# Patient Record
Sex: MALE | Race: WHITE | NOT HISPANIC OR LATINO | Employment: OTHER | URBAN - NONMETROPOLITAN AREA
[De-identification: names, ages, dates, MRNs, and addresses within clinical notes are randomized per-mention and may not be internally consistent; named-entity substitution may affect disease eponyms.]

---

## 2017-01-17 ENCOUNTER — TRANSCRIBE ORDERS (OUTPATIENT)
Dept: LAB | Facility: MEDICAL CENTER | Age: 82
End: 2017-01-17

## 2017-01-17 ENCOUNTER — APPOINTMENT (OUTPATIENT)
Dept: LAB | Facility: MEDICAL CENTER | Age: 82
End: 2017-01-17
Payer: COMMERCIAL

## 2017-01-17 DIAGNOSIS — I48.20 CHRONIC ATRIAL FIBRILLATION (HCC): Primary | ICD-10-CM

## 2017-01-17 DIAGNOSIS — I48.20 CHRONIC ATRIAL FIBRILLATION (HCC): ICD-10-CM

## 2017-01-17 LAB
INR PPP: 2.65 (ref 0.86–1.16)
PROTHROMBIN TIME: 27.8 SECONDS (ref 12–14.3)

## 2017-01-17 PROCEDURE — 85610 PROTHROMBIN TIME: CPT

## 2017-01-17 PROCEDURE — 36415 COLL VENOUS BLD VENIPUNCTURE: CPT

## 2017-03-07 ENCOUNTER — TRANSCRIBE ORDERS (OUTPATIENT)
Dept: LAB | Facility: MEDICAL CENTER | Age: 82
End: 2017-03-07

## 2017-03-07 ENCOUNTER — APPOINTMENT (OUTPATIENT)
Dept: LAB | Facility: MEDICAL CENTER | Age: 82
End: 2017-03-07
Payer: COMMERCIAL

## 2017-03-07 DIAGNOSIS — I48.20 CHRONIC ATRIAL FIBRILLATION (HCC): Primary | ICD-10-CM

## 2017-03-07 DIAGNOSIS — I48.20 CHRONIC ATRIAL FIBRILLATION (HCC): ICD-10-CM

## 2017-03-07 LAB
INR PPP: 1.77 (ref 0.86–1.16)
PROTHROMBIN TIME: 20.5 SECONDS (ref 12–14.3)

## 2017-03-07 PROCEDURE — 36415 COLL VENOUS BLD VENIPUNCTURE: CPT

## 2017-03-07 PROCEDURE — 85610 PROTHROMBIN TIME: CPT

## 2017-03-30 ENCOUNTER — TRANSCRIBE ORDERS (OUTPATIENT)
Dept: LAB | Facility: MEDICAL CENTER | Age: 82
End: 2017-03-30

## 2017-03-30 ENCOUNTER — APPOINTMENT (OUTPATIENT)
Dept: LAB | Facility: MEDICAL CENTER | Age: 82
End: 2017-03-30
Payer: COMMERCIAL

## 2017-03-30 DIAGNOSIS — I48.20 CHRONIC ATRIAL FIBRILLATION (HCC): Primary | ICD-10-CM

## 2017-03-30 DIAGNOSIS — I48.20 CHRONIC ATRIAL FIBRILLATION (HCC): ICD-10-CM

## 2017-03-30 LAB
INR PPP: 2.37 (ref 0.86–1.16)
PROTHROMBIN TIME: 25.6 SECONDS (ref 12–14.3)

## 2017-03-30 PROCEDURE — 36415 COLL VENOUS BLD VENIPUNCTURE: CPT

## 2017-03-30 PROCEDURE — 85610 PROTHROMBIN TIME: CPT

## 2017-04-27 ENCOUNTER — TRANSCRIBE ORDERS (OUTPATIENT)
Dept: LAB | Facility: MEDICAL CENTER | Age: 82
End: 2017-04-27

## 2017-04-27 ENCOUNTER — LAB (OUTPATIENT)
Dept: LAB | Facility: MEDICAL CENTER | Age: 82
End: 2017-04-27
Payer: COMMERCIAL

## 2017-04-27 DIAGNOSIS — E78.5 HYPERLIPIDEMIA, UNSPECIFIED HYPERLIPIDEMIA TYPE: ICD-10-CM

## 2017-04-27 DIAGNOSIS — E03.9 HYPOTHYROIDISM, UNSPECIFIED TYPE: ICD-10-CM

## 2017-04-27 DIAGNOSIS — I48.20 CHRONIC ATRIAL FIBRILLATION (HCC): Primary | ICD-10-CM

## 2017-04-27 DIAGNOSIS — I11.9 HYPERTENSIVE ARTERIOSCLEROTIC CARDIOVASCULAR DISEASE: ICD-10-CM

## 2017-04-27 DIAGNOSIS — E55.9 VITAMIN D DEFICIENCY: ICD-10-CM

## 2017-04-27 DIAGNOSIS — E11.8 TYPE 2 DIABETES MELLITUS WITH COMPLICATION, UNSPECIFIED LONG TERM INSULIN USE STATUS: ICD-10-CM

## 2017-04-27 DIAGNOSIS — I48.20 CHRONIC ATRIAL FIBRILLATION (HCC): ICD-10-CM

## 2017-04-27 LAB
25(OH)D3 SERPL-MCNC: 22.8 NG/ML (ref 30–100)
ALBUMIN SERPL BCP-MCNC: 3.5 G/DL (ref 3.5–5)
ALP SERPL-CCNC: 86 U/L (ref 46–116)
ALT SERPL W P-5'-P-CCNC: 32 U/L (ref 12–78)
ANION GAP SERPL CALCULATED.3IONS-SCNC: 8 MMOL/L (ref 4–13)
AST SERPL W P-5'-P-CCNC: 14 U/L (ref 5–45)
BASOPHILS # BLD AUTO: 0.02 THOUSANDS/ΜL (ref 0–0.1)
BASOPHILS NFR BLD AUTO: 0 % (ref 0–1)
BILIRUB SERPL-MCNC: 0.87 MG/DL (ref 0.2–1)
BUN SERPL-MCNC: 22 MG/DL (ref 5–25)
CALCIUM SERPL-MCNC: 8.5 MG/DL (ref 8.3–10.1)
CHLORIDE SERPL-SCNC: 109 MMOL/L (ref 100–108)
CHOLEST SERPL-MCNC: 172 MG/DL (ref 50–200)
CO2 SERPL-SCNC: 24 MMOL/L (ref 21–32)
CREAT SERPL-MCNC: 1.12 MG/DL (ref 0.6–1.3)
EOSINOPHIL # BLD AUTO: 0.15 THOUSAND/ΜL (ref 0–0.61)
EOSINOPHIL NFR BLD AUTO: 2 % (ref 0–6)
ERYTHROCYTE [DISTWIDTH] IN BLOOD BY AUTOMATED COUNT: 13.9 % (ref 11.6–15.1)
EST. AVERAGE GLUCOSE BLD GHB EST-MCNC: 163 MG/DL
GFR SERPL CREATININE-BSD FRML MDRD: >60 ML/MIN/1.73SQ M
GLUCOSE P FAST SERPL-MCNC: 156 MG/DL (ref 65–99)
HBA1C MFR BLD: 7.3 % (ref 4.2–6.3)
HCT VFR BLD AUTO: 43.1 % (ref 36.5–49.3)
HGB BLD-MCNC: 14.4 G/DL (ref 12–17)
INR PPP: 3.06 (ref 0.86–1.16)
LDLC SERPL DIRECT ASSAY-MCNC: 111 MG/DL (ref 0–100)
LYMPHOCYTES # BLD AUTO: 1.89 THOUSANDS/ΜL (ref 0.6–4.47)
LYMPHOCYTES NFR BLD AUTO: 25 % (ref 14–44)
MCH RBC QN AUTO: 30.3 PG (ref 26.8–34.3)
MCHC RBC AUTO-ENTMCNC: 33.4 G/DL (ref 31.4–37.4)
MCV RBC AUTO: 91 FL (ref 82–98)
MONOCYTES # BLD AUTO: 0.72 THOUSAND/ΜL (ref 0.17–1.22)
MONOCYTES NFR BLD AUTO: 9 % (ref 4–12)
NEUTROPHILS # BLD AUTO: 4.81 THOUSANDS/ΜL (ref 1.85–7.62)
NEUTS SEG NFR BLD AUTO: 64 % (ref 43–75)
NRBC BLD AUTO-RTO: 0 /100 WBCS
PLATELET # BLD AUTO: 175 THOUSANDS/UL (ref 149–390)
PMV BLD AUTO: 11 FL (ref 8.9–12.7)
POTASSIUM SERPL-SCNC: 4.2 MMOL/L (ref 3.5–5.3)
PROT SERPL-MCNC: 6.7 G/DL (ref 6.4–8.2)
PROTHROMBIN TIME: 32.1 SECONDS (ref 12.1–14.4)
RBC # BLD AUTO: 4.76 MILLION/UL (ref 3.88–5.62)
SODIUM SERPL-SCNC: 141 MMOL/L (ref 136–145)
TSH SERPL DL<=0.05 MIU/L-ACNC: 2.85 UIU/ML (ref 0.36–3.74)
WBC # BLD AUTO: 7.62 THOUSAND/UL (ref 4.31–10.16)

## 2017-04-27 PROCEDURE — 36415 COLL VENOUS BLD VENIPUNCTURE: CPT

## 2017-04-27 PROCEDURE — 82306 VITAMIN D 25 HYDROXY: CPT

## 2017-04-27 PROCEDURE — 85610 PROTHROMBIN TIME: CPT

## 2017-04-27 PROCEDURE — 84443 ASSAY THYROID STIM HORMONE: CPT

## 2017-04-27 PROCEDURE — 82465 ASSAY BLD/SERUM CHOLESTEROL: CPT

## 2017-04-27 PROCEDURE — 80053 COMPREHEN METABOLIC PANEL: CPT

## 2017-04-27 PROCEDURE — 83721 ASSAY OF BLOOD LIPOPROTEIN: CPT

## 2017-04-27 PROCEDURE — 85025 COMPLETE CBC W/AUTO DIFF WBC: CPT

## 2017-04-27 PROCEDURE — 83036 HEMOGLOBIN GLYCOSYLATED A1C: CPT

## 2017-05-04 ENCOUNTER — TRANSCRIBE ORDERS (OUTPATIENT)
Dept: ADMINISTRATIVE | Facility: HOSPITAL | Age: 82
End: 2017-05-04

## 2017-05-04 DIAGNOSIS — M54.9 BACK PAIN, UNSPECIFIED BACK LOCATION, UNSPECIFIED BACK PAIN LATERALITY, UNSPECIFIED CHRONICITY: Primary | ICD-10-CM

## 2017-05-08 ENCOUNTER — HOSPITAL ENCOUNTER (OUTPATIENT)
Dept: RADIOLOGY | Facility: HOSPITAL | Age: 82
Discharge: HOME | End: 2017-05-08
Payer: COMMERCIAL

## 2017-05-08 ENCOUNTER — TRANSCRIBE ORDERS (OUTPATIENT)
Dept: ADMINISTRATIVE | Facility: HOSPITAL | Age: 82
End: 2017-05-08

## 2017-05-08 DIAGNOSIS — M54.9 BACK PAIN, UNSPECIFIED BACK LOCATION, UNSPECIFIED BACK PAIN LATERALITY, UNSPECIFIED CHRONICITY: Primary | ICD-10-CM

## 2017-05-08 DIAGNOSIS — M54.9 BACK PAIN, UNSPECIFIED BACK LOCATION, UNSPECIFIED BACK PAIN LATERALITY, UNSPECIFIED CHRONICITY: ICD-10-CM

## 2017-05-08 PROCEDURE — 72110 X-RAY EXAM L-2 SPINE 4/>VWS: CPT

## 2017-05-18 ENCOUNTER — TRANSCRIBE ORDERS (OUTPATIENT)
Dept: LAB | Facility: MEDICAL CENTER | Age: 82
End: 2017-05-18

## 2017-05-18 ENCOUNTER — APPOINTMENT (OUTPATIENT)
Dept: LAB | Facility: MEDICAL CENTER | Age: 82
End: 2017-05-18
Payer: COMMERCIAL

## 2017-05-18 DIAGNOSIS — I48.91 ATRIAL FIBRILLATION, UNSPECIFIED TYPE (HCC): Primary | ICD-10-CM

## 2017-05-18 DIAGNOSIS — I48.91 ATRIAL FIBRILLATION, UNSPECIFIED TYPE (HCC): ICD-10-CM

## 2017-05-18 LAB
INR PPP: 2.35 (ref 0.86–1.16)
PROTHROMBIN TIME: 26 SECONDS (ref 12.1–14.4)

## 2017-05-18 PROCEDURE — 85610 PROTHROMBIN TIME: CPT

## 2017-05-18 PROCEDURE — 36415 COLL VENOUS BLD VENIPUNCTURE: CPT

## 2017-06-21 ENCOUNTER — TRANSCRIBE ORDERS (OUTPATIENT)
Dept: LAB | Facility: MEDICAL CENTER | Age: 82
End: 2017-06-21

## 2017-06-21 ENCOUNTER — APPOINTMENT (OUTPATIENT)
Dept: LAB | Facility: MEDICAL CENTER | Age: 82
End: 2017-06-21
Payer: COMMERCIAL

## 2017-06-21 DIAGNOSIS — I48.20 CHRONIC ATRIAL FIBRILLATION (HCC): ICD-10-CM

## 2017-06-21 DIAGNOSIS — I48.20 CHRONIC ATRIAL FIBRILLATION (HCC): Primary | ICD-10-CM

## 2017-06-21 LAB
INR PPP: 2.68 (ref 0.86–1.16)
PROTHROMBIN TIME: 28.9 SECONDS (ref 12.1–14.4)

## 2017-06-21 PROCEDURE — 85610 PROTHROMBIN TIME: CPT

## 2017-06-21 PROCEDURE — 36415 COLL VENOUS BLD VENIPUNCTURE: CPT

## 2017-08-01 ENCOUNTER — APPOINTMENT (OUTPATIENT)
Dept: LAB | Facility: MEDICAL CENTER | Age: 82
End: 2017-08-01
Payer: COMMERCIAL

## 2017-08-01 ENCOUNTER — TRANSCRIBE ORDERS (OUTPATIENT)
Dept: LAB | Facility: MEDICAL CENTER | Age: 82
End: 2017-08-01

## 2017-08-01 DIAGNOSIS — I48.20 CHRONIC ATRIAL FIBRILLATION (HCC): Primary | ICD-10-CM

## 2017-08-01 DIAGNOSIS — I48.20 CHRONIC ATRIAL FIBRILLATION (HCC): ICD-10-CM

## 2017-08-01 LAB
INR PPP: 2 (ref 0.86–1.16)
PROTHROMBIN TIME: 22.9 SECONDS (ref 12.1–14.4)

## 2017-08-01 PROCEDURE — 85610 PROTHROMBIN TIME: CPT

## 2017-08-01 PROCEDURE — 36415 COLL VENOUS BLD VENIPUNCTURE: CPT

## 2017-08-25 ENCOUNTER — TRANSCRIBE ORDERS (OUTPATIENT)
Dept: LAB | Facility: MEDICAL CENTER | Age: 82
End: 2017-08-25

## 2017-08-25 ENCOUNTER — APPOINTMENT (OUTPATIENT)
Dept: LAB | Facility: MEDICAL CENTER | Age: 82
End: 2017-08-25
Payer: COMMERCIAL

## 2017-08-25 DIAGNOSIS — C61 CANCER OF PROSTATE (HCC): ICD-10-CM

## 2017-08-25 DIAGNOSIS — I48.20 CHRONIC ATRIAL FIBRILLATION (HCC): Primary | ICD-10-CM

## 2017-08-25 DIAGNOSIS — Z79.01 LONG TERM (CURRENT) USE OF ANTICOAGULANTS: ICD-10-CM

## 2017-08-25 DIAGNOSIS — I48.20 CHRONIC ATRIAL FIBRILLATION (HCC): ICD-10-CM

## 2017-08-25 LAB
INR PPP: 3.38 (ref 0.86–1.16)
PROTHROMBIN TIME: 34.7 SECONDS (ref 12.1–14.4)
PSA SERPL-MCNC: 0.2 NG/ML (ref 0–4)

## 2017-08-25 PROCEDURE — 36415 COLL VENOUS BLD VENIPUNCTURE: CPT

## 2017-08-25 PROCEDURE — 85610 PROTHROMBIN TIME: CPT

## 2017-08-25 PROCEDURE — G0103 PSA SCREENING: HCPCS

## 2017-09-06 ENCOUNTER — ALLSCRIPTS OFFICE VISIT (OUTPATIENT)
Dept: OTHER | Facility: OTHER | Age: 82
End: 2017-09-06

## 2017-09-06 LAB
BILIRUB UR QL STRIP: NORMAL
CLARITY UR: NORMAL
COLOR UR: YELLOW
GLUCOSE (HISTORICAL): NORMAL
HGB UR QL STRIP.AUTO: NORMAL
KETONES UR STRIP-MCNC: NORMAL MG/DL
LEUKOCYTE ESTERASE UR QL STRIP: NORMAL
NITRITE UR QL STRIP: NORMAL
PH UR STRIP.AUTO: 5.5 [PH]
PROT UR STRIP-MCNC: NORMAL MG/DL
SP GR UR STRIP.AUTO: 1.02
UROBILINOGEN UR QL STRIP.AUTO: 0.2

## 2017-09-12 ENCOUNTER — TRANSCRIBE ORDERS (OUTPATIENT)
Dept: LAB | Facility: MEDICAL CENTER | Age: 82
End: 2017-09-12

## 2017-09-12 ENCOUNTER — APPOINTMENT (OUTPATIENT)
Dept: LAB | Facility: MEDICAL CENTER | Age: 82
End: 2017-09-12
Payer: COMMERCIAL

## 2017-09-12 DIAGNOSIS — Z79.01 LONG TERM (CURRENT) USE OF ANTICOAGULANTS: ICD-10-CM

## 2017-09-12 DIAGNOSIS — Z86.010 PERSONAL HISTORY OF COLONIC POLYPS: ICD-10-CM

## 2017-09-12 DIAGNOSIS — I48.20 CHRONIC ATRIAL FIBRILLATION (HCC): ICD-10-CM

## 2017-09-12 DIAGNOSIS — I48.20 CHRONIC ATRIAL FIBRILLATION (HCC): Primary | ICD-10-CM

## 2017-09-12 DIAGNOSIS — Z01.818 PREOP EXAMINATION: ICD-10-CM

## 2017-09-12 LAB
ERYTHROCYTE [DISTWIDTH] IN BLOOD BY AUTOMATED COUNT: 14 % (ref 11.6–15.1)
HCT VFR BLD AUTO: 43 % (ref 36.5–49.3)
HGB BLD-MCNC: 14.5 G/DL (ref 12–17)
INR PPP: 2.38 (ref 0.86–1.16)
MCH RBC QN AUTO: 30.5 PG (ref 26.8–34.3)
MCHC RBC AUTO-ENTMCNC: 33.7 G/DL (ref 31.4–37.4)
MCV RBC AUTO: 90 FL (ref 82–98)
PLATELET # BLD AUTO: 182 THOUSANDS/UL (ref 149–390)
PMV BLD AUTO: 10.9 FL (ref 8.9–12.7)
PROTHROMBIN TIME: 26.3 SECONDS (ref 12.1–14.4)
RBC # BLD AUTO: 4.76 MILLION/UL (ref 3.88–5.62)
WBC # BLD AUTO: 7.35 THOUSAND/UL (ref 4.31–10.16)

## 2017-09-12 PROCEDURE — 36415 COLL VENOUS BLD VENIPUNCTURE: CPT

## 2017-09-12 PROCEDURE — 85027 COMPLETE CBC AUTOMATED: CPT

## 2017-09-12 PROCEDURE — 85610 PROTHROMBIN TIME: CPT

## 2017-10-18 ENCOUNTER — APPOINTMENT (OUTPATIENT)
Dept: LAB | Facility: MEDICAL CENTER | Age: 82
End: 2017-10-18
Payer: COMMERCIAL

## 2017-10-18 ENCOUNTER — TRANSCRIBE ORDERS (OUTPATIENT)
Dept: RADIOLOGY | Facility: MEDICAL CENTER | Age: 82
End: 2017-10-18

## 2017-10-18 DIAGNOSIS — I48.20 CHRONIC ATRIAL FIBRILLATION (HCC): Primary | ICD-10-CM

## 2017-10-18 DIAGNOSIS — Z79.01 LONG-TERM (CURRENT) USE OF ANTICOAGULANTS: ICD-10-CM

## 2017-10-18 DIAGNOSIS — I48.20 CHRONIC ATRIAL FIBRILLATION (HCC): ICD-10-CM

## 2017-10-18 LAB
INR PPP: 2.27 (ref 0.86–1.16)
PROTHROMBIN TIME: 25.3 SECONDS (ref 12.1–14.4)

## 2017-10-18 PROCEDURE — 85610 PROTHROMBIN TIME: CPT

## 2017-10-18 PROCEDURE — 36415 COLL VENOUS BLD VENIPUNCTURE: CPT

## 2017-11-15 ENCOUNTER — TRANSCRIBE ORDERS (OUTPATIENT)
Dept: LAB | Facility: MEDICAL CENTER | Age: 82
End: 2017-11-15

## 2017-11-15 ENCOUNTER — APPOINTMENT (OUTPATIENT)
Dept: LAB | Facility: MEDICAL CENTER | Age: 82
End: 2017-11-15
Payer: COMMERCIAL

## 2017-11-15 DIAGNOSIS — I48.20 CHRONIC ATRIAL FIBRILLATION (HCC): ICD-10-CM

## 2017-11-15 DIAGNOSIS — I11.0 HYPERTENSIVE CHF (HCC): ICD-10-CM

## 2017-11-15 DIAGNOSIS — E11.8 TYPE 2 DIABETES MELLITUS WITH COMPLICATION, UNSPECIFIED LONG TERM INSULIN USE STATUS: Primary | ICD-10-CM

## 2017-11-15 DIAGNOSIS — E55.9 VITAMIN D DEFICIENCY: ICD-10-CM

## 2017-11-15 DIAGNOSIS — Z79.01 LONG-TERM (CURRENT) USE OF ANTICOAGULANTS: ICD-10-CM

## 2017-11-15 DIAGNOSIS — E03.9 HYPOTHYROIDISM, UNSPECIFIED TYPE: ICD-10-CM

## 2017-11-15 DIAGNOSIS — E11.8 TYPE 2 DIABETES MELLITUS WITH COMPLICATION, UNSPECIFIED LONG TERM INSULIN USE STATUS: ICD-10-CM

## 2017-11-15 LAB
25(OH)D3 SERPL-MCNC: 31.1 NG/ML (ref 30–100)
ALBUMIN SERPL BCP-MCNC: 3.5 G/DL (ref 3.5–5)
ALP SERPL-CCNC: 88 U/L (ref 46–116)
ALT SERPL W P-5'-P-CCNC: 29 U/L (ref 12–78)
ANION GAP SERPL CALCULATED.3IONS-SCNC: 8 MMOL/L (ref 4–13)
AST SERPL W P-5'-P-CCNC: 15 U/L (ref 5–45)
BASOPHILS # BLD AUTO: 0.01 THOUSANDS/ΜL (ref 0–0.1)
BASOPHILS NFR BLD AUTO: 0 % (ref 0–1)
BILIRUB SERPL-MCNC: 0.87 MG/DL (ref 0.2–1)
BUN SERPL-MCNC: 24 MG/DL (ref 5–25)
CALCIUM SERPL-MCNC: 8.5 MG/DL (ref 8.3–10.1)
CHLORIDE SERPL-SCNC: 105 MMOL/L (ref 100–108)
CHOLEST SERPL-MCNC: 155 MG/DL (ref 50–200)
CO2 SERPL-SCNC: 25 MMOL/L (ref 21–32)
CREAT SERPL-MCNC: 1.21 MG/DL (ref 0.6–1.3)
EOSINOPHIL # BLD AUTO: 0.13 THOUSAND/ΜL (ref 0–0.61)
EOSINOPHIL NFR BLD AUTO: 2 % (ref 0–6)
ERYTHROCYTE [DISTWIDTH] IN BLOOD BY AUTOMATED COUNT: 14.1 % (ref 11.6–15.1)
EST. AVERAGE GLUCOSE BLD GHB EST-MCNC: 166 MG/DL
GFR SERPL CREATININE-BSD FRML MDRD: 55 ML/MIN/1.73SQ M
GLUCOSE P FAST SERPL-MCNC: 145 MG/DL (ref 65–99)
HBA1C MFR BLD: 7.4 % (ref 4.2–6.3)
HCT VFR BLD AUTO: 42.1 % (ref 36.5–49.3)
HDLC SERPL-MCNC: 36 MG/DL (ref 40–60)
HGB BLD-MCNC: 14.3 G/DL (ref 12–17)
INR PPP: 2.61 (ref 0.86–1.16)
LDLC SERPL CALC-MCNC: 93 MG/DL (ref 0–100)
LYMPHOCYTES # BLD AUTO: 1.66 THOUSANDS/ΜL (ref 0.6–4.47)
LYMPHOCYTES NFR BLD AUTO: 23 % (ref 14–44)
MCH RBC QN AUTO: 30.7 PG (ref 26.8–34.3)
MCHC RBC AUTO-ENTMCNC: 34 G/DL (ref 31.4–37.4)
MCV RBC AUTO: 90 FL (ref 82–98)
MONOCYTES # BLD AUTO: 0.74 THOUSAND/ΜL (ref 0.17–1.22)
MONOCYTES NFR BLD AUTO: 10 % (ref 4–12)
NEUTROPHILS # BLD AUTO: 4.67 THOUSANDS/ΜL (ref 1.85–7.62)
NEUTS SEG NFR BLD AUTO: 65 % (ref 43–75)
NRBC BLD AUTO-RTO: 0 /100 WBCS
PLATELET # BLD AUTO: 185 THOUSANDS/UL (ref 149–390)
PMV BLD AUTO: 10.9 FL (ref 8.9–12.7)
POTASSIUM SERPL-SCNC: 4 MMOL/L (ref 3.5–5.3)
PROT SERPL-MCNC: 7.1 G/DL (ref 6.4–8.2)
PROTHROMBIN TIME: 28.3 SECONDS (ref 12.1–14.4)
RBC # BLD AUTO: 4.66 MILLION/UL (ref 3.88–5.62)
SODIUM SERPL-SCNC: 138 MMOL/L (ref 136–145)
TRIGL SERPL-MCNC: 131 MG/DL
TSH SERPL DL<=0.05 MIU/L-ACNC: 2.63 UIU/ML (ref 0.36–3.74)
WBC # BLD AUTO: 7.23 THOUSAND/UL (ref 4.31–10.16)

## 2017-11-15 PROCEDURE — 84443 ASSAY THYROID STIM HORMONE: CPT

## 2017-11-15 PROCEDURE — 83036 HEMOGLOBIN GLYCOSYLATED A1C: CPT

## 2017-11-15 PROCEDURE — 36415 COLL VENOUS BLD VENIPUNCTURE: CPT

## 2017-11-15 PROCEDURE — 80053 COMPREHEN METABOLIC PANEL: CPT

## 2017-11-15 PROCEDURE — 85025 COMPLETE CBC W/AUTO DIFF WBC: CPT

## 2017-11-15 PROCEDURE — 80061 LIPID PANEL: CPT

## 2017-11-15 PROCEDURE — 82306 VITAMIN D 25 HYDROXY: CPT

## 2017-11-15 PROCEDURE — 85610 PROTHROMBIN TIME: CPT

## 2017-12-18 ENCOUNTER — TRANSCRIBE ORDERS (OUTPATIENT)
Dept: RADIOLOGY | Facility: MEDICAL CENTER | Age: 82
End: 2017-12-18

## 2017-12-18 ENCOUNTER — APPOINTMENT (OUTPATIENT)
Dept: LAB | Facility: MEDICAL CENTER | Age: 82
End: 2017-12-18
Payer: COMMERCIAL

## 2017-12-18 DIAGNOSIS — I48.20 CHRONIC ATRIAL FIBRILLATION (HCC): ICD-10-CM

## 2017-12-18 DIAGNOSIS — I48.20 CHRONIC ATRIAL FIBRILLATION (HCC): Primary | ICD-10-CM

## 2017-12-18 LAB
INR PPP: 2.36 (ref 0.86–1.16)
PROTHROMBIN TIME: 26.1 SECONDS (ref 12.1–14.4)

## 2017-12-18 PROCEDURE — 85610 PROTHROMBIN TIME: CPT

## 2017-12-18 PROCEDURE — 36415 COLL VENOUS BLD VENIPUNCTURE: CPT

## 2018-01-13 VITALS
DIASTOLIC BLOOD PRESSURE: 78 MMHG | SYSTOLIC BLOOD PRESSURE: 128 MMHG | WEIGHT: 250 LBS | HEIGHT: 73 IN | BODY MASS INDEX: 33.13 KG/M2

## 2018-01-29 ENCOUNTER — APPOINTMENT (OUTPATIENT)
Dept: LAB | Facility: MEDICAL CENTER | Age: 83
End: 2018-01-29
Payer: COMMERCIAL

## 2018-01-29 ENCOUNTER — TRANSCRIBE ORDERS (OUTPATIENT)
Dept: LAB | Facility: MEDICAL CENTER | Age: 83
End: 2018-01-29

## 2018-01-29 DIAGNOSIS — I48.20 CHRONIC ATRIAL FIBRILLATION (HCC): ICD-10-CM

## 2018-01-29 DIAGNOSIS — I48.20 CHRONIC ATRIAL FIBRILLATION (HCC): Primary | ICD-10-CM

## 2018-01-29 LAB
INR PPP: 3.61 (ref 0.86–1.16)
PROTHROMBIN TIME: 36.6 SECONDS (ref 12.1–14.4)

## 2018-01-29 PROCEDURE — 36415 COLL VENOUS BLD VENIPUNCTURE: CPT

## 2018-01-29 PROCEDURE — 85610 PROTHROMBIN TIME: CPT

## 2018-02-15 ENCOUNTER — APPOINTMENT (OUTPATIENT)
Dept: LAB | Facility: MEDICAL CENTER | Age: 83
End: 2018-02-15
Payer: COMMERCIAL

## 2018-02-15 ENCOUNTER — TRANSCRIBE ORDERS (OUTPATIENT)
Dept: RADIOLOGY | Facility: MEDICAL CENTER | Age: 83
End: 2018-02-15

## 2018-02-15 DIAGNOSIS — I48.20 CHRONIC ATRIAL FIBRILLATION (HCC): Primary | ICD-10-CM

## 2018-02-15 DIAGNOSIS — Z79.01 LONG-TERM (CURRENT) USE OF ANTICOAGULANTS: ICD-10-CM

## 2018-02-15 DIAGNOSIS — I48.20 CHRONIC ATRIAL FIBRILLATION (HCC): ICD-10-CM

## 2018-02-15 LAB
INR PPP: 2.52 (ref 0.86–1.16)
PROTHROMBIN TIME: 27.5 SECONDS (ref 12.1–14.4)

## 2018-02-15 PROCEDURE — 36415 COLL VENOUS BLD VENIPUNCTURE: CPT

## 2018-02-15 PROCEDURE — 85610 PROTHROMBIN TIME: CPT

## 2018-03-13 ENCOUNTER — TRANSCRIBE ORDERS (OUTPATIENT)
Dept: RADIOLOGY | Facility: MEDICAL CENTER | Age: 83
End: 2018-03-13

## 2018-03-13 ENCOUNTER — APPOINTMENT (OUTPATIENT)
Dept: LAB | Facility: MEDICAL CENTER | Age: 83
End: 2018-03-13
Payer: COMMERCIAL

## 2018-03-13 DIAGNOSIS — I48.20 CHRONIC ATRIAL FIBRILLATION (HCC): Primary | ICD-10-CM

## 2018-03-13 DIAGNOSIS — Z79.01 LONG-TERM (CURRENT) USE OF ANTICOAGULANTS: ICD-10-CM

## 2018-03-13 DIAGNOSIS — I48.20 CHRONIC ATRIAL FIBRILLATION (HCC): ICD-10-CM

## 2018-03-13 LAB
INR PPP: 2.06 (ref 0.86–1.16)
PROTHROMBIN TIME: 23.4 SECONDS (ref 12.1–14.4)

## 2018-03-13 PROCEDURE — 85610 PROTHROMBIN TIME: CPT

## 2018-03-13 PROCEDURE — 36415 COLL VENOUS BLD VENIPUNCTURE: CPT

## 2018-04-16 ENCOUNTER — APPOINTMENT (OUTPATIENT)
Dept: LAB | Facility: MEDICAL CENTER | Age: 83
End: 2018-04-16
Payer: COMMERCIAL

## 2018-04-16 ENCOUNTER — TRANSCRIBE ORDERS (OUTPATIENT)
Dept: LAB | Facility: MEDICAL CENTER | Age: 83
End: 2018-04-16

## 2018-04-16 DIAGNOSIS — I48.91 ATRIAL FIBRILLATION, UNSPECIFIED TYPE (HCC): ICD-10-CM

## 2018-04-16 DIAGNOSIS — I48.91 ATRIAL FIBRILLATION, UNSPECIFIED TYPE (HCC): Primary | ICD-10-CM

## 2018-04-16 DIAGNOSIS — Z79.01 LONG TERM (CURRENT) USE OF ANTICOAGULANTS: ICD-10-CM

## 2018-04-16 LAB
INR PPP: 2.85 (ref 0.86–1.16)
PROTHROMBIN TIME: 30.3 SECONDS (ref 12.1–14.4)

## 2018-04-16 PROCEDURE — 36415 COLL VENOUS BLD VENIPUNCTURE: CPT

## 2018-04-16 PROCEDURE — 85610 PROTHROMBIN TIME: CPT

## 2018-04-24 ENCOUNTER — APPOINTMENT (OUTPATIENT)
Dept: LAB | Facility: MEDICAL CENTER | Age: 83
End: 2018-04-24
Payer: COMMERCIAL

## 2018-04-24 ENCOUNTER — TRANSCRIBE ORDERS (OUTPATIENT)
Dept: LAB | Facility: MEDICAL CENTER | Age: 83
End: 2018-04-24

## 2018-04-24 DIAGNOSIS — I48.91 ATRIAL FIBRILLATION, UNSPECIFIED TYPE (HCC): Primary | ICD-10-CM

## 2018-04-24 DIAGNOSIS — I48.91 ATRIAL FIBRILLATION, UNSPECIFIED TYPE (HCC): ICD-10-CM

## 2018-04-24 DIAGNOSIS — Z79.01 LONG TERM (CURRENT) USE OF ANTICOAGULANTS: ICD-10-CM

## 2018-04-24 LAB
INR PPP: 1.8 (ref 0.86–1.16)
PROTHROMBIN TIME: 21 SECONDS (ref 12.1–14.4)

## 2018-04-24 PROCEDURE — 36415 COLL VENOUS BLD VENIPUNCTURE: CPT

## 2018-04-24 PROCEDURE — 85610 PROTHROMBIN TIME: CPT

## 2018-05-07 ENCOUNTER — TRANSCRIBE ORDERS (OUTPATIENT)
Dept: LAB | Facility: MEDICAL CENTER | Age: 83
End: 2018-05-07

## 2018-05-07 ENCOUNTER — APPOINTMENT (OUTPATIENT)
Dept: LAB | Facility: MEDICAL CENTER | Age: 83
End: 2018-05-07
Payer: COMMERCIAL

## 2018-05-07 DIAGNOSIS — I48.20 CHRONIC ATRIAL FIBRILLATION (HCC): ICD-10-CM

## 2018-05-07 DIAGNOSIS — Z79.01 LONG TERM (CURRENT) USE OF ANTICOAGULANTS: ICD-10-CM

## 2018-05-07 DIAGNOSIS — R60.9 EDEMA, UNSPECIFIED TYPE: Primary | ICD-10-CM

## 2018-05-07 DIAGNOSIS — R60.9 EDEMA, UNSPECIFIED TYPE: ICD-10-CM

## 2018-05-07 LAB
ANION GAP SERPL CALCULATED.3IONS-SCNC: 4 MMOL/L (ref 4–13)
BUN SERPL-MCNC: 18 MG/DL (ref 5–25)
CALCIUM SERPL-MCNC: 8.7 MG/DL (ref 8.3–10.1)
CHLORIDE SERPL-SCNC: 107 MMOL/L (ref 100–108)
CO2 SERPL-SCNC: 28 MMOL/L (ref 21–32)
CREAT SERPL-MCNC: 1.15 MG/DL (ref 0.6–1.3)
GFR SERPL CREATININE-BSD FRML MDRD: 59 ML/MIN/1.73SQ M
GLUCOSE P FAST SERPL-MCNC: 172 MG/DL (ref 65–99)
INR PPP: 2.33 (ref 0.86–1.16)
INR PPP: 2.33 (ref 0.86–1.17)
NT-PROBNP SERPL-MCNC: 1370 PG/ML
POTASSIUM SERPL-SCNC: 4.3 MMOL/L (ref 3.5–5.3)
PROTHROMBIN TIME: 25.8 SECONDS (ref 12.1–14.4)
SODIUM SERPL-SCNC: 139 MMOL/L (ref 136–145)

## 2018-05-07 PROCEDURE — 83880 ASSAY OF NATRIURETIC PEPTIDE: CPT

## 2018-05-07 PROCEDURE — 36415 COLL VENOUS BLD VENIPUNCTURE: CPT

## 2018-05-07 PROCEDURE — 85610 PROTHROMBIN TIME: CPT

## 2018-05-07 PROCEDURE — 80048 BASIC METABOLIC PNL TOTAL CA: CPT

## 2018-06-13 ENCOUNTER — APPOINTMENT (OUTPATIENT)
Dept: LAB | Facility: MEDICAL CENTER | Age: 83
End: 2018-06-13
Payer: COMMERCIAL

## 2018-06-13 ENCOUNTER — TRANSCRIBE ORDERS (OUTPATIENT)
Dept: ADMINISTRATIVE | Facility: HOSPITAL | Age: 83
End: 2018-06-13

## 2018-06-13 DIAGNOSIS — Z79.01 LONG TERM (CURRENT) USE OF ANTICOAGULANTS: ICD-10-CM

## 2018-06-13 DIAGNOSIS — I48.20 CHRONIC ATRIAL FIBRILLATION (HCC): Primary | ICD-10-CM

## 2018-06-13 DIAGNOSIS — I48.20 CHRONIC ATRIAL FIBRILLATION (HCC): ICD-10-CM

## 2018-06-13 LAB
INR PPP: 2.37 (ref 0.86–1.17)
INR PPP: 2.37 (ref 0.86–1.17)
PROTHROMBIN TIME: 26 SECONDS (ref 11.8–14.2)

## 2018-06-13 PROCEDURE — 85610 PROTHROMBIN TIME: CPT

## 2018-06-13 PROCEDURE — 36415 COLL VENOUS BLD VENIPUNCTURE: CPT

## 2018-07-09 ENCOUNTER — ANTICOAG VISIT (OUTPATIENT)
Dept: CARDIOLOGY CLINIC | Facility: CLINIC | Age: 83
End: 2018-07-09

## 2018-07-09 DIAGNOSIS — Z79.01 LONG TERM CURRENT USE OF ANTICOAGULANT: ICD-10-CM

## 2018-07-09 DIAGNOSIS — I48.91 ATRIAL FIBRILLATION, UNSPECIFIED TYPE (HCC): Primary | ICD-10-CM

## 2018-07-17 ENCOUNTER — TRANSCRIBE ORDERS (OUTPATIENT)
Dept: LAB | Facility: MEDICAL CENTER | Age: 83
End: 2018-07-17

## 2018-07-17 ENCOUNTER — APPOINTMENT (OUTPATIENT)
Dept: LAB | Facility: MEDICAL CENTER | Age: 83
End: 2018-07-17
Payer: COMMERCIAL

## 2018-07-17 DIAGNOSIS — I48.20 CHRONIC ATRIAL FIBRILLATION (HCC): ICD-10-CM

## 2018-07-17 DIAGNOSIS — Z79.01 LONG TERM (CURRENT) USE OF ANTICOAGULANTS: ICD-10-CM

## 2018-07-17 DIAGNOSIS — I48.20 CHRONIC ATRIAL FIBRILLATION (HCC): Primary | ICD-10-CM

## 2018-07-17 LAB
INR PPP: 2.47 (ref 0.86–1.17)
PROTHROMBIN TIME: 26.8 SECONDS (ref 11.8–14.2)

## 2018-07-17 PROCEDURE — 36415 COLL VENOUS BLD VENIPUNCTURE: CPT

## 2018-07-17 PROCEDURE — 85610 PROTHROMBIN TIME: CPT

## 2018-07-18 ENCOUNTER — ANTICOAG VISIT (OUTPATIENT)
Dept: CARDIOLOGY CLINIC | Facility: CLINIC | Age: 83
End: 2018-07-18

## 2018-07-19 NOTE — PROGRESS NOTES
If no changes in medication health or diet  No missed or extra doses  No s/s of bleeding TIA or CVA  No new ABX, NSAIDs OCT meds, or suppliments  Continue the the same dose and recheck in one month per Mike Nick  Left message for Uri Thornton and told him to call back if any issues

## 2018-08-15 ENCOUNTER — EVALUATION (OUTPATIENT)
Dept: PHYSICAL THERAPY | Facility: CLINIC | Age: 83
End: 2018-08-15
Payer: COMMERCIAL

## 2018-08-15 DIAGNOSIS — M54.50 ACUTE LEFT-SIDED LOW BACK PAIN WITHOUT SCIATICA: Primary | ICD-10-CM

## 2018-08-15 PROCEDURE — 97161 PT EVAL LOW COMPLEX 20 MIN: CPT | Performed by: PHYSICAL THERAPIST

## 2018-08-15 PROCEDURE — 97140 MANUAL THERAPY 1/> REGIONS: CPT | Performed by: PHYSICAL THERAPIST

## 2018-08-15 PROCEDURE — G8991 OTHER PT/OT GOAL STATUS: HCPCS | Performed by: PHYSICAL THERAPIST

## 2018-08-15 PROCEDURE — G8990 OTHER PT/OT CURRENT STATUS: HCPCS | Performed by: PHYSICAL THERAPIST

## 2018-08-15 PROCEDURE — 97110 THERAPEUTIC EXERCISES: CPT | Performed by: PHYSICAL THERAPIST

## 2018-08-15 NOTE — PROGRESS NOTES
PT Evaluation     Today's date: 8/15/2018  Patient name: Ritchie Elias  : 1935  MRN: [id number]  Referring provider: Kory Hager DO  Dx:   Encounter Diagnosis     ICD-10-CM    1  Acute left-sided low back pain without sciatica M54 5                   Assessment  Impairments: activity intolerance, impaired balance, impaired physical strength and pain with function    Assessment details: Ritchie Elias is a 80y o  year old male presenting to PT with pain, decreased range of motion, decreased strength, and decreased tolerance to activity  Signs and symptoms are consistent with referring diagnosis of low back pain, and more specifically consistent with sacro-iliac joint dysfucntion  He is found with functional leg length discrepancy on eval, which is corrected using muscle energy techniques  The existing impairments result in difficulty performing all standing and walking tasks due to pain  Yen Brower would benefit from skilled PT services to address these issues and to maximize function  Home exercise provided and all questions answered  Thank you for the referral     Understanding of Dx/Px/POC: good   Prognosis: good    Goals      SHORT TERM GOALS (2-4 WEEKS)    Increase lumbar spine AROM 25% in    Increase lower extremity strength 10lbs in all weak areas  Improve sitting posture and body mechanics  Standing/ADL tolerance >60 minutes          LONG TERM GOALS (DISCHARGE)    Demonstrate lumbar rotation >75%  Decrease pain to <2/10 with activity  Independent with HEP  Resolve leg length discrepancy    Plan  Planned modality interventions: cryotherapy  Planned therapy interventions: joint mobilization, manual therapy, therapeutic activities and therapeutic exercise        Subjective Evaluation    History of Present Illness  Date of onset: 2018  Mechanism of injury: Chaparrita Dasilva reports acute onset of back pain last week, which began while bending/twisting forward to reach his foot while standing    He immediately felt pain, and attempted to rest for 4 days to reduce his pain  Upon MD evaluation, his pain was controlled with medication, however he continues to have some pain in his L side low back/hip  Pain originated straight across his back in an SIJ pattern  Patient also c/o BLE neuropathy in stocking pattern, decreased BLE strength and impaired balance  Quality of life: good    Pain  Current pain ratin  At best pain ratin  At worst pain ratin  Location: L side low back/hip  Quality: dull ache, sharp and pressure  Relieving factors: rest and change in position  Aggravating factors: lifting and walking  Progression: improved          Objective     Observations     Additional Observation Details  Leg length discrepancy   LLE>RLE by 1 5 inches, consistent with L anterior rotation of innominate  Strength/Myotome Testing     Additional Strength Details  Hip flexion  R: 32#  L: 35#  Knee extension  R: 44#  L:37#  Dorsiflexion  R: 52#  L: 59#      Flowsheet Rows      Most Recent Value   PT/OT G-Codes   Current Score  52   Projected Score  74   Assessment Type  Evaluation   G code set  Other PT/OT Primary   Other PT Primary Current Status ()  CK   Other PT Primary Goal Status ()  CJ        Precautions: n/a    Daily Treatment Diary         Manual  8-15            P/A Mob             Flex/Rot Mob             STM             Muscle energy 8'                         Exercise Diary              Bike L5 10'            Bridge 2x10            Press Ups             LTR             DKTC             HR/TR nv            Anti Rotation             squats nv            Dead Bug             Abd bracing nv            Step ups nv            Lat Pulldown:  S             Rotary Torso             Back Extension nv                                                                                                       Modalities              CP

## 2018-08-20 ENCOUNTER — APPOINTMENT (OUTPATIENT)
Dept: LAB | Facility: MEDICAL CENTER | Age: 83
End: 2018-08-20
Payer: COMMERCIAL

## 2018-08-20 ENCOUNTER — TRANSCRIBE ORDERS (OUTPATIENT)
Dept: LAB | Facility: MEDICAL CENTER | Age: 83
End: 2018-08-20

## 2018-08-20 DIAGNOSIS — I48.91 ATRIAL FIBRILLATION, UNSPECIFIED TYPE (HCC): Primary | ICD-10-CM

## 2018-08-20 DIAGNOSIS — Z79.01 LONG TERM (CURRENT) USE OF ANTICOAGULANTS: ICD-10-CM

## 2018-08-20 DIAGNOSIS — I48.91 ATRIAL FIBRILLATION, UNSPECIFIED TYPE (HCC): ICD-10-CM

## 2018-08-20 LAB
INR PPP: 3.39 (ref 0.86–1.17)
PROTHROMBIN TIME: 34.3 SECONDS (ref 11.8–14.2)

## 2018-08-20 PROCEDURE — 36415 COLL VENOUS BLD VENIPUNCTURE: CPT

## 2018-08-20 PROCEDURE — 85610 PROTHROMBIN TIME: CPT

## 2018-08-21 ENCOUNTER — ANTICOAG VISIT (OUTPATIENT)
Dept: CARDIOLOGY CLINIC | Facility: CLINIC | Age: 83
End: 2018-08-21

## 2018-08-21 DIAGNOSIS — Z79.01 LONG TERM (CURRENT) USE OF ANTICOAGULANTS: ICD-10-CM

## 2018-08-21 DIAGNOSIS — I48.19 PERSISTENT ATRIAL FIBRILLATION (HCC): Primary | ICD-10-CM

## 2018-08-21 NOTE — PROGRESS NOTES
If no changes in medication health or diet  No missed or extra doses  No s/s of bleeding TIA or CVA  No new ABX, NSAIDs OCT meds, or suppliments  Reduce dose as directed and check in two weeks     Semaj Arguello PA-C

## 2018-08-22 ENCOUNTER — OFFICE VISIT (OUTPATIENT)
Dept: PHYSICAL THERAPY | Facility: CLINIC | Age: 83
End: 2018-08-22
Payer: COMMERCIAL

## 2018-08-22 DIAGNOSIS — M54.50 ACUTE LEFT-SIDED LOW BACK PAIN WITHOUT SCIATICA: Primary | ICD-10-CM

## 2018-08-22 PROCEDURE — 97110 THERAPEUTIC EXERCISES: CPT

## 2018-08-22 PROCEDURE — 97112 NEUROMUSCULAR REEDUCATION: CPT

## 2018-08-22 PROCEDURE — 97010 HOT OR COLD PACKS THERAPY: CPT

## 2018-08-22 NOTE — PROGRESS NOTES
Daily Note     Today's date: 2018  Patient name: Finn Chinchilla  : 1935  MRN: [id number]  Referring provider: Joslyn Garza DO  Dx:   Encounter Diagnosis     ICD-10-CM    1  Acute left-sided low back pain without sciatica M54 5                   Subjective: Patient reports LBP has decreased since initial evaluation  Objective: See treatment diary below  Incorporated Full Te program today  Assessment: Tolerated treatment well  Patient had no change in LB t/o session today    Patient unable to lay on back due to rib pain  Plan: Continue per plan of care  Precautions: n/a     Daily Treatment Diary            Manual  8-15  8-22                   P/A Mob                       Flex/Rot Mob                       STM                       Muscle energy 8'                                             Exercise Diary                        Bike L5 10' 10'                   Bridge 2x10                     Press Ups                       LTR                       DKTC                       HR/TR nv  2x10                   Anti Rotation    L 3 2x10                   squats nv  2x10                   Dead Bug                       Abd bracing                      Step ups nv  6" 2x10                   Lat Pulldown:  S                       Rotary Torso                       Back Extension nv  80# 2x10                   Standing Hip x 3     3# 2x10                                                                                                                                                                   Modalities                        CP    10'

## 2018-08-24 NOTE — PROGRESS NOTES
Patient called because nobody called him in regards to his PT/INR  I gave him instructions, and he did state that he is was on Prednisone       Ok to continue lower dose especially if on Prednisone  thank you  EVELIN

## 2018-08-29 ENCOUNTER — OFFICE VISIT (OUTPATIENT)
Dept: PHYSICAL THERAPY | Facility: CLINIC | Age: 83
End: 2018-08-29
Payer: COMMERCIAL

## 2018-08-29 DIAGNOSIS — M54.50 ACUTE LEFT-SIDED LOW BACK PAIN WITHOUT SCIATICA: Primary | ICD-10-CM

## 2018-08-29 PROCEDURE — 97110 THERAPEUTIC EXERCISES: CPT

## 2018-08-29 PROCEDURE — 97010 HOT OR COLD PACKS THERAPY: CPT

## 2018-08-29 PROCEDURE — 97112 NEUROMUSCULAR REEDUCATION: CPT

## 2018-08-29 NOTE — PROGRESS NOTES
Daily Note     Today's date: 2018  Patient name: Dionisio Goltz  : 1935  MRN: [id number]  Referring provider: Sujatha Chatman DO  Dx:   Encounter Diagnosis     ICD-10-CM    1  Acute left-sided low back pain without sciatica M54 5                   Subjective: Patient reports that his LBP has decreased since initial visit  Ribs are still sore  Objective: See treatment diary below  Increased resistance on back extension today  Assessment: Tolerated treatment well  Patient had no change in LBP t/o program        Plan: Continue per plan of care  Potential discharge next session  Precautions: n/a     Daily Treatment Diary            Manual  8-15  8-22  8-29                 P/A Mob                       Flex/Rot Mob                       STM                       Muscle energy 8'                                             Exercise Diary                        Bike L5 10' 10'  10'                 Bridge 2x10                     Press Ups                       LTR                       DKTC                       HR/TR nv  2x10  2x10                 Anti Rotation    L 3 2x10  L 3 2x10                 squats nv  2x10  2x10                 Dead Bug                       Abd bracing                       Step ups nv  6" 2x10  6" 2x10                 Lat Pulldown:  S                       Rotary Torso                       Back Extension nv  80# 2x10  100# 2x10                 Standing Hip x 3     3# 2x10  3# 2x10                                                                                                                                                                 Modalities                        CP    10'  10'

## 2018-09-05 ENCOUNTER — OFFICE VISIT (OUTPATIENT)
Dept: PHYSICAL THERAPY | Facility: CLINIC | Age: 83
End: 2018-09-05
Payer: COMMERCIAL

## 2018-09-05 DIAGNOSIS — M54.50 ACUTE LEFT-SIDED LOW BACK PAIN WITHOUT SCIATICA: Primary | ICD-10-CM

## 2018-09-05 PROCEDURE — 97110 THERAPEUTIC EXERCISES: CPT | Performed by: PHYSICAL THERAPIST

## 2018-09-05 PROCEDURE — 97112 NEUROMUSCULAR REEDUCATION: CPT | Performed by: PHYSICAL THERAPIST

## 2018-09-05 PROCEDURE — G8992 OTHER PT/OT  D/C STATUS: HCPCS | Performed by: PHYSICAL THERAPIST

## 2018-09-05 PROCEDURE — G8991 OTHER PT/OT GOAL STATUS: HCPCS | Performed by: PHYSICAL THERAPIST

## 2018-09-05 NOTE — PROGRESS NOTES
PT Discharge    Today's date: 2018  Patient name: Noy Gasca  : 1935  MRN: [id number]  Referring provider: Francetta Merlin, DO  Dx:   Encounter Diagnosis     ICD-10-CM    1  Acute left-sided low back pain without sciatica M54 5                   Assessment  Impairments: activity intolerance, impaired balance, impaired physical strength and pain with function    Assessment details: Noy Gasca is a 80y o  year old male presenting to PT with pain with resolution of all previous symptoms  No further skilled PT indicated  HEP updated to include CARRIE to promote lumbar extension and to centralize symptoms if they return  Understanding of Dx/Px/POC: good   Prognosis: good    Goals      SHORT TERM GOALS (2-4 WEEKS)    Increase lumbar spine AROM 25% in    Increase lower extremity strength 10lbs in all weak areas  Improve sitting posture and body mechanics  Standing/ADL tolerance >60 minutes          LONG TERM GOALS (DISCHARGE)    Demonstrate lumbar rotation >75%  Decrease pain to <2/10 with activity  Independent with HEP  Resolve leg length discrepancy        Subjective Evaluation    History of Present Illness  Date of onset: 2018  Mechanism of injury: Albino Zuñiga reports no issues with his back since MD appointment and PT follow ups  He has occasional L side LBP if he leans forward for a prolonged period  Quality of life: good    Pain  Current pain ratin  At best pain ratin  At worst pain ratin  Location: L side low back/hip  Quality: dull ache, sharp and pressure  Relieving factors: rest and change in position  Aggravating factors: lifting and walking  Progression: improved    Patient Goals  Patient goals for therapy: decreased pain and return to sport/leisure activities          Objective     Observations     Additional Observation Details  Leg length discrepancy   LLE>RLE by 1 5 inches, consistent with L anterior rotation of innominate      Strength/Myotome Testing     Additional Strength Details  Hip flexion  R: 32#  L: 35#  Knee extension  R: 44#  L:37#  Dorsiflexion  R: 52#  L: 59#        Precautions: n/a     Daily Treatment Diary            Manual  8-15  8-22  8-29  9-5               P/A Mob                       Flex/Rot Mob                       STM                       Muscle energy 8'                                             Exercise Diary                        Bike L5 10' 10'  10'  L5 10'               Bridge 2x10                     Press Ups                       LTR                       DKTC                       HR/TR nv  2x10  2x10                 Anti Rotation    L 3 2x10  L 3 2x10  L4 2x10               squats nv  2x10  2x10  2x10               Dead Bug                       Abd bracing                       Step ups nv  6" 2x10  6" 2x10                 Lat Pulldown:  S                       Rotary Torso                       Back Extension nv  80# 2x10  100# 2x10  100#               Standing Hip x 3     3# 2x10  3# 2x10  5#                                                                                                                                                               Modalities                        CP    10'  10'

## 2018-09-13 ENCOUNTER — APPOINTMENT (OUTPATIENT)
Dept: LAB | Facility: MEDICAL CENTER | Age: 83
End: 2018-09-13
Payer: COMMERCIAL

## 2018-09-13 ENCOUNTER — TRANSCRIBE ORDERS (OUTPATIENT)
Dept: LAB | Facility: MEDICAL CENTER | Age: 83
End: 2018-09-13

## 2018-09-13 DIAGNOSIS — I48.91 ATRIAL FIBRILLATION, UNSPECIFIED TYPE (HCC): ICD-10-CM

## 2018-09-13 DIAGNOSIS — I48.91 ATRIAL FIBRILLATION, UNSPECIFIED TYPE (HCC): Primary | ICD-10-CM

## 2018-09-13 DIAGNOSIS — Z79.01 LONG TERM (CURRENT) USE OF ANTICOAGULANTS: ICD-10-CM

## 2018-09-13 LAB
INR PPP: 2.94 (ref 0.86–1.17)
INR PPP: 2.94 (ref 0.86–1.17)
PROTHROMBIN TIME: 30.7 SECONDS (ref 11.8–14.2)

## 2018-09-13 PROCEDURE — 85610 PROTHROMBIN TIME: CPT

## 2018-09-13 PROCEDURE — 36415 COLL VENOUS BLD VENIPUNCTURE: CPT

## 2018-09-14 ENCOUNTER — ANTICOAG VISIT (OUTPATIENT)
Dept: CARDIOLOGY CLINIC | Facility: CLINIC | Age: 83
End: 2018-09-14

## 2018-09-14 DIAGNOSIS — I48.19 PERSISTENT ATRIAL FIBRILLATION (HCC): Primary | ICD-10-CM

## 2018-09-14 DIAGNOSIS — Z79.01 LONG TERM (CURRENT) USE OF ANTICOAGULANTS: ICD-10-CM

## 2018-09-14 LAB — INR PPP: 2.94 (ref 0.86–1.17)

## 2018-09-14 NOTE — PROGRESS NOTES
Please advise  If no changes in medication health or diet  No missed or extra doses  No s/s of bleeding TIA or CVA  No new ABX, NSAIDs OCT meds, or suppliments  Continue the same dose and recheck in one month     Michelle Storey PA-C

## 2018-09-20 DIAGNOSIS — R60.9 EDEMA, UNSPECIFIED TYPE: Primary | ICD-10-CM

## 2018-09-20 RX ORDER — FUROSEMIDE 20 MG/1
TABLET ORAL
COMMUNITY
Start: 2018-08-09 | End: 2018-09-20 | Stop reason: SDUPTHER

## 2018-09-20 RX ORDER — FUROSEMIDE 20 MG/1
TABLET ORAL
Qty: 30 TABLET | Refills: 5 | Status: SHIPPED | OUTPATIENT
Start: 2018-09-20 | End: 2019-06-06 | Stop reason: SDUPTHER

## 2018-10-22 ENCOUNTER — APPOINTMENT (OUTPATIENT)
Dept: LAB | Facility: MEDICAL CENTER | Age: 83
End: 2018-10-22
Payer: COMMERCIAL

## 2018-10-22 ENCOUNTER — TRANSCRIBE ORDERS (OUTPATIENT)
Dept: LAB | Facility: MEDICAL CENTER | Age: 83
End: 2018-10-22

## 2018-10-22 DIAGNOSIS — Z79.01 LONG TERM (CURRENT) USE OF ANTICOAGULANTS: ICD-10-CM

## 2018-10-22 DIAGNOSIS — I48.20 CHRONIC ATRIAL FIBRILLATION (HCC): Primary | ICD-10-CM

## 2018-10-22 DIAGNOSIS — I48.20 CHRONIC ATRIAL FIBRILLATION (HCC): ICD-10-CM

## 2018-10-22 LAB
INR PPP: 2.58 (ref 0.86–1.17)
PROTHROMBIN TIME: 27.7 SECONDS (ref 11.8–14.2)

## 2018-10-22 PROCEDURE — 36415 COLL VENOUS BLD VENIPUNCTURE: CPT

## 2018-10-22 PROCEDURE — 85610 PROTHROMBIN TIME: CPT

## 2018-10-23 ENCOUNTER — ANTICOAG VISIT (OUTPATIENT)
Dept: CARDIOLOGY CLINIC | Facility: CLINIC | Age: 83
End: 2018-10-23

## 2018-10-23 DIAGNOSIS — I48.19 PERSISTENT ATRIAL FIBRILLATION (HCC): Primary | ICD-10-CM

## 2018-10-23 DIAGNOSIS — Z79.01 LONG TERM (CURRENT) USE OF ANTICOAGULANTS: ICD-10-CM

## 2018-10-23 NOTE — PROGRESS NOTES
Please advise    If no changes in medication health or diet  No missed or extra doses  No s/s of bleeding TIA or CVA  No new ABX, NSAIDs OCT meds, or supplements  Continue the same dose and recheck in one month  Jennifer Rice PA-C      Spoke to [facility] wife

## 2018-11-29 ENCOUNTER — APPOINTMENT (OUTPATIENT)
Dept: LAB | Facility: MEDICAL CENTER | Age: 83
End: 2018-11-29
Payer: COMMERCIAL

## 2018-11-29 ENCOUNTER — TRANSCRIBE ORDERS (OUTPATIENT)
Dept: RADIOLOGY | Facility: MEDICAL CENTER | Age: 83
End: 2018-11-29

## 2018-11-29 DIAGNOSIS — I48.91 ATRIAL FIBRILLATION, UNSPECIFIED TYPE (HCC): ICD-10-CM

## 2018-11-29 DIAGNOSIS — Z79.01 LONG TERM (CURRENT) USE OF ANTICOAGULANTS: ICD-10-CM

## 2018-11-29 DIAGNOSIS — I48.91 ATRIAL FIBRILLATION, UNSPECIFIED TYPE (HCC): Primary | ICD-10-CM

## 2018-11-29 LAB
INR PPP: 3.45 (ref 0.86–1.17)
PROTHROMBIN TIME: 34.7 SECONDS (ref 11.8–14.2)

## 2018-11-29 PROCEDURE — 36415 COLL VENOUS BLD VENIPUNCTURE: CPT

## 2018-11-29 PROCEDURE — 85610 PROTHROMBIN TIME: CPT

## 2018-11-30 ENCOUNTER — ANTICOAG VISIT (OUTPATIENT)
Dept: CARDIOLOGY CLINIC | Facility: CLINIC | Age: 83
End: 2018-11-30

## 2018-11-30 DIAGNOSIS — Z79.01 LONG TERM (CURRENT) USE OF ANTICOAGULANTS: ICD-10-CM

## 2018-11-30 DIAGNOSIS — I48.19 PERSISTENT ATRIAL FIBRILLATION (HCC): Primary | ICD-10-CM

## 2018-11-30 LAB
INR PPP: 3.45 (ref 0.86–1.17)
INR PPP: 3.45 (ref 0.86–1.17)

## 2018-11-30 NOTE — PATIENT INSTRUCTIONS
If no changes in medication health or diet  No missed or extra doses  No s/s of bleeding TIA or CVA  No new ABX, NSAIDs OCT meds, or supplements  Dose as instructed and recheck in 10-14 days  Carly Ortiz PA-C    [facility] to take 8 mg on Monday and Friday only, 6 mg the other 5 days, recheck in 2 weeks    PV

## 2018-12-19 ENCOUNTER — APPOINTMENT (OUTPATIENT)
Dept: LAB | Facility: MEDICAL CENTER | Age: 83
End: 2018-12-19
Payer: COMMERCIAL

## 2018-12-19 DIAGNOSIS — Z79.01 LONG TERM (CURRENT) USE OF ANTICOAGULANTS: ICD-10-CM

## 2018-12-19 DIAGNOSIS — I48.91 ATRIAL FIBRILLATION, UNSPECIFIED TYPE (HCC): ICD-10-CM

## 2018-12-19 LAB
INR PPP: 2.19 (ref 0.86–1.17)
PROTHROMBIN TIME: 24.4 SECONDS (ref 11.8–14.2)

## 2018-12-19 PROCEDURE — 85610 PROTHROMBIN TIME: CPT

## 2018-12-19 PROCEDURE — 36415 COLL VENOUS BLD VENIPUNCTURE: CPT

## 2018-12-20 ENCOUNTER — ANTICOAG VISIT (OUTPATIENT)
Dept: CARDIOLOGY CLINIC | Facility: CLINIC | Age: 83
End: 2018-12-20
Payer: COMMERCIAL

## 2018-12-20 DIAGNOSIS — Z79.01 LONG TERM (CURRENT) USE OF ANTICOAGULANTS: ICD-10-CM

## 2018-12-20 DIAGNOSIS — I48.19 PERSISTENT ATRIAL FIBRILLATION (HCC): Primary | ICD-10-CM

## 2018-12-20 PROCEDURE — 93793 ANTICOAG MGMT PT WARFARIN: CPT | Performed by: PHYSICIAN ASSISTANT

## 2018-12-20 PROCEDURE — 85610 PROTHROMBIN TIME: CPT | Performed by: PHYSICIAN ASSISTANT

## 2018-12-20 NOTE — PATIENT INSTRUCTIONS
If no changes in medication health or diet  No missed or extra doses  No s/s of bleeding TIA or CVA  No new ABX, NSAIDs OCT meds, or supplements  Dose as instructed and recheck in one month  Danyelle Osborn PA-C    [facility] to continue the same dose and recheck in one month    PV
No

## 2019-01-10 DIAGNOSIS — I10 ESSENTIAL HYPERTENSION: Primary | ICD-10-CM

## 2019-01-10 RX ORDER — METOPROLOL SUCCINATE 50 MG/1
TABLET, EXTENDED RELEASE ORAL
Qty: 180 TABLET | Refills: 0 | Status: SHIPPED | OUTPATIENT
Start: 2019-01-10 | End: 2019-04-04 | Stop reason: SDUPTHER

## 2019-01-22 ENCOUNTER — APPOINTMENT (OUTPATIENT)
Dept: LAB | Facility: MEDICAL CENTER | Age: 84
End: 2019-01-22
Payer: COMMERCIAL

## 2019-01-22 ENCOUNTER — TRANSCRIBE ORDERS (OUTPATIENT)
Dept: RADIOLOGY | Facility: MEDICAL CENTER | Age: 84
End: 2019-01-22

## 2019-01-22 DIAGNOSIS — Z79.01 LONG TERM (CURRENT) USE OF ANTICOAGULANTS: ICD-10-CM

## 2019-01-22 DIAGNOSIS — I48.20 CHRONIC ATRIAL FIBRILLATION (HCC): ICD-10-CM

## 2019-01-22 DIAGNOSIS — I48.20 CHRONIC ATRIAL FIBRILLATION (HCC): Primary | ICD-10-CM

## 2019-01-22 LAB
INR PPP: 2.67 (ref 0.86–1.17)
PROTHROMBIN TIME: 28.5 SECONDS (ref 11.8–14.2)

## 2019-01-22 PROCEDURE — 85610 PROTHROMBIN TIME: CPT

## 2019-01-22 PROCEDURE — 36415 COLL VENOUS BLD VENIPUNCTURE: CPT

## 2019-01-28 ENCOUNTER — ANTICOAG VISIT (OUTPATIENT)
Dept: CARDIOLOGY CLINIC | Facility: CLINIC | Age: 84
End: 2019-01-28
Payer: COMMERCIAL

## 2019-01-28 DIAGNOSIS — I48.19 PERSISTENT ATRIAL FIBRILLATION (HCC): Primary | ICD-10-CM

## 2019-01-28 DIAGNOSIS — Z79.01 LONG TERM (CURRENT) USE OF ANTICOAGULANTS: ICD-10-CM

## 2019-01-28 PROCEDURE — 93793 ANTICOAG MGMT PT WARFARIN: CPT | Performed by: PHYSICIAN ASSISTANT

## 2019-01-28 NOTE — PATIENT INSTRUCTIONS
If no changes in medication (no new abx, NSAIDs, steroids, OTC meds, or supplements), health, or diet, no missed or extra doses, and no s/sx of bleeding, TIA, or CVA, dose as instructed and recheck in 1 month   Spoke to patient   Georgiann Burkitt, PA-C

## 2019-03-04 ENCOUNTER — APPOINTMENT (OUTPATIENT)
Dept: LAB | Facility: MEDICAL CENTER | Age: 84
End: 2019-03-04
Payer: COMMERCIAL

## 2019-03-04 ENCOUNTER — TRANSCRIBE ORDERS (OUTPATIENT)
Dept: RADIOLOGY | Facility: MEDICAL CENTER | Age: 84
End: 2019-03-04

## 2019-03-04 DIAGNOSIS — Z51.81 MONITORING FOR LONG-TERM ANTICOAGULANT USE: ICD-10-CM

## 2019-03-04 DIAGNOSIS — I48.20 CHRONIC ATRIAL FIBRILLATION (HCC): Primary | ICD-10-CM

## 2019-03-04 DIAGNOSIS — Z79.01 MONITORING FOR LONG-TERM ANTICOAGULANT USE: ICD-10-CM

## 2019-03-04 DIAGNOSIS — I48.20 CHRONIC ATRIAL FIBRILLATION (HCC): ICD-10-CM

## 2019-03-04 LAB
INR PPP: 2.3 (ref 0.86–1.17)
PROTHROMBIN TIME: 25.4 SECONDS (ref 11.8–14.2)

## 2019-03-04 PROCEDURE — 36415 COLL VENOUS BLD VENIPUNCTURE: CPT

## 2019-03-04 PROCEDURE — 85610 PROTHROMBIN TIME: CPT

## 2019-03-05 ENCOUNTER — ANTICOAG VISIT (OUTPATIENT)
Dept: CARDIOLOGY CLINIC | Facility: CLINIC | Age: 84
End: 2019-03-05
Payer: COMMERCIAL

## 2019-03-05 DIAGNOSIS — I48.19 PERSISTENT ATRIAL FIBRILLATION (HCC): ICD-10-CM

## 2019-03-05 DIAGNOSIS — Z79.01 LONG TERM (CURRENT) USE OF ANTICOAGULANTS: Primary | ICD-10-CM

## 2019-03-05 PROCEDURE — 93793 ANTICOAG MGMT PT WARFARIN: CPT | Performed by: PHYSICIAN ASSISTANT

## 2019-03-05 NOTE — PATIENT INSTRUCTIONS
If no changes in medication (no new abx, NSAIDs, steroids, OTC meds, or supplements), health, or diet, no missed or extra doses, and no s/sx of bleeding, TIA, or CVA, dose as instructed and recheck in 1 month   Spoke to patient   Dong Gill PA-C

## 2019-04-04 DIAGNOSIS — I10 ESSENTIAL HYPERTENSION: ICD-10-CM

## 2019-04-04 RX ORDER — METOPROLOL SUCCINATE 50 MG/1
TABLET, EXTENDED RELEASE ORAL
Qty: 180 TABLET | Refills: 0 | Status: SHIPPED | OUTPATIENT
Start: 2019-04-04 | End: 2019-10-17 | Stop reason: SDUPTHER

## 2019-04-08 ENCOUNTER — TRANSCRIBE ORDERS (OUTPATIENT)
Dept: ADMINISTRATIVE | Facility: HOSPITAL | Age: 84
End: 2019-04-08

## 2019-04-08 ENCOUNTER — APPOINTMENT (OUTPATIENT)
Dept: LAB | Facility: MEDICAL CENTER | Age: 84
End: 2019-04-08
Payer: COMMERCIAL

## 2019-04-08 DIAGNOSIS — I48.20 CHRONIC ATRIAL FIBRILLATION (HCC): Primary | ICD-10-CM

## 2019-04-08 DIAGNOSIS — I48.20 CHRONIC ATRIAL FIBRILLATION (HCC): ICD-10-CM

## 2019-04-08 DIAGNOSIS — Z79.01 LONG TERM (CURRENT) USE OF ANTICOAGULANTS: ICD-10-CM

## 2019-04-08 LAB
INR PPP: 2.19 (ref 0.86–1.17)
PROTHROMBIN TIME: 24.4 SECONDS (ref 11.8–14.2)

## 2019-04-08 PROCEDURE — 85610 PROTHROMBIN TIME: CPT

## 2019-04-08 PROCEDURE — 36415 COLL VENOUS BLD VENIPUNCTURE: CPT

## 2019-04-09 ENCOUNTER — ANTICOAG VISIT (OUTPATIENT)
Dept: CARDIOLOGY CLINIC | Facility: CLINIC | Age: 84
End: 2019-04-09
Payer: COMMERCIAL

## 2019-04-09 DIAGNOSIS — I48.19 PERSISTENT ATRIAL FIBRILLATION (HCC): ICD-10-CM

## 2019-04-09 DIAGNOSIS — Z79.01 LONG TERM (CURRENT) USE OF ANTICOAGULANTS: ICD-10-CM

## 2019-04-09 PROCEDURE — 93793 ANTICOAG MGMT PT WARFARIN: CPT | Performed by: PHYSICIAN ASSISTANT

## 2019-04-22 ENCOUNTER — CONSULT (OUTPATIENT)
Dept: VASCULAR SURGERY | Facility: HOSPITAL | Age: 84
End: 2019-04-22
Payer: COMMERCIAL

## 2019-04-22 VITALS
WEIGHT: 243 LBS | HEIGHT: 73 IN | TEMPERATURE: 97.7 F | HEART RATE: 73 BPM | SYSTOLIC BLOOD PRESSURE: 152 MMHG | BODY MASS INDEX: 32.2 KG/M2 | DIASTOLIC BLOOD PRESSURE: 68 MMHG

## 2019-04-22 DIAGNOSIS — I87.2 CHRONIC VENOUS INSUFFICIENCY: Primary | ICD-10-CM

## 2019-04-22 PROCEDURE — 99202 OFFICE O/P NEW SF 15 MIN: CPT | Performed by: SURGERY

## 2019-04-22 RX ORDER — WARFARIN SODIUM 4 MG/1
TABLET ORAL
COMMUNITY
End: 2019-05-18 | Stop reason: SDUPTHER

## 2019-04-22 RX ORDER — ASPIRIN 81 MG/1
81 TABLET ORAL DAILY
COMMUNITY
End: 2021-07-14

## 2019-04-22 RX ORDER — ATORVASTATIN CALCIUM 40 MG/1
40 TABLET, FILM COATED ORAL DAILY
COMMUNITY
End: 2019-05-18 | Stop reason: SDUPTHER

## 2019-05-18 DIAGNOSIS — I48.19 PERSISTENT ATRIAL FIBRILLATION (HCC): Primary | ICD-10-CM

## 2019-05-20 ENCOUNTER — APPOINTMENT (OUTPATIENT)
Dept: LAB | Facility: MEDICAL CENTER | Age: 84
End: 2019-05-20
Payer: COMMERCIAL

## 2019-05-20 ENCOUNTER — TRANSCRIBE ORDERS (OUTPATIENT)
Dept: ADMINISTRATIVE | Facility: HOSPITAL | Age: 84
End: 2019-05-20

## 2019-05-20 DIAGNOSIS — I48.19 PERSISTENT ATRIAL FIBRILLATION (HCC): ICD-10-CM

## 2019-05-20 DIAGNOSIS — I48.20 CHRONIC ATRIAL FIBRILLATION (HCC): Primary | ICD-10-CM

## 2019-05-20 DIAGNOSIS — Z79.01 LONG TERM (CURRENT) USE OF ANTICOAGULANTS: ICD-10-CM

## 2019-05-20 DIAGNOSIS — I48.20 CHRONIC ATRIAL FIBRILLATION (HCC): ICD-10-CM

## 2019-05-20 LAB
INR PPP: 2.39 (ref 0.86–1.17)
PROTHROMBIN TIME: 26.1 SECONDS (ref 11.8–14.2)

## 2019-05-20 PROCEDURE — 85610 PROTHROMBIN TIME: CPT

## 2019-05-20 PROCEDURE — 36415 COLL VENOUS BLD VENIPUNCTURE: CPT

## 2019-05-20 RX ORDER — ATORVASTATIN CALCIUM 40 MG/1
40 TABLET, FILM COATED ORAL DAILY
Qty: 90 TABLET | Refills: 3 | Status: SHIPPED | OUTPATIENT
Start: 2019-05-20 | End: 2020-08-28 | Stop reason: SDUPTHER

## 2019-05-20 RX ORDER — WARFARIN SODIUM 4 MG/1
TABLET ORAL
Qty: 180 TABLET | Refills: 0 | Status: SHIPPED | OUTPATIENT
Start: 2019-05-20 | End: 2020-08-12 | Stop reason: SDUPTHER

## 2019-05-20 RX ORDER — ATORVASTATIN CALCIUM 40 MG/1
TABLET, FILM COATED ORAL
Qty: 90 TABLET | Refills: 0 | Status: SHIPPED | OUTPATIENT
Start: 2019-05-20 | End: 2019-05-20 | Stop reason: SDUPTHER

## 2019-05-21 ENCOUNTER — ANTICOAG VISIT (OUTPATIENT)
Dept: CARDIOLOGY CLINIC | Facility: CLINIC | Age: 84
End: 2019-05-21
Payer: COMMERCIAL

## 2019-05-21 DIAGNOSIS — I48.19 PERSISTENT ATRIAL FIBRILLATION (HCC): ICD-10-CM

## 2019-05-21 DIAGNOSIS — Z79.01 LONG TERM (CURRENT) USE OF ANTICOAGULANTS: ICD-10-CM

## 2019-05-21 PROCEDURE — 93793 ANTICOAG MGMT PT WARFARIN: CPT | Performed by: PHYSICIAN ASSISTANT

## 2019-06-06 ENCOUNTER — OFFICE VISIT (OUTPATIENT)
Dept: CARDIOLOGY CLINIC | Facility: CLINIC | Age: 84
End: 2019-06-06
Payer: COMMERCIAL

## 2019-06-06 VITALS
HEIGHT: 73 IN | WEIGHT: 245 LBS | HEART RATE: 75 BPM | BODY MASS INDEX: 32.47 KG/M2 | SYSTOLIC BLOOD PRESSURE: 110 MMHG | DIASTOLIC BLOOD PRESSURE: 60 MMHG

## 2019-06-06 DIAGNOSIS — R60.9 EDEMA, UNSPECIFIED TYPE: ICD-10-CM

## 2019-06-06 DIAGNOSIS — I87.2 CHRONIC VENOUS INSUFFICIENCY: ICD-10-CM

## 2019-06-06 DIAGNOSIS — I48.19 PERSISTENT ATRIAL FIBRILLATION (HCC): Primary | ICD-10-CM

## 2019-06-06 DIAGNOSIS — Z79.01 LONG TERM (CURRENT) USE OF ANTICOAGULANTS: ICD-10-CM

## 2019-06-06 DIAGNOSIS — I50.32 CHRONIC DIASTOLIC CONGESTIVE HEART FAILURE (HCC): ICD-10-CM

## 2019-06-06 PROCEDURE — 99214 OFFICE O/P EST MOD 30 MIN: CPT | Performed by: INTERNAL MEDICINE

## 2019-06-06 PROCEDURE — 93000 ELECTROCARDIOGRAM COMPLETE: CPT | Performed by: INTERNAL MEDICINE

## 2019-06-06 RX ORDER — FUROSEMIDE 20 MG/1
TABLET ORAL
Qty: 30 TABLET | Refills: 5 | Status: SHIPPED | OUTPATIENT
Start: 2019-06-06 | End: 2019-12-12 | Stop reason: SDUPTHER

## 2019-06-18 ENCOUNTER — APPOINTMENT (OUTPATIENT)
Dept: LAB | Facility: MEDICAL CENTER | Age: 84
End: 2019-06-18
Payer: COMMERCIAL

## 2019-06-18 ENCOUNTER — ANTICOAG VISIT (OUTPATIENT)
Dept: CARDIOLOGY CLINIC | Facility: CLINIC | Age: 84
End: 2019-06-18
Payer: COMMERCIAL

## 2019-06-18 DIAGNOSIS — R60.9 EDEMA, UNSPECIFIED TYPE: ICD-10-CM

## 2019-06-18 DIAGNOSIS — I48.19 PERSISTENT ATRIAL FIBRILLATION (HCC): ICD-10-CM

## 2019-06-18 DIAGNOSIS — Z79.01 LONG TERM (CURRENT) USE OF ANTICOAGULANTS: ICD-10-CM

## 2019-06-18 DIAGNOSIS — I48.19 PERSISTENT ATRIAL FIBRILLATION (HCC): Primary | ICD-10-CM

## 2019-06-18 LAB
ALBUMIN SERPL BCP-MCNC: 3.6 G/DL (ref 3.5–5)
ALP SERPL-CCNC: 90 U/L (ref 46–116)
ALT SERPL W P-5'-P-CCNC: 26 U/L (ref 12–78)
ANION GAP SERPL CALCULATED.3IONS-SCNC: 5 MMOL/L (ref 4–13)
AST SERPL W P-5'-P-CCNC: 13 U/L (ref 5–45)
BASOPHILS # BLD AUTO: 0.05 THOUSANDS/ΜL (ref 0–0.1)
BASOPHILS NFR BLD AUTO: 1 % (ref 0–1)
BILIRUB SERPL-MCNC: 0.84 MG/DL (ref 0.2–1)
BUN SERPL-MCNC: 20 MG/DL (ref 5–25)
CALCIUM SERPL-MCNC: 8.7 MG/DL (ref 8.3–10.1)
CHLORIDE SERPL-SCNC: 105 MMOL/L (ref 100–108)
CHOLEST SERPL-MCNC: 177 MG/DL (ref 50–200)
CO2 SERPL-SCNC: 27 MMOL/L (ref 21–32)
CREAT SERPL-MCNC: 1.3 MG/DL (ref 0.6–1.3)
EOSINOPHIL # BLD AUTO: 0.15 THOUSAND/ΜL (ref 0–0.61)
EOSINOPHIL NFR BLD AUTO: 2 % (ref 0–6)
ERYTHROCYTE [DISTWIDTH] IN BLOOD BY AUTOMATED COUNT: 13.2 % (ref 11.6–15.1)
GFR SERPL CREATININE-BSD FRML MDRD: 50 ML/MIN/1.73SQ M
GLUCOSE P FAST SERPL-MCNC: 208 MG/DL (ref 65–99)
HCT VFR BLD AUTO: 45.7 % (ref 36.5–49.3)
HDLC SERPL-MCNC: 40 MG/DL (ref 40–60)
HGB BLD-MCNC: 14.9 G/DL (ref 12–17)
IMM GRANULOCYTES # BLD AUTO: 0.04 THOUSAND/UL (ref 0–0.2)
IMM GRANULOCYTES NFR BLD AUTO: 0 % (ref 0–2)
INR PPP: 2.53 (ref 0.86–1.17)
LDLC SERPL CALC-MCNC: 105 MG/DL (ref 0–100)
LYMPHOCYTES # BLD AUTO: 2.26 THOUSANDS/ΜL (ref 0.6–4.47)
LYMPHOCYTES NFR BLD AUTO: 25 % (ref 14–44)
MCH RBC QN AUTO: 29.7 PG (ref 26.8–34.3)
MCHC RBC AUTO-ENTMCNC: 32.6 G/DL (ref 31.4–37.4)
MCV RBC AUTO: 91 FL (ref 82–98)
MONOCYTES # BLD AUTO: 0.81 THOUSAND/ΜL (ref 0.17–1.22)
MONOCYTES NFR BLD AUTO: 9 % (ref 4–12)
NEUTROPHILS # BLD AUTO: 5.69 THOUSANDS/ΜL (ref 1.85–7.62)
NEUTS SEG NFR BLD AUTO: 63 % (ref 43–75)
NONHDLC SERPL-MCNC: 137 MG/DL
NRBC BLD AUTO-RTO: 0 /100 WBCS
NT-PROBNP SERPL-MCNC: 1176 PG/ML
PLATELET # BLD AUTO: 164 THOUSANDS/UL (ref 149–390)
PMV BLD AUTO: 11 FL (ref 8.9–12.7)
POTASSIUM SERPL-SCNC: 4.3 MMOL/L (ref 3.5–5.3)
PROT SERPL-MCNC: 6.9 G/DL (ref 6.4–8.2)
PROTHROMBIN TIME: 27.3 SECONDS (ref 11.8–14.2)
RBC # BLD AUTO: 5.01 MILLION/UL (ref 3.88–5.62)
SODIUM SERPL-SCNC: 137 MMOL/L (ref 136–145)
TRIGL SERPL-MCNC: 158 MG/DL
WBC # BLD AUTO: 9 THOUSAND/UL (ref 4.31–10.16)

## 2019-06-18 PROCEDURE — 80053 COMPREHEN METABOLIC PANEL: CPT | Performed by: INTERNAL MEDICINE

## 2019-06-18 PROCEDURE — 80061 LIPID PANEL: CPT | Performed by: INTERNAL MEDICINE

## 2019-06-18 PROCEDURE — 85610 PROTHROMBIN TIME: CPT

## 2019-06-18 PROCEDURE — 36415 COLL VENOUS BLD VENIPUNCTURE: CPT | Performed by: INTERNAL MEDICINE

## 2019-06-18 PROCEDURE — 85025 COMPLETE CBC W/AUTO DIFF WBC: CPT | Performed by: INTERNAL MEDICINE

## 2019-06-18 PROCEDURE — 93793 ANTICOAG MGMT PT WARFARIN: CPT | Performed by: PHYSICIAN ASSISTANT

## 2019-06-18 PROCEDURE — 83880 ASSAY OF NATRIURETIC PEPTIDE: CPT

## 2019-07-11 ENCOUNTER — APPOINTMENT (OUTPATIENT)
Dept: LAB | Facility: MEDICAL CENTER | Age: 84
End: 2019-07-11
Payer: COMMERCIAL

## 2019-07-11 ENCOUNTER — TRANSCRIBE ORDERS (OUTPATIENT)
Dept: ADMINISTRATIVE | Facility: HOSPITAL | Age: 84
End: 2019-07-11

## 2019-07-11 DIAGNOSIS — Z13.9 SCREENING FOR UNSPECIFIED CONDITION: ICD-10-CM

## 2019-07-11 DIAGNOSIS — I10 HYPERTENSION, UNSPECIFIED TYPE: ICD-10-CM

## 2019-07-11 DIAGNOSIS — E55.9 VITAMIN D DEFICIENCY DISEASE: ICD-10-CM

## 2019-07-11 DIAGNOSIS — E11.9 DIABETES MELLITUS WITHOUT COMPLICATION (HCC): ICD-10-CM

## 2019-07-11 DIAGNOSIS — E11.9 DIABETES MELLITUS WITHOUT COMPLICATION (HCC): Primary | ICD-10-CM

## 2019-07-11 LAB
25(OH)D3 SERPL-MCNC: 16.6 NG/ML (ref 30–100)
ALBUMIN SERPL BCP-MCNC: 3.6 G/DL (ref 3.5–5)
ALP SERPL-CCNC: 91 U/L (ref 46–116)
ALT SERPL W P-5'-P-CCNC: 29 U/L (ref 12–78)
ANION GAP SERPL CALCULATED.3IONS-SCNC: 5 MMOL/L (ref 4–13)
AST SERPL W P-5'-P-CCNC: 13 U/L (ref 5–45)
BASOPHILS # BLD AUTO: 0.06 THOUSANDS/ΜL (ref 0–0.1)
BASOPHILS NFR BLD AUTO: 1 % (ref 0–1)
BILIRUB SERPL-MCNC: 0.86 MG/DL (ref 0.2–1)
BUN SERPL-MCNC: 26 MG/DL (ref 5–25)
CALCIUM SERPL-MCNC: 8.6 MG/DL (ref 8.3–10.1)
CHLORIDE SERPL-SCNC: 107 MMOL/L (ref 100–108)
CO2 SERPL-SCNC: 26 MMOL/L (ref 21–32)
CREAT SERPL-MCNC: 1.26 MG/DL (ref 0.6–1.3)
EOSINOPHIL # BLD AUTO: 0.14 THOUSAND/ΜL (ref 0–0.61)
EOSINOPHIL NFR BLD AUTO: 2 % (ref 0–6)
ERYTHROCYTE [DISTWIDTH] IN BLOOD BY AUTOMATED COUNT: 13.4 % (ref 11.6–15.1)
EST. AVERAGE GLUCOSE BLD GHB EST-MCNC: 209 MG/DL
GFR SERPL CREATININE-BSD FRML MDRD: 52 ML/MIN/1.73SQ M
GLUCOSE P FAST SERPL-MCNC: 212 MG/DL (ref 65–99)
HBA1C MFR BLD: 8.9 % (ref 4.2–6.3)
HCT VFR BLD AUTO: 44.5 % (ref 36.5–49.3)
HGB BLD-MCNC: 14.4 G/DL (ref 12–17)
IMM GRANULOCYTES # BLD AUTO: 0.02 THOUSAND/UL (ref 0–0.2)
IMM GRANULOCYTES NFR BLD AUTO: 0 % (ref 0–2)
INR PPP: 2.55 (ref 0.84–1.19)
LYMPHOCYTES # BLD AUTO: 1.53 THOUSANDS/ΜL (ref 0.6–4.47)
LYMPHOCYTES NFR BLD AUTO: 21 % (ref 14–44)
MCH RBC QN AUTO: 30 PG (ref 26.8–34.3)
MCHC RBC AUTO-ENTMCNC: 32.4 G/DL (ref 31.4–37.4)
MCV RBC AUTO: 93 FL (ref 82–98)
MONOCYTES # BLD AUTO: 0.71 THOUSAND/ΜL (ref 0.17–1.22)
MONOCYTES NFR BLD AUTO: 10 % (ref 4–12)
NEUTROPHILS # BLD AUTO: 4.73 THOUSANDS/ΜL (ref 1.85–7.62)
NEUTS SEG NFR BLD AUTO: 66 % (ref 43–75)
NRBC BLD AUTO-RTO: 0 /100 WBCS
PLATELET # BLD AUTO: 159 THOUSANDS/UL (ref 149–390)
PMV BLD AUTO: 11.4 FL (ref 8.9–12.7)
POTASSIUM SERPL-SCNC: 4.2 MMOL/L (ref 3.5–5.3)
PROT SERPL-MCNC: 7 G/DL (ref 6.4–8.2)
PROTHROMBIN TIME: 26.9 SECONDS (ref 11.6–14.5)
RBC # BLD AUTO: 4.8 MILLION/UL (ref 3.88–5.62)
SODIUM SERPL-SCNC: 138 MMOL/L (ref 136–145)
TSH SERPL DL<=0.05 MIU/L-ACNC: 3.16 UIU/ML (ref 0.36–3.74)
WBC # BLD AUTO: 7.19 THOUSAND/UL (ref 4.31–10.16)

## 2019-07-11 PROCEDURE — 80053 COMPREHEN METABOLIC PANEL: CPT

## 2019-07-11 PROCEDURE — 85610 PROTHROMBIN TIME: CPT

## 2019-07-11 PROCEDURE — 85025 COMPLETE CBC W/AUTO DIFF WBC: CPT

## 2019-07-11 PROCEDURE — 36415 COLL VENOUS BLD VENIPUNCTURE: CPT

## 2019-07-11 PROCEDURE — 84443 ASSAY THYROID STIM HORMONE: CPT

## 2019-07-11 PROCEDURE — 83036 HEMOGLOBIN GLYCOSYLATED A1C: CPT

## 2019-07-11 PROCEDURE — 82306 VITAMIN D 25 HYDROXY: CPT

## 2019-07-12 ENCOUNTER — ANTICOAG VISIT (OUTPATIENT)
Dept: CARDIOLOGY CLINIC | Facility: CLINIC | Age: 84
End: 2019-07-12
Payer: COMMERCIAL

## 2019-07-12 DIAGNOSIS — Z79.01 LONG TERM (CURRENT) USE OF ANTICOAGULANTS: ICD-10-CM

## 2019-07-12 DIAGNOSIS — I48.19 PERSISTENT ATRIAL FIBRILLATION (HCC): ICD-10-CM

## 2019-07-12 PROCEDURE — 93793 ANTICOAG MGMT PT WARFARIN: CPT | Performed by: PHYSICIAN ASSISTANT

## 2019-07-12 NOTE — PATIENT INSTRUCTIONS
If no changes in medication health or diet  No missed or extra doses  No s/s of bleeding TIA or CVA  No new ABX, NSAIDs OCT meds, or supplements  Dose as instructed and recheck in one month  [facility] #     Jovan Nino PA-C

## 2019-07-31 ENCOUNTER — CLINICAL SUPPORT (OUTPATIENT)
Dept: NUTRITION | Facility: HOSPITAL | Age: 84
End: 2019-07-31
Payer: COMMERCIAL

## 2019-07-31 VITALS — HEIGHT: 73 IN | WEIGHT: 235 LBS | BODY MASS INDEX: 31.14 KG/M2

## 2019-07-31 DIAGNOSIS — E11.9 TYPE 2 DIABETES MELLITUS WITHOUT COMPLICATION, WITHOUT LONG-TERM CURRENT USE OF INSULIN (HCC): ICD-10-CM

## 2019-07-31 PROCEDURE — 97802 MEDICAL NUTRITION INDIV IN: CPT

## 2019-07-31 NOTE — PROGRESS NOTES
Initial Nutrition Assessment Form    Patient Name: Nimo Camacho    YOB: 1935    Sex: Male     Assessment Date: 7/31/2019  Start Time: 1200 Stop Time: 1300 Total Minutes: 60     Data:  Present at session: Self/wife   Parent Concerns:    Medical Dx/Reason for Referral: diabetes   Past Medical History:   Diagnosis Date    [facility]     CAD (coronary artery disease)     Congest[facility] failure ([facility]F) ([facility] )     History of echocardiogram 04/05/2018    EF 0 55, Mild LVH  Mild moderate mitral regurg  Trace aortic regurg   HTN (hypertension)     Hyperlipidemia     Long term (current) use of anticoagulants 8/21/2018       Current Outpatient Medications   Medication Sig Dispense Refill    aspirin (ECOTRIN LOW STRENGTH) 81 mg EC tablet Take 81 mg by mouth daily      atorvastatin (LIPITOR) 40 mg tablet Take 1 tablet (40 mg total) by mouth daily 90 tablet 3    furosemide (LASIX) 20 mg tablet 1 daily 30 tablet 5    metoprolol succinate (TOPROL-XL) 50 mg 24 hr tablet TAKE ONE TABLET BY MOUTH 2 TIMES A  tablet 0    warfarin (COUMADIN) 4 mg tablet TAKE 2 TABLETS DAILY OR AS DIRECTED PENDING PT/INR RESULTS & [facility] DIRECTIONS AS NEEDED 180 tablet 0     No current facility-administered medications for this visit  Additional Meds/Supplements: Metformin prescribed 500 mg BID but did not tolerate, decreased to 500 mg once daily; tolerating   Special Learning Needs: none   Height: HC Readings from Last 3 Encounters:   No data found for [facility]       Weight: Wt Readings from Last 3 Encounters:   06/06/19 111 kg (245 lb)   04/22/19 110 kg (243 lb)   09/06/17 113 kg (250 lb)     There is no height or weight on file to calculate BMI     Recent Weight Change: []Yes     [x]No  Amount:       Energy Needs: 2000 calories/250 g carbs daily   No Known Allergies    Social History     Substance and Sexual Activity   Alcohol Use Never    Frequency: Never       Social History     Tobacco Use Smoking Status Never Smoker   Smokeless Tobacco Never Used       Who shops? spouse   Who cooks? spouse   Exercise: no   Prior Counseling? [x]Yes     []No  When: 2012   Why: diabetes        Diet Hx:  Breakfast: 8 a m  Oatmeal or eggs with toast, pancakes, waffles, platt, plum juice, coffee with half-n-half, 1/2 banana   Lunch: 12-1 p m  Leftovers, 1/2 sandwich, raw vegetables, diet green tea or water       Dinner: 5-6  p m  Cooked meal with meat, starch and vegetables, water       Snacks: AM - coffee and cookie    Of note patient was eating lots of candy including chocolates, licorice, goodies before blood work; has since cut all of it out of his diet        Nutrition Diagnosis:   Excess carbohydrate intake  related to Physiological causes requiring modified carbohydrate intake (i e  diabetes mellitus) as  evidenced by Hemoglobin A1C >6%       Medical Nutrition Therapy Intervention:  [x]Individualized Meal Plan [x]Understanding Lab Values   []Basic Pathophysiology of Disease []Food/Medication Interactions   []Food Diary [x]Exercise   [x]Lifestyle/Behavior Modification Techniques []Medication, Mechanism of Action   [x]Label Reading [x]Self Blood Glucose Monitoring   [x]Weight/BMI Goals []Other -    Other Notes:        Comprehension: []Excellent  []Very Good  [x]Good  []Fair   []Poor    Receptivity: []Excellent  []Very Good  [x]Good  []Fair   []Poor    Expected Compliance: []Excellent  []Very Good  [x]Good  []Fair   []Poor        Goals:  1  Carb counting with goal intake <250 g daily   2  Test blood sugar once daily but vary times   3    Eat starch, protein at each meal within carb range       Follow up September 4, 2019  Labs:  [facility]  Lab Results   Component Value Date     09/23/2015    K 4 2 07/11/2019     07/11/2019    CO2 26 07/11/2019    ANIONGAP 9 09/23/2015    BUN 26 (H) 07/11/2019    CREATININE 1 26 07/11/2019    GLUCOSE 133 09/23/2015    GLUF 212 (H) 07/11/2019    CALCIUM 8 6 07/11/2019 AST 13 07/11/2019    ALT 29 07/11/2019    ALKPHOS 91 07/11/2019    PROT 7 1 09/23/2015    BILITOT 0 85 09/23/2015    EGFR 52 07/11/2019       BMP  Lab Results   Component Value Date    GLUCOSE 133 09/23/2015    CALCIUM 8 6 07/11/2019     09/23/2015    K 4 2 07/11/2019    CO2 26 07/11/2019     07/11/2019    BUN 26 (H) 07/11/2019    CREATININE 1 26 07/11/2019       Lipids  Lab Results   Component Value Date    CHOL 164 09/23/2015     Lab Results   Component Value Date    HDL 40 06/18/2019    HDL 36 (L) 11/15/2017    HDL 35 (L) 10/28/2016     Lab Results   Component Value Date    LDLCALC 105 (H) 06/18/2019    LDLCALC 93 11/15/2017    LDLCALC 101 (H) 10/28/2016     Lab Results   Component Value Date    TRIG 158 (H) 06/18/2019    TRIG 131 11/15/2017    TRIG 130 10/28/2016     No results found for: CHOLHDL    Hemoglobin A1C  Lab Results   Component Value Date    HGBA1C 8 9 (H) 07/11/2019       Fasting Glucose  Lab Results   Component Value Date    GLUF 212 (H) 07/11/2019       Insulin     Thyroid  Lab Results   Component Value Date    S3NEZVH 0 8 06/18/2015       Hepatic Function Panel  Lab Results   Component Value Date    ALT 29 07/11/2019    AST 13 07/11/2019    ALKPHOS 91 07/11/2019    BILITOT 0 85 09/23/2015       Celiac Disease Antibody Panel  No results found for: ENDOMYSIAL IGA, GLIADIN IGA, GLIADIN IGG, IGA, TISSUE TRANSGLUT AB, TTG IGA   Iron  No results found for: IRON, TIBC, FERRITIN    Vitamins  No results found for: VITAMIN B2   No results found for: NICOTINAMIDE, NICOTINIC ACID   No results found for: VITAMINB6  Lab Results   Component Value Date    QQYYDGAL76 401 01/18/2016     No results found for: VITB5  No results found for: O2XGDZWY  No results found for: THYROGLB  No results found for: VITAMIN K   No results found for: 25-HYDROXY VIT D   No components found for: [address] [facility] MA,RD,LDN,CDE  [address] [facility] SERVICES  [place] [id number]

## 2019-08-21 ENCOUNTER — APPOINTMENT (OUTPATIENT)
Dept: LAB | Facility: MEDICAL CENTER | Age: 84
End: 2019-08-21
Payer: COMMERCIAL

## 2019-08-22 ENCOUNTER — ANTICOAG VISIT (OUTPATIENT)
Dept: CARDIOLOGY CLINIC | Facility: CLINIC | Age: 84
End: 2019-08-22
Payer: COMMERCIAL

## 2019-08-22 DIAGNOSIS — I48.19 PERSISTENT ATRIAL FIBRILLATION (HCC): ICD-10-CM

## 2019-08-22 DIAGNOSIS — Z79.01 LONG TERM (CURRENT) USE OF ANTICOAGULANTS: ICD-10-CM

## 2019-08-22 PROCEDURE — 93793 ANTICOAG MGMT PT WARFARIN: CPT | Performed by: PHYSICIAN ASSISTANT

## 2019-08-22 NOTE — PATIENT INSTRUCTIONS
If no changes in medication (no new abx, NSAIDs, steroids, OTC meds, or supplements), health, or diet, no missed or extra doses, and no s/sx of bleeding, TIA, or CVA, dose as instructed and recheck in 1 month   Spoke to pt's wife   Sarah Carmen, [place]

## 2019-09-04 ENCOUNTER — CLINICAL SUPPORT (OUTPATIENT)
Dept: NUTRITION | Facility: HOSPITAL | Age: 84
End: 2019-09-04
Payer: COMMERCIAL

## 2019-09-04 DIAGNOSIS — E11.9 TYPE 2 DIABETES MELLITUS WITHOUT COMPLICATION, WITHOUT LONG-TERM CURRENT USE OF INSULIN (HCC): ICD-10-CM

## 2019-09-04 PROCEDURE — 97803 MED NUTRITION INDIV SUBSEQ: CPT

## 2019-09-15 NOTE — PROGRESS NOTES
Follow-Up Nutrition Assessment Form    Patient Name: Nimo Camacho    YOB: 1935    Sex: Male      Follow Up Date: 9/4/19  Start Time: 1200 Stop Time: 200 Total Minutes: 30     Data:  Present at session: Self/wife   Parent Concerns:    Medical Dx/Reason for Referral: diabetes   Past Medical History:   Diagnosis Date    [facility]     CAD (coronary artery disease)     Congestive heart failure (CHF) ([facility] )     History of echocardiogram 04/05/2018    EF 0 55, Mild LVH  Mild moderate mitral regurg  Trace aortic regurg   HTN (hypertension)     Hyperlipidemia     Long term (current) use of anticoagulants 8/21/2018       Current Outpatient Medications   Medication Sig Dispense Refill    aspirin (ECOTRIN LOW STRENGTH) 81 mg EC tablet Take 81 mg by mouth daily      atorvastatin (LIPITOR) 40 mg tablet Take 1 tablet (40 mg total) by mouth daily 90 tablet 3    furosemide (LASIX) 20 mg tablet 1 daily 30 tablet 5    metoprolol succinate (TOPROL-XL) 50 mg 24 hr tablet TAKE ONE TABLET BY MOUTH 2 TIMES A  tablet 0    warfarin (COUMADIN) 4 mg tablet TAKE 2 TABLETS DAILY OR AS DIRECTED PENDING PT/INR RESULTS & CADC DIRECTIONS AS NEEDED 180 tablet 0     No current facility-administered medications for this visit  Additional Meds/Supplements: Increased metformin and is not tolerating   Barriers to Learning: None   Labs: Reviewed SMBG level; below 200 for most numbers   Height: Ht Readings from Last 3 Encounters:   07/31/19 6' 1" (1 854 m)   06/06/19 6' 1" (1 854 m)   04/22/19 6' 1" (1 854 m)      Weight: Wt Readings from Last 3 Encounters:   07/31/19 107 kg (235 lb)   06/06/19 111 kg (245 lb)   04/22/19 110 kg (243 lb)     There is no height or weight on file to calculate BMI  Wt  Change Since Last Visit: []Yes     [x]No  Amount:       Energy Needs: No change   Pain Screen: Are you having pain now? no      Goals Achieved:  1  Carb counting and staying within goal range  2  Testing blood sugars at various times  3  Consuming protein and starch at each meal     New Goals:   1  Protein and carb at HS snack   2  Fasting blood sugars still elevated   3  Initial PES: Excess carbohydrate intake  related to Physiological causes requiring modified carbohydrate intake (i e  diabetes mellitus) as  evidenced by Hemoglobin A1C >6%      New PES: No Change      New Problem List:  1  Consuming only carb at HS   2  Blood sugars improved but still above goal ranges   3  Assessment:  Patient seen for follow up visit regarding diabetes  He is counting his carbs and his blood sugars did improved  However, his fasting blood sugars remain elevated  He mentioned his PCP is fine if his blood sugars remain below 200 but patient is not happy with those numbers  He is now taking metformin as prescribed and tolerating  Wife is concerned he is not taking proper snack at HS and may explain elevated fasting sugars         Medical Nutrition Therapy Intervention:  [x]Individualized Meal Plan []Understanding Lab Values   []Basic Pathophysiology of Disease []Food/Medication Interactions   []Food Diary []Exercise   [x]Lifestyle/Behavior Modification Techniques []Medication, Mechanism of Action   []Label Reading [x]Self Blood Glucose Monitoring   []Weight/BMI Goals []Other -    Other Notes:        Comprehension: []Excellent  []Very Good  [x]Good  []Fair   []Poor    Receptivity: []Excellent  []Very Good  [x]Good  []Fair   []Poor    Expected Compliance: []Excellent  []Very Good  [x]Good  []Fair   []Poor      Labs:  CMP  Lab Results   Component Value Date     09/23/2015    K 4 2 07/11/2019     07/11/2019    CO2 26 07/11/2019    ANIONGAP 9 09/23/2015    BUN 26 (H) 07/11/2019    CREATININE 1 26 07/11/2019    GLUCOSE 133 09/23/2015    GLUF 212 (H) 07/11/2019    CALCIUM 8 6 07/11/2019    AST 13 07/11/2019    ALT 29 07/11/2019    ALKPHOS 91 07/11/2019    PROT 7 1 09/23/2015    BILITOT 0 85 09/23/2015    EGFR 52 07/11/2019       BMP  Lab Results   Component Value Date    GLUCOSE 133 09/23/2015    CALCIUM 8 6 07/11/2019     09/23/2015    K 4 2 07/11/2019    CO2 26 07/11/2019     07/11/2019    BUN 26 (H) 07/11/2019    CREATININE 1 26 07/11/2019       Lipids  Lab Results   Component Value Date    CHOL 164 09/23/2015     Lab Results   Component Value Date    HDL 40 06/18/2019    HDL 36 (L) 11/15/2017    HDL 35 (L) 10/28/2016     Lab Results   Component Value Date    LDLCALC 105 (H) 06/18/2019    LDLCALC 93 11/15/2017    LDLCALC 101 (H) 10/28/2016     Lab Results   Component Value Date    TRIG 158 (H) 06/18/2019    TRIG 131 11/15/2017    TRIG 130 10/28/2016     No results found for: CHOLHDL    Hemoglobin A1C  Lab Results   Component Value Date    HGBA1C 8 9 (H) 07/11/2019       Fasting Glucose  Lab Results   Component Value Date    GLUF 212 (H) 07/11/2019       Insulin     Thyroid  Lab Results   Component Value Date    E7WIXVC 0 8 06/18/2015       Hepatic Function Panel  Lab Results   Component Value Date    ALT 29 07/11/2019    AST 13 07/11/2019    ALKPHOS 91 07/11/2019    BILITOT 0 85 09/23/2015       Celiac Disease Antibody Panel  No results found for: ENDOMYSIAL IGA, GLIADIN IGA, GLIADIN IGG, IGA, TISSUE TRANSGLUT AB, TTG IGA   Iron  No results found for: IRON, TIBC, FERRITIN    Vitamins  No results found for: VITAMIN B2   No results found for: NICOTINAMIDE, NICOTINIC ACID   No results found for: VITAMINB6  Lab Results   Component Value Date    KMOCCKYG90 401 01/18/2016     No results found for: VITB5  No results found for: C0GIVHGP  No results found for: THYROGLB  No results found for: VITAMIN K   No results found for: 25-HYDROXY VIT D   No components found for: VITAMINE     Follow up October 9, 2019    Leopoldo Melendrez MA,RD,LDN,CDE  [facility]  [address] [facility] [id number]

## 2019-10-03 ENCOUNTER — APPOINTMENT (OUTPATIENT)
Dept: LAB | Facility: MEDICAL CENTER | Age: 84
End: 2019-10-03
Payer: COMMERCIAL

## 2019-10-04 ENCOUNTER — ANTICOAG VISIT (OUTPATIENT)
Dept: CARDIOLOGY CLINIC | Facility: CLINIC | Age: 84
End: 2019-10-04
Payer: COMMERCIAL

## 2019-10-04 DIAGNOSIS — I48.19 PERSISTENT ATRIAL FIBRILLATION (HCC): ICD-10-CM

## 2019-10-04 DIAGNOSIS — Z79.01 LONG TERM (CURRENT) USE OF ANTICOAGULANTS: ICD-10-CM

## 2019-10-04 PROCEDURE — 93793 ANTICOAG MGMT PT WARFARIN: CPT | Performed by: PHYSICIAN ASSISTANT

## 2019-10-04 NOTE — PATIENT INSTRUCTIONS
If no changes in medication (no new abx, NSAIDs, steroids, OTC meds, or supplements), health, or diet, no missed or extra doses, and no s/sx of bleeding, TIA, or CVA, dose as instructed and recheck in 1 month   Spoke to patient   Lisa Whitfield PA-C

## 2019-10-17 DIAGNOSIS — I10 ESSENTIAL HYPERTENSION: ICD-10-CM

## 2019-10-21 RX ORDER — METOPROLOL SUCCINATE 50 MG/1
TABLET, EXTENDED RELEASE ORAL
Qty: 180 TABLET | Refills: 0 | Status: SHIPPED | OUTPATIENT
Start: 2019-10-21 | End: 2019-12-12 | Stop reason: SDUPTHER

## 2019-11-02 ENCOUNTER — TRANSCRIBE ORDERS (OUTPATIENT)
Dept: LAB | Facility: MEDICAL CENTER | Age: 84
End: 2019-11-02

## 2019-11-02 ENCOUNTER — APPOINTMENT (OUTPATIENT)
Dept: LAB | Facility: MEDICAL CENTER | Age: 84
End: 2019-11-02
Payer: COMMERCIAL

## 2019-11-02 DIAGNOSIS — E11.40 TYPE 2 DIABETES MELLITUS WITH DIABETIC NEUROPATHY, WITH LONG-TERM CURRENT USE OF INSULIN (HCC): ICD-10-CM

## 2019-11-02 DIAGNOSIS — Z79.4 TYPE 2 DIABETES MELLITUS WITH DIABETIC NEUROPATHY, WITH LONG-TERM CURRENT USE OF INSULIN (HCC): Primary | ICD-10-CM

## 2019-11-02 DIAGNOSIS — Z79.4 TYPE 2 DIABETES MELLITUS WITH DIABETIC NEUROPATHY, WITH LONG-TERM CURRENT USE OF INSULIN (HCC): ICD-10-CM

## 2019-11-02 DIAGNOSIS — E11.40 TYPE 2 DIABETES MELLITUS WITH DIABETIC NEUROPATHY, WITH LONG-TERM CURRENT USE OF INSULIN (HCC): Primary | ICD-10-CM

## 2019-11-02 LAB
EST. AVERAGE GLUCOSE BLD GHB EST-MCNC: 140 MG/DL
HBA1C MFR BLD: 6.5 % (ref 4.2–6.3)

## 2019-11-02 PROCEDURE — 36415 COLL VENOUS BLD VENIPUNCTURE: CPT

## 2019-11-02 PROCEDURE — 83036 HEMOGLOBIN GLYCOSYLATED A1C: CPT

## 2019-12-12 ENCOUNTER — OFFICE VISIT (OUTPATIENT)
Dept: CARDIOLOGY CLINIC | Facility: CLINIC | Age: 84
End: 2019-12-12
Payer: COMMERCIAL

## 2019-12-12 VITALS
BODY MASS INDEX: 30.35 KG/M2 | HEIGHT: 73 IN | WEIGHT: 229 LBS | DIASTOLIC BLOOD PRESSURE: 70 MMHG | HEART RATE: 75 BPM | SYSTOLIC BLOOD PRESSURE: 120 MMHG

## 2019-12-12 DIAGNOSIS — I10 ESSENTIAL HYPERTENSION: ICD-10-CM

## 2019-12-12 DIAGNOSIS — I48.19 PERSISTENT ATRIAL FIBRILLATION (HCC): ICD-10-CM

## 2019-12-12 DIAGNOSIS — R60.9 EDEMA, UNSPECIFIED TYPE: ICD-10-CM

## 2019-12-12 DIAGNOSIS — I50.32 CHRONIC DIASTOLIC CONGESTIVE HEART FAILURE (HCC): Primary | ICD-10-CM

## 2019-12-12 PROCEDURE — 3074F SYST BP LT 130 MM HG: CPT | Performed by: INTERNAL MEDICINE

## 2019-12-12 PROCEDURE — 99214 OFFICE O/P EST MOD 30 MIN: CPT | Performed by: INTERNAL MEDICINE

## 2019-12-12 PROCEDURE — 3078F DIAST BP <80 MM HG: CPT | Performed by: INTERNAL MEDICINE

## 2019-12-12 PROCEDURE — 93000 ELECTROCARDIOGRAM COMPLETE: CPT | Performed by: INTERNAL MEDICINE

## 2019-12-12 RX ORDER — METOPROLOL SUCCINATE 50 MG/1
50 TABLET, EXTENDED RELEASE ORAL 2 TIMES DAILY
Qty: 180 TABLET | Refills: 3 | Status: SHIPPED | OUTPATIENT
Start: 2019-12-12 | End: 2021-01-07 | Stop reason: SDUPTHER

## 2019-12-12 RX ORDER — FUROSEMIDE 20 MG/1
TABLET ORAL
Qty: 45 TABLET | Refills: 3 | Status: SHIPPED | OUTPATIENT
Start: 2019-12-12 | End: 2021-01-07 | Stop reason: SDUPTHER

## 2019-12-12 NOTE — PROGRESS NOTES
Subjective:        Patient ID: Misa Castillo is a 80 y o  male  Chief Complaint:  Brannon Newman is here for routine follow-up  He has atrial fibrillation, hypertension newly diagnosed diabetes, he lost a significant amount of weight and went on low-dose metformin therapy your direction in markedly improved his A1c  He denies any chest pains palpitations concerning shortness of breath edema orthopnea  He denies any PND nor orthopnea  He ran out of Lasix couple weeks ago wants know if he needs to continue taking this  His last proBNP was over 1000  The following portions of the patient's history were reviewed and updated as appropriate: allergies, current medications, past family history, past medical history, past social history, past surgical history and problem list   Review of Systems   Constitution: Negative for chills, diaphoresis, malaise/fatigue and weight gain  HENT: Negative for nosebleeds and stridor  Eyes: Negative for double vision, vision loss in left eye, vision loss in right eye and visual disturbance  Cardiovascular: Positive for leg swelling (Mild, controlled with support hose)  Negative for chest pain, claudication, cyanosis, dyspnea on exertion, irregular heartbeat, near-syncope, orthopnea, palpitations, paroxysmal nocturnal dyspnea and syncope  Respiratory: Negative for cough, shortness of breath, snoring and wheezing  Endocrine: Negative for polydipsia, polyphagia and polyuria  Hematologic/Lymphatic: Negative for bleeding problem  Does not bruise/bleed easily  Skin: Negative for flushing and rash  Musculoskeletal: Negative for falls and myalgias  Gastrointestinal: Negative for abdominal pain, heartburn, hematemesis, hematochezia, melena and nausea  Genitourinary: Negative for hematuria  Neurological: Negative for brief paralysis, dizziness, focal weakness, headaches, light-headedness, loss of balance and vertigo     Psychiatric/Behavioral: Negative for altered mental status and substance abuse  Allergic/Immunologic: Negative for hives  Objective:      /70   Pulse 75   Ht 6' 1" (1 854 m)   Wt 104 kg (229 lb)   BMI 30 21 kg/m²   Physical Exam   Constitutional: He is oriented to person, place, and time  He appears well-developed and well-nourished  No distress  HENT:   Head: Normocephalic and atraumatic  Eyes: Pupils are equal, round, and reactive to light  EOM are normal  No scleral icterus  Neck: Normal range of motion  Neck supple  No JVD present  No thyromegaly present  Cardiovascular: Normal rate and normal heart sounds  Exam reveals no gallop and no friction rub  No murmur heard  Pulmonary/Chest: Effort normal and breath sounds normal  No stridor  No respiratory distress  He has no wheezes  He has no rales  Abdominal: Soft  Bowel sounds are normal  He exhibits no distension and no mass  There is no tenderness  Musculoskeletal: Normal range of motion  He exhibits no edema or deformity  Neurological: He is alert and oriented to person, place, and time  Coordination normal    Skin: Skin is warm and dry  No erythema  No pallor  Psychiatric: He has a normal mood and affect   His behavior is normal        Lab Review:   Appointment on 11/02/2019   Component Date Value    Hemoglobin A1C 11/02/2019 6 5*    EAG 11/02/2019 140    Ancillary Orders on 09/06/2019   Component Date Value    Protime 10/03/2019 23 3*    INR 10/03/2019 2 13*   Ancillary Orders on 07/12/2019   Component Date Value    Protime 08/21/2019 23 1*    INR 08/21/2019 2 10*   Appointment on 07/11/2019   Component Date Value    Sodium 07/11/2019 138     Potassium 07/11/2019 4 2     Chloride 07/11/2019 107     CO2 07/11/2019 26     ANION GAP 07/11/2019 5     BUN 07/11/2019 26*    Creatinine 07/11/2019 1 26     Glucose, Fasting 07/11/2019 212*    Calcium 07/11/2019 8 6     AST 07/11/2019 13     ALT 07/11/2019 29     Alkaline Phosphatase 07/11/2019 91     Total Protein 07/11/2019 7 0     Albumin 07/11/2019 3 6     Total Bilirubin 07/11/2019 0 86     eGFR 07/11/2019 52     TSH 3RD GENERATON 07/11/2019 3 160     Vit D, 25-Hydroxy 07/11/2019 16 6*    Hemoglobin A1C 07/11/2019 8 9*    EAG 07/11/2019 209     WBC 07/11/2019 7 19     RBC 07/11/2019 4 80     Hemoglobin 07/11/2019 14 4     Hematocrit 07/11/2019 44 5     MCV 07/11/2019 93     MCH 07/11/2019 30 0     MCHC 07/11/2019 32 4     RDW 07/11/2019 13 4     MPV 07/11/2019 11 4     Platelets 11/10/3886 159     nRBC 07/11/2019 0     Neutrophils Relative 07/11/2019 66     Immat GRANS % 07/11/2019 0     Lymphocytes Relative 07/11/2019 21     Monocytes Relative 07/11/2019 10     Eosinophils Relative 07/11/2019 2     Basophils Relative 07/11/2019 1     Neutrophils Absolute 07/11/2019 4 73     Immature Grans Absolute 07/11/2019 0 02     Lymphocytes Absolute 07/11/2019 1 53     Monocytes Absolute 07/11/2019 0 71     Eosinophils Absolute 07/11/2019 0 14     Basophils Absolute 07/11/2019 0 06    Orders Only on 06/18/2019   Component Date Value    Protime 07/11/2019 26 9*    INR 07/11/2019 2 55*   Appointment on 06/18/2019   Component Date Value    NT-proBNP 06/18/2019 1,176*     No results found  Assessment:       1  Chronic diastolic congestive heart failure (HCC)     2  Persistent atrial fibrillation  POCT ECG   3  Edema, unspecified type  furosemide (LASIX) 20 mg tablet   4  Essential hypertension  metoprolol succinate (TOPROL-XL) 50 mg 24 hr tablet        Plan:       Lorraine Guan is without any signs or symptoms reminiscent of unstable angina, decompensated heart failure/volume excess nor electrical instability/malignant dysrhythmia  His AFib is rate controlled and anticoagulated  Goal INR 2 0-3 0  He remains in our Coumadin Clinic  Knows he should have an INR every month      His edema, much of which is venous insufficiency, is controlled Laurent support hose, due to mild chronic diastolic CHF I have also recommended he go back on Lasix about 3 days a week  I am happy with his blood pressure control  Recent lipids also quite good on modest atorvastatin therapy with no myalgias  I recommended no changes today, ordered no surveillance testing, we will see him back in 6 months when anticipate consideration of follow-up echocardiography for hypertension and AFib surveillance purposes  Should any elective surgery be scheduled prior to them please let me know as I would then likely advocate preoperative risk stratifying stress test for anything more than low risk

## 2019-12-12 NOTE — PATIENT INSTRUCTIONS

## 2019-12-14 ENCOUNTER — APPOINTMENT (OUTPATIENT)
Dept: LAB | Facility: MEDICAL CENTER | Age: 84
End: 2019-12-14
Payer: COMMERCIAL

## 2019-12-16 ENCOUNTER — ANTICOAG VISIT (OUTPATIENT)
Dept: CARDIOLOGY CLINIC | Facility: CLINIC | Age: 84
End: 2019-12-16
Payer: COMMERCIAL

## 2019-12-16 DIAGNOSIS — Z79.01 LONG TERM (CURRENT) USE OF ANTICOAGULANTS: ICD-10-CM

## 2019-12-16 DIAGNOSIS — I48.19 PERSISTENT ATRIAL FIBRILLATION (HCC): ICD-10-CM

## 2019-12-16 PROCEDURE — 93793 ANTICOAG MGMT PT WARFARIN: CPT | Performed by: PHYSICIAN ASSISTANT

## 2019-12-16 NOTE — PATIENT INSTRUCTIONS
Spoke to his wife -he was hunting last week and missed a couple of doses   Recheck in 1 week   Gareth Smith

## 2019-12-21 ENCOUNTER — APPOINTMENT (OUTPATIENT)
Dept: LAB | Facility: MEDICAL CENTER | Age: 84
End: 2019-12-21
Payer: COMMERCIAL

## 2019-12-21 ENCOUNTER — TRANSCRIBE ORDERS (OUTPATIENT)
Dept: LAB | Facility: MEDICAL CENTER | Age: 84
End: 2019-12-21

## 2019-12-21 DIAGNOSIS — I48.19 PERSISTENT ATRIAL FIBRILLATION (HCC): ICD-10-CM

## 2019-12-21 LAB
INR PPP: 1.96 (ref 0.84–1.19)
PROTHROMBIN TIME: 21.8 SECONDS (ref 11.6–14.5)

## 2019-12-21 PROCEDURE — 36415 COLL VENOUS BLD VENIPUNCTURE: CPT

## 2019-12-21 PROCEDURE — 85610 PROTHROMBIN TIME: CPT

## 2019-12-23 ENCOUNTER — ANTICOAG VISIT (OUTPATIENT)
Dept: CARDIOLOGY CLINIC | Facility: CLINIC | Age: 84
End: 2019-12-23
Payer: COMMERCIAL

## 2019-12-23 DIAGNOSIS — I48.19 PERSISTENT ATRIAL FIBRILLATION (HCC): ICD-10-CM

## 2019-12-23 DIAGNOSIS — Z79.01 LONG TERM (CURRENT) USE OF ANTICOAGULANTS: ICD-10-CM

## 2019-12-23 PROCEDURE — 93793 ANTICOAG MGMT PT WARFARIN: CPT | Performed by: PHYSICIAN ASSISTANT

## 2019-12-23 NOTE — PATIENT INSTRUCTIONS
If no changes in medication health or diet  No missed or extra doses  No s/s of bleeding TIA or CVA  No new ABX, NSAIDs OCT meds, or supplements  Dose as instructed and recheck in two weeks  red Watts PA-C    Called Patient Back   He had been told to take an extra dose last week  This had not been reflected in the chart  He is to continue what he had been doing  Before which is 8 mg M, W, F and 6 mg the other days

## 2020-01-09 ENCOUNTER — APPOINTMENT (OUTPATIENT)
Dept: LAB | Facility: MEDICAL CENTER | Age: 85
End: 2020-01-09
Payer: COMMERCIAL

## 2020-01-09 ENCOUNTER — TRANSCRIBE ORDERS (OUTPATIENT)
Dept: ADMINISTRATIVE | Facility: HOSPITAL | Age: 85
End: 2020-01-09

## 2020-01-09 DIAGNOSIS — I10 HYPERTENSION, UNSPECIFIED TYPE: ICD-10-CM

## 2020-01-09 DIAGNOSIS — E11.9 TYPE 2 DIABETES MELLITUS WITHOUT COMPLICATION, UNSPECIFIED WHETHER LONG TERM INSULIN USE (HCC): Primary | ICD-10-CM

## 2020-01-09 DIAGNOSIS — Z13.9 SCREENING FOR UNSPECIFIED CONDITION: ICD-10-CM

## 2020-01-09 DIAGNOSIS — E11.9 TYPE 2 DIABETES MELLITUS WITHOUT COMPLICATION, UNSPECIFIED WHETHER LONG TERM INSULIN USE (HCC): ICD-10-CM

## 2020-01-09 DIAGNOSIS — I48.19 PERSISTENT ATRIAL FIBRILLATION (HCC): ICD-10-CM

## 2020-01-09 DIAGNOSIS — E55.9 VITAMIN D DEFICIENCY: ICD-10-CM

## 2020-01-09 LAB
25(OH)D3 SERPL-MCNC: 44.7 NG/ML (ref 30–100)
ALBUMIN SERPL BCP-MCNC: 3.5 G/DL (ref 3.5–5)
ALP SERPL-CCNC: 72 U/L (ref 46–116)
ALT SERPL W P-5'-P-CCNC: 24 U/L (ref 12–78)
ANION GAP SERPL CALCULATED.3IONS-SCNC: 5 MMOL/L (ref 4–13)
AST SERPL W P-5'-P-CCNC: 10 U/L (ref 5–45)
BASOPHILS # BLD AUTO: 0.05 THOUSANDS/ΜL (ref 0–0.1)
BASOPHILS NFR BLD AUTO: 1 % (ref 0–1)
BILIRUB SERPL-MCNC: 0.91 MG/DL (ref 0.2–1)
BUN SERPL-MCNC: 23 MG/DL (ref 5–25)
CALCIUM SERPL-MCNC: 8.9 MG/DL (ref 8.3–10.1)
CHLORIDE SERPL-SCNC: 111 MMOL/L (ref 100–108)
CO2 SERPL-SCNC: 27 MMOL/L (ref 21–32)
CREAT SERPL-MCNC: 1.15 MG/DL (ref 0.6–1.3)
EOSINOPHIL # BLD AUTO: 0.21 THOUSAND/ΜL (ref 0–0.61)
EOSINOPHIL NFR BLD AUTO: 3 % (ref 0–6)
ERYTHROCYTE [DISTWIDTH] IN BLOOD BY AUTOMATED COUNT: 13.7 % (ref 11.6–15.1)
EST. AVERAGE GLUCOSE BLD GHB EST-MCNC: 140 MG/DL
GFR SERPL CREATININE-BSD FRML MDRD: 58 ML/MIN/1.73SQ M
GLUCOSE P FAST SERPL-MCNC: 128 MG/DL (ref 65–99)
HBA1C MFR BLD: 6.5 % (ref 4.2–6.3)
HCT VFR BLD AUTO: 42.1 % (ref 36.5–49.3)
HGB BLD-MCNC: 13.5 G/DL (ref 12–17)
IMM GRANULOCYTES # BLD AUTO: 0.02 THOUSAND/UL (ref 0–0.2)
IMM GRANULOCYTES NFR BLD AUTO: 0 % (ref 0–2)
INR PPP: 1.84 (ref 0.84–1.19)
LYMPHOCYTES # BLD AUTO: 1.71 THOUSANDS/ΜL (ref 0.6–4.47)
LYMPHOCYTES NFR BLD AUTO: 23 % (ref 14–44)
MCH RBC QN AUTO: 29.7 PG (ref 26.8–34.3)
MCHC RBC AUTO-ENTMCNC: 32.1 G/DL (ref 31.4–37.4)
MCV RBC AUTO: 93 FL (ref 82–98)
MONOCYTES # BLD AUTO: 0.7 THOUSAND/ΜL (ref 0.17–1.22)
MONOCYTES NFR BLD AUTO: 9 % (ref 4–12)
NEUTROPHILS # BLD AUTO: 4.8 THOUSANDS/ΜL (ref 1.85–7.62)
NEUTS SEG NFR BLD AUTO: 64 % (ref 43–75)
NRBC BLD AUTO-RTO: 0 /100 WBCS
PLATELET # BLD AUTO: 164 THOUSANDS/UL (ref 149–390)
PMV BLD AUTO: 10.8 FL (ref 8.9–12.7)
POTASSIUM SERPL-SCNC: 4.1 MMOL/L (ref 3.5–5.3)
PROT SERPL-MCNC: 6.8 G/DL (ref 6.4–8.2)
PROTHROMBIN TIME: 20.8 SECONDS (ref 11.6–14.5)
RBC # BLD AUTO: 4.55 MILLION/UL (ref 3.88–5.62)
SODIUM SERPL-SCNC: 143 MMOL/L (ref 136–145)
TSH SERPL DL<=0.05 MIU/L-ACNC: 2.5 UIU/ML (ref 0.36–3.74)
WBC # BLD AUTO: 7.49 THOUSAND/UL (ref 4.31–10.16)

## 2020-01-09 PROCEDURE — 85025 COMPLETE CBC W/AUTO DIFF WBC: CPT

## 2020-01-09 PROCEDURE — 83036 HEMOGLOBIN GLYCOSYLATED A1C: CPT

## 2020-01-09 PROCEDURE — 36415 COLL VENOUS BLD VENIPUNCTURE: CPT

## 2020-01-09 PROCEDURE — 80053 COMPREHEN METABOLIC PANEL: CPT

## 2020-01-09 PROCEDURE — 85610 PROTHROMBIN TIME: CPT

## 2020-01-09 PROCEDURE — 84443 ASSAY THYROID STIM HORMONE: CPT

## 2020-01-09 PROCEDURE — 82306 VITAMIN D 25 HYDROXY: CPT

## 2020-01-10 ENCOUNTER — ANTICOAG VISIT (OUTPATIENT)
Dept: CARDIOLOGY CLINIC | Facility: CLINIC | Age: 85
End: 2020-01-10
Payer: COMMERCIAL

## 2020-01-10 DIAGNOSIS — Z79.01 LONG TERM (CURRENT) USE OF ANTICOAGULANTS: ICD-10-CM

## 2020-01-10 DIAGNOSIS — I48.19 PERSISTENT ATRIAL FIBRILLATION (HCC): ICD-10-CM

## 2020-01-10 PROCEDURE — 93793 ANTICOAG MGMT PT WARFARIN: CPT | Performed by: PHYSICIAN ASSISTANT

## 2020-01-10 NOTE — PATIENT INSTRUCTIONS
Left voicemail w/ instructions and asked him to call back w/ any changes or missed doses  Recheck in 2 weeks   Pablo Pina PA-C      Spoke to Baudilio - no missed doses or other changes   He will increase dose and recheck in 2 weeks  Pablo Pina PA-C

## 2020-01-27 ENCOUNTER — APPOINTMENT (OUTPATIENT)
Dept: LAB | Facility: MEDICAL CENTER | Age: 85
End: 2020-01-27
Payer: COMMERCIAL

## 2020-01-27 DIAGNOSIS — I48.19 PERSISTENT ATRIAL FIBRILLATION (HCC): ICD-10-CM

## 2020-01-27 LAB
INR PPP: 2.15 (ref 0.84–1.19)
PROTHROMBIN TIME: 23.5 SECONDS (ref 11.6–14.5)

## 2020-01-27 PROCEDURE — 85610 PROTHROMBIN TIME: CPT

## 2020-01-27 PROCEDURE — 36415 COLL VENOUS BLD VENIPUNCTURE: CPT

## 2020-01-28 ENCOUNTER — ANTICOAG VISIT (OUTPATIENT)
Dept: CARDIOLOGY CLINIC | Facility: CLINIC | Age: 85
End: 2020-01-28
Payer: COMMERCIAL

## 2020-01-28 DIAGNOSIS — Z79.01 LONG TERM (CURRENT) USE OF ANTICOAGULANTS: ICD-10-CM

## 2020-01-28 DIAGNOSIS — I48.19 PERSISTENT ATRIAL FIBRILLATION (HCC): ICD-10-CM

## 2020-01-28 PROCEDURE — 93793 ANTICOAG MGMT PT WARFARIN: CPT | Performed by: PHYSICIAN ASSISTANT

## 2020-02-27 ENCOUNTER — APPOINTMENT (OUTPATIENT)
Dept: LAB | Facility: MEDICAL CENTER | Age: 85
End: 2020-02-27
Payer: COMMERCIAL

## 2020-02-27 ENCOUNTER — TRANSCRIBE ORDERS (OUTPATIENT)
Dept: ADMINISTRATIVE | Facility: HOSPITAL | Age: 85
End: 2020-02-27

## 2020-02-27 DIAGNOSIS — E11.69 TYPE 2 DIABETES MELLITUS WITH OTHER SPECIFIED COMPLICATION, UNSPECIFIED WHETHER LONG TERM INSULIN USE (HCC): ICD-10-CM

## 2020-02-27 DIAGNOSIS — I10 HYPERTENSION, UNSPECIFIED TYPE: ICD-10-CM

## 2020-02-27 DIAGNOSIS — E78.5 HYPERLIPIDEMIA, UNSPECIFIED HYPERLIPIDEMIA TYPE: ICD-10-CM

## 2020-02-27 DIAGNOSIS — I48.19 PERSISTENT ATRIAL FIBRILLATION (HCC): ICD-10-CM

## 2020-02-27 DIAGNOSIS — E55.9 VITAMIN D DEFICIENCY: Primary | ICD-10-CM

## 2020-02-27 DIAGNOSIS — E55.9 VITAMIN D DEFICIENCY: ICD-10-CM

## 2020-02-27 LAB
25(OH)D3 SERPL-MCNC: 43.4 NG/ML (ref 30–100)
ALBUMIN SERPL BCP-MCNC: 3.7 G/DL (ref 3.5–5)
ALP SERPL-CCNC: 87 U/L (ref 46–116)
ALT SERPL W P-5'-P-CCNC: 19 U/L (ref 12–78)
ANION GAP SERPL CALCULATED.3IONS-SCNC: 4 MMOL/L (ref 4–13)
AST SERPL W P-5'-P-CCNC: 11 U/L (ref 5–45)
BASOPHILS # BLD AUTO: 0.06 THOUSANDS/ΜL (ref 0–0.1)
BASOPHILS NFR BLD AUTO: 1 % (ref 0–1)
BILIRUB SERPL-MCNC: 0.85 MG/DL (ref 0.2–1)
BUN SERPL-MCNC: 20 MG/DL (ref 5–25)
CALCIUM SERPL-MCNC: 8.8 MG/DL (ref 8.3–10.1)
CHLORIDE SERPL-SCNC: 109 MMOL/L (ref 100–108)
CO2 SERPL-SCNC: 26 MMOL/L (ref 21–32)
CREAT SERPL-MCNC: 1.19 MG/DL (ref 0.6–1.3)
EOSINOPHIL # BLD AUTO: 0.17 THOUSAND/ΜL (ref 0–0.61)
EOSINOPHIL NFR BLD AUTO: 2 % (ref 0–6)
ERYTHROCYTE [DISTWIDTH] IN BLOOD BY AUTOMATED COUNT: 14 % (ref 11.6–15.1)
EST. AVERAGE GLUCOSE BLD GHB EST-MCNC: 143 MG/DL
GFR SERPL CREATININE-BSD FRML MDRD: 56 ML/MIN/1.73SQ M
GLUCOSE P FAST SERPL-MCNC: 134 MG/DL (ref 65–99)
HBA1C MFR BLD: 6.6 %
HCT VFR BLD AUTO: 42.6 % (ref 36.5–49.3)
HGB BLD-MCNC: 13.7 G/DL (ref 12–17)
IMM GRANULOCYTES # BLD AUTO: 0.02 THOUSAND/UL (ref 0–0.2)
IMM GRANULOCYTES NFR BLD AUTO: 0 % (ref 0–2)
INR PPP: 2.1 (ref 0.84–1.19)
LYMPHOCYTES # BLD AUTO: 1.56 THOUSANDS/ΜL (ref 0.6–4.47)
LYMPHOCYTES NFR BLD AUTO: 21 % (ref 14–44)
MCH RBC QN AUTO: 29.7 PG (ref 26.8–34.3)
MCHC RBC AUTO-ENTMCNC: 32.2 G/DL (ref 31.4–37.4)
MCV RBC AUTO: 92 FL (ref 82–98)
MONOCYTES # BLD AUTO: 0.66 THOUSAND/ΜL (ref 0.17–1.22)
MONOCYTES NFR BLD AUTO: 9 % (ref 4–12)
NEUTROPHILS # BLD AUTO: 5 THOUSANDS/ΜL (ref 1.85–7.62)
NEUTS SEG NFR BLD AUTO: 67 % (ref 43–75)
NRBC BLD AUTO-RTO: 0 /100 WBCS
PLATELET # BLD AUTO: 172 THOUSANDS/UL (ref 149–390)
PMV BLD AUTO: 11 FL (ref 8.9–12.7)
POTASSIUM SERPL-SCNC: 4.1 MMOL/L (ref 3.5–5.3)
PROT SERPL-MCNC: 6.8 G/DL (ref 6.4–8.2)
PROTHROMBIN TIME: 23.1 SECONDS (ref 11.6–14.5)
RBC # BLD AUTO: 4.62 MILLION/UL (ref 3.88–5.62)
SODIUM SERPL-SCNC: 139 MMOL/L (ref 136–145)
TSH SERPL DL<=0.05 MIU/L-ACNC: 2.71 UIU/ML (ref 0.36–3.74)
WBC # BLD AUTO: 7.47 THOUSAND/UL (ref 4.31–10.16)

## 2020-02-27 PROCEDURE — 80053 COMPREHEN METABOLIC PANEL: CPT

## 2020-02-27 PROCEDURE — 83036 HEMOGLOBIN GLYCOSYLATED A1C: CPT

## 2020-02-27 PROCEDURE — 82306 VITAMIN D 25 HYDROXY: CPT

## 2020-02-27 PROCEDURE — 85025 COMPLETE CBC W/AUTO DIFF WBC: CPT

## 2020-02-27 PROCEDURE — 85610 PROTHROMBIN TIME: CPT

## 2020-02-27 PROCEDURE — 84443 ASSAY THYROID STIM HORMONE: CPT

## 2020-02-27 PROCEDURE — 36415 COLL VENOUS BLD VENIPUNCTURE: CPT

## 2020-02-28 ENCOUNTER — ANTICOAG VISIT (OUTPATIENT)
Dept: CARDIOLOGY CLINIC | Facility: CLINIC | Age: 85
End: 2020-02-28
Payer: COMMERCIAL

## 2020-02-28 DIAGNOSIS — Z79.01 LONG TERM (CURRENT) USE OF ANTICOAGULANTS: ICD-10-CM

## 2020-02-28 DIAGNOSIS — I48.19 PERSISTENT ATRIAL FIBRILLATION (HCC): ICD-10-CM

## 2020-02-28 PROCEDURE — 93793 ANTICOAG MGMT PT WARFARIN: CPT | Performed by: PHYSICIAN ASSISTANT

## 2020-02-28 NOTE — PATIENT INSTRUCTIONS
If no changes in medication health or diet  No missed or extra doses  No s/s of bleeding TIA or CVA  No new ABX, NSAIDs OCT meds, or supplements  Dose as instructed and recheck in one month     Aliyah Mora PA-C

## 2020-04-03 ENCOUNTER — APPOINTMENT (OUTPATIENT)
Dept: LAB | Facility: MEDICAL CENTER | Age: 85
End: 2020-04-03
Payer: COMMERCIAL

## 2020-04-03 DIAGNOSIS — I48.19 PERSISTENT ATRIAL FIBRILLATION (HCC): ICD-10-CM

## 2020-04-03 LAB
INR PPP: 1.94 (ref 0.84–1.19)
PROTHROMBIN TIME: 21.7 SECONDS (ref 11.6–14.5)

## 2020-04-03 PROCEDURE — 85610 PROTHROMBIN TIME: CPT

## 2020-04-03 PROCEDURE — 36415 COLL VENOUS BLD VENIPUNCTURE: CPT

## 2020-04-06 ENCOUNTER — ANTICOAG VISIT (OUTPATIENT)
Dept: CARDIOLOGY CLINIC | Facility: CLINIC | Age: 85
End: 2020-04-06
Payer: COMMERCIAL

## 2020-04-06 DIAGNOSIS — Z79.01 LONG TERM (CURRENT) USE OF ANTICOAGULANTS: ICD-10-CM

## 2020-04-06 DIAGNOSIS — I48.19 PERSISTENT ATRIAL FIBRILLATION (HCC): ICD-10-CM

## 2020-04-06 PROCEDURE — 93793 ANTICOAG MGMT PT WARFARIN: CPT | Performed by: PHYSICIAN ASSISTANT

## 2020-04-21 ENCOUNTER — APPOINTMENT (OUTPATIENT)
Dept: LAB | Facility: MEDICAL CENTER | Age: 85
End: 2020-04-21
Payer: COMMERCIAL

## 2020-04-21 DIAGNOSIS — Z79.01 LONG TERM (CURRENT) USE OF ANTICOAGULANTS: ICD-10-CM

## 2020-04-21 DIAGNOSIS — I48.19 PERSISTENT ATRIAL FIBRILLATION (HCC): Primary | ICD-10-CM

## 2020-04-21 LAB
INR PPP: 2.16 (ref 0.84–1.19)
PROTHROMBIN TIME: 23.6 SECONDS (ref 11.6–14.5)

## 2020-04-21 PROCEDURE — 85610 PROTHROMBIN TIME: CPT

## 2020-04-21 PROCEDURE — 36415 COLL VENOUS BLD VENIPUNCTURE: CPT

## 2020-04-22 ENCOUNTER — ANTICOAG VISIT (OUTPATIENT)
Dept: CARDIOLOGY CLINIC | Facility: CLINIC | Age: 85
End: 2020-04-22
Payer: COMMERCIAL

## 2020-04-22 DIAGNOSIS — Z79.01 LONG TERM (CURRENT) USE OF ANTICOAGULANTS: ICD-10-CM

## 2020-04-22 DIAGNOSIS — I48.19 PERSISTENT ATRIAL FIBRILLATION (HCC): ICD-10-CM

## 2020-04-22 PROCEDURE — 93793 ANTICOAG MGMT PT WARFARIN: CPT | Performed by: PHYSICIAN ASSISTANT

## 2020-05-27 ENCOUNTER — APPOINTMENT (OUTPATIENT)
Dept: LAB | Facility: MEDICAL CENTER | Age: 85
End: 2020-05-27
Payer: COMMERCIAL

## 2020-05-28 ENCOUNTER — ANTICOAG VISIT (OUTPATIENT)
Dept: CARDIOLOGY CLINIC | Facility: CLINIC | Age: 85
End: 2020-05-28
Payer: COMMERCIAL

## 2020-05-28 DIAGNOSIS — I48.19 PERSISTENT ATRIAL FIBRILLATION (HCC): ICD-10-CM

## 2020-05-28 DIAGNOSIS — Z79.01 LONG TERM (CURRENT) USE OF ANTICOAGULANTS: ICD-10-CM

## 2020-05-28 PROCEDURE — 93793 ANTICOAG MGMT PT WARFARIN: CPT | Performed by: PHYSICIAN ASSISTANT

## 2020-07-14 ENCOUNTER — APPOINTMENT (OUTPATIENT)
Dept: LAB | Facility: MEDICAL CENTER | Age: 85
End: 2020-07-14
Payer: COMMERCIAL

## 2020-07-14 ENCOUNTER — TRANSCRIBE ORDERS (OUTPATIENT)
Dept: ADMINISTRATIVE | Facility: HOSPITAL | Age: 85
End: 2020-07-14

## 2020-07-14 DIAGNOSIS — Z13.9 SCREENING FOR UNSPECIFIED CONDITION: ICD-10-CM

## 2020-07-14 DIAGNOSIS — I10 HYPERTENSION, UNSPECIFIED TYPE: ICD-10-CM

## 2020-07-14 DIAGNOSIS — E11.9 TYPE 2 DIABETES MELLITUS WITHOUT COMPLICATION, UNSPECIFIED WHETHER LONG TERM INSULIN USE (HCC): Primary | ICD-10-CM

## 2020-07-14 DIAGNOSIS — E55.9 VITAMIN D DEFICIENCY: ICD-10-CM

## 2020-07-14 DIAGNOSIS — E11.9 TYPE 2 DIABETES MELLITUS WITHOUT COMPLICATION, UNSPECIFIED WHETHER LONG TERM INSULIN USE (HCC): ICD-10-CM

## 2020-07-14 DIAGNOSIS — E55.9 VITAMIN D DEFICIENCY: Primary | ICD-10-CM

## 2020-07-14 LAB
25(OH)D3 SERPL-MCNC: 21.3 NG/ML (ref 30–100)
ALBUMIN SERPL BCP-MCNC: 3.7 G/DL (ref 3.5–5)
ALP SERPL-CCNC: 74 U/L (ref 46–116)
ALT SERPL W P-5'-P-CCNC: 22 U/L (ref 12–78)
ANION GAP SERPL CALCULATED.3IONS-SCNC: 4 MMOL/L (ref 4–13)
AST SERPL W P-5'-P-CCNC: 12 U/L (ref 5–45)
BASOPHILS # BLD AUTO: 0.04 THOUSANDS/ΜL (ref 0–0.1)
BASOPHILS NFR BLD AUTO: 1 % (ref 0–1)
BILIRUB SERPL-MCNC: 0.83 MG/DL (ref 0.2–1)
BUN SERPL-MCNC: 22 MG/DL (ref 5–25)
CALCIUM SERPL-MCNC: 8.8 MG/DL (ref 8.3–10.1)
CHLORIDE SERPL-SCNC: 107 MMOL/L (ref 100–108)
CO2 SERPL-SCNC: 28 MMOL/L (ref 21–32)
CREAT SERPL-MCNC: 1.18 MG/DL (ref 0.6–1.3)
EOSINOPHIL # BLD AUTO: 0.17 THOUSAND/ΜL (ref 0–0.61)
EOSINOPHIL NFR BLD AUTO: 2 % (ref 0–6)
ERYTHROCYTE [DISTWIDTH] IN BLOOD BY AUTOMATED COUNT: 13.6 % (ref 11.6–15.1)
EST. AVERAGE GLUCOSE BLD GHB EST-MCNC: 140 MG/DL
GFR SERPL CREATININE-BSD FRML MDRD: 56 ML/MIN/1.73SQ M
GLUCOSE P FAST SERPL-MCNC: 135 MG/DL (ref 65–99)
HBA1C MFR BLD: 6.5 %
HCT VFR BLD AUTO: 43.4 % (ref 36.5–49.3)
HGB BLD-MCNC: 14.2 G/DL (ref 12–17)
IMM GRANULOCYTES # BLD AUTO: 0.02 THOUSAND/UL (ref 0–0.2)
IMM GRANULOCYTES NFR BLD AUTO: 0 % (ref 0–2)
LYMPHOCYTES # BLD AUTO: 1.6 THOUSANDS/ΜL (ref 0.6–4.47)
LYMPHOCYTES NFR BLD AUTO: 21 % (ref 14–44)
MCH RBC QN AUTO: 30.1 PG (ref 26.8–34.3)
MCHC RBC AUTO-ENTMCNC: 32.7 G/DL (ref 31.4–37.4)
MCV RBC AUTO: 92 FL (ref 82–98)
MONOCYTES # BLD AUTO: 0.67 THOUSAND/ΜL (ref 0.17–1.22)
MONOCYTES NFR BLD AUTO: 9 % (ref 4–12)
NEUTROPHILS # BLD AUTO: 4.97 THOUSANDS/ΜL (ref 1.85–7.62)
NEUTS SEG NFR BLD AUTO: 67 % (ref 43–75)
NRBC BLD AUTO-RTO: 0 /100 WBCS
PLATELET # BLD AUTO: 162 THOUSANDS/UL (ref 149–390)
PMV BLD AUTO: 11.1 FL (ref 8.9–12.7)
POTASSIUM SERPL-SCNC: 4.6 MMOL/L (ref 3.5–5.3)
PROT SERPL-MCNC: 7 G/DL (ref 6.4–8.2)
RBC # BLD AUTO: 4.71 MILLION/UL (ref 3.88–5.62)
SODIUM SERPL-SCNC: 139 MMOL/L (ref 136–145)
TSH SERPL DL<=0.05 MIU/L-ACNC: 2.91 UIU/ML (ref 0.36–3.74)
WBC # BLD AUTO: 7.47 THOUSAND/UL (ref 4.31–10.16)

## 2020-07-14 PROCEDURE — 85025 COMPLETE CBC W/AUTO DIFF WBC: CPT

## 2020-07-14 PROCEDURE — 83036 HEMOGLOBIN GLYCOSYLATED A1C: CPT

## 2020-07-14 PROCEDURE — 82306 VITAMIN D 25 HYDROXY: CPT

## 2020-07-14 PROCEDURE — 84443 ASSAY THYROID STIM HORMONE: CPT

## 2020-07-14 PROCEDURE — 80053 COMPREHEN METABOLIC PANEL: CPT

## 2020-07-14 RX ORDER — ERGOCALCIFEROL 1.25 MG/1
50000 CAPSULE ORAL WEEKLY
Qty: 12 CAPSULE | Refills: 0 | Status: SHIPPED | OUTPATIENT
Start: 2020-07-14 | End: 2020-10-19 | Stop reason: SDUPTHER

## 2020-07-15 ENCOUNTER — ANTICOAG VISIT (OUTPATIENT)
Dept: CARDIOLOGY CLINIC | Facility: CLINIC | Age: 85
End: 2020-07-15
Payer: COMMERCIAL

## 2020-07-15 DIAGNOSIS — Z79.01 LONG TERM (CURRENT) USE OF ANTICOAGULANTS: ICD-10-CM

## 2020-07-15 DIAGNOSIS — I48.19 PERSISTENT ATRIAL FIBRILLATION (HCC): ICD-10-CM

## 2020-07-15 PROCEDURE — 93793 ANTICOAG MGMT PT WARFARIN: CPT | Performed by: PHYSICIAN ASSISTANT

## 2020-07-15 NOTE — PATIENT INSTRUCTIONS
If no changes in medication health or diet  No missed or extra doses  No s/s of bleeding TIA or CVA  No new ABX, NSAIDs OCT meds, or supplements  Dose as instructed and recheck in one month     Dany Goodman PA-C

## 2020-08-03 ENCOUNTER — OFFICE VISIT (OUTPATIENT)
Dept: CARDIOLOGY CLINIC | Facility: CLINIC | Age: 85
End: 2020-08-03
Payer: COMMERCIAL

## 2020-08-03 VITALS
DIASTOLIC BLOOD PRESSURE: 80 MMHG | HEIGHT: 73 IN | HEART RATE: 61 BPM | BODY MASS INDEX: 30.62 KG/M2 | WEIGHT: 231 LBS | SYSTOLIC BLOOD PRESSURE: 124 MMHG

## 2020-08-03 DIAGNOSIS — E78.5 DYSLIPIDEMIA: ICD-10-CM

## 2020-08-03 DIAGNOSIS — I48.19 PERSISTENT ATRIAL FIBRILLATION (HCC): Primary | ICD-10-CM

## 2020-08-03 PROCEDURE — 3074F SYST BP LT 130 MM HG: CPT | Performed by: INTERNAL MEDICINE

## 2020-08-03 PROCEDURE — 99214 OFFICE O/P EST MOD 30 MIN: CPT | Performed by: INTERNAL MEDICINE

## 2020-08-03 PROCEDURE — 4040F PNEUMOC VAC/ADMIN/RCVD: CPT | Performed by: INTERNAL MEDICINE

## 2020-08-03 PROCEDURE — 3008F BODY MASS INDEX DOCD: CPT | Performed by: INTERNAL MEDICINE

## 2020-08-03 PROCEDURE — 1036F TOBACCO NON-USER: CPT | Performed by: INTERNAL MEDICINE

## 2020-08-03 PROCEDURE — 3079F DIAST BP 80-89 MM HG: CPT | Performed by: INTERNAL MEDICINE

## 2020-08-03 PROCEDURE — 93000 ELECTROCARDIOGRAM COMPLETE: CPT | Performed by: INTERNAL MEDICINE

## 2020-08-03 PROCEDURE — 3044F HG A1C LEVEL LT 7.0%: CPT | Performed by: INTERNAL MEDICINE

## 2020-08-03 PROCEDURE — 1160F RVW MEDS BY RX/DR IN RCRD: CPT | Performed by: INTERNAL MEDICINE

## 2020-08-03 NOTE — PATIENT INSTRUCTIONS

## 2020-08-03 NOTE — PROGRESS NOTES
Subjective:        Patient ID: Christel Das is a 80 y o  male  Chief Complaint:  Roya Jaramillo is here for routine AFib and dyslipidemia follow-up with chronic lower extremity edema/venous insufficiency  He is wearing new over-the-counter support hose with much success  Denies any palpitations, chest pains, concerning shortness of breath, lightheadednes dizziness presyncope orthopnea PND  The following portions of the patient's history were reviewed and updated as appropriate: allergies, current medications, past family history, past medical history, past social history, past surgical history and problem list   Review of Systems   Constitution: Negative for chills, diaphoresis, malaise/fatigue and weight gain  HENT: Negative for nosebleeds and stridor  Eyes: Negative for double vision, vision loss in left eye, vision loss in right eye and visual disturbance  Cardiovascular: Negative for chest pain, claudication, cyanosis, dyspnea on exertion, irregular heartbeat, leg swelling, near-syncope, orthopnea, palpitations, paroxysmal nocturnal dyspnea and syncope  Respiratory: Negative for cough, shortness of breath, snoring and wheezing  Endocrine: Negative for polydipsia, polyphagia and polyuria  Hematologic/Lymphatic: Negative for bleeding problem  Does not bruise/bleed easily  Skin: Negative for flushing and rash  Musculoskeletal: Negative for falls and myalgias  Gastrointestinal: Negative for abdominal pain, heartburn, hematemesis, hematochezia, melena and nausea  Genitourinary: Negative for hematuria  Neurological: Negative for brief paralysis, dizziness, focal weakness, headaches, light-headedness, loss of balance and vertigo  Psychiatric/Behavioral: Negative for altered mental status and substance abuse  Allergic/Immunologic: Negative for hives            Objective:      /80   Pulse 61   Ht 6' 1"   Wt 105 kg (231 lb)   BMI 30 48 kg/m²   Physical Exam   Constitutional: He is oriented to person, place, and time  He appears well-developed and well-nourished  No distress  HENT:   Head: Normocephalic and atraumatic  Eyes: Pupils are equal, round, and reactive to light  EOM are normal  No scleral icterus  Neck: Normal range of motion  Neck supple  No JVD present  No thyromegaly present  Cardiovascular: Normal rate and normal heart sounds  Exam reveals no gallop and no friction rub  No murmur heard  Pulmonary/Chest: Effort normal and breath sounds normal  No stridor  No respiratory distress  He has no wheezes  He has no rales  Abdominal: Soft  Bowel sounds are normal  He exhibits no distension and no mass  There is no abdominal tenderness  Musculoskeletal: Normal range of motion  General: No deformity or edema (Trace bilateral with support hose on bilaterally to knees)  Neurological: He is alert and oriented to person, place, and time  Coordination normal    Skin: Skin is warm and dry  No erythema  No pallor  Psychiatric: He has a normal mood and affect   His behavior is normal        Lab Review:   Appointment on 07/14/2020   Component Date Value    Sodium 07/14/2020 139     Potassium 07/14/2020 4 6     Chloride 07/14/2020 107     CO2 07/14/2020 28     ANION GAP 07/14/2020 4     BUN 07/14/2020 22     Creatinine 07/14/2020 1 18     Glucose, Fasting 07/14/2020 135*    Calcium 07/14/2020 8 8     AST 07/14/2020 12     ALT 07/14/2020 22     Alkaline Phosphatase 07/14/2020 74     Total Protein 07/14/2020 7 0     Albumin 07/14/2020 3 7     Total Bilirubin 07/14/2020 0 83     eGFR 07/14/2020 56     TSH 3RD GENERATON 07/14/2020 2 910     Vit D, 25-Hydroxy 07/14/2020 21 3*    Hemoglobin A1C 07/14/2020 6 5*    EAG 07/14/2020 140     WBC 07/14/2020 7 47     RBC 07/14/2020 4 71     Hemoglobin 07/14/2020 14 2     Hematocrit 07/14/2020 43 4     MCV 07/14/2020 92     MCH 07/14/2020 30 1     MCHC 07/14/2020 32 7     RDW 07/14/2020 13 6     MPV 07/14/2020 11 1     Platelets 93/23/7732 162     nRBC 07/14/2020 0     Neutrophils Relative 07/14/2020 67     Immat GRANS % 07/14/2020 0     Lymphocytes Relative 07/14/2020 21     Monocytes Relative 07/14/2020 9     Eosinophils Relative 07/14/2020 2     Basophils Relative 07/14/2020 1     Neutrophils Absolute 07/14/2020 4 97     Immature Grans Absolute 07/14/2020 0 02     Lymphocytes Absolute 07/14/2020 1 60     Monocytes Absolute 07/14/2020 0 67     Eosinophils Absolute 07/14/2020 0 17     Basophils Absolute 07/14/2020 0 04    Ancillary Orders on 06/12/2020   Component Date Value    Protime 07/14/2020 24 9*    INR 07/14/2020 2 26*   Ancillary Orders on 05/15/2020   Component Date Value    Protime 05/27/2020 22 5*    INR 05/27/2020 2 04*   Orders Only on 04/21/2020   Component Date Value    Protime 04/21/2020 23 6*    INR 04/21/2020 2 16*   Appointment on 04/03/2020   Component Date Value    Protime 04/03/2020 21 7*    INR 04/03/2020 1 94*   Appointment on 02/27/2020   Component Date Value    Protime 02/27/2020 23 1*    INR 02/27/2020 2 10*    Sodium 02/27/2020 139     Potassium 02/27/2020 4 1     Chloride 02/27/2020 109*    CO2 02/27/2020 26     ANION GAP 02/27/2020 4     BUN 02/27/2020 20     Creatinine 02/27/2020 1 19     Glucose, Fasting 02/27/2020 134*    Calcium 02/27/2020 8 8     AST 02/27/2020 11     ALT 02/27/2020 19     Alkaline Phosphatase 02/27/2020 87     Total Protein 02/27/2020 6 8     Albumin 02/27/2020 3 7     Total Bilirubin 02/27/2020 0 85     eGFR 02/27/2020 56     Hemoglobin A1C 02/27/2020 6 6*    EAG 02/27/2020 143     TSH 3RD GENERATON 02/27/2020 2 710     Vit D, 25-Hydroxy 02/27/2020 43 4     WBC 02/27/2020 7 47     RBC 02/27/2020 4 62     Hemoglobin 02/27/2020 13 7     Hematocrit 02/27/2020 42 6     MCV 02/27/2020 92     MCH 02/27/2020 29 7     MCHC 02/27/2020 32 2     RDW 02/27/2020 14 0     MPV 02/27/2020 11 0     Platelets 20/53/5375 172     nRBC 02/27/2020 0     Neutrophils Relative 02/27/2020 67     Immat GRANS % 02/27/2020 0     Lymphocytes Relative 02/27/2020 21     Monocytes Relative 02/27/2020 9     Eosinophils Relative 02/27/2020 2     Basophils Relative 02/27/2020 1     Neutrophils Absolute 02/27/2020 5 00     Immature Grans Absolute 02/27/2020 0 02     Lymphocytes Absolute 02/27/2020 1 56     Monocytes Absolute 02/27/2020 0 66     Eosinophils Absolute 02/27/2020 0 17     Basophils Absolute 02/27/2020 0 06      No results found  Assessment:       1  Persistent atrial fibrillation (HCC)  POCT ECG   2  Dyslipidemia          Plan:       Aparna Arshad has no signs or symptoms reminiscent of angina heart failure nor electrical instability  No suggested RVR  Lipids reasonably well controlled, 1 year ago  recent transaminases normal, no myalgias  He wants to look into novel oral anticoagulant therapy, I told him if any are affordable, gave him a list today and call us if there affordable, we can stop his Coumadin aspirin therapy when he goes on such  He will give our office a call  I recommended no medication changes today nor acute cardiac testing, we will see him back in 6 months he will call sooner with any concerning potential cardiac symptoms in the meantime

## 2020-08-12 DIAGNOSIS — I48.19 PERSISTENT ATRIAL FIBRILLATION (HCC): ICD-10-CM

## 2020-08-12 RX ORDER — WARFARIN SODIUM 4 MG/1
TABLET ORAL
Qty: 180 TABLET | Refills: 3 | Status: SHIPPED | OUTPATIENT
Start: 2020-08-12 | End: 2021-01-07 | Stop reason: SDUPTHER

## 2020-08-25 ENCOUNTER — APPOINTMENT (OUTPATIENT)
Dept: LAB | Facility: MEDICAL CENTER | Age: 85
End: 2020-08-25
Payer: COMMERCIAL

## 2020-08-25 ENCOUNTER — ANTICOAG VISIT (OUTPATIENT)
Dept: CARDIOLOGY CLINIC | Facility: CLINIC | Age: 85
End: 2020-08-25
Payer: COMMERCIAL

## 2020-08-25 DIAGNOSIS — I48.19 PERSISTENT ATRIAL FIBRILLATION (HCC): ICD-10-CM

## 2020-08-25 DIAGNOSIS — Z79.01 LONG TERM (CURRENT) USE OF ANTICOAGULANTS: ICD-10-CM

## 2020-08-25 PROCEDURE — 93793 ANTICOAG MGMT PT WARFARIN: CPT | Performed by: PHYSICIAN ASSISTANT

## 2020-08-28 ENCOUNTER — OFFICE VISIT (OUTPATIENT)
Dept: FAMILY MEDICINE CLINIC | Facility: CLINIC | Age: 85
End: 2020-08-28
Payer: COMMERCIAL

## 2020-08-28 VITALS
RESPIRATION RATE: 20 BRPM | HEIGHT: 72 IN | BODY MASS INDEX: 29.8 KG/M2 | DIASTOLIC BLOOD PRESSURE: 74 MMHG | WEIGHT: 220 LBS | HEART RATE: 74 BPM | SYSTOLIC BLOOD PRESSURE: 126 MMHG

## 2020-08-28 DIAGNOSIS — I50.32 CHRONIC DIASTOLIC CONGESTIVE HEART FAILURE (HCC): ICD-10-CM

## 2020-08-28 DIAGNOSIS — I87.2 CHRONIC VENOUS INSUFFICIENCY: ICD-10-CM

## 2020-08-28 DIAGNOSIS — Z23 NEED FOR VACCINATION: Primary | ICD-10-CM

## 2020-08-28 DIAGNOSIS — Z79.01 LONG TERM (CURRENT) USE OF ANTICOAGULANTS: ICD-10-CM

## 2020-08-28 DIAGNOSIS — I48.19 PERSISTENT ATRIAL FIBRILLATION (HCC): ICD-10-CM

## 2020-08-28 PROCEDURE — 3725F SCREEN DEPRESSION PERFORMED: CPT | Performed by: FAMILY MEDICINE

## 2020-08-28 PROCEDURE — 3074F SYST BP LT 130 MM HG: CPT | Performed by: FAMILY MEDICINE

## 2020-08-28 PROCEDURE — 1125F AMNT PAIN NOTED PAIN PRSNT: CPT | Performed by: FAMILY MEDICINE

## 2020-08-28 PROCEDURE — G0009 ADMIN PNEUMOCOCCAL VACCINE: HCPCS

## 2020-08-28 PROCEDURE — 3008F BODY MASS INDEX DOCD: CPT | Performed by: FAMILY MEDICINE

## 2020-08-28 PROCEDURE — 3078F DIAST BP <80 MM HG: CPT | Performed by: FAMILY MEDICINE

## 2020-08-28 PROCEDURE — 1170F FXNL STATUS ASSESSED: CPT | Performed by: FAMILY MEDICINE

## 2020-08-28 PROCEDURE — 4040F PNEUMOC VAC/ADMIN/RCVD: CPT | Performed by: FAMILY MEDICINE

## 2020-08-28 PROCEDURE — 1160F RVW MEDS BY RX/DR IN RCRD: CPT | Performed by: FAMILY MEDICINE

## 2020-08-28 PROCEDURE — G0439 PPPS, SUBSEQ VISIT: HCPCS | Performed by: FAMILY MEDICINE

## 2020-08-28 PROCEDURE — 3044F HG A1C LEVEL LT 7.0%: CPT | Performed by: FAMILY MEDICINE

## 2020-08-28 PROCEDURE — 1036F TOBACCO NON-USER: CPT | Performed by: FAMILY MEDICINE

## 2020-08-28 PROCEDURE — 90732 PPSV23 VACC 2 YRS+ SUBQ/IM: CPT

## 2020-08-28 RX ORDER — ATORVASTATIN CALCIUM 40 MG/1
40 TABLET, FILM COATED ORAL DAILY
Qty: 90 TABLET | Refills: 1 | Status: SHIPPED | OUTPATIENT
Start: 2020-08-28 | End: 2021-01-07 | Stop reason: SDUPTHER

## 2020-08-28 NOTE — PATIENT INSTRUCTIONS
Pneumococcal Polyvalent Vaccine (By injection)   Pneumococcal Vaccine Polyvalent ([id number] VAX-een [id number]-[id number])  Prevents severe infections, such as pneumonia and meningitis  Brand Name(s): Pneumovax 23   There may be other brand names for this medicine  When This Medicine Should Not Be Used: This vaccine is not right for everyone  You should not receive this vaccine if you had an allergic reaction to pneumococcal vaccine  How to Use This Medicine:   Injectable  · A nurse or other health provider will give you this medicine  This vaccine is given as a shot into a muscle or under the skin, usually in the thigh or upper arm  Drugs and Foods to Avoid:   Ask your doctor or pharmacist before using any other medicine, including over-the-counter medicines, vitamins, and herbal products  · Some medicines can affect how this vaccine works  Tell your doctor if you are receiving a treatment or medicine that causes a weak immune system  This includes radiation treatment, steroid medicine (such as hydrocortisone, methylprednisolone, prednisolone, prednisone), or cancer medicine  · You should not receive varicella virus vaccine (Zostavax®) at the same time that you are given pneumococcal vaccine  The vaccines should be given 4 weeks apart  Warnings While Using This Medicine:   · Tell the doctor if you are currently sick, or if you have heart disease, blood vessel disease, or lung disease  · Adults and adolescents: Tell your doctor if you are pregnant or breastfeeding  · Tell your doctor if you have a weak immune system  You may not be fully protected by this vaccine  · Tell your doctor if you have any problems with spinal fluid, which could be caused by a birth defect or previous surgery  This vaccine may not prevent meningitis in people who have spinal fluid problems    Possible Side Effects While Using This Medicine:   Call your doctor right away if you notice any of these side effects:  · Allergic reaction: Itching or hives, swelling in your face or hands, swelling or tingling in your mouth or throat, chest tightness, trouble breathing  · High fever  If you notice these less serious side effects, talk with your doctor:   · Headache  · Muscle or joint pain  · Pain, redness, swelling, or tenderness where the shot was given  · Tiredness or weakness  If you notice other side effects that you think are caused by this medicine, tell your doctor  Call your doctor for medical advice about side effects  You may report side effects to FDA at 4-688-FDA-7744  © 2017 [facility] Information is for End User's use only and may not be sold, redistributed or otherwise used for commercial purposes  The above information is an  only  It is not intended as medical advice for individual conditions or treatments  Talk to your doctor, nurse or pharmacist before following any medical regimen to see if it is safe and effective for you

## 2020-08-28 NOTE — PROGRESS NOTES
Assessment and Plan:     Problem List Items Addressed This Visit        Cardiovascular and Mediastinum    Persistent [facility] fibrillation ([facility] )      Other Visit Diagnoses     Need for vaccination    -  Primary    Relevant Orders    PNEUMOCOCCAL POLYSACCHARIDE VACCINE 23-VALENT =>1YO SQ IM           Preventive health issues were discussed with patient, and age appropriate screening tests were ordered as noted in patient's After Visit Summary  Personalized health advice and appropriate referrals for health education or preventive services given if needed, as noted in patient's After Visit Summary  History of Present Illness:     Patient presents for [place] to Medicare visit  Patient Care Team:  Gordon Pena DO as PCP - Beauford Brittle, MD     Review of Systems:     Review of Systems   Problem List:     Patient Active Problem List   Diagnosis    Long term (current) use of anticoagulants    Persistent [facility] fibrillation ([facility] )    Chronic venous insufficiency      Past Medical and Surgical History:     Past Medical History:   Diagnosis Date    Atrial fibrillation ([facility] )     CAD (coronary artery disease)     Congestive heart failure (CHF) ([facility] )     History of echocardiogram 04/05/2018    EF 0 55, Mild LVH  Mild moderate mitral regurg  Trace aortic regurg   HTN (hypertension)     Hyperlipidemia     Long term (current) use of anticoagulants 8/21/2018     Past Surgical History:   Procedure Laterality Date    CARDIAC CATHETERIZATION  01/15/1988    Left main: unobstructed  Posterolateral branch 90% narrowed  Family History:     No family history on file     Social History:     E-Cigarette/Vaping    E-Cigarette Use Never User      E-Cigarette/Vaping Substances    Nicotine No     THC No     CBD No     Flavoring No     Other No     Unknown No      Social History     Socioeconomic History    Marital status: /Civil Union     Spouse name: None    Number of children: None    Years of education: None    Highest education level: None   Occupational History    None   Social Needs    Financial resource strain: None    Food insecurity     Worry: None     Inability: None    Transportation needs     Medical: None     Non-medical: None   Tobacco Use    Smoking status: Never Smoker    Smokeless tobacco: Never Used   Substance and Sexual Activity    Alcohol use: Never     Frequency: Never    Drug use: Never    Sexual activity: None   Lifestyle    Physical activity     Days per week: None     Minutes per session: None    Stress: None   Relationships    Social connections     Talks on phone: None     Gets together: None     Attends Sikh service: None     Active member of club or organization: None     Attends meetings of clubs or organizations: None     Relationship status: None    Intimate partner violence     Fear of current or ex partner: None     Emotionally abused: None     Physically abused: None     Forced sexual activity: None   Other Topics Concern    None   Social History Narrative    None      Medications and Allergies:     Current Outpatient Medications   Medication Sig Dispense Refill    aspirin (ECOTRIN LOW STRENGTH) 81 mg EC tablet Take 81 mg by mouth daily      atorvastatin (LIPITOR) 40 mg tablet Take 1 tablet (40 mg total) by mouth daily 90 tablet 3    ergocalciferol (VITAMIN D2) 50,000 units Take 1 capsule (50,000 Units total) by mouth once a week 12 capsule 0    furosemide (LASIX) 20 mg tablet 1 tablet 3 days a week 45 tablet 3    metFORMIN (GLUCOPHAGE) 500 mg tablet Take 500 mg by mouth daily with breakfast       metoprolol succinate (TOPROL-XL) 50 mg 24 hr tablet Take 1 tablet (50 mg total) by mouth 2 (two) times a day 180 tablet 3    warfarin (COUMADIN) 4 mg tablet Take 2 tablets daily or as directed by cardiology 180 tablet 3     No current facility-administered medications for this visit  No Known Allergies   Immunizations:      There is no immunization history for the selected administration types on file for this patient  Health Maintenance: There are no preventive care reminders to display for this patient  Topic Date Due    Pneumococcal Vaccine: 65+ Years (1 of 1 - PPSV23) 08/17/2000    Influenza Vaccine  07/01/2020      Medicare Screening Tests and Risk Assessments:     Mel Parker is here for his Subsequent Wellness visit  Last Medicare Wellness visit information reviewed, patient interviewed and updates made to the record today  Health Risk Assessment:   Patient rates overall health as very good  Patient feels that their physical health rating is same  Eyesight was rated as same  Hearing was rated as slightly worse  Patient feels that their emotional and mental health rating is much better  Pain experienced in the last 7 days has been none  Patient states that he has experienced no weight loss or gain in last 6 months  Fall Risk Screening: In the past year, patient has experienced: no history of falling in past year      Home Safety:  Patient does not have trouble with stairs inside or outside of their home  Patient has working smoke alarms and has working carbon monoxide detector  Home safety hazards include: none  Nutrition:   Current diet is Regular  Medications:   Patient is currently taking over-the-counter supplements  OTC medications include: see medication list  Patient is able to manage medications  Activities of Daily Living (ADLs)/Instrumental Activities of Daily Living (IADLs):   Walk and transfer into and out of bed and chair?: Yes  Dress and groom yourself?: Yes    Bathe or shower yourself?: Yes    Feed yourself?  Yes  Do your laundry/housekeeping?: Yes  Manage your money, pay your bills and track your expenses?: Yes  Make your own meals?: Yes    Do your own shopping?: Yes    Previous Hospitalizations:   Any hospitalizations or ED visits within the last 12 months?: No      Advance Care Planning: Living will: No      Comments: Take Me to [place] discussed and given to Patient  PREVENTIVE SCREENINGS      Cardiovascular Screening:    General: Screening Not Indicated and History Lipid Disorder      Diabetes Screening:     General: Screening Not Indicated and History Diabetes      Colorectal Cancer Screening:     General: Screening Not Indicated      Prostate Cancer Screening:    General: Screening Not Indicated      Lung Cancer Screening:     General: Screening Not Indicated    No exam data present     Physical Exam:     /74   Pulse 74   Resp 20   Ht 5' 11 5" (1 816 m)   Wt 99 8 kg (220 lb)   BMI 30 26 kg/m²     Physical Exam  Constitutional:       Appearance: Normal appearance  He is obese  HENT:      Head: Normocephalic  Right Ear: Tympanic membrane, ear canal and external ear normal       Left Ear: Tympanic membrane, ear canal and external ear normal       Nose: Nose normal       Mouth/Throat:      Mouth: Mucous membranes are moist       Pharynx: Oropharynx is clear  Eyes:      Extraocular Movements: Extraocular movements intact  Conjunctiva/sclera: Conjunctivae normal       Pupils: Pupils are equal, round, and reactive to light  Neck:      Musculoskeletal: Normal range of motion  Cardiovascular:      Rate and Rhythm: Normal rate  Rhythm irregular  Pulses: Normal pulses  Heart sounds: Normal heart sounds  Pulmonary:      Effort: Pulmonary effort is normal       Breath sounds: Normal breath sounds  Abdominal:      General: Abdomen is flat  Bowel sounds are normal       Palpations: Abdomen is soft  Musculoskeletal: Normal range of motion  Skin:     General: Skin is warm and dry  Neurological:      General: No focal deficit present  Mental Status: He is alert and oriented to person, place, and time     Psychiatric:         Mood and Affect: Mood normal           Elton All, DO

## 2020-10-13 ENCOUNTER — LAB (OUTPATIENT)
Dept: LAB | Facility: MEDICAL CENTER | Age: 85
End: 2020-10-13
Payer: COMMERCIAL

## 2020-10-14 ENCOUNTER — ANTICOAG VISIT (OUTPATIENT)
Dept: CARDIOLOGY CLINIC | Facility: CLINIC | Age: 85
End: 2020-10-14
Payer: COMMERCIAL

## 2020-10-14 DIAGNOSIS — I48.19 PERSISTENT ATRIAL FIBRILLATION (HCC): ICD-10-CM

## 2020-10-14 DIAGNOSIS — Z79.01 LONG TERM (CURRENT) USE OF ANTICOAGULANTS: ICD-10-CM

## 2020-10-14 PROCEDURE — 93793 ANTICOAG MGMT PT WARFARIN: CPT | Performed by: PHYSICIAN ASSISTANT

## 2020-10-19 DIAGNOSIS — E11.9 DIABETES MELLITUS TYPE 2 IN NONOBESE (HCC): Primary | ICD-10-CM

## 2020-10-19 DIAGNOSIS — E55.9 VITAMIN D DEFICIENCY: ICD-10-CM

## 2020-10-19 RX ORDER — ERGOCALCIFEROL 1.25 MG/1
50000 CAPSULE ORAL WEEKLY
Qty: 12 CAPSULE | Refills: 0 | Status: SHIPPED | OUTPATIENT
Start: 2020-10-19 | End: 2021-02-26

## 2020-10-29 ENCOUNTER — LAB (OUTPATIENT)
Dept: LAB | Facility: MEDICAL CENTER | Age: 85
End: 2020-10-29
Payer: COMMERCIAL

## 2020-10-29 DIAGNOSIS — Z79.01 LONG TERM (CURRENT) USE OF ANTICOAGULANTS: ICD-10-CM

## 2020-10-29 DIAGNOSIS — I48.19 PERSISTENT ATRIAL FIBRILLATION (HCC): ICD-10-CM

## 2020-10-30 ENCOUNTER — ANTICOAG VISIT (OUTPATIENT)
Dept: CARDIOLOGY CLINIC | Facility: CLINIC | Age: 85
End: 2020-10-30
Payer: COMMERCIAL

## 2020-10-30 DIAGNOSIS — Z79.01 LONG TERM (CURRENT) USE OF ANTICOAGULANTS: ICD-10-CM

## 2020-10-30 DIAGNOSIS — I48.19 PERSISTENT ATRIAL FIBRILLATION (HCC): ICD-10-CM

## 2020-10-30 PROCEDURE — 93793 ANTICOAG MGMT PT WARFARIN: CPT | Performed by: PHYSICIAN ASSISTANT

## 2020-11-16 ENCOUNTER — LAB (OUTPATIENT)
Dept: LAB | Facility: MEDICAL CENTER | Age: 85
End: 2020-11-16
Payer: COMMERCIAL

## 2020-11-16 ENCOUNTER — ANTICOAG VISIT (OUTPATIENT)
Dept: CARDIOLOGY CLINIC | Facility: CLINIC | Age: 85
End: 2020-11-16
Payer: COMMERCIAL

## 2020-11-16 DIAGNOSIS — I48.19 PERSISTENT ATRIAL FIBRILLATION (HCC): ICD-10-CM

## 2020-11-16 DIAGNOSIS — Z79.01 LONG TERM (CURRENT) USE OF ANTICOAGULANTS: ICD-10-CM

## 2020-11-16 LAB
INR PPP: 2.12 (ref 0.84–1.19)
PROTHROMBIN TIME: 23.6 SECONDS (ref 11.6–14.5)

## 2020-11-16 PROCEDURE — 85610 PROTHROMBIN TIME: CPT

## 2020-11-16 PROCEDURE — 93793 ANTICOAG MGMT PT WARFARIN: CPT | Performed by: PHYSICIAN ASSISTANT

## 2020-11-16 PROCEDURE — 36415 COLL VENOUS BLD VENIPUNCTURE: CPT

## 2020-11-17 DIAGNOSIS — Z79.01 LONG TERM (CURRENT) USE OF ANTICOAGULANTS: ICD-10-CM

## 2020-11-17 DIAGNOSIS — I48.19 PERSISTENT ATRIAL FIBRILLATION (HCC): Primary | ICD-10-CM

## 2020-12-22 ENCOUNTER — LAB (OUTPATIENT)
Dept: LAB | Facility: MEDICAL CENTER | Age: 85
End: 2020-12-22
Payer: COMMERCIAL

## 2020-12-22 ENCOUNTER — ANTICOAG VISIT (OUTPATIENT)
Dept: CARDIOLOGY CLINIC | Facility: CLINIC | Age: 85
End: 2020-12-22

## 2020-12-22 DIAGNOSIS — Z79.01 LONG TERM (CURRENT) USE OF ANTICOAGULANTS: ICD-10-CM

## 2020-12-22 DIAGNOSIS — I48.19 PERSISTENT ATRIAL FIBRILLATION (HCC): ICD-10-CM

## 2020-12-22 LAB
INR PPP: 2 (ref 0.84–1.19)
PROTHROMBIN TIME: 22.6 SECONDS (ref 11.6–14.5)

## 2020-12-22 PROCEDURE — 85610 PROTHROMBIN TIME: CPT | Performed by: PHYSICIAN ASSISTANT

## 2020-12-22 PROCEDURE — 36415 COLL VENOUS BLD VENIPUNCTURE: CPT | Performed by: PHYSICIAN ASSISTANT

## 2021-01-07 ENCOUNTER — OFFICE VISIT (OUTPATIENT)
Dept: CARDIOLOGY CLINIC | Facility: CLINIC | Age: 86
End: 2021-01-07
Payer: COMMERCIAL

## 2021-01-07 VITALS
HEIGHT: 72 IN | SYSTOLIC BLOOD PRESSURE: 110 MMHG | WEIGHT: 228 LBS | DIASTOLIC BLOOD PRESSURE: 60 MMHG | HEART RATE: 55 BPM | BODY MASS INDEX: 30.88 KG/M2

## 2021-01-07 DIAGNOSIS — I48.19 PERSISTENT ATRIAL FIBRILLATION (HCC): ICD-10-CM

## 2021-01-07 DIAGNOSIS — R60.9 EDEMA, UNSPECIFIED TYPE: ICD-10-CM

## 2021-01-07 DIAGNOSIS — I10 ESSENTIAL HYPERTENSION: ICD-10-CM

## 2021-01-07 DIAGNOSIS — I50.32 CHRONIC DIASTOLIC CONGESTIVE HEART FAILURE (HCC): Primary | ICD-10-CM

## 2021-01-07 DIAGNOSIS — Z79.01 LONG TERM (CURRENT) USE OF ANTICOAGULANTS: ICD-10-CM

## 2021-01-07 PROCEDURE — 1036F TOBACCO NON-USER: CPT | Performed by: INTERNAL MEDICINE

## 2021-01-07 PROCEDURE — 3074F SYST BP LT 130 MM HG: CPT | Performed by: INTERNAL MEDICINE

## 2021-01-07 PROCEDURE — 1160F RVW MEDS BY RX/DR IN RCRD: CPT | Performed by: INTERNAL MEDICINE

## 2021-01-07 PROCEDURE — 99214 OFFICE O/P EST MOD 30 MIN: CPT | Performed by: INTERNAL MEDICINE

## 2021-01-07 PROCEDURE — 3078F DIAST BP <80 MM HG: CPT | Performed by: INTERNAL MEDICINE

## 2021-01-07 RX ORDER — METOPROLOL SUCCINATE 50 MG/1
50 TABLET, EXTENDED RELEASE ORAL 2 TIMES DAILY
Qty: 180 TABLET | Refills: 3 | Status: SHIPPED | OUTPATIENT
Start: 2021-01-07 | End: 2022-01-28

## 2021-01-07 RX ORDER — ATORVASTATIN CALCIUM 40 MG/1
40 TABLET, FILM COATED ORAL DAILY
Qty: 90 TABLET | Refills: 3 | Status: SHIPPED | OUTPATIENT
Start: 2021-01-07 | End: 2022-01-28

## 2021-01-07 RX ORDER — FUROSEMIDE 20 MG/1
TABLET ORAL
Qty: 45 TABLET | Refills: 3 | Status: SHIPPED | OUTPATIENT
Start: 2021-01-07 | End: 2022-07-14

## 2021-01-07 RX ORDER — WARFARIN SODIUM 4 MG/1
TABLET ORAL
Qty: 180 TABLET | Refills: 3 | Status: SHIPPED | OUTPATIENT
Start: 2021-01-07 | End: 2022-01-14

## 2021-01-07 NOTE — PATIENT INSTRUCTIONS
A-fib (Atrial Fibrillation)   AMBULATORY CARE:   Atrial fibrillation (a-fib)  is an irregular heartbeat  It reduces your heart's ability to pump blood through your body  A-fib may come and go, or it may be a long-term condition  A-fib can cause life-threatening blood clots, stroke, or heart failure  It is important to treat and manage a-fib to help prevent these problems  Common signs and symptoms include the following:   · A heartbeat that races, pounds, or flutters    · Weakness, severe tiredness, or confusion    · Feeling lightheaded, sweaty, dizzy, or faint    · Shortness of breath or anxiety    · Chest pain or pressure    Call your local emergency number (911 in the [address] [facility]) if:   · You have any of the following signs of a heart attack:      ? Squeezing, pressure, or pain in your chest    ? You may  also have any of the following:     ? Discomfort or pain in your back, neck, jaw, stomach, or arm    ? Shortness of breath    ? Nausea or vomiting    ? Lightheadedness or a sudden cold sweat    · You have any of the following signs of a stroke:      ? Numbness or drooping on one side of your face     ? Weakness in an arm or leg    ? Confusion or difficulty speaking    ? Dizziness, a severe headache, or vision loss    Call your cardiologist if:   · Your arm or leg feels warm, tender, and painful  It may look swollen and red  · Your heart rate is more than 110 beats per minute  · You have new or worsening swelling in your legs, feet, ankles, or abdomen  · You are short of breath, even at rest      · You have questions or concerns about your condition or care  Treatment for A-fib:  Conditions that cause a-fib, such as thyroid disease, will be treated  You may also need any of the following:  · Heart medicines  help control your heart rate or rhythm  You may need more than one medicine to treat your symptoms  · Antiplatelet or blood thinner medicines  help prevent blood clots and stroke  · Cardioversion  is a procedure to return your heart rate and rhythm to normal  It can be done using medicines or electric shock  · A-fib ablation  is a procedure that uses energy to burn a small area of heart tissue  This creates scar tissue and prevents electrical signals that cause a-fib  You may need this procedure more than once  Ask for more information on a-fib ablation  · A pacemaker  may be inserted into your heart  A pacemaker is a device that controls your heartbeat  A pacemaker may be inserted during an ablation procedure or surgery  Ask your healthcare provider for more information on pacemakers  · Surgery  may be needed if other procedures do not work  During surgery your healthcare provider will make cuts in the upper part of your heart  The provider will stitch the cuts together to create scar tissue  The scar tissue will prevent electrical signals that cause a-fib  Manage A-fib:   · Know your target heart rate  Learn how to check your pulse and monitor your heart rate  · Know the risks if you choose to drink alcohol  Alcohol can make a-fib hard to manage  Ask your healthcare provider if it is safe for you to drink alcohol  A drink of alcohol is 12 ounces of beer, 5 ounces of wine, or 1½ ounces of liquor  · Do not smoke  Nicotine can cause heart damage and make it more difficult to manage your a-fib  Do not use e-cigarettes or smokeless tobacco in place of cigarettes or to help you quit  They still contain nicotine  Ask your healthcare provider for information if you currently smoke and need help quitting  · Eat heart-healthy foods  Heart healthy foods will help keep your cholesterol low  These include fruits, vegetables, whole-grain breads, low-fat dairy products, beans, lean meats, and fish  Replace butter and margarine with heart-healthy oils such as olive oil and canola oil  · Maintain a healthy weight  Ask your healthcare provider how much you should weigh  Ask him or her to help you create a safe weight loss plan if you are overweight  Even a small goal of a 10% weight loss can improve your heart health  · Get regular physical activity  Physical activity helps improve your heart health  Get at least 150 minutes of moderate aerobic physical activity each week  Your healthcare provider can help you create an activity plan  · Manage other health conditions  This includes high blood pressure or cholesterol, sleep apnea, diabetes, and other heart conditions  Take medicine as directed and follow your treatment plan  Follow up with your cardiologist as directed: You will need regular blood tests and monitoring  Write down your questions so you remember to ask them during your visits  © Copyright [address] Information is for End User's use only and may not be sold, redistributed or otherwise used for commercial purposes  All illustrations and images included in CareNotes® are the copyrighted property of [employer]  or [facility] [employer]   The above information is an  only  It is not intended as medical advice for individual conditions or treatments  Talk to your doctor, nurse or pharmacist before following any medical regimen to see if it is safe and effective for you

## 2021-01-07 NOTE — PROGRESS NOTES
Subjective:        Patient ID: Andrez Bryant is a 80 y o  male  Chief Complaint:  Wagner [place] is here for routine follow-up  Feels well , had a good hunting season  Denies any chest pain palpitations alarming shortness of breath, unusual edema, wears his support hose, presyncope syncope or palpitations  No loss of conscious  Still unsteady on his feet due to neuropathy  Gabapentin did not help him he said  The following portions of the patient's history were reviewed and updated as appropriate: allergies, current medications, past family history, past medical history, past social history, past surgical history and problem list   Review of Systems   Constitution: Negative for chills, diaphoresis, malaise/fatigue and weight gain  HENT: Negative for nosebleeds and stridor  Eyes: Negative for double vision, vision loss in left eye, vision loss in right eye and visual disturbance  Cardiovascular: Negative for chest pain, claudication, cyanosis, dyspnea on exertion, irregular heartbeat, leg swelling, near-syncope, orthopnea, palpitations, paroxysmal nocturnal dyspnea and syncope  Respiratory: Negative for cough, shortness of breath, snoring and wheezing  Endocrine: Negative for polydipsia, polyphagia and polyuria  Hematologic/Lymphatic: Negative for bleeding problem  Does not bruise/bleed easily  Skin: Negative for flushing and rash  Musculoskeletal: Negative for falls and myalgias  Gastrointestinal: Negative for abdominal pain, heartburn, hematemesis, hematochezia, melena and nausea  Genitourinary: Negative for hematuria  Neurological: Positive for loss of balance, numbness and paresthesias  Negative for brief paralysis, dizziness, focal weakness, headaches, light-headedness and vertigo  Psychiatric/Behavioral: Negative for altered mental status and substance abuse  Allergic/Immunologic: Negative for hives            Objective:      /60   Pulse 55   Ht 5' 11 5" (1 816 m)   Wt 103 kg (228 lb)   BMI 31 36 kg/m²   Physical Exam   Constitutional: He is oriented to person, place, and time  He appears well-developed and well-nourished  No distress  HENT:   Head: Normocephalic and atraumatic  Eyes: Pupils are equal, round, and reactive to light  EOM are normal  No scleral icterus  Neck: Normal range of motion  Neck supple  No JVD present  No thyromegaly present  Cardiovascular: Normal rate and normal heart sounds  Exam reveals no gallop and no friction rub  No murmur heard  Pulmonary/Chest: Effort normal and breath sounds normal  No stridor  No respiratory distress  He has no wheezes  He has no rales  Abdominal: Soft  Bowel sounds are normal  He exhibits no distension and no mass  There is no abdominal tenderness  Musculoskeletal: Normal range of motion  General: No deformity or edema  Neurological: He is alert and oriented to person, place, and time  Coordination normal    Skin: Skin is warm and dry  No erythema  No pallor  Psychiatric: He has a normal mood and affect  His behavior is normal        Lab Review:   Orders Only on 11/17/2020   Component Date Value    Protime 12/22/2020 22 6*    INR 12/22/2020 2 00*   Lab on 11/16/2020   Component Date Value    Protime 11/16/2020 23 6*    INR 11/16/2020 2 12*     No results found  Assessment:       1  Chronic diastolic congestive heart failure (HCC)     2  Persistent atrial fibrillation (HCC)  atorvastatin (LIPITOR) 40 mg tablet    Echo complete with contrast if indicated    warfarin (COUMADIN) 4 mg tablet   3  Edema, unspecified type  furosemide (LASIX) 20 mg tablet   4  Essential hypertension  metoprolol succinate (TOPROL-XL) 50 mg 24 hr tablet    Echo complete with contrast if indicated   5  Long term (current) use of anticoagulants          Plan:       [place] has rate controlled atrial fibrillation, anticoagulated, INR has been therapeutic, no bleeding issues      His heart failure is well compensated, he is euvolemic, edema at [facility] on present dose diuretics  Blood pressure is well controlled  He has no symptoms reminiscent of angina  I recommended no changes today, refilled his cardiac medications, ordered no acute testing, did order an echocardiogram in 6 months time prior to his follow-up visit  He will call sooner with any concerning potential cardiac symptoms in the meantime

## 2021-01-21 ENCOUNTER — IMMUNIZATIONS (OUTPATIENT)
Dept: FAMILY MEDICINE CLINIC | Facility: HOSPITAL | Age: 86
End: 2021-01-21

## 2021-01-21 DIAGNOSIS — Z23 ENCOUNTER FOR IMMUNIZATION: Primary | ICD-10-CM

## 2021-01-21 PROCEDURE — 0011A SARS-COV-2 / COVID-19 MRNA VACCINE (MODERNA) 100 MCG: CPT

## 2021-01-21 PROCEDURE — 91301 SARS-COV-2 / COVID-19 MRNA VACCINE (MODERNA) 100 MCG: CPT

## 2021-01-25 ENCOUNTER — TELEPHONE (OUTPATIENT)
Dept: FAMILY MEDICINE CLINIC | Facility: CLINIC | Age: 86
End: 2021-01-25

## 2021-01-25 DIAGNOSIS — Z85.46 HISTORY OF PROSTATE CANCER: Primary | ICD-10-CM

## 2021-01-26 ENCOUNTER — TRANSCRIBE ORDERS (OUTPATIENT)
Dept: LAB | Facility: MEDICAL CENTER | Age: 86
End: 2021-01-26

## 2021-01-26 ENCOUNTER — LAB (OUTPATIENT)
Dept: LAB | Facility: MEDICAL CENTER | Age: 86
End: 2021-01-26
Payer: COMMERCIAL

## 2021-01-26 DIAGNOSIS — Z00.00 ROUTINE GENERAL MEDICAL EXAMINATION AT A HEALTH CARE FACILITY: ICD-10-CM

## 2021-01-26 DIAGNOSIS — E55.9 VITAMIN D DEFICIENCY: ICD-10-CM

## 2021-01-26 DIAGNOSIS — Z85.46 HISTORY OF PROSTATE CANCER: ICD-10-CM

## 2021-01-26 DIAGNOSIS — Z00.00 ROUTINE GENERAL MEDICAL EXAMINATION AT A HEALTH CARE FACILITY: Primary | ICD-10-CM

## 2021-01-26 LAB
25(OH)D3 SERPL-MCNC: 71 NG/ML (ref 30–100)
ALBUMIN SERPL BCP-MCNC: 3.4 G/DL (ref 3.5–5)
ALP SERPL-CCNC: 67 U/L (ref 46–116)
ALT SERPL W P-5'-P-CCNC: 22 U/L (ref 12–78)
ANION GAP SERPL CALCULATED.3IONS-SCNC: 2 MMOL/L (ref 4–13)
AST SERPL W P-5'-P-CCNC: 14 U/L (ref 5–45)
BILIRUB SERPL-MCNC: 0.72 MG/DL (ref 0.2–1)
BUN SERPL-MCNC: 24 MG/DL (ref 5–25)
CALCIUM ALBUM COR SERPL-MCNC: 9 MG/DL (ref 8.3–10.1)
CALCIUM SERPL-MCNC: 8.5 MG/DL (ref 8.3–10.1)
CHLORIDE SERPL-SCNC: 112 MMOL/L (ref 100–108)
CO2 SERPL-SCNC: 29 MMOL/L (ref 21–32)
CREAT SERPL-MCNC: 1.17 MG/DL (ref 0.6–1.3)
EST. AVERAGE GLUCOSE BLD GHB EST-MCNC: 146 MG/DL
GFR SERPL CREATININE-BSD FRML MDRD: 57 ML/MIN/1.73SQ M
GLUCOSE P FAST SERPL-MCNC: 133 MG/DL (ref 65–99)
HBA1C MFR BLD: 6.7 %
POTASSIUM SERPL-SCNC: 4.2 MMOL/L (ref 3.5–5.3)
PROT SERPL-MCNC: 6.5 G/DL (ref 6.4–8.2)
PSA SERPL-MCNC: 0.1 NG/ML (ref 0–4)
SODIUM SERPL-SCNC: 143 MMOL/L (ref 136–145)

## 2021-01-26 PROCEDURE — 83036 HEMOGLOBIN GLYCOSYLATED A1C: CPT

## 2021-01-26 PROCEDURE — 82306 VITAMIN D 25 HYDROXY: CPT

## 2021-01-26 PROCEDURE — G0103 PSA SCREENING: HCPCS

## 2021-01-26 PROCEDURE — 80053 COMPREHEN METABOLIC PANEL: CPT

## 2021-01-27 ENCOUNTER — ANTICOAG VISIT (OUTPATIENT)
Dept: CARDIOLOGY CLINIC | Facility: CLINIC | Age: 86
End: 2021-01-27
Payer: COMMERCIAL

## 2021-01-27 DIAGNOSIS — Z79.01 LONG TERM (CURRENT) USE OF ANTICOAGULANTS: ICD-10-CM

## 2021-01-27 DIAGNOSIS — I48.19 PERSISTENT ATRIAL FIBRILLATION (HCC): ICD-10-CM

## 2021-01-27 PROCEDURE — 93793 ANTICOAG MGMT PT WARFARIN: CPT | Performed by: NURSE PRACTITIONER

## 2021-01-27 NOTE — PATIENT INSTRUCTIONS
INR received from lab and reviewed  No changes needed; continue current dosing schedule    Recheck INR in 2 weeks  DOE Escobedo

## 2021-02-03 ENCOUNTER — LAB (OUTPATIENT)
Dept: LAB | Facility: MEDICAL CENTER | Age: 86
End: 2021-02-03
Payer: COMMERCIAL

## 2021-02-03 ENCOUNTER — ANTICOAG VISIT (OUTPATIENT)
Dept: CARDIOLOGY CLINIC | Facility: CLINIC | Age: 86
End: 2021-02-03
Payer: COMMERCIAL

## 2021-02-03 DIAGNOSIS — Z01.818 PRE-OP EXAM: Primary | ICD-10-CM

## 2021-02-03 DIAGNOSIS — I48.19 PERSISTENT ATRIAL FIBRILLATION (HCC): ICD-10-CM

## 2021-02-03 DIAGNOSIS — Z79.01 LONG TERM (CURRENT) USE OF ANTICOAGULANTS: ICD-10-CM

## 2021-02-03 LAB
INR PPP: 1.9 (ref 0.84–1.19)
PROTHROMBIN TIME: 21.7 SECONDS (ref 11.6–14.5)

## 2021-02-03 PROCEDURE — 93793 ANTICOAG MGMT PT WARFARIN: CPT | Performed by: PHYSICIAN ASSISTANT

## 2021-02-03 PROCEDURE — 36415 COLL VENOUS BLD VENIPUNCTURE: CPT

## 2021-02-03 PROCEDURE — 85610 PROTHROMBIN TIME: CPT

## 2021-02-03 NOTE — PATIENT INSTRUCTIONS
No changes in medication health or diet  No missed or extra doses  No s/s of bleeding TIA or CVA  No new ABX, NSAIDs OCT meds, or supplements  Dose as instructed and recheck in two weeks     Tahira Sexton PA-C

## 2021-02-19 ENCOUNTER — IMMUNIZATIONS (OUTPATIENT)
Dept: FAMILY MEDICINE CLINIC | Facility: HOSPITAL | Age: 86
End: 2021-02-19

## 2021-02-19 DIAGNOSIS — Z23 ENCOUNTER FOR IMMUNIZATION: Primary | ICD-10-CM

## 2021-02-19 PROCEDURE — 91301 SARS-COV-2 / COVID-19 MRNA VACCINE (MODERNA) 100 MCG: CPT

## 2021-02-19 PROCEDURE — 0012A SARS-COV-2 / COVID-19 MRNA VACCINE (MODERNA) 100 MCG: CPT

## 2021-02-26 ENCOUNTER — LAB (OUTPATIENT)
Dept: LAB | Facility: MEDICAL CENTER | Age: 86
End: 2021-02-26
Payer: COMMERCIAL

## 2021-02-26 ENCOUNTER — OFFICE VISIT (OUTPATIENT)
Dept: FAMILY MEDICINE CLINIC | Facility: CLINIC | Age: 86
End: 2021-02-26
Payer: COMMERCIAL

## 2021-02-26 VITALS
HEART RATE: 76 BPM | BODY MASS INDEX: 31.15 KG/M2 | TEMPERATURE: 97.1 F | DIASTOLIC BLOOD PRESSURE: 86 MMHG | HEIGHT: 72 IN | WEIGHT: 230 LBS | SYSTOLIC BLOOD PRESSURE: 126 MMHG | RESPIRATION RATE: 16 BRPM

## 2021-02-26 DIAGNOSIS — Z79.01 LONG TERM (CURRENT) USE OF ANTICOAGULANTS: ICD-10-CM

## 2021-02-26 DIAGNOSIS — I87.2 CHRONIC VENOUS INSUFFICIENCY: Primary | ICD-10-CM

## 2021-02-26 DIAGNOSIS — I50.32 CHRONIC DIASTOLIC CONGESTIVE HEART FAILURE (HCC): ICD-10-CM

## 2021-02-26 DIAGNOSIS — C61 MALIGNANT NEOPLASM OF PROSTATE (HCC): ICD-10-CM

## 2021-02-26 DIAGNOSIS — R60.9 EDEMA, UNSPECIFIED TYPE: ICD-10-CM

## 2021-02-26 DIAGNOSIS — I48.19 PERSISTENT ATRIAL FIBRILLATION (HCC): ICD-10-CM

## 2021-02-26 DIAGNOSIS — I10 ESSENTIAL HYPERTENSION: ICD-10-CM

## 2021-02-26 DIAGNOSIS — E55.9 VITAMIN D DEFICIENCY: ICD-10-CM

## 2021-02-26 DIAGNOSIS — E11.9 DIABETES MELLITUS TYPE 2 IN NONOBESE (HCC): ICD-10-CM

## 2021-02-26 LAB
INR PPP: 2.38 (ref 0.84–1.19)
PROTHROMBIN TIME: 25.9 SECONDS (ref 11.6–14.5)

## 2021-02-26 PROCEDURE — 1036F TOBACCO NON-USER: CPT | Performed by: FAMILY MEDICINE

## 2021-02-26 PROCEDURE — 3079F DIAST BP 80-89 MM HG: CPT | Performed by: FAMILY MEDICINE

## 2021-02-26 PROCEDURE — 99214 OFFICE O/P EST MOD 30 MIN: CPT | Performed by: FAMILY MEDICINE

## 2021-02-26 PROCEDURE — 36415 COLL VENOUS BLD VENIPUNCTURE: CPT

## 2021-02-26 PROCEDURE — 3074F SYST BP LT 130 MM HG: CPT | Performed by: FAMILY MEDICINE

## 2021-02-26 PROCEDURE — 1160F RVW MEDS BY RX/DR IN RCRD: CPT | Performed by: FAMILY MEDICINE

## 2021-02-26 PROCEDURE — 85610 PROTHROMBIN TIME: CPT

## 2021-02-26 RX ORDER — METOPROLOL SUCCINATE 50 MG/1
50 TABLET, EXTENDED RELEASE ORAL 2 TIMES DAILY
Qty: 180 TABLET | Refills: 1 | Status: CANCELLED | OUTPATIENT
Start: 2021-02-26

## 2021-02-26 RX ORDER — ATORVASTATIN CALCIUM 40 MG/1
40 TABLET, FILM COATED ORAL DAILY
Qty: 90 TABLET | Refills: 1 | Status: CANCELLED | OUTPATIENT
Start: 2021-02-26

## 2021-02-26 RX ORDER — FUROSEMIDE 20 MG/1
TABLET ORAL
Qty: 45 TABLET | Refills: 1 | Status: CANCELLED | OUTPATIENT
Start: 2021-02-26

## 2021-02-26 RX ORDER — ERGOCALCIFEROL 1.25 MG/1
50000 CAPSULE ORAL WEEKLY
Qty: 12 CAPSULE | Refills: 0 | Status: CANCELLED | OUTPATIENT
Start: 2021-02-26

## 2021-02-26 NOTE — PROGRESS NOTES
Assessment/Plan:Chronic congestive heart failure stable on current regimen  Persistent atrial fibrillation anticoagulated    Varicose veins with venous hypertension compression stockings and diuretics of affective    Essential hypertension with blood pressure controlled on current regimen  Diabetes mellitus type 2 under good control with a hemoglobin A1c of 6 7  Vitamin-D deficiency the patient had a vitamin-D corrected to 75 and patient was told to stop supplementation and just take 1 a day multi Diana    Problem List Items Addressed This Visit        Cardiovascular and Mediastinum    Persistent atrial fibrillation ([facility] )    Chronic venous insufficiency - Primary    Chronic diastolic congestive heart failure ([facility] )       Other    Long term (current) use of anticoagulants      Other Visit Diagnoses     Vitamin D deficiency        Edema, unspecified type        Diabetes mellitus type 2 in nonobese [facility])        Essential hypertension               Diagnoses and all orders for this visit:    Chronic venous insufficiency    Persistent atrial fibrillation (HCC)    Vitamin D deficiency  -     ergocalciferol (VITAMIN D2) 50,000 units; Take 1 capsule (50,000 Units total) by mouth once a week    Edema, unspecified type    Diabetes mellitus type 2 in nonobese ([facility])  -     metFORMIN (GLUCOPHAGE) 500 mg tablet; Take 1 tablet (500 mg total) by mouth daily with breakfast    Essential hypertension    Chronic diastolic congest[facility] failure ([facility] )    Long term (current) use of anticoagulants    Other orders  -     Cancel: atorvastatin (LIPITOR) 40 mg tablet; Take 1 tablet (40 mg total) by mouth daily  -     Cancel: furosemide (LASIX) 20 mg tablet; 1 tablet 3 days a week  -     Cancel: metoprolol succinate (TOPROL-XL) 50 mg 24 hr tablet; Take 1 tablet (50 mg total) by mouth 2 (two) times a day        No problem-specific Assessment & Plan notes found for this encounter        PHQ-9 Depression Screening    PHQ-9:   Frequency of the following problems over the past two weeks: Body mass index is 31 63 kg/m²  BMI Counseling: Body mass index is 31 63 kg/m²  The BMI   Subjective:      Patient ID: Christo Amor is a 80 y o  male  HPI    The following portions of the patient's history were reviewed and updated as appropriate:   He has a past medical history of Atrial fibrillation ([facility] ), CAD (coronary artery disease), [facility]est[facility] ([facility]) [facility] History of echocardiogram (04/05/2018), HTN (hypertension), Hyperlipidemia, and Long term (current) use of anticoagulants (8/21/2018)  ,  does not have any pertinent problems on file  ,   has a past surgical history that includes Cardiac catheterization (01/15/1988)  ,  family history is not on file  ,   reports that he has never smoked  He has never used smokeless tobacco  He reports that he does not drink alcohol or use drugs  ,  has No Known Allergies     Current Outpatient Medications   Medication Sig Dispense Refill    aspirin (ECOTRIN LOW STRENGTH) 81 mg EC tablet Take 81 mg by mouth daily      atorvastatin (LIPITOR) 40 mg tablet Take 1 tablet (40 mg total) by mouth daily 90 tablet 3    ergocalciferol (VITAMIN D2) 50,000 units Take 1 capsule (50,000 Units total) by mouth once a week 12 capsule 0    furosemide (LASIX) 20 mg tablet 1 tablet 3 days a week 45 tablet 3    metFORMIN (GLUCOPHAGE) 500 mg tablet Take 1 tablet (500 mg total) by mouth daily with breakfast 90 tablet 1    metoprolol succinate (TOPROL-XL) 50 mg 24 hr tablet Take 1 tablet (50 mg total) by mouth 2 (two) times a day 180 tablet 3    warfarin (COUMADIN) 4 mg tablet Take 2 tablets daily or as directed by cardiology 180 tablet 3     No current facility-administered medications for this visit  Review of Systems      Objective:    /86   Pulse 76   Temp (!) 97 1 °F (36 2 °C)   Resp 16   Ht 5' 11 5" (1 816 m)   Wt 104 kg (230 lb)   BMI 31 63 kg/m²   Body mass index is 31 63 kg/m²  Physical Exam

## 2021-03-01 ENCOUNTER — ANTICOAG VISIT (OUTPATIENT)
Dept: CARDIOLOGY CLINIC | Facility: CLINIC | Age: 86
End: 2021-03-01
Payer: COMMERCIAL

## 2021-03-01 DIAGNOSIS — Z79.01 LONG TERM (CURRENT) USE OF ANTICOAGULANTS: ICD-10-CM

## 2021-03-01 DIAGNOSIS — I48.19 PERSISTENT ATRIAL FIBRILLATION (HCC): ICD-10-CM

## 2021-03-01 PROCEDURE — 93793 ANTICOAG MGMT PT WARFARIN: CPT | Performed by: PHYSICIAN ASSISTANT

## 2021-03-01 NOTE — PATIENT INSTRUCTIONS
No changes in medication health or diet  No missed or extra doses  No s/s of bleeding TIA or CVA  No new ABX, NSAIDs OCT meds, or supplements  Dose as instructed and recheck in one month     Jimena Concepcion PA-C

## 2021-03-31 ENCOUNTER — APPOINTMENT (OUTPATIENT)
Dept: LAB | Facility: MEDICAL CENTER | Age: 86
End: 2021-03-31
Payer: COMMERCIAL

## 2021-03-31 DIAGNOSIS — I48.19 PERSISTENT ATRIAL FIBRILLATION (HCC): ICD-10-CM

## 2021-03-31 DIAGNOSIS — Z79.01 LONG TERM (CURRENT) USE OF ANTICOAGULANTS: ICD-10-CM

## 2021-04-01 ENCOUNTER — ANTICOAG VISIT (OUTPATIENT)
Dept: CARDIOLOGY CLINIC | Facility: CLINIC | Age: 86
End: 2021-04-01
Payer: COMMERCIAL

## 2021-04-01 DIAGNOSIS — I48.19 PERSISTENT ATRIAL FIBRILLATION (HCC): ICD-10-CM

## 2021-04-01 DIAGNOSIS — Z79.01 LONG TERM (CURRENT) USE OF ANTICOAGULANTS: ICD-10-CM

## 2021-04-01 PROCEDURE — 93793 ANTICOAG MGMT PT WARFARIN: CPT | Performed by: INTERNAL MEDICINE

## 2021-04-01 NOTE — PATIENT INSTRUCTIONS
No changes in health or diet  No missed or extra doses  No s/s of bleeding TIA or CVA  No new ABX, NSAIDs OCT meds, or supplements  Dose as instructed and recheck in two weeks  Will stop taking Centrum Silver     Daniela Zepeda PA-C

## 2021-04-15 ENCOUNTER — APPOINTMENT (OUTPATIENT)
Dept: LAB | Facility: MEDICAL CENTER | Age: 86
End: 2021-04-15
Payer: COMMERCIAL

## 2021-04-15 DIAGNOSIS — Z79.01 LONG TERM (CURRENT) USE OF ANTICOAGULANTS: ICD-10-CM

## 2021-04-15 DIAGNOSIS — I48.19 PERSISTENT ATRIAL FIBRILLATION (HCC): ICD-10-CM

## 2021-04-15 LAB
INR PPP: 1.57 (ref 0.84–1.19)
PROTHROMBIN TIME: 18.7 SECONDS (ref 11.6–14.5)

## 2021-04-15 PROCEDURE — 36415 COLL VENOUS BLD VENIPUNCTURE: CPT

## 2021-04-15 PROCEDURE — 85610 PROTHROMBIN TIME: CPT

## 2021-04-16 ENCOUNTER — ANTICOAG VISIT (OUTPATIENT)
Dept: CARDIOLOGY CLINIC | Facility: CLINIC | Age: 86
End: 2021-04-16
Payer: COMMERCIAL

## 2021-04-16 DIAGNOSIS — Z79.01 LONG TERM (CURRENT) USE OF ANTICOAGULANTS: ICD-10-CM

## 2021-04-16 DIAGNOSIS — I48.19 PERSISTENT ATRIAL FIBRILLATION (HCC): ICD-10-CM

## 2021-04-16 PROCEDURE — 93793 ANTICOAG MGMT PT WARFARIN: CPT | Performed by: PHYSICIAN ASSISTANT

## 2021-04-16 NOTE — PATIENT INSTRUCTIONS
No changes in medication health or diet  No missed or extra doses  No s/s of bleeding TIA or CVA  No new ABX, NSAIDs OCT meds, or supplements  Dose as instructed and recheck in two weeks     Colton Fernandez PA-C

## 2021-05-04 ENCOUNTER — ANTICOAG VISIT (OUTPATIENT)
Dept: CARDIOLOGY CLINIC | Facility: CLINIC | Age: 86
End: 2021-05-04
Payer: COMMERCIAL

## 2021-05-04 ENCOUNTER — APPOINTMENT (OUTPATIENT)
Dept: LAB | Facility: MEDICAL CENTER | Age: 86
End: 2021-05-04
Payer: COMMERCIAL

## 2021-05-04 DIAGNOSIS — I48.19 PERSISTENT ATRIAL FIBRILLATION (HCC): ICD-10-CM

## 2021-05-04 DIAGNOSIS — Z79.01 LONG TERM (CURRENT) USE OF ANTICOAGULANTS: ICD-10-CM

## 2021-05-04 PROCEDURE — 93793 ANTICOAG MGMT PT WARFARIN: CPT | Performed by: NURSE PRACTITIONER

## 2021-05-04 NOTE — PATIENT INSTRUCTIONS
INR received from lab and reviewed  No changes needed; continue current dosing schedule  Recheck INR in one month    DOE Ibrahim

## 2021-06-01 ENCOUNTER — ANTICOAG VISIT (OUTPATIENT)
Dept: CARDIOLOGY CLINIC | Facility: CLINIC | Age: 86
End: 2021-06-01
Payer: COMMERCIAL

## 2021-06-01 ENCOUNTER — APPOINTMENT (OUTPATIENT)
Dept: LAB | Facility: MEDICAL CENTER | Age: 86
End: 2021-06-01
Payer: COMMERCIAL

## 2021-06-01 DIAGNOSIS — Z79.01 LONG TERM (CURRENT) USE OF ANTICOAGULANTS: ICD-10-CM

## 2021-06-01 DIAGNOSIS — I48.19 PERSISTENT ATRIAL FIBRILLATION (HCC): ICD-10-CM

## 2021-06-01 PROCEDURE — 93793 ANTICOAG MGMT PT WARFARIN: CPT | Performed by: PHYSICIAN ASSISTANT

## 2021-06-01 NOTE — PATIENT INSTRUCTIONS
LMAM to keep the same dose and recheck in 1 month  Patient to call if questions, concerns, or changes in medication health or diet     Earlyne Ormond, PA-C

## 2021-07-01 ENCOUNTER — HOSPITAL ENCOUNTER (OUTPATIENT)
Dept: NON INVASIVE DIAGNOSTICS | Facility: CLINIC | Age: 86
Discharge: HOME | End: 2021-07-01
Payer: COMMERCIAL

## 2021-07-01 DIAGNOSIS — I10 ESSENTIAL HYPERTENSION: ICD-10-CM

## 2021-07-01 DIAGNOSIS — I48.19 PERSISTENT ATRIAL FIBRILLATION (HCC): ICD-10-CM

## 2021-07-01 PROCEDURE — 93306 TTE W/DOPPLER COMPLETE: CPT | Performed by: INTERNAL MEDICINE

## 2021-07-01 PROCEDURE — 93306 TTE W/DOPPLER COMPLETE: CPT

## 2021-07-06 ENCOUNTER — APPOINTMENT (OUTPATIENT)
Dept: LAB | Facility: MEDICAL CENTER | Age: 86
End: 2021-07-06
Payer: COMMERCIAL

## 2021-07-06 ENCOUNTER — ANTICOAG VISIT (OUTPATIENT)
Dept: CARDIOLOGY CLINIC | Facility: CLINIC | Age: 86
End: 2021-07-06
Payer: COMMERCIAL

## 2021-07-06 DIAGNOSIS — I48.19 PERSISTENT ATRIAL FIBRILLATION (HCC): Primary | ICD-10-CM

## 2021-07-06 DIAGNOSIS — Z79.01 LONG TERM (CURRENT) USE OF ANTICOAGULANTS: ICD-10-CM

## 2021-07-06 PROCEDURE — 93793 ANTICOAG MGMT PT WARFARIN: CPT | Performed by: NURSE PRACTITIONER

## 2021-07-14 ENCOUNTER — OFFICE VISIT (OUTPATIENT)
Dept: CARDIOLOGY CLINIC | Facility: CLINIC | Age: 86
End: 2021-07-14
Payer: COMMERCIAL

## 2021-07-14 VITALS
HEART RATE: 55 BPM | DIASTOLIC BLOOD PRESSURE: 72 MMHG | BODY MASS INDEX: 31.36 KG/M2 | SYSTOLIC BLOOD PRESSURE: 116 MMHG | WEIGHT: 228 LBS

## 2021-07-14 DIAGNOSIS — E78.5 DYSLIPIDEMIA: ICD-10-CM

## 2021-07-14 DIAGNOSIS — I48.19 PERSISTENT ATRIAL FIBRILLATION (HCC): Primary | ICD-10-CM

## 2021-07-14 DIAGNOSIS — Z79.01 LONG TERM (CURRENT) USE OF ANTICOAGULANTS: ICD-10-CM

## 2021-07-14 DIAGNOSIS — I50.32 CHRONIC DIASTOLIC CONGESTIVE HEART FAILURE (HCC): ICD-10-CM

## 2021-07-14 DIAGNOSIS — I87.2 CHRONIC VENOUS INSUFFICIENCY: ICD-10-CM

## 2021-07-14 PROCEDURE — 3074F SYST BP LT 130 MM HG: CPT | Performed by: INTERNAL MEDICINE

## 2021-07-14 PROCEDURE — 99214 OFFICE O/P EST MOD 30 MIN: CPT | Performed by: INTERNAL MEDICINE

## 2021-07-14 PROCEDURE — 1036F TOBACCO NON-USER: CPT | Performed by: INTERNAL MEDICINE

## 2021-07-14 PROCEDURE — 3078F DIAST BP <80 MM HG: CPT | Performed by: INTERNAL MEDICINE

## 2021-07-14 PROCEDURE — 1160F RVW MEDS BY RX/DR IN RCRD: CPT | Performed by: INTERNAL MEDICINE

## 2021-07-14 NOTE — PATIENT INSTRUCTIONS
A-fib (Atrial Fibrillation)   AMBULATORY CARE:   Atrial fibrillation (A-fib)  is an irregular heartbeat  It reduces your heart's ability to pump blood through your body  A-fib may come and go, or it may be a long-term condition  A-fib can cause life-threatening blood clots, stroke, or heart failure  It is important to treat and manage A-fib to help prevent these problems  Common signs and symptoms include the following:   · A heartbeat that races, pounds, or flutters    · Weakness, severe tiredness, or confusion    · Feeling lightheaded, sweaty, dizzy, or faint    · Shortness of breath or anxiety    · Chest pain or pressure    Call your local emergency number (911 in the [facility] [facility]) or have someone call if:   · You have any of the following signs of a heart attack:      ? Squeezing, pressure, or pain in your chest    ? You may  also have any of the following:     § Discomfort or pain in your back, neck, jaw, stomach, or arm    § Shortness of breath    § Nausea or vomiting    § Lightheadedness or a sudden cold sweat    · You have any of the following signs of a stroke:      ? Numbness or drooping on one side of your face     ? Weakness in an arm or leg    ? Confusion or difficulty speaking    ? Dizziness, a severe headache, or vision loss    Call your doctor or cardiologist if:   · Your arm or leg feels warm, tender, and painful  It may look swollen and red  · Your heart rate is more than 110 beats per minute  · You have new or worsening swelling in your legs, feet, ankles, or abdomen  · You are short of breath, even at rest     · You have questions or concerns about your condition or care  Treatment for A-fib:  Conditions that cause A-fib, such as thyroid disease, will be treated  You may also need any of the following:  · Heart medicines  help control your heart rate or rhythm  You may need more than one medicine to treat your symptoms      · Antiplatelet or blood thinner medicines  help prevent blood clots and stroke  · Cardioversion  is a procedure to return your heart rate and rhythm to normal  It can be done using medicines or electric shock  · A-fib ablation  is a procedure that uses energy to burn a small area of heart tissue  This creates scar tissue and prevents electrical signals that cause A-fib  You may need this procedure more than once  Ask for more information on A-fib ablation  · A pacemaker  may be inserted into your heart  A pacemaker is a device that controls your heartbeat  A pacemaker may be inserted during an ablation procedure or surgery  Ask your healthcare provider for more information on pacemakers  · Surgery  may be needed if other procedures do not work  During surgery your healthcare provider will make cuts in the upper part of your heart  The provider will stitch the cuts together to create scar tissue  The scar tissue will prevent electrical signals that cause A-fib  Manage A-fib:   · Know your target heart rate  Learn how to check your pulse and monitor your heart rate  · Know the risks if you choose to drink alcohol  Alcohol can increase your risk for A-fib or make A-fib harder to manage  Ask your healthcare provider if it is okay for you to drink any alcohol  He or she can help you set limits for the number of drinks you have in 24 hours and in a week  A drink of alcohol is 12 ounces of beer, 5 ounces of wine, or 1½ ounces of liquor  · Do not smoke  Nicotine can cause heart damage and make it more difficult to manage your A-fib  Do not use e-cigarettes or smokeless tobacco in place of cigarettes or to help you quit  They still contain nicotine  Ask your healthcare provider for information if you currently smoke and need help quitting  · Eat heart-healthy foods  Heart healthy foods will help keep your cholesterol low  These include fruits, vegetables, whole-grain breads, low-fat dairy products, beans, lean meats, and fish   Replace butter and margarine with heart-healthy oils such as olive oil and canola oil  · Maintain a healthy weight  Ask your healthcare provider what a healthy weight is for you  Ask him or her to help you create a safe weight loss plan if you are overweight  Even a small goal of a 10% weight loss can improve your heart health  · Get regular physical activity  Physical activity helps improve your heart health  Get at least 150 minutes of moderate aerobic physical activity each week  Your healthcare provider can help you create an activity plan  · Manage other health conditions  This includes high blood pressure or cholesterol, sleep apnea, diabetes, and other heart conditions  Take medicine as directed and follow your treatment plan  Your healthcare provider may need to change a medicine you are taking if it is causing your A-fib  Do not  stop taking any medicine unless directed by your provider  Follow up with your doctor or cardiologist as directed: You will need regular blood tests and monitoring  Write down your questions so you remember to ask them during your visits  © Copyright [employer] 2021 Information is for End User's use only and may not be sold, redistributed or otherwise used for commercial purposes  All illustrations and images included in CareNotes® are the copyrighted property of A D A M [employer]  or [address] [place], [place] [address]   The above information is an  only  It is not intended as medical advice for individual conditions or treatments  Talk to your doctor, nurse or pharmacist before following any medical regimen to see if it is safe and effective for you

## 2021-07-14 NOTE — PROGRESS NOTES
Subjective:        Patient ID: Homer Fish is a 80 y o  male  Chief Complaint:  Tahir Hodge is here for AFib, hypertensive, dyslipidemia, diastolic heart failure follow-up  He is compliant with his compression stockings, these have helped dramatically with his lower extremity edema  Takes his water pill 3 days a week  Denies any chest pain palpitations shortness of breath concerns presyncope syncope TIA or claudication like symptoms  He does have some excess bruising particularly on his upper extremities  The following portions of the patient's history were reviewed and updated as appropriate: allergies, current medications, past family history, past medical history, past social history, past surgical history and problem list   Review of Systems   Constitutional: Negative for chills, diaphoresis, malaise/fatigue and weight gain  HENT: Negative for nosebleeds and stridor  Eyes: Negative for double vision, vision loss in left eye, vision loss in right eye and visual disturbance  Cardiovascular: Negative for chest pain, claudication, cyanosis, dyspnea on exertion, irregular heartbeat, leg swelling, near-syncope, orthopnea, palpitations, paroxysmal nocturnal dyspnea and syncope  Respiratory: Negative for cough, shortness of breath, snoring and wheezing  Endocrine: Negative for polydipsia, polyphagia and polyuria  Hematologic/Lymphatic: Negative for bleeding problem  Does not bruise/bleed easily  Skin: Negative for flushing and rash  Musculoskeletal: Negative for falls and myalgias  Gastrointestinal: Negative for abdominal pain, heartburn, hematemesis, hematochezia, melena and nausea  Genitourinary: Negative for hematuria  Neurological: Negative for brief paralysis, dizziness, focal weakness, headaches, light-headedness, loss of balance and vertigo  Psychiatric/Behavioral: Negative for altered mental status and substance abuse  Allergic/Immunologic: Negative for hives  Objective:      /72   Pulse 55   Wt 103 kg (228 lb)   BMI 31 36 kg/m²   Physical Exam  Constitutional:       General: He is not in acute distress  Appearance: He is well-developed  HENT:      Head: Normocephalic and atraumatic  Eyes:      General: No scleral icterus  Pupils: Pupils are equal, round, and reactive to light  Neck:      Thyroid: No thyromegaly  Vascular: No JVD  Cardiovascular:      Rate and Rhythm: Normal rate  Rhythm irregular  Heart sounds: Normal heart sounds  No murmur heard  No friction rub  No gallop  Pulmonary:      Effort: Pulmonary effort is normal  No respiratory distress  Breath sounds: Normal breath sounds  No stridor  No wheezing or rales  Abdominal:      General: Bowel sounds are normal  There is no distension  Palpations: Abdomen is soft  There is no mass  Tenderness: There is no abdominal tenderness  Musculoskeletal:         General: No deformity  Normal range of motion  Cervical back: Normal range of motion and neck supple  Right lower leg: No edema  Left lower leg: No edema  Skin:     General: Skin is warm and dry  Coloration: Skin is not pale  Findings: No erythema  Neurological:      Mental Status: He is alert and oriented to person, place, and time        Coordination: Coordination normal    Psychiatric:         Behavior: Behavior normal          Lab Review:   Ancillary Orders on 2021   Component Date Value    Protime 2021 31 1*    INR 2021 3 02*     Echo complete with contrast if indicated    Result Date: 2021  Narrative: [address] [place] [address] Transthoracic Echocardiogram 2D, M-mode, Doppler, and Color Doppler Study date:  2021 Patient: Jamila Higuera MR number: [id number] Account number: [de-identified] : 1935 Age: 80 years Gender: Male Status: Outpatient Location: [address] [place] [address] Height: 72 in Weight: 229 5 lb BP: 126/ 86 mmHg Indications: Atrial fibrillation  Diagnoses: I10  - Essential (primary) hypertension, I48 0 - Atrial fibrillation Sonographer:  Pierce Lu RDCS Primary Physician:  Jahaira Molina DO Referring Physician: TARUN Shannon  Group:  [facility] Interpreting Physician: TARUN Shannon  SUMMARY LEFT VENTRICLE: Systolic function was normal  Ejection fraction was estimated to be 65 %  There were no regional wall motion abnormalities  Wall thickness was mildly increased  There was mild concentric hypertrophy  RIGHT VENTRICLE: The size was at the upper limits of normal  Systolic function was normal  LEFT ATRIUM: The atrium was moderately dilated  RIGHT ATRIUM: The atrium was mildly dilated  MITRAL VALVE: There was mild annular calcification  There was mild regurgitation  TRICUSPID VALVE: There was trace regurgitation  HISTORY: PRIOR HISTORY: CAD, prostate cancer, DVT Congestive heart failure  Atrial fibrillation  Risk factors: hypertension, diabetes, and hypercholesterolemia  Chronic lung disease  PRIOR PROCEDURES: Diagnostic cath  PROCEDURE: The study was performed in the [facility] and Vascular Center  This was a routine study  The transthoracic approach was used  The study included complete 2D imaging, M-mode, complete spectral Doppler, and color Doppler  The heart rate was 63 bpm, at the start of the study  Images were obtained from the parasternal, apical, subcostal, and suprasternal notch acoustic windows  Echocardiographic views were limited due to lung interference  This was a technically difficult study  LEFT VENTRICLE: Size was normal  Systolic function was normal  Ejection fraction was estimated to be 65 %  There were no regional wall motion abnormalities  Wall thickness was mildly increased  There was mild concentric hypertrophy  No evidence of apical thrombus   DOPPLER: The study was not technically sufficient to allow evaluation of LV diastolic function  RIGHT VENTRICLE: The size was at the upper limits of normal  Systolic function was normal  Wall thickness was normal  LEFT ATRIUM: The atrium was moderately dilated  RIGHT ATRIUM: The atrium was mildly dilated  MITRAL VALVE: There was mild annular calcification  Valve structure was normal  There was normal leaflet separation  DOPPLER: The transmitral velocity was within the normal range  There was no evidence for stenosis  There was mild regurgitation  AORTIC VALVE: The valve was trileaflet  Leaflets exhibited normal thickness and normal cuspal separation  DOPPLER: Transaortic velocity was within the normal range  There was no evidence for stenosis  There was no significant regurgitation  TRICUSPID VALVE: The valve structure was normal  There was normal leaflet separation  DOPPLER: The transtricuspid velocity was within the normal range  There was no evidence for stenosis  There was trace regurgitation  PULMONIC VALVE: Leaflets exhibited normal thickness, no calcification, and normal cuspal separation  DOPPLER: The transpulmonic velocity was within the normal range  There was no significant regurgitation  PERICARDIUM: There was no pericardial effusion  The pericardium was normal in appearance  AORTA: The root exhibited normal size  SYSTEMIC VEINS: IVC: The inferior vena cava was normal in size  SYSTEM MEASUREMENT TABLES 2D %FS: 28 04 % Ao Diam: 3 28 cm Ao asc: 3 77 cm EDV(Teich): 81 23 ml EF(Teich): 54 6 % ESV(Teich): 36 88 ml IVSd: 1 31 cm LA Area: 27 54 cm2 LA Diam: 5 16 cm LVEDV MOD A4C: 108 04 ml LVEF MOD A4C: 64 4 % LVESV MOD A4C: 38 47 ml LVIDd: 4 26 cm LVIDs: 3 06 cm LVLd A4C: 6 42 cm LVLs A4C: 4 92 cm LVOT Diam: 2 02 cm LVPWd: 1 2 cm RA Area: 17 43 cm2 RVIDd: 3 32 cm SV MOD A4C: 69 57 ml SV(Teich): 44 35 ml CW AR Dec Glynn: 1 76 m/s2 AR Dec Time: 2258 44 ms AR PHT: 654 95 ms AR Vmax: 3 98 m/s AR maxP 37 mmHg AV Env  Ti: 344 43 ms AV VTI: 18 22 cm AV Vmax: 0 76 m/s AV Vmean: 0 53 m/s AV maxP 29 mmHg AV meanP 28 mmHg MR VTI: 131 67 cm MR Vmax: 3 44 m/s MR Vmean: 2 91 m/s MR maxP 22 mmHg MR meanP 8 mmHg TR Vmax: 3 m/s TR maxP 07 mmHg MM TAPSE: 1 91 cm PW AROLDO (VTI): 2 45 cm2 AROLDO Vmax: 2 46 cm2 AVAI (VTI): 0 cm2/m2 AVAI Vmax: 0 cm2/m2 LVOT Env  Ti: 346 34 ms LVOT VTI: 13 95 cm LVOT Vmax: 0 58 m/s LVOT Vmean: 0 4 m/s LVOT maxP 35 mmHg LVOT meanP 75 mmHg LVSI Dopp: 19 72 ml/m2 LVSV Dopp: 44 57 ml [facility] Accredited Echocardiography Laboratory Prepared and electronically signed by TARUN Boyce  Signed 2021 12:00:12         Assessment:       1  Persistent atrial fibrillation ([address] )     2  Chronic venous insufficiency     3  Chronic diastolic congestive heart failure ([address] )     4  Long term (current) use of anticoagulants     5  Dyslipidemia          Plan: With excess bruising, and recent literature    suggesting high risk versus benefit ratio of multiple anticoagulant therapy, advised him to stop the baby aspirin therapy now and just continue with Coumadin goal INR 2 0-3 0  Recent transaminases normal, no diet or weight change since his last lipid profile which was well controlled  Diuretic dose appears appropriate, I told him he can take an extra dose once a week p r n  Significant edema, he will watch his sodium intake as well  Heart rate well controlled on metoprolol and blood pressure is well controlled, no changes here advised  He is not having any angina, we reviewed his recent echocardiogram together, no alarming findings  I will see him back in 6 months, he will call sooner with any concerning potential cardiac symptoms in the meantime

## 2021-07-23 ENCOUNTER — ANTICOAG VISIT (OUTPATIENT)
Dept: CARDIOLOGY CLINIC | Facility: CLINIC | Age: 86
End: 2021-07-23
Payer: COMMERCIAL

## 2021-07-23 ENCOUNTER — APPOINTMENT (OUTPATIENT)
Dept: LAB | Facility: MEDICAL CENTER | Age: 86
End: 2021-07-23
Payer: COMMERCIAL

## 2021-07-23 DIAGNOSIS — I48.19 PERSISTENT ATRIAL FIBRILLATION (HCC): ICD-10-CM

## 2021-07-23 DIAGNOSIS — I48.19 PERSISTENT ATRIAL FIBRILLATION (HCC): Primary | ICD-10-CM

## 2021-07-23 DIAGNOSIS — Z79.01 LONG TERM (CURRENT) USE OF ANTICOAGULANTS: ICD-10-CM

## 2021-07-23 LAB
INR PPP: 2.28 (ref 0.84–1.19)
PROTHROMBIN TIME: 25 SECONDS (ref 11.6–14.5)

## 2021-07-23 PROCEDURE — 36415 COLL VENOUS BLD VENIPUNCTURE: CPT

## 2021-07-23 PROCEDURE — 85610 PROTHROMBIN TIME: CPT

## 2021-07-23 PROCEDURE — 93793 ANTICOAG MGMT PT WARFARIN: CPT | Performed by: NURSE PRACTITIONER

## 2021-07-23 NOTE — PATIENT INSTRUCTIONS
INR received from lab and reviewed  No changes needed; continue current dosing schedule  Recheck INR in one month    LMVM with instructions   DOE Solitario

## 2021-08-17 ENCOUNTER — APPOINTMENT (OUTPATIENT)
Dept: LAB | Facility: MEDICAL CENTER | Age: 86
End: 2021-08-17
Payer: COMMERCIAL

## 2021-08-17 DIAGNOSIS — Z86.010 PERSONAL HISTORY OF COLONIC POLYPS: ICD-10-CM

## 2021-08-17 DIAGNOSIS — Z83.71 FAMILY HISTORY OF COLONIC POLYPS: ICD-10-CM

## 2021-08-17 LAB — HEMOCCULT STL QL IA: NEGATIVE

## 2021-08-17 PROCEDURE — G0328 FECAL BLOOD SCRN IMMUNOASSAY: HCPCS

## 2021-08-25 ENCOUNTER — APPOINTMENT (OUTPATIENT)
Dept: LAB | Facility: MEDICAL CENTER | Age: 86
End: 2021-08-25
Payer: COMMERCIAL

## 2021-08-25 ENCOUNTER — RA CDI HCC (OUTPATIENT)
Dept: OTHER | Facility: HOSPITAL | Age: 86
End: 2021-08-25

## 2021-08-25 DIAGNOSIS — I48.19 PERSISTENT ATRIAL FIBRILLATION (HCC): ICD-10-CM

## 2021-08-25 DIAGNOSIS — Z79.01 LONG TERM (CURRENT) USE OF ANTICOAGULANTS: ICD-10-CM

## 2021-08-25 NOTE — PROGRESS NOTES
[facility]  coding opportunities             Chart Reviewed * (Number of) Inbasket suggestions sent to Provider: 1     Problem listed updated  Provider Accepted, (number of) suggestions accepted: 1            Number of suggestions NOT actually used: 1     Patients insurance company: [address]  (Medicare [employer] and [employer])     Visit status: Patient arrived for their scheduled appointment        [place] [address]  coding opportunities             Chart Reviewed * (Number of) Inbasket suggestions sent to Provider: 1     Problem listed updated   Provider Accepted, (number of) suggestions accepted: 1               Patients insurance company: [address]  (Medicare [employer]vantage and [employer])           [place] [address]  coding opportunities             Chart Reviewed * (Number of) Inbasket suggestions sent to Provider: 1     I11 0 Hypertensive heart disease with heart failure ([place] [address] )  * code also type of heart failure    If this is correct, please document and assess at your next visit 8/31/21                 Patients insurance company: [address]  (Medicare [employer]age and [employer])

## 2021-08-26 ENCOUNTER — ANTICOAG VISIT (OUTPATIENT)
Dept: CARDIOLOGY CLINIC | Facility: CLINIC | Age: 86
End: 2021-08-26
Payer: COMMERCIAL

## 2021-08-26 DIAGNOSIS — Z79.01 LONG TERM (CURRENT) USE OF ANTICOAGULANTS: ICD-10-CM

## 2021-08-26 DIAGNOSIS — I48.19 PERSISTENT ATRIAL FIBRILLATION (HCC): Primary | ICD-10-CM

## 2021-08-26 PROCEDURE — 93793 ANTICOAG MGMT PT WARFARIN: CPT | Performed by: NURSE PRACTITIONER

## 2021-08-26 NOTE — PATIENT INSTRUCTIONS
INR received from lab and reviewed  No changes needed; continue current dosing schedule  Recheck INR in one month    DOE Solitario

## 2021-08-27 ENCOUNTER — TELEPHONE (OUTPATIENT)
Dept: FAMILY MEDICINE CLINIC | Facility: CLINIC | Age: 86
End: 2021-08-27

## 2021-08-27 DIAGNOSIS — E11.9 DIABETES MELLITUS TYPE 2 IN NONOBESE (HCC): Primary | ICD-10-CM

## 2021-08-27 DIAGNOSIS — E55.9 VITAMIN D DEFICIENCY: ICD-10-CM

## 2021-08-28 ENCOUNTER — APPOINTMENT (OUTPATIENT)
Dept: LAB | Facility: MEDICAL CENTER | Age: 86
End: 2021-08-28
Payer: COMMERCIAL

## 2021-08-28 DIAGNOSIS — E11.9 DIABETES MELLITUS TYPE 2 IN NONOBESE (HCC): ICD-10-CM

## 2021-08-28 DIAGNOSIS — E55.9 VITAMIN D DEFICIENCY: ICD-10-CM

## 2021-08-28 LAB
ALBUMIN SERPL BCP-MCNC: 3.4 G/DL (ref 3.5–5)
ALP SERPL-CCNC: 86 U/L (ref 46–116)
ALT SERPL W P-5'-P-CCNC: 21 U/L (ref 12–78)
ANION GAP SERPL CALCULATED.3IONS-SCNC: 1 MMOL/L (ref 4–13)
AST SERPL W P-5'-P-CCNC: 12 U/L (ref 5–45)
BASOPHILS # BLD AUTO: 0.06 THOUSANDS/ΜL (ref 0–0.1)
BASOPHILS NFR BLD AUTO: 1 % (ref 0–1)
BILIRUB SERPL-MCNC: 0.94 MG/DL (ref 0.2–1)
BUN SERPL-MCNC: 23 MG/DL (ref 5–25)
CALCIUM ALBUM COR SERPL-MCNC: 8.9 MG/DL (ref 8.3–10.1)
CALCIUM SERPL-MCNC: 8.4 MG/DL (ref 8.3–10.1)
CHLORIDE SERPL-SCNC: 110 MMOL/L (ref 100–108)
CHOLEST SERPL-MCNC: 146 MG/DL (ref 50–200)
CO2 SERPL-SCNC: 27 MMOL/L (ref 21–32)
CREAT SERPL-MCNC: 1.18 MG/DL (ref 0.6–1.3)
EOSINOPHIL # BLD AUTO: 0.17 THOUSAND/ΜL (ref 0–0.61)
EOSINOPHIL NFR BLD AUTO: 2 % (ref 0–6)
ERYTHROCYTE [DISTWIDTH] IN BLOOD BY AUTOMATED COUNT: 13.8 % (ref 11.6–15.1)
EST. AVERAGE GLUCOSE BLD GHB EST-MCNC: 140 MG/DL
GFR SERPL CREATININE-BSD FRML MDRD: 56 ML/MIN/1.73SQ M
GLUCOSE P FAST SERPL-MCNC: 134 MG/DL (ref 65–99)
HBA1C MFR BLD: 6.5 %
HCT VFR BLD AUTO: 43.5 % (ref 36.5–49.3)
HDLC SERPL-MCNC: 39 MG/DL
HGB BLD-MCNC: 13.9 G/DL (ref 12–17)
IMM GRANULOCYTES # BLD AUTO: 0.03 THOUSAND/UL (ref 0–0.2)
IMM GRANULOCYTES NFR BLD AUTO: 0 % (ref 0–2)
LDLC SERPL CALC-MCNC: 86 MG/DL (ref 0–100)
LYMPHOCYTES # BLD AUTO: 1.44 THOUSANDS/ΜL (ref 0.6–4.47)
LYMPHOCYTES NFR BLD AUTO: 19 % (ref 14–44)
MCH RBC QN AUTO: 29.3 PG (ref 26.8–34.3)
MCHC RBC AUTO-ENTMCNC: 32 G/DL (ref 31.4–37.4)
MCV RBC AUTO: 92 FL (ref 82–98)
MONOCYTES # BLD AUTO: 0.76 THOUSAND/ΜL (ref 0.17–1.22)
MONOCYTES NFR BLD AUTO: 10 % (ref 4–12)
NEUTROPHILS # BLD AUTO: 5.3 THOUSANDS/ΜL (ref 1.85–7.62)
NEUTS SEG NFR BLD AUTO: 68 % (ref 43–75)
NRBC BLD AUTO-RTO: 0 /100 WBCS
PLATELET # BLD AUTO: 166 THOUSANDS/UL (ref 149–390)
PMV BLD AUTO: 10.9 FL (ref 8.9–12.7)
POTASSIUM SERPL-SCNC: 4.3 MMOL/L (ref 3.5–5.3)
PROT SERPL-MCNC: 7 G/DL (ref 6.4–8.2)
RBC # BLD AUTO: 4.74 MILLION/UL (ref 3.88–5.62)
SODIUM SERPL-SCNC: 138 MMOL/L (ref 136–145)
TRIGL SERPL-MCNC: 104 MG/DL
WBC # BLD AUTO: 7.76 THOUSAND/UL (ref 4.31–10.16)

## 2021-08-28 PROCEDURE — 36415 COLL VENOUS BLD VENIPUNCTURE: CPT

## 2021-08-28 PROCEDURE — 83036 HEMOGLOBIN GLYCOSYLATED A1C: CPT

## 2021-08-28 PROCEDURE — 82306 VITAMIN D 25 HYDROXY: CPT

## 2021-08-28 PROCEDURE — 80053 COMPREHEN METABOLIC PANEL: CPT

## 2021-08-28 PROCEDURE — 85025 COMPLETE CBC W/AUTO DIFF WBC: CPT

## 2021-08-28 PROCEDURE — 80061 LIPID PANEL: CPT

## 2021-08-29 LAB — 25(OH)D3 SERPL-MCNC: 40.3 NG/ML (ref 30–100)

## 2021-08-30 PROBLEM — I11.0 HYPERTENSIVE HEART DISEASE WITH HEART FAILURE (HCC): Status: ACTIVE | Noted: 2021-08-30

## 2021-08-31 ENCOUNTER — OFFICE VISIT (OUTPATIENT)
Dept: FAMILY MEDICINE CLINIC | Facility: CLINIC | Age: 86
End: 2021-08-31
Payer: COMMERCIAL

## 2021-08-31 VITALS
HEART RATE: 88 BPM | SYSTOLIC BLOOD PRESSURE: 126 MMHG | BODY MASS INDEX: 31.56 KG/M2 | RESPIRATION RATE: 20 BRPM | TEMPERATURE: 96.7 F | HEIGHT: 72 IN | DIASTOLIC BLOOD PRESSURE: 88 MMHG | WEIGHT: 233 LBS

## 2021-08-31 DIAGNOSIS — I48.19 PERSISTENT ATRIAL FIBRILLATION (HCC): ICD-10-CM

## 2021-08-31 DIAGNOSIS — E55.9 VITAMIN D DEFICIENCY: ICD-10-CM

## 2021-08-31 DIAGNOSIS — I87.2 CHRONIC VENOUS INSUFFICIENCY: ICD-10-CM

## 2021-08-31 DIAGNOSIS — Z11.59 NEED FOR HEPATITIS B SCREENING TEST: ICD-10-CM

## 2021-08-31 DIAGNOSIS — Z11.59 NEED FOR HEPATITIS C SCREENING TEST: ICD-10-CM

## 2021-08-31 DIAGNOSIS — Z11.4 SCREENING FOR HIV (HUMAN IMMUNODEFICIENCY VIRUS): ICD-10-CM

## 2021-08-31 DIAGNOSIS — Z13.6 SCREENING FOR CARDIOVASCULAR CONDITION: ICD-10-CM

## 2021-08-31 DIAGNOSIS — C61 MALIGNANT NEOPLASM OF PROSTATE (HCC): ICD-10-CM

## 2021-08-31 DIAGNOSIS — Z79.01 LONG TERM (CURRENT) USE OF ANTICOAGULANTS: Primary | ICD-10-CM

## 2021-08-31 DIAGNOSIS — I10 ESSENTIAL HYPERTENSION: ICD-10-CM

## 2021-08-31 DIAGNOSIS — E11.9 DIABETES MELLITUS TYPE 2 IN NONOBESE (HCC): ICD-10-CM

## 2021-08-31 DIAGNOSIS — Z23 NEED FOR VACCINATION: ICD-10-CM

## 2021-08-31 DIAGNOSIS — I50.32 CHRONIC DIASTOLIC CONGESTIVE HEART FAILURE (HCC): ICD-10-CM

## 2021-08-31 PROCEDURE — G0439 PPPS, SUBSEQ VISIT: HCPCS | Performed by: FAMILY MEDICINE

## 2021-08-31 PROCEDURE — G0008 ADMIN INFLUENZA VIRUS VAC: HCPCS

## 2021-08-31 PROCEDURE — 3079F DIAST BP 80-89 MM HG: CPT | Performed by: FAMILY MEDICINE

## 2021-08-31 PROCEDURE — 3725F SCREEN DEPRESSION PERFORMED: CPT | Performed by: FAMILY MEDICINE

## 2021-08-31 PROCEDURE — 1160F RVW MEDS BY RX/DR IN RCRD: CPT | Performed by: FAMILY MEDICINE

## 2021-08-31 PROCEDURE — 3288F FALL RISK ASSESSMENT DOCD: CPT | Performed by: FAMILY MEDICINE

## 2021-08-31 PROCEDURE — 99213 OFFICE O/P EST LOW 20 MIN: CPT | Performed by: FAMILY MEDICINE

## 2021-08-31 PROCEDURE — 3074F SYST BP LT 130 MM HG: CPT | Performed by: FAMILY MEDICINE

## 2021-08-31 PROCEDURE — 1170F FXNL STATUS ASSESSED: CPT | Performed by: FAMILY MEDICINE

## 2021-08-31 PROCEDURE — 1036F TOBACCO NON-USER: CPT | Performed by: FAMILY MEDICINE

## 2021-08-31 PROCEDURE — 90662 IIV NO PRSV INCREASED AG IM: CPT

## 2021-08-31 PROCEDURE — 1125F AMNT PAIN NOTED PAIN PRSNT: CPT | Performed by: FAMILY MEDICINE

## 2021-08-31 NOTE — PROGRESS NOTES
Assessment and Plan:     Problem List Items Addressed This Visit     None      Visit Diagnoses     Diabetes mellitus type 2 in nonobese [facility])               Preventive health issues were discussed with patient, and age appropriate screening tests were ordered as noted in patient's After Visit Summary  Personalized health advice and appropriate referrals for health education or preventive services given if needed, as noted in patient's After Visit Summary  History of Present Illness:     Patient presents for Welcome to Medicare visit  Patient Care Team:  Amanda Cheng DO as PCP - Elina Maya MD     Review of Systems:     Review of Systems   Problem List:     Patient Active Problem List   Diagnosis    Long term (current) use of anticoagulants    Persistent atrial fibrillation [facility]    Chronic venous insufficiency    Chronic diastolic congestive heart failure ([facility] )    Malignant neoplasm of prostate ([facility] )    Hypertensive heart disease with heart failure [facility])      Past Medical and Surgical History:     Past Medical History:   Diagnosis Date    Anxiety     Atrial fibrillation [facility]     Atrial flutter (HCC)     Congestive heart failure (CHF) ([facility]     [facility]     Coronary artery disease     DVT [facility]deep venous thrombosis) [facility]     ED (erectile dysfunction)     Hearing loss     History of echocardiogram 04/05/2018    EF 0 55, Mild LVH  Mild moderate mitral regurg  Trace aortic regurg   Hx of radiation therapy     XRT    Hypercholesterolemia     Hyperlipidemia     Hypertension     Long term (current) use of anticoagulants 8/21/2018    Neuropathy of both feet     Peripheral neuropathy     Prostate cancer (HCC)     Type 2 diabetes mellitus ([facility] )      Past Surgical History:   Procedure Laterality Date    CARDIAC CATHETERIZATION  01/15/1988    Left main: unobstructed  Posterolateral branch 90% narrowed      COLONOSCOPY  10/05/2017 Dr Wojciech Ward        Family History:     Family History   Problem Relation Age of Onset    Cancer Brother         bladder    Colon cancer Brother     Heart disease Other     Hypertension Other     Cancer Other         bladder    Colon cancer Other       Social History:     Social History     Socioeconomic History    Marital status: /Civil Union     Spouse name: None    Number of children: None    Years of education: None    Highest education level: None   Occupational History    Occupation: Retired-[employer]   Tobacco Use    Smoking status: Never Smoker    Smokeless tobacco: Never Used   Vaping Use    Vaping Use: Never used   Substance and Sexual Activity    Alcohol use: Not Currently    Drug use: Never    Sexual activity: None   Other Topics Concern    None   Social History Narrative    None     Social Determinants of Health     Financial Resource Strain:     Difficulty of Paying Living Expenses:    Food Insecurity:     Worried About Running Out of Food in the Last Year:     Ran Out of Food in the Last Year:    Transportation Needs:     Lack of Transportation (Medical):      Lack of Transportation (Non-Medical):    Physical Activity:     Days of Exercise per Week:     Minutes of Exercise per Session:    Stress:     Feeling of Stress :    Social Connections:     Frequency of Communication with Friends and Family:     Frequency of Social Gatherings with Friends and Family:     Attends Hoahaoism Services:     Active Member of Clubs or Organizations:     Attends Club or Organization Meetings:     Marital Status:    Intimate Partner Violence:     Fear of Current or Ex-Partner:     Emotionally Abused:     Physically Abused:     Sexually Abused:       Medications and Allergies:     Current Outpatient Medications   Medication Sig Dispense Refill    atorvastatin (LIPITOR) 40 mg tablet Take 1 tablet (40 mg total) by mouth daily 90 tablet 3    furosemide (LASIX) 20 mg tablet 1 tablet 3 days a week 45 tablet 3    metFORMIN (GLUCOPHAGE) 500 mg tablet Take 1 tablet (500 mg total) by mouth daily with breakfast 90 tablet 1    metoprolol succinate (TOPROL-XL) 50 mg 24 hr tablet Take 1 tablet (50 mg total) by mouth 2 (two) times a day 180 tablet 3    warfarin (COUMADIN) 4 mg tablet Take 2 tablets daily or as directed by cardiology 180 tablet 3     No current facility-administered medications for this visit  No Known Allergies   Immunizations:     Immunization History   Administered Date(s) Administered    Pneumococcal Polysaccharide PPV23 08/28/2020    SARS-CoV-2 / COVID-19 mRNA IM ([facility]) 01/21/2021, 02/19/2021      Health Maintenance: There are no preventive care reminders to display for this patient  Topic Date Due    DTaP,Tdap,and Td Vaccines (1 - Tdap) Never done    Influenza Vaccine (1) 09/01/2021      Medicare Screening Tests and Risk Assessments:     Conor Tena is here for his Subsequent Wellness visit  Last Medicare Wellness visit information reviewed, patient interviewed and updates made to the record today  Health Risk Assessment:   Patient rates overall health as good  Patient feels that their physical health rating is same  Patient is satisfied with their life  Eyesight was rated as same  Hearing was rated as slightly worse  Patient feels that their emotional and mental health rating is same  Patients states they are never, rarely angry  Patient states they are sometimes unusually tired/fatigued  Pain experienced in the last 7 days has been none  Patient states that he has experienced no weight loss or gain in last 6 months  Depression Screening:   PHQ-2 Score: 0      Fall Risk Screening: In the past year, patient has experienced: no history of falling in past year      Home Safety:  Patient does not have trouble with stairs inside or outside of their home  Patient has working smoke alarms and has working carbon monoxide detector  Home safety hazards include: none  Nutrition:   Current diet is Regular  Medications:   Patient is currently taking over-the-counter supplements  OTC medications include: see medication list  Patient is able to manage medications  Activities of Daily Living (ADLs)/Instrumental Activities of Daily Living (IADLs):   Walk and transfer into and out of bed and chair?: Yes  Dress and groom yourself?: Yes    Bathe or shower yourself?: Yes    Feed yourself? Yes  Do your laundry/housekeeping?: Yes  Manage your money, pay your bills and track your expenses?: Yes  Make your own meals?: Yes    Do your own shopping?: Yes    Previous Hospitalizations:   Any hospitalizations or ED visits within the last 12 months?: No      Advance Care Planning:     Advanced directive: Yes      PREVENTIVE SCREENINGS      Cardiovascular Screening:    General: Screening Current      Diabetes Screening:     General: Screening Not Indicated and History Diabetes      Colorectal Cancer Screening:     General: Screening Not Indicated      Prostate Cancer Screening:    General: History Prostate Cancer and Screening Not Indicated      Lung Cancer Screening:     General: Screening Not Indicated    Screening, Brief Intervention, and Referral to Treatment (SBIRT)    Screening  Typical number of drinks in a day: 0  Typical number of drinks in a week: 0  Interpretation: Low risk drinking behavior      Single Item Drug Screening:  How often have you used an illegal drug (including marijuana) or a prescription medication for non-medical reasons in the past year? never    Single Item Drug Screen Score: 0  Interpretation: Negative screen for possible drug use disorder    No exam data present     Physical Exam:     /88   Pulse 88   Temp (!) 96 7 °F (35 9 °C)   Resp 20   Ht 5' 11 5" (1 816 m)   Wt 106 kg (233 lb)   BMI 32 04 kg/m²     Physical Exam     Elijah Borja,

## 2021-08-31 NOTE — PROGRESS NOTES
Assessment and Plan: diabetes mellitus type 2 with a hemoglobin A1c of 6 5 under excellent control    Persistent atrial fibrillation with chronic anticoagulation    Chronic venous insufficiency patient uses compression stockings    History of prostate cancer in remission    Chronic diastolic congestive heart failure controlled on current regimen    Dyslipidemia with a lipid panel desirable on Lipitor 40     Problem List Items Addressed This Visit     None      Visit Diagnoses     Diabetes mellitus type 2 in nonobese (HCC)            BMI Counseling: Body mass index is 32 04 kg/m²  The BMI is above normal  Nutrition recommendations include decreasing portion sizes, encouraging healthy choices of fruits and vegetables, decreasing fast food intake, consuming healthier snacks, limiting drinks that contain sugar, moderation in carbohydrate intake, increasing intake of lean protein, reducing intake of saturated and trans fat and reducing intake of cholesterol  Exercise recommendations include moderate physical activity 150 minutes/week and exercising 3-5 times per week  Preventive health issues were discussed with patient, and age appropriate screening tests were ordered as noted in patient's After Visit Summary  Personalized health advice and appropriate referrals for health education or preventive services given if needed, as noted in patient's After Visit Summary  History of Present Illness:     Patient presents for Welcome to Medicare visit  Patient Care Team:  Amanda Cheng DO as PCP - Elina Maya MD     Review of Systems:     Review of Systems   Constitutional: Negative for chills and fever  HENT: Negative for ear pain and sore throat  Eyes: Negative for pain and visual disturbance  Respiratory: Negative for cough and shortness of breath  Cardiovascular: Negative for chest pain and palpitations  Gastrointestinal: Negative for abdominal pain and vomiting     Genitourinary: Negative for dysuria, frequency and hematuria  Musculoskeletal: Negative for arthralgias and back pain  Skin: Negative for color change and rash  Neurological: Negative for seizures and syncope  All other systems reviewed and are negative  Problem List:     Patient Active Problem List   Diagnosis    Long term (current) use of anticoagulants    Persistent atrial fibrillation (HCC)    Chronic venous insufficiency    Chronic diastolic congestive heart failure ([place] [address] )    Malignant neoplasm of prostate ([place] [address] )    Hypertensive heart disease with heart failure [facility])      Past Medical and Surgical History:     Past Medical History:   Diagnosis Date    Anxiety     Atrial fibrillation ([place] [address] )     Atrial flutter (HCC)     Congestive heart failure (CHF) ([place] [address] )     COPD (chronic obstructive pulmonary disease) ([place] [address] )     Coronary artery disease     DVT (deep venous thrombosis) ([place] [address] )     ED (erectile dysfunction)     Hearing loss     History of echocardiogram 04/05/2018    EF 0 55, Mild LVH  Mild moderate mitral regurg  Trace aortic regurg   Hx of radiation therapy     XRT    Hypercholesterolemia     Hyperlipidemia     Hypertension     Long term (current) use of anticoagulants 8/21/2018    Neuropathy of both feet     Peripheral neuropathy     Prostate cancer (HCC)     Type 2 diabetes mellitus ([place] [address] )      Past Surgical History:   Procedure Laterality Date    CARDIAC CATHETERIZATION  01/15/1988    Left main: unobstructed  Posterolateral branch 90% narrowed      COLONOSCOPY  10/05/2017    Dr Tony Hernandez        Family History:     Family History   Problem Relation Age of Onset    Cancer Brother         bladder    Colon cancer Brother     Heart disease Other     Hypertension Other     Cancer Other         bladder    Colon cancer Other       Social History:     Social History     Socioeconomic History    Marital status: /Civil Union     Spouse name: None    Number of children: None    Years of education: None    Highest education level: None   Occupational History    Occupation: Retired-[employer]   Tobacco Use    Smoking status: Never Smoker    Smokeless tobacco: Never Used   Vaping Use    Vaping Use: Never used   Substance and Sexual Activity    Alcohol use: Not Currently    Drug use: Never    Sexual activity: None   Other Topics Concern    None   Social History Narrative    None     Social Determinants of Health     Financial Resource Strain:     Difficulty of Paying Living Expenses:    Food Insecurity:     Worried About Running Out of Food in the Last Year:     Ran Out of Food in the Last Year:    Transportation Needs:     Lack of Transportation (Medical):      Lack of Transportation (Non-Medical):    Physical Activity:     Days of Exercise per Week:     Minutes of Exercise per Session:    Stress:     Feeling of Stress :    Social Connections:     Frequency of Communication with Friends and Family:     Frequency of Social Gatherings with Friends and Family:     Attends Yarsanism Services:     Active Member of Clubs or Organizations:     Attends Club or Organization Meetings:     Marital Status:    Intimate Partner Violence:     Fear of Current or Ex-Partner:     Emotionally Abused:     Physically Abused:     Sexually Abused:       Medications and Allergies:     Current Outpatient Medications   Medication Sig Dispense Refill    atorvastatin (LIPITOR) 40 mg tablet Take 1 tablet (40 mg total) by mouth daily 90 tablet 3    furosemide (LASIX) 20 mg tablet 1 tablet 3 days a week 45 tablet 3    metFORMIN (GLUCOPHAGE) 500 mg tablet Take 1 tablet (500 mg total) by mouth daily with breakfast 90 tablet 1    metoprolol succinate (TOPROL-XL) 50 mg 24 hr tablet Take 1 tablet (50 mg total) by mouth 2 (two) times a day 180 tablet 3    warfarin (COUMADIN) 4 mg tablet Take 2 tablets daily or as directed by cardiology 180 tablet 3     No current facility-administered medications for this visit  No Known Allergies   Immunizations:     Immunization History   Administered Date(s) Administered    Pneumococcal Polysaccharide PPV23 08/28/2020    SARS-CoV-2 / COVID-19 mRNA IM ([facility]) 01/21/2021, 02/19/2021      Health Maintenance: There are no preventive care reminders to display for this patient  Topic Date Due    DTaP,Tdap,and Td Vaccines (1 - Tdap) Never done    Influenza Vaccine (1) 09/01/2021      Medicare Screening Tests and Risk Assessments:     Annual Wellness Visit  No exam data present     Physical Exam:     /88   Pulse 88   Temp (!) 96 7 °F (35 9 °C)   Resp 20   Ht 5' 11 5" (1 816 m)   Wt 106 kg (233 lb)   BMI 32 04 kg/m²     Physical Exam  Vitals and nursing note reviewed  Constitutional:       Appearance: He is well-developed  HENT:      Head: Normocephalic and atraumatic  Eyes:      Conjunctiva/sclera: Conjunctivae normal    Cardiovascular:      Rate and Rhythm: Normal rate and regular rhythm  Heart sounds: No murmur heard  Pulmonary:      Effort: Pulmonary effort is normal  No respiratory distress  Breath sounds: Normal breath sounds  Abdominal:      Palpations: Abdomen is soft  Tenderness: There is no abdominal tenderness  Musculoskeletal:      Cervical back: Neck supple  Skin:     General: Skin is warm and dry  Neurological:      Mental Status: He is alert            Noah Benavides DO

## 2021-08-31 NOTE — PATIENT INSTRUCTIONS

## 2021-09-24 ENCOUNTER — APPOINTMENT (OUTPATIENT)
Dept: LAB | Facility: MEDICAL CENTER | Age: 86
End: 2021-09-24
Payer: COMMERCIAL

## 2021-09-24 DIAGNOSIS — Z79.01 LONG TERM (CURRENT) USE OF ANTICOAGULANTS: ICD-10-CM

## 2021-09-24 DIAGNOSIS — E11.9 DIABETES MELLITUS TYPE 2 IN NONOBESE (HCC): ICD-10-CM

## 2021-09-24 DIAGNOSIS — Z11.59 NEED FOR HEPATITIS C SCREENING TEST: ICD-10-CM

## 2021-09-24 DIAGNOSIS — I48.19 PERSISTENT ATRIAL FIBRILLATION (HCC): ICD-10-CM

## 2021-09-24 DIAGNOSIS — Z11.59 NEED FOR HEPATITIS B SCREENING TEST: ICD-10-CM

## 2021-09-24 DIAGNOSIS — E55.9 VITAMIN D DEFICIENCY: ICD-10-CM

## 2021-09-24 DIAGNOSIS — Z13.6 SCREENING FOR CARDIOVASCULAR CONDITION: ICD-10-CM

## 2021-09-24 DIAGNOSIS — Z11.4 SCREENING FOR HIV (HUMAN IMMUNODEFICIENCY VIRUS): ICD-10-CM

## 2021-09-24 LAB
INR PPP: 2.71 (ref 0.84–1.19)
PROTHROMBIN TIME: 27.4 SECONDS (ref 11.6–14.5)

## 2021-09-24 PROCEDURE — 36415 COLL VENOUS BLD VENIPUNCTURE: CPT

## 2021-09-24 PROCEDURE — 85610 PROTHROMBIN TIME: CPT

## 2021-09-27 ENCOUNTER — ANTICOAG VISIT (OUTPATIENT)
Dept: CARDIOLOGY CLINIC | Facility: CLINIC | Age: 86
End: 2021-09-27
Payer: COMMERCIAL

## 2021-09-27 DIAGNOSIS — I48.19 PERSISTENT ATRIAL FIBRILLATION (HCC): Primary | ICD-10-CM

## 2021-09-27 DIAGNOSIS — Z79.01 LONG TERM (CURRENT) USE OF ANTICOAGULANTS: ICD-10-CM

## 2021-09-27 PROCEDURE — 93793 ANTICOAG MGMT PT WARFARIN: CPT | Performed by: NURSE PRACTITIONER

## 2021-09-27 NOTE — PATIENT INSTRUCTIONS
INR received from lab and reviewed  No changes needed; continue current dosing schedule  Recheck INR in one month    DOE Xavier

## 2021-10-26 ENCOUNTER — APPOINTMENT (OUTPATIENT)
Dept: LAB | Facility: MEDICAL CENTER | Age: 86
End: 2021-10-26
Payer: COMMERCIAL

## 2021-10-26 ENCOUNTER — ANTICOAG VISIT (OUTPATIENT)
Dept: CARDIOLOGY CLINIC | Facility: CLINIC | Age: 86
End: 2021-10-26
Payer: COMMERCIAL

## 2021-10-26 DIAGNOSIS — I48.19 PERSISTENT ATRIAL FIBRILLATION (HCC): ICD-10-CM

## 2021-10-26 DIAGNOSIS — I48.19 PERSISTENT ATRIAL FIBRILLATION (HCC): Primary | ICD-10-CM

## 2021-10-26 DIAGNOSIS — Z79.01 LONG TERM (CURRENT) USE OF ANTICOAGULANTS: ICD-10-CM

## 2021-10-26 LAB
INR PPP: 2.59 (ref 0.84–1.19)
PROTHROMBIN TIME: 26.5 SECONDS (ref 11.6–14.5)

## 2021-10-26 PROCEDURE — 93793 ANTICOAG MGMT PT WARFARIN: CPT | Performed by: NURSE PRACTITIONER

## 2021-10-26 PROCEDURE — 36415 COLL VENOUS BLD VENIPUNCTURE: CPT

## 2021-10-26 PROCEDURE — 85610 PROTHROMBIN TIME: CPT

## 2021-11-01 ENCOUNTER — IMMUNIZATIONS (OUTPATIENT)
Dept: FAMILY MEDICINE CLINIC | Facility: HOSPITAL | Age: 86
End: 2021-11-01

## 2021-11-01 DIAGNOSIS — Z23 ENCOUNTER FOR IMMUNIZATION: Primary | ICD-10-CM

## 2021-11-26 ENCOUNTER — ANTICOAG VISIT (OUTPATIENT)
Dept: CARDIOLOGY CLINIC | Facility: CLINIC | Age: 86
End: 2021-11-26
Payer: COMMERCIAL

## 2021-11-26 ENCOUNTER — APPOINTMENT (OUTPATIENT)
Dept: LAB | Facility: MEDICAL CENTER | Age: 86
End: 2021-11-26
Payer: COMMERCIAL

## 2021-11-26 DIAGNOSIS — Z79.01 LONG TERM (CURRENT) USE OF ANTICOAGULANTS: ICD-10-CM

## 2021-11-26 DIAGNOSIS — I48.19 PERSISTENT ATRIAL FIBRILLATION (HCC): Primary | ICD-10-CM

## 2021-11-26 LAB
INR PPP: 2.32 (ref 0.84–1.19)
PROTHROMBIN TIME: 24.3 SECONDS (ref 11.6–14.5)

## 2021-11-26 PROCEDURE — 36415 COLL VENOUS BLD VENIPUNCTURE: CPT

## 2021-11-26 PROCEDURE — 93793 ANTICOAG MGMT PT WARFARIN: CPT | Performed by: PHYSICIAN ASSISTANT

## 2021-11-26 PROCEDURE — 85610 PROTHROMBIN TIME: CPT

## 2022-01-04 ENCOUNTER — APPOINTMENT (OUTPATIENT)
Dept: LAB | Facility: MEDICAL CENTER | Age: 87
End: 2022-01-04
Payer: COMMERCIAL

## 2022-01-05 ENCOUNTER — ANTICOAG VISIT (OUTPATIENT)
Dept: CARDIOLOGY CLINIC | Facility: CLINIC | Age: 87
End: 2022-01-05
Payer: COMMERCIAL

## 2022-01-05 DIAGNOSIS — I48.19 PERSISTENT ATRIAL FIBRILLATION (HCC): Primary | ICD-10-CM

## 2022-01-05 DIAGNOSIS — Z79.01 LONG TERM (CURRENT) USE OF ANTICOAGULANTS: ICD-10-CM

## 2022-01-05 PROCEDURE — 93793 ANTICOAG MGMT PT WARFARIN: CPT | Performed by: NURSE PRACTITIONER

## 2022-01-05 NOTE — PATIENT INSTRUCTIONS
INR received from lab and reviewed  No changes needed; continue current dosing schedule    Recheck INR in one month-instructions given to pt by phone   Kira Cade, Josh Maldondao

## 2022-01-14 DIAGNOSIS — I48.19 PERSISTENT ATRIAL FIBRILLATION (HCC): ICD-10-CM

## 2022-01-14 RX ORDER — WARFARIN SODIUM 4 MG/1
TABLET ORAL
Qty: 180 TABLET | Refills: 5 | Status: SHIPPED | OUTPATIENT
Start: 2022-01-14

## 2022-01-28 DIAGNOSIS — I10 ESSENTIAL HYPERTENSION: ICD-10-CM

## 2022-01-28 DIAGNOSIS — I48.19 PERSISTENT ATRIAL FIBRILLATION (HCC): ICD-10-CM

## 2022-01-28 RX ORDER — ATORVASTATIN CALCIUM 40 MG/1
TABLET, FILM COATED ORAL
Qty: 90 TABLET | Refills: 5 | Status: SHIPPED | OUTPATIENT
Start: 2022-01-28

## 2022-01-28 RX ORDER — METOPROLOL SUCCINATE 50 MG/1
TABLET, EXTENDED RELEASE ORAL
Qty: 180 TABLET | Refills: 5 | Status: SHIPPED | OUTPATIENT
Start: 2022-01-28

## 2022-02-04 ENCOUNTER — APPOINTMENT (OUTPATIENT)
Dept: LAB | Facility: MEDICAL CENTER | Age: 87
End: 2022-02-04
Payer: COMMERCIAL

## 2022-02-07 ENCOUNTER — ANTICOAG VISIT (OUTPATIENT)
Dept: CARDIOLOGY CLINIC | Facility: CLINIC | Age: 87
End: 2022-02-07
Payer: COMMERCIAL

## 2022-02-07 DIAGNOSIS — Z79.01 LONG TERM (CURRENT) USE OF ANTICOAGULANTS: ICD-10-CM

## 2022-02-07 DIAGNOSIS — I48.19 PERSISTENT ATRIAL FIBRILLATION (HCC): Primary | ICD-10-CM

## 2022-02-07 PROCEDURE — 93793 ANTICOAG MGMT PT WARFARIN: CPT | Performed by: NURSE PRACTITIONER

## 2022-02-07 NOTE — PATIENT INSTRUCTIONS
INR received from lab and reviewed  No changes needed; continue current dosing schedule  Recheck INR in one month    DOE Long

## 2022-02-09 ENCOUNTER — OFFICE VISIT (OUTPATIENT)
Dept: CARDIOLOGY CLINIC | Facility: CLINIC | Age: 87
End: 2022-02-09
Payer: COMMERCIAL

## 2022-02-09 VITALS
DIASTOLIC BLOOD PRESSURE: 78 MMHG | SYSTOLIC BLOOD PRESSURE: 124 MMHG | HEART RATE: 66 BPM | BODY MASS INDEX: 31.83 KG/M2 | HEIGHT: 72 IN | WEIGHT: 235 LBS

## 2022-02-09 DIAGNOSIS — I48.19 PERSISTENT ATRIAL FIBRILLATION (HCC): Primary | ICD-10-CM

## 2022-02-09 DIAGNOSIS — I10 PRIMARY HYPERTENSION: ICD-10-CM

## 2022-02-09 DIAGNOSIS — I50.32 CHRONIC DIASTOLIC CONGESTIVE HEART FAILURE (HCC): ICD-10-CM

## 2022-02-09 DIAGNOSIS — E78.5 DYSLIPIDEMIA: ICD-10-CM

## 2022-02-09 PROCEDURE — 93000 ELECTROCARDIOGRAM COMPLETE: CPT | Performed by: INTERNAL MEDICINE

## 2022-02-09 PROCEDURE — 99214 OFFICE O/P EST MOD 30 MIN: CPT | Performed by: INTERNAL MEDICINE

## 2022-02-09 NOTE — PATIENT INSTRUCTIONS
A-fib (Atrial Fibrillation)   AMBULATORY CARE:   Atrial fibrillation (A-fib)  is an irregular heartbeat  It reduces your heart's ability to pump blood through your body  A-fib may come and go, or it may be a long-term condition  A-fib can cause life-threatening blood clots, stroke, or heart failure  It is important to treat and manage A-fib to help prevent these problems  Common signs and symptoms include the following:   · A heartbeat that races, pounds, or flutters    · Weakness, severe tiredness, or confusion    · Feeling lightheaded, sweaty, dizzy, or faint    · Shortness of breath or anxiety    · Chest pain or pressure    Call your local emergency number (911 in the [address] [facility]) or have someone call if:   · You have any of the following signs of a heart attack:      ? Squeezing, pressure, or pain in your chest    ? You may  also have any of the following:     § Discomfort or pain in your back, neck, jaw, stomach, or arm    § Shortness of breath    § Nausea or vomiting    § Lightheadedness or a sudden cold sweat    · You have any of the following signs of a stroke:      ? Numbness or drooping on one side of your face     ? Weakness in an arm or leg    ? Confusion or difficulty speaking    ? Dizziness, a severe headache, or vision loss    Call your doctor or cardiologist if:   · Your arm or leg feels warm, tender, and painful  It may look swollen and red  · Your heart rate is more than 110 beats per minute  · You have new or worsening swelling in your legs, feet, ankles, or abdomen  · You are short of breath, even at rest     · You have questions or concerns about your condition or care  Treatment for A-fib:  Conditions that cause A-fib, such as thyroid disease, will be treated  You may also need any of the following:  · Heart medicines  help control your heart rate or rhythm  You may need more than one medicine to treat your symptoms      · Antiplatelet or blood thinner medicines  help prevent blood clots and stroke  · Cardioversion  is a procedure to return your heart rate and rhythm to normal  It can be done using medicines or electric shock  · A-fib ablation  is a procedure that uses energy to burn a small area of heart tissue  This creates scar tissue and prevents electrical signals that cause A-fib  You may need this procedure more than once  Ask for more information on A-fib ablation  · A pacemaker  may be inserted into your heart  A pacemaker is a device that controls your heartbeat  A pacemaker may be inserted during an ablation procedure or surgery  Ask your healthcare provider for more information on pacemakers  · Surgery  may be needed if other procedures do not work  During surgery your healthcare provider will make cuts in the upper part of your heart  The provider will stitch the cuts together to create scar tissue  The scar tissue will prevent electrical signals that cause A-fib  Manage A-fib:   · Know your target heart rate  Learn how to check your pulse and monitor your heart rate  · Know the risks if you choose to drink alcohol  Alcohol can increase your risk for A-fib or make A-fib harder to manage  Ask your healthcare provider if it is okay for you to drink any alcohol  He or she can help you set limits for the number of drinks you have in 24 hours and in a week  A drink of alcohol is 12 ounces of beer, 5 ounces of wine, or 1½ ounces of liquor  · Do not smoke  Nicotine can cause heart damage and make it more difficult to manage your A-fib  Do not use e-cigarettes or smokeless tobacco in place of cigarettes or to help you quit  They still contain nicotine  Ask your healthcare provider for information if you currently smoke and need help quitting  · Eat heart-healthy foods  Heart healthy foods will help keep your cholesterol low  These include fruits, vegetables, whole-grain breads, low-fat dairy products, beans, lean meats, and fish   Replace butter and margarine with heart-healthy oils such as olive oil and canola oil  · Maintain a healthy weight  Ask your healthcare provider what a healthy weight is for you  Ask him or her to help you create a safe weight loss plan if you are overweight  Even a small goal of a 10% weight loss can improve your heart health  · Get regular physical activity  Physical activity helps improve your heart health  Get at least 150 minutes of moderate aerobic physical activity each week  Your healthcare provider can help you create an activity plan  · Manage other health conditions  This includes high blood pressure or cholesterol, sleep apnea, diabetes, and other heart conditions  Take medicine as directed and follow your treatment plan  Your healthcare provider may need to change a medicine you are taking if it is causing your A-fib  Do not  stop taking any medicine unless directed by your provider  Follow up with your doctor or cardiologist as directed: You will need regular blood tests and monitoring  Write down your questions so you remember to ask them during your visits  © Copyright [employer] 2021 Information is for End User's use only and may not be sold, redistributed or otherwise used for commercial purposes  All illustrations and images included in CareNotes® are the copyrighted property of [employer] M [employer]  or [address] [place], [place] [address]micky   The above information is an  only  It is not intended as medical advice for individual conditions or treatments  Talk to your doctor, nurse or pharmacist before following any medical regimen to see if it is safe and effective for you

## 2022-02-09 NOTE — PROGRESS NOTES
Subjective:        Patient ID: Suraj Nieto is a 80 y o  male  Chief Complaint: Many is here for routine follow-up for chronic atrial fibrillation, hypertension, dyslipidemia and diastolic heart failure  He offers no concerning complaints, has had no loss of exercise tolerance recently, denies any chest pain shortness of breath palpitations presyncope syncope TIA or claudication like symptoms  No edema orthopnea or PND  EKG stable, rate controlled atrial fibrillation  The following portions of the patient's history were reviewed and updated as appropriate: allergies, current medications, past family history, past medical history, past social history, past surgical history and problem list   Review of Systems   Constitutional: Negative for chills, diaphoresis, malaise/fatigue and weight gain  HENT: Negative for nosebleeds and stridor  Eyes: Negative for double vision, vision loss in left eye, vision loss in right eye and visual disturbance  Cardiovascular: Negative for chest pain, claudication, cyanosis, dyspnea on exertion, irregular heartbeat, leg swelling, near-syncope, orthopnea, palpitations, paroxysmal nocturnal dyspnea and syncope  Respiratory: Negative for cough, shortness of breath, snoring and wheezing  Endocrine: Negative for polydipsia, polyphagia and polyuria  Hematologic/Lymphatic: Negative for bleeding problem  Does not bruise/bleed easily  Skin: Negative for flushing and rash  Musculoskeletal: Negative for falls and myalgias  Gastrointestinal: Negative for abdominal pain, heartburn, hematemesis, hematochezia, melena and nausea  Genitourinary: Negative for hematuria  Neurological: Positive for loss of balance (Which he blames on his chronic neuropathy, slowly progressing)  Negative for brief paralysis, dizziness, focal weakness, headaches, light-headedness and vertigo  Psychiatric/Behavioral: Negative for altered mental status and substance abuse  Allergic/Immunologic: Negative for hives  Objective:      /78   Pulse 66   Ht 5' 11 5" (1 816 m)   Wt 107 kg (235 lb)   BMI 32 32 kg/m²   Physical Exam  Constitutional:       General: He is not in acute distress  Appearance: He is well-developed  He is not ill-appearing or diaphoretic  HENT:      Head: Normocephalic and atraumatic  Eyes:      General: No scleral icterus  Pupils: Pupils are equal, round, and reactive to light  Neck:      Thyroid: No thyromegaly  Vascular: No JVD  Cardiovascular:      Rate and Rhythm: Normal rate  Rhythm irregular  Heart sounds: Normal heart sounds  No murmur heard  No friction rub  No gallop  Pulmonary:      Effort: Pulmonary effort is normal  No respiratory distress  Breath sounds: Normal breath sounds  No stridor  No wheezing or rales  Abdominal:      General: Bowel sounds are normal  There is no distension  Palpations: Abdomen is soft  There is no mass  Tenderness: There is no abdominal tenderness  Musculoskeletal:         General: No deformity  Normal range of motion  Cervical back: Normal range of motion and neck supple  Right lower leg: No edema (Bilateral support stockings on 2 knees)  Left lower leg: No edema  Skin:     General: Skin is warm and dry  Coloration: Skin is not pale  Findings: No erythema  Neurological:      Mental Status: He is alert and oriented to person, place, and time  Coordination: Coordination normal    Psychiatric:         Mood and Affect: Mood normal          Behavior: Behavior normal          Thought Content: Thought content normal          Judgment: Judgment normal          Lab Review:   Ancillary Orders on 01/21/2022   Component Date Value    Protime 02/04/2022 26 6*    INR 02/04/2022 2 61*   Ancillary Orders on 12/24/2021   Component Date Value    Protime 01/04/2022 26 3*    INR 01/04/2022 2 57*     No results found  Assessment:       1  Persistent atrial fibrillation (HCC)  POCT ECG   2  Chronic diastolic congestive heart failure ([address] )     3  Primary hypertension     4  Dyslipidemia          Plan:       Dmitry Arreola has no signs or symptoms reminiscent of angina pectoris heart failure nor electrical instability  His AFib is rate controlled anticoagulated  He was not interested in discussing the options of other anticoagulants today  He does not want to make any changes  His blood pressure is well controlled as heart his lipids on his present regimen  Meds reviewed in detail, I have recommended no changes today  Blood work seems up-to-date, reviewed together today      I will see him back in 6 months, I ordered no acute cardiac testing, asked to call me sooner with any concerning potential

## 2022-02-21 ENCOUNTER — TELEPHONE (OUTPATIENT)
Dept: OTHER | Facility: OTHER | Age: 87
End: 2022-02-21

## 2022-02-21 DIAGNOSIS — I10 ESSENTIAL HYPERTENSION: Primary | ICD-10-CM

## 2022-02-21 DIAGNOSIS — E11.9 DIABETES MELLITUS TYPE 2 IN NONOBESE (HCC): ICD-10-CM

## 2022-02-21 DIAGNOSIS — E55.9 VITAMIN D DEFICIENCY: ICD-10-CM

## 2022-02-21 NOTE — TELEPHONE ENCOUNTER
Wife calling to inquire if patient has bloodwork ordered  I told her a PT INR but wife believes he should have more labs ordered  Please call and advise

## 2022-02-22 ENCOUNTER — APPOINTMENT (OUTPATIENT)
Dept: LAB | Facility: MEDICAL CENTER | Age: 87
End: 2022-02-22
Payer: COMMERCIAL

## 2022-02-22 DIAGNOSIS — I10 ESSENTIAL HYPERTENSION: ICD-10-CM

## 2022-02-22 DIAGNOSIS — E11.9 DIABETES MELLITUS TYPE 2 IN NONOBESE (HCC): Primary | ICD-10-CM

## 2022-02-22 DIAGNOSIS — E55.9 VITAMIN D DEFICIENCY: ICD-10-CM

## 2022-02-22 LAB
25(OH)D3 SERPL-MCNC: 24.8 NG/ML (ref 30–100)
ALBUMIN SERPL BCP-MCNC: 3.5 G/DL (ref 3.5–5)
ALP SERPL-CCNC: 76 U/L (ref 46–116)
ALT SERPL W P-5'-P-CCNC: 22 U/L (ref 12–78)
ANION GAP SERPL CALCULATED.3IONS-SCNC: 4 MMOL/L (ref 4–13)
AST SERPL W P-5'-P-CCNC: 13 U/L (ref 5–45)
BASOPHILS # BLD AUTO: 0.04 THOUSANDS/ΜL (ref 0–0.1)
BASOPHILS NFR BLD AUTO: 1 % (ref 0–1)
BILIRUB SERPL-MCNC: 0.95 MG/DL (ref 0.2–1)
BUN SERPL-MCNC: 26 MG/DL (ref 5–25)
CALCIUM SERPL-MCNC: 9 MG/DL (ref 8.3–10.1)
CHLORIDE SERPL-SCNC: 110 MMOL/L (ref 100–108)
CHOLEST SERPL-MCNC: 153 MG/DL
CO2 SERPL-SCNC: 26 MMOL/L (ref 21–32)
CREAT SERPL-MCNC: 1.27 MG/DL (ref 0.6–1.3)
EOSINOPHIL # BLD AUTO: 0.1 THOUSAND/ΜL (ref 0–0.61)
EOSINOPHIL NFR BLD AUTO: 1 % (ref 0–6)
ERYTHROCYTE [DISTWIDTH] IN BLOOD BY AUTOMATED COUNT: 14.2 % (ref 11.6–15.1)
EST. AVERAGE GLUCOSE BLD GHB EST-MCNC: 151 MG/DL
GFR SERPL CREATININE-BSD FRML MDRD: 50 ML/MIN/1.73SQ M
GLUCOSE P FAST SERPL-MCNC: 155 MG/DL (ref 65–99)
HBA1C MFR BLD: 6.9 %
HCT VFR BLD AUTO: 42.8 % (ref 36.5–49.3)
HDLC SERPL-MCNC: 38 MG/DL
HGB BLD-MCNC: 13.6 G/DL (ref 12–17)
IMM GRANULOCYTES # BLD AUTO: 0.03 THOUSAND/UL (ref 0–0.2)
IMM GRANULOCYTES NFR BLD AUTO: 0 % (ref 0–2)
LDLC SERPL CALC-MCNC: 97 MG/DL (ref 0–100)
LYMPHOCYTES # BLD AUTO: 1.63 THOUSANDS/ΜL (ref 0.6–4.47)
LYMPHOCYTES NFR BLD AUTO: 21 % (ref 14–44)
MCH RBC QN AUTO: 29.2 PG (ref 26.8–34.3)
MCHC RBC AUTO-ENTMCNC: 31.8 G/DL (ref 31.4–37.4)
MCV RBC AUTO: 92 FL (ref 82–98)
MONOCYTES # BLD AUTO: 0.74 THOUSAND/ΜL (ref 0.17–1.22)
MONOCYTES NFR BLD AUTO: 10 % (ref 4–12)
NEUTROPHILS # BLD AUTO: 5.22 THOUSANDS/ΜL (ref 1.85–7.62)
NEUTS SEG NFR BLD AUTO: 67 % (ref 43–75)
NRBC BLD AUTO-RTO: 0 /100 WBCS
PLATELET # BLD AUTO: 167 THOUSANDS/UL (ref 149–390)
PMV BLD AUTO: 11 FL (ref 8.9–12.7)
POTASSIUM SERPL-SCNC: 4.2 MMOL/L (ref 3.5–5.3)
PROT SERPL-MCNC: 7.1 G/DL (ref 6.4–8.2)
RBC # BLD AUTO: 4.65 MILLION/UL (ref 3.88–5.62)
SODIUM SERPL-SCNC: 140 MMOL/L (ref 136–145)
TRIGL SERPL-MCNC: 88 MG/DL
WBC # BLD AUTO: 7.76 THOUSAND/UL (ref 4.31–10.16)

## 2022-02-22 PROCEDURE — 80053 COMPREHEN METABOLIC PANEL: CPT

## 2022-02-22 PROCEDURE — 36415 COLL VENOUS BLD VENIPUNCTURE: CPT

## 2022-02-22 PROCEDURE — 83036 HEMOGLOBIN GLYCOSYLATED A1C: CPT

## 2022-02-22 PROCEDURE — 80061 LIPID PANEL: CPT

## 2022-02-22 PROCEDURE — 82306 VITAMIN D 25 HYDROXY: CPT

## 2022-02-22 PROCEDURE — 85025 COMPLETE CBC W/AUTO DIFF WBC: CPT

## 2022-02-24 ENCOUNTER — OFFICE VISIT (OUTPATIENT)
Dept: FAMILY MEDICINE CLINIC | Facility: CLINIC | Age: 87
End: 2022-02-24
Payer: COMMERCIAL

## 2022-02-24 VITALS
DIASTOLIC BLOOD PRESSURE: 88 MMHG | WEIGHT: 234 LBS | SYSTOLIC BLOOD PRESSURE: 134 MMHG | RESPIRATION RATE: 20 BRPM | HEIGHT: 72 IN | HEART RATE: 76 BPM | BODY MASS INDEX: 31.69 KG/M2 | TEMPERATURE: 96.5 F

## 2022-02-24 DIAGNOSIS — E55.9 VITAMIN D DEFICIENCY: ICD-10-CM

## 2022-02-24 DIAGNOSIS — I10 ESSENTIAL HYPERTENSION: ICD-10-CM

## 2022-02-24 DIAGNOSIS — C61 MALIGNANT NEOPLASM OF PROSTATE (HCC): ICD-10-CM

## 2022-02-24 DIAGNOSIS — E11.9 DIABETES MELLITUS TYPE 2 IN NONOBESE (HCC): Primary | ICD-10-CM

## 2022-02-24 DIAGNOSIS — I48.19 PERSISTENT ATRIAL FIBRILLATION (HCC): ICD-10-CM

## 2022-02-24 DIAGNOSIS — N18.31 STAGE 3A CHRONIC KIDNEY DISEASE (HCC): ICD-10-CM

## 2022-02-24 DIAGNOSIS — I87.2 CHRONIC VENOUS INSUFFICIENCY: ICD-10-CM

## 2022-02-24 DIAGNOSIS — Z79.01 LONG TERM (CURRENT) USE OF ANTICOAGULANTS: ICD-10-CM

## 2022-02-24 DIAGNOSIS — I50.32 CHRONIC DIASTOLIC CONGESTIVE HEART FAILURE (HCC): ICD-10-CM

## 2022-02-24 DIAGNOSIS — E78.5 HYPERLIPIDEMIA, UNSPECIFIED HYPERLIPIDEMIA TYPE: ICD-10-CM

## 2022-02-24 PROCEDURE — 1160F RVW MEDS BY RX/DR IN RCRD: CPT | Performed by: FAMILY MEDICINE

## 2022-02-24 PROCEDURE — 99214 OFFICE O/P EST MOD 30 MIN: CPT | Performed by: FAMILY MEDICINE

## 2022-02-24 PROCEDURE — 1036F TOBACCO NON-USER: CPT | Performed by: FAMILY MEDICINE

## 2022-02-24 NOTE — PROGRESS NOTES
Assessment/Plan:  Diabetes mellitus well controlled with hemoglobin A1c of 6 9 currently treated with metformin 500 mg b i d  Hyperlipidemia on atorvastatin 40 mg with a lipid panel in the desirable profile    Essential hypertension with blood pressure controlled on the current regimen    Persistent atrial fibrillation with the rate control with Toprol 50 mg anticoagulated with Coumadin    Chronic venous insufficiency the patient wears compression stockings    Chronic diastolic congestive heart failure treated with Lasix 20 mg daily    History of prostate cancer currently in remission    Chronic kidney disease stage IIIA stable    Problem List Items Addressed This Visit     None      Visit Diagnoses     Diabetes mellitus type 2 in nonobese ([facility] )    -  Primary    Relevant Orders    Hemoglobin A1C    Comprehensive metabolic panel    Lipid Panel with Direct LDL reflex           Diagnoses and all orders for this visit:    Diabetes mellitus type 2 in nonobese ([facility] )  -     Hemoglobin A1C; Future  -     Comprehensive metabolic panel; Future  -     Lipid Panel with Direct LDL reflex; Future        No problem-specific Assessment & Plan notes found for this encounter  PHQ-2/9 Depression Screening            Body mass index is 32 18 kg/m²  BMI Counseling: Body mass index is 32 18 kg/m²  The BMI     Subjective:      Patient ID: Cynthia Aquino is a 80 y o  male      Patient presents for six-month checkup for atrial fibrillation diabetes mellitus type 2      The following portions of the patient's history were reviewed and updated as appropriate:   He has a past medical history of Anxiety, Atrial fibrillation (HCC), Atrial flutter ([facility] ), Congestive heart failure (CHF) ([facility] ), COPD (chronic obstructive pulmonary disease) ([facility] ), Coronary artery disease, DVT (deep venous thrombosis) ([facility] ), ED (erectile dysfunction), Hearing loss, History of echocardiogram (04/05/2018), radiation therapy, Hypercholesterolemia, Hyperlipidemia, Hypertension, Long term (current) use of anticoagulants (8/21/2018), Neuropathy of both feet, Peripheral neuropathy, Prostate cancer ([facility] ), and Type 2 diabetes mellitus ([facility] )  ,  does not have any pertinent problems on file  ,   has a past surgical history that includes Cardiac catheterization (01/15/1988); Colonoscopy (10/05/2017); and Prostate surgery  ,  family history includes Cancer in his brother and other; Colon cancer in his brother and other; Heart disease in his other; Hypertension in his other  ,   reports that he has never smoked  He has never used smokeless tobacco  He reports previous alcohol use  He reports that he does not use drugs  ,  has No Known Allergies     Current Outpatient Medications   Medication Sig Dispense Refill    atorvastatin (LIPITOR) 40 mg tablet TAKE 1 TABLET BY MOUTH DAILY 90 tablet 5    furosemide (LASIX) 20 mg tablet 1 tablet 3 days a week 45 tablet 3    metFORMIN (GLUCOPHAGE) 500 mg tablet Take 1 tablet (500 mg total) by mouth daily with breakfast 90 tablet 1    metoprolol succinate (TOPROL-XL) 50 mg 24 hr tablet TAKE 1 TABLET BY MOUTH TWICE DAILY 180 tablet 5    warfarin (COUMADIN) 4 mg tablet TAKE 2 TABLETS BY MOUTH DAILY OR AS DIRECTED 180 tablet 5     No current facility-administered medications for this visit  Review of Systems   Constitutional: Negative for chills and fever  HENT: Negative for ear pain and sore throat  Eyes: Negative for pain and visual disturbance  Respiratory: Negative for cough and shortness of breath  Cardiovascular: Negative for chest pain and palpitations  Gastrointestinal: Negative for abdominal pain and vomiting  Genitourinary: Negative for dysuria and hematuria  Musculoskeletal: Negative for arthralgias and back pain  Skin: Negative for color change and rash  Neurological: Negative for seizures and syncope          Neuropathy of the legs bilaterally which makes it difficult for him to go on hikes   All other systems reviewed and are negative  Objective:    /88   Pulse 76   Temp (!) 96 5 °F (35 8 °C)   Resp 20   Ht 5' 11 5" (1 816 m)   Wt 106 kg (234 lb)   BMI 32 18 kg/m²   Body mass index is 32 18 kg/m²  Physical Exam  Constitutional:       Appearance: He is well-developed  HENT:      Head: Normocephalic  Eyes:      Pupils: Pupils are equal, round, and reactive to light  Cardiovascular:      Rate and Rhythm: Normal rate and regular rhythm  Heart sounds: Normal heart sounds  Pulmonary:      Effort: Pulmonary effort is normal       Breath sounds: Normal breath sounds  Abdominal:      General: Bowel sounds are normal       Palpations: Abdomen is soft  Tenderness: There is no abdominal tenderness  Musculoskeletal:      Cervical back: Normal range of motion  Skin:     General: Skin is warm  Neurological:      Mental Status: He is alert and oriented to person, place, and time

## 2022-03-07 ENCOUNTER — APPOINTMENT (OUTPATIENT)
Dept: LAB | Facility: MEDICAL CENTER | Age: 87
End: 2022-03-07
Payer: COMMERCIAL

## 2022-03-08 ENCOUNTER — ANTICOAG VISIT (OUTPATIENT)
Dept: CARDIOLOGY CLINIC | Facility: CLINIC | Age: 87
End: 2022-03-08
Payer: COMMERCIAL

## 2022-03-08 DIAGNOSIS — Z79.01 LONG TERM (CURRENT) USE OF ANTICOAGULANTS: ICD-10-CM

## 2022-03-08 DIAGNOSIS — I48.19 PERSISTENT ATRIAL FIBRILLATION (HCC): Primary | ICD-10-CM

## 2022-03-08 PROCEDURE — 93793 ANTICOAG MGMT PT WARFARIN: CPT | Performed by: PHYSICIAN ASSISTANT

## 2022-03-08 NOTE — PATIENT INSTRUCTIONS
Spoke with Patient: No changes in medication health or diet  No missed or extra doses  No s/s of bleeding TIA or CVA  No new ABX, NSAIDs OTC meds, or supplements  Dose as instructed and recheck in two weeks     Katie Gonzales PA-C

## 2022-03-18 ENCOUNTER — TELEPHONE (OUTPATIENT)
Dept: FAMILY MEDICINE CLINIC | Facility: CLINIC | Age: 87
End: 2022-03-18

## 2022-03-18 DIAGNOSIS — Z85.46 HISTORY OF PROSTATE CANCER: Primary | ICD-10-CM

## 2022-03-18 NOTE — TELEPHONE ENCOUNTER
Received care consideration that patient has a history of treated prostate CA - has not had a PSA in 12 months     Order placed

## 2022-03-22 ENCOUNTER — ANTICOAG VISIT (OUTPATIENT)
Dept: CARDIOLOGY CLINIC | Facility: CLINIC | Age: 87
End: 2022-03-22
Payer: COMMERCIAL

## 2022-03-22 ENCOUNTER — APPOINTMENT (OUTPATIENT)
Dept: LAB | Facility: MEDICAL CENTER | Age: 87
End: 2022-03-22
Payer: COMMERCIAL

## 2022-03-22 DIAGNOSIS — E55.9 VITAMIN D DEFICIENCY: ICD-10-CM

## 2022-03-22 DIAGNOSIS — I48.19 PERSISTENT ATRIAL FIBRILLATION (HCC): Primary | ICD-10-CM

## 2022-03-22 DIAGNOSIS — I10 ESSENTIAL HYPERTENSION: ICD-10-CM

## 2022-03-22 DIAGNOSIS — Z79.01 LONG TERM (CURRENT) USE OF ANTICOAGULANTS: ICD-10-CM

## 2022-03-22 DIAGNOSIS — Z85.46 HISTORY OF PROSTATE CANCER: ICD-10-CM

## 2022-03-22 PROCEDURE — 93793 ANTICOAG MGMT PT WARFARIN: CPT | Performed by: PHYSICIAN ASSISTANT

## 2022-03-22 NOTE — PATIENT INSTRUCTIONS
Spoke with Patient: No changes in medication health or diet  No missed or extra doses  No s/s of bleeding TIA or CVA  No new ABX, NSAIDs OTC meds, or supplements  Dose as instructed and recheck in two weeks     Meka Aden PA-C

## 2022-04-01 ENCOUNTER — OFFICE VISIT (OUTPATIENT)
Dept: FAMILY MEDICINE CLINIC | Facility: CLINIC | Age: 87
End: 2022-04-01
Payer: COMMERCIAL

## 2022-04-01 ENCOUNTER — HOSPITAL ENCOUNTER (OUTPATIENT)
Dept: RADIOLOGY | Facility: HOSPITAL | Age: 87
Discharge: HOME | End: 2022-04-01
Attending: FAMILY MEDICINE
Payer: COMMERCIAL

## 2022-04-01 VITALS — DIASTOLIC BLOOD PRESSURE: 60 MMHG | SYSTOLIC BLOOD PRESSURE: 110 MMHG

## 2022-04-01 DIAGNOSIS — M25.551 RIGHT HIP PAIN: ICD-10-CM

## 2022-04-01 DIAGNOSIS — M53.2X6 LUMBAR SPINE INSTABILITY: Primary | ICD-10-CM

## 2022-04-01 DIAGNOSIS — Z79.01 LONG TERM (CURRENT) USE OF ANTICOAGULANTS: ICD-10-CM

## 2022-04-01 DIAGNOSIS — I48.19 PERSISTENT ATRIAL FIBRILLATION (HCC): ICD-10-CM

## 2022-04-01 DIAGNOSIS — M53.2X6 LUMBAR SPINE INSTABILITY: ICD-10-CM

## 2022-04-01 PROCEDURE — 72100 X-RAY EXAM L-S SPINE 2/3 VWS: CPT

## 2022-04-01 PROCEDURE — 1036F TOBACCO NON-USER: CPT | Performed by: FAMILY MEDICINE

## 2022-04-01 PROCEDURE — 1160F RVW MEDS BY RX/DR IN RCRD: CPT | Performed by: FAMILY MEDICINE

## 2022-04-01 PROCEDURE — 73502 X-RAY EXAM HIP UNI 2-3 VIEWS: CPT

## 2022-04-01 PROCEDURE — 99213 OFFICE O/P EST LOW 20 MIN: CPT | Performed by: FAMILY MEDICINE

## 2022-04-01 RX ORDER — CYCLOBENZAPRINE HCL 10 MG
10 TABLET ORAL 3 TIMES DAILY PRN
Qty: 30 TABLET | Refills: 0 | Status: SHIPPED | OUTPATIENT
Start: 2022-04-01

## 2022-04-01 RX ORDER — METHYLPREDNISOLONE 4 MG/1
TABLET ORAL
Qty: 21 EACH | Refills: 0 | Status: SHIPPED | OUTPATIENT
Start: 2022-04-01

## 2022-04-04 ENCOUNTER — TELEPHONE (OUTPATIENT)
Dept: FAMILY MEDICINE CLINIC | Facility: CLINIC | Age: 87
End: 2022-04-04

## 2022-04-04 NOTE — PROGRESS NOTES
Assessment/Plan:  Fall with lumbar strain sprain and right hip pain the plan is to x-ray the lumbar spine and hip  The patient was advised to use Salonpas for the pain  Patient was also given a Medrol Dosepak for pain and inflammation and Flexeril for lumbar spasm  Patient has persistent atrial fibrillation anticoagulated with Coumadin    Problem List Items Addressed This Visit     None      Visit Diagnoses     Lumbar spine instability    -  Primary    Relevant Medications    methylPREDNISolone 4 MG tablet therapy pack    cyclobenzaprine (FLEXERIL) 10 mg tablet    Other Relevant Orders    XR spine lumbar 2 or 3 views injury    XR hip/pelv 2-3 vws right if performed           Diagnoses and all orders for this visit:    Lumbar spine instability  -     XR spine lumbar 2 or 3 views injury; Future  -     XR hip/pelv 2-3 vws right if performed; Future  -     methylPREDNISolone 4 MG tablet therapy pack; Use as directed on package  -     cyclobenzaprine (FLEXERIL) 10 mg tablet; Take 1 tablet (10 mg total) by mouth 3 (three) times a day as needed for muscle spasms        No problem-specific Assessment & Plan notes found for this encounter  PHQ-2/9 Depression Screening            There is no height or weight on file to calculate BMI  BMI Counseling: There is no height or weight on file to calculate BMI  The BMI     Subjective:      Patient ID: Henry Mclean is a 80 y o  male      Patient presents with a chief complaint that he was involved in a fall being pulled by a dog      The following portions of the patient's history were reviewed and updated as appropriate:   He has a past medical history of Anxiety, Atrial fibrillation ([address] ), Atrial flutter ([address] ), Congestive heart failure (CHF) ([address] ), COPD (chronic obstructive pulmonary disease) ([address] ), Coronary artery disease, DVT (deep venous thrombosis) ([address] ), ED (erectile dysfunction), Hearing loss, History of echocardiogram (04/05/2018), radiation therapy, Hypercholesterolemia, Hyperlipidemia, Hypertension, Long term (current) use of anticoagulants (8/21/2018), Neuropathy of both feet, Peripheral neuropathy, Prostate cancer ([facility] ), and Type 2 diabetes mellitus ([facility] )  ,  does not have any pertinent problems on file  ,   has a past surgical history that includes Cardiac catheterization (01/15/1988); Colonoscopy (10/05/2017); and Prostate surgery  ,  family history includes Cancer in his brother and other; Colon cancer in his brother and other; Heart disease in his other; Hypertension in his other  ,   reports that he has never smoked  He has never used smokeless tobacco  He reports previous alcohol use  He reports that he does not use drugs  ,  has No Known Allergies     Current Outpatient Medications   Medication Sig Dispense Refill    atorvastatin (LIPITOR) 40 mg tablet TAKE 1 TABLET BY MOUTH DAILY 90 tablet 5    cyclobenzaprine (FLEXERIL) 10 mg tablet Take 1 tablet (10 mg total) by mouth 3 (three) times a day as needed for muscle spasms 30 tablet 0    furosemide (LASIX) 20 mg tablet 1 tablet 3 days a week 45 tablet 3    metFORMIN (GLUCOPHAGE) 500 mg tablet Take 1 tablet (500 mg total) by mouth daily with breakfast 90 tablet 1    methylPREDNISolone 4 MG tablet therapy pack Use as directed on package 21 each 0    metoprolol succinate (TOPROL-XL) 50 mg 24 hr tablet TAKE 1 TABLET BY MOUTH TWICE DAILY 180 tablet 5    warfarin (COUMADIN) 4 mg tablet TAKE 2 TABLETS BY MOUTH DAILY OR AS DIRECTED 180 tablet 5     No current facility-administered medications for this visit  Review of Systems   Constitutional: Negative for chills and fever  HENT: Negative for ear pain and sore throat  Eyes: Negative for pain and visual disturbance  Respiratory: Negative for cough and shortness of breath  Cardiovascular: Negative for chest pain and palpitations  Gastrointestinal: Negative for abdominal pain and vomiting  Genitourinary: Negative for dysuria and hematuria  Musculoskeletal: Positive for back pain  Negative for arthralgias  Right hip pain   Skin: Negative for color change and rash  Neurological: Negative for seizures and syncope  All other systems reviewed and are negative  Objective:    /60 (BP Location: Right arm, Patient Position: Sitting, Cuff Size: Standard)   There is no height or weight on file to calculate BMI  Physical Exam  Constitutional:       Appearance: He is well-developed  HENT:      Head: Normocephalic  Eyes:      Pupils: Pupils are equal, round, and reactive to light  Cardiovascular:      Rate and Rhythm: Normal rate  Rhythm irregular  Heart sounds: Murmur heard  Pulmonary:      Effort: Pulmonary effort is normal       Breath sounds: Normal breath sounds  Abdominal:      General: Bowel sounds are normal       Palpations: Abdomen is soft  Tenderness: There is no abdominal tenderness  Musculoskeletal:      Cervical back: Normal range of motion  Comments: Pain on palpation of the right hip lumbar paraspinal spasm with SI tenderness bilaterally   Skin:     General: Skin is warm  Neurological:      Mental Status: He is alert and oriented to person, place, and time

## 2022-04-05 ENCOUNTER — ANTICOAG VISIT (OUTPATIENT)
Dept: CARDIOLOGY CLINIC | Facility: CLINIC | Age: 87
End: 2022-04-05

## 2022-04-05 ENCOUNTER — TELEPHONE (OUTPATIENT)
Dept: FAMILY MEDICINE CLINIC | Facility: CLINIC | Age: 87
End: 2022-04-05

## 2022-04-05 DIAGNOSIS — Z79.01 LONG TERM (CURRENT) USE OF ANTICOAGULANTS: ICD-10-CM

## 2022-04-05 DIAGNOSIS — I48.19 PERSISTENT ATRIAL FIBRILLATION (HCC): Primary | ICD-10-CM

## 2022-04-08 ENCOUNTER — APPOINTMENT (OUTPATIENT)
Dept: LAB | Facility: MEDICAL CENTER | Age: 87
End: 2022-04-08
Payer: COMMERCIAL

## 2022-04-08 ENCOUNTER — ANTICOAG VISIT (OUTPATIENT)
Dept: CARDIOLOGY CLINIC | Facility: CLINIC | Age: 87
End: 2022-04-08
Payer: COMMERCIAL

## 2022-04-08 DIAGNOSIS — Z79.01 LONG TERM (CURRENT) USE OF ANTICOAGULANTS: ICD-10-CM

## 2022-04-08 DIAGNOSIS — I48.19 PERSISTENT ATRIAL FIBRILLATION (HCC): ICD-10-CM

## 2022-04-08 DIAGNOSIS — I48.19 PERSISTENT ATRIAL FIBRILLATION (HCC): Primary | ICD-10-CM

## 2022-04-08 LAB
INR PPP: 3.98 (ref 0.84–1.19)
PROTHROMBIN TIME: 36.7 SECONDS (ref 11.6–14.5)

## 2022-04-08 PROCEDURE — 93793 ANTICOAG MGMT PT WARFARIN: CPT | Performed by: NURSE PRACTITIONER

## 2022-04-08 PROCEDURE — 36415 COLL VENOUS BLD VENIPUNCTURE: CPT

## 2022-04-08 PROCEDURE — 85610 PROTHROMBIN TIME: CPT

## 2022-04-08 NOTE — PATIENT INSTRUCTIONS
INR received from lab and reviewed      Hold today, reduce to 4 mg Sat and Sun  Recheck INR on Monday  [facility] with results and instructions   DOE Frost

## 2022-04-11 ENCOUNTER — APPOINTMENT (OUTPATIENT)
Dept: LAB | Facility: MEDICAL CENTER | Age: 87
End: 2022-04-11
Payer: COMMERCIAL

## 2022-04-11 ENCOUNTER — ANTICOAG VISIT (OUTPATIENT)
Dept: CARDIOLOGY CLINIC | Facility: CLINIC | Age: 87
End: 2022-04-11
Payer: COMMERCIAL

## 2022-04-11 DIAGNOSIS — I48.19 PERSISTENT ATRIAL FIBRILLATION (HCC): Primary | ICD-10-CM

## 2022-04-11 DIAGNOSIS — Z79.01 LONG TERM (CURRENT) USE OF ANTICOAGULANTS: ICD-10-CM

## 2022-04-11 DIAGNOSIS — I48.19 PERSISTENT ATRIAL FIBRILLATION (HCC): ICD-10-CM

## 2022-04-11 LAB
INR PPP: 1.96 (ref 0.84–1.19)
INR PPP: 1.96 (ref 0.84–1.19)
PROTHROMBIN TIME: 21.4 SECONDS (ref 11.6–14.5)

## 2022-04-11 PROCEDURE — 85610 PROTHROMBIN TIME: CPT

## 2022-04-11 PROCEDURE — 36415 COLL VENOUS BLD VENIPUNCTURE: CPT

## 2022-04-11 PROCEDURE — 93793 ANTICOAG MGMT PT WARFARIN: CPT | Performed by: NURSE PRACTITIONER

## 2022-04-11 NOTE — PATIENT INSTRUCTIONS
INR received from lab and reviewed  No changes needed; continue current dosing schedule    Recheck INR in 2 weeks  Instructions given to pt by phone  Josh Anthony

## 2022-04-12 ENCOUNTER — ANTICOAG VISIT (OUTPATIENT)
Dept: CARDIOLOGY CLINIC | Facility: CLINIC | Age: 87
End: 2022-04-12

## 2022-04-12 DIAGNOSIS — I48.19 PERSISTENT ATRIAL FIBRILLATION (HCC): Primary | ICD-10-CM

## 2022-04-12 DIAGNOSIS — Z79.01 LONG TERM (CURRENT) USE OF ANTICOAGULANTS: ICD-10-CM

## 2022-04-22 ENCOUNTER — APPOINTMENT (OUTPATIENT)
Dept: LAB | Facility: MEDICAL CENTER | Age: 87
End: 2022-04-22
Payer: COMMERCIAL

## 2022-04-22 ENCOUNTER — TELEPHONE (OUTPATIENT)
Dept: NUTRITION | Facility: HOSPITAL | Age: 87
End: 2022-04-22

## 2022-04-22 DIAGNOSIS — Z79.01 LONG TERM (CURRENT) USE OF ANTICOAGULANTS: ICD-10-CM

## 2022-04-22 DIAGNOSIS — I48.19 PERSISTENT ATRIAL FIBRILLATION (HCC): ICD-10-CM

## 2022-04-22 LAB
INR PPP: 2.88 (ref 0.84–1.19)
PROTHROMBIN TIME: 28.7 SECONDS (ref 11.6–14.5)

## 2022-04-22 PROCEDURE — 36415 COLL VENOUS BLD VENIPUNCTURE: CPT

## 2022-04-22 PROCEDURE — 85610 PROTHROMBIN TIME: CPT

## 2022-04-22 NOTE — TELEPHONE ENCOUNTER
Spoke with patient's wife regarding his increased blood sugars  She is concern because his blood sugars have not return to normal after his fall and subsequent steroid use  His fasting blood sugars are ranging from 149-189  She spoke with Dr Deny Hastings office and was advise not to increase Metformin  She was questioning what they can do to lower his blood sugars  Explained to wife that his fall, steroids, pain and recent holiday are all like contributing to his higher than normal blood sugars  He activity level has decrease since his fall too  Encourage her to continue to monitor blood sugars and if not within usual range by mid May call PCP office

## 2022-04-25 ENCOUNTER — ANTICOAG VISIT (OUTPATIENT)
Dept: CARDIOLOGY CLINIC | Facility: CLINIC | Age: 87
End: 2022-04-25
Payer: COMMERCIAL

## 2022-04-25 DIAGNOSIS — Z79.01 LONG TERM (CURRENT) USE OF ANTICOAGULANTS: ICD-10-CM

## 2022-04-25 DIAGNOSIS — I48.19 PERSISTENT ATRIAL FIBRILLATION (HCC): Primary | ICD-10-CM

## 2022-04-25 PROCEDURE — 93793 ANTICOAG MGMT PT WARFARIN: CPT | Performed by: PHYSICIAN ASSISTANT

## 2022-04-25 NOTE — PATIENT INSTRUCTIONS
LMAM to adjust dose and recheck in one month  Patient to call if questions, concerns or changes in medication health or diet    Andrew Henry PA-C

## 2022-05-13 ENCOUNTER — HOSPITAL ENCOUNTER (OUTPATIENT)
Dept: CT IMAGING | Facility: HOSPITAL | Age: 87
Discharge: HOME | End: 2022-05-13
Payer: COMMERCIAL

## 2022-05-13 ENCOUNTER — HOSPITAL ENCOUNTER (OUTPATIENT)
Dept: RADIOLOGY | Facility: HOSPITAL | Age: 87
Discharge: HOME | End: 2022-05-13
Attending: FAMILY MEDICINE
Payer: COMMERCIAL

## 2022-05-13 ENCOUNTER — APPOINTMENT (OUTPATIENT)
Dept: LAB | Facility: HOSPITAL | Age: 87
End: 2022-05-13
Attending: FAMILY MEDICINE
Payer: COMMERCIAL

## 2022-05-13 ENCOUNTER — OFFICE VISIT (OUTPATIENT)
Dept: FAMILY MEDICINE CLINIC | Facility: CLINIC | Age: 87
End: 2022-05-13
Payer: COMMERCIAL

## 2022-05-13 VITALS
DIASTOLIC BLOOD PRESSURE: 68 MMHG | BODY MASS INDEX: 31.78 KG/M2 | SYSTOLIC BLOOD PRESSURE: 114 MMHG | WEIGHT: 227 LBS | TEMPERATURE: 97.4 F | HEIGHT: 71 IN | HEART RATE: 68 BPM | OXYGEN SATURATION: 94 % | RESPIRATION RATE: 16 BRPM

## 2022-05-13 DIAGNOSIS — K29.00 ACUTE GASTRITIS WITHOUT HEMORRHAGE, UNSPECIFIED GASTRITIS TYPE: ICD-10-CM

## 2022-05-13 DIAGNOSIS — J40 BRONCHITIS: ICD-10-CM

## 2022-05-13 DIAGNOSIS — I50.32 CHRONIC DIASTOLIC CONGESTIVE HEART FAILURE (HCC): ICD-10-CM

## 2022-05-13 DIAGNOSIS — I11.0 HYPERTENSIVE HEART DISEASE WITH HEART FAILURE (HCC): ICD-10-CM

## 2022-05-13 DIAGNOSIS — Z79.01 LONG TERM (CURRENT) USE OF ANTICOAGULANTS: ICD-10-CM

## 2022-05-13 DIAGNOSIS — R29.6 MULTIPLE FALLS: ICD-10-CM

## 2022-05-13 DIAGNOSIS — I48.19 PERSISTENT ATRIAL FIBRILLATION (HCC): ICD-10-CM

## 2022-05-13 DIAGNOSIS — E55.9 VITAMIN D DEFICIENCY: ICD-10-CM

## 2022-05-13 DIAGNOSIS — S09.90XA TRAUMATIC INJURY OF HEAD, INITIAL ENCOUNTER: ICD-10-CM

## 2022-05-13 DIAGNOSIS — N18.31 STAGE 3A CHRONIC KIDNEY DISEASE (HCC): ICD-10-CM

## 2022-05-13 DIAGNOSIS — Z85.46 HISTORY OF PROSTATE CANCER: ICD-10-CM

## 2022-05-13 DIAGNOSIS — S09.90XA TRAUMATIC INJURY OF HEAD, INITIAL ENCOUNTER: Primary | ICD-10-CM

## 2022-05-13 DIAGNOSIS — R35.0 URINARY FREQUENCY: ICD-10-CM

## 2022-05-13 LAB
25(OH)D3 SERPL-MCNC: 39.2 NG/ML (ref 30–100)
BACTERIA UR QL AUTO: NORMAL /HPF
BASOPHILS # BLD AUTO: 0.03 THOUSANDS/ΜL (ref 0–0.1)
BASOPHILS NFR BLD AUTO: 0 % (ref 0–1)
BILIRUB UR QL STRIP: NEGATIVE
CLARITY UR: ABNORMAL
COLOR UR: YELLOW
EOSINOPHIL # BLD AUTO: 0.05 THOUSAND/ΜL (ref 0–0.61)
EOSINOPHIL NFR BLD AUTO: 0 % (ref 0–6)
ERYTHROCYTE [DISTWIDTH] IN BLOOD BY AUTOMATED COUNT: 14.2 % (ref 11.6–15.1)
GLUCOSE UR STRIP-MCNC: ABNORMAL MG/DL
HCT VFR BLD AUTO: 41.9 % (ref 36.5–49.3)
HGB BLD-MCNC: 13.5 G/DL (ref 12–17)
HGB UR QL STRIP.AUTO: ABNORMAL
IMM GRANULOCYTES # BLD AUTO: 0.09 THOUSAND/UL (ref 0–0.2)
IMM GRANULOCYTES NFR BLD AUTO: 1 % (ref 0–2)
KETONES UR STRIP-MCNC: NEGATIVE MG/DL
LEUKOCYTE ESTERASE UR QL STRIP: NEGATIVE
LYMPHOCYTES # BLD AUTO: 1.15 THOUSANDS/ΜL (ref 0.6–4.47)
LYMPHOCYTES NFR BLD AUTO: 10 % (ref 14–44)
MCH RBC QN AUTO: 29.5 PG (ref 26.8–34.3)
MCHC RBC AUTO-ENTMCNC: 32.2 G/DL (ref 31.4–37.4)
MCV RBC AUTO: 92 FL (ref 82–98)
MONOCYTES # BLD AUTO: 1.27 THOUSAND/ΜL (ref 0.17–1.22)
MONOCYTES NFR BLD AUTO: 11 % (ref 4–12)
NEUTROPHILS # BLD AUTO: 9.42 THOUSANDS/ΜL (ref 1.85–7.62)
NEUTS SEG NFR BLD AUTO: 78 % (ref 43–75)
NITRITE UR QL STRIP: NEGATIVE
NON-SQ EPI CELLS URNS QL MICRO: NORMAL /HPF
NRBC BLD AUTO-RTO: 0 /100 WBCS
PH UR STRIP.AUTO: 6 [PH]
PLATELET # BLD AUTO: 192 THOUSANDS/UL (ref 149–390)
PMV BLD AUTO: 10.8 FL (ref 8.9–12.7)
PROT UR STRIP-MCNC: ABNORMAL MG/DL
RBC # BLD AUTO: 4.57 MILLION/UL (ref 3.88–5.62)
RBC #/AREA URNS AUTO: NORMAL /HPF
SP GR UR STRIP.AUTO: 1.02 (ref 1–1.03)
UROBILINOGEN UR QL STRIP.AUTO: 2 E.U./DL
WBC # BLD AUTO: 12.01 THOUSAND/UL (ref 4.31–10.16)
WBC #/AREA URNS AUTO: NORMAL /HPF

## 2022-05-13 PROCEDURE — 87070 CULTURE OTHR SPECIMN AEROBIC: CPT

## 2022-05-13 PROCEDURE — 82306 VITAMIN D 25 HYDROXY: CPT

## 2022-05-13 PROCEDURE — 3725F SCREEN DEPRESSION PERFORMED: CPT | Performed by: FAMILY MEDICINE

## 2022-05-13 PROCEDURE — 1101F PT FALLS ASSESS-DOCD LE1/YR: CPT | Performed by: FAMILY MEDICINE

## 2022-05-13 PROCEDURE — 70450 CT HEAD/BRAIN W/O DYE: CPT

## 2022-05-13 PROCEDURE — 87205 SMEAR GRAM STAIN: CPT

## 2022-05-13 PROCEDURE — 1036F TOBACCO NON-USER: CPT | Performed by: FAMILY MEDICINE

## 2022-05-13 PROCEDURE — 36415 COLL VENOUS BLD VENIPUNCTURE: CPT

## 2022-05-13 PROCEDURE — G1004 CDSM NDSC: HCPCS

## 2022-05-13 PROCEDURE — 81001 URINALYSIS AUTO W/SCOPE: CPT | Performed by: FAMILY MEDICINE

## 2022-05-13 PROCEDURE — 99215 OFFICE O/P EST HI 40 MIN: CPT | Performed by: FAMILY MEDICINE

## 2022-05-13 PROCEDURE — 1160F RVW MEDS BY RX/DR IN RCRD: CPT | Performed by: FAMILY MEDICINE

## 2022-05-13 PROCEDURE — 85025 COMPLETE CBC W/AUTO DIFF WBC: CPT

## 2022-05-13 PROCEDURE — 86738 MYCOPLASMA ANTIBODY: CPT

## 2022-05-13 PROCEDURE — 71046 X-RAY EXAM CHEST 2 VIEWS: CPT

## 2022-05-13 PROCEDURE — 3288F FALL RISK ASSESSMENT DOCD: CPT | Performed by: FAMILY MEDICINE

## 2022-05-13 RX ORDER — FAMOTIDINE 20 MG/1
20 TABLET, FILM COATED ORAL 2 TIMES DAILY
Qty: 60 TABLET | Refills: 1 | Status: SHIPPED | OUTPATIENT
Start: 2022-05-13

## 2022-05-13 RX ORDER — AZITHROMYCIN 250 MG/1
TABLET, FILM COATED ORAL
Qty: 6 TABLET | Refills: 0 | Status: SHIPPED | OUTPATIENT
Start: 2022-05-13 | End: 2022-05-16 | Stop reason: SDUPTHER

## 2022-05-13 NOTE — PROGRESS NOTES
Assessment/Plan:  Patient had a fall yesterday in his yard while gardening he did have head trauma with the incident the patient did not lose consciousness  Had difficulty getting up     The patient is anticoagulated for chronic atrial fibrillation plan on checking a CT to rule out a subdural    Chronic atrial fibrillation anticoagulated with Coumadin    Sinus pressure congestion with coughing of thick phlegm plan is mycoplasma panel sputum CNS    And check a chest  x-ray      Diabetes mellitus type 2 with blood sugar running at approximately 170-200    Urinary frequency the plan is to perform a urinalysis with reflex to culture    Leg weakness with multiple falls the plan is for a physical therapy evaluation and treatment    Hypertensive cardiovascular disease with blood pressure controlled on the current regimen    Prostate cancer in remission    Chronic diastolic congestive heart failure treated with Lasix 20 mg    Patient was advised to present to the emergency room if there is any worsening of his symptoms    Problem List Items Addressed This Visit        Cardiovascular and Mediastinum    Persistent atrial fibrillation ([address] )    Relevant Orders    CT head w wo contrast    Chronic diastolic congestive heart failure ([address] )    Hypertensive heart disease with heart failure ([address] )       Other    Long term (current) use of anticoagulants    Relevant Orders    CT head w wo contrast      Other Visit Diagnoses     Traumatic injury of head, initial encounter    -  Primary    Relevant Orders    CT head w wo contrast    Bronchitis        Relevant Medications    azithromycin (Zithromax) 250 mg tablet    Other Relevant Orders    CBC and differential    XR chest pa & lateral    Mycoplasma Pneumoniae AB, IgG/IgM    Sputum culture and Gram stain    Acute gastritis without hemorrhage, unspecified gastritis type        Relevant Medications    famotidine (PEPCID) 20 mg tablet    History of prostate cancer        Stage 3a chronic kidney disease ([place] [address] )        Multiple falls        Relevant Orders    Ambulatory Referral to Physical Therapy    Vitamin D deficiency        Relevant Orders    Vitamin D 25 hydroxy    Urinary frequency        Relevant Orders    UA w Reflex to Microscopic w Reflex to Culture           Diagnoses and all orders for this visit:    Traumatic injury of head, initial encounter  -     CT head w wo contrast; Future    Persistent atrial fibrillation ([place] [address] )  -     CT head w wo contrast; Future    Long term (current) use of anticoagulants  -     CT head w wo contrast; Future    Bronchitis  -     CBC and differential; Future  -     XR chest pa & lateral; Future  -     Mycoplasma Pneumoniae AB, IgG/IgM; Future  -     Sputum culture and Gram stain; Future  -     azithromycin (Zithromax) 250 mg tablet; Take 2 tablets (500 mg total) by mouth daily for 1 day, THEN 1 tablet (250 mg total) daily for 4 days  Acute gastritis without hemorrhage, unspecified gastritis type  -     famotidine (PEPCID) 20 mg tablet; Take 1 tablet (20 mg total) by mouth in the morning and 1 tablet (20 mg total) in the evening  History of prostate cancer    Stage 3a chronic kidney disease ([place] [address] )    Multiple falls  -     Ambulatory Referral to Physical Therapy; Future    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)    Chronic diastolic congestive heart failure (HCC)    Vitamin D deficiency  -     Vitamin D 25 hydroxy; Future    Urinary frequency  -     UA w Reflex to Microscopic w Reflex to Culture        No problem-specific Assessment & Plan notes found for this encounter  PHQ-2/9 Depression Screening    Little interest or pleasure in doing things: 0 - not at all  Feeling down, depressed, or hopeless: 0 - not at all  PHQ-2 Score: 0  PHQ-2 Interpretation: Negative depression screen          Body mass index is 31 66 kg/m²  BMI Counseling: Body mass index is 31 66 kg/m²  The BMI     Subjective:      Patient ID: Carlee Anaya is a 80 y o  male     Patient presents with cold-like symptoms on and off for 2-3 days had a home COVID-19 test 2 days ago that was negative  Patient complains of generalized weakness and using a roller walker patient complains of an upset stomach and fatigue along with a cough  Patient also complains of urinary frequency  The patient had a fall in the yd patient states he lost his balance landed on his butt by the [facility] greatest his head patient was able to manage to get up by himself with difficulty  The following portions of the patient's history were reviewed and updated as appropriate:   He has a past medical history of Anxiety, Atrial fibrillation (HCC), Atrial flutter ([facility] [address] ), Congest[facility] COPD (chronic obstructive pulmonary disease) ([facility] ), Coronary artery disease, DVT (deep venous thrombosis) ([facility] ), ED (erectile dysfunction), Hearing loss, History of echocardiogram (04/05/2018), radiation therapy, Hypercholesterolemia, Hyperlipidemia, Hypertension, Long term (current) use of anticoagulants (8/21/2018), Neuropathy of both feet, Peripheral neuropathy, Prostate cancer ([facility] ), and Type 2 diabetes mellitus ([facility] )  ,  does not have any pertinent problems on file  ,   has a past surgical history that includes Cardiac catheterization (01/15/1988); Colonoscopy (10/05/2017); and Prostate surgery  ,  family history includes Cancer in his brother and other; Colon cancer in his brother and other; Heart disease in his other; Hypertension in his other  ,   reports that he has never smoked  He has never used smokeless tobacco  He reports previous alcohol use  He reports that he does not use drugs  ,  has No Known Allergies     Current Outpatient Medications   Medication Sig Dispense Refill    azithromycin (Zithromax) 250 mg tablet Take 2 tablets (500 mg total) by mouth daily for 1 day, THEN 1 tablet (250 mg total) daily for 4 days   6 tablet 0    famotidine (PEPCID) 20 mg tablet Take 1 tablet (20 mg total) by mouth in the morning and 1 tablet (20 mg total) in the evening  60 tablet 1    atorvastatin (LIPITOR) 40 mg tablet TAKE 1 TABLET BY MOUTH DAILY 90 tablet 5    cyclobenzaprine (FLEXERIL) 10 mg tablet Take 1 tablet (10 mg total) by mouth 3 (three) times a day as needed for muscle spasms 30 tablet 0    furosemide (LASIX) 20 mg tablet 1 tablet 3 days a week 45 tablet 3    metFORMIN (GLUCOPHAGE) 500 mg tablet TAKE 1 TABLET BY MOUTH DAILY WITH BREAKFAST 90 tablet 1    methylPREDNISolone 4 MG tablet therapy pack Use as directed on package (Patient not taking: Reported on 5/13/2022) 21 each 0    metoprolol succinate (TOPROL-XL) 50 mg 24 hr tablet TAKE 1 TABLET BY MOUTH TWICE DAILY 180 tablet 5    warfarin (COUMADIN) 4 mg tablet TAKE 2 TABLETS BY MOUTH DAILY OR AS DIRECTED 180 tablet 5     No current facility-administered medications for this visit  Review of Systems   Constitutional: Positive for fatigue  Negative for chills and fever  HENT: Positive for congestion, rhinorrhea and sinus pressure  Negative for ear pain and sore throat  Eyes: Negative for pain and visual disturbance  Respiratory: Positive for cough  Negative for shortness of breath  Cardiovascular: Negative for chest pain and palpitations  Gastrointestinal: Positive for nausea  Negative for abdominal pain and vomiting  Endocrine: Positive for polyuria  Genitourinary: Negative for dysuria and hematuria  Musculoskeletal: Negative for arthralgias and back pain  Skin: Negative for color change and rash  Neurological: Positive for weakness  Negative for seizures and syncope  All other systems reviewed and are negative  Objective:    /68   Pulse 68   Temp (!) 97 4 °F (36 3 °C)   Resp 16   Ht 5' 11" (1 803 m)   Wt 103 kg (227 lb)   SpO2 94%   BMI 31 66 kg/m²   Body mass index is 31 66 kg/m²  Physical Exam  HENT:      Nose: Rhinorrhea present  Right Sinus: Maxillary sinus tenderness present        Left Sinus: Maxillary sinus tenderness present  Cardiovascular:      Rate and Rhythm: Rhythm irregular  Heart sounds: Murmur heard  Pulmonary:      Breath sounds: Rhonchi present  Musculoskeletal:      Right lower leg: Edema present  Left lower leg: Edema present        Comments: Using walker

## 2022-05-14 LAB
M PNEUMO IGG SER IA-ACNC: 340 U/ML (ref 0–99)
M PNEUMO IGM SER IA-ACNC: <770 U/ML (ref 0–769)

## 2022-05-15 LAB
BACTERIA SPT RESP CULT: ABNORMAL
GRAM STN SPEC: ABNORMAL

## 2022-05-16 ENCOUNTER — TELEPHONE (OUTPATIENT)
Dept: FAMILY MEDICINE CLINIC | Facility: CLINIC | Age: 87
End: 2022-05-16

## 2022-05-16 DIAGNOSIS — J40 BRONCHITIS: ICD-10-CM

## 2022-05-16 RX ORDER — AZITHROMYCIN 250 MG/1
TABLET, FILM COATED ORAL
Qty: 6 TABLET | Refills: 0 | Status: SHIPPED | OUTPATIENT
Start: 2022-05-16 | End: 2022-05-21

## 2022-05-16 NOTE — TELEPHONE ENCOUNTER
Left message the mycoplasma was positive in that I have called in a 2nd Zithromax pack he is to complete the 1st back stay off 5 days and then begin the 2nd pack

## 2022-05-17 ENCOUNTER — TELEPHONE (OUTPATIENT)
Dept: FAMILY MEDICINE CLINIC | Facility: CLINIC | Age: 87
End: 2022-05-17

## 2022-05-17 DIAGNOSIS — R06.2 WHEEZING: Primary | ICD-10-CM

## 2022-05-17 RX ORDER — ALBUTEROL SULFATE 2.5 MG/3ML
2.5 SOLUTION RESPIRATORY (INHALATION) EVERY 6 HOURS PRN
Qty: 270 ML | Refills: 1 | Status: SHIPPED | OUTPATIENT
Start: 2022-05-17 | End: 2022-06-16

## 2022-05-17 NOTE — TELEPHONE ENCOUNTER
Patient did receive your message, he is on his last zithromax today and he is picking up the new one and will start it in 5 days  He is feeling a little better, temp still running appox 100 2 at night  Patients sugars are running approx 207 but the wife believes it may be from the medications  His wife has been giving him nebulizer treatments on her own and it is definitely helping him break up the mucous  She was wondering if she could either have a script to fill more albuterol or maybe a breo inhaler?

## 2022-05-23 ENCOUNTER — TELEPHONE (OUTPATIENT)
Dept: FAMILY MEDICINE CLINIC | Facility: CLINIC | Age: 87
End: 2022-05-23

## 2022-05-23 NOTE — TELEPHONE ENCOUNTER
[facility] PA dept needs a peer to peer done by 6:00pm tonight for the CT head patient had on 05/13/2022 - it was started on that day but never got the authorization to go through w/o a peer to peer    Phone # [phone]    Case # [id number]

## 2022-05-31 ENCOUNTER — APPOINTMENT (OUTPATIENT)
Dept: LAB | Facility: MEDICAL CENTER | Age: 87
End: 2022-05-31
Payer: COMMERCIAL

## 2022-05-31 ENCOUNTER — ANTICOAG VISIT (OUTPATIENT)
Dept: CARDIOLOGY CLINIC | Facility: CLINIC | Age: 87
End: 2022-05-31
Payer: COMMERCIAL

## 2022-05-31 DIAGNOSIS — E11.9 DIABETES MELLITUS TYPE 2 IN NONOBESE (HCC): ICD-10-CM

## 2022-05-31 DIAGNOSIS — Z79.01 LONG TERM (CURRENT) USE OF ANTICOAGULANTS: ICD-10-CM

## 2022-05-31 DIAGNOSIS — I48.19 PERSISTENT ATRIAL FIBRILLATION (HCC): ICD-10-CM

## 2022-05-31 DIAGNOSIS — I48.19 PERSISTENT ATRIAL FIBRILLATION (HCC): Primary | ICD-10-CM

## 2022-05-31 LAB
25(OH)D3 SERPL-MCNC: 49.4 NG/ML (ref 30–100)
ALBUMIN SERPL BCP-MCNC: 3.2 G/DL (ref 3.5–5)
ALP SERPL-CCNC: 82 U/L (ref 46–116)
ALT SERPL W P-5'-P-CCNC: 23 U/L (ref 12–78)
ANION GAP SERPL CALCULATED.3IONS-SCNC: 4 MMOL/L (ref 4–13)
AST SERPL W P-5'-P-CCNC: 17 U/L (ref 5–45)
BASOPHILS # BLD AUTO: 0.05 THOUSANDS/ΜL (ref 0–0.1)
BASOPHILS NFR BLD AUTO: 1 % (ref 0–1)
BILIRUB SERPL-MCNC: 0.67 MG/DL (ref 0.2–1)
BUN SERPL-MCNC: 25 MG/DL (ref 5–25)
CALCIUM ALBUM COR SERPL-MCNC: 9.9 MG/DL (ref 8.3–10.1)
CALCIUM SERPL-MCNC: 9.3 MG/DL (ref 8.3–10.1)
CHLORIDE SERPL-SCNC: 109 MMOL/L (ref 100–108)
CHOLEST SERPL-MCNC: 151 MG/DL
CO2 SERPL-SCNC: 27 MMOL/L (ref 21–32)
CREAT SERPL-MCNC: 1.17 MG/DL (ref 0.6–1.3)
EOSINOPHIL # BLD AUTO: 0.15 THOUSAND/ΜL (ref 0–0.61)
EOSINOPHIL NFR BLD AUTO: 2 % (ref 0–6)
ERYTHROCYTE [DISTWIDTH] IN BLOOD BY AUTOMATED COUNT: 13.8 % (ref 11.6–15.1)
EST. AVERAGE GLUCOSE BLD GHB EST-MCNC: 154 MG/DL
GFR SERPL CREATININE-BSD FRML MDRD: 56 ML/MIN/1.73SQ M
GLUCOSE P FAST SERPL-MCNC: 146 MG/DL (ref 65–99)
HBA1C MFR BLD: 7 %
HCT VFR BLD AUTO: 41.5 % (ref 36.5–49.3)
HDLC SERPL-MCNC: 41 MG/DL
HGB BLD-MCNC: 13 G/DL (ref 12–17)
IMM GRANULOCYTES # BLD AUTO: 0.03 THOUSAND/UL (ref 0–0.2)
IMM GRANULOCYTES NFR BLD AUTO: 0 % (ref 0–2)
INR PPP: 2.56 (ref 0.84–1.19)
LDLC SERPL CALC-MCNC: 93 MG/DL (ref 0–100)
LYMPHOCYTES # BLD AUTO: 1.53 THOUSANDS/ΜL (ref 0.6–4.47)
LYMPHOCYTES NFR BLD AUTO: 20 % (ref 14–44)
MCH RBC QN AUTO: 29 PG (ref 26.8–34.3)
MCHC RBC AUTO-ENTMCNC: 31.3 G/DL (ref 31.4–37.4)
MCV RBC AUTO: 92 FL (ref 82–98)
MONOCYTES # BLD AUTO: 0.65 THOUSAND/ΜL (ref 0.17–1.22)
MONOCYTES NFR BLD AUTO: 8 % (ref 4–12)
NEUTROPHILS # BLD AUTO: 5.38 THOUSANDS/ΜL (ref 1.85–7.62)
NEUTS SEG NFR BLD AUTO: 69 % (ref 43–75)
NRBC BLD AUTO-RTO: 0 /100 WBCS
PLATELET # BLD AUTO: 254 THOUSANDS/UL (ref 149–390)
PMV BLD AUTO: 10.9 FL (ref 8.9–12.7)
POTASSIUM SERPL-SCNC: 4.2 MMOL/L (ref 3.5–5.3)
PROT SERPL-MCNC: 6.8 G/DL (ref 6.4–8.2)
PROTHROMBIN TIME: 26.2 SECONDS (ref 11.6–14.5)
PSA SERPL-MCNC: 0.1 NG/ML (ref 0–4)
RBC # BLD AUTO: 4.49 MILLION/UL (ref 3.88–5.62)
SODIUM SERPL-SCNC: 140 MMOL/L (ref 136–145)
TRIGL SERPL-MCNC: 87 MG/DL
WBC # BLD AUTO: 7.79 THOUSAND/UL (ref 4.31–10.16)

## 2022-05-31 PROCEDURE — 84153 ASSAY OF PSA TOTAL: CPT

## 2022-05-31 PROCEDURE — 82306 VITAMIN D 25 HYDROXY: CPT

## 2022-05-31 PROCEDURE — 85025 COMPLETE CBC W/AUTO DIFF WBC: CPT

## 2022-05-31 PROCEDURE — 36415 COLL VENOUS BLD VENIPUNCTURE: CPT

## 2022-05-31 PROCEDURE — 85610 PROTHROMBIN TIME: CPT

## 2022-05-31 PROCEDURE — 80053 COMPREHEN METABOLIC PANEL: CPT

## 2022-05-31 PROCEDURE — 80061 LIPID PANEL: CPT

## 2022-05-31 PROCEDURE — 83036 HEMOGLOBIN GLYCOSYLATED A1C: CPT

## 2022-05-31 PROCEDURE — 93793 ANTICOAG MGMT PT WARFARIN: CPT | Performed by: NURSE PRACTITIONER

## 2022-05-31 NOTE — PATIENT INSTRUCTIONS
INR received from lab and reviewed  No changes needed; continue current dosing schedule  Recheck INR in one month    Results and instructions given to pt by phone  Josh Frost

## 2022-06-29 ENCOUNTER — ANTICOAG VISIT (OUTPATIENT)
Dept: CARDIOLOGY CLINIC | Facility: CLINIC | Age: 87
End: 2022-06-29
Payer: COMMERCIAL

## 2022-06-29 ENCOUNTER — APPOINTMENT (OUTPATIENT)
Dept: LAB | Facility: MEDICAL CENTER | Age: 87
End: 2022-06-29
Payer: COMMERCIAL

## 2022-06-29 DIAGNOSIS — Z79.01 LONG TERM (CURRENT) USE OF ANTICOAGULANTS: ICD-10-CM

## 2022-06-29 DIAGNOSIS — I48.19 PERSISTENT ATRIAL FIBRILLATION (HCC): Primary | ICD-10-CM

## 2022-06-29 DIAGNOSIS — I48.19 PERSISTENT ATRIAL FIBRILLATION (HCC): ICD-10-CM

## 2022-06-29 LAB
INR PPP: 2.84 (ref 0.84–1.19)
PROTHROMBIN TIME: 28.4 SECONDS (ref 11.6–14.5)

## 2022-06-29 PROCEDURE — 36415 COLL VENOUS BLD VENIPUNCTURE: CPT

## 2022-06-29 PROCEDURE — 93793 ANTICOAG MGMT PT WARFARIN: CPT | Performed by: PHYSICIAN ASSISTANT

## 2022-06-29 PROCEDURE — 85610 PROTHROMBIN TIME: CPT

## 2022-06-29 NOTE — PATIENT INSTRUCTIONS
LMAM to adjust dose and recheck in one month  Patient to call if questions, concerns or changes in medication health or diet    Hema Lindsey PA-C

## 2022-07-14 ENCOUNTER — TELEPHONE (OUTPATIENT)
Dept: FAMILY MEDICINE CLINIC | Facility: CLINIC | Age: 87
End: 2022-07-14

## 2022-07-14 DIAGNOSIS — R60.9 EDEMA, UNSPECIFIED TYPE: ICD-10-CM

## 2022-07-14 RX ORDER — FUROSEMIDE 20 MG/1
TABLET ORAL
Qty: 45 TABLET | Refills: 5 | Status: SHIPPED | OUTPATIENT
Start: 2022-07-14

## 2022-07-14 NOTE — TELEPHONE ENCOUNTER
Patient is wondering if it is ok to get a 2nd booster after having walking pneumonia, wife states he is well recovered but was just wondering

## 2022-07-30 ENCOUNTER — APPOINTMENT (OUTPATIENT)
Dept: LAB | Facility: MEDICAL CENTER | Age: 87
End: 2022-07-30
Payer: COMMERCIAL

## 2022-07-30 DIAGNOSIS — I48.19 PERSISTENT ATRIAL FIBRILLATION (HCC): ICD-10-CM

## 2022-07-30 DIAGNOSIS — Z79.01 LONG TERM (CURRENT) USE OF ANTICOAGULANTS: ICD-10-CM

## 2022-08-01 ENCOUNTER — ANTICOAG VISIT (OUTPATIENT)
Dept: CARDIOLOGY CLINIC | Facility: CLINIC | Age: 87
End: 2022-08-01
Payer: COMMERCIAL

## 2022-08-01 ENCOUNTER — RA CDI HCC (OUTPATIENT)
Dept: OTHER | Facility: HOSPITAL | Age: 87
End: 2022-08-01

## 2022-08-01 DIAGNOSIS — I48.19 PERSISTENT ATRIAL FIBRILLATION (HCC): Primary | ICD-10-CM

## 2022-08-01 DIAGNOSIS — Z79.01 LONG TERM (CURRENT) USE OF ANTICOAGULANTS: ICD-10-CM

## 2022-08-01 PROCEDURE — 93793 ANTICOAG MGMT PT WARFARIN: CPT | Performed by: PHYSICIAN ASSISTANT

## 2022-08-01 NOTE — PROGRESS NOTES
[facility]  coding opportunities       Chart reviewed, no opportunity found:   [address]        Patients Insurance     Medicare Insurance: [employer]age

## 2022-08-01 NOTE — PATIENT INSTRUCTIONS
Spoke with Patient: No changes in medication health or diet  No missed or extra doses  No s/s of bleeding TIA or CVA  No new ABX, NSAIDs OTC meds, or supplements  Dose as instructed and recheck in two weeks     Daniela Zepeda PA-C

## 2022-08-08 ENCOUNTER — OFFICE VISIT (OUTPATIENT)
Dept: FAMILY MEDICINE CLINIC | Facility: CLINIC | Age: 87
End: 2022-08-08
Payer: COMMERCIAL

## 2022-08-08 VITALS
HEIGHT: 71 IN | BODY MASS INDEX: 32.2 KG/M2 | HEART RATE: 72 BPM | TEMPERATURE: 96.8 F | WEIGHT: 230 LBS | SYSTOLIC BLOOD PRESSURE: 126 MMHG | DIASTOLIC BLOOD PRESSURE: 84 MMHG | RESPIRATION RATE: 16 BRPM

## 2022-08-08 DIAGNOSIS — H25.9 SENILE CATARACT OF LEFT EYE, UNSPECIFIED AGE-RELATED CATARACT TYPE: ICD-10-CM

## 2022-08-08 DIAGNOSIS — I11.0 HYPERTENSIVE HEART DISEASE WITH HEART FAILURE (HCC): ICD-10-CM

## 2022-08-08 DIAGNOSIS — Z79.01 LONG TERM (CURRENT) USE OF ANTICOAGULANTS: ICD-10-CM

## 2022-08-08 DIAGNOSIS — N18.31 STAGE 3A CHRONIC KIDNEY DISEASE (HCC): ICD-10-CM

## 2022-08-08 DIAGNOSIS — I48.19 PERSISTENT ATRIAL FIBRILLATION (HCC): ICD-10-CM

## 2022-08-08 DIAGNOSIS — Z01.818 PREOPERATIVE CLEARANCE: Primary | ICD-10-CM

## 2022-08-08 DIAGNOSIS — I50.32 CHRONIC DIASTOLIC CONGESTIVE HEART FAILURE (HCC): ICD-10-CM

## 2022-08-08 DIAGNOSIS — I87.2 CHRONIC VENOUS INSUFFICIENCY: ICD-10-CM

## 2022-08-08 DIAGNOSIS — I73.9 PERIPHERAL ARTERIAL DISEASE (HCC): ICD-10-CM

## 2022-08-08 PROCEDURE — 99214 OFFICE O/P EST MOD 30 MIN: CPT | Performed by: FAMILY MEDICINE

## 2022-08-08 NOTE — PROGRESS NOTES
Assessment/Plan:  The patient is cleared for left cataract surgery    Persistent atrial fibrillation anticoagulated with Coumadin    Hypertensive cardiovascular disease with blood pressure controlled on the current regimen    Chronic diastolic congestive heart failure treated with Lasix 20 mg February 9th cardiology notes reviewed and appreciated  June 1, 2021 echo reviewed    Chronic venous insufficiency bilaterally the patient wears compression stockings    Diabetes mellitus type 2 laboratories pending    Peripheral arterial disease on arterial screening severe right arterial narrowing the plan is to follow through with a Doppler of the right lower extremity  Problem List Items Addressed This Visit        Cardiovascular and Mediastinum    Persistent atrial fibrillation (HCC)    Chronic venous insufficiency    Chronic diastolic congestive heart failure ([address] )    Hypertensive heart disease with heart failure ([address] )       Other    Long term (current) use of anticoagulants      Other Visit Diagnoses     Preoperative clearance    -  Primary    Senile cataract of left eye, unspecified age-related cataract type        Stage 3a chronic kidney disease ([address] )        Peripheral arterial disease ([address] )        Relevant Orders    VAS lower limb arterial duplex, limited/unilateral           Diagnoses and all orders for this visit:    Preoperative clearance    Senile cataract of left eye, unspecified age-related cataract type    Persistent atrial fibrillation ([address] )    Long term (current) use of anticoagulants    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)    Chronic diastolic congestive heart failure ([address] )    Chronic venous insufficiency    Stage 3a chronic kidney disease ([address] )    Peripheral arterial disease (HCC)  -     VAS lower limb arterial duplex, limited/unilateral; Future      No problem-specific Assessment & Plan notes found for this encounter        PHQ-2/9 Depression Screening            Body mass index is 32 08 kg/m²  BMI Counseling: Body mass index is 32 08 kg/m²  The BMI     Subjective:      Patient ID: Natacha Luke is a 80 y o  male  Patient presents for preoperative clearance for left cataract surgery      The following portions of the patient's history were reviewed and updated as appropriate:   He has a past medical history of Anxiety, Atrial fibrillation (HCC), Atrial flutter ([facility] ), Congestive heart failure (CHF) ([facility] ), COPD [facility]chronic obstructive [facility] disease) ([facility] ), Coronary artery disease, DVT (deep venous thrombosis) ([facility] ), ED (erectile dysfunction), Hearing loss, History of echocardiogram (04/05/2018), radiation therapy, Hypercholesterolemia, Hyperlipidemia, Hypertension, Long term (current) use of anticoagulants (8/21/2018), Neuropathy of both feet, Peripheral neuropathy, Prostate cancer ([facility] ), and Type 2 diabetes mellitus ([facility] )  ,  does not have any pertinent problems on file  ,   has a past surgical history that includes Cardiac catheterization (01/15/1988); Colonoscopy (10/05/2017); and Prostate surgery  ,  family history includes Cancer in his brother and other; Colon cancer in his brother and other; Heart disease in his other; Hypertension in his other  ,   reports that he has never smoked  He has never used smokeless tobacco  He reports previous alcohol use  He reports that he does not use drugs  ,  has No Known Allergies     Current Outpatient Medications   Medication Sig Dispense Refill    atorvastatin (LIPITOR) 40 mg tablet TAKE 1 TABLET BY MOUTH DAILY 90 tablet 5    cyclobenzaprine (FLEXERIL) 10 mg tablet Take 1 tablet (10 mg total) by mouth 3 (three) times a day as needed for muscle spasms 30 tablet 0    famotidine (PEPCID) 20 mg tablet Take 1 tablet (20 mg total) by mouth in the morning and 1 tablet (20 mg total) in the evening   60 tablet 1    furosemide (LASIX) 20 mg tablet TAKE 1 TABLET BY MOUTH 3 DAYS WEEKLY 45 tablet 5    metFORMIN (GLUCOPHAGE) 500 mg tablet TAKE 1 TABLET BY MOUTH DAILY WITH BREAKFAST 90 tablet 1    methylPREDNISolone 4 MG tablet therapy pack Use as directed on package (Patient not taking: Reported on 5/13/2022) 21 each 0    metoprolol succinate (TOPROL-XL) 50 mg 24 hr tablet TAKE 1 TABLET BY MOUTH TWICE DAILY 180 tablet 5    warfarin (COUMADIN) 4 mg tablet TAKE 2 TABLETS BY MOUTH DAILY OR AS DIRECTED 180 tablet 5     No current facility-administered medications for this visit  Review of Systems   Constitutional: Negative for chills and fever  HENT: Negative for ear pain and sore throat  Eyes: Negative for pain and visual disturbance  Respiratory: Negative for cough and shortness of breath  Cardiovascular: Negative for chest pain and palpitations  Gastrointestinal: Negative for abdominal pain and vomiting  Genitourinary: Negative for dysuria and hematuria  Musculoskeletal: Negative for arthralgias and back pain  Skin: Negative for color change and rash  Neurological: Positive for numbness  Negative for seizures and syncope  Bilateral peripheral neuropathy   All other systems reviewed and are negative  Objective:    /84   Pulse 72   Temp (!) 96 8 °F (36 °C)   Resp 16   Ht 5' 11" (1 803 m)   Wt 104 kg (230 lb)   BMI 32 08 kg/m²   Body mass index is 32 08 kg/m²  Physical Exam  Constitutional:       Appearance: He is well-developed  HENT:      Head: Normocephalic  Eyes:      Pupils: Pupils are equal, round, and reactive to light  Cardiovascular:      Rate and Rhythm: Normal rate and regular rhythm  Heart sounds: Normal heart sounds  Pulmonary:      Effort: Pulmonary effort is normal       Breath sounds: Normal breath sounds  Abdominal:      General: Bowel sounds are normal       Palpations: Abdomen is soft  Tenderness: There is no abdominal tenderness  Musculoskeletal:      Cervical back: Normal range of motion  Skin:     General: Skin is warm     Neurological:      Mental Status: He is alert and oriented to person, place, and time

## 2022-08-11 ENCOUNTER — OFFICE VISIT (OUTPATIENT)
Dept: CARDIOLOGY CLINIC | Facility: CLINIC | Age: 87
End: 2022-08-11
Payer: COMMERCIAL

## 2022-08-11 VITALS
WEIGHT: 231 LBS | BODY MASS INDEX: 30.62 KG/M2 | SYSTOLIC BLOOD PRESSURE: 110 MMHG | HEART RATE: 56 BPM | DIASTOLIC BLOOD PRESSURE: 60 MMHG | HEIGHT: 73 IN

## 2022-08-11 DIAGNOSIS — I48.20 CHRONIC ATRIAL FIBRILLATION (HCC): Primary | ICD-10-CM

## 2022-08-11 DIAGNOSIS — E78.2 MIXED HYPERLIPIDEMIA: ICD-10-CM

## 2022-08-11 DIAGNOSIS — I50.32 CHRONIC DIASTOLIC CONGESTIVE HEART FAILURE (HCC): ICD-10-CM

## 2022-08-11 DIAGNOSIS — I11.0 HYPERTENSIVE HEART DISEASE WITH HEART FAILURE (HCC): ICD-10-CM

## 2022-08-11 PROCEDURE — 1160F RVW MEDS BY RX/DR IN RCRD: CPT | Performed by: INTERNAL MEDICINE

## 2022-08-11 PROCEDURE — 99214 OFFICE O/P EST MOD 30 MIN: CPT | Performed by: INTERNAL MEDICINE

## 2022-08-11 NOTE — PATIENT INSTRUCTIONS
A-fib (Atrial Fibrillation)   AMBULATORY CARE:   Atrial fibrillation (A-fib)  is an irregular heartbeat  It reduces your heart's ability to pump blood through your body  A-fib may come and go, or it may be a long-term condition  A-fib can cause life-threatening blood clots, stroke, or heart failure  It is important to treat and manage A-fib to help prevent these problems  Common signs and symptoms include the following:   A heartbeat that races, pounds, or flutters    Weakness, severe tiredness, or confusion    Feeling lightheaded, sweaty, dizzy, or faint    Shortness of breath or anxiety    Chest pain or pressure    Call your local emergency number (911 in the [address] [facility]) or have someone call if:   You have any of the following signs of a heart attack:      Squeezing, pressure, or pain in your chest    You may  also have any of the following:     Discomfort or pain in your back, neck, jaw, stomach, or arm    Shortness of breath    Nausea or vomiting    Lightheadedness or a sudden cold sweat    You have any of the following signs of a stroke:      Numbness or drooping on one side of your face     Weakness in an arm or leg    Confusion or difficulty speaking    Dizziness, a severe headache, or vision loss    Call your doctor or cardiologist if:   Your arm or leg feels warm, tender, and painful  It may look swollen and red  Your heart rate is more than 110 beats per minute  You have new or worsening swelling in your legs, feet, ankles, or abdomen  You are short of breath, even at rest     You have questions or concerns about your condition or care  Treatment for A-fib:  Conditions that cause A-fib, such as thyroid disease, will be treated  You may also need any of the following:  Heart medicines  help control your heart rate or rhythm  You may need more than one medicine to treat your symptoms  Antiplatelet or blood thinner medicines  help prevent blood clots and stroke      Cardioversion  is a procedure to return your heart rate and rhythm to normal  It can be done using medicines or electric shock  A-fib ablation  is a procedure that uses energy to burn a small area of heart tissue  This creates scar tissue and prevents electrical signals that cause A-fib  You may need this procedure more than once  Ask for more information on A-fib ablation  A pacemaker  may be inserted into your heart  A pacemaker is a device that controls your heartbeat  A pacemaker may be inserted during an ablation procedure or surgery  Ask your healthcare provider for more information on pacemakers  Surgery  may be needed if other procedures do not work  During surgery your healthcare provider will make cuts in the upper part of your heart  The provider will stitch the cuts together to create scar tissue  The scar tissue will prevent electrical signals that cause A-fib  Manage A-fib:   Know your target heart rate  Learn how to check your pulse and monitor your heart rate  Know the risks if you choose to drink alcohol  Alcohol can increase your risk for A-fib or make A-fib harder to manage  Ask your healthcare provider if it is okay for you to drink any alcohol  He or she can help you set limits for the number of drinks you have in 24 hours and in a week  A drink of alcohol is 12 ounces of beer, 5 ounces of wine, or 1½ ounces of liquor  Do not smoke  Nicotine can cause heart damage and make it more difficult to manage your A-fib  Do not use e-cigarettes or smokeless tobacco in place of cigarettes or to help you quit  They still contain nicotine  Ask your healthcare provider for information if you currently smoke and need help quitting  Eat heart-healthy foods  Heart healthy foods will help keep your cholesterol low  These include fruits, vegetables, whole-grain breads, low-fat dairy products, beans, lean meats, and fish  Replace butter and margarine with heart-healthy oils such as olive oil and canola oil  Maintain a healthy weight  Ask your healthcare provider what a healthy weight is for you  Ask him or her to help you create a safe weight loss plan if you are overweight  Even a small goal of a 10% weight loss can improve your heart health  Get regular physical activity  Physical activity helps improve your heart health  Get at least 150 minutes of moderate aerobic physical activity each week  Your healthcare provider can help you create an activity plan  Manage other health conditions  This includes high blood pressure or cholesterol, sleep apnea, diabetes, and other heart conditions  Take medicine as directed and follow your treatment plan  Your healthcare provider may need to change a medicine you are taking if it is causing your A-fib  Do not  stop taking any medicine unless directed by your provider  Follow up with your doctor or cardiologist as directed: You will need regular blood tests and monitoring  Write down your questions so you remember to ask them during your visits  © Copyright [employer] 2022 Information is for End User's use only and may not be sold, redistributed or otherwise used for commercial purposes  All illustrations and images included in CareNotes® are the copyrighted property of A D A M [employer] Inc  or [facility] Jose A Carreno   The above information is an  only  It is not intended as medical advice for individual conditions or treatments  Talk to your doctor, nurse or pharmacist before following any medical regimen to see if it is safe and effective for you

## 2022-08-11 NOTE — PROGRESS NOTES
Subjective:        Patient ID: Christel Das is a 80 y o  male  Chief Complaint:  Roya Jaramillo is here for routine follow-up for chronic atrial fibrillation, feels well offers no cardiac complaints  Getting some arterial lower extremity Doppler studies done but he has no claudication, has no lower extremity wound healing issues, has chronic neuropathy/paresthesias/numbness  Denies any chest pain shortness of breath palpitations presyncope syncope TIA or claudication like symptoms  No falls or bleeding issues  The following portions of the patient's history were reviewed and updated as appropriate: allergies, current medications, past family history, past medical history, past social history, past surgical history and problem list   Review of Systems   Constitutional: Negative for chills, diaphoresis, malaise/fatigue and weight gain  HENT: Negative for nosebleeds and stridor  Eyes: Negative for double vision, vision loss in left eye, vision loss in right eye and visual disturbance  Cardiovascular: Negative for chest pain, claudication, cyanosis, dyspnea on exertion, irregular heartbeat, leg swelling, near-syncope, orthopnea, palpitations, paroxysmal nocturnal dyspnea and syncope  Respiratory: Negative for cough, shortness of breath, snoring and wheezing  Endocrine: Negative for polydipsia, polyphagia and polyuria  Hematologic/Lymphatic: Negative for bleeding problem  Does not bruise/bleed easily  Skin: Negative for flushing and rash  Musculoskeletal: Negative for falls and myalgias  Gastrointestinal: Negative for abdominal pain, heartburn, hematemesis, hematochezia, melena and nausea  Genitourinary: Negative for hematuria  Neurological: Positive for numbness and paresthesias  Negative for brief paralysis, dizziness, focal weakness, headaches, light-headedness, loss of balance and vertigo  Psychiatric/Behavioral: Negative for altered mental status and substance abuse  Allergic/Immunologic: Negative for hives  Objective:      /60   Pulse 56   Ht 6' 1" (1 854 m)   Wt 105 kg (231 lb)   BMI 30 48 kg/m²   Physical Exam  Constitutional:       General: He is not in acute distress  Appearance: He is well-developed  He is not diaphoretic  HENT:      Head: Normocephalic and atraumatic  Eyes:      General: No scleral icterus  Pupils: Pupils are equal, round, and reactive to light  Neck:      Thyroid: No thyromegaly  Vascular: No carotid bruit or JVD  Cardiovascular:      Rate and Rhythm: Normal rate  Rhythm irregular  Heart sounds: Normal heart sounds  No murmur heard  No friction rub  No gallop  Pulmonary:      Effort: Pulmonary effort is normal  No respiratory distress  Breath sounds: Normal breath sounds  No stridor  No wheezing or rales  Abdominal:      General: Bowel sounds are normal  There is no distension  Palpations: Abdomen is soft  There is no mass  Tenderness: There is no abdominal tenderness  Musculoskeletal:         General: No deformity  Normal range of motion  Cervical back: Normal range of motion and neck supple  Right lower leg: Edema present  Left lower leg: Edema (Trace bilateral below knees with support stockings on) present  Skin:     General: Skin is warm and dry  Coloration: Skin is not pale  Findings: No erythema  Neurological:      Mental Status: He is alert and oriented to person, place, and time  Coordination: Coordination normal    Psychiatric:         Mood and Affect: Mood normal          Behavior: Behavior normal          Lab Review:   Transcribe Orders on 07/30/2022   Component Date Value    Protime 07/30/2022 20 4 (A)    INR 07/30/2022 1 72 (A)   Appointment on 06/29/2022   Component Date Value    Protime 06/29/2022 28 4 (A)    INR 06/29/2022 2 84 (A)     No results found  Assessment:       1  Chronic atrial fibrillation ([facility] )     2   Chronic diastolic congestive heart failure ([facility] )     3  Hypertensive heart disease with heart failure ([facility] )     4  Mixed hyperlipidemia          Plan:       Dany Low has no signs or symptoms reminiscent of angina heart failure nor electrical instability/dysrhythmia  AFib is rate controlled and anticoagulated, goal INR 2 0-3 0, he is in our Coumadin Clinic  Blood pressure is well controlled,  Continue metoprolol  Lipids reasonably well controlled, no myalgias, continue atorvastatin  He is euvolemic continue Lasix 3 days per week and support stockings  I made no changes today, ordered no cardiac testing acutely, asked him to come back and see me in 6 months, asked him to call me sooner with any concerning potential cardiac symptoms in the meantime

## 2022-08-16 ENCOUNTER — APPOINTMENT (OUTPATIENT)
Dept: LAB | Facility: MEDICAL CENTER | Age: 87
End: 2022-08-16
Payer: COMMERCIAL

## 2022-08-16 ENCOUNTER — ANTICOAG VISIT (OUTPATIENT)
Dept: CARDIOLOGY CLINIC | Facility: CLINIC | Age: 87
End: 2022-08-16
Payer: COMMERCIAL

## 2022-08-16 DIAGNOSIS — Z79.01 LONG TERM (CURRENT) USE OF ANTICOAGULANTS: ICD-10-CM

## 2022-08-16 DIAGNOSIS — I48.19 PERSISTENT ATRIAL FIBRILLATION (HCC): Primary | ICD-10-CM

## 2022-08-16 DIAGNOSIS — I48.19 PERSISTENT ATRIAL FIBRILLATION (HCC): ICD-10-CM

## 2022-08-16 LAB
INR PPP: 2.92 (ref 0.84–1.19)
PROTHROMBIN TIME: 30.8 SECONDS (ref 11.6–14.5)

## 2022-08-16 PROCEDURE — 36415 COLL VENOUS BLD VENIPUNCTURE: CPT

## 2022-08-16 PROCEDURE — 93793 ANTICOAG MGMT PT WARFARIN: CPT | Performed by: NURSE PRACTITIONER

## 2022-08-16 PROCEDURE — 85610 PROTHROMBIN TIME: CPT

## 2022-08-16 NOTE — PATIENT INSTRUCTIONS
INR received from lab and reviewed  No changes needed; continue current dosing schedule    Recheck INR in 2 weeks-instructions given to pt by phone   Josh Ferreira

## 2022-08-25 ENCOUNTER — HOSPITAL ENCOUNTER (OUTPATIENT)
Dept: NON INVASIVE DIAGNOSTICS | Facility: HOSPITAL | Age: 87
Discharge: HOME | End: 2022-08-25
Attending: FAMILY MEDICINE
Payer: COMMERCIAL

## 2022-08-25 DIAGNOSIS — I73.9 PERIPHERAL ARTERIAL DISEASE (HCC): ICD-10-CM

## 2022-08-25 PROCEDURE — 93926 LOWER EXTREMITY STUDY: CPT | Performed by: SURGERY

## 2022-08-25 PROCEDURE — 93926 LOWER EXTREMITY STUDY: CPT

## 2022-08-25 PROCEDURE — 93922 UPR/L XTREMITY ART 2 LEVELS: CPT | Performed by: SURGERY

## 2022-08-26 ENCOUNTER — TELEPHONE (OUTPATIENT)
Dept: FAMILY MEDICINE CLINIC | Facility: CLINIC | Age: 87
End: 2022-08-26

## 2022-08-30 ENCOUNTER — ANTICOAG VISIT (OUTPATIENT)
Dept: CARDIOLOGY CLINIC | Facility: CLINIC | Age: 87
End: 2022-08-30
Payer: COMMERCIAL

## 2022-08-30 ENCOUNTER — APPOINTMENT (OUTPATIENT)
Dept: LAB | Facility: MEDICAL CENTER | Age: 87
End: 2022-08-30
Payer: COMMERCIAL

## 2022-08-30 DIAGNOSIS — I48.19 PERSISTENT ATRIAL FIBRILLATION (HCC): Primary | ICD-10-CM

## 2022-08-30 DIAGNOSIS — Z79.01 LONG TERM (CURRENT) USE OF ANTICOAGULANTS: ICD-10-CM

## 2022-08-30 DIAGNOSIS — I48.19 PERSISTENT ATRIAL FIBRILLATION (HCC): ICD-10-CM

## 2022-08-30 LAB
INR PPP: 2.61 (ref 0.84–1.19)
PROTHROMBIN TIME: 28.2 SECONDS (ref 11.6–14.5)

## 2022-08-30 PROCEDURE — 93793 ANTICOAG MGMT PT WARFARIN: CPT | Performed by: NURSE PRACTITIONER

## 2022-08-30 PROCEDURE — 36415 COLL VENOUS BLD VENIPUNCTURE: CPT

## 2022-08-30 PROCEDURE — 85610 PROTHROMBIN TIME: CPT

## 2022-08-30 NOTE — PATIENT INSTRUCTIONS
INR received from lab and reviewed  No changes needed; continue current dosing schedule    Recheck INR in one month-instructions given to patient by phone  Tres Guerrero

## 2022-09-13 ENCOUNTER — TELEPHONE (OUTPATIENT)
Dept: FAMILY MEDICINE CLINIC | Facility: CLINIC | Age: 87
End: 2022-09-13

## 2022-09-13 DIAGNOSIS — N18.31 STAGE 3A CHRONIC KIDNEY DISEASE (HCC): ICD-10-CM

## 2022-09-13 DIAGNOSIS — E78.5 HYPERLIPIDEMIA, UNSPECIFIED HYPERLIPIDEMIA TYPE: ICD-10-CM

## 2022-09-13 DIAGNOSIS — I10 ESSENTIAL HYPERTENSION: ICD-10-CM

## 2022-09-13 DIAGNOSIS — I11.0 HYPERTENSIVE HEART DISEASE WITH HEART FAILURE (HCC): Primary | ICD-10-CM

## 2022-09-13 DIAGNOSIS — E11.9 DIABETES MELLITUS TYPE 2 IN NONOBESE (HCC): ICD-10-CM

## 2022-09-13 DIAGNOSIS — E55.9 VITAMIN D DEFICIENCY: ICD-10-CM

## 2022-09-14 ENCOUNTER — APPOINTMENT (OUTPATIENT)
Dept: LAB | Facility: MEDICAL CENTER | Age: 87
End: 2022-09-14
Payer: COMMERCIAL

## 2022-09-14 DIAGNOSIS — I11.0 HYPERTENSIVE HEART DISEASE WITH HEART FAILURE (HCC): ICD-10-CM

## 2022-09-14 DIAGNOSIS — N18.31 STAGE 3A CHRONIC KIDNEY DISEASE (HCC): ICD-10-CM

## 2022-09-14 DIAGNOSIS — I10 ESSENTIAL HYPERTENSION: ICD-10-CM

## 2022-09-14 DIAGNOSIS — E11.9 DIABETES MELLITUS TYPE 2 IN NONOBESE (HCC): ICD-10-CM

## 2022-09-14 DIAGNOSIS — E78.5 HYPERLIPIDEMIA, UNSPECIFIED HYPERLIPIDEMIA TYPE: ICD-10-CM

## 2022-09-14 DIAGNOSIS — E55.9 VITAMIN D DEFICIENCY: ICD-10-CM

## 2022-09-14 LAB
25(OH)D3 SERPL-MCNC: 47.6 NG/ML (ref 30–100)
ALBUMIN SERPL BCP-MCNC: 3.4 G/DL (ref 3.5–5)
ALP SERPL-CCNC: 69 U/L (ref 46–116)
ALT SERPL W P-5'-P-CCNC: 22 U/L (ref 12–78)
ANION GAP SERPL CALCULATED.3IONS-SCNC: 3 MMOL/L (ref 4–13)
AST SERPL W P-5'-P-CCNC: 9 U/L (ref 5–45)
BASOPHILS # BLD AUTO: 0.04 THOUSANDS/ΜL (ref 0–0.1)
BASOPHILS NFR BLD AUTO: 1 % (ref 0–1)
BILIRUB SERPL-MCNC: 0.8 MG/DL (ref 0.2–1)
BUN SERPL-MCNC: 21 MG/DL (ref 5–25)
CALCIUM ALBUM COR SERPL-MCNC: 9.8 MG/DL (ref 8.3–10.1)
CALCIUM SERPL-MCNC: 9.3 MG/DL (ref 8.3–10.1)
CHLORIDE SERPL-SCNC: 108 MMOL/L (ref 96–108)
CHOLEST SERPL-MCNC: 139 MG/DL
CO2 SERPL-SCNC: 26 MMOL/L (ref 21–32)
CREAT SERPL-MCNC: 1.19 MG/DL (ref 0.6–1.3)
EOSINOPHIL # BLD AUTO: 0.14 THOUSAND/ΜL (ref 0–0.61)
EOSINOPHIL NFR BLD AUTO: 2 % (ref 0–6)
ERYTHROCYTE [DISTWIDTH] IN BLOOD BY AUTOMATED COUNT: 14.1 % (ref 11.6–15.1)
GFR SERPL CREATININE-BSD FRML MDRD: 54 ML/MIN/1.73SQ M
GLUCOSE P FAST SERPL-MCNC: 141 MG/DL (ref 65–99)
HCT VFR BLD AUTO: 41.9 % (ref 36.5–49.3)
HDLC SERPL-MCNC: 37 MG/DL
HGB BLD-MCNC: 13.4 G/DL (ref 12–17)
IMM GRANULOCYTES # BLD AUTO: 0.04 THOUSAND/UL (ref 0–0.2)
IMM GRANULOCYTES NFR BLD AUTO: 1 % (ref 0–2)
LDLC SERPL CALC-MCNC: 81 MG/DL (ref 0–100)
LYMPHOCYTES # BLD AUTO: 1.66 THOUSANDS/ΜL (ref 0.6–4.47)
LYMPHOCYTES NFR BLD AUTO: 22 % (ref 14–44)
MCH RBC QN AUTO: 29.2 PG (ref 26.8–34.3)
MCHC RBC AUTO-ENTMCNC: 32 G/DL (ref 31.4–37.4)
MCV RBC AUTO: 91 FL (ref 82–98)
MONOCYTES # BLD AUTO: 0.71 THOUSAND/ΜL (ref 0.17–1.22)
MONOCYTES NFR BLD AUTO: 10 % (ref 4–12)
NEUTROPHILS # BLD AUTO: 4.82 THOUSANDS/ΜL (ref 1.85–7.62)
NEUTS SEG NFR BLD AUTO: 64 % (ref 43–75)
NONHDLC SERPL-MCNC: 102 MG/DL
NRBC BLD AUTO-RTO: 0 /100 WBCS
PLATELET # BLD AUTO: 157 THOUSANDS/UL (ref 149–390)
PMV BLD AUTO: 11 FL (ref 8.9–12.7)
POTASSIUM SERPL-SCNC: 4.6 MMOL/L (ref 3.5–5.3)
PROT SERPL-MCNC: 6.6 G/DL (ref 6.4–8.4)
RBC # BLD AUTO: 4.59 MILLION/UL (ref 3.88–5.62)
SODIUM SERPL-SCNC: 137 MMOL/L (ref 135–147)
TRIGL SERPL-MCNC: 105 MG/DL
WBC # BLD AUTO: 7.41 THOUSAND/UL (ref 4.31–10.16)

## 2022-09-14 PROCEDURE — 82306 VITAMIN D 25 HYDROXY: CPT

## 2022-09-14 PROCEDURE — 80053 COMPREHEN METABOLIC PANEL: CPT

## 2022-09-14 PROCEDURE — 80061 LIPID PANEL: CPT

## 2022-09-14 PROCEDURE — 85025 COMPLETE CBC W/AUTO DIFF WBC: CPT

## 2022-09-14 PROCEDURE — 83036 HEMOGLOBIN GLYCOSYLATED A1C: CPT

## 2022-09-14 PROCEDURE — 36415 COLL VENOUS BLD VENIPUNCTURE: CPT

## 2022-09-15 ENCOUNTER — OFFICE VISIT (OUTPATIENT)
Dept: FAMILY MEDICINE CLINIC | Facility: CLINIC | Age: 87
End: 2022-09-15
Payer: COMMERCIAL

## 2022-09-15 VITALS
WEIGHT: 234 LBS | TEMPERATURE: 96.9 F | SYSTOLIC BLOOD PRESSURE: 126 MMHG | RESPIRATION RATE: 20 BRPM | DIASTOLIC BLOOD PRESSURE: 74 MMHG | HEART RATE: 84 BPM | BODY MASS INDEX: 31.01 KG/M2 | HEIGHT: 73 IN

## 2022-09-15 DIAGNOSIS — I87.2 CHRONIC VENOUS INSUFFICIENCY: ICD-10-CM

## 2022-09-15 DIAGNOSIS — I48.19 PERSISTENT ATRIAL FIBRILLATION (HCC): ICD-10-CM

## 2022-09-15 DIAGNOSIS — I50.32 CHRONIC DIASTOLIC CONGESTIVE HEART FAILURE (HCC): ICD-10-CM

## 2022-09-15 DIAGNOSIS — Z23 NEED FOR VACCINATION: ICD-10-CM

## 2022-09-15 DIAGNOSIS — Z85.46 HISTORY OF PROSTATE CANCER: ICD-10-CM

## 2022-09-15 DIAGNOSIS — N18.31 STAGE 3A CHRONIC KIDNEY DISEASE (HCC): ICD-10-CM

## 2022-09-15 DIAGNOSIS — E11.9 DIABETES MELLITUS TYPE 2 IN NONOBESE (HCC): ICD-10-CM

## 2022-09-15 DIAGNOSIS — Z00.00 MEDICARE ANNUAL WELLNESS VISIT, SUBSEQUENT: Primary | ICD-10-CM

## 2022-09-15 DIAGNOSIS — I73.9 PERIPHERAL ARTERIAL DISEASE (HCC): ICD-10-CM

## 2022-09-15 DIAGNOSIS — Z79.01 LONG TERM (CURRENT) USE OF ANTICOAGULANTS: ICD-10-CM

## 2022-09-15 DIAGNOSIS — I11.0 HYPERTENSIVE HEART DISEASE WITH HEART FAILURE (HCC): ICD-10-CM

## 2022-09-15 LAB
EST. AVERAGE GLUCOSE BLD GHB EST-MCNC: 157 MG/DL
HBA1C MFR BLD: 7.1 %

## 2022-09-15 PROCEDURE — 3288F FALL RISK ASSESSMENT DOCD: CPT | Performed by: FAMILY MEDICINE

## 2022-09-15 PROCEDURE — 1125F AMNT PAIN NOTED PAIN PRSNT: CPT | Performed by: FAMILY MEDICINE

## 2022-09-15 PROCEDURE — 1003F LEVEL OF ACTIVITY ASSESS: CPT | Performed by: FAMILY MEDICINE

## 2022-09-15 PROCEDURE — 1160F RVW MEDS BY RX/DR IN RCRD: CPT | Performed by: FAMILY MEDICINE

## 2022-09-15 PROCEDURE — 1101F PT FALLS ASSESS-DOCD LE1/YR: CPT | Performed by: FAMILY MEDICINE

## 2022-09-15 PROCEDURE — 1170F FXNL STATUS ASSESSED: CPT | Performed by: FAMILY MEDICINE

## 2022-09-15 PROCEDURE — 3725F SCREEN DEPRESSION PERFORMED: CPT | Performed by: FAMILY MEDICINE

## 2022-09-15 PROCEDURE — 99214 OFFICE O/P EST MOD 30 MIN: CPT | Performed by: FAMILY MEDICINE

## 2022-09-15 PROCEDURE — G0008 ADMIN INFLUENZA VIRUS VAC: HCPCS

## 2022-09-15 PROCEDURE — 90662 IIV NO PRSV INCREASED AG IM: CPT

## 2022-09-15 PROCEDURE — G0439 PPPS, SUBSEQ VISIT: HCPCS | Performed by: FAMILY MEDICINE

## 2022-09-15 NOTE — PROGRESS NOTES
Assessment and Plan:  Persistent atrial fibrillation anticoagulated with Coumadin    Hypertensive cardiovascular disease with blood pressure controlled on the current regimen    Chronic diastolic congestive heart failure treated with Lasix 20 mg echocardiogram reviewed showing a calcified mitral valve mild concentric hypertrophy    Diabetes mellitus type 2 well controlled with hemoglobin A1c of 7 1    Peripheral arterial disease with severe right arterial narrowing of the right lower extremity at baseline    History of prostate cancer currently in remission    Chronic venous insufficiency with venous hypertension the patient wears compression stockings    Hypercholesterolemia on atorvastatin 40 mg with total cholesterol of 139 triglycerides at 1:05 a m  HDL at 37 and LDL at 81     Problem List Items Addressed This Visit        Cardiovascular and Mediastinum    Persistent atrial fibrillation (HCC)    Chronic venous insufficiency    Chronic diastolic congestive heart failure (HCC)    Hypertensive heart disease with heart failure ([place] [address] )       Other    Long term (current) use of anticoagulants      Other Visit Diagnoses     Medicare annual wellness visit, subsequent    -  Primary    Diabetes mellitus type 2 in nonobese ([facility] )        Stage 3a chronic kidney disease ([place] [address] )        Peripheral arterial disease ([place] [address] )        History of prostate cancer        Need for vaccination        Relevant Orders    influenza vaccine, high-dose, PF 0 7 mL (FLUZONE HIGH-DOSE) (Completed)        BMI Counseling: Body mass index is 30 87 kg/m²  The BMI is above normal  Nutrition recommendations include decreasing portion sizes, encouraging healthy choices of fruits and vegetables, decreasing fast food intake, consuming healthier snacks, limiting drinks that contain sugar, moderation in carbohydrate intake, increasing intake of lean protein, reducing intake of saturated and trans fat and reducing intake of cholesterol   Exercise recommendations include moderate physical activity 150 minutes/week  Rationale for BMI follow-up plan is due to patient being overweight or obese  Depression Screening and Follow-up Plan: Patient was screened for depression during today's encounter  They screened negative with a PHQ-2 score of 0  Preventive health issues were discussed with patient, and age appropriate screening tests were ordered as noted in patient's After Visit Summary  Personalized health advice and appropriate referrals for health education or preventive services given if needed, as noted in patient's After Visit Summary  History of Present Illness:     Patient presents for a Medicare Wellness Visit    Patient  presents for Medicare wellness check     Patient Care Team:  Beverly Vogel DO as PCP - Julia Louis MD     Review of Systems:     Review of Systems   Constitutional: Negative for chills and fever  HENT: Negative for ear pain and sore throat  Eyes: Negative for pain and visual disturbance  Respiratory: Negative for cough and shortness of breath  Cardiovascular: Positive for leg swelling  Negative for chest pain and palpitations  Gastrointestinal: Negative for abdominal pain and vomiting  Genitourinary: Negative for dysuria and hematuria  Musculoskeletal: Negative for arthralgias and back pain  Skin: Negative for color change and rash  Neurological: Negative for seizures and syncope  All other systems reviewed and are negative         Problem List:     Patient Active Problem List   Diagnosis    Long term (current) use of anticoagulants    Persistent atrial fibrillation (HCC)    Chronic venous insufficiency    [facility]    Malignant neoplasm of prostate ([facility] )    Hypertensive heart disease with heart failure [facility])      Past Medical and Surgical History:     Past Medical History:   Diagnosis Date    Anxiety     [facility]     Atrial flutter ([facility] )  Congestive heart failure (CHF) (HCC)     COPD (chronic obstructive pulmonary disease) (HCC)     Coronary artery disease     [facility]     ED (erectile dysfunction)     Hearing loss     History of echocardiogram 04/05/2018    EF 0 55, Mild LVH  Mild moderate mitral regurg  Trace aortic regurg   Hx of radiation therapy     XRT    Hypercholesterolemia     Hyperlipidemia     Hypertension     Long term (current) use of anticoagulants 8/21/2018    Neuropathy of both feet     Peripheral neuropathy     Prostate cancer (HCC)     Type 2 diabetes mellitus ([facility] )      Past Surgical History:   Procedure Laterality Date    CARDIAC CATHETERIZATION  01/15/1988    Left main: unobstructed  Posterolateral branch 90% narrowed   COLONOSCOPY  10/05/2017    Dr Norma Cardona        Family History:     Family History   Problem Relation Age of Onset    Cancer Brother         bladder    Colon cancer Brother     Heart disease Other     Hypertension Other     Cancer Other         bladder    Colon cancer Other       Social History:     Social History     Socioeconomic History    Marital status: /Civil Union     Spouse name: None    Number of children: None    Years of education: None    Highest education level: None   Occupational History    Occupation: Retired-[employer]   Tobacco Use    Smoking status: Never Smoker    Smokeless tobacco: Never Used   Vaping Use    Vaping Use: Never used   Substance and Sexual Activity    Alcohol use: Not Currently    Drug use: Never    Sexual activity: None   Other Topics Concern    None   Social History Narrative    None     Social Determinants of Health     Financial Resource Strain: Low Risk     Difficulty of Paying Living Expenses: Not very hard   Food Insecurity: Not on file   Transportation Needs: No Transportation Needs    Lack of Transportation (Medical): No    Lack of Transportation (Non-Medical):  No Physical Activity: Not on file   Stress: Not on file   Social Connections: Not on file   Intimate Partner Violence: Not on file   Housing Stability: Not on file      Medications and Allergies:     Current Outpatient Medications   Medication Sig Dispense Refill    atorvastatin (LIPITOR) 40 mg tablet TAKE 1 TABLET BY MOUTH DAILY 90 tablet 5    furosemide (LASIX) 20 mg tablet TAKE 1 TABLET BY MOUTH 3 DAYS WEEKLY 45 tablet 5    metFORMIN (GLUCOPHAGE) 500 mg tablet TAKE 1 TABLET BY MOUTH DAILY WITH BREAKFAST 90 tablet 1    metoprolol succinate (TOPROL-XL) 50 mg 24 hr tablet TAKE 1 TABLET BY MOUTH TWICE DAILY 180 tablet 5    warfarin (COUMADIN) 4 mg tablet TAKE 2 TABLETS BY MOUTH DAILY OR AS DIRECTED 180 tablet 5     No current facility-administered medications for this visit  No Known Allergies   Immunizations:     Immunization History   Administered Date(s) Administered    COVID-19 MODERNA VACC 0 25 ML IM BOOSTER 11/01/2021    COVID-19 MODERNA VACC 0 5 ML IM 01/21/2021, 02/19/2021, 07/18/2022    Influenza, high dose seasonal 0 7 mL 08/31/2021, 09/15/2022    Pneumococcal Polysaccharide PPV23 08/28/2020      Health Maintenance: There are no preventive care reminders to display for this patient  There are no preventive care reminders to display for this patient     Medicare Screening Tests and Risk Assessments:         Depression Screening:   PHQ-2 Score: 0      PREVENTIVE SCREENINGS      Cardiovascular Screening:    General: Screening Not Indicated and History Lipid Disorder      Diabetes Screening:     General: Screening Not Indicated and History Diabetes      Colorectal Cancer Screening:     General: Screening Not Indicated      Prostate Cancer Screening:    General: History Prostate Cancer and Screening Not Indicated      Lung Cancer Screening:     General: Screening Not Indicated    No exam data present     Physical Exam:     /74   Pulse 84   Temp (!) 96 9 °F (36 1 °C)   Resp 20   Ht 6' 1" (1 854 m)   Wt 106 kg (234 lb)   BMI 30 87 kg/m²     Physical Exam  Vitals and nursing note reviewed  Constitutional:       Appearance: He is well-developed  HENT:      Head: Normocephalic and atraumatic  Eyes:      Conjunctiva/sclera: Conjunctivae normal    Cardiovascular:      Rate and Rhythm: Normal rate  Rhythm irregular  Heart sounds: Murmur heard  Pulmonary:      Effort: Pulmonary effort is normal  No respiratory distress  Breath sounds: Normal breath sounds  Abdominal:      Palpations: Abdomen is soft  Tenderness: There is no abdominal tenderness  Musculoskeletal:      Cervical back: Neck supple  Right lower leg: Edema present  Left lower leg: Edema present  Skin:     General: Skin is warm and dry  Neurological:      Mental Status: He is alert            Aaron Chester DO

## 2022-09-15 NOTE — PROGRESS NOTES
Assessment and Plan:     Problem List Items Addressed This Visit    None     Visit Diagnoses     Need for vaccination    -  Primary    Relevant Orders    influenza vaccine, high-dose, PF 0 7 mL (FLUZONE HIGH-DOSE)           Preventive health issues were discussed with patient, and age appropriate screening tests were ordered as noted in patient's After Visit Summary  Personalized health advice and appropriate referrals for health education or preventive services given if needed, as noted in patient's After Visit Summary  History of Present Illness:     Patient presents for a Medicare Wellness Visit    HPI   Patient Care Team:  Kaylene Suarez DO as PCP - Jose Wilson MD     Review of Systems:     Review of Systems     Problem List:     Patient Active Problem List   Diagnosis    Long term (current) use of anticoagulants    [facility]    Chronic venous insufficiency    Chronic diastolic congestive heart failure [facility] )    Malignant neoplasm of prostate [facility] )    Hypertensive heart disease with heart failure [facility])      Past Medical and Surgical History:     Past Medical History:   Diagnosis Date    Anxiety     Atrial fibrillation [facility] )     Atrial flutter (HCC)     Congestive [facility] failure ([facility]F) [facility] )     COPD (chronic obstructive pulmonary disease) [facility] )     Coronary artery disease     DVT (deep venous thrombosis) [facility]     ED (erectile dysfunction)     Hearing loss     History of echocardiogram 04/05/2018    EF 0 55, Mild LVH  Mild moderate mitral regurg  Trace aortic regurg      Hx of radiation therapy     XRT    Hypercholesterolemia     Hyperlipidemia     Hypertension     Long term (current) use of anticoagulants 8/21/2018    Neuropathy of both feet     Peripheral neuropathy     Prostate cancer (HCC)     Type 2 diabetes mellitus ([facility] )      Past Surgical History:   Procedure Laterality Date    CARDIAC CATHETERIZATION  01/15/1988    Left main: unobstructed  Posterolateral branch 90% narrowed   COLONOSCOPY  10/05/2017    Dr Ashley Lopes        Family History:     Family History   Problem Relation Age of Onset    Cancer Brother         bladder    Colon cancer Brother     Heart disease Other     Hypertension Other     Cancer Other         bladder    Colon cancer Other       Social History:     Social History     Socioeconomic History    Marital status: /Civil Union     Spouse name: None    Number of children: None    Years of education: None    Highest education level: None   Occupational History    Occupation: Retired-[employer]   Tobacco Use    Smoking status: Never Smoker    Smokeless tobacco: Never Used   Vaping Use    Vaping Use: Never used   Substance and Sexual Activity    Alcohol use: Not Currently    Drug use: Never    Sexual activity: None   Other Topics Concern    None   Social History Narrative    None     Social Determinants of Health     Financial Resource Strain: Low Risk     Difficulty of Paying Living Expenses: Not very hard   Food Insecurity: Not on file   Transportation Needs: No Transportation Needs    Lack of Transportation (Medical): No    Lack of Transportation (Non-Medical):  No   Physical Activity: Not on file   Stress: Not on file   Social Connections: Not on file   Intimate Partner Violence: Not on file   Housing Stability: Not on file      Medications and Allergies:     Current Outpatient Medications   Medication Sig Dispense Refill    atorvastatin (LIPITOR) 40 mg tablet TAKE 1 TABLET BY MOUTH DAILY 90 tablet 5    furosemide (LASIX) 20 mg tablet TAKE 1 TABLET BY MOUTH 3 DAYS WEEKLY 45 tablet 5    metFORMIN (GLUCOPHAGE) 500 mg tablet TAKE 1 TABLET BY MOUTH DAILY WITH BREAKFAST 90 tablet 1    metoprolol succinate (TOPROL-XL) 50 mg 24 hr tablet TAKE 1 TABLET BY MOUTH TWICE DAILY 180 tablet 5    warfarin (COUMADIN) 4 mg tablet TAKE 2 TABLETS BY MOUTH DAILY OR AS DIRECTED 180 tablet 5     No current facility-administered medications for this visit  No Known Allergies   Immunizations:     Immunization History   Administered Date(s) Administered    COVID-19 MODERNA VACC 0 25 ML IM BOOSTER 11/01/2021    COVID-19 MODERNA VACC 0 5 ML IM 01/21/2021, 02/19/2021, 07/18/2022    Influenza, high dose seasonal 0 7 mL 08/31/2021    Pneumococcal Polysaccharide PPV23 08/28/2020      Health Maintenance: There are no preventive care reminders to display for this patient  Topic Date Due    Influenza Vaccine (1) 09/01/2022      Medicare Screening Tests and Risk Assessments:         Health Risk Assessment:   Patient rates overall health as very good  Patient feels that their physical health rating is same  Eyesight was rated as same  Hearing was rated as same  Patient feels that their emotional and mental health rating is same  Patients states they are never, rarely angry  Patient states they are sometimes unusually tired/fatigued  Pain experienced in the last 7 days has been none  Patient states that he has experienced no weight loss or gain in last 6 months  Depression Screening:   PHQ-2 Score: 0      Fall Risk Screening: In the past year, patient has experienced: no history of falling in past year      Home Safety:  Patient has trouble with stairs inside or outside of their home  Patient has working smoke alarms and has working carbon monoxide detector  Patient has a Stair Lift at home  Nutrition:   Current diet is Diabetic and Low Carb  Medications:   Patient is currently taking over-the-counter supplements  OTC medications include: see medication list  Patient is able to manage medications  Activities of Daily Living (ADLs)/Instrumental Activities of Daily Living (IADLs):   Walk and transfer into and out of bed and chair?: Yes  Dress and groom yourself?: Yes    Bathe or shower yourself?: Yes    Feed yourself?  Yes  Do your laundry/housekeeping?: Yes  Manage your money, pay your bills and track your expenses?: Yes  Make your own meals?: Yes    Do your own shopping?: Yes    Previous Hospitalizations:   Any hospitalizations or ED visits within the last 12 months?: No      Advance Care Planning:   Living will: No    Durable POA for healthcare: Yes      PREVENTIVE SCREENINGS      Cardiovascular Screening:    General: Screening Not Indicated and History Lipid Disorder      Diabetes Screening:     General: Screening Not Indicated and History Diabetes      Colorectal Cancer Screening:     General: Screening Not Indicated      Prostate Cancer Screening:    General: History Prostate Cancer and Screening Not Indicated      Lung Cancer Screening:     General: Screening Not Indicated    Screening, Brief Intervention, and Referral to Treatment (SBIRT)    Screening  Typical number of drinks in a day: 0  Typical number of drinks in a week: 0  Interpretation: Low risk drinking behavior      Single Item Drug Screening:  How often have you used an illegal drug (including marijuana) or a prescription medication for non-medical reasons in the past year? never    Single Item Drug Screen Score: 0  Interpretation: Negative screen for possible drug use disorder    No exam data present     Physical Exam:     /74   Pulse 84   Temp (!) 96 9 °F (36 1 °C)   Resp 20   Ht 6' 1" (1 854 m)   Wt 106 kg (234 lb)   BMI 30 87 kg/m²     Physical Exam     Elijah Borja, DO

## 2022-09-15 NOTE — PATIENT INSTRUCTIONS
Medicare Preventive Visit Patient Instructions  Thank you for completing your Welcome to Medicare Visit or Medicare Annual Wellness Visit today  Your next wellness visit will be due in one year (9/16/2023)  The screening/preventive services that you may require over the next 5-10 years are detailed below  Some tests may not apply to you based off risk factors and/or age  Screening tests ordered at today's visit but not completed yet may show as past due  Also, please note that scanned in results may not display below  Preventive Screenings:  Service Recommendations Previous Testing/Comments   Colorectal Cancer Screening  · Colonoscopy    · Fecal Occult Blood Test (FOBT)/Fecal Immunochemical Test (FIT)  · Fecal DNA/Cologuard Test  · Flexible Sigmoidoscopy Age: 39-70 years old   Colonoscopy: every 10 years (May be performed more frequently if at higher risk)  OR  FOBT/FIT: every 1 year  OR  Cologuard: every 3 years  OR  Sigmoidoscopy: every 5 years  Screening may be recommended earlier than age 39 if at higher risk for colorectal cancer  Also, an individualized decision between you and your healthcare provider will decide whether screening between the ages of 74-80 would be appropriate   Colonoscopy: Not on file  FOBT/FIT: 08/17/2021  Cologuard: Not on file  Sigmoidoscopy: Not on file    Screening Not Indicated  Screening Not Indicated     Prostate Cancer Screening Individualized decision between patient and health care provider in men between ages of 53-78   Medicare will cover every 12 months beginning on the day after your 50th birthday PSA: 0 1 ng/mL     History Prostate Cancer  Screening Not Indicated  History Prostate Cancer  Screening Not Indicated     Hepatitis C Screening Once for adults born between 1945 and 1965  More frequently in patients at high risk for Hepatitis C Hep C Antibody: Not on file        Diabetes Screening 1-2 times per year if you're at risk for diabetes or have pre-diabetes Fasting glucose: 141 mg/dL (9/14/2022)  A1C: 7 1 % (9/14/2022)  Screening Not Indicated  History Diabetes  Screening Not Indicated  History Diabetes   Cholesterol Screening Once every 5 years if you don't have a lipid disorder  May order more often based on risk factors  Lipid panel: 09/14/2022  Screening Not Indicated  History Lipid Disorder  Screening Not Indicated  History Lipid Disorder      Other Preventive Screenings Covered by Medicare:  1  Abdominal Aortic Aneurysm (AAA) Screening: covered once if your at risk  You're considered to be at risk if you have a family history of AAA or a male between the age of 73-68 who smoking at least 100 cigarettes in your lifetime  2  Lung Cancer Screening: covers low dose CT scan once per year if you meet all of the following conditions: (1) Age 50-69; (2) No signs or symptoms of lung cancer; (3) Current smoker or have quit smoking within the last 15 years; (4) You have a tobacco smoking history of at least 20 pack years (packs per day x number of years you smoked); (5) You get a written order from a healthcare provider  3  Glaucoma Screening: covered annually if you're considered high risk: (1) You have diabetes OR (2) Family history of glaucoma OR (3)  aged 48 and older OR (3)  American aged 72 and older  3  Osteoporosis Screening: covered every 2 years if you meet one of the following conditions: (1) Have a vertebral abnormality; (2) On glucocorticoid therapy for more than 3 months; (3) Have primary hyperparathyroidism; (4) On osteoporosis medications and need to assess response to drug therapy  5  HIV Screening: covered annually if you're between the age of 12-76  Also covered annually if you are younger than 13 and older than 72 with risk factors for HIV infection  For pregnant patients, it is covered up to 3 times per pregnancy      Immunizations:  Immunization Recommendations   Influenza Vaccine Annual influenza vaccination during flu season is recommended for all persons aged >= 6 months who do not have contraindications   Pneumococcal Vaccine   * Pneumococcal conjugate vaccine = PCV13 (Prevnar 13), PCV15 (Vaxneuvance), PCV20 (Prevnar 20)  * Pneumococcal polysaccharide vaccine = PPSV23 (Pneumovax) Adults 2364 years old: 1-3 doses may be recommended based on certain risk factors  Adults 72 years old: 1-2 doses may be recommended based off what pneumonia vaccine you previously received   Hepatitis B Vaccine 3 dose series if at intermediate or high risk (ex: diabetes, end stage renal disease, liver disease)   Tetanus (Td) Vaccine - COST NOT COVERED BY MEDICARE PART B Following completion of primary series, a booster dose should be given every 10 years to maintain immunity against tetanus  Td may also be given as tetanus wound prophylaxis  Tdap Vaccine - COST NOT COVERED BY MEDICARE PART B Recommended at least once for all adults  For pregnant patients, recommended with each pregnancy  Shingles Vaccine (Shingrix) - COST NOT COVERED BY MEDICARE PART B  2 shot series recommended in those aged 48 and above     Health Maintenance Due:  There are no preventive care reminders to display for this patient  Immunizations Due:  There are no preventive care reminders to display for this patient  Advance Directives   What are advance directives? Advance directives are legal documents that state your wishes and plans for medical care  These plans are made ahead of time in case you lose your ability to make decisions for yourself  Advance directives can apply to any medical decision, such as the treatments you want, and if you want to donate organs  What are the types of advance directives? There are many types of advance directives, and each state has rules about how to use them  You may choose a combination of any of the following:  · Living will: This is a written record of the treatment you want   You can also choose which treatments you do not want, which to limit, and which to stop at a certain time  This includes surgery, medicine, IV fluid, and tube feedings  · Durable power of  for healthcare [facility]): This is a written record that states who you want to make healthcare choices for you when you are unable to make them for yourself  This person, called a proxy, is usually a family member or a friend  You may choose more than 1 proxy  · Do not resuscitate (DNR) order:  A DNR order is used in case your heart stops beating or you stop breathing  It is a request not to have certain forms of treatment, such as CPR  A DNR order may be included in other types of advance directives  · Medical directive: This covers the care that you want if you are in a coma, near death, or unable to make decisions for yourself  You can list the treatments you want for each condition  Treatment may include pain medicine, surgery, blood transfusions, dialysis, IV or tube feedings, and a ventilator (breathing machine)  · Values history: This document has questions about your views, beliefs, and how you feel and think about life  This information can help others choose the care that you would choose  Why are advance directives important? An advance directive helps you control your care  Although spoken wishes may be used, it is better to have your wishes written down  Spoken wishes can be misunderstood, or not followed  Treatments may be given even if you do not want them  An advance directive may make it easier for your family to make difficult choices about your care  Weight Management   Why it is important to manage your weight:  Being overweight increases your risk of health conditions such as heart disease, high blood pressure, type 2 diabetes, and certain types of cancer  It can also increase your risk for osteoarthritis, sleep apnea, and other respiratory problems  Aim for a slow, steady weight loss   Even a small amount of weight loss can lower your risk of health problems  How to lose weight safely:  A safe and healthy way to lose weight is to eat fewer calories and get regular exercise  You can lose up about 1 pound a week by decreasing the number of calories you eat by 500 calories each day  Healthy meal plan for weight management:  A healthy meal plan includes a variety of foods, contains fewer calories, and helps you stay healthy  A healthy meal plan includes the following:  · Eat whole-grain foods more often  A healthy meal plan should contain fiber  Fiber is the part of grains, fruits, and vegetables that is not broken down by your body  Whole-grain foods are healthy and provide extra fiber in your diet  Some examples of whole-grain foods are whole-wheat breads and pastas, oatmeal, brown rice, and bulgur  · Eat a variety of vegetables every day  Include dark, leafy greens such as spinach, kale, sobia greens, and mustard greens  Eat yellow and orange vegetables such as carrots, sweet potatoes, and winter squash  · Eat a variety of fruits every day  Choose fresh or canned fruit (canned in its own juice or light syrup) instead of juice  Fruit juice has very little or no fiber  · Eat low-fat dairy foods  Drink fat-free (skim) milk or 1% milk  Eat fat-free yogurt and low-fat cottage cheese  Try low-fat cheeses such as mozzarella and other reduced-fat cheeses  · Choose meat and other protein foods that are low in fat  Choose beans or other legumes such as split peas or lentils  Choose fish, skinless poultry (chicken or turkey), or lean cuts of red meat (beef or pork)  Before you cook meat or poultry, cut off any visible fat  · Use less fat and oil  Try baking foods instead of frying them  Add less fat, such as margarine, sour cream, regular salad dressing and mayonnaise to foods  Eat fewer high-fat foods  Some examples of high-fat foods include french fries, doughnuts, ice cream, and cakes  · Eat fewer sweets    Limit foods and drinks that are high in sugar  This includes candy, cookies, regular soda, and sweetened drinks  Exercise:  Exercise at least 30 minutes per day on most days of the week  Some examples of exercise include walking, biking, dancing, and swimming  You can also fit in more physical activity by taking the stairs instead of the elevator or parking farther away from stores  Ask your healthcare provider about the best exercise plan for you  © Copyright [employer] 2018 Information is for End User's use only and may not be sold, redistributed or otherwise used for commercial purposes   All illustrations and images included in CareNotes® are the copyrighted property of A D A M , Inc  or [address] [place] [address]

## 2022-09-28 ENCOUNTER — APPOINTMENT (OUTPATIENT)
Dept: LAB | Facility: MEDICAL CENTER | Age: 87
End: 2022-09-28
Payer: COMMERCIAL

## 2022-09-28 DIAGNOSIS — Z79.01 LONG TERM (CURRENT) USE OF ANTICOAGULANTS: ICD-10-CM

## 2022-09-28 DIAGNOSIS — I48.19 PERSISTENT ATRIAL FIBRILLATION (HCC): ICD-10-CM

## 2022-09-28 LAB
INR PPP: 3.06 (ref 0.84–1.19)
PROTHROMBIN TIME: 31.9 SECONDS (ref 11.6–14.5)

## 2022-09-28 PROCEDURE — 36415 COLL VENOUS BLD VENIPUNCTURE: CPT

## 2022-09-28 PROCEDURE — 85610 PROTHROMBIN TIME: CPT

## 2022-09-29 ENCOUNTER — ANTICOAG VISIT (OUTPATIENT)
Dept: CARDIOLOGY CLINIC | Facility: CLINIC | Age: 87
End: 2022-09-29
Payer: COMMERCIAL

## 2022-09-29 DIAGNOSIS — Z79.01 LONG TERM (CURRENT) USE OF ANTICOAGULANTS: ICD-10-CM

## 2022-09-29 DIAGNOSIS — I48.19 PERSISTENT ATRIAL FIBRILLATION (HCC): Primary | ICD-10-CM

## 2022-09-29 PROCEDURE — 93793 ANTICOAG MGMT PT WARFARIN: CPT | Performed by: NURSE PRACTITIONER

## 2022-09-29 NOTE — PATIENT INSTRUCTIONS
INR received from lab and reviewed  No changes needed; continue current dosing schedule    Recheck INR in 3 weeks-results and instructions given to pt by phone   Tres Jackson

## 2022-10-17 DIAGNOSIS — E11.9 DIABETES MELLITUS TYPE 2 IN NONOBESE (HCC): ICD-10-CM

## 2022-10-26 ENCOUNTER — APPOINTMENT (OUTPATIENT)
Dept: LAB | Facility: MEDICAL CENTER | Age: 87
End: 2022-10-26

## 2022-10-26 DIAGNOSIS — I48.19 PERSISTENT ATRIAL FIBRILLATION (HCC): ICD-10-CM

## 2022-10-26 DIAGNOSIS — Z79.01 LONG TERM (CURRENT) USE OF ANTICOAGULANTS: ICD-10-CM

## 2022-10-26 LAB
INR PPP: 1.86 (ref 0.84–1.19)
PROTHROMBIN TIME: 21.7 SECONDS (ref 11.6–14.5)

## 2022-10-27 ENCOUNTER — ANTICOAG VISIT (OUTPATIENT)
Dept: CARDIOLOGY CLINIC | Facility: CLINIC | Age: 87
End: 2022-10-27
Payer: COMMERCIAL

## 2022-10-27 DIAGNOSIS — Z79.01 LONG TERM (CURRENT) USE OF ANTICOAGULANTS: ICD-10-CM

## 2022-10-27 DIAGNOSIS — I48.19 PERSISTENT ATRIAL FIBRILLATION (HCC): Primary | ICD-10-CM

## 2022-10-27 PROCEDURE — 93793 ANTICOAG MGMT PT WARFARIN: CPT | Performed by: NURSE PRACTITIONER

## 2022-10-27 NOTE — PATIENT INSTRUCTIONS
INR received from lab and reviewed  Increase to 8 mg today then continue current dosing schedule    Recheck INR in 2-3 weeks  DOE Ivy

## 2022-11-16 ENCOUNTER — APPOINTMENT (OUTPATIENT)
Dept: LAB | Facility: MEDICAL CENTER | Age: 87
End: 2022-11-16

## 2022-11-16 ENCOUNTER — ANTICOAG VISIT (OUTPATIENT)
Dept: CARDIOLOGY CLINIC | Facility: CLINIC | Age: 87
End: 2022-11-16

## 2022-11-16 DIAGNOSIS — Z79.01 LONG TERM (CURRENT) USE OF ANTICOAGULANTS: ICD-10-CM

## 2022-11-16 DIAGNOSIS — I48.19 PERSISTENT ATRIAL FIBRILLATION (HCC): Primary | ICD-10-CM

## 2022-11-16 DIAGNOSIS — I48.19 PERSISTENT ATRIAL FIBRILLATION (HCC): ICD-10-CM

## 2022-11-16 LAB
INR PPP: 2.2 (ref 0.84–1.19)
PROTHROMBIN TIME: 24.7 SECONDS (ref 11.6–14.5)

## 2022-11-16 NOTE — PATIENT INSTRUCTIONS
INR received from lab and reviewed  No changes needed; continue current dosing schedule    Recheck INR in one month-results and instructions given to pt by phone  Josh June

## 2022-12-06 ENCOUNTER — APPOINTMENT (OUTPATIENT)
Dept: LAB | Facility: MEDICAL CENTER | Age: 87
End: 2022-12-06

## 2022-12-06 DIAGNOSIS — Z79.01 LONG TERM (CURRENT) USE OF ANTICOAGULANTS: ICD-10-CM

## 2022-12-06 DIAGNOSIS — I48.19 PERSISTENT ATRIAL FIBRILLATION (HCC): ICD-10-CM

## 2022-12-06 LAB
INR PPP: 2.44 (ref 0.84–1.19)
PROTHROMBIN TIME: 26.7 SECONDS (ref 11.6–14.5)

## 2022-12-07 ENCOUNTER — ANTICOAG VISIT (OUTPATIENT)
Dept: CARDIOLOGY CLINIC | Facility: CLINIC | Age: 87
End: 2022-12-07

## 2022-12-07 DIAGNOSIS — I48.19 PERSISTENT ATRIAL FIBRILLATION (HCC): Primary | ICD-10-CM

## 2022-12-07 DIAGNOSIS — Z79.01 LONG TERM (CURRENT) USE OF ANTICOAGULANTS: ICD-10-CM

## 2022-12-07 NOTE — PATIENT INSTRUCTIONS
INR received from lab and reviewed  No changes needed; continue current dosing schedule    Recheck INR in one month-instructions given to pt by phone  Derek Castellano [facility]

## 2022-12-28 ENCOUNTER — TELEPHONE (OUTPATIENT)
Dept: OTHER | Facility: OTHER | Age: 87
End: 2022-12-28

## 2022-12-28 DIAGNOSIS — I48.19 PERSISTENT ATRIAL FIBRILLATION (HCC): ICD-10-CM

## 2022-12-28 DIAGNOSIS — Z79.01 LONG TERM (CURRENT) USE OF ANTICOAGULANTS: Primary | ICD-10-CM

## 2022-12-28 NOTE — TELEPHONE ENCOUNTER
Pt would like a new blood work script for tomorrow  His cardiologist is requesting before his next appointment

## 2022-12-29 ENCOUNTER — APPOINTMENT (OUTPATIENT)
Dept: LAB | Facility: MEDICAL CENTER | Age: 87
End: 2022-12-29

## 2022-12-29 LAB
INR PPP: 2.38 (ref 0.84–1.19)
PROTHROMBIN TIME: 26.2 SECONDS (ref 11.6–14.5)

## 2022-12-30 ENCOUNTER — ANTICOAG VISIT (OUTPATIENT)
Dept: CARDIOLOGY CLINIC | Facility: CLINIC | Age: 87
End: 2022-12-30

## 2022-12-30 DIAGNOSIS — Z79.01 LONG TERM (CURRENT) USE OF ANTICOAGULANTS: ICD-10-CM

## 2022-12-30 DIAGNOSIS — I48.19 PERSISTENT ATRIAL FIBRILLATION (HCC): Primary | ICD-10-CM

## 2022-12-30 NOTE — PATIENT INSTRUCTIONS
LMAM to adjust dose and recheck in one month  Patient to call if questions, concerns or changes in medication health or diet    Shane Davila PA-C

## 2023-01-03 DIAGNOSIS — E11.9 DIABETES MELLITUS TYPE 2 IN NONOBESE (HCC): Primary | ICD-10-CM

## 2023-01-03 RX ORDER — LANCETS 30 GAUGE
EACH MISCELLANEOUS
Qty: 100 EACH | Refills: 5 | Status: SHIPPED | OUTPATIENT
Start: 2023-01-03

## 2023-01-03 RX ORDER — BLOOD SUGAR DIAGNOSTIC
STRIP MISCELLANEOUS
Qty: 100 EACH | Refills: 1 | Status: SHIPPED | OUTPATIENT
Start: 2023-01-03

## 2023-01-10 DIAGNOSIS — E11.9 DIABETES MELLITUS TYPE 2 IN NONOBESE (HCC): ICD-10-CM

## 2023-01-13 ENCOUNTER — OFFICE VISIT (OUTPATIENT)
Dept: FAMILY MEDICINE CLINIC | Facility: CLINIC | Age: 88
End: 2023-01-13

## 2023-01-13 VITALS
BODY MASS INDEX: 30.35 KG/M2 | OXYGEN SATURATION: 99 % | RESPIRATION RATE: 20 BRPM | SYSTOLIC BLOOD PRESSURE: 126 MMHG | DIASTOLIC BLOOD PRESSURE: 84 MMHG | WEIGHT: 229 LBS | HEIGHT: 73 IN | HEART RATE: 68 BPM | TEMPERATURE: 96.1 F

## 2023-01-13 DIAGNOSIS — I11.0 HYPERTENSIVE HEART DISEASE WITH HEART FAILURE (HCC): ICD-10-CM

## 2023-01-13 DIAGNOSIS — E78.5 HYPERLIPIDEMIA, UNSPECIFIED HYPERLIPIDEMIA TYPE: ICD-10-CM

## 2023-01-13 DIAGNOSIS — Z79.01 LONG TERM (CURRENT) USE OF ANTICOAGULANTS: ICD-10-CM

## 2023-01-13 DIAGNOSIS — I50.32 CHRONIC DIASTOLIC CONGESTIVE HEART FAILURE (HCC): ICD-10-CM

## 2023-01-13 DIAGNOSIS — J40 BRONCHITIS: ICD-10-CM

## 2023-01-13 DIAGNOSIS — I10 ESSENTIAL HYPERTENSION: ICD-10-CM

## 2023-01-13 DIAGNOSIS — C61 MALIGNANT NEOPLASM OF PROSTATE (HCC): ICD-10-CM

## 2023-01-13 DIAGNOSIS — I73.9 PERIPHERAL ARTERIAL DISEASE (HCC): ICD-10-CM

## 2023-01-13 DIAGNOSIS — N18.31 STAGE 3A CHRONIC KIDNEY DISEASE (HCC): ICD-10-CM

## 2023-01-13 DIAGNOSIS — E11.9 DIABETES MELLITUS TYPE 2 IN NONOBESE (HCC): Primary | ICD-10-CM

## 2023-01-13 DIAGNOSIS — I48.19 PERSISTENT ATRIAL FIBRILLATION (HCC): ICD-10-CM

## 2023-01-13 RX ORDER — AZITHROMYCIN 250 MG/1
TABLET, FILM COATED ORAL
Qty: 6 TABLET | Refills: 1 | Status: SHIPPED | OUTPATIENT
Start: 2023-01-13 | End: 2023-01-18

## 2023-01-13 RX ORDER — BENZONATATE 200 MG/1
200 CAPSULE ORAL 3 TIMES DAILY PRN
Qty: 20 CAPSULE | Refills: 0 | Status: SHIPPED | OUTPATIENT
Start: 2023-01-13

## 2023-01-13 NOTE — PROGRESS NOTES
Assessment/Plan: bronchitis with the history of walking pneumonia in May of 2022 will begin Zithromax and symptom medically treat the cough with Tessalon Perles if the symptoms worsen or the cough persist chest x-ray and laboratory  as well as a sputum has been ordered    Diabetes mellitus type 2 with blood sugar running approximately 170    Atrial fibrillation anticoagulated with Coumadin rate controlled with Toprol XL 50    Chronic diastolic congestive heart failure diuresed with Lasix 20 mg daily  Cardiology evaluation of August 11, 2022 reviewed and appreciated    Hyperlipidemia treated with atorvastatin 40 mg daily    Stage IIIA chronic kidney disease currently stable    Peripheral vascular disease currently stable    History of prostate cancer in remission    Problem List Items Addressed This Visit    None       There are no diagnoses linked to this encounter  No problem-specific Assessment & Plan notes found for this encounter  PHQ-2/9 Depression Screening            Body mass index is 30 21 kg/m²  BMI Counseling: Body mass index is 30 21 kg/m²  The BMI     Subjective:      Patient ID: Jennifer Arizmendi is a 80 y o  male  HPI    The following portions of the patient's history were reviewed and updated as appropriate:   He has a past medical history of Anxiety, Atrial fibrillation (HCC), Atrial flutter ([address] ), Congestive heart failure (CHF) ([address] ), COPD (chronic obstructive pulmonary disease) ([address] ), Coronary artery disease, DVT (deep venous thrombosis) ([address] ), ED (erectile dysfunction), Hearing loss, History of echocardiogram (04/05/2018), radiation therapy, Hypercholesterolemia, Hyperlipidemia, Hypertension, Long term (current) use of anticoagulants (8/21/2018), Neuropathy of both feet, Peripheral neuropathy, Prostate cancer ([facility] ), and Type 2 diabetes mellitus ([facility] )  ,  does not have any pertinent problems on file  ,   has a past surgical history that includes Cardiac catheterization (01/15/1988); Colonoscopy (10/05/2017); and Prostate surgery  ,  family history includes Cancer in his brother and other; Colon cancer in his brother and other; Heart disease in his other; Hypertension in his other  ,   reports that he has never smoked  He has never used smokeless tobacco  He reports that he does not currently use alcohol  He reports that he does not use drugs  ,  has No Known Allergies     Current Outpatient Medications   Medication Sig Dispense Refill   • atorvastatin (LIPITOR) 40 mg tablet TAKE 1 TABLET BY MOUTH DAILY 90 tablet 5   • furosemide (LASIX) 20 mg tablet TAKE 1 TABLET BY MOUTH 3 DAYS WEEKLY 45 tablet 5   • metFORMIN (GLUCOPHAGE) 500 mg tablet TAKE 1 TABLET BY MOUTH DAILY WITH BREAKFAST 90 tablet 2   • metoprolol succinate (TOPROL-XL) 50 mg 24 hr tablet TAKE 1 TABLET BY MOUTH TWICE DAILY 180 tablet 5   • OneTouch Delica Lancets 02S MISC USE TO TEST ONCE DAILY 100 each 5   • OneTouch Ultra test strip TEST ONCE DAILY 100 each 1   • warfarin (COUMADIN) 4 mg tablet TAKE 2 TABLETS BY MOUTH DAILY OR AS DIRECTED 180 tablet 5     No current facility-administered medications for this visit  Review of Systems   Constitutional: Positive for fatigue  Negative for chills and fever  HENT: Negative for ear pain and sore throat  Eyes: Negative for pain and visual disturbance  Respiratory: Positive for cough  Negative for shortness of breath  Cardiovascular: Positive for leg swelling  Negative for chest pain and palpitations  Gastrointestinal: Positive for rectal pain  Negative for abdominal pain and vomiting  Genitourinary: Negative for dysuria and hematuria  Musculoskeletal: Negative for arthralgias and back pain  Skin: Negative for color change and rash  Neurological: Negative for seizures and syncope  All other systems reviewed and are negative          Objective:    /84   Pulse 68   Temp (!) 96 1 °F (35 6 °C)   Resp 20   Ht 6' 1" (1 854 m)   Wt 104 kg (229 lb)   SpO2 99%   BMI 30 21 kg/m²   Body mass index is 30 21 kg/m²  Physical Exam  Constitutional:       Appearance: He is well-developed  HENT:      Head: Normocephalic  Eyes:      Pupils: Pupils are equal, round, and reactive to light  Cardiovascular:      Rate and Rhythm: Normal rate  Rhythm irregular  Heart sounds: Normal heart sounds  Pulmonary:      Effort: Pulmonary effort is normal       Breath sounds: Rhonchi present  Abdominal:      General: Bowel sounds are normal       Palpations: Abdomen is soft  Tenderness: There is no abdominal tenderness  Musculoskeletal:      Cervical back: Normal range of motion  Right lower leg: Edema present  Left lower leg: Edema present  Skin:     General: Skin is warm  Neurological:      Mental Status: He is alert and oriented to person, place, and time

## 2023-01-14 ENCOUNTER — APPOINTMENT (OUTPATIENT)
Dept: LAB | Facility: MEDICAL CENTER | Age: 88
End: 2023-01-14

## 2023-01-14 DIAGNOSIS — J40 BRONCHITIS: ICD-10-CM

## 2023-01-14 LAB
ALBUMIN SERPL BCP-MCNC: 3.1 G/DL (ref 3.5–5)
ALP SERPL-CCNC: 66 U/L (ref 46–116)
ALT SERPL W P-5'-P-CCNC: 17 U/L (ref 12–78)
ANION GAP SERPL CALCULATED.3IONS-SCNC: 9 MMOL/L (ref 4–13)
AST SERPL W P-5'-P-CCNC: 10 U/L (ref 5–45)
BASOPHILS # BLD AUTO: 0.07 THOUSANDS/ÂΜL (ref 0–0.1)
BASOPHILS NFR BLD AUTO: 1 % (ref 0–1)
BILIRUB SERPL-MCNC: 0.77 MG/DL (ref 0.2–1)
BUN SERPL-MCNC: 21 MG/DL (ref 5–25)
CALCIUM ALBUM COR SERPL-MCNC: 9.2 MG/DL (ref 8.3–10.1)
CALCIUM SERPL-MCNC: 8.5 MG/DL (ref 8.3–10.1)
CHLORIDE SERPL-SCNC: 105 MMOL/L (ref 96–108)
CO2 SERPL-SCNC: 25 MMOL/L (ref 21–32)
CREAT SERPL-MCNC: 1.14 MG/DL (ref 0.6–1.3)
EOSINOPHIL # BLD AUTO: 0.29 THOUSAND/ÂΜL (ref 0–0.61)
EOSINOPHIL NFR BLD AUTO: 3 % (ref 0–6)
ERYTHROCYTE [DISTWIDTH] IN BLOOD BY AUTOMATED COUNT: 13.2 % (ref 11.6–15.1)
GFR SERPL CREATININE-BSD FRML MDRD: 57 ML/MIN/1.73SQ M
GLUCOSE P FAST SERPL-MCNC: 161 MG/DL (ref 65–99)
HCT VFR BLD AUTO: 40.8 % (ref 36.5–49.3)
HGB BLD-MCNC: 13.2 G/DL (ref 12–17)
IMM GRANULOCYTES # BLD AUTO: 0.03 THOUSAND/UL (ref 0–0.2)
IMM GRANULOCYTES NFR BLD AUTO: 0 % (ref 0–2)
LYMPHOCYTES # BLD AUTO: 1.07 THOUSANDS/ÂΜL (ref 0.6–4.47)
LYMPHOCYTES NFR BLD AUTO: 12 % (ref 14–44)
MCH RBC QN AUTO: 29.3 PG (ref 26.8–34.3)
MCHC RBC AUTO-ENTMCNC: 32.4 G/DL (ref 31.4–37.4)
MCV RBC AUTO: 91 FL (ref 82–98)
MONOCYTES # BLD AUTO: 0.88 THOUSAND/ÂΜL (ref 0.17–1.22)
MONOCYTES NFR BLD AUTO: 10 % (ref 4–12)
NEUTROPHILS # BLD AUTO: 6.85 THOUSANDS/ÂΜL (ref 1.85–7.62)
NEUTS SEG NFR BLD AUTO: 74 % (ref 43–75)
NRBC BLD AUTO-RTO: 0 /100 WBCS
PLATELET # BLD AUTO: 258 THOUSANDS/UL (ref 149–390)
PMV BLD AUTO: 10.3 FL (ref 8.9–12.7)
POTASSIUM SERPL-SCNC: 4.4 MMOL/L (ref 3.5–5.3)
PROT SERPL-MCNC: 6.9 G/DL (ref 6.4–8.4)
RBC # BLD AUTO: 4.51 MILLION/UL (ref 3.88–5.62)
SODIUM SERPL-SCNC: 139 MMOL/L (ref 135–147)
WBC # BLD AUTO: 9.19 THOUSAND/UL (ref 4.31–10.16)

## 2023-01-16 ENCOUNTER — TELEPHONE (OUTPATIENT)
Dept: FAMILY MEDICINE CLINIC | Facility: CLINIC | Age: 88
End: 2023-01-16

## 2023-01-16 LAB
M PNEUMO IGG SER IA-ACNC: 226 U/ML (ref 0–99)
M PNEUMO IGM SER IA-ACNC: <770 U/ML (ref 0–769)

## 2023-01-23 ENCOUNTER — APPOINTMENT (OUTPATIENT)
Dept: LAB | Facility: MEDICAL CENTER | Age: 88
End: 2023-01-23

## 2023-01-23 ENCOUNTER — ANTICOAG VISIT (OUTPATIENT)
Dept: CARDIOLOGY CLINIC | Facility: CLINIC | Age: 88
End: 2023-01-23

## 2023-01-23 DIAGNOSIS — Z79.01 LONG TERM (CURRENT) USE OF ANTICOAGULANTS: ICD-10-CM

## 2023-01-23 DIAGNOSIS — I48.19 PERSISTENT ATRIAL FIBRILLATION (HCC): Primary | ICD-10-CM

## 2023-01-23 NOTE — PATIENT INSTRUCTIONS
Spoke with Patient: No changes in medication health or diet  No missed or extra doses  No s/s of bleeding TIA or CVA  No new ABX, NSAIDs OTC meds, or supplements  Dose as instructed and recheck in one month     Poornima Kinsey PA-C

## 2023-01-30 ENCOUNTER — OFFICE VISIT (OUTPATIENT)
Dept: FAMILY MEDICINE CLINIC | Facility: CLINIC | Age: 88
End: 2023-01-30

## 2023-01-30 ENCOUNTER — TELEPHONE (OUTPATIENT)
Dept: OTHER | Facility: OTHER | Age: 88
End: 2023-01-30

## 2023-01-30 VITALS
BODY MASS INDEX: 29.61 KG/M2 | HEART RATE: 78 BPM | SYSTOLIC BLOOD PRESSURE: 132 MMHG | WEIGHT: 224.4 LBS | TEMPERATURE: 98.7 F | OXYGEN SATURATION: 97 % | DIASTOLIC BLOOD PRESSURE: 82 MMHG

## 2023-01-30 DIAGNOSIS — I48.19 PERSISTENT ATRIAL FIBRILLATION (HCC): ICD-10-CM

## 2023-01-30 DIAGNOSIS — E11.9 DIABETES MELLITUS TYPE 2 IN NONOBESE (HCC): ICD-10-CM

## 2023-01-30 DIAGNOSIS — J40 BRONCHITIS: Primary | ICD-10-CM

## 2023-01-30 RX ORDER — PREDNISONE 10 MG/1
10 TABLET ORAL 2 TIMES DAILY WITH MEALS
Qty: 10 TABLET | Refills: 0 | Status: SHIPPED | OUTPATIENT
Start: 2023-01-30 | End: 2023-02-04

## 2023-01-30 RX ORDER — BENZONATATE 200 MG/1
200 CAPSULE ORAL 3 TIMES DAILY PRN
Qty: 20 CAPSULE | Refills: 0 | Status: SHIPPED | OUTPATIENT
Start: 2023-01-30

## 2023-01-30 RX ORDER — AZITHROMYCIN 250 MG/1
TABLET, FILM COATED ORAL
Qty: 6 TABLET | Refills: 1 | Status: SHIPPED | OUTPATIENT
Start: 2023-01-30 | End: 2023-02-04

## 2023-01-30 NOTE — TELEPHONE ENCOUNTER
Pt's wife Natty Lawrence calling  She would like her  seen today  States that Dr Marely Chery wanted to see him for a follow-up after zithromax treatment for bronchitis  Nattyangely Romanty states that her  is still coughing and not feeling any better  Please call back with care advice and and appt as soon as possible

## 2023-01-30 NOTE — PROGRESS NOTES
Assessment/Plan: Probable mycoplasma infection has had improvement with Zithromax we will provide him with additional Zithromax if symptoms persist will obtain a chest x-ray and sputum prednisone 10 mg twice daily for 5 days patient is wife advised to keep a close watch on his glucose and discontinue the prednisone if his glucose goes above 250  Cough will be treated with Tessalon Perles    Persistent atrial fibrillation with chronic anticoagulation with Coumadin    Problem List Items Addressed This Visit    None       There are no diagnoses linked to this encounter  No problem-specific Assessment & Plan notes found for this encounter  PHQ-2/9 Depression Screening            Body mass index is 29 61 kg/m²  BMI Counseling: Body mass index is 29 61 kg/m²  The BMI   Subjective:      Patient ID: Alexys Bowers is a 80 y o  male  Continues with cough states his legs feel weak in the morning      The following portions of the patient's history were reviewed and updated as appropriate:   He has a past medical history of Anxiety, Atrial fibrillation (HCC), Atrial flutter (HCC), Congestive heart failure ([address]F) ([facility] [address] COPD (chronic obstructive pulmonary disease) ([facility] ), Coronary artery disease, DVT (deep venous thrombosis) ([facility] ), ED (erectile dysfunction), Hearing loss, History of echocardiogram (04/05/2018), radiation therapy, Hypercholesterolemia, Hyperlipidemia, Hypertension, Long term (current) use of anticoagulants (8/21/2018), Neuropathy of both feet, Peripheral neuropathy, Prostate cancer ([facility] ), and Type 2 diabetes mellitus ([facility] )  ,  does not have any pertinent problems on file  ,   has a past surgical history that includes Cardiac catheterization (01/15/1988); Colonoscopy (10/05/2017); and Prostate surgery  ,  family history includes Cancer in his brother and other; Colon cancer in his brother and other; Heart disease in his other; Hypertension in his other  ,   reports that he has never smoked   He has never used smokeless tobacco  He reports that he does not currently use alcohol  He reports that he does not use drugs  ,  has No Known Allergies     Current Outpatient Medications   Medication Sig Dispense Refill   • atorvastatin (LIPITOR) 40 mg tablet TAKE 1 TABLET BY MOUTH DAILY 90 tablet 5   • furosemide (LASIX) 20 mg tablet TAKE 1 TABLET BY MOUTH 3 DAYS WEEKLY 45 tablet 5   • metFORMIN (GLUCOPHAGE) 500 mg tablet TAKE 1 TABLET BY MOUTH DAILY WITH BREAKFAST 90 tablet 2   • metoprolol succinate (TOPROL-XL) 50 mg 24 hr tablet TAKE 1 TABLET BY MOUTH TWICE DAILY 180 tablet 5   • warfarin (COUMADIN) 4 mg tablet TAKE 2 TABLETS BY MOUTH DAILY OR AS DIRECTED 180 tablet 5   • benzonatate (TESSALON) 200 MG capsule Take 1 capsule (200 mg total) by mouth 3 (three) times a day as needed for cough 20 capsule 0   • OneTouch Delica Lancets 25R MISC USE TO TEST ONCE DAILY 100 each 5   • OneTouch Ultra test strip TEST ONCE DAILY 100 each 1     No current facility-administered medications for this visit  Review of Systems   Constitutional: Positive for fatigue  Negative for chills and fever  HENT: Positive for congestion  Negative for ear pain and sore throat  Eyes: Negative for pain and visual disturbance  Respiratory: Positive for cough  Negative for shortness of breath  Cardiovascular: Negative for chest pain and palpitations  Gastrointestinal: Negative for abdominal pain and vomiting  Genitourinary: Negative for dysuria and hematuria  Musculoskeletal: Negative for arthralgias and back pain  Skin: Negative for color change and rash  Neurological: Negative for seizures and syncope  All other systems reviewed and are negative  Objective:    /82 (BP Location: Left arm, Patient Position: Sitting, Cuff Size: Standard)   Pulse 78   Temp 98 7 °F (37 1 °C) (Tympanic)   Wt 102 kg (224 lb 6 4 oz)   SpO2 97%   BMI 29 61 kg/m²   Body mass index is 29 61 kg/m²       Physical Exam  Constitutional: Appearance: He is well-developed  HENT:      Head: Normocephalic  Eyes:      Pupils: Pupils are equal, round, and reactive to light  Cardiovascular:      Rate and Rhythm: Normal rate and regular rhythm  Heart sounds: Normal heart sounds  Pulmonary:      Effort: Pulmonary effort is normal       Breath sounds: Normal breath sounds  Abdominal:      General: Bowel sounds are normal       Palpations: Abdomen is soft  Tenderness: There is no abdominal tenderness  Musculoskeletal:      Cervical back: Normal range of motion  Skin:     General: Skin is warm  Neurological:      Mental Status: He is alert and oriented to person, place, and time

## 2023-02-16 ENCOUNTER — OFFICE VISIT (OUTPATIENT)
Dept: CARDIOLOGY CLINIC | Facility: CLINIC | Age: 88
End: 2023-02-16

## 2023-02-16 VITALS
WEIGHT: 226 LBS | SYSTOLIC BLOOD PRESSURE: 110 MMHG | DIASTOLIC BLOOD PRESSURE: 60 MMHG | HEIGHT: 73 IN | HEART RATE: 82 BPM | BODY MASS INDEX: 29.95 KG/M2

## 2023-02-16 DIAGNOSIS — I11.0 HYPERTENSIVE HEART DISEASE WITH HEART FAILURE (HCC): ICD-10-CM

## 2023-02-16 DIAGNOSIS — E11.69 DYSLIPIDEMIA ASSOCIATED WITH TYPE 2 DIABETES MELLITUS (HCC): ICD-10-CM

## 2023-02-16 DIAGNOSIS — I50.32 CHRONIC DIASTOLIC CONGESTIVE HEART FAILURE (HCC): ICD-10-CM

## 2023-02-16 DIAGNOSIS — I48.19 PERSISTENT ATRIAL FIBRILLATION (HCC): Primary | ICD-10-CM

## 2023-02-16 DIAGNOSIS — E78.5 DYSLIPIDEMIA ASSOCIATED WITH TYPE 2 DIABETES MELLITUS (HCC): ICD-10-CM

## 2023-02-16 NOTE — PATIENT INSTRUCTIONS
A-fib (Atrial Fibrillation)   AMBULATORY CARE:   Atrial fibrillation (A-fib)  is an irregular heartbeat  It reduces your heart's ability to pump blood through your body  A-fib may come and go, or it may be a long-term condition  A-fib can cause life-threatening blood clots, stroke, or heart failure  It is important to treat and manage A-fib to help prevent these problems  Common signs and symptoms include the following:   A heartbeat that races, pounds, or flutters    Weakness, severe tiredness, or confusion    Feeling lightheaded, sweaty, dizzy, or faint    Shortness of breath or anxiety    Chest pain or pressure    Call your local emergency number (911 in the [address] [facility]) or have someone call if:   You have any of the following signs of a heart attack:      Squeezing, pressure, or pain in your chest    You may  also have any of the following:     Discomfort or pain in your back, neck, jaw, stomach, or arm    Shortness of breath    Nausea or vomiting    Lightheadedness or a sudden cold sweat    You have any of the following signs of a stroke:      Numbness or drooping on one side of your face     Weakness in an arm or leg    Confusion or difficulty speaking    Dizziness, a severe headache, or vision loss    Call your doctor or cardiologist if:   Your arm or leg feels warm, tender, and painful  It may look swollen and red  Your heart rate is more than 110 beats per minute  You have new or worsening swelling in your legs, feet, ankles, or abdomen  You are short of breath, even at rest     You have questions or concerns about your condition or care  Treatment for A-fib:  Conditions that cause A-fib, such as thyroid disease, will be treated  You may also need any of the following:  Heart medicines  help control your heart rate or rhythm  You may need more than one medicine to treat your symptoms  Antiplatelet or blood thinner medicines  help prevent blood clots and stroke      Cardioversion  is a procedure to return your heart rate and rhythm to normal  It can be done using medicines or electric shock  A-fib ablation  is a procedure that uses energy to burn a small area of heart tissue  This creates scar tissue and prevents electrical signals that cause A-fib  You may need this procedure more than once  Ask for more information on A-fib ablation  A pacemaker  may be inserted into your heart  A pacemaker is a device that controls your heartbeat  A pacemaker may be inserted during an ablation procedure or surgery  Ask your healthcare provider for more information on pacemakers  Surgery  may be needed if other procedures do not work  During surgery your healthcare provider will make cuts in the upper part of your heart  The provider will stitch the cuts together to create scar tissue  The scar tissue will prevent electrical signals that cause A-fib  Manage A-fib:   Know your target heart rate  Learn how to check your pulse and monitor your heart rate  Know the risks if you choose to drink alcohol  Alcohol can increase your risk for A-fib or make A-fib harder to manage  Ask your healthcare provider if it is okay for you to drink any alcohol  He or she can help you set limits for the number of drinks you have in 24 hours and in a week  A drink of alcohol is 12 ounces of beer, 5 ounces of wine, or 1½ ounces of liquor  Do not smoke  Nicotine can cause heart damage and make it more difficult to manage your A-fib  Do not use e-cigarettes or smokeless tobacco in place of cigarettes or to help you quit  They still contain nicotine  Ask your healthcare provider for information if you currently smoke and need help quitting  Eat heart-healthy foods  Heart healthy foods will help keep your cholesterol low  These include fruits, vegetables, whole-grain breads, low-fat dairy products, beans, lean meats, and fish  Replace butter and margarine with heart-healthy oils such as olive oil and canola oil  Maintain a healthy weight  Ask your healthcare provider what a healthy weight is for you  Ask him or her to help you create a safe weight loss plan if you are overweight  Even a small goal of a 10% weight loss can improve your heart health  Get regular physical activity  Physical activity helps improve your heart health  Get at least 150 minutes of moderate aerobic physical activity each week  Your healthcare provider can help you create an activity plan  Manage other health conditions  This includes high blood pressure or cholesterol, sleep apnea, diabetes, and other heart conditions  Take medicine as directed and follow your treatment plan  Your healthcare provider may need to change a medicine you are taking if it is causing your A-fib  Do not  stop taking any medicine unless directed by your provider  Follow up with your doctor or cardiologist as directed: You will need regular blood tests and monitoring  Write down your questions so you remember to ask them during your visits  © Copyright [employer] 2022 Information is for End User's use only and may not be sold, redistributed or otherwise used for commercial purposes  All illustrations and images included in CareNotes® are the copyrighted property of A D A M , Inc  or [facility] Jose A Carreno   The above information is an  only  It is not intended as medical advice for individual conditions or treatments  Talk to your doctor, nurse or pharmacist before following any medical regimen to see if it is safe and effective for you

## 2023-02-23 DIAGNOSIS — I10 ESSENTIAL HYPERTENSION: ICD-10-CM

## 2023-02-23 DIAGNOSIS — I48.19 PERSISTENT ATRIAL FIBRILLATION (HCC): ICD-10-CM

## 2023-02-23 RX ORDER — WARFARIN SODIUM 4 MG/1
TABLET ORAL
Qty: 180 TABLET | Refills: 5 | Status: SHIPPED | OUTPATIENT
Start: 2023-02-23

## 2023-02-23 RX ORDER — METOPROLOL SUCCINATE 50 MG/1
TABLET, EXTENDED RELEASE ORAL
Qty: 180 TABLET | Refills: 5 | Status: SHIPPED | OUTPATIENT
Start: 2023-02-23

## 2023-02-24 ENCOUNTER — ANTICOAG VISIT (OUTPATIENT)
Dept: CARDIOLOGY CLINIC | Facility: CLINIC | Age: 88
End: 2023-02-24

## 2023-02-24 ENCOUNTER — APPOINTMENT (OUTPATIENT)
Dept: LAB | Facility: MEDICAL CENTER | Age: 88
End: 2023-02-24

## 2023-02-24 DIAGNOSIS — Z79.01 LONG TERM (CURRENT) USE OF ANTICOAGULANTS: ICD-10-CM

## 2023-02-24 DIAGNOSIS — I48.19 PERSISTENT ATRIAL FIBRILLATION (HCC): Primary | ICD-10-CM

## 2023-02-24 NOTE — PATIENT INSTRUCTIONS
Spoke with Patient: No changes in or diet  No missed or extra doses  No s/s of bleeding TIA or CVA  No new , NSAIDs OTC meds, or supplements  Dose as instructed and recheck in two weeks  Patient  had pneumonia and was on ABX     Doc BAMBI Schaefer

## 2023-03-10 ENCOUNTER — TELEPHONE (OUTPATIENT)
Dept: OTHER | Facility: OTHER | Age: 88
End: 2023-03-10

## 2023-03-10 DIAGNOSIS — I11.0 HYPERTENSIVE HEART DISEASE WITH HEART FAILURE (HCC): ICD-10-CM

## 2023-03-10 DIAGNOSIS — I73.9 PERIPHERAL ARTERIAL DISEASE (HCC): ICD-10-CM

## 2023-03-10 DIAGNOSIS — I50.32 CHRONIC DIASTOLIC CONGESTIVE HEART FAILURE (HCC): Primary | ICD-10-CM

## 2023-03-10 DIAGNOSIS — E11.9 TYPE 2 DIABETES MELLITUS WITHOUT COMPLICATION, WITHOUT LONG-TERM CURRENT USE OF INSULIN (HCC): ICD-10-CM

## 2023-03-10 DIAGNOSIS — E55.9 VITAMIN D DEFICIENCY: ICD-10-CM

## 2023-03-10 NOTE — TELEPHONE ENCOUNTER
Patient's wife called stating they would like to get his blood work done today  However, it does not look like his lab orders were placed in his chart  Please call wife back as soon as possible to discuss

## 2023-03-11 ENCOUNTER — APPOINTMENT (OUTPATIENT)
Dept: LAB | Facility: MEDICAL CENTER | Age: 88
End: 2023-03-11

## 2023-03-11 DIAGNOSIS — I48.19 PERSISTENT ATRIAL FIBRILLATION (HCC): ICD-10-CM

## 2023-03-11 DIAGNOSIS — E11.9 TYPE 2 DIABETES MELLITUS WITHOUT COMPLICATION, WITHOUT LONG-TERM CURRENT USE OF INSULIN (HCC): ICD-10-CM

## 2023-03-11 DIAGNOSIS — I73.9 PERIPHERAL ARTERIAL DISEASE (HCC): ICD-10-CM

## 2023-03-11 DIAGNOSIS — J40 BRONCHITIS: ICD-10-CM

## 2023-03-11 DIAGNOSIS — E55.9 VITAMIN D DEFICIENCY: ICD-10-CM

## 2023-03-11 DIAGNOSIS — I50.32 CHRONIC DIASTOLIC CONGESTIVE HEART FAILURE (HCC): ICD-10-CM

## 2023-03-11 DIAGNOSIS — Z79.01 LONG TERM (CURRENT) USE OF ANTICOAGULANTS: ICD-10-CM

## 2023-03-11 DIAGNOSIS — I11.0 HYPERTENSIVE HEART DISEASE WITH HEART FAILURE (HCC): ICD-10-CM

## 2023-03-11 LAB
25(OH)D3 SERPL-MCNC: 36.1 NG/ML (ref 30–100)
ALBUMIN SERPL BCP-MCNC: 3.1 G/DL (ref 3.5–5)
ALP SERPL-CCNC: 75 U/L (ref 46–116)
ALT SERPL W P-5'-P-CCNC: 16 U/L (ref 12–78)
ANION GAP SERPL CALCULATED.3IONS-SCNC: 4 MMOL/L (ref 4–13)
AST SERPL W P-5'-P-CCNC: 14 U/L (ref 5–45)
BASOPHILS # BLD AUTO: 0.05 THOUSANDS/ÂΜL (ref 0–0.1)
BASOPHILS NFR BLD AUTO: 1 % (ref 0–1)
BILIRUB SERPL-MCNC: 0.89 MG/DL (ref 0.2–1)
BUN SERPL-MCNC: 21 MG/DL (ref 5–25)
CALCIUM ALBUM COR SERPL-MCNC: 9.4 MG/DL (ref 8.3–10.1)
CALCIUM SERPL-MCNC: 8.7 MG/DL (ref 8.3–10.1)
CHLORIDE SERPL-SCNC: 108 MMOL/L (ref 96–108)
CHOLEST SERPL-MCNC: 154 MG/DL
CO2 SERPL-SCNC: 25 MMOL/L (ref 21–32)
CREAT SERPL-MCNC: 1.01 MG/DL (ref 0.6–1.3)
EOSINOPHIL # BLD AUTO: 0.22 THOUSAND/ÂΜL (ref 0–0.61)
EOSINOPHIL NFR BLD AUTO: 3 % (ref 0–6)
ERYTHROCYTE [DISTWIDTH] IN BLOOD BY AUTOMATED COUNT: 14.1 % (ref 11.6–15.1)
EST. AVERAGE GLUCOSE BLD GHB EST-MCNC: 177 MG/DL
GFR SERPL CREATININE-BSD FRML MDRD: 66 ML/MIN/1.73SQ M
GLUCOSE P FAST SERPL-MCNC: 154 MG/DL (ref 65–99)
HBA1C MFR BLD: 7.8 %
HCT VFR BLD AUTO: 39.6 % (ref 36.5–49.3)
HDLC SERPL-MCNC: 45 MG/DL
HGB BLD-MCNC: 12.2 G/DL (ref 12–17)
IMM GRANULOCYTES # BLD AUTO: 0.03 THOUSAND/UL (ref 0–0.2)
IMM GRANULOCYTES NFR BLD AUTO: 0 % (ref 0–2)
INR PPP: 2.77 (ref 0.84–1.19)
LDLC SERPL CALC-MCNC: 91 MG/DL (ref 0–100)
LYMPHOCYTES # BLD AUTO: 1 THOUSANDS/ÂΜL (ref 0.6–4.47)
LYMPHOCYTES NFR BLD AUTO: 11 % (ref 14–44)
MCH RBC QN AUTO: 27.9 PG (ref 26.8–34.3)
MCHC RBC AUTO-ENTMCNC: 30.8 G/DL (ref 31.4–37.4)
MCV RBC AUTO: 91 FL (ref 82–98)
MONOCYTES # BLD AUTO: 0.75 THOUSAND/ÂΜL (ref 0.17–1.22)
MONOCYTES NFR BLD AUTO: 9 % (ref 4–12)
NEUTROPHILS # BLD AUTO: 6.72 THOUSANDS/ÂΜL (ref 1.85–7.62)
NEUTS SEG NFR BLD AUTO: 76 % (ref 43–75)
NRBC BLD AUTO-RTO: 0 /100 WBCS
PLATELET # BLD AUTO: 258 THOUSANDS/UL (ref 149–390)
PMV BLD AUTO: 10.5 FL (ref 8.9–12.7)
POTASSIUM SERPL-SCNC: 4.5 MMOL/L (ref 3.5–5.3)
PROT SERPL-MCNC: 6.5 G/DL (ref 6.4–8.4)
PROTHROMBIN TIME: 29.5 SECONDS (ref 11.6–14.5)
RBC # BLD AUTO: 4.37 MILLION/UL (ref 3.88–5.62)
SODIUM SERPL-SCNC: 137 MMOL/L (ref 135–147)
TRIGL SERPL-MCNC: 89 MG/DL
WBC # BLD AUTO: 8.77 THOUSAND/UL (ref 4.31–10.16)

## 2023-03-13 ENCOUNTER — ANTICOAG VISIT (OUTPATIENT)
Dept: CARDIOLOGY CLINIC | Facility: CLINIC | Age: 88
End: 2023-03-13

## 2023-03-13 DIAGNOSIS — I48.19 PERSISTENT ATRIAL FIBRILLATION (HCC): Primary | ICD-10-CM

## 2023-03-13 DIAGNOSIS — Z79.01 LONG TERM (CURRENT) USE OF ANTICOAGULANTS: ICD-10-CM

## 2023-03-13 NOTE — PATIENT INSTRUCTIONS
Spoke with Patient"s wife: No changes in medication health or diet  No missed or extra doses  No s/s of bleeding TIA or CVA  No new ABX, NSAIDs OTC meds, or supplements  Dose as instructed and recheck in one month     Siddhartha Bolden PA-C

## 2023-03-16 ENCOUNTER — OFFICE VISIT (OUTPATIENT)
Dept: FAMILY MEDICINE CLINIC | Facility: CLINIC | Age: 88
End: 2023-03-16

## 2023-03-16 VITALS
HEART RATE: 94 BPM | WEIGHT: 221.8 LBS | OXYGEN SATURATION: 97 % | BODY MASS INDEX: 29.26 KG/M2 | RESPIRATION RATE: 16 BRPM | SYSTOLIC BLOOD PRESSURE: 140 MMHG | DIASTOLIC BLOOD PRESSURE: 70 MMHG

## 2023-03-16 DIAGNOSIS — J40 BRONCHITIS: ICD-10-CM

## 2023-03-16 DIAGNOSIS — E11.9 DIABETES MELLITUS TYPE 2 IN NONOBESE (HCC): Primary | ICD-10-CM

## 2023-03-16 RX ORDER — BENZONATATE 200 MG/1
200 CAPSULE ORAL 3 TIMES DAILY PRN
Qty: 20 CAPSULE | Refills: 0 | Status: CANCELLED | OUTPATIENT
Start: 2023-03-16

## 2023-03-16 RX ORDER — BENZONATATE 200 MG/1
200 CAPSULE ORAL 3 TIMES DAILY PRN
Qty: 20 CAPSULE | Refills: 0 | Status: SHIPPED | OUTPATIENT
Start: 2023-03-16

## 2023-03-16 RX ORDER — AZITHROMYCIN 250 MG/1
TABLET, FILM COATED ORAL
Qty: 6 TABLET | Refills: 0 | Status: SHIPPED | OUTPATIENT
Start: 2023-03-16 | End: 2023-03-21

## 2023-03-16 NOTE — PROGRESS NOTES
Assessment/Plan: Diabetes mellitus with a hemoglobin A1c of 7 8    Continues with bronchitis has a history of walking pneumonia we will repeat a course of Zithromax and Tessalon Perles    Dyslipidemia with a total cholesterol of 154 triglycerides of 89 HDL of 45 and LDL of 91 on Lipitor 40 mg    Atrial fibrillation anticoagulated with Coumadin with a rate controlled with Toprol-XL 50 mg    Chronic diastolic congestive heart failure diuresed with Lasix 20 mg daily    Stage IIIa chronic kidney disease currently stable    Peripheral vascular disease currently stable    History of proximal leg cancer currently in remission    Stress due to family issues grandson was diagnosed with an astrocytoma has undergone surgery and will need radiation therapy however the grandson is refusing any further therapy at this time    Problem List Items Addressed This Visit    None  Visit Diagnoses     Bronchitis               Diagnoses and all orders for this visit:    Bronchitis        No problem-specific Assessment & Plan notes found for this encounter  PHQ-2/9 Depression Screening            Body mass index is 29 26 kg/m²  BMI Counseling: Body mass index is 29 26 kg/m²  The BMI     Subjective:      Patient ID: Danny Elder is a 80 y o  male      Patient presents accompanied by his wife for checkup on diabetes mellitus      The following portions of the patient's history were reviewed and updated as appropriate:   He has a past medical history of Anxiety, Atrial fibrillation ([address] ), Atrial flutter ([address] ), Congestive heart failure (CHF) ([address] ), COPD (chronic obstructive pulmonary disease) ([address] ), Coronary artery disease, DVT (deep venous thrombosis) ([address] ), ED (erectile dysfunction), Hearing loss, History of echocardiogram (04/05/2018), radiation therapy, Hypercholesterolemia, Hyperlipidemia, Hypertension, Long term (current) use of anticoagulants (8/21/2018), Neuropathy of both feet, Peripheral neuropathy, Prostate cancer ([address] ), and Type 2 diabetes mellitus ([facility] )  ,  does not have any pertinent problems on file  ,   has a past surgical history that includes Cardiac catheterization (01/15/1988); Colonoscopy (10/05/2017); and Prostate surgery  ,  family history includes Cancer in his brother and other; Colon cancer in his brother and other; Heart disease in his other; Hypertension in his other  ,   reports that he has never smoked  He has never used smokeless tobacco  He reports that he does not currently use alcohol  He reports that he does not use drugs  ,  has No Known Allergies     Current Outpatient Medications   Medication Sig Dispense Refill   • atorvastatin (LIPITOR) 40 mg tablet TAKE 1 TABLET BY MOUTH DAILY 90 tablet 5   • furosemide (LASIX) 20 mg tablet TAKE 1 TABLET BY MOUTH 3 DAYS WEEKLY 45 tablet 5   • metFORMIN (GLUCOPHAGE) 500 mg tablet TAKE 1 TABLET BY MOUTH DAILY WITH BREAKFAST 90 tablet 2   • metoprolol succinate (TOPROL-XL) 50 mg 24 hr tablet TAKE 1 TABLET BY MOUTH TWICE DAILY 180 tablet 5   • warfarin (COUMADIN) 4 mg tablet TAKE 2 TABLETS BY MOUTH DAILY OR AS DIRECTED 180 tablet 5   • benzonatate (TESSALON) 200 MG capsule Take 1 capsule (200 mg total) by mouth 3 (three) times a day as needed for cough (Patient not taking: Reported on 2/16/2023) 20 capsule 0   • OneTouch Delica Lancets 02B MISC USE TO TEST ONCE DAILY 100 each 5   • OneTouch Ultra test strip TEST ONCE DAILY 100 each 1     No current facility-administered medications for this visit  Review of Systems   Constitutional: Negative for chills and fever  HENT: Negative for ear pain and sore throat  Eyes: Negative for pain and visual disturbance  Respiratory: Negative for cough and shortness of breath  Cardiovascular: Negative for chest pain and palpitations  Gastrointestinal: Negative for abdominal pain and vomiting  Genitourinary: Negative for dysuria and hematuria  Musculoskeletal: Negative for arthralgias and back pain     Skin: Negative for color change and rash  Neurological: Negative for seizures and syncope  Psychiatric/Behavioral: Positive for dysphoric mood  All other systems reviewed and are negative  Objective:    /70 (BP Location: Right arm, Patient Position: Sitting, Cuff Size: Standard)   Pulse 94   Resp 16   Wt 101 kg (221 lb 12 8 oz)   SpO2 97%   BMI 29 26 kg/m²   Body mass index is 29 26 kg/m²  Physical Exam  Constitutional:       Appearance: Normal appearance  He is well-developed  HENT:      Head: Normocephalic and atraumatic  Right Ear: Tympanic membrane, ear canal and external ear normal       Left Ear: Tympanic membrane, ear canal and external ear normal       Nose: Nose normal       Mouth/Throat:      Mouth: Mucous membranes are moist       Pharynx: Oropharynx is clear  Eyes:      Extraocular Movements: Extraocular movements intact  Conjunctiva/sclera: Conjunctivae normal       Pupils: Pupils are equal, round, and reactive to light  Cardiovascular:      Rate and Rhythm: Normal rate  Rhythm irregular  Pulses: Normal pulses  Heart sounds: Normal heart sounds  Pulmonary:      Effort: Pulmonary effort is normal       Breath sounds: Normal breath sounds  Abdominal:      General: Abdomen is flat  Bowel sounds are normal       Palpations: Abdomen is soft  Tenderness: There is no abdominal tenderness  Musculoskeletal:         General: Normal range of motion  Cervical back: Normal range of motion and neck supple  Skin:     General: Skin is warm and dry  Capillary Refill: Capillary refill takes less than 2 seconds  Neurological:      General: No focal deficit present  Mental Status: He is alert and oriented to person, place, and time  Psychiatric:         Mood and Affect: Mood normal          Behavior: Behavior normal          Thought Content:  Thought content normal          Judgment: Judgment normal

## 2023-03-17 PROBLEM — E11.9 TYPE 2 DIABETES MELLITUS WITHOUT COMPLICATION, WITHOUT LONG-TERM CURRENT USE OF INSULIN (HCC): Status: ACTIVE | Noted: 2023-03-17

## 2023-03-21 ENCOUNTER — TELEPHONE (OUTPATIENT)
Dept: ADMINISTRATIVE | Facility: OTHER | Age: 88
End: 2023-03-21

## 2023-03-21 NOTE — TELEPHONE ENCOUNTER
----- Message from Lita Rivera sent at 3/17/2023  4:45 PM EDT -----  Regarding: Care Gaps Request  03/17/23 4:45 PM    Hello, our patient Yoli Garcia has had Diabetic Eye Exam completed/performed  Please assist in updating the patient chart by making an External outreach to [facility] facility located in [address] [place] [address]  The date of service is more recent than 2021      Thank you,  Lita Rivera  [facility]

## 2023-03-27 NOTE — TELEPHONE ENCOUNTER
As a follow-up, a second attempt has been made for outreach via telephone call to facility  Please see Contacts section for details       x2 eye    Thank you  Cori Boyle

## 2023-03-28 NOTE — TELEPHONE ENCOUNTER
Upon review of the In Basket request we have found/obtained the documentation  After careful review of the document we are unable to complete this request for Diabetic Eye Exam because the documentation does not have the proper verbiage (wording) needed to close the requested care gap(s)  Any additional questions or concerns should be emailed to the Practice Liaisons via the appropriate education email address, please do not reply via In Basket      Thank you  Olena Corona

## 2023-04-01 ENCOUNTER — OFFICE VISIT (OUTPATIENT)
Dept: URGENT CARE | Facility: MEDICAL CENTER | Age: 88
End: 2023-04-01

## 2023-04-01 ENCOUNTER — APPOINTMENT (OUTPATIENT)
Dept: RADIOLOGY | Facility: MEDICAL CENTER | Age: 88
End: 2023-04-01

## 2023-04-01 ENCOUNTER — HOSPITAL ENCOUNTER (OUTPATIENT)
Facility: HOSPITAL | Age: 88
Setting detail: OBSERVATION
Discharge: HOME | End: 2023-04-02
Attending: EMERGENCY MEDICINE | Admitting: FAMILY MEDICINE

## 2023-04-01 VITALS
DIASTOLIC BLOOD PRESSURE: 68 MMHG | OXYGEN SATURATION: 95 % | SYSTOLIC BLOOD PRESSURE: 107 MMHG | HEART RATE: 83 BPM | TEMPERATURE: 98.1 F | RESPIRATION RATE: 32 BRPM

## 2023-04-01 DIAGNOSIS — R05.1 ACUTE COUGH: ICD-10-CM

## 2023-04-01 DIAGNOSIS — J18.9 MULTIFOCAL PNEUMONIA: Primary | ICD-10-CM

## 2023-04-01 DIAGNOSIS — J18.9 PNEUMONIA OF LEFT UPPER LOBE DUE TO INFECTIOUS ORGANISM: Primary | ICD-10-CM

## 2023-04-01 LAB
2HR DELTA HS TROPONIN: 0 NG/L
4HR DELTA HS TROPONIN: -1 NG/L
ALBUMIN SERPL BCP-MCNC: 3.6 G/DL (ref 3.5–5)
ALP SERPL-CCNC: 64 U/L (ref 34–104)
ALT SERPL W P-5'-P-CCNC: 12 U/L (ref 7–52)
ANION GAP SERPL CALCULATED.3IONS-SCNC: 9 MMOL/L (ref 4–13)
APTT PPP: 60 SECONDS (ref 23–37)
AST SERPL W P-5'-P-CCNC: 9 U/L (ref 13–39)
BASOPHILS # BLD AUTO: 0.04 THOUSANDS/ÂΜL (ref 0–0.1)
BASOPHILS NFR BLD AUTO: 1 % (ref 0–1)
BILIRUB SERPL-MCNC: 0.68 MG/DL (ref 0.2–1)
BNP SERPL-MCNC: 275 PG/ML (ref 0–100)
BUN SERPL-MCNC: 20 MG/DL (ref 5–25)
CALCIUM SERPL-MCNC: 8.6 MG/DL (ref 8.4–10.2)
CARDIAC TROPONIN I PNL SERPL HS: 5 NG/L
CARDIAC TROPONIN I PNL SERPL HS: 6 NG/L
CARDIAC TROPONIN I PNL SERPL HS: 6 NG/L
CHLORIDE SERPL-SCNC: 103 MMOL/L (ref 96–108)
CO2 SERPL-SCNC: 25 MMOL/L (ref 21–32)
CREAT SERPL-MCNC: 0.92 MG/DL (ref 0.6–1.3)
EOSINOPHIL # BLD AUTO: 0.31 THOUSAND/ÂΜL (ref 0–0.61)
EOSINOPHIL NFR BLD AUTO: 4 % (ref 0–6)
ERYTHROCYTE [DISTWIDTH] IN BLOOD BY AUTOMATED COUNT: 14.1 % (ref 11.6–15.1)
FLUAV RNA RESP QL NAA+PROBE: NEGATIVE
FLUBV RNA RESP QL NAA+PROBE: NEGATIVE
GFR SERPL CREATININE-BSD FRML MDRD: 74 ML/MIN/1.73SQ M
GLUCOSE SERPL-MCNC: 124 MG/DL (ref 65–140)
GLUCOSE SERPL-MCNC: 153 MG/DL (ref 65–140)
GLUCOSE SERPL-MCNC: 163 MG/DL (ref 65–140)
HCT VFR BLD AUTO: 39.2 % (ref 36.5–49.3)
HGB BLD-MCNC: 12.5 G/DL (ref 12–17)
IMM GRANULOCYTES # BLD AUTO: 0.04 THOUSAND/UL (ref 0–0.2)
IMM GRANULOCYTES NFR BLD AUTO: 1 % (ref 0–2)
INR PPP: 3.58 (ref 0.84–1.19)
LACTATE SERPL-SCNC: 1.2 MMOL/L (ref 0.5–2)
LYMPHOCYTES # BLD AUTO: 1.13 THOUSANDS/ÂΜL (ref 0.6–4.47)
LYMPHOCYTES NFR BLD AUTO: 13 % (ref 14–44)
MCH RBC QN AUTO: 28.6 PG (ref 26.8–34.3)
MCHC RBC AUTO-ENTMCNC: 31.9 G/DL (ref 31.4–37.4)
MCV RBC AUTO: 90 FL (ref 82–98)
MONOCYTES # BLD AUTO: 0.88 THOUSAND/ÂΜL (ref 0.17–1.22)
MONOCYTES NFR BLD AUTO: 10 % (ref 4–12)
NEUTROPHILS # BLD AUTO: 6.28 THOUSANDS/ÂΜL (ref 1.85–7.62)
NEUTS SEG NFR BLD AUTO: 71 % (ref 43–75)
NRBC BLD AUTO-RTO: 0 /100 WBCS
PLATELET # BLD AUTO: 315 THOUSANDS/UL (ref 149–390)
PMV BLD AUTO: 9.8 FL (ref 8.9–12.7)
POTASSIUM SERPL-SCNC: 4.1 MMOL/L (ref 3.5–5.3)
PROCALCITONIN SERPL-MCNC: <0.05 NG/ML
PROT SERPL-MCNC: 6.6 G/DL (ref 6.4–8.4)
PROTHROMBIN TIME: 36 SECONDS (ref 11.6–14.5)
RBC # BLD AUTO: 4.37 MILLION/UL (ref 3.88–5.62)
RSV RNA RESP QL NAA+PROBE: NEGATIVE
SARS-COV-2 RNA RESP QL NAA+PROBE: NEGATIVE
SODIUM SERPL-SCNC: 137 MMOL/L (ref 135–147)
WBC # BLD AUTO: 8.68 THOUSAND/UL (ref 4.31–10.16)

## 2023-04-01 RX ORDER — AMOXICILLIN AND CLAVULANATE POTASSIUM 875; 125 MG/1; MG/1
1 TABLET, FILM COATED ORAL EVERY 12 HOURS SCHEDULED
Qty: 14 TABLET | Refills: 0 | Status: CANCELLED | OUTPATIENT
Start: 2023-04-01 | End: 2023-04-08

## 2023-04-01 RX ORDER — BENZONATATE 100 MG/1
200 CAPSULE ORAL 3 TIMES DAILY PRN
Status: DISCONTINUED | OUTPATIENT
Start: 2023-04-01 | End: 2023-04-02 | Stop reason: HOSPADM

## 2023-04-01 RX ORDER — DOXYCYCLINE 100 MG/1
100 CAPSULE ORAL 2 TIMES DAILY
Qty: 14 CAPSULE | Refills: 0 | Status: CANCELLED | OUTPATIENT
Start: 2023-04-01 | End: 2023-04-08

## 2023-04-01 RX ORDER — CEFTRIAXONE 1 G/50ML
1000 INJECTION, SOLUTION INTRAVENOUS ONCE
Status: COMPLETED | OUTPATIENT
Start: 2023-04-01 | End: 2023-04-01

## 2023-04-01 RX ORDER — INSULIN LISPRO 100 [IU]/ML
1-6 INJECTION, SOLUTION INTRAVENOUS; SUBCUTANEOUS
Status: DISCONTINUED | OUTPATIENT
Start: 2023-04-01 | End: 2023-04-02 | Stop reason: HOSPADM

## 2023-04-01 RX ORDER — METOPROLOL SUCCINATE 50 MG/1
50 TABLET, EXTENDED RELEASE ORAL 2 TIMES DAILY
Status: DISCONTINUED | OUTPATIENT
Start: 2023-04-01 | End: 2023-04-02 | Stop reason: HOSPADM

## 2023-04-01 RX ORDER — FUROSEMIDE 40 MG/1
40 TABLET ORAL 3 TIMES WEEKLY
Status: DISCONTINUED | OUTPATIENT
Start: 2023-04-03 | End: 2023-04-02 | Stop reason: HOSPADM

## 2023-04-01 RX ORDER — ALBUTEROL SULFATE 2.5 MG/3ML
2.5 SOLUTION RESPIRATORY (INHALATION) EVERY 6 HOURS PRN
Status: DISCONTINUED | OUTPATIENT
Start: 2023-04-01 | End: 2023-04-02 | Stop reason: HOSPADM

## 2023-04-01 RX ORDER — ACETAMINOPHEN 325 MG/1
650 TABLET ORAL EVERY 6 HOURS PRN
Status: DISCONTINUED | OUTPATIENT
Start: 2023-04-01 | End: 2023-04-02 | Stop reason: HOSPADM

## 2023-04-01 RX ORDER — GUAIFENESIN/DEXTROMETHORPHAN 100-10MG/5
10 SYRUP ORAL EVERY 4 HOURS PRN
Status: DISCONTINUED | OUTPATIENT
Start: 2023-04-01 | End: 2023-04-02 | Stop reason: HOSPADM

## 2023-04-01 RX ORDER — CEFTRIAXONE 1 G/50ML
1000 INJECTION, SOLUTION INTRAVENOUS EVERY 24 HOURS
Status: DISCONTINUED | OUTPATIENT
Start: 2023-04-02 | End: 2023-04-02 | Stop reason: HOSPADM

## 2023-04-01 RX ORDER — ATORVASTATIN CALCIUM 40 MG/1
40 TABLET, FILM COATED ORAL DAILY
Status: DISCONTINUED | OUTPATIENT
Start: 2023-04-02 | End: 2023-04-02 | Stop reason: HOSPADM

## 2023-04-01 RX ADMIN — CEFTRIAXONE 1000 MG: 1 INJECTION, SOLUTION INTRAVENOUS at 14:19

## 2023-04-01 RX ADMIN — INSULIN LISPRO 1 UNITS: 100 INJECTION, SOLUTION INTRAVENOUS; SUBCUTANEOUS at 21:15

## 2023-04-01 RX ADMIN — METOPROLOL SUCCINATE 50 MG: 50 TABLET, EXTENDED RELEASE ORAL at 21:15

## 2023-04-01 NOTE — RESPIRATORY THERAPY NOTE
RT Protocol Note  Camilla Copeland 80 y o  male MRN: [id number]  Unit/Bed#: 423-01 Encounter: [id number]    Assessment    Principal Problem:    Multifocal pneumonia  Active Problems:    Persistent atrial fibrillation (HCC)    Chronic diastolic congestive heart failure ([facility] )    Type 2 diabetes mellitus without complication, without long-term current use of insulin ([facility])      Home Pulmonary Medications:  none  Home Devices/Therapy:  (none)    Past Medical History:   Diagnosis Date   • Anxiety    • Atrial fibrillation ([facility])    • Atrial flutter (HCC)    • Congestive heart failure (CHF) (HCC)    • COPD (chronic obstructive pulmonary disease) ([facility])    • Coronary artery disease    • DVT (deep venous thrombosis) ([facility])    • ED (erectile dysfunction)    • Hearing loss    • History of echocardiogram 04/05/2018    EF 0 55, Mild LVH  Mild moderate mitral regurg  Trace aortic regurg     • Hx of radiation therapy     XRT   • Hypercholesterolemia    • Hyperlipidemia    • Hypertension    • Long term (current) use of anticoagulants 8/21/2018   • Neuropathy of both feet    • Peripheral neuropathy    • Prostate cancer ([facility])    • Type 2 diabetes mellitus ([facility] )      Social History     Socioeconomic History   • Marital status: /Civil Union     Spouse name: None   • Number of children: None   • Years of education: None   • Highest education level: None   Occupational History   • Occupation: Retired-[employer]   Tobacco Use   • Smoking status: Never   • Smokeless tobacco: Never   Vaping Use   • Vaping Use: Never used   Substance and Sexual Activity   • Alcohol use: Not Currently   • Drug use: Never   • Sexual activity: None   Other Topics Concern   • None   Social History Narrative   • None     Social Determinants of Health     Financial Resource Strain: Low Risk    • Difficulty of Paying Living Expenses: Not very hard   Food Insecurity: Not on file   Transportation Needs: No Transportation Needs   • Lack of Transportation (Medical): "No   • Lack of Transportation (Non-Medical): No   Physical Activity: Not on file   Stress: Not on file   Social Connections: Not on file   Intimate Partner Violence: Not on file   Housing Stability: Not on file       Subjective         Objective    Physical Exam:   Assessment Type: Assess only  General Appearance: Alert, Awake  Respiratory Pattern: Normal, Spontaneous, Symmetrical, Dyspnea with exertion  Chest Assessment: Chest expansion symmetrical, Trachea midline  Bilateral Breath Sounds: Diminished  L Breath Sounds: Crackles (LLL)  Cough: Non-productive, Strong, Moist  O2 Device: RA    Vitals:  Blood pressure (!) 131/104, pulse 93, temperature 97 6 °F (36 4 °C), temperature source Tympanic, resp  rate 18, height 6' 1\" (1 854 m), weight 101 kg (222 lb 7 1 oz), SpO2 95 %  Imaging and other studies: I have personally reviewed pertinent reports        O2 Device: RA     Plan  Bronchodilator therapy with albuterol Q6 prn for SOB and wheezing, airway clearance protocol with  Flutter valve         Resp Comments: pt states he does not use any respiratory meds at home, states he had this pneumonia for a few weeks now     "

## 2023-04-01 NOTE — ASSESSMENT & PLAN NOTE
· INR today, supratherapeutic  · Recheck INR tomorrow, goal is 2 0 to 3 0  · Takes Coumadin MWF and Nam 8 mg   Takes 6 mg Tu Th and Sa  · Continue metoprolol

## 2023-04-01 NOTE — ED PROVIDER NOTES
History  Chief Complaint   Patient presents with   • Cough     Pt has been diagnosed with pneumonia off and on since January 2023 on zithromax for same with no improvement     Patient is an 49-year-old male  He has a history of atrial fibrillation  He is anticoagulated on Coumadin  He has COPD and congestive heart failure  He has coronary artery disease  There is a history of DVT  Patient has actually been sick for couple months  He has been feeling weak and rundown  He has had a cough  No congestion or rhinorrhea  No fever or chills  His primary MD prescribed Zithromax  He actually completed 3 courses of Zithromax  He still is not felt better  Last night he had a bad coughing spell  This prompted him to go to urgent care today  He had a chest x-ray done that showed multifocal pneumonia  He was sent to the emergency room  Symptoms are moderate in severity  No aggravating or relieving factors  Prior to Admission Medications   Prescriptions Last Dose Informant Patient Reported? Taking?    OneTouch Delica Lancets 69K MISC   No No   Sig: USE TO TEST ONCE DAILY   OneTouch Ultra test strip   No No   Sig: TEST ONCE DAILY   atorvastatin (LIPITOR) 40 mg tablet   No No   Sig: TAKE 1 TABLET BY MOUTH DAILY   benzonatate (TESSALON) 200 MG capsule   No No   Sig: Take 1 capsule (200 mg total) by mouth 3 (three) times a day as needed for cough   furosemide (LASIX) 20 mg tablet   No No   Sig: TAKE 1 TABLET BY MOUTH 3 DAYS WEEKLY   metFORMIN (GLUCOPHAGE) 500 mg tablet   No No   Sig: TAKE 1 TABLET BY MOUTH DAILY WITH BREAKFAST   metoprolol succinate (TOPROL-XL) 50 mg 24 hr tablet   No No   Sig: TAKE 1 TABLET BY MOUTH TWICE DAILY   warfarin (COUMADIN) 4 mg tablet   No No   Sig: TAKE 2 TABLETS BY MOUTH DAILY OR AS DIRECTED      Facility-Administered Medications: None       Past Medical History:   Diagnosis Date   • Anxiety    • Atrial fibrillation (HCC)    • Atrial flutter (HCC)    • Congestive heart failure (CHF) ([facility] )    • COPD (chronic obstructive pulmonary disease) ([facility])    • Coronary artery disease    • DVT (deep venous thrombosis) ([facility])    • ED (erectile dysfunction)    • Hearing loss    • History of echocardiogram 04/05/2018    EF 0 55, Mild LVH  Mild moderate mitral regurg  Trace aortic regurg  • Hx of radiation therapy     XRT   • Hypercholesterolemia    • Hyperlipidemia    • Hypertension    • Long term (current) use of anticoagulants 8/21/2018   • Neuropathy of both feet    • Peripheral neuropathy    • Prostate cancer ([facility] )    • Type 2 diabetes mellitus [facility])        Past Surgical History:   Procedure Laterality Date   • CARDIAC CATHETERIZATION  01/15/1988    Left main: unobstructed  Posterolateral branch 90% narrowed  • COLONOSCOPY  10/05/2017    Dr Leila Longoria   • PROSTATE SURGERY         Family History   Problem Relation Age of Onset   • Cancer Brother         bladder   • Colon cancer Brother    • Heart disease Other    • Hypertension Other    • Cancer Other         bladder   • Colon cancer Other      I have reviewed and agree with the history as documented  E-Cigarette/Vaping   • E-Cigarette Use Never User      E-Cigarette/Vaping Substances   • Nicotine No    • THC No    • CBD No    • Flavoring No    • Other No    • Unknown No      Social History     Tobacco Use   • Smoking status: Never   • Smokeless tobacco: Never   Vaping Use   • Vaping Use: Never used   Substance Use Topics   • Alcohol use: Not Currently   • Drug use: Never       Review of Systems   Constitutional: Negative for chills and fever  HENT: Negative for rhinorrhea and sore throat  Eyes: Negative for pain, redness and visual disturbance  Respiratory: Positive for cough  Negative for shortness of breath  Cardiovascular: Negative for chest pain and leg swelling  Gastrointestinal: Negative for abdominal pain, diarrhea and vomiting  Endocrine: Negative for polydipsia and polyuria     Genitourinary: Negative for dysuria, frequency and hematuria  Musculoskeletal: Negative for back pain and neck pain  Skin: Negative for rash and wound  Allergic/Immunologic: Negative for immunocompromised state  Neurological: Positive for weakness  Negative for numbness and headaches  Psychiatric/Behavioral: Negative for hallucinations and suicidal ideas  All other systems reviewed and are negative  Physical Exam  Physical Exam  Vitals reviewed  Constitutional:       General: He is not in acute distress  Appearance: He is not toxic-appearing  HENT:      Head: Normocephalic and atraumatic  Nose: Nose normal       Mouth/Throat:      Mouth: Mucous membranes are moist    Eyes:      General:         Right eye: No discharge  Left eye: No discharge  Conjunctiva/sclera: Conjunctivae normal    Cardiovascular:      Rate and Rhythm: Normal rate  Rhythm irregular  Pulses: Normal pulses  Heart sounds: Normal heart sounds  No murmur heard  No friction rub  No gallop  Pulmonary:      Effort: Pulmonary effort is normal  No respiratory distress  Breath sounds: No stridor  Rales present  No wheezing or rhonchi  Comments: Crackles in the left base  Patient is tachypneic  Abdominal:      General: Bowel sounds are normal  There is no distension  Palpations: Abdomen is soft  Tenderness: There is no abdominal tenderness  There is no right CVA tenderness, left CVA tenderness, guarding or rebound  Musculoskeletal:         General: No swelling, tenderness, deformity or signs of injury  Normal range of motion  Cervical back: Normal range of motion and neck supple  No rigidity  Right lower leg: No edema  Left lower leg: No edema  Comments: No calf pain or unilateral leg swelling   Skin:     General: Skin is warm and dry  Coloration: Skin is not jaundiced or pale  Findings: No bruising, erythema, lesion or rash  Neurological:      General: No focal deficit present  Mental Status: He is alert and oriented to person, place, and time  Cranial Nerves: No facial asymmetry  Sensory: No sensory deficit  Motor: Motor function is intact  Psychiatric:         Mood and Affect: Mood normal          Behavior: Behavior normal          Vital Signs  ED Triage Vitals [04/01/23 1343]   Temperature Pulse Respirations Blood Pressure SpO2   97 9 °F (36 6 °C) 68 20 148/70 98 %      Temp Source Heart Rate Source Patient Position - Orthostatic VS BP Location FiO2 (%)   Temporal Monitor Sitting Left arm --      Pain Score       No Pain           Vitals:    04/01/23 1343 04/01/23 1500   BP: 148/70 111/65   Pulse: 68 74   Patient Position - Orthostatic VS: Sitting Sitting         Visual Acuity      ED Medications  Medications   cefTRIAXone (ROCEPHIN) IVPB (premix in dextrose) 1,000 mg 50 mL (0 mg Intravenous Stopped 4/1/23 1451)       Diagnostic Studies  Results Reviewed     Procedure Component Value Units Date/Time    FLU/RSV/COVID - if FLU/RSV clinically relevant [[id number]]  (Normal) Collected: 04/01/23 1413    Lab Status: Final result Specimen: Nares from Nose Updated: 04/01/23 1507     SARS-CoV-2 Negative     INFLUENZA A PCR Negative     INFLUENZA B PCR Negative     RSV PCR Negative    Narrative:      FOR PEDIATRIC PATIENTS - copy/paste COVID Guidelines URL to browser: [email or web address]  [employer]    SARS-CoV-2 assay is a Nucleic Acid Amplification assay intended for the  qualitative detection of nucleic acid from SARS-CoV-2 in nasopharyngeal  swabs  Results are for the presumptive identification of SARS-CoV-2 RNA  Positive results are indicative of infection with SARS-CoV-2, the virus  causing COVID-19, but do not rule out bacterial infection or co-infection  with other viruses  Laboratories within the [place] and its  territories are required to report all positive results to the appropriate  public health authorities  Negative results do not preclude SARS-CoV-2  infection and should not be used as the sole basis for treatment or other  patient management decisions  Negative results must be combined with  clinical observations, patient history, and epidemiological information  This test has not been FDA cleared or approved  This test has been authorized by FDA under an Emergency Use Authorization  (EUA)  This test is only authorized for the duration of time the  declaration that circumstances exist justifying the authorization of the  emergency use of an in vitro diagnostic tests for detection of SARS-CoV-2  virus and/or diagnosis of COVID-19 infection under section 564(b)(1) of  the Act, 21 U  [place]  786ZEC-6(U)(8), unless the authorization is terminated  or revoked sooner  The test has been validated but independent review by FDA  and CLIA is pending  Test performed using [employer] GeneXpert: This RT-PCR assay targets N2,  a region unique to SARS-CoV-2  A conserved region in the E-gene was chosen  for pan-Sarbecovirus detection which includes SARS-CoV-2  According to CMS-2020-01-R, this platform meets the definition of high-throughput technology  B-Type Natriuretic Peptide(BNP) [[id number]]  (Abnormal) Collected: 04/01/23 1413    Lab Status: Final result Specimen: Blood from Arm, Right Updated: 04/01/23 1449      pg/mL     HS Troponin 0hr (reflex protocol) [[id number]]  (Normal) Collected: 04/01/23 1413    Lab Status: Final result Specimen: Blood from Arm, Right Updated: 04/01/23 1448     hs TnI 0hr 6 ng/L     HS Troponin I 2hr [[id number]]     Lab Status: No result Specimen: Blood     Lactic acid [[id number]]  (Normal) Collected: 04/01/23 1413    Lab Status: Final result Specimen: Blood from Arm, Right Updated: 04/01/23 1444     LACTIC ACID 1 2 mmol/L     Narrative:      Result may be elevated if tourniquet was used during collection      Comprehensive metabolic panel [[id number]]  (Abnormal) Collected: 04/01/23 1413 Lab Status: Final result Specimen: Blood from Arm, Right Updated: 04/01/23 1443     Sodium 137 mmol/L      Potassium 4 1 mmol/L      Chloride 103 mmol/L      CO2 25 mmol/L      ANION GAP 9 mmol/L      BUN 20 mg/dL      Creatinine 0 92 mg/dL      Glucose 163 mg/dL      Calcium 8 6 mg/dL      AST 9 U/L      ALT 12 U/L      Alkaline Phosphatase 64 U/L      Total Protein 6 6 g/dL      Albumin 3 6 g/dL      Total Bilirubin 0 68 mg/dL      eGFR 74 ml/min/1 73sq m     Narrative:      National Kidney Disease Foundation guidelines for Chronic Kidney Disease (CKD):   •  Stage 1 with normal or high GFR (GFR > 90 mL/min/1 73 square meters)  •  Stage 2 Mild CKD (GFR = 60-89 mL/min/1 73 square meters)  •  Stage 3A Moderate CKD (GFR = 45-59 mL/min/1 73 square meters)  •  Stage 3B Moderate CKD (GFR = 30-44 mL/min/1 73 square meters)  •  Stage 4 Severe CKD (GFR = 15-29 mL/min/1 73 square meters)  •  Stage 5 End Stage CKD (GFR <15 mL/min/1 73 square meters)  Note: GFR calculation is accurate only with a steady state creatinine    APTT [[id number]]  (Abnormal) Collected: 04/01/23 1413    Lab Status: Final result Specimen: Blood from Arm, Right Updated: 04/01/23 1440     PTT 60 seconds     Protime-INR [[id number]]  (Abnormal) Collected: 04/01/23 1413    Lab Status: Final result Specimen: Blood from Arm, Right Updated: 04/01/23 1440     Protime 36 0 seconds      INR 3 58    CBC and differential [[id number]]  (Abnormal) Collected: 04/01/23 1413    Lab Status: Final result Specimen: Blood from Arm, Right Updated: 04/01/23 1427     WBC 8 68 Thousand/uL      RBC 4 37 Million/uL      Hemoglobin 12 5 g/dL      Hematocrit 39 2 %      MCV 90 fL      MCH 28 6 pg      MCHC 31 9 g/dL      RDW 14 1 %      MPV 9 8 fL      Platelets 568 Thousands/uL      nRBC 0 /100 WBCs      Neutrophils Relative 71 %      Immat GRANS % 1 %      Lymphocytes Relative 13 %      Monocytes Relative 10 %      Eosinophils Relative 4 %      Basophils Relative 1 % Neutrophils Absolute 6 28 Thousands/µL      Immature Grans Absolute 0 04 Thousand/uL      Lymphocytes Absolute 1 13 Thousands/µL      Monocytes Absolute 0 88 Thousand/µL      Eosinophils Absolute 0 31 Thousand/µL      Basophils Absolute 0 04 Thousands/µL     Blood culture #1 [[id number]] Collected: 04/01/23 1413    Lab Status: In process Specimen: Blood from Arm, Right Updated: 04/01/23 1424    Blood culture #2 [[id number]] Collected: 04/01/23 1418    Lab Status: In process Specimen: Blood from Arm, Left Updated: 04/01/23 1424                 No orders to display              Procedures  ECG 12 Lead Documentation Only    Date/Time: 4/1/2023 2:21 PM  Performed by: Alex Nagy MD  Authorized by: Alex Nagy MD     ECG reviewed by me, the ED Provider: yes    Patient location:  ED  Comments:      Rate controlled atrial fibrillation  No acute ischemic ST or T wave changes  Normal axis  Normal EKG  ED Course                               SBIRT 20yo+    Flowsheet Row Most Recent Value   SBIRT (23 yo +)    In order to provide better care to our patients, we are screening all of our patients for alcohol and drug use  Would it be okay to ask you these screening questions? Yes Filed at: 04/01/2023 1510   Initial Alcohol Screen: US AUDIT-C     1  How often do you have a drink containing alcohol? 0 Filed at: 04/01/2023 1510   2  How many drinks containing alcohol do you have on a typical day you are drinking? 0 Filed at: 04/01/2023 1510   3b  FEMALE Any Age, or MALE 65+: How often do you have 4 or more drinks on one occassion? 0 Filed at: 04/01/2023 1510   Audit-C Score 0 Filed at: 04/01/2023 1510   RICK: How many times in the past year have you    Used an illegal drug or used a prescription medication for non-medical reasons? Never Filed at: 04/01/2023 1510                    Medical Decision Making  Chest x-ray is consistent with pneumonia  INR is therapeutic  This is not pulmonary embolism    Chest x-ray does not have the appearance of congestive heart failure  EKG is without ischemia  Patient is tachypneic  He has failed outpatient management  Consulted with hospitalist for admission  Amount and/or Complexity of Data Reviewed  Independent Historian: clotilde  External Data Reviewed: radiology and notes  Labs: ordered  Decision-making details documented in ED Course  Radiology: independent interpretation performed  Decision-making details documented in ED Course  Details: Multifocal pneumonia  ECG/medicine tests: ordered and independent interpretation performed  Decision-making details documented in ED Course  Risk  Prescription drug management  Decision regarding hospitalization  Disposition  Final diagnoses:   Multifocal pneumonia     Time reflects when diagnosis was documented in both MDM as applicable and the Disposition within this note     Time User Action Codes Description Comment    4/1/2023  3:30 PM Elisha iGbbs Add [J18 9] Multifocal pneumonia       ED Disposition     ED Disposition   Admit    Condition   Stable    Date/Time   Sat Apr 1, 2023  3:30 PM    Comment              Follow-up Information    None         Patient's Medications   Discharge Prescriptions    No medications on file       No discharge procedures on file      PDMP Review     None          ED Provider  Electronically Signed by           Alexus Lowery MD  04/01/23 6158

## 2023-04-01 NOTE — ASSESSMENT & PLAN NOTE
· Presented for worsening cough  · Has been treated with multiple courses of azithromycin for mycoplasma pneumonia since January  · CXR as viewed by me: Multifocal pneumonia   Radiology read is pending  · COVID, Flu and RSV negative  · Ceftriaxone IV started in ER  · Check procalcitonin  · Follow blood cultures  · Check sputum culture, strep pneumoniae, Legionella  · Respiratory protocol  · Monitor labs and VS  · Serial respiratory assessments  · Robitussin DM  · Consider pulmonology consultation if not improving

## 2023-04-01 NOTE — ASSESSMENT & PLAN NOTE
Wt Readings from Last 3 Encounters:   04/01/23 101 kg (222 lb 9 6 oz)   03/16/23 101 kg (221 lb 12 8 oz)   02/16/23 103 kg (226 lb)     · Does not appear volume overloaded  · Continue Lasix  · Continue compression stockings  · Daily weights and I & Os

## 2023-04-01 NOTE — ASSESSMENT & PLAN NOTE
Lab Results   Component Value Date    HGBA1C 7 8 (H) 03/11/2023       No results for input(s): POCGLU in the last 72 hours      Blood Sugar Average: Last 72 hrs:     · Diabetic diet  · Fingerstick glucose with SS coverage with meals and at bedtime  · Hold metformin while in the hospital

## 2023-04-01 NOTE — H&P
[address]  H&P  Name: Karrie Campoverde  MRN: [id number]  Unit/Bed#: 144-60 I Date of Admission: 4/1/2023   Date of Service: 4/1/2023 I Hospital Day: 0      Assessment/Plan      * Multifocal pneumonia  Assessment & Plan  · Presented for worsening cough  · Has been treated with multiple courses of azithromycin for mycoplasma pneumonia since January  · CXR as viewed by me: Multifocal pneumonia  Radiology read is pending  · COVID, Flu and RSV negative  · Ceftriaxone IV started in ER  · Check procalcitonin  · Follow blood cultures  · Check sputum culture, strep pneumoniae, Legionella  · Respiratory protocol  · Monitor labs and VS  · Serial respiratory assessments  · Robitussin DM  · Consider pulmonology consultation if not improving    Persistent atrial fibrillation ([address] )  Assessment & Plan  · INR today, supratherapeutic  · Recheck INR tomorrow, goal is 2 0 to 3 0  · Takes Coumadin MWF and Nam 8 mg  Takes 6 mg Tu Th and Sa  · Continue metoprolol    Type 2 diabetes mellitus without complication, without long-term current use of insulin (HCC)  Assessment & Plan  Lab Results   Component Value Date    HGBA1C 7 8 (H) 03/11/2023       No results for input(s): POCGLU in the last 72 hours  Blood Sugar Average: Last 72 hrs:     · Diabetic diet  · Fingerstick glucose with SS coverage with meals and at bedtime  · Hold metformin while in the hospital      Chronic diastolic congestive heart failure (HCC)  Assessment & Plan  Wt Readings from Last 3 Encounters:   04/01/23 101 kg (222 lb 9 6 oz)   03/16/23 101 kg (221 lb 12 8 oz)   02/16/23 103 kg (226 lb)     · Does not appear volume overloaded  · Continue Lasix  · Continue compression stockings  · Daily weights and I & Os           VTE Prophylaxis: Warfarin (Coumadin)  / sequential compression device   Code Status: Full  POLST: POLST form is not discussed and not completed at this time    Discussion with family: Patient    Anticipated Length of Stay: Patient will be admitted on an Observation basis with an anticipated length of stay of less than 2 midnights  Justification for Hospital Stay: Multifocal pneumonia with outpatient treatment failure  Total Time for Visit, including Counseling / Coordination of Care: 60 minutes  Greater than 50% of this total time spent on direct patient counseling and coordination of care  Chief Complaint:   Worsening cough with history of pneumonia    History of Present Illness:    Chepe Pepe is a 80 y o  male with history as noted below who presents with worsening cough and history of pneumonia with outpatient treatment failure  He had gone to an urgent care for worsening cough, and he was advised to go to the ER  He has completed three courses of azithromycin as an outpatient for mycoplasma pneumonia  He reports having coughing fits at night, and he said that he had such a bad coughing fit in the bathroom, that he had to sit on the toilet to catch his breath  He also reports weakness and fatigue  He denies fever, diaphoresis, chest pain, abdominal pain, or nausea  Review of Systems:    Review of Systems   Constitutional: Negative for chills and fever  HENT: Positive for congestion  Eyes: Negative for visual disturbance  Respiratory: Positive for cough and shortness of breath  Cardiovascular: Negative for chest pain and leg swelling  Gastrointestinal: Negative for abdominal pain, nausea and vomiting  Genitourinary: Negative for dysuria  Musculoskeletal: Negative for arthralgias and myalgias  Skin: Negative for color change  Neurological: Positive for weakness  Negative for dizziness and syncope  Psychiatric/Behavioral: Negative for confusion  All other systems reviewed and are negative        Past Medical and Surgical History:     Past Medical History:   Diagnosis Date   • Anxiety    • Atrial fibrillation [facility])    • Atrial flutter (HCC)    • Congestive heart failure (CHF) ([facility])    • COPD (chronic obstructive pulmonary disease) ([facility])    • Coronary artery disease    • DVT (deep venous thrombosis) ([facility])    • ED (erectile dysfunction)    • Hearing loss    • History of echocardiogram 04/05/2018    EF 0 55, Mild LVH  Mild moderate mitral regurg  Trace aortic regurg  • Hx of radiation therapy     XRT   • Hypercholesterolemia    • Hyperlipidemia    • Hypertension    • Long term (current) use of anticoagulants 8/21/2018   • Neuropathy of both feet    • Peripheral neuropathy    • Prostate cancer ([facility] [address] )    • Type 2 diabetes mellitus [facility])        Past Surgical History:   Procedure Laterality Date   • CARDIAC CATHETERIZATION  01/15/1988    Left main: unobstructed  Posterolateral branch 90% narrowed  • COLONOSCOPY  10/05/2017    Dr Sandra Antonio   • PROSTATE SURGERY         Meds/Allergies:    Prior to Admission medications    Medication Sig Start Date End Date Taking? Authorizing Provider   atorvastatin (LIPITOR) 40 mg tablet TAKE 1 TABLET BY MOUTH DAILY 1/28/22  Yes Rodney Kumar,    benzonatate (TESSALON) 200 MG capsule Take 1 capsule (200 mg total) by mouth 3 (three) times a day as needed for cough 3/16/23  Yes Elijah Borja DO   furosemide (LASIX) 20 mg tablet TAKE 1 TABLET BY MOUTH 3 DAYS WEEKLY  Patient taking differently: TAKE 1 TABLET BY MOUTH 3 DAYS WEEKLY; Mon, Wed, Friday 7/14/22  Yes Rodney Kumar, DO   metFORMIN (GLUCOPHAGE) 500 mg tablet TAKE 1 TABLET BY MOUTH DAILY WITH BREAKFAST 1/11/23  Yes Elijah Borja DO   metoprolol succinate (TOPROL-XL) 50 mg 24 hr tablet TAKE 1 TABLET BY MOUTH TWICE DAILY 2/23/23  Yes Rodney Kumar, DO   OneTouch Delica Lancets 40Q MISC USE TO TEST ONCE DAILY 1/3/23  Yes Elijah Borja DO   OneTouch Ultra test strip TEST ONCE DAILY 1/3/23  Yes Elijah Borja DO   warfarin (COUMADIN) 4 mg tablet TAKE 2 TABLETS BY MOUTH DAILY OR AS DIRECTED  Patient taking differently: TAKE 2 TABLETS BY MOUTH DAILY OR AS DIRECTED;   Sunday 8mg; Mon 8mg; Tues 6mg; Wed 8mg;  Thurs "6mg; Friday 8mg; Sat 6mg; Pt states he did not take yet today as he takes this in the evening; 2/23/23   Cash Ojeda, DO     I have reviewed home medications with patient personally  Allergies: No Known Allergies    Social History:     Marital Status: /Civil Union   Occupation: Retried  Patient Pre-hospital Living Situation: Home  Patient Pre-hospital Level of Mobility: Independent  Patient Pre-hospital Diet Restrictions: carb controlled heart healthy  Substance Use History:   Social History     Substance and Sexual Activity   Alcohol Use Not Currently     Social History     Tobacco Use   Smoking Status Never   Smokeless Tobacco Never     Social History     Substance and Sexual Activity   Drug Use Never       Family History:    Family History   Problem Relation Age of Onset   • Cancer Brother         bladder   • Colon cancer Brother    • Heart disease Other    • Hypertension Other    • Cancer Other         bladder   • Colon cancer Other        Physical Exam:     Vitals:   Blood Pressure: (!) 131/104 (04/01/23 1553)  Pulse: 91 (04/01/23 1553)  Temperature: 97 6 °F (36 4 °C) (04/01/23 1553)  Temp Source: Tympanic (04/01/23 1553)  Respirations: 18 (04/01/23 1553)  Height: 6' 1\" (185 4 cm) (04/01/23 1553)  Weight - Scale: 101 kg (222 lb 9 6 oz) (04/01/23 1553)  SpO2: 97 % (04/01/23 1639)    Physical Exam  Vitals and nursing note reviewed  Constitutional:       General: He is not in acute distress  Appearance: He is not ill-appearing  HENT:      Head: Normocephalic and atraumatic  Nose: Nose normal       Mouth/Throat:      Mouth: Mucous membranes are moist       Pharynx: Oropharynx is clear  Eyes:      Extraocular Movements: Extraocular movements intact  Pupils: Pupils are equal, round, and reactive to light  Cardiovascular:      Rate and Rhythm: Normal rate  Rhythm irregular  Pulses: Normal pulses  Heart sounds: Normal heart sounds     Pulmonary:      Effort: Pulmonary effort " is normal  No respiratory distress  Breath sounds: Decreased breath sounds present  No wheezing, rhonchi or rales  Abdominal:      General: Bowel sounds are normal       Palpations: Abdomen is soft  Tenderness: There is no abdominal tenderness  Musculoskeletal:      Cervical back: Neck supple  Right lower leg: No edema  Left lower leg: No edema  Comments: Compression stockings in place to BLE, when turned down, no appreciable edema    Skin:     General: Skin is warm and dry  Capillary Refill: Capillary refill takes less than 2 seconds  Neurological:      General: No focal deficit present  Mental Status: He is alert and oriented to person, place, and time  Additional Data:     Lab Results: I have personally reviewed pertinent reports  Results from last 7 days   Lab Units 04/01/23  1413   WBC Thousand/uL 8 68   HEMOGLOBIN g/dL 12 5   HEMATOCRIT % 39 2   PLATELETS Thousands/uL 315   NEUTROS PCT % 71   LYMPHS PCT % 13*   MONOS PCT % 10   EOS PCT % 4     Results from last 7 days   Lab Units 04/01/23  1413   SODIUM mmol/L 137   POTASSIUM mmol/L 4 1   CHLORIDE mmol/L 103   CO2 mmol/L 25   BUN mg/dL 20   CREATININE mg/dL 0 92   ANION GAP mmol/L 9   CALCIUM mg/dL 8 6   ALBUMIN g/dL 3 6   TOTAL BILIRUBIN mg/dL 0 68   ALK PHOS U/L 64   ALT U/L 12   AST U/L 9*   GLUCOSE RANDOM mg/dL 163*     Results from last 7 days   Lab Units 04/01/23  1413   INR  3 58*     Results from last 7 days   Lab Units 04/01/23  1724   POC GLUCOSE mg/dl 124         Results from last 7 days   Lab Units 04/01/23  1413   LACTIC ACID mmol/L 1 2       Imaging: I have personally reviewed pertinent reports  No orders to display       "[employer] / [employer] Records Reviewed: Yes     ** Please Note: This note has been constructed using a voice recognition system   **

## 2023-04-01 NOTE — PROGRESS NOTES
[address] [facility]        NAME: Radha Parsons is a 80 y o  male  : 1935    MRN: [id number]  DATE: 2023  TIME: 1:23 PM    Assessment and Plan   Pneumonia of left upper lobe due to infectious organism [J18 9]  1  Pneumonia of left upper lobe due to infectious organism  Transfer to other facility      2  Acute cough  XR chest pa & lateral        CXR provider read: MARIFER infiltrate  Possible RML infiltrate  3/11/23: AST/ALT WNL; eGFR 66 (stage 2 mild CKD)    79 y/o M with PMH of COPD, DM, CHF, atrial fibrillation presents with worsening cough x last night  Patient has been treated with multiple courses of azithromycin for Mycoplasma pneumonia x January  RR 32  O2 95%  /68  CXR reveals MARIFER infiltrate and possible RML infiltrate  Recommend blood work including BUN testing to evaluate need for inpatient treatment  Patient Instructions       Follow up with PCP in 3-5 days  Proceed to  ER    Chief Complaint     Chief Complaint   Patient presents with   • Cough     Worse at night  Had 3 doses of z-pack and steroids  Was using Tesselon  History of Present Illness       Patient has been treated with azithromycin for pneumonia three times in the last few months for mycoplasma pneumonia  Hx/o DM, CHF, renal insufficiency, HTN, atrial fibrillation, atrial flutter, and COPD  Reports occasional saliva aspiration  Patient denies hospitalization in past 3 months, residence in a nursing home, immunosuppressant therapy, hepatic insufficiency, asplenia, alcohol abuse, smoking  Denies confusion, fever or chest pain  Cough  This is a new problem  Episode onset: 2 months  The problem has been rapidly worsening (last night)  The cough is non-productive  Associated symptoms include shortness of breath (at night only when getting up to go to the bathroon)  Pertinent negatives include no chills, ear pain, fever, headaches, myalgias, postnasal drip, rash, rhinorrhea, sore throat or wheezing  Treatments tried: azithromycin last completed 2 weeks ago; tessalon; prednisone; zyrtec  Review of Systems   Review of Systems   Constitutional: Negative for chills and fever  HENT: Negative for congestion, dental problem, ear discharge, ear pain, facial swelling, postnasal drip, rhinorrhea, sinus pressure, sinus pain, sneezing, sore throat and trouble swallowing  Eyes: Negative for itching  Respiratory: Positive for cough and shortness of breath (at night only when getting up to go to the bathroon)  Negative for chest tightness and wheezing  Gastrointestinal: Negative for abdominal pain, constipation, diarrhea, nausea and vomiting  Musculoskeletal: Negative for myalgias  Skin: Negative for rash  Neurological: Positive for weakness  Negative for dizziness, light-headedness and headaches           Current Medications       Current Outpatient Medications:   •  atorvastatin (LIPITOR) 40 mg tablet, TAKE 1 TABLET BY MOUTH DAILY, Disp: 90 tablet, Rfl: 5  •  benzonatate (TESSALON) 200 MG capsule, Take 1 capsule (200 mg total) by mouth 3 (three) times a day as needed for cough, Disp: 20 capsule, Rfl: 0  •  furosemide (LASIX) 20 mg tablet, TAKE 1 TABLET BY MOUTH 3 DAYS WEEKLY, Disp: 45 tablet, Rfl: 5  •  metFORMIN (GLUCOPHAGE) 500 mg tablet, TAKE 1 TABLET BY MOUTH DAILY WITH BREAKFAST, Disp: 90 tablet, Rfl: 2  •  metoprolol succinate (TOPROL-XL) 50 mg 24 hr tablet, TAKE 1 TABLET BY MOUTH TWICE DAILY, Disp: 180 tablet, Rfl: 5  •  OneTouch Delica Lancets 90Z MISC, USE TO TEST ONCE DAILY, Disp: 100 each, Rfl: 5  •  OneTouch Ultra test strip, TEST ONCE DAILY, Disp: 100 each, Rfl: 1  •  warfarin (COUMADIN) 4 mg tablet, TAKE 2 TABLETS BY MOUTH DAILY OR AS DIRECTED, Disp: 180 tablet, Rfl: 5    Current Allergies     Allergies as of 04/01/2023   • (No Known Allergies)            The following portions of the patient's history were reviewed and updated as appropriate: allergies, current medications, past family history, past medical history, past social history, past surgical history and problem list      Past Medical History:   Diagnosis Date   • Anxiety    • Atrial fibrillation ([facility] [address] )    • Atrial flutter (HCC)    • Congestive heart failure (CHF) (HCC)    • COPD (chronic obstructive pulmonary disease) (HCC)    • Coronary artery disease    • DVT (deep venous thrombosis) ([facility])    • ED (erectile dysfunction)    • Hearing loss    • History of echocardiogram 04/05/2018    EF 0 55, Mild LVH  Mild moderate mitral regurg  Trace aortic regurg  • Hx of radiation therapy     XRT   • Hypercholesterolemia    • Hyperlipidemia    • Hypertension    • Long term (current) use of anticoagulants 8/21/2018   • Neuropathy of both feet    • Peripheral neuropathy    • Prostate cancer ([facility] [address] )    • Type 2 diabetes mellitus [facility])        Past Surgical History:   Procedure Laterality Date   • CARDIAC CATHETERIZATION  01/15/1988    Left main: unobstructed  Posterolateral branch 90% narrowed  • COLONOSCOPY  10/05/2017    Dr Deisy Trejo   • PROSTATE SURGERY         Family History   Problem Relation Age of Onset   • Cancer Brother         bladder   • Colon cancer Brother    • Heart disease Other    • Hypertension Other    • Cancer Other         bladder   • Colon cancer Other          Medications have been verified  Objective   /68   Pulse 83   Temp 98 1 °F (36 7 °C)   Resp (!) 32   SpO2 95%   No LMP for male patient  Physical Exam     Physical Exam  Vitals reviewed  Constitutional:       General: He is not in acute distress  Appearance: He is well-developed  He is not diaphoretic  HENT:      Head: Normocephalic  Right Ear: Tympanic membrane, ear canal and external ear normal       Left Ear: Tympanic membrane, ear canal and external ear normal       Nose: Nose normal       Mouth/Throat:      Pharynx: No oropharyngeal exudate or posterior oropharyngeal erythema     Eyes:      Conjunctiva/sclera: Conjunctivae normal  Cardiovascular:      Rate and Rhythm: Normal rate and regular rhythm  Heart sounds: Normal heart sounds  No murmur heard  No friction rub  No gallop  Pulmonary:      Effort: Pulmonary effort is normal  No respiratory distress  Breath sounds: Rales (L posterior lung field) present  No wheezing or rhonchi  Lymphadenopathy:      Cervical: No cervical adenopathy  Skin:     General: Skin is warm  Neurological:      Mental Status: He is alert and oriented to person, place, and time  Psychiatric:         Behavior: Behavior normal          Thought Content:  Thought content normal          Judgment: Judgment normal

## 2023-04-01 NOTE — PLAN OF CARE
Problem: Potential for Falls  Goal: Patient will remain free of falls  Description: INTERVENTIONS:  - Educate patient/family on patient safety including physical limitations  - Instruct patient to call for assistance with activity   - Consult OT/PT to assist with strengthening/mobility   - Keep Call bell within reach  - Keep bed low and locked with side rails adjusted as appropriate  - Keep care items and personal belongings within reach  - Initiate and maintain comfort rounds  - Make Fall Risk Sign visible to staff  - Offer Toileting every 2 Hours, in advance of need  - Initiate/Maintain fall alarm  - Obtain necessary fall risk management equipment: non-skid footwear  - Apply yellow socks and bracelet for high fall risk patients  - Consider moving patient to room near nurses station  Outcome: Progressing     Problem: PAIN - ADULT  Goal: Verbalizes/displays adequate comfort level or baseline comfort level  Description: Interventions:  - Encourage patient to monitor pain and request assistance  - Assess pain using appropriate pain scale  - Administer analgesics based on type and severity of pain and evaluate response  - Implement non-pharmacological measures as appropriate and evaluate response  - Consider cultural and social influences on pain and pain management  - Notify physician/advanced practitioner if interventions unsuccessful or patient reports new pain  Outcome: Progressing     Problem: INFECTION - ADULT  Goal: Absence or prevention of progression during hospitalization  Description: INTERVENTIONS:  - Assess and monitor for signs and symptoms of infection  - Monitor lab/diagnostic results  - Monitor all insertion sites, i e  indwelling lines, tubes, and drains  - Monitor endotracheal if appropriate and nasal secretions for changes in amount and color  - [place] appropriate cooling/warming therapies per order  - Administer medications as ordered  - Instruct and encourage patient and family to use good hand hygiene technique  - Identify and instruct in appropriate isolation precautions for identified infection/condition  Outcome: Progressing     Problem: SAFETY ADULT  Goal: Patient will remain free of falls  Description: INTERVENTIONS:  - Educate patient/family on patient safety including physical limitations  - Instruct patient to call for assistance with activity   - Consult OT/PT to assist with strengthening/mobility   - Keep Call bell within reach  - Keep bed low and locked with side rails adjusted as appropriate  - Keep care items and personal belongings within reach  - Initiate and maintain comfort rounds  - Make Fall Risk Sign visible to staff  - Offer Toileting every 2 Hours, in advance of need  - Initiate/Maintain fall alarm  - Obtain necessary fall risk management equipment: non-skid footwear  - Apply yellow socks and bracelet for high fall risk patients  - Consider moving patient to room near nurses station  Outcome: Progressing  Goal: Maintain or return to baseline ADL function  Description: INTERVENTIONS:  -  Assess patient's ability to carry out ADLs; assess patient's baseline for ADL function and identify physical deficits which impact ability to perform ADLs (bathing, care of mouth/teeth, toileting, grooming, dressing, etc )  - Assess/evaluate cause of self-care deficits   - Assess range of motion  - Assess patient's mobility; develop plan if impaired  - Assess patient's need for assistive devices and provide as appropriate  - Encourage maximum independence but intervene and supervise when necessary  - Involve family in performance of ADLs  - Assess for home care needs following discharge   - Consider OT consult to assist with ADL evaluation and planning for discharge  - Provide patient education as appropriate  Outcome: Progressing  Goal: Maintains/Returns to pre admission functional level  Description: INTERVENTIONS:  - Perform BMAT or MOVE assessment daily    - Set and communicate daily mobility goal to care team and patient/family/caregiver  - Collaborate with rehabilitation services on mobility goals if consulted  - Perform Range of Motion 3 times a day  - Reposition patient every 2 hours  - Dangle patient 3 times a day  - Stand patient 3 times a day  - Ambulate patient 3 times a day  - Out of bed to chair 3 times a day   - Out of bed for meals 3 times a day  - Out of bed for toileting  - Record patient progress and toleration of activity level   Outcome: Progressing     Problem: DISCHARGE PLANNING  Goal: Discharge to home or other facility with appropriate resources  Description: INTERVENTIONS:  - Identify barriers to discharge w/patient and caregiver  - Arrange for needed discharge resources and transportation as appropriate  - Identify discharge learning needs (meds, wound care, etc )  - Arrange for interpretive services to assist at discharge as needed  - Refer to Case Management Department for coordinating discharge planning if the patient needs post-hospital services based on physician/advanced practitioner order or complex needs related to functional status, cognitive ability, or social support system  Outcome: Progressing     Problem: Knowledge Deficit  Goal: Patient/family/caregiver demonstrates understanding of disease process, treatment plan, medications, and discharge instructions  Description: Complete learning assessment and assess knowledge base    Interventions:  - Provide teaching at level of understanding  - Provide teaching via preferred learning methods  Outcome: Progressing     Problem: CARDIOVASCULAR - ADULT  Goal: Maintains optimal cardiac output and hemodynamic stability  Description: INTERVENTIONS:  - Monitor I/O, vital signs and rhythm  - Monitor for S/S and trends of decreased cardiac output  - Administer and titrate ordered vasoactive medications to optimize hemodynamic stability  - Assess quality of pulses, skin color and temperature  - Assess for signs of decreased coronary artery perfusion  - Instruct patient to report change in severity of symptoms  Outcome: Progressing  Goal: Absence of cardiac dysrhythmias or at baseline rhythm  Description: INTERVENTIONS:  - Continuous cardiac monitoring, vital signs, obtain 12 lead EKG if ordered  - Administer antiarrhythmic and heart rate control medications as ordered  - Monitor electrolytes and administer replacement therapy as ordered  Outcome: Progressing     Problem: RESPIRATORY - ADULT  Goal: Achieves optimal ventilation and oxygenation  Description: INTERVENTIONS:  - Assess for changes in respiratory status  - Assess for changes in mentation and behavior  - Position to facilitate oxygenation and minimize respiratory effort  - Oxygen administered by appropriate delivery if ordered  - Initiate smoking cessation education as indicated  - Encourage broncho-pulmonary hygiene including cough, deep breathe, Incentive Spirometry  - Assess the need for suctioning and aspirate as needed  - Assess and instruct to report SOB or any respiratory difficulty  - Respiratory Therapy support as indicated  Outcome: Progressing     Problem: METABOLIC, FLUID AND ELECTROLYTES - ADULT  Goal: Electrolytes maintained within normal limits  Description: INTERVENTIONS:  - Monitor labs and assess patient for signs and symptoms of electrolyte imbalances  - Administer electrolyte replacement as ordered  - Monitor response to electrolyte replacements, including repeat lab results as appropriate  - Instruct patient on fluid and nutrition as appropriate  Outcome: Progressing  Goal: Fluid balance maintained  Description: INTERVENTIONS:  - Monitor labs   - Monitor I/O and WT  - Instruct patient on fluid and nutrition as appropriate  - Assess for signs & symptoms of volume excess or deficit  Outcome: Progressing  Goal: Glucose maintained within target range  Description: INTERVENTIONS:  - Monitor Blood Glucose as ordered  - Assess for signs and symptoms of hyperglycemia and hypoglycemia  - Administer ordered medications to maintain glucose within target range  - Assess nutritional intake and initiate nutrition service referral as needed  Outcome: Progressing     Problem: MUSCULOSKELETAL - ADULT  Goal: Maintain or return mobility to safest level of function  Description: INTERVENTIONS:  - Assess patient's ability to carry out ADLs; assess patient's baseline for ADL function and identify physical deficits which impact ability to perform ADLs (bathing, care of mouth/teeth, toileting, grooming, dressing, etc )  - Assess/evaluate cause of self-care deficits   - Assess range of motion  - Assess patient's mobility  - Assess patient's need for assistive devices and provide as appropriate  - Encourage maximum independence but intervene and supervise when necessary  - Involve family in performance of ADLs  - Assess for home care needs following discharge   - Consider OT consult to assist with ADL evaluation and planning for discharge  - Provide patient education as appropriate  Outcome: Progressing  Goal: Maintain proper alignment of affected body part  Description: INTERVENTIONS:  - Support, maintain and protect limb and body alignment  - Provide patient/ family with appropriate education  Outcome: Progressing

## 2023-04-02 VITALS
TEMPERATURE: 97.8 F | HEART RATE: 85 BPM | OXYGEN SATURATION: 93 % | WEIGHT: 213.41 LBS | SYSTOLIC BLOOD PRESSURE: 131 MMHG | HEIGHT: 73 IN | RESPIRATION RATE: 18 BRPM | DIASTOLIC BLOOD PRESSURE: 74 MMHG | BODY MASS INDEX: 28.28 KG/M2

## 2023-04-02 LAB
ANION GAP SERPL CALCULATED.3IONS-SCNC: 10 MMOL/L (ref 4–13)
BASOPHILS # BLD AUTO: 0.04 THOUSANDS/ÂΜL (ref 0–0.1)
BASOPHILS NFR BLD AUTO: 1 % (ref 0–1)
BUN SERPL-MCNC: 18 MG/DL (ref 5–25)
CALCIUM SERPL-MCNC: 8.1 MG/DL (ref 8.4–10.2)
CHLORIDE SERPL-SCNC: 103 MMOL/L (ref 96–108)
CO2 SERPL-SCNC: 23 MMOL/L (ref 21–32)
CREAT SERPL-MCNC: 0.87 MG/DL (ref 0.6–1.3)
EOSINOPHIL # BLD AUTO: 0.33 THOUSAND/ÂΜL (ref 0–0.61)
EOSINOPHIL NFR BLD AUTO: 4 % (ref 0–6)
ERYTHROCYTE [DISTWIDTH] IN BLOOD BY AUTOMATED COUNT: 14.2 % (ref 11.6–15.1)
GFR SERPL CREATININE-BSD FRML MDRD: 77 ML/MIN/1.73SQ M
GLUCOSE SERPL-MCNC: 134 MG/DL (ref 65–140)
GLUCOSE SERPL-MCNC: 152 MG/DL (ref 65–140)
GLUCOSE SERPL-MCNC: 187 MG/DL (ref 65–140)
HCT VFR BLD AUTO: 33.8 % (ref 36.5–49.3)
HGB BLD-MCNC: 10.7 G/DL (ref 12–17)
IMM GRANULOCYTES # BLD AUTO: 0.03 THOUSAND/UL (ref 0–0.2)
IMM GRANULOCYTES NFR BLD AUTO: 0 % (ref 0–2)
INR PPP: 3.85 (ref 0.84–1.19)
L PNEUMO1 AG UR QL IA.RAPID: NEGATIVE
LYMPHOCYTES # BLD AUTO: 1.24 THOUSANDS/ÂΜL (ref 0.6–4.47)
LYMPHOCYTES NFR BLD AUTO: 15 % (ref 14–44)
MCH RBC QN AUTO: 27.6 PG (ref 26.8–34.3)
MCHC RBC AUTO-ENTMCNC: 31.7 G/DL (ref 31.4–37.4)
MCV RBC AUTO: 87 FL (ref 82–98)
MONOCYTES # BLD AUTO: 0.86 THOUSAND/ÂΜL (ref 0.17–1.22)
MONOCYTES NFR BLD AUTO: 10 % (ref 4–12)
NEUTROPHILS # BLD AUTO: 5.81 THOUSANDS/ÂΜL (ref 1.85–7.62)
NEUTS SEG NFR BLD AUTO: 70 % (ref 43–75)
NRBC BLD AUTO-RTO: 0 /100 WBCS
PLATELET # BLD AUTO: 264 THOUSANDS/UL (ref 149–390)
PMV BLD AUTO: 9.8 FL (ref 8.9–12.7)
POTASSIUM SERPL-SCNC: 4.1 MMOL/L (ref 3.5–5.3)
PROCALCITONIN SERPL-MCNC: <0.05 NG/ML
PROTHROMBIN TIME: 38.1 SECONDS (ref 11.6–14.5)
RBC # BLD AUTO: 3.87 MILLION/UL (ref 3.88–5.62)
S PNEUM AG UR QL: NEGATIVE
SODIUM SERPL-SCNC: 136 MMOL/L (ref 135–147)
WBC # BLD AUTO: 8.31 THOUSAND/UL (ref 4.31–10.16)

## 2023-04-02 RX ADMIN — INSULIN LISPRO 1 UNITS: 100 INJECTION, SOLUTION INTRAVENOUS; SUBCUTANEOUS at 11:57

## 2023-04-02 RX ADMIN — INSULIN LISPRO 1 UNITS: 100 INJECTION, SOLUTION INTRAVENOUS; SUBCUTANEOUS at 09:26

## 2023-04-02 RX ADMIN — METOPROLOL SUCCINATE 50 MG: 50 TABLET, EXTENDED RELEASE ORAL at 09:20

## 2023-04-02 NOTE — INCIDENTAL FINDINGS
The following findings require follow up:  Radiographic finding   Finding: Multifocal pneumonia   Follow up required: Repeat Chest X-ray   Follow up should be done within 6-8 week(s)    Please notify the following clinician to assist with the follow up:   Dr Erendira Brito (primary care provider)

## 2023-04-02 NOTE — DISCHARGE SUMMARY
Discharge Summary - Ada Copeland 80 y o  male MRN: [id number]    Unit/Bed#: 423-01 Encounter: [id number]    Admission Date:   Admission Orders (From admission, onward)     Ordered        04/01/23 1530  Place in Observation  Once                        Admitting Diagnosis: Cough [R05 9]  Multifocal pneumonia [J18 9]    HPI: Yuniel Beavers is a 80 y o  male with history as noted below who presents with worsening cough and history of pneumonia with outpatient treatment failure  He had gone to an urgent care for worsening cough, and he was advised to go to the ER  He has completed three courses of azithromycin as an outpatient for mycoplasma pneumonia  He reports having coughing fits at night, and he said that he had such a bad coughing fit in the bathroom, that he had to sit on the toilet to catch his breath  He also reports weakness and fatigue  He denies fever, diaphoresis, chest pain, abdominal pain, or nausea  Procedures Performed:   Orders Placed This Encounter   Procedures   • ED ECG Documentation Only       Summary of Hospital Course:     Multifocal pneumonia    80-year-old male with a history of A-fib presented to the emergency room with complaint of cough, found to have evidence of multifocal pneumonia and admitted to the emergency room  He was treated with multiple courses of azithromycin prior to admission  He received ceftriaxone in the ED, and influenza/COVID/RSV screen was negative  Procalcitonin was negative, patient was afebrile and without leukocytosis, therefore I suspected this is likely a viral pneumonia and patient will be discharged without antibiotics  I did recommend he repeat the chest x-ray in 6 to 8 weeks to assure resolution        Significant Findings, Care, Treatment and Services Provided:     CXR    Multifocal pneumonia   Recommend treatment and follow-up imaging in approximately 6-8 weeks to assess for radiographic clearing   Per Epic, clinical team is already aware of the findings at the time of this dictation  Complications: none    Discharge Diagnosis: see above    Medical Problems     Resolved Problems  Date Reviewed: 4/1/2023   None         Condition at Discharge: fair         Discharge instructions/Information to patient and family:   See after visit summary for information provided to patient and family  Provisions for Follow-Up Care:  See after visit summary for information related to follow-up care and any pertinent home health orders  PCP: Trice Welch DO    [facility]    Planned Readmission: No      Discharge Statement   I spent 25 minutes discharging the patient  This time was spent on the day of discharge  I had direct contact with the patient on the day of discharge  Additional documentation is required if more than 30 minutes were spent on discharge  Discharge Medications:  See after visit summary for reconciled discharge medications provided to patient and family

## 2023-04-02 NOTE — PLAN OF CARE
Problem: Potential for Falls  Goal: Patient will remain free of falls  Description: INTERVENTIONS:  - Educate patient/family on patient safety including physical limitations  - Instruct patient to call for assistance with activity   - Consult OT/PT to assist with strengthening/mobility   - Keep Call bell within reach  - Keep bed low and locked with side rails adjusted as appropriate  - Keep care items and personal belongings within reach  - Initiate and maintain comfort rounds  - Make Fall Risk Sign visible to staff  - Offer Toileting every 2 Hours, in advance of need  - Initiate/Maintain fall alarm  - Obtain necessary fall risk management equipment: non-skid footwear  - Apply yellow socks and bracelet for high fall risk patients  - Consider moving patient to room near nurses station  Outcome: Progressing     Problem: PAIN - ADULT  Goal: Verbalizes/displays adequate comfort level or baseline comfort level  Description: Interventions:  - Encourage patient to monitor pain and request assistance  - Assess pain using appropriate pain scale  - Administer analgesics based on type and severity of pain and evaluate response  - Implement non-pharmacological measures as appropriate and evaluate response  - Consider cultural and social influences on pain and pain management  - Notify physician/advanced practitioner if interventions unsuccessful or patient reports new pain  Outcome: Progressing     Problem: INFECTION - ADULT  Goal: Absence or prevention of progression during hospitalization  Description: INTERVENTIONS:  - Assess and monitor for signs and symptoms of infection  - Monitor lab/diagnostic results  - Monitor all insertion sites, i e  indwelling lines, tubes, and drains  - Monitor endotracheal if appropriate and nasal secretions for changes in amount and color  - [place] appropriate cooling/warming therapies per order  - Administer medications as ordered  - Instruct and encourage patient and family to use good hand hygiene technique  - Identify and instruct in appropriate isolation precautions for identified infection/condition  Outcome: Progressing     Problem: SAFETY ADULT  Goal: Patient will remain free of falls  Description: INTERVENTIONS:  - Educate patient/family on patient safety including physical limitations  - Instruct patient to call for assistance with activity   - Consult OT/PT to assist with strengthening/mobility   - Keep Call bell within reach  - Keep bed low and locked with side rails adjusted as appropriate  - Keep care items and personal belongings within reach  - Initiate and maintain comfort rounds  - Make Fall Risk Sign visible to staff  - Offer Toileting every 2 Hours, in advance of need  - Initiate/Maintain fall alarm  - Obtain necessary fall risk management equipment: non-skid footwear  - Apply yellow socks and bracelet for high fall risk patients  - Consider moving patient to room near nurses station  Outcome: Progressing  Goal: Maintain or return to baseline ADL function  Description: INTERVENTIONS:  -  Assess patient's ability to carry out ADLs; assess patient's baseline for ADL function and identify physical deficits which impact ability to perform ADLs (bathing, care of mouth/teeth, toileting, grooming, dressing, etc )  - Assess/evaluate cause of self-care deficits   - Assess range of motion  - Assess patient's mobility; develop plan if impaired  - Assess patient's need for assistive devices and provide as appropriate  - Encourage maximum independence but intervene and supervise when necessary  - Involve family in performance of ADLs  - Assess for home care needs following discharge   - Consider OT consult to assist with ADL evaluation and planning for discharge  - Provide patient education as appropriate  Outcome: Progressing  Goal: Maintains/Returns to pre admission functional level  Description: INTERVENTIONS:  - Perform BMAT or MOVE assessment daily    - Set and communicate daily mobility goal to care team and patient/family/caregiver  - Collaborate with rehabilitation services on mobility goals if consulted  - Perform Range of Motion 3 times a day  - Reposition patient every 2 hours  - Dangle patient 3 times a day  - Stand patient 3 times a day  - Ambulate patient 3 times a day  - Out of bed to chair 3 times a day   - Out of bed for meals 3 times a day  - Out of bed for toileting  - Record patient progress and toleration of activity level   Outcome: Progressing     Problem: DISCHARGE PLANNING  Goal: Discharge to home or other facility with appropriate resources  Description: INTERVENTIONS:  - Identify barriers to discharge w/patient and caregiver  - Arrange for needed discharge resources and transportation as appropriate  - Identify discharge learning needs (meds, wound care, etc )  - Arrange for interpretive services to assist at discharge as needed  - Refer to Case Management Department for coordinating discharge planning if the patient needs post-hospital services based on physician/advanced practitioner order or complex needs related to functional status, cognitive ability, or social support system  Outcome: Progressing     Problem: Knowledge Deficit  Goal: Patient/family/caregiver demonstrates understanding of disease process, treatment plan, medications, and discharge instructions  Description: Complete learning assessment and assess knowledge base    Interventions:  - Provide teaching at level of understanding  - Provide teaching via preferred learning methods  Outcome: Progressing     Problem: CARDIOVASCULAR - ADULT  Goal: Maintains optimal cardiac output and hemodynamic stability  Description: INTERVENTIONS:  - Monitor I/O, vital signs and rhythm  - Monitor for S/S and trends of decreased cardiac output  - Administer and titrate ordered vasoactive medications to optimize hemodynamic stability  - Assess quality of pulses, skin color and temperature  - Assess for signs of decreased coronary artery perfusion  - Instruct patient to report change in severity of symptoms  Outcome: Progressing  Goal: Absence of cardiac dysrhythmias or at baseline rhythm  Description: INTERVENTIONS:  - Continuous cardiac monitoring, vital signs, obtain 12 lead EKG if ordered  - Administer antiarrhythmic and heart rate control medications as ordered  - Monitor electrolytes and administer replacement therapy as ordered  Outcome: Progressing     Problem: RESPIRATORY - ADULT  Goal: Achieves optimal ventilation and oxygenation  Description: INTERVENTIONS:  - Assess for changes in respiratory status  - Assess for changes in mentation and behavior  - Position to facilitate oxygenation and minimize respiratory effort  - Oxygen administered by appropriate delivery if ordered  - Initiate smoking cessation education as indicated  - Encourage broncho-pulmonary hygiene including cough, deep breathe, Incentive Spirometry  - Assess the need for suctioning and aspirate as needed  - Assess and instruct to report SOB or any respiratory difficulty  - Respiratory Therapy support as indicated  Outcome: Progressing     Problem: METABOLIC, FLUID AND ELECTROLYTES - ADULT  Goal: Electrolytes maintained within normal limits  Description: INTERVENTIONS:  - Monitor labs and assess patient for signs and symptoms of electrolyte imbalances  - Administer electrolyte replacement as ordered  - Monitor response to electrolyte replacements, including repeat lab results as appropriate  - Instruct patient on fluid and nutrition as appropriate  Outcome: Progressing  Goal: Fluid balance maintained  Description: INTERVENTIONS:  - Monitor labs   - Monitor I/O and WT  - Instruct patient on fluid and nutrition as appropriate  - Assess for signs & symptoms of volume excess or deficit  Outcome: Progressing  Goal: Glucose maintained within target range  Description: INTERVENTIONS:  - Monitor Blood Glucose as ordered  - Assess for signs and symptoms of hyperglycemia and hypoglycemia  - Administer ordered medications to maintain glucose within target range  - Assess nutritional intake and initiate nutrition service referral as needed  Outcome: Progressing     Problem: MUSCULOSKELETAL - ADULT  Goal: Maintain or return mobility to safest level of function  Description: INTERVENTIONS:  - Assess patient's ability to carry out ADLs; assess patient's baseline for ADL function and identify physical deficits which impact ability to perform ADLs (bathing, care of mouth/teeth, toileting, grooming, dressing, etc )  - Assess/evaluate cause of self-care deficits   - Assess range of motion  - Assess patient's mobility  - Assess patient's need for assistive devices and provide as appropriate  - Encourage maximum independence but intervene and supervise when necessary  - Involve family in performance of ADLs  - Assess for home care needs following discharge   - Consider OT consult to assist with ADL evaluation and planning for discharge  - Provide patient education as appropriate  Outcome: Progressing  Goal: Maintain proper alignment of affected body part  Description: INTERVENTIONS:  - Support, maintain and protect limb and body alignment  - Provide patient/ family with appropriate education  Outcome: Progressing     Problem: MOBILITY - ADULT  Goal: Maintain or return to baseline ADL function  Description: INTERVENTIONS:  -  Assess patient's ability to carry out ADLs; assess patient's baseline for ADL function and identify physical deficits which impact ability to perform ADLs (bathing, care of mouth/teeth, toileting, grooming, dressing, etc )  - Assess/evaluate cause of self-care deficits   - Assess range of motion  - Assess patient's mobility; develop plan if impaired  - Assess patient's need for assistive devices and provide as appropriate  - Encourage maximum independence but intervene and supervise when necessary  - Involve family in performance of ADLs  - Assess for home care needs following discharge   - Consider OT consult to assist with ADL evaluation and planning for discharge  - Provide patient education as appropriate  Outcome: Progressing  Goal: Maintains/Returns to pre admission functional level  Description: INTERVENTIONS:  - Perform BMAT or MOVE assessment daily    - Set and communicate daily mobility goal to care team and patient/family/caregiver  - Collaborate with rehabilitation services on mobility goals if consulted  - Perform Range of Motion 3 times a day  - Reposition patient every 2 hours    - Dangle patient 3 times a day  - Stand patient 3 times a day  - Ambulate patient 3 times a day  - Out of bed to chair 3 times a day   - Out of bed for meals 3 times a day  - Out of bed for toileting  - Record patient progress and toleration of activity level   Outcome: Progressing

## 2023-04-02 NOTE — CASE MANAGEMENT
Case Management Discharge Planning Note    Patient name Rdaha Parsosn  Location [id number] MRN [id number]  : 1935 Date 2023       Current Admission Date: 2023  Current Admission Diagnosis:Multifocal pneumonia   Patient Active Problem List    Diagnosis Date Noted   • Multifocal pneumonia 2023   • Type 2 diabetes mellitus without complication, without long-term current use of insulin ([facility] ) 2023   • Peripheral arterial disease ([facility] ) 2023   • Hypertensive heart disease with heart failure ([facility] ) 2021   • Malignant neoplasm of prostate ([place] [address] ) 2021   • Chronic diastolic congestive heart failure ([place] [address] ) 2020   • Chronic venous insufficiency 2019   • Long term (current) use of anticoagulants 2018   • Persistent atrial fibrillation ([facility] ) 2018      LOS (days): 0  Geometric Mean LOS (GMLOS) (days):   Days to GMLOS:     OBJECTIVE:            Current admission status: Observation   Preferred Pharmacy:   [address] [facility] CHAR #2  [address] [place] [address] CHAR #2  [address] [id number]  Phone: [phone] Fax: [phone]    Primary Care Provider: Linda Quinn DO    Primary Insurance: Jonn MERAZ  Secondary Insurance:     Kassandra Koehler DETAILS:Pt is being dc'd home on this date with OP follow up

## 2023-04-02 NOTE — NURSING NOTE
AVS printed and discharge instructions reviewed with Patient and Spouse  State their understanding and denied having questions  IV discontinued  Masimo monitoring device removed and placed on charging station  Patient dressed with assistance from Spouse  Patient assisted into wheelchair and transported to main exit/entrance by RN, Myrna Rodgers  Patient discharged

## 2023-04-02 NOTE — PLAN OF CARE
Problem: Potential for Falls  Goal: Patient will remain free of falls  Description: INTERVENTIONS:  - Educate patient/family on patient safety including physical limitations  - Instruct patient to call for assistance with activity   - Consult OT/PT to assist with strengthening/mobility   - Keep Call bell within reach  - Keep bed low and locked with side rails adjusted as appropriate  - Keep care items and personal belongings within reach  - Initiate and maintain comfort rounds  - Make Fall Risk Sign visible to staff  - Offer Toileting every 2 Hours, in advance of need  - Initiate/Maintain fall alarm  - Obtain necessary fall risk management equipment: non-skid footwear  - Apply yellow socks and bracelet for high fall risk patients  - Consider moving patient to room near nurses station  Outcome: Completed     Problem: PAIN - ADULT  Goal: Verbalizes/displays adequate comfort level or baseline comfort level  Description: Interventions:  - Encourage patient to monitor pain and request assistance  - Assess pain using appropriate pain scale  - Administer analgesics based on type and severity of pain and evaluate response  - Implement non-pharmacological measures as appropriate and evaluate response  - Consider cultural and social influences on pain and pain management  - Notify physician/advanced practitioner if interventions unsuccessful or patient reports new pain  Outcome: Completed     Problem: INFECTION - ADULT  Goal: Absence or prevention of progression during hospitalization  Description: INTERVENTIONS:  - Assess and monitor for signs and symptoms of infection  - Monitor lab/diagnostic results  - Monitor all insertion sites, i e  indwelling lines, tubes, and drains  - Monitor endotracheal if appropriate and nasal secretions for changes in amount and color  - Marshall appropriate cooling/warming therapies per order  - Administer medications as ordered  - Instruct and encourage patient and family to use good hand hygiene technique  - Identify and instruct in appropriate isolation precautions for identified infection/condition  Outcome: Completed     Problem: SAFETY ADULT  Goal: Patient will remain free of falls  Description: INTERVENTIONS:  - Educate patient/family on patient safety including physical limitations  - Instruct patient to call for assistance with activity   - Consult OT/PT to assist with strengthening/mobility   - Keep Call bell within reach  - Keep bed low and locked with side rails adjusted as appropriate  - Keep care items and personal belongings within reach  - Initiate and maintain comfort rounds  - Make Fall Risk Sign visible to staff  - Offer Toileting every 2 Hours, in advance of need  - Initiate/Maintain fall alarm  - Obtain necessary fall risk management equipment: non-skid footwear  - Apply yellow socks and bracelet for high fall risk patients  - Consider moving patient to room near nurses station  Outcome: Completed  Goal: Maintain or return to baseline ADL function  Description: INTERVENTIONS:  -  Assess patient's ability to carry out ADLs; assess patient's baseline for ADL function and identify physical deficits which impact ability to perform ADLs (bathing, care of mouth/teeth, toileting, grooming, dressing, etc )  - Assess/evaluate cause of self-care deficits   - Assess range of motion  - Assess patient's mobility; develop plan if impaired  - Assess patient's need for assistive devices and provide as appropriate  - Encourage maximum independence but intervene and supervise when necessary  - Involve family in performance of ADLs  - Assess for home care needs following discharge   - Consider OT consult to assist with ADL evaluation and planning for discharge  - Provide patient education as appropriate  Outcome: Completed  Goal: Maintains/Returns to pre admission functional level  Description: INTERVENTIONS:  - Perform BMAT or MOVE assessment daily    - Set and communicate daily mobility goal to care team and patient/family/caregiver  - Collaborate with rehabilitation services on mobility goals if consulted  - Perform Range of Motion 3 times a day  - Reposition patient every 2 hours  - Dangle patient 3 times a day  - Stand patient 3 times a day  - Ambulate patient 3 times a day  - Out of bed to chair 3 times a day   - Out of bed for meals 3 times a day  - Out of bed for toileting  - Record patient progress and toleration of activity level   Outcome: Completed     Problem: DISCHARGE PLANNING  Goal: Discharge to home or other facility with appropriate resources  Description: INTERVENTIONS:  - Identify barriers to discharge w/patient and caregiver  - Arrange for needed discharge resources and transportation as appropriate  - Identify discharge learning needs (meds, wound care, etc )  - Arrange for interpretive services to assist at discharge as needed  - Refer to Case Management Department for coordinating discharge planning if the patient needs post-hospital services based on physician/advanced practitioner order or complex needs related to functional status, cognitive ability, or social support system  Outcome: Completed     Problem: Knowledge Deficit  Goal: Patient/family/caregiver demonstrates understanding of disease process, treatment plan, medications, and discharge instructions  Description: Complete learning assessment and assess knowledge base    Interventions:  - Provide teaching at level of understanding  - Provide teaching via preferred learning methods  Outcome: Completed     Problem: CARDIOVASCULAR - ADULT  Goal: Maintains optimal cardiac output and hemodynamic stability  Description: INTERVENTIONS:  - Monitor I/O, vital signs and rhythm  - Monitor for S/S and trends of decreased cardiac output  - Administer and titrate ordered vasoactive medications to optimize hemodynamic stability  - Assess quality of pulses, skin color and temperature  - Assess for signs of decreased coronary artery perfusion  - Instruct patient to report change in severity of symptoms  Outcome: Completed  Goal: Absence of cardiac dysrhythmias or at baseline rhythm  Description: INTERVENTIONS:  - Continuous cardiac monitoring, vital signs, obtain 12 lead EKG if ordered  - Administer antiarrhythmic and heart rate control medications as ordered  - Monitor electrolytes and administer replacement therapy as ordered  Outcome: Completed     Problem: RESPIRATORY - ADULT  Goal: Achieves optimal ventilation and oxygenation  Description: INTERVENTIONS:  - Assess for changes in respiratory status  - Assess for changes in mentation and behavior  - Position to facilitate oxygenation and minimize respiratory effort  - Oxygen administered by appropriate delivery if ordered  - Initiate smoking cessation education as indicated  - Encourage broncho-pulmonary hygiene including cough, deep breathe, Incentive Spirometry  - Assess the need for suctioning and aspirate as needed  - Assess and instruct to report SOB or any respiratory difficulty  - Respiratory Therapy support as indicated  Outcome: Completed     Problem: METABOLIC, FLUID AND ELECTROLYTES - ADULT  Goal: Electrolytes maintained within normal limits  Description: INTERVENTIONS:  - Monitor labs and assess patient for signs and symptoms of electrolyte imbalances  - Administer electrolyte replacement as ordered  - Monitor response to electrolyte replacements, including repeat lab results as appropriate  - Instruct patient on fluid and nutrition as appropriate  Outcome: Completed  Goal: Fluid balance maintained  Description: INTERVENTIONS:  - Monitor labs   - Monitor I/O and WT  - Instruct patient on fluid and nutrition as appropriate  - Assess for signs & symptoms of volume excess or deficit  Outcome: Completed  Goal: Glucose maintained within target range  Description: INTERVENTIONS:  - Monitor Blood Glucose as ordered  - Assess for signs and symptoms of hyperglycemia and hypoglycemia  - Administer ordered medications to maintain glucose within target range  - Assess nutritional intake and initiate nutrition service referral as needed  Outcome: Completed     Problem: MUSCULOSKELETAL - ADULT  Goal: Maintain or return mobility to safest level of function  Description: INTERVENTIONS:  - Assess patient's ability to carry out ADLs; assess patient's baseline for ADL function and identify physical deficits which impact ability to perform ADLs (bathing, care of mouth/teeth, toileting, grooming, dressing, etc )  - Assess/evaluate cause of self-care deficits   - Assess range of motion  - Assess patient's mobility  - Assess patient's need for assistive devices and provide as appropriate  - Encourage maximum independence but intervene and supervise when necessary  - Involve family in performance of ADLs  - Assess for home care needs following discharge   - Consider OT consult to assist with ADL evaluation and planning for discharge  - Provide patient education as appropriate  Outcome: Completed  Goal: Maintain proper alignment of affected body part  Description: INTERVENTIONS:  - Support, maintain and protect limb and body alignment  - Provide patient/ family with appropriate education  Outcome: Completed     Problem: MOBILITY - ADULT  Goal: Maintain or return to baseline ADL function  Description: INTERVENTIONS:  -  Assess patient's ability to carry out ADLs; assess patient's baseline for ADL function and identify physical deficits which impact ability to perform ADLs (bathing, care of mouth/teeth, toileting, grooming, dressing, etc )  - Assess/evaluate cause of self-care deficits   - Assess range of motion  - Assess patient's mobility; develop plan if impaired  - Assess patient's need for assistive devices and provide as appropriate  - Encourage maximum independence but intervene and supervise when necessary  - Involve family in performance of ADLs  - Assess for home care needs following discharge   - Consider OT consult to assist with ADL evaluation and planning for discharge  - Provide patient education as appropriate  Outcome: Completed  Goal: Maintains/Returns to pre admission functional level  Description: INTERVENTIONS:  - Perform BMAT or MOVE assessment daily    - Set and communicate daily mobility goal to care team and patient/family/caregiver  - Collaborate with rehabilitation services on mobility goals if consulted  - Perform Range of Motion 3 times a day  - Reposition patient every 2 hours    - Dangle patient 3 times a day  - Stand patient 3 times a day  - Ambulate patient 3 times a day  - Out of bed to chair 3 times a day   - Out of bed for meals 3 times a day  - Out of bed for toileting  - Record patient progress and toleration of activity level   Outcome: Completed

## 2023-04-03 ENCOUNTER — TRANSITIONAL CARE MANAGEMENT (OUTPATIENT)
Dept: FAMILY MEDICINE CLINIC | Facility: CLINIC | Age: 88
End: 2023-04-03

## 2023-04-03 ENCOUNTER — ANTICOAG VISIT (OUTPATIENT)
Dept: CARDIOLOGY CLINIC | Facility: CLINIC | Age: 88
End: 2023-04-03

## 2023-04-03 DIAGNOSIS — I48.19 PERSISTENT ATRIAL FIBRILLATION (HCC): Primary | ICD-10-CM

## 2023-04-03 DIAGNOSIS — Z79.01 LONG TERM (CURRENT) USE OF ANTICOAGULANTS: ICD-10-CM

## 2023-04-03 LAB
B PARAP IS1001 DNA NPH QL NAA+NON-PROBE: NOT DETECTED
B PERT.PT PRMT NPH QL NAA+NON-PROBE: NOT DETECTED
C PNEUM DNA NPH QL NAA+NON-PROBE: NOT DETECTED
FLUAV RNA NPH QL NAA+NON-PROBE: NOT DETECTED
FLUBV RNA NPH QL NAA+NON-PROBE: NOT DETECTED
HADV DNA NPH QL NAA+NON-PROBE: NOT DETECTED
HCOV 229E RNA NPH QL NAA+NON-PROBE: NOT DETECTED
HCOV HKU1 RNA NPH QL NAA+NON-PROBE: NOT DETECTED
HCOV NL63 RNA NPH QL NAA+NON-PROBE: NOT DETECTED
HCOV OC43 RNA NPH QL NAA+NON-PROBE: NOT DETECTED
HMPV RNA NPH QL NAA+NON-PROBE: NOT DETECTED
HPIV1 RNA NPH QL NAA+NON-PROBE: NOT DETECTED
HPIV2 RNA NPH QL NAA+NON-PROBE: NOT DETECTED
HPIV3 RNA NPH QL NAA+NON-PROBE: NOT DETECTED
HPIV4 RNA NPH QL NAA+NON-PROBE: NOT DETECTED
M PNEUMO DNA NPH QL NAA+NON-PROBE: NOT DETECTED
RSV RNA NPH QL NAA+NON-PROBE: NOT DETECTED
RV+EV RNA NPH QL NAA+NON-PROBE: NOT DETECTED
SARS-COV-2 RNA NPH QL NAA+NON-PROBE: NOT DETECTED

## 2023-04-03 NOTE — CASE MANAGEMENT
Case Management Discharge Planning Note    Patient name Marine Billet  Location [address] [id number] MRN [id number]  : 1935 Date 4/3/2023       Current Admission Date: 2023  Current Admission Diagnosis:Multifocal pneumonia   Patient Active Problem List    Diagnosis Date Noted   • Multifocal pneumonia 2023   • Type 2 diabetes mellitus without complication, without long-term current use of insulin ([facility] ) 2023   • Peripheral arterial disease ([facility] ) 2023   • Hypertensive heart disease with heart failure ([facility] ) 2021   • Malignant neoplasm of prostate ([facility] ) 2021   • Chronic diastolic congestive heart failure ([facility] ) 2020   • Chronic venous insufficiency 2019   • Long term (current) use of anticoagulants 2018   • Persistent atrial fibrillation ([facility] ) 2018      LOS (days): 0  Geometric Mean LOS (GMLOS) (days):   Days to GMLOS:     OBJECTIVE:            Current admission status: Observation   Preferred Pharmacy:   [address] CHAR #2  [address]  [address] [address]  Phone: [phone] Fax: [phone]    Primary Care Provider: Kaylie Swanson DO    Primary Insurance: Amie Smallwood [facility] REP  Secondary Insurance:     DISCHARGE DETAILS:  Late entry:Pt was dc'd home on 23 prior to being opened by [facility]

## 2023-04-03 NOTE — PATIENT INSTRUCTIONS
Was hospitalized with pneumonia  Was on Rocephin and Augmentin  Has no signs and symptoms of bleeding  INR was elevated running admission and he was advised to hold Coumadin Sunday evening  He will resume dose as instructed above and check INR in 1 week  Erica Ghosh PA-C

## 2023-04-04 ENCOUNTER — RA CDI HCC (OUTPATIENT)
Dept: OTHER | Facility: HOSPITAL | Age: 88
End: 2023-04-04

## 2023-04-04 NOTE — PROGRESS NOTES
[address]  coding opportunities     E11 22 , E11 51 and E11 65     Chart Reviewed number of suggestions sent to Provider: 3     Patients Insurance     Medicare Insurance: [address] [place] [address]

## 2023-04-05 DIAGNOSIS — J40 BRONCHITIS: ICD-10-CM

## 2023-04-05 RX ORDER — BENZONATATE 200 MG/1
CAPSULE ORAL
Qty: 20 CAPSULE | Refills: 5 | Status: SHIPPED | OUTPATIENT
Start: 2023-04-05

## 2023-04-06 LAB
BACTERIA BLD CULT: NORMAL
BACTERIA BLD CULT: NORMAL

## 2023-04-07 ENCOUNTER — OFFICE VISIT (OUTPATIENT)
Dept: FAMILY MEDICINE CLINIC | Facility: CLINIC | Age: 88
End: 2023-04-07

## 2023-04-07 VITALS
SYSTOLIC BLOOD PRESSURE: 112 MMHG | BODY MASS INDEX: 28.6 KG/M2 | HEART RATE: 82 BPM | OXYGEN SATURATION: 97 % | WEIGHT: 216.8 LBS | TEMPERATURE: 97.1 F | DIASTOLIC BLOOD PRESSURE: 70 MMHG

## 2023-04-07 DIAGNOSIS — I10 ESSENTIAL HYPERTENSION: ICD-10-CM

## 2023-04-07 DIAGNOSIS — J18.9 MULTIFOCAL PNEUMONIA: Primary | ICD-10-CM

## 2023-04-07 DIAGNOSIS — Z79.01 LONG TERM (CURRENT) USE OF ANTICOAGULANTS: ICD-10-CM

## 2023-04-07 DIAGNOSIS — I87.2 CHRONIC VENOUS INSUFFICIENCY: ICD-10-CM

## 2023-04-07 DIAGNOSIS — E11.9 TYPE 2 DIABETES MELLITUS WITHOUT COMPLICATION, WITHOUT LONG-TERM CURRENT USE OF INSULIN (HCC): ICD-10-CM

## 2023-04-07 DIAGNOSIS — Z85.46 HISTORY OF PROSTATE CANCER: ICD-10-CM

## 2023-04-07 DIAGNOSIS — I48.19 PERSISTENT ATRIAL FIBRILLATION (HCC): ICD-10-CM

## 2023-04-07 DIAGNOSIS — N18.31 STAGE 3A CHRONIC KIDNEY DISEASE (HCC): ICD-10-CM

## 2023-04-07 NOTE — PROGRESS NOTES
Sue Copeland 80 y o  male   Date:  4/7/2023    TCM Call     Date and time call was made  4/3/2023 11:20 AM    Hospital care reviewed  Records reviewed    Patient was hospitialized at  [address] [place] [address]    Date of Admission  04/01/23    Date of discharge  04/02/23    Diagnosis  multifocal pneumonia    Disposition  Home    Were the patients medications reviewed and updated  No    Current Symptoms  --  feeling a bit better and is bringing up some phlegm      TCM Call     Post hospital issues  None    Should patient be enrolled in anticoag monitoring? Yes  managed by cardiology    Scheduled for follow up? Yes    Did you obtain your prescribed medications  No    Why were you unable to obtain your medications  none needed at hospital discharge    Do you need help managing your prescriptions or medications  No    Is transportation to your appointment needed  No    I have advised the patient to call PCP with any new or worsening symptoms  Maranda VIGIL    Living Arrangements  Spouse or Significiant other    Support System  Spouse    The type of support provided  Emotional; Physical    Are you recieving any outpatient services  No    Are you recieving home care services  No    Are you using any community resources  No    Current waiver services  No    Interperter language line needed  No    Comments  no other questions or concerns at time of phone call        Hospital records were reviewed  Medications upon discharge reviewed/updated  Discharge Disposition:    Follow up visits with other specialists:       Assessment and Plan: Patient is an 80-year-old male with a history of chronic atrial fibrillation who presented to the emergency room with worsening of cough after being treated as an outpatient for a mycoplasma infection with 3 courses of Zithromax  The patient was given intravenous ceftriaxone    Influenza COVID and RSV screens were negative prolactin was negative patient was afebrile and without leukocytosis it was felt that he had a viral pneumonia and was discharged without antibiotics he will have a repeat chest x-ray in 8 weeks    Patient has a history of persistent atrial fibrillation and has had long-term anticoagulation with Coumadin    Diabetes mellitus type 2 with glucose well controlled and hemoglobin A1c of 7 8    Stage IIIa chronic kidney disease stable    Essential hypertension with a blood pressure controlled on current regimen    Chronic venous insufficiency    History of prostate cancer in remission        Stew Abdi was seen today for transition of care management, cough and fatigue  Diagnoses and all orders for this visit:    Multifocal pneumonia    Persistent atrial fibrillation ([address] )    Long term (current) use of anticoagulants    Type 2 diabetes mellitus without complication, without long-term current use of insulin (HCC)    Stage 3a chronic kidney disease (HCC)    Essential hypertension    Chronic venous insufficiency    History of prostate cancer          HPI:  Patient presents for transition of care management visit was hospitalized April 1 through April 2 with multifocal pneumonia          ROS: Review of Systems   Constitutional: Positive for fatigue  Negative for chills and fever  HENT: Negative for ear pain and sore throat  Eyes: Negative for pain and visual disturbance  Respiratory: Positive for cough  Negative for shortness of breath  Cardiovascular: Negative for chest pain and palpitations  Gastrointestinal: Negative for abdominal pain and vomiting  Genitourinary: Negative for dysuria and hematuria  Musculoskeletal: Negative for arthralgias and back pain  Skin: Negative for color change and rash  Neurological: Negative for seizures and syncope  All other systems reviewed and are negative        Past Medical History:   Diagnosis Date   • Anxiety    • Atrial fibrillation [facility])    • Atrial flutter (HCC)    • Congestive heart failure (CHF) ([facility])    • COPD (chronic obstructive pulmonary disease) ([facility])    • Coronary artery disease    • DVT (deep venous thrombosis) ([facility])    • ED (erectile dysfunction)    • Hearing loss    • History of echocardiogram 04/05/2018    EF 0 55, Mild LVH  Mild moderate mitral regurg  Trace aortic regurg  • Hx of radiation therapy     XRT   • Hypercholesterolemia    • Hyperlipidemia    • Hypertension    • Long term (current) use of anticoagulants 8/21/2018   • Neuropathy of both feet    • Peripheral neuropathy    • Prostate cancer ([facility] )    • Type 2 diabetes mellitus [facility])        Past Surgical History:   Procedure Laterality Date   • CARDIAC CATHETERIZATION  01/15/1988    Left main: unobstructed  Posterolateral branch 90% narrowed  • COLONOSCOPY  10/05/2017    Dr Vazquez Goode   • PROSTATE SURGERY         Social History     Socioeconomic History   • Marital status: /Civil Union     Spouse name: None   • Number of children: None   • Years of education: None   • Highest education level: None   Occupational History   • Occupation: Retired-[employer]   Tobacco Use   • Smoking status: Never   • Smokeless tobacco: Never   Vaping Use   • Vaping Use: Never used   Substance and Sexual Activity   • Alcohol use: Not Currently   • Drug use: Never   • Sexual activity: None   Other Topics Concern   • None   Social History Narrative   • None     Social Determinants of Health     Financial Resource Strain: Low Risk    • Difficulty of Paying Living Expenses: Not very hard   Food Insecurity: Not on file   Transportation Needs: No Transportation Needs   • Lack of Transportation (Medical): No   • Lack of Transportation (Non-Medical):  No   Physical Activity: Not on file   Stress: Not on file   Social Connections: Not on file   Intimate Partner Violence: Not on file   Housing Stability: Not on file       Family History   Problem Relation Age of Onset   • Cancer Brother         bladder   • Colon cancer Brother    • Heart disease Other    • Hypertension Other • Cancer Other         bladder   • Colon cancer Other        No Known Allergies      Current Outpatient Medications:   •  atorvastatin (LIPITOR) 40 mg tablet, TAKE 1 TABLET BY MOUTH DAILY, Disp: 90 tablet, Rfl: 5  •  benzonatate (TESSALON) 200 MG capsule, TAKE ONE CAPSULE BY MOUTH THREE TIMES A DAY AS NEEDED FOR COUGH, Disp: 20 capsule, Rfl: 5  •  furosemide (LASIX) 20 mg tablet, TAKE 1 TABLET BY MOUTH 3 DAYS WEEKLY (Patient taking differently: TAKE 1 TABLET BY MOUTH 3 DAYS WEEKLY; Mon, Wed, Friday), Disp: 45 tablet, Rfl: 5  •  metFORMIN (GLUCOPHAGE) 500 mg tablet, TAKE 1 TABLET BY MOUTH DAILY WITH BREAKFAST, Disp: 90 tablet, Rfl: 2  •  metoprolol succinate (TOPROL-XL) 50 mg 24 hr tablet, TAKE 1 TABLET BY MOUTH TWICE DAILY, Disp: 180 tablet, Rfl: 5  •  OneTouch Delica Lancets 75H MISC, USE TO TEST ONCE DAILY, Disp: 100 each, Rfl: 5  •  OneTouch Ultra test strip, TEST ONCE DAILY, Disp: 100 each, Rfl: 1  •  warfarin (COUMADIN) 4 mg tablet, TAKE 2 TABLETS BY MOUTH DAILY OR AS DIRECTED (Patient taking differently: TAKE 2 TABLETS BY MOUTH DAILY OR AS DIRECTED;  Sunday 8mg; Mon 8mg; Tues 6mg; Wed 8mg; Thurs 6mg; Friday 8mg; Sat 6mg; Pt states he did not take yet today as he takes this in the evening;), Disp: 180 tablet, Rfl: 5      Physical Exam:  /70 (BP Location: Left arm, Patient Position: Sitting, Cuff Size: Standard)   Pulse 82   Temp (!) 97 1 °F (36 2 °C) (Tympanic)   Wt 98 3 kg (216 lb 12 8 oz)   SpO2 97%   BMI 28 60 kg/m²     Physical Exam  Constitutional:       Appearance: Normal appearance  He is well-developed  HENT:      Head: Normocephalic and atraumatic  Right Ear: Tympanic membrane, ear canal and external ear normal       Left Ear: Tympanic membrane, ear canal and external ear normal       Nose: Nose normal       Mouth/Throat:      Mouth: Mucous membranes are moist       Pharynx: Oropharynx is clear  Eyes:      Extraocular Movements: Extraocular movements intact  Conjunctiva/sclera: Conjunctivae normal       Pupils: Pupils are equal, round, and reactive to light  Cardiovascular:      Rate and Rhythm: Normal rate  Rhythm irregular  Pulses: Normal pulses  Heart sounds: Normal heart sounds  Pulmonary:      Effort: Pulmonary effort is normal       Breath sounds: Normal breath sounds  Abdominal:      General: Abdomen is flat  Bowel sounds are normal       Palpations: Abdomen is soft  Tenderness: There is no abdominal tenderness  Musculoskeletal:         General: Normal range of motion  Cervical back: Normal range of motion and neck supple  Right lower leg: Edema present  Left lower leg: Edema present  Skin:     General: Skin is warm and dry  Capillary Refill: Capillary refill takes less than 2 seconds  Neurological:      General: No focal deficit present  Mental Status: He is alert and oriented to person, place, and time  Psychiatric:         Mood and Affect: Mood normal          Behavior: Behavior normal          Thought Content:  Thought content normal          Judgment: Judgment normal              Labs:  Lab Results   Component Value Date    WBC 8 31 04/02/2023    HGB 10 7 (L) 04/02/2023    HCT 33 8 (L) 04/02/2023    MCV 87 04/02/2023     04/02/2023     Lab Results   Component Value Date     09/23/2015    K 4 1 04/02/2023     04/02/2023    CO2 23 04/02/2023    ANIONGAP 9 09/23/2015    BUN 18 04/02/2023    CREATININE 0 87 04/02/2023    GLUCOSE 133 09/23/2015    GLUF 154 (H) 03/11/2023    CALCIUM 8 1 (L) 04/02/2023    CORRECTEDCA 9 4 03/11/2023    AST 9 (L) 04/01/2023    ALT 12 04/01/2023    ALKPHOS 64 04/01/2023    PROT 7 1 09/23/2015    BILITOT 0 85 09/23/2015    EGFR 77 04/02/2023

## 2023-04-19 ENCOUNTER — APPOINTMENT (OUTPATIENT)
Dept: LAB | Facility: HOSPITAL | Age: 88
End: 2023-04-19

## 2023-04-20 ENCOUNTER — APPOINTMENT (OUTPATIENT)
Dept: LAB | Facility: MEDICAL CENTER | Age: 88
End: 2023-04-20

## 2023-04-20 ENCOUNTER — TELEPHONE (OUTPATIENT)
Dept: FAMILY MEDICINE CLINIC | Facility: CLINIC | Age: 88
End: 2023-04-20

## 2023-04-20 DIAGNOSIS — J40 BRONCHITIS: Primary | ICD-10-CM

## 2023-04-20 DIAGNOSIS — J40 BRONCHITIS: ICD-10-CM

## 2023-04-20 NOTE — TELEPHONE ENCOUNTER
Patients wife dropped off a sputum sample today at the lab, she states that she was told to do so but there is no order?  can you please place an order

## 2023-04-27 ENCOUNTER — TELEPHONE (OUTPATIENT)
Dept: FAMILY MEDICINE CLINIC | Facility: CLINIC | Age: 88
End: 2023-04-27

## 2023-04-27 ENCOUNTER — TELEPHONE (OUTPATIENT)
Dept: UROLOGY | Facility: MEDICAL CENTER | Age: 88
End: 2023-04-27

## 2023-04-27 DIAGNOSIS — N30.90 CYSTITIS: Primary | ICD-10-CM

## 2023-04-27 RX ORDER — CIPROFLOXACIN 250 MG/1
250 TABLET, FILM COATED ORAL EVERY 12 HOURS SCHEDULED
Qty: 10 TABLET | Refills: 0 | Status: SHIPPED | OUTPATIENT
Start: 2023-04-27 | End: 2023-05-02

## 2023-04-27 NOTE — TELEPHONE ENCOUNTER
Patient states he has frequency/urgency all night to urinate, wife states probably a UTI , would you be able to order something for him

## 2023-04-27 NOTE — TELEPHONE ENCOUNTER
Please Triage -   New Patient- Þmichelet    What is the reason for the patients appointment? Patient's wife called stating patient is having urgency,frequency for about one month  He has a history of prostate cancer 12 years ago  He stated he was doing fine and now  He is getting up at night constantly  He needs to be seen to see how to proceed and see if he needs medication  He only wants to see Dr Frazier Overall  Please review and see how soon he can be seen  First available appointment is in June  Patient can be reached at [phone]       Imaging/Lab Results:      Do we accept the patient's insurance or is the patient Self-Pay? Provider & Plan: [facility]   Member ID#: Has the patient had any previous urologist(s)? yes        Have patient records been requested?in epic        Has the patient had any outside testing done? Does the patient have a personal history of cancer? Prostate cancer 12 years         Patient can be reached at :

## 2023-04-28 ENCOUNTER — APPOINTMENT (OUTPATIENT)
Dept: LAB | Facility: MEDICAL CENTER | Age: 88
End: 2023-04-28

## 2023-04-28 DIAGNOSIS — Z79.01 LONG TERM (CURRENT) USE OF ANTICOAGULANTS: ICD-10-CM

## 2023-04-28 DIAGNOSIS — I48.19 PERSISTENT ATRIAL FIBRILLATION (HCC): ICD-10-CM

## 2023-04-28 LAB
AMORPH URATE CRY URNS QL MICRO: ABNORMAL
BACTERIA UR QL AUTO: ABNORMAL /HPF
BILIRUB UR QL STRIP: NEGATIVE
CAOX CRY URNS QL MICRO: ABNORMAL /HPF
CLARITY UR: ABNORMAL
COLOR UR: ABNORMAL
GLUCOSE UR STRIP-MCNC: ABNORMAL MG/DL
HGB UR QL STRIP.AUTO: NEGATIVE
INR PPP: 4.98 (ref 0.84–1.19)
KETONES UR STRIP-MCNC: NEGATIVE MG/DL
LEUKOCYTE ESTERASE UR QL STRIP: NEGATIVE
MUCOUS THREADS UR QL AUTO: ABNORMAL
NITRITE UR QL STRIP: NEGATIVE
NON-SQ EPI CELLS URNS QL MICRO: ABNORMAL /HPF
PH UR STRIP.AUTO: 6 [PH]
PROT UR STRIP-MCNC: ABNORMAL MG/DL
PROTHROMBIN TIME: 46.5 SECONDS (ref 11.6–14.5)
RBC #/AREA URNS AUTO: ABNORMAL /HPF
SP GR UR STRIP.AUTO: 1.02 (ref 1–1.03)
UROBILINOGEN UR STRIP-ACNC: <2 MG/DL
WBC #/AREA URNS AUTO: ABNORMAL /HPF

## 2023-04-29 ENCOUNTER — ANTICOAG VISIT (OUTPATIENT)
Dept: NON INVASIVE DIAGNOSTICS | Facility: HOSPITAL | Age: 88
End: 2023-04-29

## 2023-04-29 DIAGNOSIS — I48.19 PERSISTENT ATRIAL FIBRILLATION (HCC): Primary | ICD-10-CM

## 2023-04-29 DIAGNOSIS — Z79.01 LONG TERM (CURRENT) USE OF ANTICOAGULANTS: ICD-10-CM

## 2023-04-29 NOTE — PATIENT INSTRUCTIONS
Spoke with Patient's wife: No changes in diet  No missed or extra doses  No s/s of bleeding TIA or CVA  No new ABX, NSAIDs OTC meds, or supplements  Dose as instructed and recheck on  Friday  Patient has UTI and is on Cipro     Theron Russo PA-C

## 2023-05-05 ENCOUNTER — APPOINTMENT (OUTPATIENT)
Dept: LAB | Facility: MEDICAL CENTER | Age: 88
End: 2023-05-05

## 2023-05-05 ENCOUNTER — TELEPHONE (OUTPATIENT)
Dept: FAMILY MEDICINE CLINIC | Facility: CLINIC | Age: 88
End: 2023-05-05

## 2023-05-05 DIAGNOSIS — I48.19 PERSISTENT ATRIAL FIBRILLATION (HCC): ICD-10-CM

## 2023-05-05 DIAGNOSIS — E11.9 TYPE 2 DIABETES MELLITUS WITHOUT COMPLICATION, WITHOUT LONG-TERM CURRENT USE OF INSULIN (HCC): Primary | ICD-10-CM

## 2023-05-05 DIAGNOSIS — Z79.01 LONG TERM (CURRENT) USE OF ANTICOAGULANTS: ICD-10-CM

## 2023-05-05 DIAGNOSIS — E11.9 TYPE 2 DIABETES MELLITUS WITHOUT COMPLICATION, WITHOUT LONG-TERM CURRENT USE OF INSULIN (HCC): ICD-10-CM

## 2023-05-05 LAB
INR PPP: 3.25 (ref 0.84–1.19)
PROTHROMBIN TIME: 33.4 SECONDS (ref 11.6–14.5)

## 2023-05-05 NOTE — TELEPHONE ENCOUNTER
Patients blood sugars have been running at 220 yesterday and this morning, his is done with the cipro so she is taking him for his INR this morning she was wondering if you wanted to add a BS

## 2023-05-06 LAB
ALBUMIN SERPL BCP-MCNC: 2.4 G/DL (ref 3.5–5)
ALP SERPL-CCNC: 63 U/L (ref 46–116)
ALT SERPL W P-5'-P-CCNC: 34 U/L (ref 12–78)
ANION GAP SERPL CALCULATED.3IONS-SCNC: 5 MMOL/L (ref 4–13)
AST SERPL W P-5'-P-CCNC: 19 U/L (ref 5–45)
BILIRUB SERPL-MCNC: 0.65 MG/DL (ref 0.2–1)
BUN SERPL-MCNC: 18 MG/DL (ref 5–25)
CALCIUM ALBUM COR SERPL-MCNC: 10.3 MG/DL (ref 8.3–10.1)
CALCIUM SERPL-MCNC: 9 MG/DL (ref 8.3–10.1)
CHLORIDE SERPL-SCNC: 103 MMOL/L (ref 96–108)
CO2 SERPL-SCNC: 25 MMOL/L (ref 21–32)
CREAT SERPL-MCNC: 0.99 MG/DL (ref 0.6–1.3)
GFR SERPL CREATININE-BSD FRML MDRD: 68 ML/MIN/1.73SQ M
GLUCOSE P FAST SERPL-MCNC: 177 MG/DL (ref 65–99)
POTASSIUM SERPL-SCNC: 4.1 MMOL/L (ref 3.5–5.3)
PROT SERPL-MCNC: 6.8 G/DL (ref 6.4–8.4)
SODIUM SERPL-SCNC: 133 MMOL/L (ref 135–147)

## 2023-05-08 ENCOUNTER — CONSULT (OUTPATIENT)
Dept: PULMONOLOGY | Facility: CLINIC | Age: 88
End: 2023-05-08

## 2023-05-08 ENCOUNTER — HOSPITAL ENCOUNTER (OUTPATIENT)
Dept: RADIOLOGY | Facility: HOSPITAL | Age: 88
Discharge: HOME | End: 2023-05-08
Attending: INTERNAL MEDICINE

## 2023-05-08 ENCOUNTER — ANTICOAG VISIT (OUTPATIENT)
Dept: CARDIOLOGY CLINIC | Facility: CLINIC | Age: 88
End: 2023-05-08

## 2023-05-08 VITALS
WEIGHT: 209 LBS | SYSTOLIC BLOOD PRESSURE: 103 MMHG | DIASTOLIC BLOOD PRESSURE: 74 MMHG | OXYGEN SATURATION: 95 % | BODY MASS INDEX: 27.7 KG/M2 | HEIGHT: 73 IN | HEART RATE: 113 BPM | TEMPERATURE: 98.8 F

## 2023-05-08 DIAGNOSIS — J18.9 MULTIFOCAL PNEUMONIA: ICD-10-CM

## 2023-05-08 DIAGNOSIS — I48.19 PERSISTENT ATRIAL FIBRILLATION (HCC): Primary | ICD-10-CM

## 2023-05-08 DIAGNOSIS — Z79.01 LONG TERM (CURRENT) USE OF ANTICOAGULANTS: ICD-10-CM

## 2023-05-08 DIAGNOSIS — J18.9 MULTIFOCAL PNEUMONIA: Primary | ICD-10-CM

## 2023-05-08 NOTE — PROGRESS NOTES
"Pulmonary Consultation   Demetrio Copeland 80 y o  male MRN: [id number]  5/8/2023    Assessment:    Multifocal pneumonia  Chest x-ray ordered in follow up; this has not cleared, so will proceed to CT scan  This may be a non-infectious pneumonia like organizing pneumonia; however, deferred initiating steroids until CT is back  Plan:    Diagnoses and all orders for this visit:    Multifocal pneumonia  -     XR chest pa & lateral; Future  -     CT chest without contrast; Future    Follow up to be determined based on CT scan  History of Present Illness   HPI:  Bonita Cao is a 80 y o  male who presents for evaluation of \"walking pneumonia  \"  He reports having been given this diagnosis since January  He has been on multiple courses of antibiotics for this which have periodically knocked down his symptoms, but they continue to come back every time  He reports having had this issue a couple of years ago with a similar course, requiring several rounds of antibiotics to clear  On this current episode, he thought he was starting to feel better, but at the moment when he is laying down he is still plagued by a terrible cough, and when he is exerting himself now, he feels as though he is going to choke  That latter complaint seems to be slowing down a little bit, which is likely because he has been able to start bringing up some sputum  He reports that only recently did he start to have any production to this cough, and he is now bringing up brown-colored phlegm  He has been on steroids for a few days during this course which he does not remember if it particularly helped or not  Review of his imaging showed bilateral pneumonia primarily in the lower lung fields  Follow-up chest x-ray performed shortly after his office visit showed this was now more isolated unilaterally to the right with loss of diaphragm silhouette suggesting persistent right lower lobe infiltrate  A CT scan is pending      Review of Systems " Respiratory: Positive for cough and shortness of breath  All other systems reviewed and are negative  Historical Information   Past Medical History:   Diagnosis Date   • Anxiety    • Atrial fibrillation (HCC)    • Atrial flutter (HCC)    • Congestive heart failure (CHF) (HCC)    • COPD (chronic obstructive pulmonary disease) (HCC)    • Coronary artery disease    • DVT (deep venous thrombosis) (HCC)    • ED (erectile dysfunction)    • Hearing loss    • History of echocardiogram 04/05/2018    EF 0 55, Mild LVH  Mild moderate mitral regurg  Trace aortic regurg  • Hx of radiation therapy     XRT   • Hypercholesterolemia    • Hyperlipidemia    • Hypertension    • Long term (current) use of anticoagulants 8/21/2018   • Neuropathy of both feet    • Peripheral neuropathy    • Prostate cancer ([facility] [address] )    • Type 2 diabetes mellitus [facility])      Past Surgical History:   Procedure Laterality Date   • CARDIAC CATHETERIZATION  01/15/1988    Left main: unobstructed  Posterolateral branch 90% narrowed     • COLONOSCOPY  10/05/2017    Dr Arango Screen   • PROSTATE SURGERY       Family History   Problem Relation Age of Onset   • Cancer Brother         bladder   • Colon cancer Brother    • Heart disease Other    • Hypertension Other    • Cancer Other         bladder   • Colon cancer Other        Meds/Allergies     Current Outpatient Medications:   •  atorvastatin (LIPITOR) 40 mg tablet, TAKE 1 TABLET BY MOUTH DAILY, Disp: 90 tablet, Rfl: 3  •  benzonatate (TESSALON) 200 MG capsule, TAKE ONE CAPSULE BY MOUTH THREE TIMES A DAY AS NEEDED FOR COUGH, Disp: 20 capsule, Rfl: 5  •  furosemide (LASIX) 20 mg tablet, TAKE 1 TABLET BY MOUTH 3 DAYS WEEKLY (Patient taking differently: TAKE 1 TABLET BY MOUTH 3 DAYS WEEKLY; Mon, Wed, Friday), Disp: 45 tablet, Rfl: 5  •  metFORMIN (GLUCOPHAGE) 500 mg tablet, TAKE 1 TABLET BY MOUTH DAILY WITH BREAKFAST, Disp: 90 tablet, Rfl: 2  •  metoprolol succinate (TOPROL-XL) 50 mg 24 hr tablet, TAKE 1 TABLET BY "MOUTH TWICE DAILY, Disp: 180 tablet, Rfl: 5  •  OneTouch Delica Lancets 24I MISC, USE TO TEST ONCE DAILY, Disp: 100 each, Rfl: 5  •  OneTouch Ultra test strip, TEST ONCE DAILY, Disp: 100 each, Rfl: 1  •  warfarin (COUMADIN) 4 mg tablet, TAKE 2 TABLETS BY MOUTH DAILY OR AS DIRECTED (Patient taking differently: TAKE 2 TABLETS BY MOUTH DAILY OR AS DIRECTED;  Sunday 8mg; Mon 8mg; Tues 6mg; Wed 8mg; Thurs 6mg; Friday 8mg; Sat 6mg; Pt states he did not take yet today as he takes this in the evening;), Disp: 180 tablet, Rfl: 5  No Known Allergies    Vitals: Blood pressure 103/74, pulse (!) 113, temperature 98 8 °F (37 1 °C), height 6' 1\" (1 854 m), weight 94 8 kg (209 lb), SpO2 95 %  Body mass index is 27 57 kg/m²  Oxygen Therapy  SpO2: 95 %    Physical Exam  Physical Exam  Vitals reviewed  Constitutional:       General: He is not in acute distress  Appearance: Normal appearance  He is well-developed  He is not ill-appearing  HENT:      Head: Normocephalic and atraumatic  Eyes:      General: No scleral icterus  Conjunctiva/sclera: Conjunctivae normal    Neck:      Thyroid: No thyromegaly  Vascular: No JVD  Cardiovascular:      Rate and Rhythm: Normal rate and regular rhythm  Heart sounds: Normal heart sounds  No murmur heard  No friction rub  No gallop  Pulmonary:      Effort: Pulmonary effort is normal  No respiratory distress  Breath sounds: Rhonchi present  No wheezing or rales  Musculoskeletal:      Cervical back: Neck supple  Right lower leg: No edema  Left lower leg: No edema  Skin:     General: Skin is warm and dry  Findings: No rash  Neurological:      General: No focal deficit present  Mental Status: He is alert and oriented to person, place, and time  Mental status is at baseline  Psychiatric:         Mood and Affect: Mood normal          Behavior: Behavior normal      Labs: I have personally reviewed pertinent lab results    Lab Results   Component " Value Date    WBC 8 31 04/02/2023    HGB 10 7 (L) 04/02/2023    HCT 33 8 (L) 04/02/2023    MCV 87 04/02/2023     04/02/2023     Lab Results   Component Value Date    GLUCOSE 133 09/23/2015    CALCIUM 9 0 05/05/2023     09/23/2015    K 4 1 05/05/2023    CO2 25 05/05/2023     05/05/2023    BUN 18 05/05/2023    CREATININE 0 99 05/05/2023     No results found for: IGE  Lab Results   Component Value Date    ALT 34 05/05/2023    AST 19 05/05/2023    ALKPHOS 63 05/05/2023    BILITOT 0 85 09/23/2015       Imaging and other studies: I have personally reviewed relevant images in PACS  EKG, Pathology, and Other Studies: I have personally reviewed relevant reports in [facility]  [facility]

## 2023-05-08 NOTE — PATIENT INSTRUCTIONS
Spoke with Patient: No changes in medication health or diet  No missed or extra doses  No s/s of bleeding TIA or CVA  No new ABX, NSAIDs OTC meds, or supplements  Dose as instructed and recheck in one week     Daniel Morales PA-C

## 2023-05-10 NOTE — ASSESSMENT & PLAN NOTE
Chest x-ray ordered in follow up; this has not cleared, so will proceed to CT scan  This may be a non-infectious pneumonia like organizing pneumonia; however, deferred initiating steroids until CT is back  Abdomen soft, nontender, nondistended, bowel sounds present in all 4 quadrants. [id number] Comprehensive

## 2023-05-12 ENCOUNTER — HOSPITAL ENCOUNTER (OUTPATIENT)
Dept: CT IMAGING | Facility: HOSPITAL | Age: 88
Discharge: HOME | End: 2023-05-12
Attending: INTERNAL MEDICINE

## 2023-05-12 DIAGNOSIS — J18.9 MULTIFOCAL PNEUMONIA: ICD-10-CM

## 2023-05-15 ENCOUNTER — ANTICOAG VISIT (OUTPATIENT)
Dept: CARDIOLOGY CLINIC | Facility: CLINIC | Age: 88
End: 2023-05-15

## 2023-05-15 ENCOUNTER — APPOINTMENT (OUTPATIENT)
Dept: LAB | Facility: MEDICAL CENTER | Age: 88
End: 2023-05-15

## 2023-05-15 DIAGNOSIS — I48.19 PERSISTENT ATRIAL FIBRILLATION (HCC): ICD-10-CM

## 2023-05-15 DIAGNOSIS — I48.19 PERSISTENT ATRIAL FIBRILLATION (HCC): Primary | ICD-10-CM

## 2023-05-15 DIAGNOSIS — Z79.01 LONG TERM (CURRENT) USE OF ANTICOAGULANTS: ICD-10-CM

## 2023-05-15 LAB
INR PPP: 4 (ref 0.84–1.19)
PROTHROMBIN TIME: 39.3 SECONDS (ref 11.6–14.5)

## 2023-05-15 NOTE — PATIENT INSTRUCTIONS
INR received from lab and reviewed      Dose per calendar  Recheck INR in one week--instructions given to pt's wife by phone   Belén Franz, Josh Maldonado

## 2023-05-19 ENCOUNTER — TELEPHONE (OUTPATIENT)
Dept: PULMONOLOGY | Facility: CLINIC | Age: 88
End: 2023-05-19

## 2023-05-19 ENCOUNTER — TELEPHONE (OUTPATIENT)
Dept: FAMILY MEDICINE CLINIC | Facility: CLINIC | Age: 88
End: 2023-05-19

## 2023-05-19 DIAGNOSIS — F32.9 REACTIVE DEPRESSION: ICD-10-CM

## 2023-05-19 DIAGNOSIS — J84.116 CRYPTOGENIC ORGANIZING PNEUMONIA (HCC): Primary | ICD-10-CM

## 2023-05-19 DIAGNOSIS — R63.0 LOSS OF APPETITE: ICD-10-CM

## 2023-05-19 DIAGNOSIS — E11.9 TYPE 2 DIABETES MELLITUS WITHOUT COMPLICATION, WITHOUT LONG-TERM CURRENT USE OF INSULIN (HCC): Primary | ICD-10-CM

## 2023-05-19 DIAGNOSIS — E55.9 VITAMIN D DEFICIENCY: ICD-10-CM

## 2023-05-19 RX ORDER — PREDNISONE 10 MG/1
50 TABLET ORAL DAILY
Qty: 150 TABLET | Refills: 0 | Status: SHIPPED | OUTPATIENT
Start: 2023-05-19 | End: 2023-06-18

## 2023-05-19 RX ORDER — SULFAMETHOXAZOLE AND TRIMETHOPRIM 400; 80 MG/1; MG/1
1 TABLET ORAL DAILY
Qty: 30 TABLET | Refills: 0 | Status: SHIPPED | OUTPATIENT
Start: 2023-05-19 | End: 2023-06-18

## 2023-05-19 RX ORDER — BUPROPION HYDROCHLORIDE 150 MG/1
150 TABLET ORAL EVERY MORNING
Qty: 90 TABLET | Refills: 3 | Status: CANCELLED | OUTPATIENT
Start: 2023-05-19 | End: 2024-05-13

## 2023-05-19 NOTE — TELEPHONE ENCOUNTER
Patient wife lvm stating that she would like the doctor to call her so they can discuss the results of Maximo's CT scan  Please advise

## 2023-05-19 NOTE — TELEPHONE ENCOUNTER
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

## 2023-05-19 NOTE — TELEPHONE ENCOUNTER
Wife called  Patient +walking pneumo x few months  Patient saw Pulmonology 05/08/2023 for consult  Patient had a CT scan on 05/12/2023  Wife states she has a call into Pulmonology regarding the results  1  Wife c/o weight loss  Patient has no appetite, gerenalized weakness, increased INR, and wife noticed via MyChart that patients had a decrease in Hemoglobin and Hematocrit x last several months  Patient is expected to have an INR on Monday and wife requesting a CBC c Diff order to have drawn as well  2  Wife c/o increased nocturia, which in turn is causing patient to not get any rest, and when patient is not sleeping, patient is coughing  Is there a medication patient could take to help with the nocturia? 3  Wife c/o depression stating patient has been very depressed since he has been sick  Wife states she would call for an appointment but she can barely get the patient out of the house  Please advise

## 2023-05-22 ENCOUNTER — ANTICOAG VISIT (OUTPATIENT)
Dept: CARDIOLOGY CLINIC | Facility: CLINIC | Age: 88
End: 2023-05-22

## 2023-05-22 ENCOUNTER — APPOINTMENT (OUTPATIENT)
Dept: LAB | Facility: MEDICAL CENTER | Age: 88
End: 2023-05-22

## 2023-05-22 DIAGNOSIS — Z79.01 LONG TERM (CURRENT) USE OF ANTICOAGULANTS: ICD-10-CM

## 2023-05-22 DIAGNOSIS — I48.19 PERSISTENT ATRIAL FIBRILLATION (HCC): ICD-10-CM

## 2023-05-22 DIAGNOSIS — I48.19 PERSISTENT ATRIAL FIBRILLATION (HCC): Primary | ICD-10-CM

## 2023-05-22 LAB
INR PPP: 2.18 (ref 0.84–1.19)
PROTHROMBIN TIME: 24.5 SECONDS (ref 11.6–14.5)

## 2023-05-22 RX ORDER — MEGESTROL ACETATE 40 MG/1
40 TABLET ORAL DAILY
Qty: 90 TABLET | Refills: 1 | Status: SHIPPED | OUTPATIENT
Start: 2023-05-22 | End: 2023-05-22 | Stop reason: CLARIF

## 2023-05-22 RX ORDER — ESCITALOPRAM OXALATE 10 MG/1
10 TABLET ORAL DAILY
Qty: 90 TABLET | Refills: 3 | Status: SHIPPED | OUTPATIENT
Start: 2023-05-22 | End: 2023-05-22 | Stop reason: CLARIF

## 2023-05-22 NOTE — PATIENT INSTRUCTIONS
INR received from lab and reviewed      Continue with 6 mg T/Th  Recheck INR in 2 weeks as pt will be on prednisone and antibiotics for 1 month--instructions given to pt's wife by phone  Sherri Pink, Josh Maldonado

## 2023-05-26 ENCOUNTER — TELEPHONE (OUTPATIENT)
Dept: FAMILY MEDICINE CLINIC | Facility: CLINIC | Age: 88
End: 2023-05-26

## 2023-05-26 DIAGNOSIS — E11.9 TYPE 2 DIABETES MELLITUS WITHOUT COMPLICATION, WITHOUT LONG-TERM CURRENT USE OF INSULIN (HCC): Primary | ICD-10-CM

## 2023-05-26 NOTE — TELEPHONE ENCOUNTER
Called patient and informed him that he should take two 500 mg metformin at supper tonight and then breakfast and supper while he is on the prednisone he understood and repeated back to me the instructions

## 2023-05-31 PROBLEM — J18.9 MULTIFOCAL PNEUMONIA: Status: RESOLVED | Noted: 2023-04-01 | Resolved: 2023-05-31

## 2023-06-01 ENCOUNTER — TELEPHONE (OUTPATIENT)
Dept: CARDIOLOGY CLINIC | Facility: CLINIC | Age: 88
End: 2023-06-01

## 2023-06-01 ENCOUNTER — APPOINTMENT (OUTPATIENT)
Dept: LAB | Facility: MEDICAL CENTER | Age: 88
End: 2023-06-01
Payer: COMMERCIAL

## 2023-06-01 DIAGNOSIS — I48.19 PERSISTENT ATRIAL FIBRILLATION (HCC): ICD-10-CM

## 2023-06-01 DIAGNOSIS — Z79.01 LONG TERM (CURRENT) USE OF ANTICOAGULANTS: ICD-10-CM

## 2023-06-01 DIAGNOSIS — E55.9 VITAMIN D DEFICIENCY: ICD-10-CM

## 2023-06-01 LAB
25(OH)D3 SERPL-MCNC: 41 NG/ML (ref 30–100)
BACTERIA UR QL AUTO: ABNORMAL /HPF
BILIRUB UR QL STRIP: NEGATIVE
CAOX CRY URNS QL MICRO: ABNORMAL /HPF
CLARITY UR: CLEAR
COLOR UR: ABNORMAL
GLUCOSE UR STRIP-MCNC: ABNORMAL MG/DL
HGB UR QL STRIP.AUTO: NEGATIVE
HYALINE CASTS #/AREA URNS LPF: ABNORMAL /LPF
INR PPP: 6.39 (ref 0.84–1.19)
KETONES UR STRIP-MCNC: NEGATIVE MG/DL
LEUKOCYTE ESTERASE UR QL STRIP: ABNORMAL
MUCOUS THREADS UR QL AUTO: ABNORMAL
NITRITE UR QL STRIP: NEGATIVE
NON-SQ EPI CELLS URNS QL MICRO: ABNORMAL /HPF
PH UR STRIP.AUTO: 5.5 [PH]
PROT UR STRIP-MCNC: ABNORMAL MG/DL
PROTHROMBIN TIME: 56.4 SECONDS (ref 11.6–14.5)
RBC #/AREA URNS AUTO: ABNORMAL /HPF
SP GR UR STRIP.AUTO: 1.03 (ref 1–1.03)
UROBILINOGEN UR STRIP-ACNC: <2 MG/DL
WBC #/AREA URNS AUTO: ABNORMAL /HPF

## 2023-06-01 PROCEDURE — 85610 PROTHROMBIN TIME: CPT

## 2023-06-01 PROCEDURE — 36415 COLL VENOUS BLD VENIPUNCTURE: CPT

## 2023-06-01 PROCEDURE — 82306 VITAMIN D 25 HYDROXY: CPT

## 2023-06-01 NOTE — TELEPHONE ENCOUNTER
I received a message from on-call service to contact laboratory studies for patient on abnormal/critical lab value  I labeled: Speak with  Florentino Jensen who informed me that patient's INR was 6 39  I was able to call and speak with both patient and his wife  Patient notes that overall he feels well and denies any bleeding issues at this time  He states that he has been on steroids for the last several days and since that time has not drank any of his Glucerna which he usually does throughout the day  In the setting of elevated INR I recommended not taking his Coumadin until he hears from our Coumadin clinic  I also recommended that he reinitiate Glucerna which per conversation does have elevated vitamin K content which would assist in lowering his INR  I informed both him and his wife that if he were to have any warning or alarm type symptoms or bleeding that he is to seek emergency medical care immediately  They noted understanding were agreeable to plan  I will be sending message to our Coumadin clinic to contact patient in the morning on 6/2/2023 with further instructions and plan of care and recommendations for repeat testing

## 2023-06-02 ENCOUNTER — ANTICOAG VISIT (OUTPATIENT)
Dept: CARDIOLOGY CLINIC | Facility: CLINIC | Age: 88
End: 2023-06-02

## 2023-06-02 DIAGNOSIS — Z79.01 LONG TERM (CURRENT) USE OF ANTICOAGULANTS: ICD-10-CM

## 2023-06-02 DIAGNOSIS — I48.19 PERSISTENT ATRIAL FIBRILLATION (HCC): Primary | ICD-10-CM

## 2023-06-02 RX ORDER — GLIMEPIRIDE 1 MG/1
1 TABLET ORAL
Qty: 90 TABLET | Refills: 3 | Status: SHIPPED | OUTPATIENT
Start: 2023-06-02 | End: 2024-05-27

## 2023-06-02 NOTE — PATIENT INSTRUCTIONS
Patient on steroids and Bactrim for Pneumonia  Dis have spinach and Boost yesterday  Will Hold coumadin until Monday and will continue drinking one- two boost daily  He will recheck INR on Monday  Miya Funez PA-C

## 2023-06-02 NOTE — TELEPHONE ENCOUNTER
"Wife reports that patients BS this am was 299 he is still on the predisone as well    she also states his \"color is off, maybe anemia? \" she was wondering if lab work could be ordered  "

## 2023-06-03 ENCOUNTER — NURSE TRIAGE (OUTPATIENT)
Dept: FAMILY MEDICINE CLINIC | Facility: CLINIC | Age: 88
End: 2023-06-03

## 2023-06-03 DIAGNOSIS — U07.1 COVID-19: Primary | ICD-10-CM

## 2023-06-03 RX ORDER — NIRMATRELVIR AND RITONAVIR 300-100 MG
3 KIT ORAL 2 TIMES DAILY
Qty: 30 TABLET | Refills: 0 | Status: SHIPPED | OUTPATIENT
Start: 2023-06-03 | End: 2023-06-08

## 2023-06-03 NOTE — TELEPHONE ENCOUNTER
"Regarding: MALENA/ Doretha Palmaid advcie  ----- Message from Lul Night sent at 6/3/2023  6:37 PM EDT -----  \"I am calling for my  he tested positive for Covid   He has been sick and tired very weak and his legs very wee and is on a steroid, I am calling for some advice \" He has no fever\"    "

## 2023-06-03 NOTE — TELEPHONE ENCOUNTER
"  Reason for Disposition  • HIGH RISK for severe COVID complications (e g , weak immune system, age > 59 years, obesity with BMI > 22, pregnant, chronic lung disease or other chronic medical condition)  (Exception: Already seen by PCP and no new or worsening symptoms )    Answer Assessment - Initial Assessment Questions  1  COVID-19 DIAGNOSIS: \"Who made your COVID-19 diagnosis? \" \"Was it confirmed by a positive lab test or self-test?\" If not diagnosed by a doctor (or NP/PA), ask \"Are there lots of cases (community spread) where you live? \" Note: See public health department website, if unsure  Positive today    2  COVID-19 EXPOSURE: \"Was there any known exposure to COVID before the symptoms began? \" CDC Definition of close contact: within 6 feet (2 meters) for a total of 15 minutes or more over a 24-hour period  Visiting family members positive    3  ONSET: \"When did the COVID-19 symptoms start? \"       3 days    4  WORST SYMPTOM: \"What is your worst symptom? \" (e g , cough, fever, shortness of breath, muscle aches)      Very tired    5  COUGH: \"Do you have a cough? \" If Yes, ask: \"How bad is the cough? \"        Denies    6  FEVER: \"Do you have a fever? \" If Yes, ask: \"What is your temperature, how was it measured, and when did it start? \"      Denies    7  RESPIRATORY STATUS: \"Describe your breathing? \" (e g , shortness of breath, wheezing, unable to speak)       Denies    8  BETTER-SAME-WORSE: [place] you getting better, staying the same or getting worse compared to yesterday? \"  If getting worse, ask, \"In what way? \"      Same    9  HIGH RISK DISEASE: \"Do you have any chronic medical problems? \" (e g , asthma, heart or lung disease, weak immune system, obesity, etc )      On steroids, bactrim for , DM, CHF, afib    10  VACCINE: \"Have you had the COVID-19 vaccine? \" If Yes, ask: \"Which one, how many shots, when did you get it? \"        Yes    11  BOOSTER: \"Have you received your COVID-19 booster? \" If Yes, ask: \"Which " "one and when did you get it? \"        2    12  PREGNANCY: \"Is there any chance you are pregnant? \" \"When was your last menstrual period? \"        N/a    13  OTHER SYMPTOMS: \"Do you have any other symptoms? \"  (e g , chills, fatigue, headache, loss of smell or taste, muscle pain, sore throat)        No taste, fatigue, decreased appetite, weakness    14  O2 SATURATION MONITOR:  \"Do you use an oxygen saturation monitor (pulse oximeter) at home? \" If Yes, ask \"What is your reading (oxygen level) today? \" \"What is your usual oxygen saturation reading? \" (e g , 95%)        carito    Protocols used: CORONAVIRUS (COVID-19) DIAGNOSED OR SUSPECTED-ADULT-AH    "

## 2023-06-05 ENCOUNTER — APPOINTMENT (OUTPATIENT)
Dept: LAB | Facility: MEDICAL CENTER | Age: 88
End: 2023-06-05
Payer: COMMERCIAL

## 2023-06-05 DIAGNOSIS — I48.19 PERSISTENT ATRIAL FIBRILLATION (HCC): ICD-10-CM

## 2023-06-05 DIAGNOSIS — E11.9 TYPE 2 DIABETES MELLITUS WITHOUT COMPLICATION, WITHOUT LONG-TERM CURRENT USE OF INSULIN (HCC): ICD-10-CM

## 2023-06-05 DIAGNOSIS — Z79.01 LONG TERM (CURRENT) USE OF ANTICOAGULANTS: ICD-10-CM

## 2023-06-05 LAB
INR PPP: 1.86 (ref 0.84–1.19)
PROTHROMBIN TIME: 21.7 SECONDS (ref 11.6–14.5)

## 2023-06-05 PROCEDURE — 85610 PROTHROMBIN TIME: CPT

## 2023-06-05 PROCEDURE — 36415 COLL VENOUS BLD VENIPUNCTURE: CPT

## 2023-06-06 ENCOUNTER — TELEPHONE (OUTPATIENT)
Dept: FAMILY MEDICINE CLINIC | Facility: CLINIC | Age: 88
End: 2023-06-06

## 2023-06-06 ENCOUNTER — ANTICOAG VISIT (OUTPATIENT)
Dept: CARDIOLOGY CLINIC | Facility: CLINIC | Age: 88
End: 2023-06-06
Payer: COMMERCIAL

## 2023-06-06 DIAGNOSIS — I48.19 PERSISTENT ATRIAL FIBRILLATION (HCC): Primary | ICD-10-CM

## 2023-06-06 DIAGNOSIS — Z79.01 LONG TERM (CURRENT) USE OF ANTICOAGULANTS: ICD-10-CM

## 2023-06-06 DIAGNOSIS — E11.9 TYPE 2 DIABETES MELLITUS WITHOUT COMPLICATION, WITHOUT LONG-TERM CURRENT USE OF INSULIN (HCC): Primary | ICD-10-CM

## 2023-06-06 PROCEDURE — 93793 ANTICOAG MGMT PT WARFARIN: CPT | Performed by: PHYSICIAN ASSISTANT

## 2023-06-06 RX ORDER — METFORMIN HYDROCHLORIDE 500 MG/1
1000 TABLET, EXTENDED RELEASE ORAL 2 TIMES DAILY WITH MEALS
Qty: 360 TABLET | Refills: 1 | Status: SHIPPED | OUTPATIENT
Start: 2023-06-06

## 2023-06-06 NOTE — TELEPHONE ENCOUNTER
Pharm called; Wife was just at Pharm to  other medication but while she was there, she told the Pharm that Patient has been taking Metformin 500mg 2 tablets BID  Pharm called concerned because its not what was on file, and then questioned if the patient is suppose to stay on Metformin 500mg 2 tablets BID because he will be short on his current 500mg 1 tablet daily Rx  Advised was a verbal order while patient was on the Prednisone, but patient has now been on the verbal order x nearly 2 weeks due to the continued glucose elevation  Please advise

## 2023-06-06 NOTE — PATIENT INSTRUCTIONS
Spoke with Patient: No changes in medication health or diet  No missed or extra doses  No s/s of bleeding TIA or CVA  No new ABX, NSAIDs OTC meds, or supplements  Dose as instructed and recheck in three days   Patient has Lavon Malik PA-C

## 2023-06-08 ENCOUNTER — APPOINTMENT (OUTPATIENT)
Dept: LAB | Facility: MEDICAL CENTER | Age: 88
End: 2023-06-08
Payer: COMMERCIAL

## 2023-06-08 DIAGNOSIS — Z79.01 LONG TERM (CURRENT) USE OF ANTICOAGULANTS: ICD-10-CM

## 2023-06-08 DIAGNOSIS — I48.19 PERSISTENT ATRIAL FIBRILLATION (HCC): ICD-10-CM

## 2023-06-08 LAB
INR PPP: 1.12 (ref 0.84–1.19)
PROTHROMBIN TIME: 14.6 SECONDS (ref 11.6–14.5)

## 2023-06-08 PROCEDURE — 36415 COLL VENOUS BLD VENIPUNCTURE: CPT

## 2023-06-08 PROCEDURE — 85610 PROTHROMBIN TIME: CPT

## 2023-06-09 ENCOUNTER — ANTICOAG VISIT (OUTPATIENT)
Dept: CARDIOLOGY CLINIC | Facility: CLINIC | Age: 88
End: 2023-06-09
Payer: COMMERCIAL

## 2023-06-09 DIAGNOSIS — Z79.01 LONG TERM (CURRENT) USE OF ANTICOAGULANTS: ICD-10-CM

## 2023-06-09 DIAGNOSIS — I48.19 PERSISTENT ATRIAL FIBRILLATION (HCC): Primary | ICD-10-CM

## 2023-06-09 PROCEDURE — 93793 ANTICOAG MGMT PT WARFARIN: CPT | Performed by: PHYSICIAN ASSISTANT

## 2023-06-09 NOTE — PATIENT INSTRUCTIONS
Spoke with Patient: No changes in medication health or diet  No missed or extra doses  No s/s of bleeding TIA or CVA  No new ABX, NSAIDs OTC meds, or supplements  Dose as instructed and recheck on Monday per Dr Nolberto Blum PA-C

## 2023-06-12 ENCOUNTER — ANTICOAG VISIT (OUTPATIENT)
Dept: CARDIOLOGY CLINIC | Facility: CLINIC | Age: 88
End: 2023-06-12
Payer: COMMERCIAL

## 2023-06-12 ENCOUNTER — APPOINTMENT (OUTPATIENT)
Dept: LAB | Facility: MEDICAL CENTER | Age: 88
End: 2023-06-12
Payer: COMMERCIAL

## 2023-06-12 DIAGNOSIS — I48.19 PERSISTENT ATRIAL FIBRILLATION (HCC): Primary | ICD-10-CM

## 2023-06-12 DIAGNOSIS — I48.19 PERSISTENT ATRIAL FIBRILLATION (HCC): ICD-10-CM

## 2023-06-12 DIAGNOSIS — Z79.01 LONG TERM (CURRENT) USE OF ANTICOAGULANTS: ICD-10-CM

## 2023-06-12 LAB
INR PPP: 1.57 (ref 0.84–1.19)
PROTHROMBIN TIME: 19 SECONDS (ref 11.6–14.5)

## 2023-06-12 PROCEDURE — 36415 COLL VENOUS BLD VENIPUNCTURE: CPT

## 2023-06-12 PROCEDURE — 93793 ANTICOAG MGMT PT WARFARIN: CPT | Performed by: PHYSICIAN ASSISTANT

## 2023-06-12 PROCEDURE — 85610 PROTHROMBIN TIME: CPT

## 2023-06-12 NOTE — PATIENT INSTRUCTIONS
Spoke with Patient: No changes in medication health or diet  No missed or extra doses  No s/s of bleeding TIA or CVA  No new ABX, NSAIDs OTC meds, or supplements  Dose as instructed and recheck in one week     Marilyn Silver PA-C

## 2023-06-14 ENCOUNTER — OFFICE VISIT (OUTPATIENT)
Dept: PULMONOLOGY | Facility: CLINIC | Age: 88
End: 2023-06-14
Payer: COMMERCIAL

## 2023-06-14 VITALS
DIASTOLIC BLOOD PRESSURE: 62 MMHG | HEART RATE: 65 BPM | TEMPERATURE: 97.2 F | SYSTOLIC BLOOD PRESSURE: 102 MMHG | OXYGEN SATURATION: 99 %

## 2023-06-14 DIAGNOSIS — J84.116 CRYPTOGENIC ORGANIZING PNEUMONIA (HCC): ICD-10-CM

## 2023-06-14 DIAGNOSIS — E11.9 TYPE 2 DIABETES MELLITUS WITHOUT COMPLICATION, WITHOUT LONG-TERM CURRENT USE OF INSULIN (HCC): ICD-10-CM

## 2023-06-14 DIAGNOSIS — I48.19 PERSISTENT ATRIAL FIBRILLATION (HCC): Primary | ICD-10-CM

## 2023-06-14 PROCEDURE — 99214 OFFICE O/P EST MOD 30 MIN: CPT | Performed by: PHYSICIAN ASSISTANT

## 2023-06-14 RX ORDER — PREDNISONE 10 MG/1
TABLET ORAL
Qty: 140 TABLET | Refills: 0 | Status: SHIPPED | OUTPATIENT
Start: 2023-06-14 | End: 2023-08-09

## 2023-06-14 RX ORDER — SULFAMETHOXAZOLE AND TRIMETHOPRIM 400; 80 MG/1; MG/1
1 TABLET ORAL DAILY
Qty: 30 TABLET | Refills: 0 | Status: SHIPPED | OUTPATIENT
Start: 2023-06-14 | End: 2023-07-14

## 2023-06-16 ENCOUNTER — OFFICE VISIT (OUTPATIENT)
Dept: FAMILY MEDICINE CLINIC | Facility: CLINIC | Age: 88
End: 2023-06-16
Payer: COMMERCIAL

## 2023-06-16 VITALS
RESPIRATION RATE: 16 BRPM | DIASTOLIC BLOOD PRESSURE: 68 MMHG | HEIGHT: 73 IN | SYSTOLIC BLOOD PRESSURE: 124 MMHG | BODY MASS INDEX: 25.18 KG/M2 | HEART RATE: 76 BPM | WEIGHT: 190 LBS | TEMPERATURE: 94.8 F

## 2023-06-16 DIAGNOSIS — J18.9 MULTIFOCAL PNEUMONIA: Primary | ICD-10-CM

## 2023-06-16 DIAGNOSIS — I73.9 PERIPHERAL ARTERIAL DISEASE (HCC): ICD-10-CM

## 2023-06-16 DIAGNOSIS — I87.2 CHRONIC VENOUS INSUFFICIENCY: ICD-10-CM

## 2023-06-16 DIAGNOSIS — E55.9 VITAMIN D DEFICIENCY: ICD-10-CM

## 2023-06-16 DIAGNOSIS — G62.9 NEUROPATHY: ICD-10-CM

## 2023-06-16 DIAGNOSIS — I10 ESSENTIAL HYPERTENSION: ICD-10-CM

## 2023-06-16 DIAGNOSIS — E11.9 TYPE 2 DIABETES MELLITUS WITHOUT COMPLICATION, WITHOUT LONG-TERM CURRENT USE OF INSULIN (HCC): ICD-10-CM

## 2023-06-16 DIAGNOSIS — I48.19 PERSISTENT ATRIAL FIBRILLATION (HCC): ICD-10-CM

## 2023-06-16 DIAGNOSIS — Z79.01 LONG TERM (CURRENT) USE OF ANTICOAGULANTS: ICD-10-CM

## 2023-06-16 PROBLEM — J84.116 CRYPTOGENIC ORGANIZING PNEUMONIA (HCC): Status: ACTIVE | Noted: 2023-06-16

## 2023-06-16 PROCEDURE — 99215 OFFICE O/P EST HI 40 MIN: CPT | Performed by: FAMILY MEDICINE

## 2023-06-16 RX ORDER — METOPROLOL SUCCINATE 25 MG/1
25 TABLET, EXTENDED RELEASE ORAL DAILY
Qty: 90 TABLET | Refills: 3 | Status: SHIPPED | OUTPATIENT
Start: 2023-06-16

## 2023-06-16 RX ORDER — PYRIDOXINE HCL (VITAMIN B6) 50 MG
50 TABLET ORAL DAILY
Qty: 90 TABLET | Refills: 1 | Status: SHIPPED | OUTPATIENT
Start: 2023-06-16

## 2023-06-16 NOTE — PROGRESS NOTES
Assessment/Plan: The patient has been high doses of steroids with an elevation of his glucose for the treatment of multifocal pneumonia  The patient currently is being weaned off of prednisone and i   s on 40 mg/day currently  The hospital record was reviewed    Patient's elevation of glucose with steroid therapy was managed by increasing metformin from 500 mg/day to 2 g/day with the addition of Amaryl 1 mg the glucoses have been running between 150 and 200    History of neuropathy of the lower extremities bilaterally worsened during steroid treatment the legs also currently feel cool to touch  Doppler of August 25, 2022 showed diffuse nonobstructive disease bilaterally  The patient has had a 50 pound weight loss during his prolonged illness and the cold extremities may be a side effect of Toprol  The patient's wife is an RN I instructed them to try breaking the Toprol in half to 25 mg daily and monitoring his pulse if he becomes tachycardic they should return to the full dose of metoprolol and contact us  We may need to consider Cardizem for rate control  We will also add B6 for neuropathy  Atrial fibrillation currently anticoagulated with Coumadin managed by the Coumadin clinic    Essential hypertension with blood pressure controlled on the current regimen    Problem List Items Addressed This Visit    None  Visit Diagnoses     Essential hypertension               Diagnoses and all orders for this visit:    Essential hypertension        No problem-specific Assessment & Plan notes found for this encounter  PHQ-2/9 Depression Screening            Body mass index is 25 07 kg/m²  BMI Counseling: Body mass index is 25 07 kg/m²  The BMI Diabetic Foot Exam    Patient's shoes and socks removed  Right Foot/Ankle   Right Foot Inspection  Skin Exam: skin normal, skin intact and abnormal color  No dry skin, no warmth, no callus, no erythema, no maceration, no pre-ulcer, no ulcer and no callus       Toe Exam: swelling and erythema  Sensory   Vibration: diminished  Proprioception: diminished  Monofilament testing: diminished    Vascular  Capillary refills: elevated  The right DP pulse is 1+  The right PT pulse is 1+  Left Foot/Ankle  Left Foot Inspection  Skin Exam: skin normal, skin intact and abnormal color  No dry skin, no warmth, no erythema, no maceration, no pre-ulcer, no ulcer and no callus  Toe Exam: swelling and erythema  Sensory   Vibration: diminished  Proprioception: diminished  Monofilament testing: diminished    Vascular  Capillary refills: elevated  The left DP pulse is 1+  The left PT pulse is 1+  Assign Risk Category  Deformity present  Loss of protective sensation  Weak pulses  Risk: 2      Subjective:      Patient ID: Bacilio Jimenez is a 80 y o  male  Patient presents accompanied by his wife with a chief complaint of weakness of the legs and paresthesia of the feet bilaterally      The following portions of the patient's history were reviewed and updated as appropriate:   He has a past medical history of Anxiety, Atrial fibrillation (HCC), Atrial flutter ([facility] [address] ), Congestive heart failure (CHF) ([facility] ), COPD (chronic obstructive pulmonary disease) ([facility] ), Coronary artery disease, DVT (deep venous thrombosis) ([facility] ), ED (erectile dysfunction), Hearing loss, History of echocardiogram (04/05/2018), radiation therapy, Hypercholesterolemia, Hyperlipidemia, Hypertension, Long term (current) use of anticoagulants (8/21/2018), Neuropathy of both feet, Peripheral neuropathy, Prostate cancer ([facility] ), and Type 2 diabetes mellitus ([facility] )  ,  does not have any pertinent problems on file  ,   has a past surgical history that includes Cardiac catheterization (01/15/1988); Colonoscopy (10/05/2017); and Prostate surgery  ,  family history includes Cancer in his brother and other; Colon cancer in his brother and other; Heart disease in his other; Hypertension in his other  ,   reports that he has never smoked  He has never used smokeless tobacco  He reports that he does not currently use alcohol  He reports that he does not use drugs  ,  has No Known Allergies     Current Outpatient Medications   Medication Sig Dispense Refill   • atorvastatin (LIPITOR) 40 mg tablet TAKE 1 TABLET BY MOUTH DAILY 90 tablet 3   • benzonatate (TESSALON) 200 MG capsule TAKE ONE CAPSULE BY MOUTH THREE TIMES A DAY AS NEEDED FOR COUGH (Patient not taking: Reported on 6/14/2023) 20 capsule 5   • econazole nitrate 1 % cream      • furosemide (LASIX) 20 mg tablet TAKE 1 TABLET BY MOUTH 3 DAYS WEEKLY (Patient taking differently: TAKE 1 TABLET BY MOUTH 3 DAYS WEEKLY; Mon, Wed, Friday) 45 tablet 5   • glimepiride (AMARYL) 1 mg tablet Take 1 tablet (1 mg total) by mouth daily with breakfast 90 tablet 3   • metFORMIN (GLUCOPHAGE-XR) 500 mg 24 hr tablet Take 2 tablets (1,000 mg total) by mouth 2 (two) times a day with meals 360 tablet 1   • metoprolol succinate (TOPROL-XL) 50 mg 24 hr tablet TAKE 1 TABLET BY MOUTH TWICE DAILY 180 tablet 5   • OneTouch Delica Lancets 99P MISC USE TO TEST ONCE DAILY 100 each 5   • OneTouch Ultra test strip TEST ONCE DAILY 100 each 1   • predniSONE 10 mg tablet Take 4 tablets (40 mg total) by mouth daily for 14 days, THEN 3 tablets (30 mg total) daily for 14 days, THEN 2 tablets (20 mg total) daily for 14 days, THEN 1 tablet (10 mg total) daily for 14 days  140 tablet 0   • sulfamethoxazole-trimethoprim (BACTRIM) 400-80 mg per tablet Take 1 tablet by mouth daily Until your prednisone is down to 20 mg daily 30 tablet 0   • warfarin (COUMADIN) 4 mg tablet TAKE 2 TABLETS BY MOUTH DAILY OR AS DIRECTED (Patient taking differently: TAKE 2 TABLETS BY MOUTH DAILY OR AS DIRECTED;   Sunday 8mg; Mon 8mg; Tues 6mg; Wed 8mg; Thurs 6mg; Friday 8mg; Sat 6mg; Pt states he did not take yet today as he takes this in the evening;) 180 tablet 5     No current facility-administered medications for this visit         Review of Systems Constitutional: Negative for chills and fever  HENT: Negative for ear pain and sore throat  Eyes: Negative for pain and visual disturbance  Respiratory: Negative for cough and shortness of breath  Cardiovascular: Positive for leg swelling  Negative for chest pain and palpitations  Gastrointestinal: Negative for abdominal pain and vomiting  Genitourinary: Negative for dysuria and hematuria  Musculoskeletal: Negative for arthralgias and back pain  Skin: Negative for color change and rash  Neurological: Positive for weakness and numbness  Negative for seizures and syncope  Weakness of the legs bilaterally and numbness of the feet bilaterally   All other systems reviewed and are negative  Objective:   VAS lower limb arterial duplex, limited/unilateral  Status: Final result     PACS Images     Show images for VAS lower limb arterial duplex, limited/unilateral  Study Result    Narrative & Impression      [facility] REPORT  CLINICAL:  Indications:  Abnormal [employer]ET[employer] screening of right ANGELIQUE  Patient denies claudication  Operative History:  No cardiovascular surgeries noted  Risk Factors  The patient has history of CHF, A-Fib, HTN, CAD, and Hyperlipidemia  He has no  history of Diabetes  Clinical  Right Pressure:  151 mmHg, Left Pressure:  136 mmHg  FINDINGS:     Right                  PSV (cm/s)    Common Femoral Artery          74    Prox Profunda                  90    Prox SFA                       72    Mid SFA                        75    Dist SFA                       56    Proximal Pop                   60    Distal Pop                     59    Dist Post Tibial               44    Prox  Ant  Tibial              70    Dist  Ant  Tibial              97             CONCLUSION:  Impression:  RIGHT LOWER LIMB:  Diffuse disease noted throughout the femoral-popliteal and tibioperoneal  arteries without significant focal stenosis    Ankle/Brachial index: 1 17, which is in the normal range  Prior: 1 23  Metatarsal pressure of 137 mmHg  Great toe pressure of 92 mmHg, within the healing range  PVR/ PPG tracings are slightly dampened  LEFT LOWER LIMB:  Ankle/Brachial index: 1 09, which is in the normal range  Prior: 1 24  Metatarsal pressure of 170 mmHg  Great toe pressure of 93 mmHg, within the healing range  PVR/ PPG tracings are slightly dampened  Compared to previous study on 12/20/2016, there is no change       SIGNATURE:  Electronically Signed by: Lavell Rodriguez on 2022-08-25 09:31:38 PM     Linked Documents    View Image     Imaging    VAS lower limb arterial duplex, limited/unilateral (Order: [id number]) - 8/25/2022    Result History    VAS lower limb arterial duplex, limited/unilateral (Order #[id number]) on 8/25/2022 - Order Result History Report    Order Report     Order Details  Order Questions    Question Answer   Exam reason Severe peripheral arterial disease by screen   Note: Enter reason for exam            Result Information    Status Priority Source   Final result (8/25/2022  9:31 PM) Routine      Reason for Exam    Severe peripheral arterial disease by screen   Dx: Peripheral arterial disease ([facility] ) [I73 9 (ICD-10-CM)]       All Reviewers List    Corey Escobar DO on 8/26/2022 10:46 AM         VAS lower limb arterial duplex, limited/unilateral: Patient Communication     Add Comments   Seen         Signed by    Signed Date/Time  Phone Pager   Shelly Fisher 8/25/2022 21:31 [phone]        Exam Information    Status Exam Begun  Exam Ended  Performing Tech   Final [99] 8/25/2022 17:03 8/25/2022 18:33 Delisa Lopez         External Results Report    Open External Results Report      Encounter    View Encounter                     Patient Care Timeline    No data selected in time range    Pre-op Summary    Pre-op             Recovery Summary    Recovery                 Routing History    None         /68   Pulse 76   Temp (!) 94 8 °F (34 9 °C)   Resp "16   Ht 6' 1\" (1 854 m)   Wt 86 2 kg (190 lb)   BMI 25 07 kg/m²   Body mass index is 25 07 kg/m²  Physical Exam  Constitutional:       Appearance: Normal appearance  He is well-developed  HENT:      Head: Normocephalic and atraumatic  Right Ear: Tympanic membrane, ear canal and external ear normal       Left Ear: Tympanic membrane, ear canal and external ear normal       Nose: Nose normal       Mouth/Throat:      Mouth: Mucous membranes are moist       Pharynx: Oropharynx is clear  Eyes:      Extraocular Movements: Extraocular movements intact  Conjunctiva/sclera: Conjunctivae normal       Pupils: Pupils are equal, round, and reactive to light  Cardiovascular:      Rate and Rhythm: Normal rate  Rhythm irregular  Pulses: Pulses are weak  Dorsalis pedis pulses are 1+ on the right side and 1+ on the left side  Posterior tibial pulses are 1+ on the right side and 1+ on the left side  Heart sounds: Normal heart sounds  Pulmonary:      Effort: Pulmonary effort is normal       Breath sounds: Normal breath sounds  Abdominal:      General: Abdomen is flat  Bowel sounds are normal       Palpations: Abdomen is soft  Tenderness: There is no abdominal tenderness  Musculoskeletal:         General: Normal range of motion  Cervical back: Normal range of motion and neck supple  Feet:      Right foot:      Skin integrity: No ulcer, skin breakdown, erythema, warmth, callus or dry skin  Left foot:      Skin integrity: No ulcer, skin breakdown, erythema, warmth, callus or dry skin  Skin:     General: Skin is warm and dry  Capillary Refill: Capillary refill takes less than 2 seconds  Comments: The skin of the feet is purplish in color and cool   Neurological:      General: No focal deficit present  Mental Status: He is alert and oriented to person, place, and time     Psychiatric:         Mood and Affect: Mood normal          Behavior: Behavior " normal          Thought Content:  Thought content normal          Judgment: Judgment normal

## 2023-06-16 NOTE — PROGRESS NOTES
Pulmonary Follow Up Note   Edgardo Copeland 80 y o  male MRN: [id number]  6/16/2023      Assessment:    Cryptogenic organizing pneumonia [facility])  Komal Adams is doing exceptionally well with current treatment of  but is adamant about  getting off of prednisone as quickly as possible  Discussed case with Dr Starla Sarmiento  Recommended completing prednisone 50 mg p o  daily through the 19th then start tapering by 10 mg every 14 days  Continue PJP prophylactic Bactrim until down to prednisone 20 mg daily  I explained to Komal Adams and his wife that  typically requires longer steroid taper and expediting this course does come with the potential for disease relapse  They are both willing to take that risk and know to call with worsening symptoms  Plan to repeat CT chest in 8 weeks  Persistent atrial fibrillation (HCC)  Continue to monitor INR closely while taking prednisone  Defer further [facility] management to PCP  Rate controlled today on metoprolol  Type 2 diabetes mellitus without complication, without long-term current use of insulin (HCC)    Lab Results   Component Value Date    HGBA1C 7 8 (H) 03/11/2023     Monitor blood sugars closely in the setting of systemic steroids  Plan:    Diagnoses and all orders for this visit:    Persistent atrial fibrillation ([facility] [address] )    Cryptogenic organizing pneumonia ([facility] [address] )  -     sulfamethoxazole-trimethoprim (BACTRIM) 400-80 mg per tablet; Take 1 tablet by mouth daily Until your prednisone is down to 20 mg daily  -     predniSONE 10 mg tablet; Take 4 tablets (40 mg total) by mouth daily for 14 days, THEN 3 tablets (30 mg total) daily for 14 days, THEN 2 tablets (20 mg total) daily for 14 days, THEN 1 tablet (10 mg total) daily for 14 days  -     CT chest wo contrast; Future    Type 2 diabetes mellitus without complication, without long-term current use of insulin (HCC)    Other orders  -     econazole nitrate 1 % cream        Return in about 8 weeks (around 8/9/2023)      History of Present Illness   HPI:  Sergey Gutierrez is a 80 y o  male who Raji Journey the office today for 1 month follow-up  Patient had previously establish care with Dr Paris Stubbs 1 month ago  for work-up of multifocal pneumonia  He reportedly been dealing with pneumonia since January 2023 undergoing multiple courses of antibiotics which would help initially but symptoms would always return  He had CT chest after that visit that showed persistent consolidations right greater than left  After reviewing case with Dr Paris Stubbs I called the patient today to review results and start treatment for noninfectious pneumonia such as cryptogenic organizing pneumonia  prednisone 50 mg p o  daily started 5/19/2023 with instructions to continue that dose for the next 4 weeks  He presents today with only a few more days left of the 50 mg  He tells me that cough and shortness of breath he had once is completely gone  He feels back to his normal self from a respiratory standpoint  He and his wife are both concerned about the potential side effects of such high doses of steroids  He states that he has been having issues with his INR and Coumadin along with high blood sugars  He also feels that his legs have been weaker more recently since starting steroids  He feels like they are swollen  His wife  who is present in the office today tells me that they both had COVID 3 weeks ago and received Paxlovid  They have made full recovery from this  No fevers chills recently  No chest pain  As mentioned above, cough is completely resolved  He denies shortness of breath at rest or exertion  No wheezing  Review of Systems   All other systems reviewed and are negative        Historical Information   Past Medical History:   Diagnosis Date   • Anxiety    • Atrial fibrillation [facility])    • Atrial flutter (HCC)    • Congestive heart failure (CHF) ([facility])    • COPD (chronic obstructive pulmonary disease) ([facility])    • Coronary artery disease    • DVT (deep venous thrombosis) (HCC)    • ED (erectile dysfunction)    • Hearing loss    • History of echocardiogram 04/05/2018    EF 0 55, Mild LVH  Mild moderate mitral regurg  Trace aortic regurg  • Hx of radiation therapy     XRT   • Hypercholesterolemia    • Hyperlipidemia    • Hypertension    • Long term (current) use of anticoagulants 8/21/2018   • Neuropathy of both feet    • Peripheral neuropathy    • Prostate cancer ([address] )    • Type 2 diabetes mellitus [facility])      Past Surgical History:   Procedure Laterality Date   • CARDIAC CATHETERIZATION  01/15/1988    Left main: unobstructed  Posterolateral branch 90% narrowed     • COLONOSCOPY  10/05/2017    Dr Jennifer Buitrago   • PROSTATE SURGERY       Family History   Problem Relation Age of Onset   • Cancer Brother         bladder   • Colon cancer Brother    • Heart disease Other    • Hypertension Other    • Cancer Other         bladder   • Colon cancer Other        Social History     Tobacco Use   Smoking Status Never   Smokeless Tobacco Never         Meds/Allergies     Current Outpatient Medications:   •  atorvastatin (LIPITOR) 40 mg tablet, TAKE 1 TABLET BY MOUTH DAILY, Disp: 90 tablet, Rfl: 3  •  furosemide (LASIX) 20 mg tablet, TAKE 1 TABLET BY MOUTH 3 DAYS WEEKLY (Patient taking differently: TAKE 1 TABLET BY MOUTH 3 DAYS WEEKLY; Mon, Wed, Friday), Disp: 45 tablet, Rfl: 5  •  glimepiride (AMARYL) 1 mg tablet, Take 1 tablet (1 mg total) by mouth daily with breakfast, Disp: 90 tablet, Rfl: 3  •  metFORMIN (GLUCOPHAGE-XR) 500 mg 24 hr tablet, Take 2 tablets (1,000 mg total) by mouth 2 (two) times a day with meals, Disp: 360 tablet, Rfl: 1  •  metoprolol succinate (TOPROL-XL) 50 mg 24 hr tablet, TAKE 1 TABLET BY MOUTH TWICE DAILY, Disp: 180 tablet, Rfl: 5  •  OneTouch Delica Lancets 46V MISC, USE TO TEST ONCE DAILY, Disp: 100 each, Rfl: 5  •  OneTouch Ultra test strip, TEST ONCE DAILY, Disp: 100 each, Rfl: 1  •  predniSONE 10 mg tablet, Take 4 tablets (40 mg total) by mouth daily for 14 days, THEN 3 tablets (30 mg total) daily for 14 days, THEN 2 tablets (20 mg total) daily for 14 days, THEN 1 tablet (10 mg total) daily for 14 days  , Disp: 140 tablet, Rfl: 0  •  sulfamethoxazole-trimethoprim (BACTRIM) 400-80 mg per tablet, Take 1 tablet by mouth daily Until your prednisone is down to 20 mg daily, Disp: 30 tablet, Rfl: 0  •  warfarin (COUMADIN) 4 mg tablet, TAKE 2 TABLETS BY MOUTH DAILY OR AS DIRECTED (Patient taking differently: TAKE 2 TABLETS BY MOUTH DAILY OR AS DIRECTED;  Sunday 8mg; Mon 8mg; Tues 6mg; Wed 8mg; Thurs 6mg; Friday 8mg; Sat 6mg; Pt states he did not take yet today as he takes this in the evening;), Disp: 180 tablet, Rfl: 5  •  benzonatate (TESSALON) 200 MG capsule, TAKE ONE CAPSULE BY MOUTH THREE TIMES A DAY AS NEEDED FOR COUGH (Patient not taking: Reported on 6/14/2023), Disp: 20 capsule, Rfl: 5  •  econazole nitrate 1 % cream, , Disp: , Rfl:   No Known Allergies    Vitals: Blood pressure 102/62, pulse 65, temperature (!) 97 2 °F (36 2 °C), temperature source Temporal, SpO2 99 %  There is no height or weight on file to calculate BMI  Oxygen Therapy  SpO2: 99 %  Oxygen Therapy: None (Room air)    Physical Exam  Physical Exam  Vitals reviewed  Constitutional:       Appearance: Normal appearance  He is well-developed  HENT:      Head: Normocephalic and atraumatic  Nose: Nose normal       Mouth/Throat:      Mouth: Mucous membranes are moist    Eyes:      Extraocular Movements: Extraocular movements intact  Cardiovascular:      Rate and Rhythm: Normal rate and regular rhythm  Heart sounds: Normal heart sounds  Pulmonary:      Effort: Pulmonary effort is normal  No respiratory distress  Breath sounds: No wheezing, rhonchi or rales  Musculoskeletal:      Right lower leg: Edema (trace LE edema) present  Left lower leg: Edema (+1 pitting LE edema) present  Skin:     General: Skin is warm and dry     Neurological:      Mental Status: He is "alert  Mental status is at baseline  Psychiatric:         Mood and Affect: Mood normal          Behavior: Behavior normal          Labs: I have personally reviewed pertinent lab results  , ABG: No results found for: \"PHART\", \"OPB8NBA\", \"PO2ART\", \"WFG5PBL\", \"D0WPOCAJ\", \"BEART\", \"SOURCE\", BNP: No results found for: \"BNP\", CBC: No results found for: \"WBC\", \"HGB\", \"HCT\", \"MCV\", \"PLT\", \"ADJUSTEDWBC\", \"RBC\", \"MCH\", \"MCHC\", \"RDW\", \"MPV\", \"NRBC\", CMP: No results found for: \"SODIUM\", \"K\", \"CL\", \"CO2\", \"ANIONGAP\", \"BUN\", \"CREATININE\", \"GLUCOSE\", \"CALCIUM\", \"AST\", \"ALT\", \"ALKPHOS\", \"PROT\", \"BILITOT\", \"EGFR\", PT/INR: No results found for: \"PT\", \"INR\", Troponin: No results found for: \"TROPONINI\"  Lab Results   Component Value Date    WBC 8 31 04/02/2023    HGB 10 7 (L) 04/02/2023    HCT 33 8 (L) 04/02/2023    MCV 87 04/02/2023     04/02/2023     Lab Results   Component Value Date    GLUCOSE 133 09/23/2015    CALCIUM 9 0 05/05/2023     09/23/2015    K 4 1 05/05/2023    CO2 25 05/05/2023     05/05/2023    BUN 18 05/05/2023    CREATININE 0 99 05/05/2023     No results found for: \"IGE\"  Lab Results   Component Value Date    ALT 34 05/05/2023    AST 19 05/05/2023    ALKPHOS 63 05/05/2023    BILITOT 0 85 09/23/2015       Imaging and other studies: I have personally reviewed pertinent reports  and I have personally reviewed pertinent films in PACS     CT chest without contrast 5/12/2023  Bilateral consolidation more pronounced on the right than on the left with findings suggestive of chronic evolving process such as organizing pneumonia especially in light of serial radiographic findings  A concomitant acute pneumonia is also possible in the appropriate clinical setting  Prior CT chest available for comparison      Pulmonary function testing:  None to review         "

## 2023-06-16 NOTE — ASSESSMENT & PLAN NOTE
Lab Results   Component Value Date    HGBA1C 7 8 (H) 03/11/2023     Monitor blood sugars closely in the setting of systemic steroids

## 2023-06-16 NOTE — ASSESSMENT & PLAN NOTE
Aleena Mandel is doing exceptionally well with current treatment of  but is adamant about  getting off of prednisone as quickly as possible  Discussed case with Dr Rc Stockton  Recommended completing prednisone 50 mg p o  daily through the 19th then start tapering by 10 mg every 14 days  Continue PJP prophylactic Bactrim until down to prednisone 20 mg daily  I explained to Aleena Mandel and his wife that  typically requires longer steroid taper and expediting this course does come with the potential for disease relapse  They are both willing to take that risk and know to call with worsening symptoms  Plan to repeat CT chest in 8 weeks

## 2023-06-16 NOTE — ASSESSMENT & PLAN NOTE
Continue to monitor INR closely while taking prednisone  Defer further [facility] management to PCP  Rate controlled today on metoprolol

## 2023-06-19 ENCOUNTER — ANTICOAG VISIT (OUTPATIENT)
Dept: CARDIOLOGY CLINIC | Facility: CLINIC | Age: 88
End: 2023-06-19
Payer: COMMERCIAL

## 2023-06-19 ENCOUNTER — APPOINTMENT (OUTPATIENT)
Dept: LAB | Facility: MEDICAL CENTER | Age: 88
End: 2023-06-19
Payer: COMMERCIAL

## 2023-06-19 DIAGNOSIS — Z79.01 LONG TERM (CURRENT) USE OF ANTICOAGULANTS: ICD-10-CM

## 2023-06-19 DIAGNOSIS — I48.19 PERSISTENT ATRIAL FIBRILLATION (HCC): ICD-10-CM

## 2023-06-19 DIAGNOSIS — I48.19 PERSISTENT ATRIAL FIBRILLATION (HCC): Primary | ICD-10-CM

## 2023-06-19 DIAGNOSIS — E55.9 VITAMIN D DEFICIENCY: ICD-10-CM

## 2023-06-19 DIAGNOSIS — E11.9 DIABETES MELLITUS TYPE 2 IN NONOBESE (HCC): ICD-10-CM

## 2023-06-19 DIAGNOSIS — E11.9 TYPE 2 DIABETES MELLITUS WITHOUT COMPLICATION, WITHOUT LONG-TERM CURRENT USE OF INSULIN (HCC): ICD-10-CM

## 2023-06-19 LAB
ALBUMIN SERPL BCP-MCNC: 3 G/DL (ref 3.5–5)
ALP SERPL-CCNC: 52 U/L (ref 46–116)
ALT SERPL W P-5'-P-CCNC: 40 U/L (ref 12–78)
ANION GAP SERPL CALCULATED.3IONS-SCNC: 6 MMOL/L (ref 4–13)
AST SERPL W P-5'-P-CCNC: 12 U/L (ref 5–45)
BASOPHILS # BLD AUTO: 0.01 THOUSANDS/ÂΜL (ref 0–0.1)
BASOPHILS NFR BLD AUTO: 0 % (ref 0–1)
BILIRUB SERPL-MCNC: 0.63 MG/DL (ref 0.2–1)
BUN SERPL-MCNC: 39 MG/DL (ref 5–25)
CALCIUM ALBUM COR SERPL-MCNC: 9.3 MG/DL (ref 8.3–10.1)
CALCIUM SERPL-MCNC: 8.5 MG/DL (ref 8.3–10.1)
CHLORIDE SERPL-SCNC: 108 MMOL/L (ref 96–108)
CHOLEST SERPL-MCNC: 138 MG/DL
CO2 SERPL-SCNC: 24 MMOL/L (ref 21–32)
CREAT SERPL-MCNC: 1.04 MG/DL (ref 0.6–1.3)
EOSINOPHIL # BLD AUTO: 0.02 THOUSAND/ÂΜL (ref 0–0.61)
EOSINOPHIL NFR BLD AUTO: 0 % (ref 0–6)
ERYTHROCYTE [DISTWIDTH] IN BLOOD BY AUTOMATED COUNT: 18.5 % (ref 11.6–15.1)
GFR SERPL CREATININE-BSD FRML MDRD: 64 ML/MIN/1.73SQ M
GLUCOSE P FAST SERPL-MCNC: 231 MG/DL (ref 65–99)
HCT VFR BLD AUTO: 35.8 % (ref 36.5–49.3)
HDLC SERPL-MCNC: 56 MG/DL
HGB BLD-MCNC: 11.3 G/DL (ref 12–17)
IMM GRANULOCYTES # BLD AUTO: 0.08 THOUSAND/UL (ref 0–0.2)
IMM GRANULOCYTES NFR BLD AUTO: 1 % (ref 0–2)
INR PPP: 3.8 (ref 0.84–1.19)
LDLC SERPL CALC-MCNC: 64 MG/DL (ref 0–100)
LYMPHOCYTES # BLD AUTO: 0.78 THOUSANDS/ÂΜL (ref 0.6–4.47)
LYMPHOCYTES NFR BLD AUTO: 14 % (ref 14–44)
MCH RBC QN AUTO: 27.7 PG (ref 26.8–34.3)
MCHC RBC AUTO-ENTMCNC: 31.6 G/DL (ref 31.4–37.4)
MCV RBC AUTO: 88 FL (ref 82–98)
MONOCYTES # BLD AUTO: 0.38 THOUSAND/ÂΜL (ref 0.17–1.22)
MONOCYTES NFR BLD AUTO: 7 % (ref 4–12)
NEUTROPHILS # BLD AUTO: 4.41 THOUSANDS/ÂΜL (ref 1.85–7.62)
NEUTS SEG NFR BLD AUTO: 78 % (ref 43–75)
NRBC BLD AUTO-RTO: 0 /100 WBCS
POTASSIUM SERPL-SCNC: 4.3 MMOL/L (ref 3.5–5.3)
PROT SERPL-MCNC: 5.8 G/DL (ref 6.4–8.4)
PROTHROMBIN TIME: 37.7 SECONDS (ref 11.6–14.5)
RBC # BLD AUTO: 4.08 MILLION/UL (ref 3.88–5.62)
SODIUM SERPL-SCNC: 138 MMOL/L (ref 135–147)
TRIGL SERPL-MCNC: 90 MG/DL
WBC # BLD AUTO: 5.68 THOUSAND/UL (ref 4.31–10.16)

## 2023-06-19 PROCEDURE — 85025 COMPLETE CBC W/AUTO DIFF WBC: CPT

## 2023-06-19 PROCEDURE — 36415 COLL VENOUS BLD VENIPUNCTURE: CPT

## 2023-06-19 PROCEDURE — 82306 VITAMIN D 25 HYDROXY: CPT

## 2023-06-19 PROCEDURE — 93793 ANTICOAG MGMT PT WARFARIN: CPT | Performed by: NURSE PRACTITIONER

## 2023-06-19 PROCEDURE — 80061 LIPID PANEL: CPT

## 2023-06-19 PROCEDURE — 80053 COMPREHEN METABOLIC PANEL: CPT

## 2023-06-19 PROCEDURE — 85610 PROTHROMBIN TIME: CPT

## 2023-06-19 NOTE — PATIENT INSTRUCTIONS
INR received from lab and reviewed  Dose per calendar    Recheck INR in one week-[facility] with instructions   DOE Kinsey

## 2023-06-20 LAB — 25(OH)D3 SERPL-MCNC: 25.9 NG/ML (ref 30–100)

## 2023-06-21 ENCOUNTER — OFFICE VISIT (OUTPATIENT)
Dept: UROLOGY | Facility: CLINIC | Age: 88
End: 2023-06-21
Payer: COMMERCIAL

## 2023-06-21 VITALS — SYSTOLIC BLOOD PRESSURE: 130 MMHG | BODY MASS INDEX: 25.07 KG/M2 | HEIGHT: 73 IN | DIASTOLIC BLOOD PRESSURE: 68 MMHG

## 2023-06-21 DIAGNOSIS — N39.41 URGE INCONTINENCE: ICD-10-CM

## 2023-06-21 DIAGNOSIS — C61 PROSTATE CANCER (HCC): Primary | ICD-10-CM

## 2023-06-21 PROCEDURE — 99204 OFFICE O/P NEW MOD 45 MIN: CPT | Performed by: UROLOGY

## 2023-06-21 NOTE — LETTER
2023     Stacia Mata, DO  [address] [facility]  [address]  [address] [place], [place] [id number]    Patient: Dany Morales   YOB: 1935   Date of Visit: 2023       Dear Dr Claudean Cool: Thank you for referring Jona Reyes to me for evaluation  Below are my notes for this consultation  If you have questions, please do not hesitate to call me  I look forward to following your patient along with you  Sincerely,        Joann Riley MD        CC: No Recipients    Joann Riley MD  2023  2:16 PM  Sign when Signing Visit  [address] [facility]  [address], [address] [place] [phone]  www  [facility]  org      NAME: López Copeland  AGE: 80 y o  SEX: male  : 1935   MRN: [id number]    DATE: 2023  TIME: 2:11 PM    Assessment and Plan:    New onset urgency and frequency some urge incontinence-I believe this is due to the prednisone  Hopefully when he weans off the prednisone this should ease off  I wish to avoid overactive bladder medications due to side effects such as memory loss and possible urinary retention  If this persist, he will let me know and we will get an ultrasound or CAT scan to evaluate his kidneys  He did have quite a few oxalate crystals in his urine  Prostate cancer status post radiation therapy years ago-check PSA and send him the results  Chief Complaint   No chief complaint on file  History of Present Illness   New patient office visit: 43-year-old man who called 2023 with urgency frequency for about 1 month  Has history of prostate cancer 12 years ago  Creatinine normal at 1 04 2023  Urinalysis 2023 showed 2-4 red blood cells per high-powered field, 2-4 white blood cells per high-powered field, 3-5 hyaline casts and moderate calcium oxalate crystals    In April there were no red blood cells, no white blood cells and moderate bacteria and occasional calcium oxalate crystals  Last PSA was 0 11-year ago May 31, 2022  It was 0 2 in 2016  No recent upper tract imaging  he had IMRT 13 years ago at [facility]- saw Dr Erick Taveras  Had pneumonia January- and is being treated for chronic bacterial pneumonitis and was started on prednisone around the time of symptom onset  6 or 7 weeks ago, he developed nocturia where he was getting up every hour to hour and a half  That has somewhat settled down but he now has to wear pads because he has episodes of what is apparently urge incontinence at least for the past 2 weeks  The following portions of the patient's history were reviewed and updated as appropriate: allergies, current medications, past family history, past medical history, past social history, past surgical history and problem list   Past Medical History:   Diagnosis Date   • Anxiety    • Atrial fibrillation ([facility] )    • Atrial flutter ([facility] )    • Congestive heart failure (CHF) ([facility])    • COPD (chronic obstructive pulmonary disease) ([facility] )    • Coronary artery disease    • DVT (deep venous thrombosis) ([facility] )    • ED (erectile dysfunction)    • Hearing loss    • History of echocardiogram 04/05/2018    EF 0 55, Mild LVH  Mild moderate mitral regurg  Trace aortic regurg  • Hx of radiation therapy     XRT   • Hypercholesterolemia    • Hyperlipidemia    • Hypertension    • Long term (current) use of anticoagulants 8/21/2018   • Neuropathy of both feet    • Peripheral neuropathy    • Prostate cancer ([facility] )    • Type 2 diabetes mellitus [facility])      Past Surgical History:   Procedure Laterality Date   • CARDIAC CATHETERIZATION  01/15/1988    Left main: unobstructed  Posterolateral branch 90% narrowed  • COLONOSCOPY  10/05/2017    Dr Neeraj Grande   • PROSTATE SURGERY       shoulder  Review of Systems   Review of Systems   Genitourinary: Positive for frequency and urgency          As per HPI       Active Problem List     Patient Active Problem List   Diagnosis   • "Long term (current) use of anticoagulants   • Persistent atrial fibrillation (HCC)   • Chronic venous insufficiency   • Chronic diastolic congestive heart failure (HCC)   • Malignant neoplasm of prostate ([address] )   • 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)   • Peripheral arterial disease (HCC)   • Type 2 diabetes mellitus without complication, without long-term current use of insulin (HCC)   • Cryptogenic organizing pneumonia (HCC)       Objective   /68 (BP Location: Left arm, Patient Position: Sitting, Cuff Size: Adult)   Ht 6' 1\" (1 854 m)   BMI 25 07 kg/m²     Physical Exam  Vitals reviewed  Constitutional:       Appearance: Normal appearance  HENT:      Head: Normocephalic and atraumatic  Eyes:      Extraocular Movements: Extraocular movements intact  Pulmonary:      Effort: Pulmonary effort is normal    Abdominal:      General: There is no distension  Palpations: Abdomen is soft  There is no mass  Tenderness: There is no abdominal tenderness  There is no right CVA tenderness, left CVA tenderness, guarding or rebound  Hernia: No hernia is present  Genitourinary:     Penis: Normal        Testes: Normal       Comments: Normal uncirc'd phallus, testes /scrotum normal  Musculoskeletal:      Cervical back: Normal range of motion  Comments: Proximal muscle weakness, in wheelchair   Skin:     Coloration: Skin is not jaundiced or pale  Neurological:      General: No focal deficit present  Mental Status: He is alert and oriented to person, place, and time  Psychiatric:         Mood and Affect: Mood normal          Behavior: Behavior normal          Thought Content:  Thought content normal          Judgment: Judgment normal              Current Medications     Current Outpatient Medications:   •  atorvastatin (LIPITOR) 40 mg tablet, TAKE 1 TABLET BY MOUTH DAILY, Disp: 90 tablet, Rfl: 3  •  econazole nitrate 1 % cream, , Disp: , Rfl:   •  furosemide (LASIX) 20 mg " tablet, TAKE 1 TABLET BY MOUTH 3 DAYS WEEKLY (Patient taking differently: TAKE 1 TABLET BY MOUTH 3 DAYS WEEKLY; Mon, Wed, Friday), Disp: 45 tablet, Rfl: 5  •  glimepiride (AMARYL) 1 mg tablet, Take 1 tablet (1 mg total) by mouth daily with breakfast, Disp: 90 tablet, Rfl: 3  •  metFORMIN (GLUCOPHAGE-XR) 500 mg 24 hr tablet, Take 2 tablets (1,000 mg total) by mouth 2 (two) times a day with meals, Disp: 360 tablet, Rfl: 1  •  metoprolol succinate (Toprol XL) 25 mg 24 hr tablet, Take 1 tablet (25 mg total) by mouth daily, Disp: 90 tablet, Rfl: 3  •  OneTouch Delica Lancets 37A MISC, USE TO TEST ONCE DAILY, Disp: 100 each, Rfl: 5  •  OneTouch Ultra test strip, TEST ONCE DAILY, Disp: 100 each, Rfl: 1  •  predniSONE 10 mg tablet, Take 4 tablets (40 mg total) by mouth daily for 14 days, THEN 3 tablets (30 mg total) daily for 14 days, THEN 2 tablets (20 mg total) daily for 14 days, THEN 1 tablet (10 mg total) daily for 14 days  , Disp: 140 tablet, Rfl: 0  •  Pyridoxine HCl (vitamin B-6) 50 MG TABS, Take 1 tablet (50 mg total) by mouth daily, Disp: 90 tablet, Rfl: 1  •  sulfamethoxazole-trimethoprim (BACTRIM) 400-80 mg per tablet, Take 1 tablet by mouth daily Until your prednisone is down to 20 mg daily, Disp: 30 tablet, Rfl: 0  •  warfarin (COUMADIN) 4 mg tablet, TAKE 2 TABLETS BY MOUTH DAILY OR AS DIRECTED (Patient taking differently: TAKE 2 TABLETS BY MOUTH DAILY OR AS DIRECTED;  Sunday 8mg; Mon 8mg; Tues 6mg; Wed 8mg; Thurs 6mg; Friday 8mg; Sat 6mg;  Pt states he did not take yet today as he takes this in the evening;), Disp: 180 tablet, Rfl: 5  •  benzonatate (TESSALON) 200 MG capsule, TAKE ONE CAPSULE BY MOUTH THREE TIMES A DAY AS NEEDED FOR COUGH (Patient not taking: Reported on 6/14/2023), Disp: 20 capsule, Rfl: 5        Leonidas Byers MD

## 2023-06-21 NOTE — PATIENT INSTRUCTIONS
Let me know if the symptoms persist 3-4 weeks after stopping the prednisone  Have PSA done and I will send results

## 2023-06-21 NOTE — PROGRESS NOTES
[address] [facility]  [facility]  [address] [facility] [place]  [place], [address] [facility]  [phone]  www  [facility]  org      NAME: Ramu Copeland  AGE: 80 y o  SEX: male  : 1935   MRN: [id number]    DATE: 2023  TIME: 2:11 PM    Assessment and Plan:    New onset urgency and frequency some urge incontinence-I believe this is due to the prednisone  Hopefully when he weans off the prednisone this should ease off  I wish to avoid overactive bladder medications due to side effects such as memory loss and possible urinary retention  If this persist, he will let me know and we will get an ultrasound or CAT scan to evaluate his kidneys  He did have quite a few oxalate crystals in his urine  Prostate cancer status post radiation therapy years ago-check PSA and send him the results  Chief Complaint   No chief complaint on file  History of Present Illness   New patient office visit: 68-year-old man who called 2023 with urgency frequency for about 1 month  Has history of prostate cancer 12 years ago  Creatinine normal at 1 04 2023  Urinalysis 2023 showed 2-4 red blood cells per high-powered field, 2-4 white blood cells per high-powered field, 3-5 hyaline casts and moderate calcium oxalate crystals  In April there were no red blood cells, no white blood cells and moderate bacteria and occasional calcium oxalate crystals  Last PSA was 0 11-year ago May 31, 2022  It was 0 2 in 2016  No recent upper tract imaging  he had IMRT 13 years ago at [facility]- saw Dr Oden Felt  Had pneumonia January- and is being treated for chronic bacterial pneumonitis and was started on prednisone around the time of symptom onset  6 or 7 weeks ago, he developed nocturia where he was getting up every hour to hour and a half    That has somewhat settled down but he now has to wear pads because he has episodes of what is apparently urge incontinence at least for the past 2 weeks  The following portions of the patient's history were reviewed and updated as appropriate: allergies, current medications, past family history, past medical history, past social history, past surgical history and problem list   Past Medical History:   Diagnosis Date   • Anxiety    • Atrial fibrillation ([facility] )    • Atrial flutter ([facility] )    • Congestive heart failure (CHF) ([facility])    • COPD (chronic obstructive pulmonary disease) ([facility] )    • Coronary artery disease    • DVT (deep venous thrombosis) ([facility] )    • ED (erectile dysfunction)    • Hearing loss    • History of echocardiogram 04/05/2018    EF 0 55, Mild LVH  Mild moderate mitral regurg  Trace aortic regurg  • Hx of radiation therapy     XRT   • Hypercholesterolemia    • Hyperlipidemia    • Hypertension    • Long term (current) use of anticoagulants 8/21/2018   • Neuropathy of both feet    • Peripheral neuropathy    • Prostate cancer ([facility] )    • Type 2 diabetes mellitus [facility])      Past Surgical History:   Procedure Laterality Date   • CARDIAC CATHETERIZATION  01/15/1988    Left main: unobstructed  Posterolateral branch 90% narrowed  • COLONOSCOPY  10/05/2017    Dr Betsy Valenzuela   • PROSTATE SURGERY       shoulder  Review of Systems   Review of Systems   Genitourinary: Positive for frequency and urgency          As per HPI       Active Problem List     Patient Active Problem List   Diagnosis   • Long term (current) use of anticoagulants   • Persistent atrial fibrillation (HCC)   • Chronic venous insufficiency   • Chronic diastolic congestive heart failure ([facility] )   • Malignant neoplasm of prostate ([facility] )   • Hypertensive heart disease with heart failure ([facility] )   • Peripheral arterial disease ([facility] )   • Type 2 diabetes mellitus without complication, without long-term current use of insulin ([facility] )   • Cryptogenic organizing pneumonia (HCC)       Objective   /68 (BP Location: Left arm, Patient Position: Sitting, Cuff Size: Adult)   Ht 6' "1\" (1 854 m)   BMI 25 07 kg/m²     Physical Exam  Vitals reviewed  Constitutional:       Appearance: Normal appearance  HENT:      Head: Normocephalic and atraumatic  Eyes:      Extraocular Movements: Extraocular movements intact  Pulmonary:      Effort: Pulmonary effort is normal    Abdominal:      General: There is no distension  Palpations: Abdomen is soft  There is no mass  Tenderness: There is no abdominal tenderness  There is no right CVA tenderness, left CVA tenderness, guarding or rebound  Hernia: No hernia is present  Genitourinary:     Penis: Normal        Testes: Normal       Comments: Normal uncirc'd phallus, testes /scrotum normal  Musculoskeletal:      Cervical back: Normal range of motion  Comments: Proximal muscle weakness, in wheelchair   Skin:     Coloration: Skin is not jaundiced or pale  Neurological:      General: No focal deficit present  Mental Status: He is alert and oriented to person, place, and time  Psychiatric:         Mood and Affect: Mood normal          Behavior: Behavior normal          Thought Content:  Thought content normal          Judgment: Judgment normal              Current Medications     Current Outpatient Medications:   •  atorvastatin (LIPITOR) 40 mg tablet, TAKE 1 TABLET BY MOUTH DAILY, Disp: 90 tablet, Rfl: 3  •  econazole nitrate 1 % cream, , Disp: , Rfl:   •  furosemide (LASIX) 20 mg tablet, TAKE 1 TABLET BY MOUTH 3 DAYS WEEKLY (Patient taking differently: TAKE 1 TABLET BY MOUTH 3 DAYS WEEKLY; Mon, Wed, Friday), Disp: 45 tablet, Rfl: 5  •  glimepiride (AMARYL) 1 mg tablet, Take 1 tablet (1 mg total) by mouth daily with breakfast, Disp: 90 tablet, Rfl: 3  •  metFORMIN (GLUCOPHAGE-XR) 500 mg 24 hr tablet, Take 2 tablets (1,000 mg total) by mouth 2 (two) times a day with meals, Disp: 360 tablet, Rfl: 1  •  metoprolol succinate (Toprol XL) 25 mg 24 hr tablet, Take 1 tablet (25 mg total) by mouth daily, Disp: 90 tablet, Rfl: 3  •  " OneTouch Delica Lancets 78O MISC, USE TO TEST ONCE DAILY, Disp: 100 each, Rfl: 5  •  OneTouch Ultra test strip, TEST ONCE DAILY, Disp: 100 each, Rfl: 1  •  predniSONE 10 mg tablet, Take 4 tablets (40 mg total) by mouth daily for 14 days, THEN 3 tablets (30 mg total) daily for 14 days, THEN 2 tablets (20 mg total) daily for 14 days, THEN 1 tablet (10 mg total) daily for 14 days  , Disp: 140 tablet, Rfl: 0  •  Pyridoxine HCl (vitamin B-6) 50 MG TABS, Take 1 tablet (50 mg total) by mouth daily, Disp: 90 tablet, Rfl: 1  •  sulfamethoxazole-trimethoprim (BACTRIM) 400-80 mg per tablet, Take 1 tablet by mouth daily Until your prednisone is down to 20 mg daily, Disp: 30 tablet, Rfl: 0  •  warfarin (COUMADIN) 4 mg tablet, TAKE 2 TABLETS BY MOUTH DAILY OR AS DIRECTED (Patient taking differently: TAKE 2 TABLETS BY MOUTH DAILY OR AS DIRECTED;  Sunday 8mg; Mon 8mg; Tues 6mg; Wed 8mg; Thurs 6mg; Friday 8mg; Sat 6mg;  Pt states he did not take yet today as he takes this in the evening;), Disp: 180 tablet, Rfl: 5  •  benzonatate (TESSALON) 200 MG capsule, TAKE ONE CAPSULE BY MOUTH THREE TIMES A DAY AS NEEDED FOR COUGH (Patient not taking: Reported on 6/14/2023), Disp: 20 capsule, Rfl: 5        Rigoberto Van MD

## 2023-06-26 ENCOUNTER — APPOINTMENT (OUTPATIENT)
Dept: LAB | Facility: MEDICAL CENTER | Age: 88
End: 2023-06-26
Payer: COMMERCIAL

## 2023-06-26 DIAGNOSIS — I48.19 PERSISTENT ATRIAL FIBRILLATION (HCC): Primary | ICD-10-CM

## 2023-06-26 DIAGNOSIS — Z79.01 LONG TERM (CURRENT) USE OF ANTICOAGULANTS: ICD-10-CM

## 2023-06-26 DIAGNOSIS — I48.19 PERSISTENT ATRIAL FIBRILLATION (HCC): ICD-10-CM

## 2023-06-26 DIAGNOSIS — C61 PROSTATE CANCER (HCC): ICD-10-CM

## 2023-06-26 LAB
INR PPP: 2.75 (ref 0.84–1.19)
PROTHROMBIN TIME: 29.4 SECONDS (ref 11.6–14.5)
PSA SERPL-MCNC: 0.02 NG/ML (ref 0–4)

## 2023-06-26 PROCEDURE — 84153 ASSAY OF PSA TOTAL: CPT

## 2023-06-26 PROCEDURE — 85610 PROTHROMBIN TIME: CPT

## 2023-06-26 PROCEDURE — 36415 COLL VENOUS BLD VENIPUNCTURE: CPT

## 2023-06-27 ENCOUNTER — TELEPHONE (OUTPATIENT)
Dept: UROLOGY | Facility: CLINIC | Age: 88
End: 2023-06-27

## 2023-06-27 ENCOUNTER — ANTICOAG VISIT (OUTPATIENT)
Dept: CARDIOLOGY CLINIC | Facility: CLINIC | Age: 88
End: 2023-06-27
Payer: COMMERCIAL

## 2023-06-27 DIAGNOSIS — Z79.01 LONG TERM (CURRENT) USE OF ANTICOAGULANTS: ICD-10-CM

## 2023-06-27 DIAGNOSIS — I48.19 PERSISTENT ATRIAL FIBRILLATION (HCC): Primary | ICD-10-CM

## 2023-06-27 PROCEDURE — 93793 ANTICOAG MGMT PT WARFARIN: CPT | Performed by: PHYSICIAN ASSISTANT

## 2023-06-27 NOTE — PATIENT INSTRUCTIONS
Spoke with Patient: No changes in medication health or diet  No missed or extra doses  No s/s of bleeding TIA or CVA  No new ABX, NSAIDs OTC meds, or supplements  Dose as instructed and recheck in two weeks     Jonathon Perez PA-C

## 2023-06-27 NOTE — TELEPHONE ENCOUNTER
----- Message from Jennifer Lopez MD sent at 6/27/2023  9:07 AM EDT -----  Please let him know that his PSA is good at 0 02

## 2023-07-05 ENCOUNTER — TELEPHONE (OUTPATIENT)
Dept: FAMILY MEDICINE CLINIC | Facility: CLINIC | Age: 88
End: 2023-07-05

## 2023-07-05 ENCOUNTER — TELEPHONE (OUTPATIENT)
Dept: PULMONOLOGY | Facility: CLINIC | Age: 88
End: 2023-07-05

## 2023-07-05 NOTE — TELEPHONE ENCOUNTER
Pt's wife calling in with questions regarding the weaning of the prednisone. She stated the p/t is C/O of diarrhea and stomach pain he believes is caused by the prednisone. He is asking if he can go down to two pills now instead of waiting another week to taper.

## 2023-07-05 NOTE — TELEPHONE ENCOUNTER
Wife called. Patient had an episode of diarrhea last night and another one this morning. Wife treated with imodium; seems to have resolved. Wife continues to state patient's FBS (finger stick) this morning read 307. Concerns still related to the steroid. Wife wants to know if PCP thinks patient should have an extra Glipizide tonight due to elevated glucose. Please advise.

## 2023-07-06 NOTE — TELEPHONE ENCOUNTER
Called patient and discussed with wife. Explained that patient has been having diarrhea over the last few days and is blaming the antibiotic and steroids for this new symptom. I explained that this is a possibility however at this point in time would not recommend quicker taper off steroids. I explained that if we taper too quickly Maximo's symptoms can return and disease process may relapse and we would have to start from the beginning again. She is sure Lillie Bryant would not want to do that. We will have to try to manage SE as they come. I reassured her that prednisone is not known to cause diarrhea and Bactrim is low-risk for causing CDI which did bring her relief. I recommend otc imodium and if persistent to follow up with PCP. She will relay message to Lillie Bryant. Answered all questions.

## 2023-07-11 ENCOUNTER — APPOINTMENT (OUTPATIENT)
Dept: LAB | Facility: MEDICAL CENTER | Age: 88
End: 2023-07-11
Payer: COMMERCIAL

## 2023-07-11 LAB
ALBUMIN SERPL BCP-MCNC: 2.9 G/DL (ref 3.5–5)
ALP SERPL-CCNC: 54 U/L (ref 46–116)
ALT SERPL W P-5'-P-CCNC: 79 U/L (ref 12–78)
ANION GAP SERPL CALCULATED.3IONS-SCNC: 7 MMOL/L
AST SERPL W P-5'-P-CCNC: 20 U/L (ref 5–45)
BILIRUB SERPL-MCNC: 0.87 MG/DL (ref 0.2–1)
BUN SERPL-MCNC: 31 MG/DL (ref 5–25)
CALCIUM ALBUM COR SERPL-MCNC: 9.5 MG/DL (ref 8.3–10.1)
CALCIUM SERPL-MCNC: 8.6 MG/DL (ref 8.3–10.1)
CHLORIDE SERPL-SCNC: 112 MMOL/L (ref 96–108)
CO2 SERPL-SCNC: 23 MMOL/L (ref 21–32)
CREAT SERPL-MCNC: 1.01 MG/DL (ref 0.6–1.3)
CREAT UR-MCNC: 70.2 MG/DL
GFR SERPL CREATININE-BSD FRML MDRD: 66 ML/MIN/1.73SQ M
GLUCOSE P FAST SERPL-MCNC: 159 MG/DL (ref 65–99)
INR PPP: 5.82 (ref 0.84–1.19)
MICROALBUMIN UR-MCNC: 64.2 MG/L (ref 0–20)
MICROALBUMIN/CREAT 24H UR: 91 MG/G CREATININE (ref 0–30)
POTASSIUM SERPL-SCNC: 4.5 MMOL/L (ref 3.5–5.3)
PROT SERPL-MCNC: 5.9 G/DL (ref 6.4–8.4)
PROTHROMBIN TIME: 52.5 SECONDS (ref 11.6–14.5)
SODIUM SERPL-SCNC: 142 MMOL/L (ref 135–147)
TSH SERPL DL<=0.05 MIU/L-ACNC: 2.29 UIU/ML (ref 0.45–4.5)

## 2023-07-11 PROCEDURE — 82043 UR ALBUMIN QUANTITATIVE: CPT

## 2023-07-11 PROCEDURE — 83036 HEMOGLOBIN GLYCOSYLATED A1C: CPT

## 2023-07-11 PROCEDURE — 85007 BL SMEAR W/DIFF WBC COUNT: CPT

## 2023-07-11 PROCEDURE — 84443 ASSAY THYROID STIM HORMONE: CPT

## 2023-07-11 PROCEDURE — 82570 ASSAY OF URINE CREATININE: CPT

## 2023-07-11 PROCEDURE — 80053 COMPREHEN METABOLIC PANEL: CPT

## 2023-07-11 PROCEDURE — 85027 COMPLETE CBC AUTOMATED: CPT

## 2023-07-12 ENCOUNTER — ANTICOAG VISIT (OUTPATIENT)
Dept: CARDIOLOGY CLINIC | Facility: CLINIC | Age: 88
End: 2023-07-12
Payer: COMMERCIAL

## 2023-07-12 DIAGNOSIS — I48.19 PERSISTENT ATRIAL FIBRILLATION (HCC): Primary | ICD-10-CM

## 2023-07-12 DIAGNOSIS — Z79.01 LONG TERM (CURRENT) USE OF ANTICOAGULANTS: ICD-10-CM

## 2023-07-12 LAB
ACANTHOCYTES BLD QL SMEAR: PRESENT
BASOPHILS # BLD MANUAL: 0 THOUSAND/UL (ref 0–0.1)
BASOPHILS NFR MAR MANUAL: 0 % (ref 0–1)
BURR CELLS BLD QL SMEAR: PRESENT
DIFFERENTIAL COMMENT: ABNORMAL
EOSINOPHIL # BLD MANUAL: 0.05 THOUSAND/UL (ref 0–0.4)
EOSINOPHIL NFR BLD MANUAL: 1 % (ref 0–6)
ERYTHROCYTE [DISTWIDTH] IN BLOOD BY AUTOMATED COUNT: 21.2 % (ref 11.6–15.1)
EST. AVERAGE GLUCOSE BLD GHB EST-MCNC: 246 MG/DL
HBA1C MFR BLD: 10.2 %
HCT VFR BLD AUTO: 35.5 % (ref 36.5–49.3)
HGB BLD-MCNC: 11.4 G/DL (ref 12–17)
LYMPHOCYTES # BLD AUTO: 0.97 THOUSAND/UL (ref 0.6–4.47)
LYMPHOCYTES # BLD AUTO: 21 % (ref 14–44)
MCH RBC QN AUTO: 28.3 PG (ref 26.8–34.3)
MCHC RBC AUTO-ENTMCNC: 32.1 G/DL (ref 31.4–37.4)
MCV RBC AUTO: 88 FL (ref 82–98)
MONOCYTES # BLD AUTO: 0.37 THOUSAND/UL (ref 0–1.22)
MONOCYTES NFR BLD: 8 % (ref 4–12)
NEUTROPHILS # BLD MANUAL: 3.19 THOUSAND/UL (ref 1.85–7.62)
NEUTS BAND NFR BLD MANUAL: 7 % (ref 0–8)
NEUTS SEG NFR BLD AUTO: 62 % (ref 43–75)
NRBC BLD AUTO-RTO: 4 /100 WBC (ref 0–2)
OVALOCYTES BLD QL SMEAR: PRESENT
PATHOLOGY REVIEW: YES
PLATELET # BLD AUTO: 110 THOUSANDS/UL (ref 149–390)
PLATELET BLD QL SMEAR: ABNORMAL
PMV BLD AUTO: 11.3 FL (ref 8.9–12.7)
POIKILOCYTOSIS BLD QL SMEAR: PRESENT
RBC # BLD AUTO: 4.03 MILLION/UL (ref 3.88–5.62)
RBC MORPH BLD: PRESENT
VARIANT LYMPHS # BLD AUTO: 1 %
WBC # BLD AUTO: 4.62 THOUSAND/UL (ref 4.31–10.16)

## 2023-07-12 PROCEDURE — 93793 ANTICOAG MGMT PT WARFARIN: CPT | Performed by: PHYSICIAN ASSISTANT

## 2023-07-12 NOTE — PATIENT INSTRUCTIONS
Spoke with Patient: No changes in medication health or diet. No missed or extra doses. No s/s of bleeding TIA or CVA. No new ABX, NSAIDs OTC meds, or supplements. Dose as instructed and recheck on Monday. Not drinking  boost because of diarrhea. Slipped out of bed.    Skeeter Duverney, PA-C

## 2023-07-15 DIAGNOSIS — R60.9 EDEMA, UNSPECIFIED TYPE: ICD-10-CM

## 2023-07-17 ENCOUNTER — APPOINTMENT (OUTPATIENT)
Dept: LAB | Facility: MEDICAL CENTER | Age: 88
End: 2023-07-17
Payer: COMMERCIAL

## 2023-07-17 DIAGNOSIS — I48.19 PERSISTENT ATRIAL FIBRILLATION (HCC): ICD-10-CM

## 2023-07-17 DIAGNOSIS — Z79.01 LONG TERM (CURRENT) USE OF ANTICOAGULANTS: ICD-10-CM

## 2023-07-17 LAB
INR PPP: 2.35 (ref 0.84–1.19)
PROTHROMBIN TIME: 26 SECONDS (ref 11.6–14.5)

## 2023-07-17 PROCEDURE — 36415 COLL VENOUS BLD VENIPUNCTURE: CPT

## 2023-07-17 PROCEDURE — 85610 PROTHROMBIN TIME: CPT

## 2023-07-17 RX ORDER — FUROSEMIDE 20 MG/1
TABLET ORAL
Qty: 45 TABLET | Refills: 3 | Status: ON HOLD | OUTPATIENT
Start: 2023-07-17 | End: 2023-08-17 | Stop reason: SDUPTHER

## 2023-07-18 ENCOUNTER — HOME HEALTH ADMISSION (OUTPATIENT)
Dept: HOME HEALTH SERVICES | Facility: HOME HEALTHCARE | Age: 88
End: 2023-07-18
Payer: COMMERCIAL

## 2023-07-18 ENCOUNTER — TELEPHONE (OUTPATIENT)
Dept: FAMILY MEDICINE CLINIC | Facility: CLINIC | Age: 88
End: 2023-07-18

## 2023-07-18 ENCOUNTER — ANTICOAG VISIT (OUTPATIENT)
Dept: CARDIOLOGY CLINIC | Facility: CLINIC | Age: 88
End: 2023-07-18
Payer: COMMERCIAL

## 2023-07-18 DIAGNOSIS — Z79.01 LONG TERM (CURRENT) USE OF ANTICOAGULANTS: ICD-10-CM

## 2023-07-18 DIAGNOSIS — G62.9 NEUROPATHY: ICD-10-CM

## 2023-07-18 DIAGNOSIS — R53.1 WEAKNESS GENERALIZED: ICD-10-CM

## 2023-07-18 DIAGNOSIS — I48.19 PERSISTENT ATRIAL FIBRILLATION (HCC): Primary | ICD-10-CM

## 2023-07-18 DIAGNOSIS — I87.2 CHRONIC VENOUS INSUFFICIENCY: ICD-10-CM

## 2023-07-18 DIAGNOSIS — Z91.81 RISK FOR FALLS: Primary | ICD-10-CM

## 2023-07-18 DIAGNOSIS — I11.0 HYPERTENSIVE HEART DISEASE WITH HEART FAILURE (HCC): ICD-10-CM

## 2023-07-18 DIAGNOSIS — I10 ESSENTIAL HYPERTENSION: ICD-10-CM

## 2023-07-18 PROCEDURE — 93793 ANTICOAG MGMT PT WARFARIN: CPT | Performed by: PHYSICIAN ASSISTANT

## 2023-07-18 NOTE — PATIENT INSTRUCTIONS
Spoke with Patient: No changes in medication health or diet. No missed or extra doses. No s/s of bleeding TIA or CVA. No new ABX, NSAIDs OTC meds, or supplements. Dose as instructed and recheck in one week.    Cash Brewer PA-C

## 2023-07-18 NOTE — TELEPHONE ENCOUNTER
Wife called; concerned about her 's health. 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 Please review labs. Patient is off Bactrim, and continues to ween steroid. Blood Sugar 241 this morning. Wife interested in [address] [facility] for patient.      Please advise

## 2023-07-19 ENCOUNTER — HOME CARE VISIT (OUTPATIENT)
Dept: HOME HEALTH SERVICES | Facility: HOME HEALTHCARE | Age: 88
End: 2023-07-19

## 2023-07-20 ENCOUNTER — HOME CARE VISIT (OUTPATIENT)
Dept: HOME HEALTH SERVICES | Facility: HOME HEALTHCARE | Age: 88
End: 2023-07-20
Payer: COMMERCIAL

## 2023-07-20 VITALS
TEMPERATURE: 97 F | DIASTOLIC BLOOD PRESSURE: 56 MMHG | RESPIRATION RATE: 20 BRPM | SYSTOLIC BLOOD PRESSURE: 110 MMHG | HEART RATE: 68 BPM

## 2023-07-20 PROCEDURE — G0299 HHS/HOSPICE OF RN EA 15 MIN: HCPCS

## 2023-07-20 PROCEDURE — 400013 VN SOC

## 2023-07-20 PROCEDURE — 10330081 VN NO-PAY CLAIM PROCEDURE

## 2023-07-24 ENCOUNTER — HOME CARE VISIT (OUTPATIENT)
Dept: HOME HEALTH SERVICES | Facility: HOME HEALTHCARE | Age: 88
End: 2023-07-24
Payer: COMMERCIAL

## 2023-07-24 VITALS
RESPIRATION RATE: 20 BRPM | TEMPERATURE: 97.2 F | HEART RATE: 70 BPM | OXYGEN SATURATION: 96 % | SYSTOLIC BLOOD PRESSURE: 116 MMHG | DIASTOLIC BLOOD PRESSURE: 64 MMHG

## 2023-07-24 PROCEDURE — G0152 HHCP-SERV OF OT,EA 15 MIN: HCPCS

## 2023-07-24 PROCEDURE — G0151 HHCP-SERV OF PT,EA 15 MIN: HCPCS

## 2023-07-24 PROCEDURE — G0299 HHS/HOSPICE OF RN EA 15 MIN: HCPCS

## 2023-07-24 NOTE — CASE COMMUNICATION
OT evaluation completed 7/24/23. No further visits planned at this time as patient is functioning at max potential for ADL's. Patient showers with assist from spouse.

## 2023-07-25 ENCOUNTER — APPOINTMENT (OUTPATIENT)
Dept: LAB | Facility: MEDICAL CENTER | Age: 88
End: 2023-07-25
Payer: COMMERCIAL

## 2023-07-25 VITALS — SYSTOLIC BLOOD PRESSURE: 110 MMHG | DIASTOLIC BLOOD PRESSURE: 80 MMHG | HEART RATE: 70 BPM

## 2023-07-25 DIAGNOSIS — I48.19 PERSISTENT ATRIAL FIBRILLATION (HCC): Primary | ICD-10-CM

## 2023-07-25 DIAGNOSIS — Z79.01 LONG TERM (CURRENT) USE OF ANTICOAGULANTS: ICD-10-CM

## 2023-07-25 DIAGNOSIS — I48.19 PERSISTENT ATRIAL FIBRILLATION (HCC): ICD-10-CM

## 2023-07-25 LAB
INR PPP: 1.98 (ref 0.84–1.19)
PROTHROMBIN TIME: 22.8 SECONDS (ref 11.6–14.5)

## 2023-07-25 PROCEDURE — 85610 PROTHROMBIN TIME: CPT

## 2023-07-25 PROCEDURE — 36415 COLL VENOUS BLD VENIPUNCTURE: CPT

## 2023-07-25 PROCEDURE — G0180 MD CERTIFICATION HHA PATIENT: HCPCS | Performed by: FAMILY MEDICINE

## 2023-07-25 NOTE — CASE COMMUNICATION
PT duc on 7/24/23. PT POC for 2wk4 for gait/transfers/balance training, LE ther ex, HEP, edu, fall prevention.      Thank you, Remington Peres PT

## 2023-07-26 ENCOUNTER — ANTICOAG VISIT (OUTPATIENT)
Dept: CARDIOLOGY CLINIC | Facility: CLINIC | Age: 88
End: 2023-07-26
Payer: COMMERCIAL

## 2023-07-26 DIAGNOSIS — I48.19 PERSISTENT ATRIAL FIBRILLATION (HCC): Primary | ICD-10-CM

## 2023-07-26 DIAGNOSIS — Z79.01 LONG TERM (CURRENT) USE OF ANTICOAGULANTS: ICD-10-CM

## 2023-07-26 PROCEDURE — 93793 ANTICOAG MGMT PT WARFARIN: CPT | Performed by: NURSE PRACTITIONER

## 2023-07-26 NOTE — PATIENT INSTRUCTIONS
INR received from lab and reviewed. Increase to 4 mg Wednesdays and continue 2 mg   Recheck INR in 2 weeks.   Instructions given to patient's wife by phone  Leonarda Sanchez, [address] [place] [address]

## 2023-07-27 ENCOUNTER — HOME CARE VISIT (OUTPATIENT)
Dept: HOME HEALTH SERVICES | Facility: HOME HEALTHCARE | Age: 88
End: 2023-07-27
Payer: COMMERCIAL

## 2023-07-27 ENCOUNTER — TELEPHONE (OUTPATIENT)
Dept: FAMILY MEDICINE CLINIC | Facility: CLINIC | Age: 88
End: 2023-07-27

## 2023-07-27 VITALS
OXYGEN SATURATION: 92 % | DIASTOLIC BLOOD PRESSURE: 60 MMHG | RESPIRATION RATE: 20 BRPM | TEMPERATURE: 97.3 F | HEART RATE: 72 BPM | SYSTOLIC BLOOD PRESSURE: 114 MMHG

## 2023-07-27 PROCEDURE — G0300 HHS/HOSPICE OF LPN EA 15 MIN: HCPCS

## 2023-07-27 PROCEDURE — G0151 HHCP-SERV OF PT,EA 15 MIN: HCPCS

## 2023-07-27 NOTE — TELEPHONE ENCOUNTER
Discontinue Amaryl monitor the blood sugars if they remain in acceptable range metformin will be next to be discontinued

## 2023-07-27 NOTE — TELEPHONE ENCOUNTER
Wife states that 's blood sugar was 89 today - should metformin be cut down - he is only on one prednisone at this time

## 2023-07-31 ENCOUNTER — TELEPHONE (OUTPATIENT)
Dept: FAMILY MEDICINE CLINIC | Facility: CLINIC | Age: 88
End: 2023-07-31

## 2023-07-31 ENCOUNTER — HOME CARE VISIT (OUTPATIENT)
Dept: HOME HEALTH SERVICES | Facility: HOME HEALTHCARE | Age: 88
End: 2023-07-31
Payer: COMMERCIAL

## 2023-07-31 VITALS
SYSTOLIC BLOOD PRESSURE: 104 MMHG | DIASTOLIC BLOOD PRESSURE: 62 MMHG | TEMPERATURE: 97 F | HEART RATE: 68 BPM | OXYGEN SATURATION: 94 %

## 2023-07-31 DIAGNOSIS — F32.9 REACTIVE DEPRESSION: Primary | ICD-10-CM

## 2023-07-31 PROCEDURE — G0151 HHCP-SERV OF PT,EA 15 MIN: HCPCS

## 2023-07-31 PROCEDURE — G0299 HHS/HOSPICE OF RN EA 15 MIN: HCPCS

## 2023-07-31 RX ORDER — VENLAFAXINE HYDROCHLORIDE 37.5 MG/1
37.5 CAPSULE, EXTENDED RELEASE ORAL
Qty: 90 CAPSULE | Refills: 3 | Status: SHIPPED | OUTPATIENT
Start: 2023-07-31 | End: 2023-07-31

## 2023-07-31 RX ORDER — ESCITALOPRAM OXALATE 10 MG/1
10 TABLET ORAL DAILY
Qty: 90 TABLET | Refills: 1 | Status: SHIPPED | OUTPATIENT
Start: 2023-07-31 | End: 2023-07-31

## 2023-07-31 RX ORDER — BUSPIRONE HYDROCHLORIDE 7.5 MG/1
7.5 TABLET ORAL 2 TIMES DAILY
Qty: 180 TABLET | Refills: 3 | Status: ON HOLD | OUTPATIENT
Start: 2023-07-31

## 2023-07-31 NOTE — TELEPHONE ENCOUNTER
Baldo Gasca from [facility] reports on [place] from today:  -more dizziness than normal the past couple days  -increased loose stools  She did push on trying to eatting better and getting fluids in  -he is showing more signs of frustration with his recent illness's , depression

## 2023-08-01 ENCOUNTER — HOSPITAL ENCOUNTER (INPATIENT)
Facility: HOSPITAL | Age: 88
LOS: 2 days | Discharge: HOME HEALTH | End: 2023-08-03
Attending: EMERGENCY MEDICINE | Admitting: INTERNAL MEDICINE
Payer: COMMERCIAL

## 2023-08-01 ENCOUNTER — HOME CARE VISIT (OUTPATIENT)
Dept: HOME HEALTH SERVICES | Facility: HOME HEALTHCARE | Age: 88
End: 2023-08-01
Payer: COMMERCIAL

## 2023-08-01 ENCOUNTER — APPOINTMENT (EMERGENCY)
Dept: RADIOLOGY | Facility: HOSPITAL | Age: 88
End: 2023-08-01
Payer: COMMERCIAL

## 2023-08-01 ENCOUNTER — APPOINTMENT (EMERGENCY)
Dept: CT IMAGING | Facility: HOSPITAL | Age: 88
End: 2023-08-01
Payer: COMMERCIAL

## 2023-08-01 VITALS — HEART RATE: 94 BPM | OXYGEN SATURATION: 100 % | DIASTOLIC BLOOD PRESSURE: 62 MMHG | SYSTOLIC BLOOD PRESSURE: 104 MMHG

## 2023-08-01 DIAGNOSIS — R19.7 DIARRHEA: ICD-10-CM

## 2023-08-01 DIAGNOSIS — R79.89 ELEVATED LACTIC ACID LEVEL: ICD-10-CM

## 2023-08-01 DIAGNOSIS — I95.1 ORTHOSTATIC HYPOTENSION: ICD-10-CM

## 2023-08-01 DIAGNOSIS — R79.1 SUBTHERAPEUTIC INTERNATIONAL NORMALIZED RATIO (INR): ICD-10-CM

## 2023-08-01 DIAGNOSIS — R53.1 GENERALIZED WEAKNESS: Primary | ICD-10-CM

## 2023-08-01 DIAGNOSIS — K21.9 ESOPHAGEAL REFLUX: ICD-10-CM

## 2023-08-01 DIAGNOSIS — E83.42 HYPOMAGNESEMIA: ICD-10-CM

## 2023-08-01 DIAGNOSIS — I48.20 CHRONIC A-FIB (HCC): ICD-10-CM

## 2023-08-01 DIAGNOSIS — E87.6 HYPOKALEMIA: ICD-10-CM

## 2023-08-01 DIAGNOSIS — E11.65 HYPERGLYCEMIA DUE TO DIABETES MELLITUS (HCC): ICD-10-CM

## 2023-08-01 DIAGNOSIS — D64.9 ANEMIA: ICD-10-CM

## 2023-08-01 DIAGNOSIS — Z79.01 LONG TERM (CURRENT) USE OF ANTICOAGULANTS: ICD-10-CM

## 2023-08-01 PROBLEM — D23.9 DERMATOFIBROMA: Status: ACTIVE | Noted: 2018-05-02

## 2023-08-01 PROBLEM — Z83.71 FAMILY HISTORY OF COLONIC POLYPS: Status: ACTIVE | Noted: 2017-08-28

## 2023-08-01 PROBLEM — M89.8X9 EXOSTOSIS: Status: ACTIVE | Noted: 2017-12-29

## 2023-08-01 PROBLEM — Z80.0 FH: COLON CANCER: Status: ACTIVE | Noted: 2017-08-28

## 2023-08-01 PROBLEM — M72.2 PLANTAR FASCIAL FIBROMATOSIS: Status: ACTIVE | Noted: 2023-08-01

## 2023-08-01 PROBLEM — Z83.719 FAMILY HISTORY OF COLONIC POLYPS: Status: ACTIVE | Noted: 2017-08-28

## 2023-08-01 PROBLEM — Z86.010 HISTORY OF COLONIC POLYPS: Status: ACTIVE | Noted: 2017-08-28

## 2023-08-01 PROBLEM — M70.20 OLECRANON BURSITIS: Status: ACTIVE | Noted: 2017-12-29

## 2023-08-01 PROBLEM — IMO0002 EXCESSIVE ANTICOAGULATION: Status: ACTIVE | Noted: 2018-04-04

## 2023-08-01 LAB
2HR DELTA HS TROPONIN: -4 NG/L
4HR DELTA HS TROPONIN: -5 NG/L
ALBUMIN SERPL BCP-MCNC: 3.6 G/DL (ref 3.5–5)
ALP SERPL-CCNC: 54 U/L (ref 34–104)
ALT SERPL W P-5'-P-CCNC: 71 U/L (ref 7–52)
ANION GAP SERPL CALCULATED.3IONS-SCNC: 14 MMOL/L
APTT PPP: 28 SECONDS (ref 23–37)
AST SERPL W P-5'-P-CCNC: 25 U/L (ref 13–39)
BASOPHILS # BLD AUTO: 0.02 THOUSANDS/ÂΜL (ref 0–0.1)
BASOPHILS NFR BLD AUTO: 0 % (ref 0–1)
BILIRUB SERPL-MCNC: 1.22 MG/DL (ref 0.2–1)
BILIRUB UR QL STRIP: NEGATIVE
BNP SERPL-MCNC: 262 PG/ML (ref 0–100)
BUN SERPL-MCNC: 26 MG/DL (ref 5–25)
CALCIUM SERPL-MCNC: 7.4 MG/DL (ref 8.4–10.2)
CARDIAC TROPONIN I PNL SERPL HS: 18 NG/L
CARDIAC TROPONIN I PNL SERPL HS: 19 NG/L
CARDIAC TROPONIN I PNL SERPL HS: 23 NG/L
CHLORIDE SERPL-SCNC: 100 MMOL/L (ref 96–108)
CLARITY UR: CLEAR
CO2 SERPL-SCNC: 26 MMOL/L (ref 21–32)
COLOR UR: YELLOW
CREAT SERPL-MCNC: 1.06 MG/DL (ref 0.6–1.3)
EOSINOPHIL # BLD AUTO: 0.04 THOUSAND/ÂΜL (ref 0–0.61)
EOSINOPHIL NFR BLD AUTO: 1 % (ref 0–6)
ERYTHROCYTE [DISTWIDTH] IN BLOOD BY AUTOMATED COUNT: 22.7 % (ref 11.6–15.1)
FLUAV RNA RESP QL NAA+PROBE: NEGATIVE
FLUBV RNA RESP QL NAA+PROBE: NEGATIVE
GFR SERPL CREATININE-BSD FRML MDRD: 62 ML/MIN/1.73SQ M
GLUCOSE SERPL-MCNC: 180 MG/DL (ref 65–140)
GLUCOSE UR STRIP-MCNC: ABNORMAL MG/DL
HCT VFR BLD AUTO: 32.1 % (ref 36.5–49.3)
HGB BLD-MCNC: 10.3 G/DL (ref 12–17)
HGB UR QL STRIP.AUTO: NEGATIVE
IMM GRANULOCYTES # BLD AUTO: 0.07 THOUSAND/UL (ref 0–0.2)
IMM GRANULOCYTES NFR BLD AUTO: 1 % (ref 0–2)
INR PPP: 1.4 (ref 0.84–1.19)
KETONES UR STRIP-MCNC: NEGATIVE MG/DL
LACTATE SERPL-SCNC: 1.8 MMOL/L (ref 0.5–2)
LACTATE SERPL-SCNC: 2.9 MMOL/L (ref 0.5–2)
LEUKOCYTE ESTERASE UR QL STRIP: NEGATIVE
LYMPHOCYTES # BLD AUTO: 2.27 THOUSANDS/ÂΜL (ref 0.6–4.47)
LYMPHOCYTES NFR BLD AUTO: 36 % (ref 14–44)
MAGNESIUM SERPL-MCNC: 0.8 MG/DL (ref 1.9–2.7)
MCH RBC QN AUTO: 29 PG (ref 26.8–34.3)
MCHC RBC AUTO-ENTMCNC: 32.1 G/DL (ref 31.4–37.4)
MCV RBC AUTO: 90 FL (ref 82–98)
MONOCYTES # BLD AUTO: 0.33 THOUSAND/ÂΜL (ref 0.17–1.22)
MONOCYTES NFR BLD AUTO: 5 % (ref 4–12)
NEUTROPHILS # BLD AUTO: 3.65 THOUSANDS/ÂΜL (ref 1.85–7.62)
NEUTS SEG NFR BLD AUTO: 57 % (ref 43–75)
NITRITE UR QL STRIP: NEGATIVE
NRBC BLD AUTO-RTO: 2 /100 WBCS
PH UR STRIP.AUTO: 5 [PH]
PLATELET # BLD AUTO: 143 THOUSANDS/UL (ref 149–390)
PMV BLD AUTO: 10.1 FL (ref 8.9–12.7)
POTASSIUM SERPL-SCNC: 3.2 MMOL/L (ref 3.5–5.3)
PROCALCITONIN SERPL-MCNC: 0.13 NG/ML
PROT SERPL-MCNC: 5.7 G/DL (ref 6.4–8.4)
PROT UR STRIP-MCNC: NEGATIVE MG/DL
PROTHROMBIN TIME: 17.4 SECONDS (ref 11.6–14.5)
RBC # BLD AUTO: 3.55 MILLION/UL (ref 3.88–5.62)
RSV RNA RESP QL NAA+PROBE: NEGATIVE
SARS-COV-2 RNA RESP QL NAA+PROBE: NEGATIVE
SODIUM SERPL-SCNC: 140 MMOL/L (ref 135–147)
SP GR UR STRIP.AUTO: 1.01 (ref 1–1.03)
TSH SERPL DL<=0.05 MIU/L-ACNC: 1.86 UIU/ML (ref 0.45–4.5)
TSH SERPL DL<=0.05 MIU/L-ACNC: 4.06 UIU/ML (ref 0.45–4.5)
UROBILINOGEN UR QL STRIP.AUTO: 0.2 E.U./DL
WBC # BLD AUTO: 6.38 THOUSAND/UL (ref 4.31–10.16)

## 2023-08-01 PROCEDURE — 0241U HB NFCT DS VIR RESP RNA 4 TRGT: CPT | Performed by: PHYSICIAN ASSISTANT

## 2023-08-01 PROCEDURE — 84484 ASSAY OF TROPONIN QUANT: CPT | Performed by: PHYSICIAN ASSISTANT

## 2023-08-01 PROCEDURE — 85730 THROMBOPLASTIN TIME PARTIAL: CPT | Performed by: PHYSICIAN ASSISTANT

## 2023-08-01 PROCEDURE — 99285 EMERGENCY DEPT VISIT HI MDM: CPT

## 2023-08-01 PROCEDURE — 96366 THER/PROPH/DIAG IV INF ADDON: CPT

## 2023-08-01 PROCEDURE — 84484 ASSAY OF TROPONIN QUANT: CPT | Performed by: INTERNAL MEDICINE

## 2023-08-01 PROCEDURE — 84443 ASSAY THYROID STIM HORMONE: CPT | Performed by: INTERNAL MEDICINE

## 2023-08-01 PROCEDURE — 84443 ASSAY THYROID STIM HORMONE: CPT | Performed by: PHYSICIAN ASSISTANT

## 2023-08-01 PROCEDURE — 81003 URINALYSIS AUTO W/O SCOPE: CPT | Performed by: PHYSICIAN ASSISTANT

## 2023-08-01 PROCEDURE — 74177 CT ABD & PELVIS W/CONTRAST: CPT

## 2023-08-01 PROCEDURE — 93005 ELECTROCARDIOGRAM TRACING: CPT

## 2023-08-01 PROCEDURE — 83605 ASSAY OF LACTIC ACID: CPT | Performed by: PHYSICIAN ASSISTANT

## 2023-08-01 PROCEDURE — 71260 CT THORAX DX C+: CPT

## 2023-08-01 PROCEDURE — 85025 COMPLETE CBC W/AUTO DIFF WBC: CPT | Performed by: PHYSICIAN ASSISTANT

## 2023-08-01 PROCEDURE — 83735 ASSAY OF MAGNESIUM: CPT | Performed by: PHYSICIAN ASSISTANT

## 2023-08-01 PROCEDURE — 99291 CRITICAL CARE FIRST HOUR: CPT | Performed by: PHYSICIAN ASSISTANT

## 2023-08-01 PROCEDURE — 84145 PROCALCITONIN (PCT): CPT | Performed by: PHYSICIAN ASSISTANT

## 2023-08-01 PROCEDURE — C9113 INJ PANTOPRAZOLE SODIUM, VIA: HCPCS | Performed by: PHYSICIAN ASSISTANT

## 2023-08-01 PROCEDURE — 80053 COMPREHEN METABOLIC PANEL: CPT | Performed by: PHYSICIAN ASSISTANT

## 2023-08-01 PROCEDURE — 99223 1ST HOSP IP/OBS HIGH 75: CPT | Performed by: INTERNAL MEDICINE

## 2023-08-01 PROCEDURE — G1004 CDSM NDSC: HCPCS

## 2023-08-01 PROCEDURE — 36415 COLL VENOUS BLD VENIPUNCTURE: CPT | Performed by: PHYSICIAN ASSISTANT

## 2023-08-01 PROCEDURE — 85610 PROTHROMBIN TIME: CPT | Performed by: PHYSICIAN ASSISTANT

## 2023-08-01 PROCEDURE — 71045 X-RAY EXAM CHEST 1 VIEW: CPT

## 2023-08-01 PROCEDURE — 83880 ASSAY OF NATRIURETIC PEPTIDE: CPT | Performed by: PHYSICIAN ASSISTANT

## 2023-08-01 PROCEDURE — 96365 THER/PROPH/DIAG IV INF INIT: CPT

## 2023-08-01 RX ORDER — SODIUM CHLORIDE 9 MG/ML
100 INJECTION, SOLUTION INTRAVENOUS CONTINUOUS
Status: DISCONTINUED | OUTPATIENT
Start: 2023-08-01 | End: 2023-08-02

## 2023-08-01 RX ORDER — SODIUM CHLORIDE, SODIUM GLUCONATE, SODIUM ACETATE, POTASSIUM CHLORIDE, MAGNESIUM CHLORIDE, SODIUM PHOSPHATE, DIBASIC, AND POTASSIUM PHOSPHATE .53; .5; .37; .037; .03; .012; .00082 G/100ML; G/100ML; G/100ML; G/100ML; G/100ML; G/100ML; G/100ML
1000 INJECTION, SOLUTION INTRAVENOUS ONCE
Status: COMPLETED | OUTPATIENT
Start: 2023-08-01 | End: 2023-08-01

## 2023-08-01 RX ORDER — MAGNESIUM SULFATE HEPTAHYDRATE 40 MG/ML
2 INJECTION, SOLUTION INTRAVENOUS ONCE
Status: COMPLETED | OUTPATIENT
Start: 2023-08-01 | End: 2023-08-01

## 2023-08-01 RX ORDER — METOPROLOL SUCCINATE 25 MG/1
25 TABLET, EXTENDED RELEASE ORAL DAILY
Status: DISCONTINUED | OUTPATIENT
Start: 2023-08-02 | End: 2023-08-02

## 2023-08-01 RX ORDER — PANTOPRAZOLE SODIUM 40 MG/10ML
40 INJECTION, POWDER, LYOPHILIZED, FOR SOLUTION INTRAVENOUS ONCE
Status: COMPLETED | OUTPATIENT
Start: 2023-08-01 | End: 2023-08-01

## 2023-08-01 RX ORDER — PREDNISONE 10 MG/1
10 TABLET ORAL DAILY
Status: DISCONTINUED | OUTPATIENT
Start: 2023-08-02 | End: 2023-08-03 | Stop reason: HOSPADM

## 2023-08-01 RX ORDER — WARFARIN SODIUM 5 MG/1
5 TABLET ORAL
Status: DISCONTINUED | OUTPATIENT
Start: 2023-08-01 | End: 2023-08-03

## 2023-08-01 RX ORDER — BUSPIRONE HYDROCHLORIDE 5 MG/1
7.5 TABLET ORAL 2 TIMES DAILY
Status: DISCONTINUED | OUTPATIENT
Start: 2023-08-01 | End: 2023-08-03 | Stop reason: HOSPADM

## 2023-08-01 RX ORDER — POTASSIUM CHLORIDE 20 MEQ/1
40 TABLET, EXTENDED RELEASE ORAL ONCE
Status: COMPLETED | OUTPATIENT
Start: 2023-08-01 | End: 2023-08-01

## 2023-08-01 RX ADMIN — SODIUM CHLORIDE, SODIUM GLUCONATE, SODIUM ACETATE, POTASSIUM CHLORIDE, MAGNESIUM CHLORIDE, SODIUM PHOSPHATE, DIBASIC, AND POTASSIUM PHOSPHATE 1000 ML: .53; .5; .37; .037; .03; .012; .00082 INJECTION, SOLUTION INTRAVENOUS at 12:36

## 2023-08-01 RX ADMIN — MAGNESIUM SULFATE HEPTAHYDRATE 2 G: 40 INJECTION, SOLUTION INTRAVENOUS at 16:22

## 2023-08-01 RX ADMIN — SODIUM CHLORIDE, SODIUM GLUCONATE, SODIUM ACETATE, POTASSIUM CHLORIDE, MAGNESIUM CHLORIDE, SODIUM PHOSPHATE, DIBASIC, AND POTASSIUM PHOSPHATE 1000 ML: .53; .5; .37; .037; .03; .012; .00082 INJECTION, SOLUTION INTRAVENOUS at 11:26

## 2023-08-01 RX ADMIN — POTASSIUM CHLORIDE 40 MEQ: 1500 TABLET, EXTENDED RELEASE ORAL at 12:33

## 2023-08-01 RX ADMIN — PANTOPRAZOLE SODIUM 40 MG: 40 INJECTION, POWDER, FOR SOLUTION INTRAVENOUS at 14:17

## 2023-08-01 RX ADMIN — IOHEXOL 100 ML: 350 INJECTION, SOLUTION INTRAVENOUS at 12:33

## 2023-08-01 RX ADMIN — WARFARIN SODIUM 5 MG: 5 TABLET ORAL at 17:12

## 2023-08-01 RX ADMIN — BUSPIRONE HYDROCHLORIDE 7.5 MG: 5 TABLET ORAL at 17:12

## 2023-08-01 RX ADMIN — SODIUM CHLORIDE 100 ML/HR: 0.9 INJECTION, SOLUTION INTRAVENOUS at 16:22

## 2023-08-01 RX ADMIN — MAGNESIUM SULFATE HEPTAHYDRATE 2 G: 40 INJECTION, SOLUTION INTRAVENOUS at 14:23

## 2023-08-01 NOTE — ASSESSMENT & PLAN NOTE
Wt Readings from Last 3 Encounters:   06/16/23 86.2 kg (190 lb)   05/08/23 94.8 kg (209 lb)   04/07/23 98.3 kg (216 lb 12.8 oz)       It appears volume depleted

## 2023-08-01 NOTE — ASSESSMENT & PLAN NOTE
Patient reports intermittent loose bowel movements for several days, increasing weakness for several days, had a very large loose bowel movement yesterday evening after dinner    Check C. difficile testing, has been on both azithromycin and Bactrim in the last 6 months.     Continue supportive care, if no improvement may need GI consult

## 2023-08-01 NOTE — Clinical Note
Case was discussed with Dr. Vann and the patient's admission status was agreed to be Admission Status: inpatient status to the service of Dr. Vandana Mario.

## 2023-08-01 NOTE — H&P
[address]  H&P  Name: Ranjana Smith 80 y.o. male I MRN: [id number]  Unit/Bed#: YY73 I Date of Admission: 8/1/2023   Date of Service: 8/1/2023 I Hospital Day: 0      Assessment/Plan   * Diarrhea of presumed infectious origin  Assessment & Plan  Patient reports intermittent loose bowel movements for several days, increasing weakness for several days, had a very large loose bowel movement yesterday evening after dinner    Check C. difficile testing, has been on both azithromycin and Bactrim in the last 6 months. Continue supportive care, if no improvement may need GI consult    Abnormality of gait  Assessment & Plan  Patient had severe weakness today unable to get himself off of the toilet, consult PT OT    Patient appears deconditioned. Type 2 diabetes mellitus without complication, without long-term current use of insulin ([facility])  Assessment & Plan  Lab Results   Component Value Date    HGBA1C 10.2 (H) 07/11/2023       No results for input(s): "POCGLU" in the last 72 hours. Blood Sugar Average: Last 72 hrs:  Monitor Accu-Cheks before meals and at bedtime    Chronic diastolic congestive heart failure (HCC)  Assessment & Plan  Wt Readings from Last 3 Encounters:   06/16/23 86.2 kg (190 lb)   05/08/23 94.8 kg (209 lb)   04/07/23 98.3 kg (216 lb 12.8 oz)       It appears volume depleted      Persistent atrial fibrillation (HCC)  Assessment & Plan  Continue Coumadin, continue beta-blocker    Cryptogenic organizing pneumonia [facility])  Assessment & Plan  Patient is on a prolonged oral prednisone taper, continue  10 mg daily         VTE Pharmacologic Prophylaxis:   High Risk (Score >/= 5) - Pharmacological DVT Prophylaxis Ordered: warfarin (Coumadin). Sequential Compression Devices Ordered. Code Status: Full code  Discussion with family: Updated  (wife) at bedside.     Anticipated Length of Stay: Patient will be admitted on an inpatient basis with an anticipated length of stay of greater than 2 midnights secondary to Generalized weakness, diarrhea, concern for infectious diarrhea, patient appears debilitated, will need IV fluid, PT OT evaluation, anticipate possible need for short-term rehab. Chief Complaint: Generalized weakness and diarrhea    History of Present Illness:  Erleen Cowden is a 80 y.o. male with a PMH of as noted below who presents with several days of ongoing diarrhea, loose watery bowel movements, no associate abdominal pain or bleeding, on day of admission patient was so weak that he could not get himself off of the toilet, presented to the emergency room via EMS. History per ER reviewed below,    "80year old male with PMH HTN, HLD, DM, CAD, h/o prostate cancer, h/o DVT, long term use of coumadin, peripheral neuropathy, chronic afib, CHF, COPD presents via EMS from home for evaluation. He is accompanied by spouse who also helps provide history. They notes he's been having some ongoing issues. Was diagnosed with walking pneumonia in January and then states he developed "cryptogenic organizing pneumonia". He has been treated with antibiotic courses to include recent bactrim which wife notes finished recently. He is still on prednisone taper and they note he is down to 10 mg daily. He has been following with pulmonary. Pt reports he has no respiratory symptoms. Denies chest pain. Denies fever, chills. Denies SOB. Denies cough or congestion. He notes his cough has improved. Over the past few days he has had increased fatigue. He has also been having some non bloody diarrhea over the past 3-4 days. Wife describes this as brown and watery. No noted black or tarry stools. No blood noted. Today he notes he was on the toilet and got very dizzy and lightheaded when he tried to stand up. He did not syncopize. He did not fall. He notes he sat back down and after some time symptoms resolved. Denies headache. Denies visual disturbance.   Denies any new numbness/tingling. Has peripheral neuropathy in feet which has been chronic. No focal weakness. No noted facial droop. No difficultly with speech. He has been awake, alert and appropriately oriented. There has been no noted confusion. His legs have been swollen which has been chronic. Pt does not feel this has changed but spouse feels swelling is worse. He takes lasix three times a week, last dose taken yesterday. He has complaints of fatigue and generalized weakness. He reports appetite is good and states he is hungry."    Review of Systems:  Review of Systems   All other systems reviewed and are negative. Past Medical and Surgical History:   Past Medical History:   Diagnosis Date   • Anxiety    • Atrial fibrillation [facility])    • Atrial flutter ([facility])    • Congestive heart failure (CHF) ([facility])    • COPD (chronic obstructive pulmonary disease) ([facility])    • Coronary artery disease    • DVT (deep venous thrombosis) ([facility])    • ED (erectile dysfunction)    • Hearing loss    • History of echocardiogram 04/05/2018    EF 0.55, Mild LVH. Mild moderate mitral regurg. Trace aortic regurg. • Hx of radiation therapy     XRT   • Hypercholesterolemia    • Hyperlipidemia    • Hypertension    • Long term (current) use of anticoagulants 8/21/2018   • Neuropathy of both feet    • Peripheral neuropathy    • Prostate cancer ([address])    • Type 2 diabetes mellitus [facility])        Past Surgical History:   Procedure Laterality Date   • CARDIAC CATHETERIZATION  01/15/1988    Left main: unobstructed. Posterolateral branch 90% narrowed. • COLONOSCOPY  10/05/2017    Dr. Denis Click   • PROSTATE SURGERY         Meds/Allergies:  Prior to Admission medications    Medication Sig Start Date End Date Taking?  Authorizing Provider   atorvastatin (LIPITOR) 40 mg tablet TAKE 1 TABLET BY MOUTH DAILY 5/2/23   Jasen Christianson DO   busPIRone (BUSPAR) 7.5 mg tablet Take 1 tablet (7.5 mg total) by mouth 2 (two) times a day 7/31/23   Devendra Aguero DO econazole nitrate 1 % cream  6/1/23   Historical Provider, MD   furosemide (LASIX) 20 mg tablet TAKE 1 TABLET BY MOUTH 3 DAYS WEEKLY 7/17/23   Jazmin Vaca DO   glimepiride (AMARYL) 1 mg tablet Take 1 tablet (1 mg total) by mouth daily with breakfast 6/2/23 5/27/24  Henry Borja, DO   loperamide (IMODIUM A-D) 2 MG tablet Take 2 mg by mouth 3 (three) times a day as needed for diarrhea. Indications: Diarrhea    Historical Provider, MD   metFORMIN (GLUCOPHAGE-XR) 500 mg 24 hr tablet Take 2 tablets (1,000 mg total) by mouth 2 (two) times a day with meals 6/6/23   Devinnella Showers, DO   metoprolol succinate (Toprol XL) 25 mg 24 hr tablet Take 1 tablet (25 mg total) by mouth daily 6/16/23   Donnella Showers, DO   OneTouch Delica Lancets 38N MISC USE TO TEST ONCE DAILY 1/3/23   Donnella Showers, DO   OneTouch Ultra test strip TEST ONCE DAILY 1/3/23   Henry Borja, DO   predniSONE 10 mg tablet Take 4 tablets (40 mg total) by mouth daily for 14 days, THEN 3 tablets (30 mg total) daily for 14 days, THEN 2 tablets (20 mg total) daily for 14 days, THEN 1 tablet (10 mg total) daily for 14 days. 6/14/23 8/9/23  Marshal Polanco PA-C   Pyridoxine HCl (vitamin B-6) 50 MG TABS Take 1 tablet (50 mg total) by mouth daily 6/16/23   Henry Borja, DO   warfarin (COUMADIN) 4 mg tablet TAKE 2 TABLETS BY MOUTH DAILY OR AS DIRECTED  Patient taking differently: TAKE 2 TABLETS BY MOUTH DAILY OR AS DIRECTED:  AS OF 7/14/23, PATIENT TAKING 2 MG DAILY 2/23/23   Jazmin Vaca DO     I have reviewed home medications with patient personally.     Allergies: No Known Allergies    Social History:  Marital Status: /Civil Union   Substance Use History:   Social History     Substance and Sexual Activity   Alcohol Use Not Currently     Social History     Tobacco Use   Smoking Status Never   Smokeless Tobacco Never     Social History     Substance and Sexual Activity   Drug Use Never       Family History:  Family History   Problem Relation Age of Onset   • Cancer Brother         bladder   • Colon cancer Brother    • Heart disease Other    • Hypertension Other    • Cancer Other         bladder   • Colon cancer Other        Physical Exam:     Vitals:   Blood Pressure: 121/87 (08/01/23 1300)  Pulse: (!) 109 (08/01/23 1300)  Temperature: 98.3 °F (36.8 °C) (08/01/23 1046)  Temp Source: Temporal (08/01/23 1046)  Respirations: (!) 29 (08/01/23 1300)  SpO2: 95 % (08/01/23 1300)    Physical Exam  Vitals and nursing note reviewed. Constitutional:       General: He is not in acute distress. Appearance: He is ill-appearing. He is not toxic-appearing or diaphoretic. HENT:      Head: Normocephalic and atraumatic. Cardiovascular:      Rate and Rhythm: Normal rate. Pulses: Normal pulses. Heart sounds: Normal heart sounds. Pulmonary:      Effort: Pulmonary effort is normal. No respiratory distress. Breath sounds: Normal breath sounds. No wheezing. Abdominal:      General: Abdomen is flat. Bowel sounds are normal. There is no distension. Palpations: Abdomen is soft. Tenderness: There is no abdominal tenderness. Musculoskeletal:         General: Normal range of motion. Comments: Diffuse muscle wasting present on physical exam   Skin:     Findings: Bruising present. Comments: Skin appears pale, chronic venous insufficiency lower extremities   Neurological:      Mental Status: He is alert and oriented to person, place, and time. Mental status is at baseline. Cranial Nerves: No cranial nerve deficit. Motor: No weakness. Coordination: Coordination normal.   Psychiatric:         Mood and Affect: Mood normal.         Behavior: Behavior normal.         Thought Content:  Thought content normal.         Judgment: Judgment normal.          Additional Data:     Lab Results:  Results from last 7 days   Lab Units 08/01/23  1109   WBC Thousand/uL 6.38   HEMOGLOBIN g/dL 10.3*   HEMATOCRIT % 32.1*   PLATELETS Thousands/uL 143*   NEUTROS PCT % 57   LYMPHS PCT % 36   MONOS PCT % 5   EOS PCT % 1     Results from last 7 days   Lab Units 08/01/23  1109   SODIUM mmol/L 140   POTASSIUM mmol/L 3.2*   CHLORIDE mmol/L 100   CO2 mmol/L 26   BUN mg/dL 26*   CREATININE mg/dL 1.06   ANION GAP mmol/L 14   CALCIUM mg/dL 7.4*   ALBUMIN g/dL 3.6   TOTAL BILIRUBIN mg/dL 1.22*   ALK PHOS U/L 54   ALT U/L 71*   AST U/L 25   GLUCOSE RANDOM mg/dL 180*     Results from last 7 days   Lab Units 08/01/23  1109   INR  1.40*             Results from last 7 days   Lab Units 08/01/23  1318 08/01/23  1109   LACTIC ACID mmol/L 1.8 2.9*   PROCALCITONIN ng/ml  --  0.13       Lines/Drains:  Invasive Devices     Peripheral Intravenous Line  Duration           Peripheral IV 08/01/23 Dorsal (posterior); Right Forearm <1 day                    Imaging: No pertinent imaging reviewed. CT chest abdomen pelvis w contrast   Final Result by Stephanie Andrade MD (08/01 1465)      No acute intra-abdominal process      Cardiomegaly with left atrial enlargement      Secretions in the thoracic esophagus, representing gastroesophageal reflux. This puts the patient at increased risk for aspiration pneumonitis. Punctate right upper renal pole nonobstructing calculus. The study was marked in [facility] for immediate notification. Workstation performed: [id number]         XR chest 1 view portable   Final Result by Anand Prieto MD (08/01 8625)      No acute cardiopulmonary disease. Workstation performed: [id number]             ** Please Note: This note has been constructed using a voice recognition system.  **

## 2023-08-01 NOTE — ED PROVIDER NOTES
History  Chief Complaint   Patient presents with   • Weakness - Generalized     Pt brought in by EMS for generalized weakness and diarrhea x4 days. 80year old male with PMH HTN, HLD, DM, CAD, h/o prostate cancer, h/o DVT, long term use of coumadin, peripheral neuropathy, chronic afib, CHF, COPD presents via EMS from home for evaluation. He is accompanied by spouse who also helps provide history. They notes he's been having some ongoing issues. Was diagnosed with walking pneumonia in January and then states he developed "cryptogenic organizing pneumonia". He has been treated with antibiotic courses to include recent bactrim which wife notes finished recently. He is still on prednisone taper and they note he is down to 10 mg daily. He has been following with pulmonary. Pt reports he has no respiratory symptoms. Denies chest pain. Denies fever, chills. Denies SOB. Denies cough or congestion. He notes his cough has improved. Over the past few days he has had increased fatigue. He has also been having some non bloody diarrhea over the past 3-4 days. Wife describes this as brown and watery. No noted black or tarry stools. No blood noted. Today he notes he was on the toilet and got very dizzy and lightheaded when he tried to stand up. He did not syncopize. He did not fall. He notes he sat back down and after some time symptoms resolved. Denies headache. Denies visual disturbance. Denies any new numbness/tingling. Has peripheral neuropathy in feet which has been chronic. No focal weakness. No noted facial droop. No difficultly with speech. He has been awake, alert and appropriately oriented. There has been no noted confusion. His legs have been swollen which has been chronic. Pt does not feel this has changed but spouse feels swelling is worse. He takes lasix three times a week, last dose taken yesterday. He has complaints of fatigue and generalized weakness.   He reports appetite is good and states he is hungry. History provided by:  Patient and medical records   used: No        Prior to Admission Medications   Prescriptions Last Dose Informant Patient Reported? Taking? OneTouch Delica Lancets 98C MISC  Self, Spouse/Significant Other No No   Sig: USE TO TEST ONCE DAILY   OneTouch Ultra test strip  Self, Spouse/Significant Other No No   Sig: TEST ONCE DAILY   Pyridoxine HCl (vitamin B-6) 50 MG TABS  Self, Spouse/Significant Other No No   Sig: Take 1 tablet (50 mg total) by mouth daily   atorvastatin (LIPITOR) 40 mg tablet  Self, Spouse/Significant Other No No   Sig: TAKE 1 TABLET BY MOUTH DAILY   busPIRone (BUSPAR) 7.5 mg tablet   No No   Sig: Take 1 tablet (7.5 mg total) by mouth 2 (two) times a day   econazole nitrate 1 % cream  Self, Spouse/Significant Other Yes No   furosemide (LASIX) 20 mg tablet   No No   Sig: TAKE 1 TABLET BY MOUTH 3 DAYS WEEKLY   glimepiride (AMARYL) 1 mg tablet  Self, Spouse/Significant Other No No   Sig: Take 1 tablet (1 mg total) by mouth daily with breakfast   loperamide (IMODIUM A-D) 2 MG tablet   Yes No   Sig: Take 2 mg by mouth 3 (three) times a day as needed for diarrhea. Indications: Diarrhea   metFORMIN (GLUCOPHAGE-XR) 500 mg 24 hr tablet  Self, Spouse/Significant Other No No   Sig: Take 2 tablets (1,000 mg total) by mouth 2 (two) times a day with meals   metoprolol succinate (Toprol XL) 25 mg 24 hr tablet  Self, Spouse/Significant Other No No   Sig: Take 1 tablet (25 mg total) by mouth daily   predniSONE 10 mg tablet  Self, Spouse/Significant Other No No   Sig: Take 4 tablets (40 mg total) by mouth daily for 14 days, THEN 3 tablets (30 mg total) daily for 14 days, THEN 2 tablets (20 mg total) daily for 14 days, THEN 1 tablet (10 mg total) daily for 14 days.    warfarin (COUMADIN) 4 mg tablet  Self No No   Sig: TAKE 2 TABLETS BY MOUTH DAILY OR AS DIRECTED   Patient taking differently: TAKE 2 TABLETS BY MOUTH DAILY OR AS DIRECTED:  AS OF 7/14/23, PATIENT TAKING 2 MG DAILY      Facility-Administered Medications: None       Past Medical History:   Diagnosis Date   • Anxiety    • Atrial fibrillation (HCC)    • Atrial flutter (HCC)    • Congestive heart failure (CHF) (HCC)    • COPD (chronic obstructive pulmonary disease) (HCC)    • Coronary artery disease    • DVT (deep venous thrombosis) (HCC)    • ED (erectile dysfunction)    • Hearing loss    • History of echocardiogram 04/05/2018    EF 0.55, Mild LVH. Mild moderate mitral regurg. Trace aortic regurg. • Hx of radiation therapy     XRT   • Hypercholesterolemia    • Hyperlipidemia    • Hypertension    • Long term (current) use of anticoagulants 8/21/2018   • Neuropathy of both feet    • Peripheral neuropathy    • Prostate cancer ([facility])    • Type 2 diabetes mellitus [facility])        Past Surgical History:   Procedure Laterality Date   • CARDIAC CATHETERIZATION  01/15/1988    Left main: unobstructed. Posterolateral branch 90% narrowed. • COLONOSCOPY  10/05/2017    Dr. Blake Rajan   • PROSTATE SURGERY         Family History   Problem Relation Age of Onset   • Cancer Brother         bladder   • Colon cancer Brother    • Heart disease Other    • Hypertension Other    • Cancer Other         bladder   • Colon cancer Other      I have reviewed and agree with the history as documented. E-Cigarette/Vaping   • E-Cigarette Use Never User      E-Cigarette/Vaping Substances   • Nicotine No    • THC No    • CBD No    • Flavoring No    • Other No    • Unknown No      Social History     Tobacco Use   • Smoking status: Never   • Smokeless tobacco: Never   Vaping Use   • Vaping Use: Never used   Substance Use Topics   • Alcohol use: Not Currently   • Drug use: Never       Review of Systems   Constitutional: Positive for fatigue. Negative for chills and fever. HENT: Negative. Negative for congestion, rhinorrhea and sore throat. Eyes: Negative. Negative for visual disturbance. Respiratory: Negative.   Negative for cough, shortness of breath and wheezing. Cardiovascular: Negative. Negative for chest pain, palpitations and leg swelling. Gastrointestinal: Positive for diarrhea. Negative for abdominal pain, blood in stool, constipation, nausea and vomiting. Genitourinary: Negative. Negative for dysuria, flank pain, frequency and hematuria. Musculoskeletal: Negative. Negative for back pain and neck pain. Skin: Negative. Negative for rash. Neurological: Positive for dizziness and light-headedness. Negative for syncope, numbness and headaches. Psychiatric/Behavioral: Negative. Negative for confusion. All other systems reviewed and are negative. Physical Exam  Physical Exam  Vitals and nursing note reviewed. Constitutional:       General: He is awake. He is not in acute distress. Appearance: Normal appearance. He is well-developed. He is not toxic-appearing or diaphoretic. HENT:      Head: Normocephalic and atraumatic. Right Ear: Hearing and external ear normal.      Left Ear: Hearing and external ear normal.      Nose: Nose normal.      Mouth/Throat:      Mouth: Mucous membranes are moist.      Tongue: Tongue does not deviate from midline. Pharynx: Oropharynx is clear. Uvula midline. Eyes:      General: Lids are normal. No visual field deficit or scleral icterus. Extraocular Movements: Extraocular movements intact. Conjunctiva/sclera: Conjunctivae normal.      Pupils: Pupils are equal, round, and reactive to light. Neck:      Trachea: Trachea and phonation normal.   Cardiovascular:      Rate and Rhythm: Tachycardia present. Rhythm irregularly irregular. Pulses: Normal pulses. Radial pulses are 2+ on the right side and 2+ on the left side. Heart sounds: Normal heart sounds, S1 normal and S2 normal.   Pulmonary:      Effort: Pulmonary effort is normal. No tachypnea or respiratory distress. Breath sounds: Normal breath sounds.  No wheezing, rhonchi or rales.   Abdominal:      General: Bowel sounds are normal. There is no distension. Palpations: Abdomen is soft. Tenderness: There is no abdominal tenderness. There is no guarding or rebound. Musculoskeletal:      Cervical back: Normal range of motion and neck supple. Right lower leg: Edema present. Left lower leg: Edema present. Skin:     General: Skin is warm and dry. Capillary Refill: Capillary refill takes 2 to 3 seconds. Findings: No rash. Comments: Stasis changes b/l lower extremities. Neurological:      General: No focal deficit present. Mental Status: He is alert and oriented to person, place, and time. GCS: GCS eye subscore is 4. GCS verbal subscore is 5. GCS motor subscore is 6. Cranial Nerves: Cranial nerves 2-12 are intact. No cranial nerve deficit, dysarthria or facial asymmetry. Sensory: Sensation is intact. Motor: Motor function is intact. No weakness or pronator drift. Psychiatric:         Mood and Affect: Mood normal.         Speech: Speech normal. Speech is not slurred. Behavior: Behavior normal. Behavior is cooperative.          Vital Signs  ED Triage Vitals [08/01/23 1046]   Temperature Pulse Respirations Blood Pressure SpO2   98.3 °F (36.8 °C) 99 18 103/60 98 %      Temp Source Heart Rate Source Patient Position - Orthostatic VS BP Location FiO2 (%)   Temporal Monitor Sitting Left arm --      Pain Score       --           Vitals:    08/01/23 1133 08/01/23 1136 08/01/23 1234 08/01/23 1300   BP: 93/55 (!) 88/55 140/62 121/87   Pulse: (!) 108 (!) 123 93 (!) 109   Patient Position - Orthostatic VS: Sitting - Orthostatic VS Standing - Orthostatic VS  Lying         Visual Acuity      ED Medications  Medications   magnesium sulfate 2 g/50 mL IVPB (premix) 2 g (has no administration in time range)   pantoprazole (PROTONIX) injection 40 mg (has no administration in time range)   multi-electrolyte (ISOLYTE-S PH 7.4) bolus 1,000 mL (0 mL Intravenous Stopped 8/1/23 1226)   potassium chloride (K-DUR,KLOR-CON) CR tablet 40 mEq (40 mEq Oral Given 8/1/23 1233)   multi-electrolyte (ISOLYTE-S PH 7.4) bolus 1,000 mL (0 mL Intravenous Stopped 8/1/23 1336)   iohexol (OMNIPAQUE) 350 MG/ML injection (SINGLE-DOSE) 100 mL (100 mL Intravenous Given 8/1/23 1233)       Diagnostic Studies  Results Reviewed     Procedure Component Value Units Date/Time    HS Troponin I 2hr [[id number]]  (Normal) Collected: 08/01/23 1318    Lab Status: Final result Specimen: Blood from Arm, Right Updated: 08/01/23 1346     hs TnI 2hr 19 ng/L      Delta 2hr hsTnI -4 ng/L     Lactic acid 2 Hours [[id number]]  (Normal) Collected: 08/01/23 1318    Lab Status: Final result Specimen: Blood from Arm, Right Updated: 08/01/23 1342     LACTIC ACID 1.8 mmol/L     Narrative:      Result may be elevated if tourniquet was used during collection. UA w Reflex to Microscopic w Reflex to Culture [[id number]]  (Abnormal) Collected: 08/01/23 1252    Lab Status: Final result Specimen: Urine, Clean Catch Updated: 08/01/23 1326     Color, UA Yellow     Clarity, UA Clear     Specific Gravity, UA 1.010     pH, UA 5.0     Leukocytes, UA Negative     Nitrite, UA Negative     Protein, UA Negative mg/dl      Glucose,  (1/10%) mg/dl      Ketones, UA Negative mg/dl      Urobilinogen, UA 0.2 E.U./dl      Bilirubin, UA Negative     Occult Blood, UA Negative    HS Troponin I 4hr [[id number]]     Lab Status: No result Specimen: Blood     FLU/RSV/COVID - if FLU/RSV clinically relevant [[id number]]  (Normal) Collected: 08/01/23 1109    Lab Status: Final result Specimen: Nasopharyngeal Swab Updated: 08/01/23 1154     SARS-CoV-2 Negative     INFLUENZA A PCR Negative     INFLUENZA B PCR Negative     RSV PCR Negative    Narrative:      FOR PEDIATRIC PATIENTS - copy/paste COVID Guidelines URL to browser: [email or web address]    SARS-CoV-2 assay is a Nucleic Acid Amplification assay intended for the  qualitative detection of nucleic acid from SARS-CoV-2 in nasopharyngeal  swabs. Results are for the presumptive identification of SARS-CoV-2 RNA. Positive results are indicative of infection with SARS-CoV-2, the virus  causing COVID-19, but do not rule out bacterial infection or co-infection  with other viruses. Laboratories within the [facility] and its  territories are required to report all positive results to the appropriate  public health authorities. Negative results do not preclude SARS-CoV-2  infection and should not be used as the sole basis for treatment or other  patient management decisions. Negative results must be combined with  clinical observations, patient history, and epidemiological information. This test has not been FDA cleared or approved. This test has been authorized by FDA under an Emergency Use Authorization  (EUA). This test is only authorized for the duration of time the  declaration that circumstances exist justifying the authorization of the  emergency use of an in vitro diagnostic tests for detection of SARS-CoV-2  virus and/or diagnosis of COVID-19 infection under section 564(b)(1) of  the Act, 21 U. S.C. 071VHF-9(Q)(3), unless the authorization is terminated  or revoked sooner. The test has been validated but independent review by FDA  and CLIA is pending. Test performed using [employer] GeneXpert: This RT-PCR assay targets N2,  a region unique to SARS-CoV-2. A conserved region in the E-gene was chosen  for pan-Sarbecovirus detection which includes SARS-CoV-2. According to CMS-2020-01-R, this platform meets the definition of high-throughput technology.     TSH, 3rd generation with Free T4 reflex [[id number]]  (Normal) Collected: 08/01/23 1109    Lab Status: Final result Specimen: Blood from Arm, Right Updated: 08/01/23 1150     TSH 3RD GENERATON 4.064 uIU/mL     Procalcitonin [[id number]]  (Normal) Collected: 08/01/23 1109    Lab Status: Final result Specimen: Blood from Arm, Right Updated: 08/01/23 1141     Procalcitonin 0.13 ng/ml     B-Type Natriuretic Peptide(BNP) [[id number]]  (Abnormal) Collected: 08/01/23 1109    Lab Status: Final result Specimen: Blood from Arm, Right Updated: 08/01/23 1139      pg/mL     HS Troponin 0hr (reflex protocol) [[id number]]  (Normal) Collected: 08/01/23 1109    Lab Status: Final result Specimen: Blood from Arm, Right Updated: 08/01/23 1138     hs TnI 0hr 23 ng/L     Lactic acid, plasma (w/reflex if result > 2.0) [[id number]]  (Abnormal) Collected: 08/01/23 1109    Lab Status: Final result Specimen: Blood from Arm, Right Updated: 08/01/23 1135     LACTIC ACID 2.9 mmol/L     Narrative:      Result may be elevated if tourniquet was used during collection.     Magnesium [[id number]]  (Abnormal) Collected: 08/01/23 1109    Lab Status: Final result Specimen: Blood from Arm, Right Updated: 08/01/23 1135     Magnesium 0.8 mg/dL     Comprehensive metabolic panel [[id number]]  (Abnormal) Collected: 08/01/23 1109    Lab Status: Final result Specimen: Blood from Arm, Right Updated: 08/01/23 1133     Sodium 140 mmol/L      Potassium 3.2 mmol/L      Chloride 100 mmol/L      CO2 26 mmol/L      ANION GAP 14 mmol/L      BUN 26 mg/dL      Creatinine 1.06 mg/dL      Glucose 180 mg/dL      Calcium 7.4 mg/dL      AST 25 U/L      ALT 71 U/L      Alkaline Phosphatase 54 U/L      Total Protein 5.7 g/dL      Albumin 3.6 g/dL      Total Bilirubin 1.22 mg/dL      eGFR 62 ml/min/1.73sq m     Narrative:      [place] guidelines for Chronic Kidney Disease (CKD):   •  Stage 1 with normal or high GFR (GFR > 90 mL/min/1.73 square meters)  •  Stage 2 Mild CKD (GFR = 60-89 mL/min/1.73 square meters)  •  Stage 3A Moderate CKD (GFR = 45-59 mL/min/1.73 square meters)  •  Stage 3B Moderate CKD (GFR = 30-44 mL/min/1.73 square meters)  •  Stage 4 Severe CKD (GFR = 15-29 mL/min/1.73 square meters)  •  Stage 5 End Stage CKD (GFR <15 mL/min/1.73 square meters)  Note: GFR calculation is accurate only with a steady state creatinine    APTT [[id number]]  (Normal) Collected: 08/01/23 1109    Lab Status: Final result Specimen: Blood from Arm, Right Updated: 08/01/23 1127     PTT 28 seconds     Protime-INR [[id number]]  (Abnormal) Collected: 08/01/23 1109    Lab Status: Final result Specimen: Blood from Arm, Right Updated: 08/01/23 1127     Protime 17.4 seconds      INR 1.40    CBC and differential [[id number]]  (Abnormal) Collected: 08/01/23 1109    Lab Status: Final result Specimen: Blood from Arm, Right Updated: 08/01/23 1116     WBC 6.38 Thousand/uL      RBC 3.55 Million/uL      Hemoglobin 10.3 g/dL      Hematocrit 32.1 %      MCV 90 fL      MCH 29.0 pg      MCHC 32.1 g/dL      RDW 22.7 %      MPV 10.1 fL      Platelets 895 Thousands/uL      nRBC 2 /100 WBCs      Neutrophils Relative 57 %      Immat GRANS % 1 %      Lymphocytes Relative 36 %      Monocytes Relative 5 %      Eosinophils Relative 1 %      Basophils Relative 0 %      Neutrophils Absolute 3.65 Thousands/µL      Immature Grans Absolute 0.07 Thousand/uL      Lymphocytes Absolute 2.27 Thousands/µL      Monocytes Absolute 0.33 Thousand/µL      Eosinophils Absolute 0.04 Thousand/µL      Basophils Absolute 0.02 Thousands/µL                  CT chest abdomen pelvis w contrast   Final Result by Rebeca Martinez MD (08/01 1335)      No acute intra-abdominal process      Cardiomegaly with left atrial enlargement      Secretions in the thoracic esophagus, representing gastroesophageal reflux. This puts the patient at increased risk for aspiration pneumonitis. Punctate right upper renal pole nonobstructing calculus. The study was marked in [facility] for immediate notification.             Workstation performed: [id number]         XR chest 1 view portable    (Results Pending)              Procedures  ECG 12 Lead Documentation Only    Date/Time: 8/1/2023 11:07 AM    Performed by: Maile Pate BAMBI Jones  Authorized by: Bruce Keita PA-C    Indications / Diagnosis:  Weakness  ECG reviewed by me, the ED Provider: yes    Patient location:  ED  Previous ECG:     Comparison to cardiac monitor: Yes    Interpretation:     Interpretation: abnormal    Rate:     ECG rate:  88    ECG rate assessment: normal    Rhythm:     Rhythm: atrial fibrillation    Ectopy:     Ectopy: PVCs      PVCs:  Unifocal  QRS:     QRS axis:  Normal  Conduction:     Conduction: normal    ST segments:     ST segments:  Normal  T waves:     T waves: non-specific    Comments:      SC *, QRS 84, QT/QTc 342/413    CriticalCare Time    Date/Time: 8/1/2023 2:06 PM    Performed by: Bruce Keita PA-C  Authorized by: Bruce Keita PA-C    Critical care provider statement:     Critical care time (minutes):  35    Critical care time was exclusive of:  Separately billable procedures and treating other patients    Critical care was necessary to treat or prevent imminent or life-threatening deterioration of the following conditions:  Dehydration    Critical care was time spent personally by me on the following activities:  Obtaining history from patient or surrogate, development of treatment plan with patient or surrogate, evaluation of patient's response to treatment, ordering and review of laboratory studies, ordering and review of radiographic studies, re-evaluation of patient's condition, review of old charts and examination of patient    I assumed direction of critical care for this patient from another provider in my specialty: no               ED Course  ED Course as of 08/01/23 1414   Tue Aug 01, 2023   1057 Prior records reviewed. Was treated for multifocal pneumonia 5/12/23 (CT chest showing b/l consolidations). Reviewed home health visit from yesterday. Had complaints of increased weakness, dizzy over the past 3 days. There was concern regarding reactive depression. PCP was called and sent in script for lexapro 10 mg. Last INR 1.98 on 7/25/23. Last HgbA1C 10.2% on 7/11/23 (was on prolonged steroid). 1121 WBC: 6.38   1121 Hemoglobin(!): 10.3  History of anemia, decreased from 11.4 three weeks ago   1122 Platelet Count(!): 738  Stable, value of 110 three weeks ago   1128 POCT INR(!): 1.40  Subtherapeutic on coumadin   1128 PROTIME(!): 17.4   1128 PTT: 28   1137 Glucose, Random(!): 180  Known diabetic   1137 Creatinine: 1.06  Appears stable   1137 BUN(!): 26  Improved from prior   1137 Sodium: 140   1137 Potassium(!): 3.2  Will replete   1138 Chloride: 100   1138 CO2: 26   1138 Anion Gap: 14   1138 Calcium(!): 7.4   1138 AST: 25   1138 ALT(!): 71  Similar to three weeks ago   1138 Alkaline Phosphatase: 54   1138 Total Protein(!): 5.7   1138 Albumin: 3.6   1138 TOTAL BILIRUBIN(!): 1.22   1138 eGFR: 62   1138 LACTIC ACID(!!): 2.9  Fluids in process   1138 Magnesium(!!): 0.8  Will replete   1138 hs TnI 0hr: 23  Not diagnostic of ACS, however will require delta to exclude. 1139 Pt did experience orthostatic hypotension with standing as well as increase in HR.   1140 BNP(!): 262  Stable from prior   1143 Procalcitonin: 0.13   1148 XR chest 1 view portable  Independently viewed and interpreted by me - no acute process, previous pneumonia has resolved, no noted pleural effusions, no pulmonary edema; pending official read. 1151 TSH 3RD GENERATON: 4.064  Within normal range   1157 SARS-COV-2: Negative   1157 INFLU A PCR: Negative   1157 INFLU B PCR: Negative   1157 RSV PCR: Negative   1203 Pt reassessed. States he is hungry. Recommended admission due to suspected volume depletion/dehydration as well as orthostatic hypotension and electrolyte replenishment and further testing in the ER to include CT imaging. Pt and spouse agreeable.    1326 Glucose, UA(!): 100 (1/10%)  UA shows glucose otherwise negative and not suggestive of infection   1337 CT chest abdomen pelvis w contrast  IMPRESSION:     No acute intra-abdominal process     Cardiomegaly with left atrial enlargement     Secretions in the thoracic esophagus, representing gastroesophageal reflux. This puts the patient at increased risk for aspiration pneumonitis.     Punctate right upper renal pole nonobstructing calculus. 1343 LACTIC ACID: 1.8  Improved after fluids. Elevated lactic felt to be related to dehydration as opposed to an infectious process. 1343 TT to on call SLIM to discuss admission. 26 Pt and spouse updated on results. Agreeable to admission plan as previously discussed. Will provide dose of protonix for reflux seen on CT scan. [phone] Discussed with Dr. Kory Gaitan of [facility], accepts for inpatient admission to Dr. Yuan Slider service. [address] hsTnI: -4             HEART Risk Score    Flowsheet Row Most Recent Value   Heart Score Risk Calculator    History 0 Filed at: 08/01/2023 1128   ECG 1 Filed at: 08/01/2023 1128   Age 2 Filed at: 08/01/2023 1128   Risk Factors 2 Filed at: 08/01/2023 1128   Troponin 1 Filed at: 08/01/2023 1128   HEART Score 6 Filed at: 08/01/2023 1128                     Initial Sepsis Screening     Row Name 08/01/23 1138                Is the patient's history suggestive of a new or worsening infection? No  -KO              User Key  (r) = Recorded By, (t) = Taken By, (c) = Cosigned By    [address] [place] [address] Name Provider Type    MARISSA Christensen PA-C Physician Assistant                SBIRT 22yo+    Flowsheet Row Most Recent Value   Initial Alcohol Screen: US AUDIT-C     1. How often do you have a drink containing alcohol? 0 Filed at: 08/01/2023 1114   2. How many drinks containing alcohol do you have on a typical day you are drinking? 0 Filed at: 08/01/2023 1114   3a. Male UNDER 65: How often do you have five or more drinks on one occasion? 0 Filed at: 08/01/2023 1114   3b. FEMALE Any Age, or MALE 65+: How often do you have 4 or more drinks on one occassion?  0 Filed at: 08/01/2023 1046   Audit-C Score 0 Filed at: 08/01/2023 1114 RICK: How many times in the past year have you. .. Used an illegal drug or used a prescription medication for non-medical reasons? Never Filed at: 08/01/2023 1114                    Medical Decision Making  81 yo male presenting for evaluation of generalized weakness. Had some dizzy/lightheadedness earlier which sounds orthostatic in nature. Will obtain orthostatic vital signs. Will obtain EKG, CXR, labs and UA to further evaluate. He is chronically anticoagulated on coumadin, will recheck INR. Work up obtained as noted above. EKG and serial troponins not c/w ACS. Mildly elevated BNP but likely due to chronic CHF, does not appear in acute exacerbation. CXR does not reveal pneumonia, pneumothorax, vascular congestion or pleural effusion. Prior noted multifocal pneumonia has improved. No noted leukocytosis. Has anemia which is chronic and down trending, no s/sx active bleeding at present. Mild hyperglycemia c/w chronic diabetes and likely elevated due to recent steroid usage (has been on several weeks of prednisone). Renal function within normal limits. Magnesium and potassium have been repleted. UA not suggestive of infection. CT chest/abd/pelv was pursued to further evaluate. No acute intra-abdominal process noted. Has cardiomegaly and left atrial enlargement and coronary calcifications seen. Secretions noted in thoracic esophagus likely in setting of reflux. Other incidental findings as noted in report to include non obstructing renal stone. Pt responded to fluid resuscitation. He required assistance getting out of bed and using commode. He is generally weak. Given symptoms as well as orthostatic hypotension and work up, recommended admission in which pt and spouse were agreeable. SLIM consulted for admission. Please refer to above ER course for further details/discussion.       Anemia: chronic illness or injury with exacerbation, progression, or side effects of treatment  Chronic a-fib [facility]): chronic illness or injury     Details: chronic, rate controlled  Diarrhea: acute illness or injury     Details: No noted abnormalities on CT scan such as colitis, diverticulitis  Elevated lactic acid level: acute illness or injury     Details: Improved on recheck. Likely due to dehydration/volume depletion. no noted infectious concerns. Generalized weakness: acute illness or injury  Hyperglycemia due to diabetes mellitus ([address]): acute illness or injury     Details: not DKA  Hypokalemia: acute illness or injury     Details: orally repleted  Hypomagnesemia: acute illness or injury     Details: IV magnesium given  Orthostatic hypotension: acute illness or injury     Details: Responded to fluids; suspected to be in setting of dehydration secondary to recent diarrhea. Subtherapeutic international normalized ratio (INR): acute illness or injury     Details: subtherapeutic on coumadin (chronic anticoagulation)  Amount and/or Complexity of Data Reviewed  Independent Historian: clotilde  External Data Reviewed: labs, radiology and notes. Labs: ordered. Decision-making details documented in ED Course. Radiology: ordered and independent interpretation performed. Decision-making details documented in ED Course. ECG/medicine tests: ordered and independent interpretation performed. Decision-making details documented in ED Course. Discussion of management or test interpretation with external provider(s): SLIM    Risk  Prescription drug management. Decision regarding hospitalization.           Disposition  Final diagnoses:   Generalized weakness   Orthostatic hypotension   Hypomagnesemia   Hypokalemia   Diarrhea   Anemia   Subtherapeutic international normalized ratio (INR)   Chronic a-fib (HCC)   Hyperglycemia due to diabetes mellitus (HCC)   Elevated lactic acid level   Esophageal reflux     Time reflects when diagnosis was documented in both MDM as applicable and the Disposition within this note     Time User Action Codes Description Comment    8/1/2023 12:47 PM Reagan Nicolas Add [R53.1] Generalized weakness     8/1/2023 12:47 PM Reagan Nicolas Add [I95.1] Orthostatic hypotension     8/1/2023 12:47 PM Reagan Nicolas Add [E83.42] Hypomagnesemia     8/1/2023 12:47 PM Nohemy Shoemaker [E87.6] Hypokalemia     8/1/2023 12:47 PM Nohemy Shoemaker [R19.7] Diarrhea     8/1/2023 12:47 PM Nohemy Shoemaker [D64.9] Anemia     8/1/2023 12:48 PM Reagan Nicolas Add [R79.1] Subtherapeutic international normalized ratio (INR)     8/1/2023 12:48 PM Reagan Nicolas Add [I48.20] Chronic a-fib ([address])     8/1/2023 12:48 PM Reagan Nicolas Add [E11.65] Hyperglycemia due to diabetes mellitus ([address])     8/1/2023 12:48 PM Nohemy Shoemaker [R79.89] Elevated lactic acid level     8/1/2023  1:54 PM Reagan Nicolas Add [K21.9] Esophageal reflux       ED Disposition     ED Disposition   Admit    Condition   Stable    Date/Time   Tue Aug 1, 2023  1:54 PM    Comment   Case was discussed with Dr. Elsa Lopez and the patient's admission status was agreed to be Admission Status: inpatient status to the service of Dr. Brett Tamayo    None         Patient's Medications   Discharge Prescriptions    No medications on file       No discharge procedures on file.     PDMP Review     None          ED Provider  Electronically Signed by           Sonia Castillo PA-C  08/01/23 [id number]

## 2023-08-01 NOTE — ASSESSMENT & PLAN NOTE
Lab Results   Component Value Date    HGBA1C 10.2 (H) 07/11/2023       No results for input(s): "POCGLU" in the last 72 hours.     Blood Sugar Average: Last 72 hrs:  Monitor Accu-Cheks before meals and at bedtime

## 2023-08-02 LAB
ALBUMIN SERPL BCP-MCNC: 3 G/DL (ref 3.5–5)
ALP SERPL-CCNC: 47 U/L (ref 34–104)
ALT SERPL W P-5'-P-CCNC: 60 U/L (ref 7–52)
ANION GAP SERPL CALCULATED.3IONS-SCNC: 10 MMOL/L
AST SERPL W P-5'-P-CCNC: 26 U/L (ref 13–39)
ATRIAL RATE: 88 BPM
BASOPHILS # BLD AUTO: 0.01 THOUSANDS/ÂΜL (ref 0–0.1)
BASOPHILS NFR BLD AUTO: 0 % (ref 0–1)
BILIRUB SERPL-MCNC: 0.89 MG/DL (ref 0.2–1)
BUN SERPL-MCNC: 18 MG/DL (ref 5–25)
CALCIUM ALBUM COR SERPL-MCNC: 7.8 MG/DL (ref 8.3–10.1)
CALCIUM SERPL-MCNC: 7 MG/DL (ref 8.4–10.2)
CHLORIDE SERPL-SCNC: 104 MMOL/L (ref 96–108)
CO2 SERPL-SCNC: 27 MMOL/L (ref 21–32)
CREAT SERPL-MCNC: 0.9 MG/DL (ref 0.6–1.3)
EOSINOPHIL # BLD AUTO: 0.02 THOUSAND/ÂΜL (ref 0–0.61)
EOSINOPHIL NFR BLD AUTO: 0 % (ref 0–6)
ERYTHROCYTE [DISTWIDTH] IN BLOOD BY AUTOMATED COUNT: 23.3 % (ref 11.6–15.1)
EST. AVERAGE GLUCOSE BLD GHB EST-MCNC: 258 MG/DL
GFR SERPL CREATININE-BSD FRML MDRD: 76 ML/MIN/1.73SQ M
GLUCOSE SERPL-MCNC: 128 MG/DL (ref 65–140)
GLUCOSE SERPL-MCNC: 274 MG/DL (ref 65–140)
GLUCOSE SERPL-MCNC: 450 MG/DL (ref 65–140)
GLUCOSE SERPL-MCNC: 474 MG/DL (ref 65–140)
HBA1C MFR BLD: 10.6 %
HCT VFR BLD AUTO: 27.1 % (ref 36.5–49.3)
HGB BLD-MCNC: 8.7 G/DL (ref 12–17)
IMM GRANULOCYTES # BLD AUTO: 0.05 THOUSAND/UL (ref 0–0.2)
IMM GRANULOCYTES NFR BLD AUTO: 1 % (ref 0–2)
INR PPP: 1.66 (ref 0.84–1.19)
LYMPHOCYTES # BLD AUTO: 1.53 THOUSANDS/ÂΜL (ref 0.6–4.47)
LYMPHOCYTES NFR BLD AUTO: 31 % (ref 14–44)
MAGNESIUM SERPL-MCNC: 1.7 MG/DL (ref 1.9–2.7)
MCH RBC QN AUTO: 29.3 PG (ref 26.8–34.3)
MCHC RBC AUTO-ENTMCNC: 32.1 G/DL (ref 31.4–37.4)
MCV RBC AUTO: 91 FL (ref 82–98)
MONOCYTES # BLD AUTO: 0.3 THOUSAND/ÂΜL (ref 0.17–1.22)
MONOCYTES NFR BLD AUTO: 6 % (ref 4–12)
NEUTROPHILS # BLD AUTO: 2.97 THOUSANDS/ÂΜL (ref 1.85–7.62)
NEUTS SEG NFR BLD AUTO: 62 % (ref 43–75)
NRBC BLD AUTO-RTO: 1 /100 WBCS
PHOSPHATE SERPL-MCNC: 2.2 MG/DL (ref 2.3–4.1)
PLATELET # BLD AUTO: 113 THOUSANDS/UL (ref 149–390)
PMV BLD AUTO: 10.4 FL (ref 8.9–12.7)
POTASSIUM SERPL-SCNC: 2.8 MMOL/L (ref 3.5–5.3)
PROT SERPL-MCNC: 5 G/DL (ref 6.4–8.4)
PROTHROMBIN TIME: 19.8 SECONDS (ref 11.6–14.5)
QRS AXIS: 18 DEGREES
QRSD INTERVAL: 84 MS
QT INTERVAL: 342 MS
QTC INTERVAL: 413 MS
RBC # BLD AUTO: 2.97 MILLION/UL (ref 3.88–5.62)
SODIUM SERPL-SCNC: 141 MMOL/L (ref 135–147)
T WAVE AXIS: -34 DEGREES
VENTRICULAR RATE: 88 BPM
WBC # BLD AUTO: 4.88 THOUSAND/UL (ref 4.31–10.16)

## 2023-08-02 PROCEDURE — 93010 ELECTROCARDIOGRAM REPORT: CPT | Performed by: INTERNAL MEDICINE

## 2023-08-02 PROCEDURE — 80053 COMPREHEN METABOLIC PANEL: CPT | Performed by: INTERNAL MEDICINE

## 2023-08-02 PROCEDURE — 99232 SBSQ HOSP IP/OBS MODERATE 35: CPT | Performed by: INTERNAL MEDICINE

## 2023-08-02 PROCEDURE — 84100 ASSAY OF PHOSPHORUS: CPT | Performed by: INTERNAL MEDICINE

## 2023-08-02 PROCEDURE — 82948 REAGENT STRIP/BLOOD GLUCOSE: CPT

## 2023-08-02 PROCEDURE — 85025 COMPLETE CBC W/AUTO DIFF WBC: CPT | Performed by: INTERNAL MEDICINE

## 2023-08-02 PROCEDURE — 83735 ASSAY OF MAGNESIUM: CPT | Performed by: INTERNAL MEDICINE

## 2023-08-02 PROCEDURE — 83036 HEMOGLOBIN GLYCOSYLATED A1C: CPT | Performed by: INTERNAL MEDICINE

## 2023-08-02 PROCEDURE — 85610 PROTHROMBIN TIME: CPT | Performed by: INTERNAL MEDICINE

## 2023-08-02 RX ORDER — MAGNESIUM SULFATE HEPTAHYDRATE 40 MG/ML
2 INJECTION, SOLUTION INTRAVENOUS ONCE
Status: COMPLETED | OUTPATIENT
Start: 2023-08-02 | End: 2023-08-02

## 2023-08-02 RX ORDER — FUROSEMIDE 20 MG/1
20 TABLET ORAL ONCE
Status: COMPLETED | OUTPATIENT
Start: 2023-08-02 | End: 2023-08-02

## 2023-08-02 RX ORDER — POTASSIUM CHLORIDE 20 MEQ/1
40 TABLET, EXTENDED RELEASE ORAL 2 TIMES DAILY
Status: COMPLETED | OUTPATIENT
Start: 2023-08-02 | End: 2023-08-03

## 2023-08-02 RX ORDER — GLIMEPIRIDE 2 MG/1
1 TABLET ORAL
Status: DISCONTINUED | OUTPATIENT
Start: 2023-08-02 | End: 2023-08-03 | Stop reason: HOSPADM

## 2023-08-02 RX ORDER — METOPROLOL SUCCINATE 25 MG/1
25 TABLET, EXTENDED RELEASE ORAL DAILY
Status: DISCONTINUED | OUTPATIENT
Start: 2023-08-03 | End: 2023-08-03 | Stop reason: HOSPADM

## 2023-08-02 RX ORDER — INSULIN LISPRO 100 [IU]/ML
1-5 INJECTION, SOLUTION INTRAVENOUS; SUBCUTANEOUS
Status: DISCONTINUED | OUTPATIENT
Start: 2023-08-02 | End: 2023-08-03 | Stop reason: HOSPADM

## 2023-08-02 RX ADMIN — SODIUM CHLORIDE 100 ML/HR: 0.9 INJECTION, SOLUTION INTRAVENOUS at 04:46

## 2023-08-02 RX ADMIN — WARFARIN SODIUM 5 MG: 5 TABLET ORAL at 17:52

## 2023-08-02 RX ADMIN — BUSPIRONE HYDROCHLORIDE 7.5 MG: 5 TABLET ORAL at 17:51

## 2023-08-02 RX ADMIN — PREDNISONE 10 MG: 10 TABLET ORAL at 09:34

## 2023-08-02 RX ADMIN — INSULIN LISPRO 5 UNITS: 100 INJECTION, SOLUTION INTRAVENOUS; SUBCUTANEOUS at 18:29

## 2023-08-02 RX ADMIN — MAGNESIUM SULFATE HEPTAHYDRATE 2 G: 40 INJECTION, SOLUTION INTRAVENOUS at 09:31

## 2023-08-02 RX ADMIN — FUROSEMIDE 20 MG: 20 TABLET ORAL at 09:56

## 2023-08-02 RX ADMIN — GLIMEPIRIDE 1 MG: 2 TABLET ORAL at 18:30

## 2023-08-02 RX ADMIN — INSULIN LISPRO 2 UNITS: 100 INJECTION, SOLUTION INTRAVENOUS; SUBCUTANEOUS at 22:29

## 2023-08-02 RX ADMIN — BUSPIRONE HYDROCHLORIDE 7.5 MG: 5 TABLET ORAL at 09:31

## 2023-08-02 RX ADMIN — POTASSIUM CHLORIDE 40 MEQ: 1500 TABLET, EXTENDED RELEASE ORAL at 09:32

## 2023-08-02 RX ADMIN — METOPROLOL SUCCINATE 25 MG: 25 TABLET, EXTENDED RELEASE ORAL at 09:32

## 2023-08-02 RX ADMIN — POTASSIUM CHLORIDE 40 MEQ: 1500 TABLET, EXTENDED RELEASE ORAL at 17:51

## 2023-08-02 NOTE — ASSESSMENT & PLAN NOTE
Check C. difficile if patient has any further diarrhea.     Symptoms are improved, continue p.o. intake

## 2023-08-02 NOTE — ASSESSMENT & PLAN NOTE
Patient is on a prolonged oral prednisone taper, continue  10 mg daily, continue outpatient follow-up with pulmonary medicine

## 2023-08-02 NOTE — PLAN OF CARE
Problem: Potential for Falls  Goal: Patient will remain free of falls  Description: INTERVENTIONS:  - Educate patient/family on patient safety including physical limitations  - Instruct patient to call for assistance with activity   - Consult OT/PT to assist with strengthening/mobility   - Keep Call bell within reach  - Keep bed low and locked with side rails adjusted as appropriate  - Keep care items and personal belongings within reach  - Initiate and maintain comfort rounds  - Make Fall Risk Sign visible to staff  - Offer Toileting every two Hours, in advance of need  - Initiate/Maintain chair and bed alarm  - Apply yellow socks and bracelet for high fall risk patients  - Consider moving patient to room near nurses station  Outcome: Progressing     Problem: MOBILITY - ADULT  Goal: Maintain or return to baseline ADL function  Description: INTERVENTIONS:  -  Assess patient's ability to carry out ADLs; assess patient's baseline for ADL function and identify physical deficits which impact ability to perform ADLs (bathing, care of mouth/teeth, toileting, grooming, dressing, etc.)  - Assess/evaluate cause of self-care deficits   - Assess range of motion  - Assess patient's mobility; develop plan if impaired  - Assess patient's need for assistive devices and provide as appropriate  - Encourage maximum independence but intervene and supervise when necessary  - Involve family in performance of ADLs  - Assess for home care needs following discharge   - Consider OT consult to assist with ADL evaluation and planning for discharge  - Provide patient education as appropriate  Outcome: Progressing  Goal: Maintains/Returns to pre admission functional level  Description: INTERVENTIONS:  - Perform BMAT or MOVE assessment daily.   - Set and communicate daily mobility goal to care team and patient/family/caregiver. - Collaborate with rehabilitation services on mobility goals if consulted  - Reposition patient every two hours.   - Out of bed to chair with all meals   - Out of bed for toileting  - Record patient progress and toleration of activity level   Outcome: Progressing

## 2023-08-02 NOTE — MALNUTRITION/BMI
This medical record reflects one or more clinical indicators suggestive of malnutrition. Malnutrition Findings:   Adult Malnutrition type: Chronic illness     Malnutrition Characteristics: Muscle loss, Weight loss, Fluid accumulation                  360 Statement: Severe PCM related to significant wt loss over the last 3-6 months which may be related to decrease po intake as patient had PNA with muscle loss and abnormal gait. Treated with diet. Monitor for supplement needs. BMI Findings: Body mass index is 24.99 kg/m². See Nutrition note dated 8/2/2023 for additional details. Completed nutrition assessment is viewable in the nutrition documentation.

## 2023-08-02 NOTE — PROGRESS NOTES
[address]  Progress Note  Name: Christo Trujillo  MRN: [id number]  Unit/Bed#: 967-30 I Date of Admission: 2023   Date of Service: 2023 I Hospital Day: 1    Assessment/Plan   * Diarrhea of presumed infectious origin  Assessment & Plan  Check C. difficile if patient has any further diarrhea. Symptoms are improved, continue p.o. intake    Abnormality of gait  Assessment & Plan  Follow-up PT OT evaluation    Type 2 diabetes mellitus without complication, without long-term current use of insulin (HCC)  Assessment & Plan  Lab Results   Component Value Date    HGBA1C 10.2 (H) 2023       No results for input(s): "POCGLU" in the last 72 hours. Blood Sugar Average: Last 72 hrs:  Monitor Accu-Cheks before meals and at bedtime    Chronic diastolic congestive heart failure (HCC)  Assessment & Plan  Wt Readings from Last 3 Encounters:   23 85.9 kg (189 lb 6 oz)   23 86.2 kg (190 lb)   23 94.8 kg (209 lb)       Lower extremity edema worsening today, resume diuretic therapy    Persistent atrial fibrillation (HCC)  Assessment & Plan  Continue Coumadin, continue beta-blocker    Cryptogenic organizing pneumonia [facility])  Assessment & Plan  Patient is on a prolonged oral prednisone taper, continue  10 mg daily, continue outpatient follow-up with pulmonary medicine           Code Status: Level 1 - Full Code    Subjective:   No pain    Objective:     Vitals:   Temp (24hrs), Av.9 °F (36.6 °C), Min:97.6 °F (36.4 °C), Max:98.3 °F (36.8 °C)    Temp:  [97.6 °F (36.4 °C)-98.3 °F (36.8 °C)] 97.9 °F (36.6 °C)  HR:  [] 104  Resp:  [17-29] 17  BP: ()/(52-87) 97/52  SpO2:  [95 %-100 %] 99 %  Body mass index is 24.99 kg/m². Input and Output Summary (last 24 hours):      Intake/Output Summary (Last 24 hours) at 2023 1226  Last data filed at 2023 1724  Gross per 24 hour   Intake 1300 ml   Output --   Net 1300 ml       Physical Exam:   Physical Exam  Vitals and nursing note reviewed. HENT:      Head: Normocephalic and atraumatic. Right Ear: External ear normal.      Left Ear: External ear normal.   Cardiovascular:      Rate and Rhythm: Normal rate. Pulses: Normal pulses. Pulmonary:      Effort: Pulmonary effort is normal. No respiratory distress. Breath sounds: Normal breath sounds. No wheezing. Musculoskeletal:      Cervical back: Normal range of motion. Right lower leg: Edema present. Left lower leg: Edema present. Neurological:      Mental Status: He is alert. Mental status is at baseline. Psychiatric:         Mood and Affect: Mood normal.         Behavior: Behavior normal.         Thought Content: Thought content normal.         Judgment: Judgment normal.          Additional Data:     Labs:  Results from last 7 days   Lab Units 08/02/23  0500   WBC Thousand/uL 4.88   HEMOGLOBIN g/dL 8.7*   HEMATOCRIT % 27.1*   PLATELETS Thousands/uL 113*   NEUTROS PCT % 62   LYMPHS PCT % 31   MONOS PCT % 6   EOS PCT % 0     Results from last 7 days   Lab Units 08/02/23  0500   SODIUM mmol/L 141   POTASSIUM mmol/L 2.8*   CHLORIDE mmol/L 104   CO2 mmol/L 27   BUN mg/dL 18   CREATININE mg/dL 0.90   ANION GAP mmol/L 10   CALCIUM mg/dL 7.0*   ALBUMIN g/dL 3.0*   TOTAL BILIRUBIN mg/dL 0.89   ALK PHOS U/L 47   ALT U/L 60*   AST U/L 26   GLUCOSE RANDOM mg/dL 128     Results from last 7 days   Lab Units 08/02/23  0500   INR  1.66*             Results from last 7 days   Lab Units 08/01/23  1318 08/01/23  1109   LACTIC ACID mmol/L 1.8 2.9*   PROCALCITONIN ng/ml  --  0.13       Lines/Drains:  Invasive Devices     Peripheral Intravenous Line  Duration           Peripheral IV 08/01/23 Dorsal (posterior); Right Forearm <1 day                      Imaging: No pertinent imaging reviewed.     Recent Cultures (last 7 days):         Last 24 Hours Medication List:   Current Facility-Administered Medications   Medication Dose Route Frequency Provider Last Rate   • busPIRone 7.5 mg Oral BID Foster LOAIZA DO     • [START ON 8/3/2023] metoprolol succinate  25 mg Oral Daily Foster LOAIZA DO     • potassium chloride  40 mEq Oral BID Foster LOAIZA DO     • predniSONE  10 mg Oral Daily Foster LOAIZA DO     • warfarin  5 mg Oral Daily (warfarin) Severa Glee, DO          Today, Patient Was Seen By: Severa Glee, DO    **Please Note: This note may have been constructed using a voice recognition system. **

## 2023-08-02 NOTE — ASSESSMENT & PLAN NOTE
Wt Readings from Last 3 Encounters:   08/02/23 85.9 kg (189 lb 6 oz)   06/16/23 86.2 kg (190 lb)   05/08/23 94.8 kg (209 lb)       Lower extremity edema worsening today, resume diuretic therapy

## 2023-08-02 NOTE — CASE MANAGEMENT
Case Management Assessment & Discharge Planning Note    Patient name Emma Senior  Location /[id number] MRN [id number]  : 1935 Date 2023       Current Admission Date: 2023  Current Admission Diagnosis:Diarrhea of presumed infectious origin   Patient Active Problem List    Diagnosis Date Noted   • Plantar fascial fibromatosis 2023   • Diarrhea of presumed infectious origin 2023   • Cryptogenic organizing pneumonia ([address]) 2023   • Type 2 diabetes mellitus without complication, without long-term current use of insulin ([address]) 2023   • Peripheral arterial disease ([address]) 2023   • 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   • Malignant neoplasm of prostate ([address]) 2021   • Chronic diastolic congestive heart failure ([address]) 2020   • Chronic venous insufficiency 2019   • Long term (current) use of anticoagulants 2018   • Persistent atrial fibrillation ([address]) 2018   • Dermatofibroma 2018   • Excessive anticoagulation 2018   • Exostosis 2017   • Olecranon bursitis 2017   • FH: colon cancer 2017   • Family history of colonic polyps 2017   • History of colonic polyps 2017   • Abnormality of gait 2016   • Idiopathic peripheral neuropathy 2016   • Paresthesias 2016      LOS (days): 1  Geometric Mean LOS (GMLOS) (days): 2.60  Days to GMLOS:1.7     OBJECTIVE:    Risk of Unplanned Readmission Score: 24.46         Current admission status: Inpatient       Preferred Pharmacy:   [address] [place] [address]  [place] [address]  Phone: [phone] Fax: [phone]  - [place], [facility] [address]  Phone: [phone] Fax: [phone]    Primary Care Provider: Jona Diaz DO    Primary Insurance: [facility]  Secondary Insurance: ASSESSMENT:  Active Health Care Proxies     [facility] Representative - Spouse   Primary Phone: [phone] (Mobile)  Home Phone: [phone]               Advance Directives  Does patient have a [address]?: No  Was patient offered paperwork?: Yes (declines)  Does patient currently have a Health Care decision maker?: Yes, please see Health Care Proxy section  Does patient have Advance Directives?: No  Was patient offered paperwork?: Yes (declines)  Primary Contact: Julio Torrez (Spouse)     [phone]         Readmission Root Cause  30 Day Readmission: No    Patient Information  Admitted from[de-identified] Home  Mental Status: Alert  During Assessment patient was accompanied by: Not accompanied during assessment  Assessment information provided by[de-identified] Patient  Primary Caregiver: Self  Support Systems: Spouse/significant other  [facility]: [address] [place] [address]. do you live in?: [address] [place] [address] entry access options.  Select all that apply.: Ramp  Type of Current Residence: 2 story home  Upon entering residence, is there a bedroom on the main floor (no further steps)?: No  A bedroom is located on the following floor levels of residence (select all that apply):: 2nd Floor  Upon entering residence, is there a bathroom on the main floor (no further steps)?: Yes (1st and 2nd floor)  Number of steps to 2nd floor from main floor:  (stairglide)  In the last 12 months, was there a time when you were not able to pay the mortgage or rent on time?: No  In the last 12 months, how many places have you lived?: 1  In the last 12 months, was there a time when you did not have a steady place to sleep or slept in a shelter (including now)?: No  Homeless/housing insecurity resource given?: N/A  Living Arrangements: Lives w/ Spouse/significant other  Is patient a ?: Yes  Is patient active with [facility])?: No    Activities of Daily Living Prior to Admission  Functional Status: Independent  Completes ADLs independently?: Yes  Ambulates independently?: Yes  Does patient use assisted devices?: Yes  Assisted Devices (DME) used: Sabrina Odor, Other (Comment) (Berenda Fat)  Does patient currently own DME?: Yes  What DME does the patient currently own?: Straight Elvera Jacks, Wheelchair, Other (Comment) (Berenda Fat)  Does patient have a history of Outpatient Therapy (PT/OT)?: No  Does the patient have a history of Short-Term Rehab?: No  Does patient have a history of HHC?: Yes  Does patient currently have [address]?: Yes    Current Home Health Care  Type of Current Home Care Services: Nurse visit, [address] [facility][de-identified] [facility] Provider[de-identified] PCP    Patient Information Continued  Income Source: Pension/snf  Does patient have prescription coverage?: Yes  Within the past 12 months, you worried that your food would run out before you got the money to buy more.: Never true  Within the past 12 months, the food you bought just didn't last and you didn't have money to get more.: Never true  Food insecurity resource given?: N/A  Does patient receive dialysis treatments?: No  Does patient have a history of substance abuse?: No  Does patient have a history of Mental Health Diagnosis?: No         Means of Transportation  [facility]de[facility]] Family transport (wife)  In the past 12 months, has lack of transportation kept you from medical appointments or from getting medications?: No  In the past 12 months, has lack of transportation kept you from meetings, work, or from getting things needed for daily living?: No  Was application for public transport provided?: N/A        DISCHARGE DETAILS:    Discharge planning discussed with[de-identified] patient  Freedom of Choice: Yes  Comments - Freedom of Choice: resumption [facility] after dc  CM contacted family/caregiver?: No- see comments (declines)  Were Treatment Team discharge recommendations reviewed with patient/caregiver?: Yes  Did patient/caregiver verbalize understanding of patient care needs?: Yes  Were patient/caregiver advised of the risks associated with not following Treatment Team discharge recommendations?: Yes         Requested [address] [facility]         Is the patient interested in [facility] at discharge?: Yes  [facility] requested[de-identified] Nursing, Physical [address] Name[de-identified] [facility] External Referral Reason (only applicable if external A name selected): Patient has established relationship with provider  [facility][de-identified] PCP  Home Health Services Needed[de-identified] Gait/ADL Training, Strengthening/Theraputic Exercises to Improve Function, Evaluate Functional Status and Safety, Diabetes Management, Heart Failure Management  Homebound Criteria Met[de-identified] Requires the Assistance of Another Person for Safe Ambulation or to Leave the Home, Uses an Assist Device (i.e. cane, walker, etc)  Supporting Clincal Findings[de-identified] Limited Endurance, Fatigues Easliy in [employer]    DME Referral Provided  Referral made for DME?: No         Would you like to participate in our [address] service program?  : No - Declined       Discharge Destination Plan[de-identified] Home with [address] [facility] ([facility])  Transport at Discharge : Family (wife)

## 2023-08-02 NOTE — UTILIZATION REVIEW
Initial Clinical Review    Admission: Date/Time/Statement:   Admission Orders (From admission, onward)     Ordered        08/01/23 1354  INPATIENT ADMISSION  Once                      Orders Placed This Encounter   Procedures   • INPATIENT ADMISSION     Standing Status:   Standing     Number of Occurrences:   1     Order Specific Question:   Level of Care     Answer:   Med Surg [16]     Order Specific Question:   Estimated length of stay     Answer:   More than 2 Midnights     Order Specific Question:   Certification     Answer:   I certify that inpatient services are medically necessary for this patient for a duration of greater than two midnights. See H&P and MD Progress Notes for additional information about the patient's course of treatment. ED Arrival Information     Expected   8/1/2023     Arrival   8/1/2023 10:43    Acuity   Urgent            Means of arrival   Ambulance    Escorted by   [facility]    Service   Hospitalist    Admission type   Emergency            Arrival complaint   Weakness           Chief Complaint   Patient presents with   • Weakness - Generalized     Pt brought in by EMS for generalized weakness and diarrhea x4 days. Initial Presentation: 80 y.o. male presents to ed from home for evaluation and treatment of weakness and diarrhea x 4 days. Patient reports dizziness when rising from toilet. PMHX:  DM, CAD, PROSTATE CA, DVT, AFIB, CHF, COPD. Clinical assessment significant for orthostatic hypotension, BLLE edema. LA 2.8, MAG 0.8, LILLI 7.4, K 3.2, BUN 26, T BILI 1.22. Imaging shows cardiomegaly. Ekg shows Afib and nonspecific T wave abnormality. Initially treated with iv multi-electrolyte bolus, iv mag x1, Kdur 40 meq po, iv protonix. Admit to inpatient med surg for diarrhea presumed infectious etiology. Date: 8-2-23  Day 2: inpatient med surg    Follow up stool for C diff. Hb decreased 10.3 to 8.7. Received iv mag x1.   Start prednisone 10 mg po daily and po lasix x1. K 2.8 repleted with po K-dur. Obtain PT/OT evaluations. Trend daily wt and intake / output. Monitor blood pressure.     Date: 8-3-23   Day 3: Has surpassed a 2nd midnight with active treatments and services, which include replete electrolytes with iv k phos, iv mag, trend labs, monitor vital signs    ED Triage Vitals   08/01/23 1046 08/01/23 1046 08/01/23 1046 08/01/23 1046 08/01/23 1046   98.3 °F (36.8 °C) 99 18 103/60 98 %      Temporal Monitor         No Pain          08/02/23 85.9 kg (189 lb 6 oz)     Additional Vital Signs:     Date/Time Temp Pulse Resp BP MAP (mmHg) SpO2 O2 Device Patient Position - Orthostatic VS   08/02/23 09:46:12 -- 104 -- 97/52 67 99 % --    08/02/23 07:02:21 97.9 °F (36.6 °C) 105 17 98/60 73 100 % --    08/01/23 22:15:03 98.3 °F (36.8 °C) 80 20 114/54 74 100 % None (Room air)    08/01/23 2117 -- -- -- -- -- 98 % None (Room air)    08/01/23 15:54:15 97.6 °F (36.4 °C) 93 18 104/56 72 97 % None (Room air)    08/01/23 1300 -- 109   Abnormal  29   Abnormal  121/87 99 95 % None (Room air)    08/01/23 1234 -- 93 -- 140/62 89 98 % --    08/01/23 1136  123   Abnormal   88/55   Abnormal     Standing - Orthostatic VS   08/01/23 1133  108   Abnormal   93/55    Sitting - Orthostatic VS   08/01/23 1131  84  103/63    Lying - Orthostatic VS   08/01/23 1046 98.3 °F (36.8 °C) 99 18 103/60 77 98 % None (Room air)        Pertinent Labs/Diagnostic Test Results:         Date/Time: 8/1/2023 11:07 AM        Indications / Diagnosis:  Weakness    Patient location:  ED  Previous ECG:     Comparison to cardiac monitor: Yes    Interpretation:     Interpretation: abnormal    Rate:     ECG rate:  88    ECG rate assessment: normal    Rhythm:     Rhythm: atrial fibrillation    Ectopy:     Ectopy: PVCs      PVCs:  Unifocal  QRS:     QRS axis:  Normal  Conduction:     Conduction: normal    ST segments:     ST segments:  Normal  T waves:     T waves: non-specific    Comments:      DC *, QRS 84, QT/QTc 342/413    CT chest abdomen pelvis w contrast   Final  (08/01 1335)      No acute intra-abdominal process      Cardiomegaly with left atrial enlargement      Secretions in the thoracic esophagus, representing gastroesophageal reflux. This puts the patient at increased risk for aspiration pneumonitis. Punctate right upper renal pole nonobstructing calculus. XR chest 1 view portable   Final  (08/01 1455)      No acute cardiopulmonary disease.         Results from last 7 days   Lab Units 08/01/23  1109   SARS-COV-2  Negative     Results from last 7 days   Lab Units 08/02/23  0500 08/01/23  1109   WBC Thousand/uL 4.88 6.38   HEMOGLOBIN g/dL 8.7* 10.3*   HEMATOCRIT % 27.1* 32.1*   PLATELETS Thousands/uL 113* 143*   NEUTROS ABS Thousands/µL 2.97 3.65         Results from last 7 days   Lab Units 08/02/23  0500 08/01/23  1109   SODIUM mmol/L 141 140   POTASSIUM mmol/L 2.8* 3.2*   CHLORIDE mmol/L 104 100   CO2 mmol/L 27 26   ANION GAP mmol/L 10 14   BUN mg/dL 18 26*   CREATININE mg/dL 0.90 1.06   EGFR ml/min/1.73sq m 76 62   CALCIUM mg/dL 7.0* 7.4*   MAGNESIUM mg/dL 1.7* 0.8*   PHOSPHORUS mg/dL 2.2*  --      Results from last 7 days   Lab Units 08/02/23  0500 08/01/23  1109   AST U/L 26 25   ALT U/L 60* 71*   ALK PHOS U/L 47 54   TOTAL PROTEIN g/dL 5.0* 5.7*   ALBUMIN g/dL 3.0* 3.6   TOTAL BILIRUBIN mg/dL 0.89 1.22*         Results from last 7 days   Lab Units 08/02/23  0500 08/01/23  1109   GLUCOSE RANDOM mg/dL 128 180*       Results from last 7 days   Lab Units 08/01/23  1623 08/01/23  1318 08/01/23  1109   HS TNI 0HR ng/L  --   --  23   HS TNI 2HR ng/L  --  19  --    HSTNI D2 ng/L  --  -4  --    HS TNI 4HR ng/L 18  --   --    HSTNI D4 ng/L -5  --   --          Results from last 7 days   Lab Units 08/02/23  0500 08/01/23  1109   PROTIME seconds 19.8* 17.4*   INR  1.66* 1.40*   PTT seconds  --  28     Results from last 7 days   Lab Units 08/01/23  1623 08/01/23  1109   TSH 3RD GENERATON uIU/mL 1.858 4.064 Results from last 7 days   Lab Units 08/01/23  1109   PROCALCITONIN ng/ml 0.13     Results from last 7 days   Lab Units 08/01/23  1318 08/01/23  1109   LACTIC ACID mmol/L 1.8 2.9*       Results from last 7 days   Lab Units 08/01/23  1109   BNP pg/mL 262*       Results from last 7 days   Lab Units 08/01/23  1252   CLARITY UA  Clear   COLOR UA  Yellow   SPEC GRAV UA  1.010   PH UA  5.0   GLUCOSE UA mg/dl 100 (1/10%)*   KETONES UA mg/dl Negative   BLOOD UA  Negative   PROTEIN UA mg/dl Negative   NITRITE UA  Negative   BILIRUBIN UA  Negative   UROBILINOGEN UA E.U./dl 0.2   LEUKOCYTES UA  Negative     Results from last 7 days   Lab Units 08/01/23  1109   INFLUENZA A PCR  Negative   INFLUENZA B PCR  Negative   RSV PCR  Negative       ED Treatment:   Medication Administration from 08/01/2023 1043 to 08/01/2023 1546       Date/Time Order Dose Route Action     08/01/2023 1126 EDT multi-electrolyte (ISOLYTE-S PH 7.4) bolus 1,000 mL 1,000 mL Intravenous New Bag     08/01/2023 1423 EDT magnesium sulfate 2 g/50 mL IVPB (premix) 2 g 2 g Intravenous New Bag     08/01/2023 1233 EDT potassium chloride (K-DUR,KLOR-CON) CR tablet 40 mEq 40 mEq Oral Given     08/01/2023 1236 EDT multi-electrolyte (ISOLYTE-S PH 7.4) bolus 1,000 mL 1,000 mL Intravenous New Bag     08/01/2023 1417 EDT pantoprazole (PROTONIX) injection 40 mg 40 mg Intravenous Given        Past Medical History:   Diagnosis Date   • Anxiety    • Atrial fibrillation (HCC)    • Atrial flutter (HCC)    • Congestive heart failure (CHF) (HCC)    • COPD (chronic obstructive pulmonary disease) ([address])    • Coronary artery disease    • DVT (deep venous thrombosis) ([address])    • ED (erectile dysfunction)    • Hearing loss    • History of echocardiogram 04/05/2018    EF 0.55, Mild LVH. Mild moderate mitral regurg. Trace aortic regurg.    • Hx of radiation therapy     XRT   • Hypercholesterolemia    • Hyperlipidemia    • Hypertension    • Long term (current) use of anticoagulants 8/21/2018   • Neuropathy of both feet    • Peripheral neuropathy    • Prostate cancer ([address])    • Type 2 diabetes mellitus ([address])      Present on Admission:    • Persistent atrial fibrillation (HCC)  • Chronic diastolic congestive heart failure (HCC)  • Type 2 diabetes mellitus without complication, without long-term current use of insulin ([address])  • Cryptogenic organizing pneumonia (HCC)  • Abnormality of gait  • Diarrhea of presumed infectious origin      Admitting Diagnosis:     Orthostatic hypotension [I95.1]  Esophageal reflux [K21.9]  Diarrhea [R19.7]  Hypokalemia [E87.6]  Hypomagnesemia [E83.42]  Weakness [R53.1]  Anemia [D64.9]  Chronic a-fib (HCC) [I48.20]  Subtherapeutic international normalized ratio (INR) [R79.1]  Generalized weakness [R53.1]  Elevated lactic acid level [R79.89]  Hyperglycemia due to diabetes mellitus ([address]) [E11.65]    Age/Sex: 80 y.o. male    Scheduled Medications:  busPIRone, 7.5 mg, Oral, BID  [START ON 8/3/2023] metoprolol succinate, 25 mg, Oral, Daily  potassium chloride, 40 mEq, Oral, BID  predniSONE, 10 mg, Oral, Daily  warfarin, 5 mg, Oral, Daily (warfarin)      Continuous IV Infusions:     PRN Meds:       IP CONSULT TO NUTRITION SERVICES    Network Utilization Review Department  ATTENTION: Please call with any questions or concerns to [phone] and carefully listen to the prompts so that you are directed to the right person. All voicemails are confidential.  Jackelyn Garcia all requests for admission clinical reviews, approved or denied determinations and any other requests to dedicated fax number below belonging to the campus where the patient is receiving treatment.  List of dedicated fax numbers for the Facilities:  [place] DENIALS (Administrative/Medical Necessity) [phone] [address] (Maternity/NICU/Pediatrics) [phone]   [address] [place] [address] Drive [phone]   [facility] [phone]   [facility] [address]  [phone]   [address] [facility] [address] [address] [facility] [address] [facility] [address] [place] [address] Nn [phone]

## 2023-08-02 NOTE — PLAN OF CARE
Problem: Potential for Falls  Goal: Patient will remain free of falls  Description: INTERVENTIONS:  - Educate patient/family on patient safety including physical limitations  - Instruct patient to call for assistance with activity   - Consult OT/PT to assist with strengthening/mobility   - Keep Call bell within reach  - Keep bed low and locked with side rails adjusted as appropriate  - Keep care items and personal belongings within reach  - Initiate and maintain comfort rounds  - Make Fall Risk Sign visible to staff  - Offer Toileting every 2 Hours, in advance of need  - Initiate/Maintain bed/chair alarm  - Obtain necessary fall risk management equipment: call bell  - Apply yellow socks and bracelet for high fall risk patients  - Consider moving patient to room near nurses station  Outcome: Progressing     Problem: MOBILITY - ADULT  Goal: Maintain or return to baseline ADL function  Description: INTERVENTIONS:  -  Assess patient's ability to carry out ADLs; assess patient's baseline for ADL function and identify physical deficits which impact ability to perform ADLs (bathing, care of mouth/teeth, toileting, grooming, dressing, etc.)  - Assess/evaluate cause of self-care deficits   - Assess range of motion  - Assess patient's mobility; develop plan if impaired  - Assess patient's need for assistive devices and provide as appropriate  - Encourage maximum independence but intervene and supervise when necessary  - Involve family in performance of ADLs  - Assess for home care needs following discharge   - Consider OT consult to assist with ADL evaluation and planning for discharge  - Provide patient education as appropriate  Outcome: Progressing  Goal: Maintains/Returns to pre admission functional level  Description: INTERVENTIONS:  - Perform BMAT or MOVE assessment daily.   - Set and communicate daily mobility goal to care team and patient/family/caregiver.    - Collaborate with rehabilitation services on mobility goals if consulted  - Perform Range of Motion 3 times a day. - Reposition patient every 2 hours.   - Dangle patient 3 times a day  - Stand patient 3 times a day  - Ambulate patient 3 times a day  - Out of bed to chair 3 times a day   - Out of bed for meals 3 times a day  - Out of bed for toileting  - Record patient progress and toleration of activity level   Outcome: Progressing

## 2023-08-03 VITALS
BODY MASS INDEX: 25.13 KG/M2 | DIASTOLIC BLOOD PRESSURE: 59 MMHG | RESPIRATION RATE: 20 BRPM | OXYGEN SATURATION: 99 % | WEIGHT: 189.6 LBS | HEIGHT: 73 IN | TEMPERATURE: 97.9 F | HEART RATE: 89 BPM | SYSTOLIC BLOOD PRESSURE: 101 MMHG

## 2023-08-03 LAB
ALBUMIN SERPL BCP-MCNC: 2.7 G/DL (ref 3.5–5)
ALP SERPL-CCNC: 42 U/L (ref 34–104)
ALT SERPL W P-5'-P-CCNC: 50 U/L (ref 7–52)
ANION GAP SERPL CALCULATED.3IONS-SCNC: 8 MMOL/L
AST SERPL W P-5'-P-CCNC: 20 U/L (ref 13–39)
BASOPHILS # BLD AUTO: 0.01 THOUSANDS/ÂΜL (ref 0–0.1)
BASOPHILS NFR BLD AUTO: 0 % (ref 0–1)
BILIRUB SERPL-MCNC: 0.72 MG/DL (ref 0.2–1)
BUN SERPL-MCNC: 18 MG/DL (ref 5–25)
CALCIUM ALBUM COR SERPL-MCNC: 7.7 MG/DL (ref 8.3–10.1)
CALCIUM SERPL-MCNC: 6.7 MG/DL (ref 8.4–10.2)
CHLORIDE SERPL-SCNC: 107 MMOL/L (ref 96–108)
CO2 SERPL-SCNC: 24 MMOL/L (ref 21–32)
CREAT SERPL-MCNC: 0.75 MG/DL (ref 0.6–1.3)
EOSINOPHIL # BLD AUTO: 0.01 THOUSAND/ÂΜL (ref 0–0.61)
EOSINOPHIL NFR BLD AUTO: 0 % (ref 0–6)
ERYTHROCYTE [DISTWIDTH] IN BLOOD BY AUTOMATED COUNT: 23.4 % (ref 11.6–15.1)
GFR SERPL CREATININE-BSD FRML MDRD: 82 ML/MIN/1.73SQ M
GLUCOSE SERPL-MCNC: 100 MG/DL (ref 65–140)
GLUCOSE SERPL-MCNC: 207 MG/DL (ref 65–140)
GLUCOSE SERPL-MCNC: 78 MG/DL (ref 65–140)
HCT VFR BLD AUTO: 24 % (ref 36.5–49.3)
HCT VFR BLD AUTO: 26.5 % (ref 36.5–49.3)
HGB BLD-MCNC: 7.8 G/DL (ref 12–17)
HGB BLD-MCNC: 8.5 G/DL (ref 12–17)
IMM GRANULOCYTES # BLD AUTO: 0.05 THOUSAND/UL (ref 0–0.2)
IMM GRANULOCYTES NFR BLD AUTO: 1 % (ref 0–2)
INR PPP: 2.01 (ref 0.84–1.19)
LYMPHOCYTES # BLD AUTO: 1.2 THOUSANDS/ÂΜL (ref 0.6–4.47)
LYMPHOCYTES NFR BLD AUTO: 28 % (ref 14–44)
MAGNESIUM SERPL-MCNC: 1.8 MG/DL (ref 1.9–2.7)
MCH RBC QN AUTO: 29.5 PG (ref 26.8–34.3)
MCHC RBC AUTO-ENTMCNC: 32.5 G/DL (ref 31.4–37.4)
MCV RBC AUTO: 91 FL (ref 82–98)
MONOCYTES # BLD AUTO: 0.25 THOUSAND/ÂΜL (ref 0.17–1.22)
MONOCYTES NFR BLD AUTO: 6 % (ref 4–12)
NEUTROPHILS # BLD AUTO: 2.84 THOUSANDS/ÂΜL (ref 1.85–7.62)
NEUTS SEG NFR BLD AUTO: 65 % (ref 43–75)
NRBC BLD AUTO-RTO: 1 /100 WBCS
PHOSPHATE SERPL-MCNC: 2 MG/DL (ref 2.3–4.1)
PLATELET # BLD AUTO: 93 THOUSANDS/UL (ref 149–390)
PMV BLD AUTO: 9.8 FL (ref 8.9–12.7)
POTASSIUM SERPL-SCNC: 3.2 MMOL/L (ref 3.5–5.3)
PROT SERPL-MCNC: 4.6 G/DL (ref 6.4–8.4)
PROTHROMBIN TIME: 23 SECONDS (ref 11.6–14.5)
RBC # BLD AUTO: 2.64 MILLION/UL (ref 3.88–5.62)
SODIUM SERPL-SCNC: 139 MMOL/L (ref 135–147)
WBC # BLD AUTO: 4.36 THOUSAND/UL (ref 4.31–10.16)

## 2023-08-03 PROCEDURE — 80053 COMPREHEN METABOLIC PANEL: CPT | Performed by: INTERNAL MEDICINE

## 2023-08-03 PROCEDURE — 97163 PT EVAL HIGH COMPLEX 45 MIN: CPT

## 2023-08-03 PROCEDURE — 82948 REAGENT STRIP/BLOOD GLUCOSE: CPT

## 2023-08-03 PROCEDURE — 85018 HEMOGLOBIN: CPT | Performed by: INTERNAL MEDICINE

## 2023-08-03 PROCEDURE — 83735 ASSAY OF MAGNESIUM: CPT | Performed by: INTERNAL MEDICINE

## 2023-08-03 PROCEDURE — 85014 HEMATOCRIT: CPT | Performed by: INTERNAL MEDICINE

## 2023-08-03 PROCEDURE — 99239 HOSP IP/OBS DSCHRG MGMT >30: CPT | Performed by: INTERNAL MEDICINE

## 2023-08-03 PROCEDURE — 97167 OT EVAL HIGH COMPLEX 60 MIN: CPT

## 2023-08-03 PROCEDURE — 85025 COMPLETE CBC W/AUTO DIFF WBC: CPT | Performed by: INTERNAL MEDICINE

## 2023-08-03 PROCEDURE — 85610 PROTHROMBIN TIME: CPT | Performed by: INTERNAL MEDICINE

## 2023-08-03 PROCEDURE — 84100 ASSAY OF PHOSPHORUS: CPT | Performed by: INTERNAL MEDICINE

## 2023-08-03 RX ORDER — MAGNESIUM SULFATE HEPTAHYDRATE 40 MG/ML
2 INJECTION, SOLUTION INTRAVENOUS ONCE
Status: COMPLETED | OUTPATIENT
Start: 2023-08-03 | End: 2023-08-03

## 2023-08-03 RX ORDER — WARFARIN SODIUM 2 MG/1
2 TABLET ORAL
Status: DISCONTINUED | OUTPATIENT
Start: 2023-08-03 | End: 2023-08-03 | Stop reason: HOSPADM

## 2023-08-03 RX ADMIN — POTASSIUM CHLORIDE 40 MEQ: 1500 TABLET, EXTENDED RELEASE ORAL at 08:08

## 2023-08-03 RX ADMIN — INSULIN LISPRO 1 UNITS: 100 INJECTION, SOLUTION INTRAVENOUS; SUBCUTANEOUS at 11:41

## 2023-08-03 RX ADMIN — METOPROLOL SUCCINATE 25 MG: 25 TABLET, EXTENDED RELEASE ORAL at 08:08

## 2023-08-03 RX ADMIN — PREDNISONE 10 MG: 10 TABLET ORAL at 08:08

## 2023-08-03 RX ADMIN — POTASSIUM PHOSPHATE, MONOBASIC AND POTASSIUM PHOSPHATE, DIBASIC 21 MMOL: 224; 236 INJECTION, SOLUTION, CONCENTRATE INTRAVENOUS at 11:17

## 2023-08-03 RX ADMIN — BUSPIRONE HYDROCHLORIDE 7.5 MG: 5 TABLET ORAL at 08:09

## 2023-08-03 RX ADMIN — MAGNESIUM SULFATE HEPTAHYDRATE 2 G: 40 INJECTION, SOLUTION INTRAVENOUS at 08:49

## 2023-08-03 RX ADMIN — GLIMEPIRIDE 1 MG: 2 TABLET ORAL at 08:09

## 2023-08-03 NOTE — NURSING NOTE
Pt dc to home. Dc instructions given and reviewed with wife and pt. Verbalizes understanding. Pt ;left via wheelchair with wife and pca.

## 2023-08-03 NOTE — PHYSICAL THERAPY NOTE
Physical Therapy Evaluation    Patient Name: Wade     VZEBU'L Date: 8/3/2023     Problem List  Principal Problem:    Diarrhea of presumed infectious origin  Active Problems:    Persistent atrial fibrillation (HCC)    Chronic diastolic congestive heart failure ([address])    Type 2 diabetes mellitus without complication, without long-term current use of insulin ([address])    Cryptogenic organizing pneumonia ([address])    Abnormality of gait       Past Medical History  Past Medical History:   Diagnosis Date    Anxiety     Atrial fibrillation ([address])     Atrial flutter ([address])     Congestive heart failure (CHF) (HCC)     COPD (chronic obstructive pulmonary disease) ([address])     Coronary artery disease     DVT (deep venous thrombosis) ([facility])     ED (erectile dysfunction)     Hearing loss     History of echocardiogram 04/05/2018    EF 0.55, Mild LVH. Mild moderate mitral regurg. Trace aortic regurg. Hx of radiation therapy     XRT    Hypercholesterolemia     Hyperlipidemia     Hypertension     Long term (current) use of anticoagulants 8/21/2018    Neuropathy of both feet     Peripheral neuropathy     Prostate cancer (HCC)     Type 2 diabetes mellitus [facility])         Past Surgical History  Past Surgical History:   Procedure Laterality Date    CARDIAC CATHETERIZATION  01/15/1988    Left main: unobstructed. Posterolateral branch 90% narrowed. COLONOSCOPY  10/05/2017    Dr. Birdie Eagle             08/03/23 0850   PT Last Visit   PT Visit Date 08/03/23   Note Type   Note type Evaluation   Pain Assessment   Pain Assessment Tool 0-10   Pain Score No Pain   Restrictions/Precautions   Weight Bearing Precautions Per Order No   Other Precautions Fall Risk;Bed Alarm; Chair Alarm   Home Living   Type of [address] [place] [address]  Two level;1/2 bath on main level;Bed/bath upstairs; Ramped entrance  (stairglide to 2nd floor)   Bathroom Shower/Tub Walk-in shower   Bathroom Toilet Standard   Bathroom Equipment Grab bars in shower; Shower chair; Toilet raiser   [address] Walker;Cane;Wheelchair-manual;Electric scooter; Other (Comment)  (pt has both RW and SW)   Additional Comments pt reports use of RW at baseline   Prior Function   Level of [place] Independent with functional mobility; Needs assistance with ADLs; Needs assistance with IADLS   Lives With Spouse   Receives Help From Family;Home health   IADLs Family/Friend/Other provides transportation   Falls in the last 6 months 0   Vocational Retired   General   Family/Caregiver Present Yes   Cognition   Overall Cognitive Status WFL   Arousal/Participation Alert   Orientation Level Oriented X4   Following Commands Follows all commands and directions without difficulty   Subjective   Subjective "We've been together 68 years"   RLE Assessment   RLE Assessment WFL  (4/5 grossly)   LLE Assessment   LLE Assessment WFL  (4/5 grossly)   Bed Mobility   Supine to Sit 5  Supervision   Additional items Increased time required;Verbal cues   Sit to Supine   (pt OOB at end of session)   Transfers   Sit to Stand 4  Minimal assistance   Additional items Assist x 2; Increased time required;Verbal cues   Stand to Sit 4  Minimal assistance   Additional items Assist x 2; Increased time required;Verbal cues   Additional Comments RW used   Ambulation/Elevation   Gait pattern Short stride; Foward flexed;Decreased foot clearance;Narrow LAYA   Gait Assistance 5  Supervision   Additional items Verbal cues   Assistive Device Rolling walker   Distance 120'   Balance   Static Sitting Good   Dynamic Sitting Fair +   Static Standing Fair   Dynamic Standing [address] [facility] -  (with RW)   Endurance Deficit   Endurance Deficit Yes   Endurance Deficit Description pt fatigued with increased ambulation   Activity Tolerance   Activity Tolerance Patient limited by fatigue   Assessment   Prognosis Good   Problem List Decreased strength;Decreased endurance; Impaired balance;Decreased mobility   Assessment Patient is a 80 y.o. male evaluated by Physical Therapy s/p admit to [address] [place] [address] on 8/1/2023 with admitting diagnosis of: Orthostatic hypotension, Esophageal reflux, Diarrhea, Hypokalemia, Hypomagnesemia, Weakness, Anemia, Chronic a-fib, Subtherapeutic international normalized ratio, Generalized weakness, Elevated lactic acid level, Hyperglycemia due to diabetes mellitus, and principal problem of: Diarrhea of presumed infectious origin. PT was consulted to assess patient's functional mobility and discharge needs. Ordered are PT Evaluation and treatment with activity level of: up and OOB as tolerated. Comorbidities affecting patient's physical performance at time of assessment include: a-fib, CHF, HLD, DM, peripheral neuropathy, CAD, a-flutter, hx of cancer, COPD, HTN, hypercholesterolemia. Personal factors affecting the patient at time of IE include: lives in 2 story home, ambulating with assistive device, inability to navigate community distances, inability/difficulty performing IADLs and inability/difficulty performing ADLs. Please locate objective findings from PT assessment regarding body systems outlined above. Upon evaluation, pt able to perform all functional mobility with SUP-Anil, RW, and increased time. Occasional verbal cuing provided for safety awareness and sequencing. Seated rest break taken following 120' of ambulation d/t fatigue. Mild postural sway demonstrated with mobility but no true LOB experienced. HR and SpO2 remained WFL on RA throughout. The patient's AM-PAC Basic Mobility Inpatient Short Form Raw Score is 16. A Raw score of less than or equal to 16 suggests the patient may benefit from discharge to post-acute rehabilitation services. Please also refer to the recommendation of the Physical Therapist for safe discharge planning.  Co treatment with OT secondary to complex medical condition of pt, possible A of 2 required to achieve and maintain transitional movements, requiring the need of skilled therapeutic intervention of 2 therapists to achieve delivery of services. Pt will benefit from continued PT intervention during LOS to address current deficits, increase LOF, and facilitate safe d/c to next level of care when medically appropriate. D/c recommendation at this time is home with continued home health rehabilitation. Goals   Patient Goals to go home   LTG Expiration Date 08/17/23   Long Term Goal #1 Pt will participate in B LE strengthening exercises to facilitate improved functional activity tolerance. Pt will perform all functional transfers and bed mobility mod(I) with good safety awareness. Pt will ambulate 250' mod(I) with LRAD while maintaining good functional dynamic balance. Plan   Treatment/Interventions Functional transfer training;LE strengthening/ROM; Therapeutic exercise; Endurance training;Bed mobility;Gait training   PT Frequency 3-5x/wk   Recommendation   PT Discharge Recommendation Home with home health rehabilitation   AM-PAC Basic Mobility Inpatient   Turning in Flat Bed Without Bedrails 3   Lying on Back to Sitting on Edge of Flat Bed Without Bedrails 3   Moving Bed to Chair 3   Standing Up From Chair Using Arms 2   Walk in Room 3   Climb 3-5 Stairs With Railing 2   Basic Mobility Inpatient Raw Score 16   Basic Mobility Standardized Score 38.32   Highest Level Of Mobility   JH-HLM Goal 5: Stand one or more mins   JH-HLM Achieved 7: Walk 25 feet or more   End of Consult   Patient Position at End of Consult Bedside chair;Bed/Chair alarm activated; All needs within reach

## 2023-08-03 NOTE — PLAN OF CARE
Problem: OCCUPATIONAL THERAPY ADULT  Goal: Performs self-care activities at highest level of function for planned discharge setting. See evaluation for individualized goals. Description: Treatment Interventions: ADL retraining, Functional transfer training, UE strengthening/ROM, Endurance training, Patient/family training, Activityengagement          See flowsheet documentation for full assessment, interventions and recommendations. Note: Limitation: Decreased ADL status, Decreased UE strength, Decreased Safe judgement during ADL, Decreased endurance, Decreased self-care trans, Decreased high-level ADLs     Assessment: Pt is a 80 y.o. male seen for OT evaluation s/p admit to [facility] on 8/1/2023 w/ Diarrhea of presumed infectious origin. Comorbidities affecting pt's functional performance at time of assessment include: a-fib, HLD, CHF, DM, prostate CA, COPD, DVT, [place], HTN, hypercholesterolemia, ED, prostate CA, anxiety, neuropathy. Personal factors affecting pt at time of IE include:difficulty performing ADLS, difficulty performing IADLS , limited insight into deficits, decreased initiation and engagement  and health management . Prior to admission, pt was (A) with ADLs and IADLs with use of RW during mobility. Upon evaluation: Pt requires min-max (A) x1-2 with use of RW during mobility 2* the following deficits impacting occupational performance: weakness, decreased strength, decreased balance, decreased tolerance, impaired initiation, decreased safety awareness, increased pain and impaired interpersonal skills. Pt to benefit from continued skilled OT tx while in the hospital to address deficits as defined above and maximize level of functional independence w ADL's and functional mobility. Occupational Performance areas to address include: grooming, bathing/shower, toilet hygiene, dressing, functional mobility, community mobility and clothing management.  The patient's raw score on the AM-PAC Daily Activity Inpatient Short Form is 17. A raw score of less than 19 suggests the patient may benefit from discharge to post-acute rehabilitation services. Please refer to the recommendation of the Occupational Therapist for safe discharge planning. Pt benefited from co-evaluation of skilled OT and PT therapists in order to most appropriately address functional deficits d/t extensive assistance required for safe functional mobility, decreased activity tolerance, and regression from functioning level prior to admission and/or onset of present illness. OT/PT objectives were addressed separately; please see PT note for specific goal areas targeted.      OT Discharge Recommendation: Home with home health rehabilitation

## 2023-08-03 NOTE — OCCUPATIONAL THERAPY NOTE
Occupational Therapy Evaluation     Patient Name: Ally Hu  [id number] Date: 8/3/2023  Problem List  Principal Problem:    Diarrhea of presumed infectious origin  Active Problems:    Persistent atrial fibrillation (HCC)    Chronic diastolic congestive heart failure ([address])    Type 2 diabetes mellitus without complication, without long-term current use of insulin ([address])    Cryptogenic organizing pneumonia ([address])    Abnormality of gait    Past Medical History  Past Medical History:   Diagnosis Date    Anxiety     Atrial fibrillation ([address])     Atrial flutter ([address])     Congestive heart failure (CHF) (HCC)     COPD (chronic obstructive pulmonary disease) ([address])     Coronary artery disease     DVT (deep venous thrombosis) ([address])     ED (erectile dysfunction)     Hearing loss     History of echocardiogram 04/05/2018    EF 0.55, Mild LVH. Mild moderate mitral regurg. Trace aortic regurg. Hx of radiation therapy     XRT    Hypercholesterolemia     Hyperlipidemia     Hypertension     Long term (current) use of anticoagulants 8/21/2018    Neuropathy of both feet     Peripheral neuropathy     Prostate cancer (HCC)     Type 2 diabetes mellitus [facility])      Past Surgical History  Past Surgical History:   Procedure Laterality Date    CARDIAC CATHETERIZATION  01/15/1988    Left main: unobstructed. Posterolateral branch 90% narrowed. COLONOSCOPY  10/05/2017    Dr. Nell Corona        08/03/23 0849   OT Last Visit   OT Visit Date 08/03/23   Note Type   Note type Evaluation   Pain Assessment   Pain Score No Pain   Restrictions/Precautions   Weight Bearing Precautions Per Order No   Other Precautions Chair Alarm; Fall Risk   Home Living   Type of [address] [place] [address]  Two level;Ramped entrance;1/2 bath on main level   Bathroom Shower/Tub Walk-in shower   Bathroom Toilet Standard   Bathroom Equipment Grab bars in shower; Shower chair; Toilet raiser   [facility] [place]; Wheelchair-manual;Cane;Grab bars;Stair glide; Other (Comment); Electric scooter  (RW, SW, and 5HD)   Additional Comments pt reports use of RW during functional mobility at baseline   Prior Function   Level of [place] Needs assistance with ADLs; Needs assistance with IADLS; Independent with functional mobility   Lives With Spouse   Receives Help From Family   IADLs Family/Friend/Other provides transportation; Family/Friend/Other provides meals; Family/Friend/Other provides medication management   Falls in the last 6 months 0   Vocational Retired   Comments pt has home PT/nurse   Subjective   Subjective "I worked for [employer]"   ADL   Where Assessed Edge of bed   LB Dressing Assistance 2  Maximal Assistance   LB Dressing Deficit Don/doff R sock; Don/doff L sock; Thread RLE into underwear; Thread LLE into underwear   Additional Comments pt requires max (A) at baseline for LB ADL performance   Bed Mobility   Supine to Sit 5  Supervision   Additional items Bedrails; Increased time required   Sit to Supine   (seated in chair at end of sesion)   Additional Comments pt on RA and SPO2 WFL throughout session   Transfers   Sit to Stand 4  Minimal assistance   Additional items Assist x 2; Increased time required; Bedrails;Verbal cues  (RW)   Stand to Sit 4  Minimal assistance   Additional items Assist x 2;Bedrails; Increased time required;Verbal cues  (RW)   Additional Comments anticipate decreased (A) with higher surface to transfer to and from due to pt height; utilized RW; no significant LOB, however mild posterior lean initially   Functional Mobility   Functional Mobility 5  Supervision   Additional Comments pt performed functional mobility with RW at (S) level; no significant LOB or instability; complaints of minor fatigue   Additional items Rolling walker   Balance   Static Sitting Good   Dynamic Sitting Good   Static Standing Fair +   Dynamic Standing Fair   Ambulatory Fair -   Activity Tolerance   Activity Tolerance Patient limited by fatigue   RUE Assessment   RUE Assessment WFL   LUE Assessment   LUE Assessment WFL   Hand Function   Gross Motor Coordination Functional   Fine Motor Coordination Functional   Sensation   Light Touch No apparent deficits   Sharp/Dull No apparent deficits   Psychosocial   Psychosocial (WDL) WDL   Cognition   Overall Cognitive Status WFL   Arousal/Participation Alert   Attention Within functional limits   Orientation Level Oriented X4   Memory Within functional limits   Following Commands Follows all commands and directions without difficulty   Assessment   Limitation Decreased ADL status; Decreased UE strength;Decreased Safe judgement during ADL;Decreased endurance;Decreased self-care trans;Decreased high-level ADLs   Assessment Pt is a 80 y.o. male seen for OT evaluation s/p admit to [facility] on 8/1/2023 w/ Diarrhea of presumed infectious origin. Comorbidities affecting pt's functional performance at time of assessment include: a-fib, HLD, CHF, DM, prostate CA, COPD, DVT, [place], HTN, hypercholesterolemia, ED, prostate CA, anxiety, neuropathy. Personal factors affecting pt at time of IE include:difficulty performing ADLS, difficulty performing IADLS , limited insight into deficits, decreased initiation and engagement  and health management . Prior to admission, pt was (A) with ADLs and IADLs with use of RW during mobility. Upon evaluation: Pt requires min-max (A) x1-2 with use of RW during mobility 2* the following deficits impacting occupational performance: weakness, decreased strength, decreased balance, decreased tolerance, impaired initiation, decreased safety awareness, increased pain and impaired interpersonal skills. Pt to benefit from continued skilled OT tx while in the hospital to address deficits as defined above and maximize level of functional independence w ADL's and functional mobility.  Occupational Performance areas to address include: grooming, bathing/shower, toilet hygiene, dressing, functional mobility, community mobility and clothing management. The patient's raw score on the AM-PAC Daily Activity Inpatient Short Form is 17. A raw score of less than 19 suggests the patient may benefit from discharge to post-acute rehabilitation services. Please refer to the recommendation of the Occupational Therapist for safe discharge planning. Pt benefited from co-evaluation of skilled OT and PT therapists in order to most appropriately address functional deficits d/t extensive assistance required for safe functional mobility, decreased activity tolerance, and regression from functioning level prior to admission and/or onset of present illness. OT/PT objectives were addressed separately; please see PT note for specific goal areas targeted. Goals   Patient Goals to go home today   Short Term Goal  pt will perform UE strengthening exercises   Long Term Goal #1 pt will demonstrate toilet transfes and hygiene at min (A) level   Long Term Goal #2 pt will demonstrate UB/LB bathing and grooming tasks at min (A) level   Long Term Goal pt will demonstrate functional mobility with RW at mod (I) level   Plan   Treatment Interventions ADL retraining;Functional transfer training;UE strengthening/ROM; Endurance training;Patient/family training; Activityengagement   Goal Expiration Date 08/17/23   OT Frequency 3-5x/wk   Recommendation   OT Discharge Recommendation Home with home health rehabilitation   Additional Comments  pt to continue with home PT services and nursing   [facility]   Lower Body Dressing 2   Bathing 2   Toileting 2   Upper Body Dressing 3   Grooming 4   Eating 4   Daily Activity Raw Score 17   Daily Activity Standardized Score (Calc for Raw Score >=11) 37.26   AM-PAC Applied Cognition Inpatient   Following a Speech/Presentation 4   Understanding Ordinary Conversation 4   Taking Medications 4   Remembering Where Things Are Placed or Put Away 4   Remembering List of 4-5 Errands 4 Taking Care of Complicated Tasks 4   Applied Cognition Raw Score 24   Applied Cognition Standardized Score 62.21

## 2023-08-03 NOTE — PLAN OF CARE
Problem: Potential for Falls  Goal: Patient will remain free of falls  Description: INTERVENTIONS:  - Educate patient/family on patient safety including physical limitations  - Instruct patient to call for assistance with activity   - Consult OT/PT to assist with strengthening/mobility   - Keep Call bell within reach  - Keep bed low and locked with side rails adjusted as appropriate  - Keep care items and personal belongings within reach  - Initiate and maintain comfort rounds  - Make Fall Risk Sign visible to staff  - Initiate/Maintain bed/chair alarm  - Apply yellow socks and bracelet for high fall risk patients  - Consider moving patient to room near nurses station  Outcome: Progressing     Problem: MOBILITY - ADULT  Goal: Maintain or return to baseline ADL function  Description: INTERVENTIONS:  -  Assess patient's ability to carry out ADLs; assess patient's baseline for ADL function and identify physical deficits which impact ability to perform ADLs (bathing, care of mouth/teeth, toileting, grooming, dressing, etc.)  - Assess/evaluate cause of self-care deficits   - Assess range of motion  - Assess patient's mobility; develop plan if impaired  - Assess patient's need for assistive devices and provide as appropriate  - Encourage maximum independence but intervene and supervise when necessary  - Involve family in performance of ADLs  - Assess for home care needs following discharge   - Consider OT consult to assist with ADL evaluation and planning for discharge  - Provide patient education as appropriate  Outcome: Progressing  Goal: Maintains/Returns to pre admission functional level  Description: INTERVENTIONS:  - Perform BMAT or MOVE assessment daily.   - Set and communicate daily mobility goal to care team and patient/family/caregiver. - Collaborate with rehabilitation services on mobility goals if consulted  - Reposition patient every two hours.   - Out of bed to chair three times a day   - Out of bed for meals three times a day  - Out of bed for toileting  - Record patient progress and toleration of activity level   Outcome: Progressing     Problem: Nutrition/Hydration-ADULT  Goal: Nutrient/Hydration intake appropriate for improving, restoring or maintaining nutritional needs  Description: Monitor and assess patient's nutrition/hydration status for malnutrition. Collaborate with interdisciplinary team and initiate plan and interventions as ordered. Monitor patient's weight and dietary intake as ordered or per policy. Utilize nutrition screening tool and intervene as necessary. Determine patient's food preferences and provide high-protein, high-caloric foods as appropriate.      INTERVENTIONS:  - Monitor oral intake, urinary output, labs, and treatment plans  - Assess nutrition and hydration status and recommend course of action  - Evaluate amount of meals eaten  - Assist patient with eating if necessary   - Allow adequate time for meals  - Recommend/ encourage appropriate diets, oral nutritional supplements, and vitamin/mineral supplements  - Order, calculate, and assess calorie counts as needed  - Recommend, monitor, and adjust tube feedings and TPN/PPN based on assessed needs  - Assess need for intravenous fluids  - Provide specific nutrition/hydration education as appropriate  - Include patient/family/caregiver in decisions related to nutrition  Outcome: Progressing

## 2023-08-03 NOTE — DISCHARGE SUMMARY
[address]  Discharge- Marva Copeland 1935, 80 y.o. male MRN: [id number]  Unit/Bed#: 395-21 Encounter: [id number]  Primary Care Provider: Lili Pat DO   Date and time admitted to hospital: 8/1/2023 10:43 AM    * Diarrhea of presumed infectious origin  Assessment & Plan  Diarrhea has resolved, patient did not have any loose bowel movements while hospitalized    Symptoms are improved, continue p.o. intake    Abnormality of gait  Assessment & Plan  Generalized weakness, likely secondary to volume depletion, electrolyte abnormalities, both of which have been corrected, functional status markedly improved, discharged with home physical therapy/home care    Type 2 diabetes mellitus without complication, without long-term current use of insulin [facility])  Assessment & Plan  Lab Results   Component Value Date    HGBA1C 10.6 (H) 08/02/2023       Recent Labs     08/02/23  1807 08/02/23  2057 08/03/23  0709 08/03/23  1139   POCGLU 450* 274* 100 207*       Blood Sugar Average: Last 72 hrs:  Resume home regimen    Chronic diastolic congestive heart failure ([address])  Assessment & Plan  Wt Readings from Last 3 Encounters:   08/03/23 86 kg (189 lb 9.5 oz)   06/16/23 86.2 kg (190 lb)   05/08/23 94.8 kg (209 lb)       Lower extremity edema worsening today, resume diuretic therapy    Persistent atrial fibrillation (HCC)  Assessment & Plan  Continue Coumadin, continue beta-blocker    Cryptogenic organizing pneumonia [facility])  Assessment & Plan  Patient is on a prolonged oral prednisone taper, continue  10 mg daily, continue outpatient follow-up with pulmonary medicine      Medical Problems     Resolved Problems  Date Reviewed: 8/2/2023   None       Discharging Physician / Practitioner: Aníbal Kowalski DO  PCP: Lili Pat DO  Admission Date:   Admission Orders (From admission, onward)     Ordered        08/01/23 1354  INPATIENT ADMISSION  Once                      Discharge Date: 08/03/23        Discharge Day Visit / Exam:   Subjective:  No  pain  Vitals: Blood Pressure: 101/59 (08/03/23 0710)  Pulse: 89 (08/03/23 0710)  Temperature: 97.9 °F (36.6 °C) (08/03/23 0710)  Temp Source: Oral (08/01/23 2215)  Respirations: 20 (08/03/23 0710)  Height: 6' 1" (185.4 cm) (08/01/23 1554)  Weight - Scale: 86 kg (189 lb 9.5 oz) (08/03/23 0535)  SpO2: 99 % (08/03/23 0815)  Exam:   Physical Exam  Vitals and nursing note reviewed. Constitutional:       Appearance: Normal appearance. HENT:      Head: Normocephalic and atraumatic. Right Ear: External ear normal.      Left Ear: External ear normal.   Cardiovascular:      Rate and Rhythm: Normal rate. Pulses: Normal pulses. Pulmonary:      Effort: Pulmonary effort is normal.   Musculoskeletal:      Cervical back: Normal range of motion. Right lower leg: Edema present. Left lower leg: Edema present. Skin:     General: Skin is warm and dry. Findings: No lesion or rash. Neurological:      Mental Status: He is alert. Mental status is at baseline. Psychiatric:         Mood and Affect: Mood normal.         Behavior: Behavior normal.         Thought Content: Thought content normal.         Judgment: Judgment normal.              Discharge instructions/Information to patient and family:   See after visit summary for information provided to patient and family. Provisions for Follow-Up Care:  See after visit summary for information related to follow-up care and any pertinent home health orders. Disposition:   Home with VNA Services (Reminder: Complete face to face encounter)    Planned Readmission: no     Discharge Statement:  I spent 35 minutes discharging the patient. This time was spent on the day of discharge. I had direct contact with the patient on the day of discharge.  Greater than 50% of the total time was spent examining patient, answering all patient questions, arranging and discussing plan of care with patient as well as directly providing post-discharge instructions. Additional time then spent on discharge activities. Discharge Medications:  See after visit summary for reconciled discharge medications provided to patient and/or family.       **Please Note: This note may have been constructed using a voice recognition system**

## 2023-08-03 NOTE — ASSESSMENT & PLAN NOTE
Generalized weakness, likely secondary to volume depletion, electrolyte abnormalities, both of which have been corrected, functional status markedly improved, discharged with home physical therapy/home care

## 2023-08-03 NOTE — ASSESSMENT & PLAN NOTE
Wt Readings from Last 3 Encounters:   08/03/23 86 kg (189 lb 9.5 oz)   06/16/23 86.2 kg (190 lb)   05/08/23 94.8 kg (209 lb)       Lower extremity edema worsening today, resume diuretic therapy

## 2023-08-03 NOTE — ASSESSMENT & PLAN NOTE
Lab Results   Component Value Date    HGBA1C 10.6 (H) 08/02/2023       Recent Labs     08/02/23  1807 08/02/23 2057 08/03/23  0709 08/03/23  1139   POCGLU 450* 274* 100 207*       Blood Sugar Average: Last 72 hrs:  Resume home regimen

## 2023-08-03 NOTE — PLAN OF CARE
Problem: Potential for Falls  Goal: Patient will remain free of falls  Description: INTERVENTIONS:  - Educate patient/family on patient safety including physical limitations  - Instruct patient to call for assistance with activity   - Consult OT/PT to assist with strengthening/mobility   - Keep Call bell within reach  - Keep bed low and locked with side rails adjusted as appropriate  - Keep care items and personal belongings within reach  - Initiate and maintain comfort rounds  - Make Fall Risk Sign visible to staff  - Offer Toileting every 2 Hours, in advance of need  - Initiate/Maintain bed/chair alarm  - Obtain necessary fall risk management equipment: call bell  - Apply yellow socks and bracelet for high fall risk patients  - Consider moving patient to room near nurses station  Outcome: Progressing     Problem: MOBILITY - ADULT  Goal: Maintain or return to baseline ADL function  Description: INTERVENTIONS:  -  Assess patient's ability to carry out ADLs; assess patient's baseline for ADL function and identify physical deficits which impact ability to perform ADLs (bathing, care of mouth/teeth, toileting, grooming, dressing, etc.)  - Assess/evaluate cause of self-care deficits   - Assess range of motion  - Assess patient's mobility; develop plan if impaired  - Assess patient's need for assistive devices and provide as appropriate  - Encourage maximum independence but intervene and supervise when necessary  - Involve family in performance of ADLs  - Assess for home care needs following discharge   - Consider OT consult to assist with ADL evaluation and planning for discharge  - Provide patient education as appropriate  Outcome: Progressing  Goal: Maintains/Returns to pre admission functional level  Description: INTERVENTIONS:  - Perform BMAT or MOVE assessment daily.   - Set and communicate daily mobility goal to care team and patient/family/caregiver.    - Collaborate with rehabilitation services on mobility goals if consulted  - Perform Range of Motion 3 times a day. - Reposition patient every 2 hours. - Dangle patient 3 times a day  - Stand patient 3 times a day  - Ambulate patient 3 times a day  - Out of bed to chair 3 times a day   - Out of bed for meals 3 times a day  - Out of bed for toileting  - Record patient progress and toleration of activity level   Outcome: Progressing     Problem: Nutrition/Hydration-ADULT  Goal: Nutrient/Hydration intake appropriate for improving, restoring or maintaining nutritional needs  Description: Monitor and assess patient's nutrition/hydration status for malnutrition. Collaborate with interdisciplinary team and initiate plan and interventions as ordered. Monitor patient's weight and dietary intake as ordered or per policy. Utilize nutrition screening tool and intervene as necessary. Determine patient's food preferences and provide high-protein, high-caloric foods as appropriate.      INTERVENTIONS:  - Monitor oral intake, urinary output, labs, and treatment plans  - Assess nutrition and hydration status and recommend course of action  - Evaluate amount of meals eaten  - Assist patient with eating if necessary   - Allow adequate time for meals  - Recommend/ encourage appropriate diets, oral nutritional supplements, and vitamin/mineral supplements  - Order, calculate, and assess calorie counts as needed  - Recommend, monitor, and adjust tube feedings and TPN/PPN based on assessed needs  - Assess need for intravenous fluids  - Provide specific nutrition/hydration education as appropriate  - Include patient/family/caregiver in decisions related to nutrition  Outcome: Progressing

## 2023-08-03 NOTE — ASSESSMENT & PLAN NOTE
Diarrhea has resolved, patient did not have any loose bowel movements while hospitalized    Symptoms are improved, continue p.o. intake

## 2023-08-03 NOTE — PLAN OF CARE
Problem: PHYSICAL THERAPY ADULT  Goal: Performs mobility at highest level of function for planned discharge setting. See evaluation for individualized goals. Description: Treatment/Interventions: Functional transfer training, LE strengthening/ROM, Therapeutic exercise, Endurance training, Bed mobility, Gait training          See flowsheet documentation for full assessment, interventions and recommendations. Note: Prognosis: Good  Problem List: Decreased strength, Decreased endurance, Impaired balance, Decreased mobility  Assessment: Patient is a 80 y.o. male evaluated by Physical Therapy s/p admit to [address] [place] [address] on 8/1/2023 with admitting diagnosis of: Orthostatic hypotension, Esophageal reflux, Diarrhea, Hypokalemia, Hypomagnesemia, Weakness, Anemia, Chronic a-fib, Subtherapeutic international normalized ratio, Generalized weakness, Elevated lactic acid level, Hyperglycemia due to diabetes mellitus, and principal problem of: Diarrhea of presumed infectious origin. PT was consulted to assess patient's functional mobility and discharge needs. Ordered are PT Evaluation and treatment with activity level of: up and OOB as tolerated. Comorbidities affecting patient's physical performance at time of assessment include: a-fib, CHF, HLD, DM, peripheral neuropathy, CAD, a-flutter, hx of cancer, COPD, HTN, hypercholesterolemia. Personal factors affecting the patient at time of IE include: lives in 2 story home, ambulating with assistive device, inability to navigate community distances, inability/difficulty performing IADLs and inability/difficulty performing ADLs. Please locate objective findings from PT assessment regarding body systems outlined above. Upon evaluation, pt able to perform all functional mobility with SUP-Anil, RW, and increased time. Occasional verbal cuing provided for safety awareness and sequencing. Seated rest break taken following 120' of ambulation d/t fatigue.  Mild postural sway demonstrated with mobility but no true LOB experienced. HR and SpO2 remained WFL on RA throughout. The patient's AM-PAC Basic Mobility Inpatient Short Form Raw Score is 16. A Raw score of less than or equal to 16 suggests the patient may benefit from discharge to post-acute rehabilitation services. Please also refer to the recommendation of the Physical Therapist for safe discharge planning. Co treatment with OT secondary to complex medical condition of pt, possible A of 2 required to achieve and maintain transitional movements, requiring the need of skilled therapeutic intervention of 2 therapists to achieve delivery of services. Pt will benefit from continued PT intervention during LOS to address current deficits, increase LOF, and facilitate safe d/c to next level of care when medically appropriate. D/c recommendation at this time is home with continued home health rehabilitation. PT Discharge Recommendation: Home with home health rehabilitation    See flowsheet documentation for full assessment.

## 2023-08-04 ENCOUNTER — HOME CARE VISIT (OUTPATIENT)
Dept: HOME HEALTH SERVICES | Facility: HOME HEALTHCARE | Age: 88
End: 2023-08-04
Payer: COMMERCIAL

## 2023-08-04 ENCOUNTER — ANTICOAG VISIT (OUTPATIENT)
Dept: CARDIOLOGY CLINIC | Facility: CLINIC | Age: 88
End: 2023-08-04
Payer: COMMERCIAL

## 2023-08-04 ENCOUNTER — TRANSITIONAL CARE MANAGEMENT (OUTPATIENT)
Dept: FAMILY MEDICINE CLINIC | Facility: CLINIC | Age: 88
End: 2023-08-04

## 2023-08-04 DIAGNOSIS — Z79.01 LONG TERM (CURRENT) USE OF ANTICOAGULANTS: ICD-10-CM

## 2023-08-04 DIAGNOSIS — I48.19 PERSISTENT ATRIAL FIBRILLATION (HCC): Primary | ICD-10-CM

## 2023-08-04 PROCEDURE — 93793 ANTICOAG MGMT PT WARFARIN: CPT | Performed by: PHYSICIAN ASSISTANT

## 2023-08-04 NOTE — UTILIZATION REVIEW
NOTIFICATION OF ADMISSION DISCHARGE   This is a Notification of Discharge from [facility]. Please be advised that this patient has been discharge from our facility. Below you will find the admission and discharge date and time including the patient’s disposition. UTILIZATION REVIEW CONTACT:  Zachariah Howe  Utilization   Network Utilization Review Department  Phone: [phone] x carefully listen to the prompts. All voicemails are confidential.  Email: [email or web address]     ADMISSION INFORMATION  PRESENTATION DATE: 8/1/2023 10:43 AM  OBERVATION ADMISSION DATE:   INPATIENT ADMISSION DATE: 8/1/23  1:54 PM   DISCHARGE DATE: 8/3/2023  2:30 PM   DISPOSITION:Home with Home Health Care    IMPORTANT INFORMATION:  Send all requests for admission clinical reviews, approved or denied determinations and any other requests to dedicated fax number below belonging to the campus where the patient is receiving treatment.  List of dedicated fax numbers:  [place] DENIALS (Administrative/Medical Necessity) [phone] [address] [facility] (Maternity/NICU/Pediatrics) [phone]   [facility] [phone]   [facility] [phone] [address] [facility] [phone]   [address] [place] [address] [phone]   [facility] [phone]   [address] [facility] [address] [facility] [phone]   [address] [place] [address] [phone] [address] [facility] [phone] 27[address] [facility] [address] [facility] [phone]

## 2023-08-04 NOTE — CASE COMMUNICATION
Received message from pt's wife regarding PT INR being due next week. This SN called [facility] and spoke with Cj Sosa who confirmed that PT INR is due 8/11/23.

## 2023-08-04 NOTE — PATIENT INSTRUCTIONS
Spoke with Patient: No changes in medication health or diet. No missed or extra doses. No s/s of bleeding TIA or CVA. No new ABX, NSAIDs OTC meds, or supplements. Dose as instructed and recheck in one week.    Yessica Sierra PA-C

## 2023-08-05 ENCOUNTER — HOME CARE VISIT (OUTPATIENT)
Dept: HOME HEALTH SERVICES | Facility: HOME HEALTHCARE | Age: 88
End: 2023-08-05
Payer: COMMERCIAL

## 2023-08-05 PROCEDURE — G0299 HHS/HOSPICE OF RN EA 15 MIN: HCPCS

## 2023-08-06 VITALS
TEMPERATURE: 96.9 F | HEART RATE: 78 BPM | DIASTOLIC BLOOD PRESSURE: 70 MMHG | OXYGEN SATURATION: 98 % | SYSTOLIC BLOOD PRESSURE: 120 MMHG | RESPIRATION RATE: 18 BRPM

## 2023-08-06 NOTE — CASE COMMUNICATION
[facility] has admitted your patient to [facility] service with the following disciplines:      SN and PT  This report is informational only, no responses is needed  Primary focus of home health care. . INTEGUMENTARY  Patient stated goals of care. WOUND TO HEAL AND REMAIN AT HOME  Anticipated visit pattern and next visit date. 4Y7. 2W6   NEXT VISIT IS TUESDAY  See medication list - meds in home differ from AVS. ALL MEDS AT HOME  Sig nificant clinical findings. WEAKNESS, FATIGUE, DECREASE ENDURANCE  Potential barriers to goal achievement. NA  Other pertinent information. NA    Thank you for allowing us to participate in the care of your patient.

## 2023-08-07 ENCOUNTER — HOME CARE VISIT (OUTPATIENT)
Dept: HOME HEALTH SERVICES | Facility: HOME HEALTHCARE | Age: 88
End: 2023-08-07
Payer: COMMERCIAL

## 2023-08-08 ENCOUNTER — HOME CARE VISIT (OUTPATIENT)
Dept: HOME HEALTH SERVICES | Facility: HOME HEALTHCARE | Age: 88
End: 2023-08-08
Payer: COMMERCIAL

## 2023-08-08 ENCOUNTER — TELEPHONE (OUTPATIENT)
Dept: FAMILY MEDICINE CLINIC | Facility: CLINIC | Age: 88
End: 2023-08-08

## 2023-08-08 VITALS
HEART RATE: 72 BPM | OXYGEN SATURATION: 98 % | TEMPERATURE: 97.5 F | DIASTOLIC BLOOD PRESSURE: 60 MMHG | RESPIRATION RATE: 18 BRPM | SYSTOLIC BLOOD PRESSURE: 96 MMHG

## 2023-08-08 DIAGNOSIS — R19.7 DIARRHEA, UNSPECIFIED TYPE: Primary | ICD-10-CM

## 2023-08-08 PROCEDURE — G0299 HHS/HOSPICE OF RN EA 15 MIN: HCPCS

## 2023-08-08 NOTE — TELEPHONE ENCOUNTER
Florentino Ayala called for Quirino Trevino. Patient seen today (still at patient's house); blood pressure has been trending downward with systolic readings of 263-350. Today, BP 96/60, assymptomatic. Continued loose stools, and continued weakness. Wife has been giving patient imodium 1-2 times a day as needed. It was suggested to wife to keep a diet diary to see if patient's loose stools are diet related. Questions possible referral to gastro? Glucose over 400 in hospital, patient glucose today at 91. Florentino Ayala states glucose has also been trending downward since his return to home. Please advise.

## 2023-08-08 NOTE — TELEPHONE ENCOUNTER
Discontinue metformin. .  We will obtain a stool culture clostridia titer and stool WBCs. Will refer to gastroenterology thank you.

## 2023-08-08 NOTE — TELEPHONE ENCOUNTER
Spoke to Derek, she will relay message to Wife. Will increase glucose finger sticks to twice a day to monitor glucose post D/C metformin. Aware of stool culture orders and will help wife collect specimen from patient. Advised Gastro referral as been placed.

## 2023-08-09 ENCOUNTER — RA CDI HCC (OUTPATIENT)
Dept: OTHER | Facility: HOSPITAL | Age: 88
End: 2023-08-09

## 2023-08-09 ENCOUNTER — TELEPHONE (OUTPATIENT)
Dept: PULMONOLOGY | Facility: CLINIC | Age: 88
End: 2023-08-09

## 2023-08-09 ENCOUNTER — HOME CARE VISIT (OUTPATIENT)
Dept: HOME HEALTH SERVICES | Facility: HOME HEALTHCARE | Age: 88
End: 2023-08-09
Payer: COMMERCIAL

## 2023-08-09 VITALS — OXYGEN SATURATION: 97 % | SYSTOLIC BLOOD PRESSURE: 104 MMHG | DIASTOLIC BLOOD PRESSURE: 60 MMHG | HEART RATE: 90 BPM

## 2023-08-09 PROCEDURE — G0151 HHCP-SERV OF PT,EA 15 MIN: HCPCS

## 2023-08-09 NOTE — CASE COMMUNICATION
PT reeval on 8/9/23 s/p hospital stay. PT POC for 2wk3 for gait/transfer/balance training, HEP , edu, fall prevention. Patient had weak spell today but had later lunch as at MD appt with his wife. Pt's wife to monitor BS. BP was 104/60. Legs felt heavy with BLE min swelling. phys ther recommends to elevate LE's as out of home for most of day with legs in dependent position.      Thank you, Karol Nazario Escort PT

## 2023-08-09 NOTE — PROGRESS NOTES
[facility] [address] [facility] coding opportunities          Chart Reviewed number of suggestions sent to Provider: 3   E11.51  E11.65  E11.22    Patients Insurance     Medicare Insurance: [facility]

## 2023-08-09 NOTE — TELEPHONE ENCOUNTER
Pt's wife Nakul Jensen calling stating the pt was in the hospital and during his visit had a chest CT done. Nakul Jensen is asking if Francisco Frey should still have an additional CT done on 08/18 that was scheduled prior to hospital admisison or if the CT from 08/01 is sufficient. Please advise. Nakul Jensen is requesting a call back to her mobile number on file as she will be leaving the house for the day.

## 2023-08-09 NOTE — TELEPHONE ENCOUNTER
He does not need to have another CT chest on 8/18. We can cancel it. The cat scan from 8/1 looks better compared to May. Please just remind her of Maximo's appointment the end of Aug with Dr. Layla Browne where they will go over everything in detail.

## 2023-08-10 ENCOUNTER — APPOINTMENT (OUTPATIENT)
Dept: LAB | Facility: MEDICAL CENTER | Age: 88
DRG: 291 | End: 2023-08-10
Payer: COMMERCIAL

## 2023-08-10 ENCOUNTER — OFFICE VISIT (OUTPATIENT)
Dept: OTOLARYNGOLOGY | Facility: CLINIC | Age: 88
End: 2023-08-10
Payer: COMMERCIAL

## 2023-08-10 VITALS
DIASTOLIC BLOOD PRESSURE: 60 MMHG | BODY MASS INDEX: 25.01 KG/M2 | HEIGHT: 73 IN | SYSTOLIC BLOOD PRESSURE: 100 MMHG | TEMPERATURE: 97.4 F

## 2023-08-10 DIAGNOSIS — Z79.01 LONG TERM (CURRENT) USE OF ANTICOAGULANTS: ICD-10-CM

## 2023-08-10 DIAGNOSIS — H61.23 BILATERAL IMPACTED CERUMEN: ICD-10-CM

## 2023-08-10 DIAGNOSIS — H93.8X2 SENSATION OF PLUGGED EAR ON LEFT SIDE: Primary | ICD-10-CM

## 2023-08-10 DIAGNOSIS — I48.19 PERSISTENT ATRIAL FIBRILLATION (HCC): ICD-10-CM

## 2023-08-10 DIAGNOSIS — J31.0 NONALLERGIC RHINITIS: ICD-10-CM

## 2023-08-10 DIAGNOSIS — H65.02 NON-RECURRENT ACUTE SEROUS OTITIS MEDIA OF LEFT EAR: ICD-10-CM

## 2023-08-10 LAB
INR PPP: 1.21 (ref 0.84–1.19)
PROTHROMBIN TIME: 15.5 SECONDS (ref 11.6–14.5)

## 2023-08-10 PROCEDURE — 69210 REMOVE IMPACTED EAR WAX UNI: CPT | Performed by: PHYSICIAN ASSISTANT

## 2023-08-10 PROCEDURE — 99204 OFFICE O/P NEW MOD 45 MIN: CPT | Performed by: PHYSICIAN ASSISTANT

## 2023-08-10 RX ORDER — IPRATROPIUM BROMIDE 21 UG/1
2 SPRAY, METERED NASAL 3 TIMES DAILY
Qty: 30 ML | Refills: 5 | Status: SHIPPED | OUTPATIENT
Start: 2023-08-10

## 2023-08-10 NOTE — PROGRESS NOTES
[facility] New Patient visit      Jana Anderson is a 80 y.o. 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    Was hospitalized for pneumonia     Chronic runny nose, very annoying    No hearing aids   No otalgia  No otorrhea     Review of any relevant imaging: images from any scan reviewed personally  Scans:   Labs:   Notes: ED notes    Review of Systems:  As above    PMHx:  Past Medical History:   Diagnosis Date   • Anxiety    • Atrial fibrillation (HCC)    • Atrial flutter (HCC)    • Congestive heart failure (CHF) (HCC)    • COPD (chronic obstructive pulmonary disease) ([address])    • Coronary artery disease    • DVT (deep venous thrombosis) ([address])    • ED (erectile dysfunction)    • Hearing loss    • History of echocardiogram 04/05/2018    EF 0.55, Mild LVH. Mild moderate mitral regurg. Trace aortic regurg.    • Hx of radiation therapy     XRT   • Hypercholesterolemia    • Hyperlipidemia    • Hypertension    • Long term (current) use of anticoagulants 8/21/2018   • Neuropathy of both feet    • Peripheral neuropathy    • Prostate cancer (HCC)    • Type 2 diabetes mellitus (HCC)         FAMHx:  Family History   Problem Relation Age of Onset   • Cancer Brother         bladder   • Colon cancer Brother    • Heart disease Other    • Hypertension Other    • Cancer Other         bladder   • Colon cancer Other        SOCHx:  Social History     Socioeconomic History   • Marital status: /Civil Union     Spouse name: None   • Number of children: None   • Years of education: None   • Highest education level: None   Occupational History   • Occupation: Retired-[employer]   Tobacco Use   • Smoking status: Never   • Smokeless tobacco: Never   Vaping Use   • Vaping Use: Never used   Substance and Sexual Activity   • Alcohol use: Not Currently   • Drug use: Never   • Sexual activity: None   Other Topics Concern • None   Social History Narrative   • None     Social Determinants of Health     Financial Resource Strain: Low Risk  (9/14/2022)    Overall Financial Resource Strain (CARDIA)    • Difficulty of Paying Living Expenses: Not very hard   Food Insecurity: No Food Insecurity (8/2/2023)    Hunger Vital Sign    • Worried About Running Out of Food in the Last Year: Never true    • Ran Out of Food in the Last Year: Never true   Transportation Needs: No Transportation Needs (8/2/2023)    PRAPARE - Transportation    • Lack of Transportation (Medical): No    • Lack of Transportation (Non-Medical): No   Physical Activity: Not on file   Stress: Not on file   Social Connections: Not on file   Intimate Partner Violence: Not on file   Housing Stability: Low Risk  (8/2/2023)    Housing Stability Vital Sign    • Unable to Pay for Housing in the Last Year: No    • Number of Places Lived in the Last Year: 1    • Unstable Housing in the Last Year: No       Allergies:  No Known Allergies     MEDS:  Reviewed      Physical exam: (abnormal findings appear in bold and supercede any conflicting normal findings listed below)    /60   Temp (!) 97.4 °F (36.3 °C)   Ht 6' 1" (1.854 m)   BMI 25.01 kg/m²     Constitutional:  Well developed, well nourished and groomed, in no acute distress. Eyes:  Extra-ocular movements intact, pupils equally round and reactive to light and accommodation, the lids and conjunctivae are normal in appearance. Head: Atraumatic, normocephalic, no visible scalp lesions, bony palpation unremarkable without stepoffs, parotid and submandibular salivary glands non-tender to palpation and without masses bilaterally. Ears:  Auricles normal in appearance bilaterally, mastoid prominence non-tender, external auditory canals clear bilaterally, tympanic membranes intact bilaterally without evidence of middle ear effusion or masses, normal appearing ossicles. Bilateral cerumen impaction. Unable to visualize TM. See proc. Nose/Sinuses:  External appearance unremarkable, no maxillary or frontal sinus tenderness to palpation bilaterally. Anterior rhinoscopy demonstrates pink mucosa. No polyps or other masses identified. Turbinates are non-edematous. No evidence of purulent drainage. Nasal cavities are wet. There is a small scab right below the columella at the philtrum of lip. Oral Cavity:  Moist mucus membranes, gums and dentition unremarkable, no oral mucosal masses or lesions, floor of mouth soft, tongue mobile without masses or lesions. Oropharynx:  Base of tongue soft and without masses, tonsils bilaterally unremarkable, soft palate mucosa unremarkable. Neck:  No visible or palpable cervical lesions or lymphadenopathy, thyroid gland is normal in size and symmetry and without masses, normal laryngeal elevation with swallowing. Cardiovascular:  Normal rate and rhythm, no palpable thrills, no jugulovenous distension observed. Respiratory:  Normal respiratory effort without evidence of retractions or use of accessory muscles. Integument:  Normal appearing without observed masses or lesions. Neurologic:  Cranial nerves II-XII intact bilaterally. Psychiatric:  Alert and oriented to time, place and person, normal affect. Procedure:  Procedure: Cerumen debridement b/l EAC     Indications: Cerumen impaction b/l EAC     Procedure in detail: After informed verbal consent was obtained the ear was visualized using procedural otoscope. Affected ear was debrided of cerumen using cerumen loop, suction, and alligator forceps. The findings below were seen. The patient tolerated the procedure well. FINDINGS: Cerumen impaction removed without difficulty. Audiometry: defers    Assessment:  1. Sensation of plugged ear on left side        2. Bilateral impacted cerumen        3. Non-recurrent acute serous otitis media of left ear        4.  Nonallergic rhinitis  ipratropium (ATROVENT) 0.03 % nasal spray Plan:  1. Manan Wan is a 80 y.o. male with acute and chronic problems as above who presents for evaluation of hearing loss. More noticeable in the left ear. Bilateral cerumen impaction removed today without issue. Notices immediate improvement in hearing after removal. He has an incidental samaniego effusion in the left ear. Discussed obtaining audiogram now vs. Once resolved. Agree to hearing test once effusion resolves. Likely eustachian tube dysfunction secondary to prior pneumonia. This is typically a self-limiting issue without need for medical management. If patient continues to have nasal symptoms, they can consider Flonase, decongestant, antihistamine, PO steroid but they are not expected to hasten the recovery of ETD. If effusion is persistent beyond 3 months, can consider a tube tympanostomy tube placement. Would recommend NP scope prior to procedure to assess nasopharynx. Agree to observation  Atrovent for RO    Small scab under columella, superior philtrum of lip. Most likely from chronic rhinorrhea. Recommend Bacitracin BID. Has derm appt coming up and will also have it evaluated. F/u 6 weeks               ** Please Note: Portions of the record may have been created with voice recognition software. Occasional wrong word or "sound a like" substitutions may have occurred due to the inherent limitations of voice recognition software. There may also be notations and random deletions of words or characters from malfunctioning software. Read the chart carefully and recognize, using context, where substitutions/deletions have occurred. **

## 2023-08-10 NOTE — PROGRESS NOTES
Review of Systems   Constitutional: Negative. HENT: Positive for hearing loss. Bilateral ears cerumen impactions   Eyes: Negative. Respiratory: Negative. Cardiovascular: Negative. Gastrointestinal: Negative. Endocrine: Negative. Genitourinary: Negative. Musculoskeletal: Negative. Allergic/Immunologic: Negative. Neurological: Negative. Hematological: Negative. Psychiatric/Behavioral: Negative.

## 2023-08-11 ENCOUNTER — ANTICOAG VISIT (OUTPATIENT)
Dept: CARDIOLOGY CLINIC | Facility: CLINIC | Age: 88
End: 2023-08-11
Payer: COMMERCIAL

## 2023-08-11 ENCOUNTER — HOME CARE VISIT (OUTPATIENT)
Dept: HOME HEALTH SERVICES | Facility: HOME HEALTHCARE | Age: 88
End: 2023-08-11
Payer: COMMERCIAL

## 2023-08-11 DIAGNOSIS — I48.19 PERSISTENT ATRIAL FIBRILLATION (HCC): Primary | ICD-10-CM

## 2023-08-11 DIAGNOSIS — Z79.01 LONG TERM (CURRENT) USE OF ANTICOAGULANTS: ICD-10-CM

## 2023-08-11 PROCEDURE — G0151 HHCP-SERV OF PT,EA 15 MIN: HCPCS

## 2023-08-11 PROCEDURE — 93793 ANTICOAG MGMT PT WARFARIN: CPT | Performed by: PHYSICIAN ASSISTANT

## 2023-08-11 PROCEDURE — G0299 HHS/HOSPICE OF RN EA 15 MIN: HCPCS

## 2023-08-11 NOTE — PATIENT INSTRUCTIONS
Spoke with Patient: No changes in medication health or diet. No missed or extra doses. No s/s of bleeding TIA or CVA. No new ABX, NSAIDs OTC meds, or supplements. Dose as instructed and recheck in two weeks.    Rodrigo Cunha PA-C

## 2023-08-12 ENCOUNTER — APPOINTMENT (OUTPATIENT)
Dept: LAB | Facility: MEDICAL CENTER | Age: 88
DRG: 291 | End: 2023-08-12
Payer: COMMERCIAL

## 2023-08-12 VITALS — HEART RATE: 52 BPM | DIASTOLIC BLOOD PRESSURE: 60 MMHG | OXYGEN SATURATION: 100 % | SYSTOLIC BLOOD PRESSURE: 104 MMHG

## 2023-08-12 VITALS
OXYGEN SATURATION: 96 % | DIASTOLIC BLOOD PRESSURE: 58 MMHG | HEART RATE: 88 BPM | SYSTOLIC BLOOD PRESSURE: 102 MMHG | RESPIRATION RATE: 18 BRPM | TEMPERATURE: 97.3 F

## 2023-08-12 DIAGNOSIS — R19.7 DIARRHEA, UNSPECIFIED TYPE: ICD-10-CM

## 2023-08-12 PROCEDURE — 87493 C DIFF AMPLIFIED PROBE: CPT

## 2023-08-13 ENCOUNTER — APPOINTMENT (EMERGENCY)
Dept: RADIOLOGY | Facility: HOSPITAL | Age: 88
DRG: 291 | End: 2023-08-13
Payer: COMMERCIAL

## 2023-08-13 ENCOUNTER — HOSPITAL ENCOUNTER (INPATIENT)
Facility: HOSPITAL | Age: 88
LOS: 4 days | Discharge: HOME HEALTH | DRG: 291 | End: 2023-08-17
Attending: EMERGENCY MEDICINE | Admitting: INTERNAL MEDICINE
Payer: COMMERCIAL

## 2023-08-13 ENCOUNTER — HOME CARE VISIT (OUTPATIENT)
Dept: HOME HEALTH SERVICES | Facility: HOME HEALTHCARE | Age: 88
End: 2023-08-13
Payer: COMMERCIAL

## 2023-08-13 DIAGNOSIS — R26.9 ABNORMALITY OF GAIT: ICD-10-CM

## 2023-08-13 DIAGNOSIS — D61.818 PANCYTOPENIA (HCC): ICD-10-CM

## 2023-08-13 DIAGNOSIS — R60.9 EDEMA, UNSPECIFIED TYPE: ICD-10-CM

## 2023-08-13 DIAGNOSIS — E83.42 HYPOMAGNESEMIA: ICD-10-CM

## 2023-08-13 DIAGNOSIS — I50.9 CHF EXACERBATION (HCC): Primary | ICD-10-CM

## 2023-08-13 DIAGNOSIS — D64.9 ANEMIA, UNSPECIFIED TYPE: ICD-10-CM

## 2023-08-13 DIAGNOSIS — R77.8 ELEVATED TROPONIN: ICD-10-CM

## 2023-08-13 DIAGNOSIS — I48.19 PERSISTENT ATRIAL FIBRILLATION (HCC): ICD-10-CM

## 2023-08-13 PROBLEM — R79.89 TROPONIN LEVEL ELEVATED: Status: ACTIVE | Noted: 2023-08-13

## 2023-08-13 PROBLEM — I50.33 ACUTE ON CHRONIC DIASTOLIC (CONGESTIVE) HEART FAILURE (HCC): Status: ACTIVE | Noted: 2023-08-13

## 2023-08-13 LAB
2HR DELTA HS TROPONIN: 83 NG/L
4HR DELTA HS TROPONIN: -50 NG/L
ALBUMIN SERPL BCP-MCNC: 3.5 G/DL (ref 3.5–5)
ALP SERPL-CCNC: 55 U/L (ref 34–104)
ALT SERPL W P-5'-P-CCNC: 30 U/L (ref 7–52)
ANION GAP SERPL CALCULATED.3IONS-SCNC: 9 MMOL/L
AST SERPL W P-5'-P-CCNC: 18 U/L (ref 13–39)
BACTERIA UR QL AUTO: ABNORMAL /HPF
BASOPHILS # BLD AUTO: 0.01 THOUSANDS/ÂΜL (ref 0–0.1)
BASOPHILS NFR BLD AUTO: 0 % (ref 0–1)
BILIRUB SERPL-MCNC: 1.05 MG/DL (ref 0.2–1)
BILIRUB UR QL STRIP: NEGATIVE
BNP SERPL-MCNC: 506 PG/ML (ref 0–100)
BUN SERPL-MCNC: 22 MG/DL (ref 5–25)
C DIFF TOX GENS STL QL NAA+PROBE: NEGATIVE
CALCIUM SERPL-MCNC: 8.4 MG/DL (ref 8.4–10.2)
CARDIAC TROPONIN I PNL SERPL HS: 458 NG/L
CARDIAC TROPONIN I PNL SERPL HS: 508 NG/L
CARDIAC TROPONIN I PNL SERPL HS: 591 NG/L
CHLORIDE SERPL-SCNC: 107 MMOL/L (ref 96–108)
CLARITY UR: CLEAR
CO2 SERPL-SCNC: 25 MMOL/L (ref 21–32)
COLOR UR: YELLOW
CREAT SERPL-MCNC: 0.87 MG/DL (ref 0.6–1.3)
EOSINOPHIL # BLD AUTO: 0.03 THOUSAND/ÂΜL (ref 0–0.61)
EOSINOPHIL NFR BLD AUTO: 1 % (ref 0–6)
ERYTHROCYTE [DISTWIDTH] IN BLOOD BY AUTOMATED COUNT: 25.2 % (ref 11.6–15.1)
FLUAV RNA RESP QL NAA+PROBE: NEGATIVE
FLUBV RNA RESP QL NAA+PROBE: NEGATIVE
GFR SERPL CREATININE-BSD FRML MDRD: 77 ML/MIN/1.73SQ M
GLUCOSE SERPL-MCNC: 155 MG/DL (ref 65–140)
GLUCOSE SERPL-MCNC: 155 MG/DL (ref 65–140)
GLUCOSE UR STRIP-MCNC: ABNORMAL MG/DL
HCT VFR BLD AUTO: 25.8 % (ref 36.5–49.3)
HGB BLD-MCNC: 8.1 G/DL (ref 12–17)
HGB UR QL STRIP.AUTO: NEGATIVE
IMM GRANULOCYTES # BLD AUTO: 0.03 THOUSAND/UL (ref 0–0.2)
IMM GRANULOCYTES NFR BLD AUTO: 1 % (ref 0–2)
INR PPP: 1.11 (ref 0.84–1.19)
KETONES UR STRIP-MCNC: NEGATIVE MG/DL
LEUKOCYTE ESTERASE UR QL STRIP: NEGATIVE
LYMPHOCYTES # BLD AUTO: 1.08 THOUSANDS/ÂΜL (ref 0.6–4.47)
LYMPHOCYTES NFR BLD AUTO: 30 % (ref 14–44)
MAGNESIUM SERPL-MCNC: 1.3 MG/DL (ref 1.9–2.7)
MCH RBC QN AUTO: 30.8 PG (ref 26.8–34.3)
MCHC RBC AUTO-ENTMCNC: 31.4 G/DL (ref 31.4–37.4)
MCV RBC AUTO: 98 FL (ref 82–98)
MONOCYTES # BLD AUTO: 0.26 THOUSAND/ÂΜL (ref 0.17–1.22)
MONOCYTES NFR BLD AUTO: 7 % (ref 4–12)
MUCOUS THREADS UR QL AUTO: ABNORMAL
NEUTROPHILS # BLD AUTO: 2.22 THOUSANDS/ÂΜL (ref 1.85–7.62)
NEUTS SEG NFR BLD AUTO: 61 % (ref 43–75)
NITRITE UR QL STRIP: NEGATIVE
NON-SQ EPI CELLS URNS QL MICRO: ABNORMAL /HPF
NRBC BLD AUTO-RTO: 1 /100 WBCS
PH UR STRIP.AUTO: 5 [PH]
PLATELET # BLD AUTO: 112 THOUSANDS/UL (ref 149–390)
PMV BLD AUTO: 9.8 FL (ref 8.9–12.7)
POTASSIUM SERPL-SCNC: 3.6 MMOL/L (ref 3.5–5.3)
PROT SERPL-MCNC: 5.8 G/DL (ref 6.4–8.4)
PROT UR STRIP-MCNC: ABNORMAL MG/DL
PROTHROMBIN TIME: 14.5 SECONDS (ref 11.6–14.5)
RBC # BLD AUTO: 2.63 MILLION/UL (ref 3.88–5.62)
RBC #/AREA URNS AUTO: ABNORMAL /HPF
RSV RNA RESP QL NAA+PROBE: NEGATIVE
SARS-COV-2 RNA RESP QL NAA+PROBE: NEGATIVE
SODIUM SERPL-SCNC: 141 MMOL/L (ref 135–147)
SP GR UR STRIP.AUTO: 1.02 (ref 1–1.03)
TSH SERPL DL<=0.05 MIU/L-ACNC: 3.53 UIU/ML (ref 0.45–4.5)
UROBILINOGEN UR QL STRIP.AUTO: 0.2 E.U./DL
WBC # BLD AUTO: 3.63 THOUSAND/UL (ref 4.31–10.16)
WBC #/AREA URNS AUTO: ABNORMAL /HPF

## 2023-08-13 PROCEDURE — 83735 ASSAY OF MAGNESIUM: CPT | Performed by: EMERGENCY MEDICINE

## 2023-08-13 PROCEDURE — 84484 ASSAY OF TROPONIN QUANT: CPT | Performed by: EMERGENCY MEDICINE

## 2023-08-13 PROCEDURE — 82728 ASSAY OF FERRITIN: CPT | Performed by: PHYSICIAN ASSISTANT

## 2023-08-13 PROCEDURE — 93005 ELECTROCARDIOGRAM TRACING: CPT

## 2023-08-13 PROCEDURE — 85610 PROTHROMBIN TIME: CPT | Performed by: PHYSICIAN ASSISTANT

## 2023-08-13 PROCEDURE — 99285 EMERGENCY DEPT VISIT HI MDM: CPT

## 2023-08-13 PROCEDURE — 0241U HB NFCT DS VIR RESP RNA 4 TRGT: CPT | Performed by: PHYSICIAN ASSISTANT

## 2023-08-13 PROCEDURE — 87086 URINE CULTURE/COLONY COUNT: CPT | Performed by: PHYSICIAN ASSISTANT

## 2023-08-13 PROCEDURE — 99223 1ST HOSP IP/OBS HIGH 75: CPT | Performed by: INTERNAL MEDICINE

## 2023-08-13 PROCEDURE — 71046 X-RAY EXAM CHEST 2 VIEWS: CPT

## 2023-08-13 PROCEDURE — 36415 COLL VENOUS BLD VENIPUNCTURE: CPT | Performed by: EMERGENCY MEDICINE

## 2023-08-13 PROCEDURE — 84484 ASSAY OF TROPONIN QUANT: CPT | Performed by: PHYSICIAN ASSISTANT

## 2023-08-13 PROCEDURE — 83550 IRON BINDING TEST: CPT | Performed by: PHYSICIAN ASSISTANT

## 2023-08-13 PROCEDURE — 82607 VITAMIN B-12: CPT | Performed by: PHYSICIAN ASSISTANT

## 2023-08-13 PROCEDURE — 81001 URINALYSIS AUTO W/SCOPE: CPT | Performed by: EMERGENCY MEDICINE

## 2023-08-13 PROCEDURE — 87081 CULTURE SCREEN ONLY: CPT | Performed by: PHYSICIAN ASSISTANT

## 2023-08-13 PROCEDURE — 80053 COMPREHEN METABOLIC PANEL: CPT | Performed by: EMERGENCY MEDICINE

## 2023-08-13 PROCEDURE — 82948 REAGENT STRIP/BLOOD GLUCOSE: CPT

## 2023-08-13 PROCEDURE — 84443 ASSAY THYROID STIM HORMONE: CPT | Performed by: PHYSICIAN ASSISTANT

## 2023-08-13 PROCEDURE — 30233N1 TRANSFUSION OF NONAUTOLOGOUS RED BLOOD CELLS INTO PERIPHERAL VEIN, PERCUTANEOUS APPROACH: ICD-10-PCS | Performed by: FAMILY MEDICINE

## 2023-08-13 PROCEDURE — 85025 COMPLETE CBC W/AUTO DIFF WBC: CPT | Performed by: EMERGENCY MEDICINE

## 2023-08-13 PROCEDURE — 82746 ASSAY OF FOLIC ACID SERUM: CPT | Performed by: PHYSICIAN ASSISTANT

## 2023-08-13 PROCEDURE — 83540 ASSAY OF IRON: CPT | Performed by: PHYSICIAN ASSISTANT

## 2023-08-13 PROCEDURE — 83880 ASSAY OF NATRIURETIC PEPTIDE: CPT | Performed by: EMERGENCY MEDICINE

## 2023-08-13 PROCEDURE — 99285 EMERGENCY DEPT VISIT HI MDM: CPT | Performed by: EMERGENCY MEDICINE

## 2023-08-13 PROCEDURE — 96374 THER/PROPH/DIAG INJ IV PUSH: CPT

## 2023-08-13 RX ORDER — FUROSEMIDE 10 MG/ML
50 INJECTION INTRAMUSCULAR; INTRAVENOUS ONCE
Status: COMPLETED | OUTPATIENT
Start: 2023-08-13 | End: 2023-08-13

## 2023-08-13 RX ORDER — ASPIRIN 81 MG/1
81 TABLET, CHEWABLE ORAL DAILY
Status: DISCONTINUED | OUTPATIENT
Start: 2023-08-14 | End: 2023-08-17 | Stop reason: HOSPADM

## 2023-08-13 RX ORDER — FUROSEMIDE 10 MG/ML
40 INJECTION INTRAMUSCULAR; INTRAVENOUS DAILY
Status: DISCONTINUED | OUTPATIENT
Start: 2023-08-14 | End: 2023-08-14

## 2023-08-13 RX ORDER — BUSPIRONE HYDROCHLORIDE 5 MG/1
7.5 TABLET ORAL 2 TIMES DAILY
Status: DISCONTINUED | OUTPATIENT
Start: 2023-08-13 | End: 2023-08-17 | Stop reason: HOSPADM

## 2023-08-13 RX ORDER — ATORVASTATIN CALCIUM 40 MG/1
40 TABLET, FILM COATED ORAL DAILY
Status: DISCONTINUED | OUTPATIENT
Start: 2023-08-14 | End: 2023-08-17 | Stop reason: HOSPADM

## 2023-08-13 RX ORDER — METOPROLOL SUCCINATE 25 MG/1
25 TABLET, EXTENDED RELEASE ORAL DAILY
Status: DISCONTINUED | OUTPATIENT
Start: 2023-08-13 | End: 2023-08-17 | Stop reason: HOSPADM

## 2023-08-13 RX ORDER — ONDANSETRON 2 MG/ML
4 INJECTION INTRAMUSCULAR; INTRAVENOUS EVERY 6 HOURS PRN
Status: DISCONTINUED | OUTPATIENT
Start: 2023-08-13 | End: 2023-08-17 | Stop reason: HOSPADM

## 2023-08-13 RX ORDER — IPRATROPIUM BROMIDE 21 UG/1
2 SPRAY, METERED NASAL 3 TIMES DAILY
Status: DISCONTINUED | OUTPATIENT
Start: 2023-08-13 | End: 2023-08-17 | Stop reason: HOSPADM

## 2023-08-13 RX ORDER — ASPIRIN 81 MG/1
243 TABLET, CHEWABLE ORAL ONCE
Status: COMPLETED | OUTPATIENT
Start: 2023-08-13 | End: 2023-08-13

## 2023-08-13 RX ORDER — LOPERAMIDE HYDROCHLORIDE 2 MG/1
2 CAPSULE ORAL 3 TIMES DAILY PRN
Status: DISCONTINUED | OUTPATIENT
Start: 2023-08-13 | End: 2023-08-17 | Stop reason: HOSPADM

## 2023-08-13 RX ORDER — INSULIN LISPRO 100 [IU]/ML
1-5 INJECTION, SOLUTION INTRAVENOUS; SUBCUTANEOUS
Status: DISCONTINUED | OUTPATIENT
Start: 2023-08-13 | End: 2023-08-17 | Stop reason: HOSPADM

## 2023-08-13 RX ORDER — WARFARIN SODIUM 4 MG/1
8 TABLET ORAL DAILY
Status: DISCONTINUED | OUTPATIENT
Start: 2023-08-14 | End: 2023-08-14

## 2023-08-13 RX ORDER — MAGNESIUM SULFATE HEPTAHYDRATE 40 MG/ML
2 INJECTION, SOLUTION INTRAVENOUS ONCE
Status: COMPLETED | OUTPATIENT
Start: 2023-08-13 | End: 2023-08-13

## 2023-08-13 RX ORDER — PYRIDOXINE HCL (VITAMIN B6) 50 MG
50 TABLET ORAL DAILY
Status: DISCONTINUED | OUTPATIENT
Start: 2023-08-13 | End: 2023-08-17 | Stop reason: HOSPADM

## 2023-08-13 RX ORDER — ACETAMINOPHEN 325 MG/1
650 TABLET ORAL EVERY 6 HOURS PRN
Status: DISCONTINUED | OUTPATIENT
Start: 2023-08-13 | End: 2023-08-17 | Stop reason: HOSPADM

## 2023-08-13 RX ADMIN — METOPROLOL SUCCINATE 25 MG: 25 TABLET, EXTENDED RELEASE ORAL at 15:22

## 2023-08-13 RX ADMIN — ASPIRIN 81 MG CHEWABLE TABLET 243 MG: 81 TABLET CHEWABLE at 13:15

## 2023-08-13 RX ADMIN — FUROSEMIDE 50 MG: 10 INJECTION, SOLUTION INTRAMUSCULAR; INTRAVENOUS at 12:51

## 2023-08-13 RX ADMIN — MAGNESIUM SULFATE HEPTAHYDRATE 2 G: 40 INJECTION, SOLUTION INTRAVENOUS at 13:15

## 2023-08-13 RX ADMIN — BUSPIRONE HYDROCHLORIDE 7.5 MG: 5 TABLET ORAL at 17:05

## 2023-08-13 RX ADMIN — INSULIN LISPRO 1 UNITS: 100 INJECTION, SOLUTION INTRAVENOUS; SUBCUTANEOUS at 17:05

## 2023-08-13 RX ADMIN — IPRATROPIUM BROMIDE 2 SPRAY: 21 SPRAY, METERED NASAL at 21:27

## 2023-08-13 NOTE — ED PROVIDER NOTES
History  Chief Complaint   Patient presents with   • Leg Swelling     Bilateral lower leg swelling that started today per patients wife. States that he has been having a hard time getting around due to the swelling. Denies any shortness of breath      (Jacobo Nicolas) Ally Hu is a 80 y.o. male who identifies as male    They presented to the emergency department on August 13, 2023. Patient presents with:  Leg Swelling: Bilateral lower leg swelling that started today per patients wife. States that he has been having a hard time getting around due to the swelling. Denies any shortness of breath. The patient states that he has a history of CHF, takes 20 mg of Lasix 3 times weekly, however noted today while after waking up from a nap he noticed a severe leg swelling bilaterally. Patient notes that he usually has leg swelling and uses an Ace wrap, however the severity of his swelling caused him concern and prompted him to come to the emergency department for evaluation. Patient denies any shortness of breath, chest pain, nausea, vomiting, change in bowel habits, rash, fever, chills. Patient notes that he has history of prostate cancer and occasionally has dribbling issues, however was able to urinate today without issues including dysuria, urgency, frequency, hematuria. Patient also notes that he has a history of atrial fibrillation for which he takes Coumadin. Patient denies any other complaint at this time.       Allergies include:  No Known Allergies      Immunizations:    Immunization History  Administered            Date(s) Administered   COVID-19 MODERNA VACC 0.25 ML IM BOOSTER                         11/01/2021     COVID-19 MODERNA VACC 0.5 ML IM                         01/21/2021 02/19/2021 07/18/2022     INFLUENZA             10/09/2018  09/16/2020     Influenza, high dose seasonal 0.7 mL                         08/31/2021  09/15/2022     Pneumococcal Conjugate 13-Valent 10/30/2016     Pneumococcal Polysaccharide PPV23                         2020     Zoster Vaccine Recombinant                         2019     influenza, trivalent, adjuvanted                         2019    Immunizations Reviewed. Prior to Admission Medications   Prescriptions Last Dose Informant Patient Reported? Taking? ASPIRIN 81 PO 2023  Yes Yes   Sig: Take by oral route. OneTouch Delica Lancets 51J MISC 2023 Self, Spouse/Significant Other No Yes   Sig: USE TO TEST ONCE DAILY   OneTouch Ultra test strip 2023 Self, Spouse/Significant Other No Yes   Sig: TEST ONCE DAILY   Pyridoxine HCl (vitamin B-6) 50 MG TABS 2023 Self, Spouse/Significant Other No Yes   Sig: Take 1 tablet (50 mg total) by mouth daily   atorvastatin (LIPITOR) 40 mg tablet 2023 Self, Spouse/Significant Other No Yes   Sig: TAKE 1 TABLET BY MOUTH DAILY   busPIRone (BUSPAR) 7.5 mg tablet 2023  No Yes   Sig: Take 1 tablet (7.5 mg total) by mouth 2 (two) times a day   econazole nitrate 1 % cream 2023 Self, Spouse/Significant Other Yes Yes   furosemide (LASIX) 20 mg tablet 2023  No Yes   Sig: TAKE 1 TABLET BY MOUTH 3 DAYS WEEKLY   glimepiride (AMARYL) 1 mg tablet 2023 Self, Spouse/Significant Other No Yes   Sig: Take 1 tablet (1 mg total) by mouth daily with breakfast   ipratropium (ATROVENT) 0.03 % nasal spray 2023  No Yes   Si sprays into each nostril 3 (three) times a day   loperamide (IMODIUM A-D) 2 MG tablet 2023  Yes Yes   Sig: Take 2 mg by mouth 3 (three) times a day as needed for diarrhea.  Indications: Diarrhea   metoprolol succinate (Toprol XL) 25 mg 24 hr tablet 2023 Self, Spouse/Significant Other No Yes   Sig: Take 1 tablet (25 mg total) by mouth daily   warfarin (COUMADIN) 4 mg tablet 2023 Self No Yes   Sig: TAKE 2 TABLETS BY MOUTH DAILY OR AS DIRECTED   Patient taking differently: No sig reported      Facility-Administered Medications: None       Past Medical History:   Diagnosis Date   • Anxiety    • Atrial fibrillation [facility])    • Atrial flutter ([facility])    • Congestive heart failure (CHF) ([facility])    • COPD (chronic obstructive pulmonary disease) ([facility])    • Coronary artery disease    • DVT (deep venous thrombosis) ([facility])    • ED (erectile dysfunction)    • Hearing loss    • History of echocardiogram 04/05/2018    EF 0.55, Mild LVH. Mild moderate mitral regurg. Trace aortic regurg. • Hx of radiation therapy     XRT   • Hypercholesterolemia    • Hyperlipidemia    • Hypertension    • Long term (current) use of anticoagulants 8/21/2018   • Neuropathy of both feet    • Peripheral neuropathy    • Prostate cancer ([address] )    • Type 2 diabetes mellitus [facility])        Past Surgical History:   Procedure Laterality Date   • CARDIAC CATHETERIZATION  01/15/1988    Left main: unobstructed. Posterolateral branch 90% narrowed. • COLONOSCOPY  10/05/2017    Dr. Coco Palomares   • PROSTATE SURGERY         Family History   Problem Relation Age of Onset   • Cancer Brother         bladder   • Colon cancer Brother    • Heart disease Other    • Hypertension Other    • Cancer Other         bladder   • Colon cancer Other      I have reviewed and agree with the history as documented. E-Cigarette/Vaping   • E-Cigarette Use Never User      E-Cigarette/Vaping Substances   • Nicotine No    • THC No    • CBD No    • Flavoring No    • Other No    • Unknown No      Social History     Tobacco Use   • Smoking status: Never   • Smokeless tobacco: Never   Vaping Use   • Vaping Use: Never used   Substance Use Topics   • Alcohol use: Not Currently   • Drug use: Never        Review of Systems   Constitutional: Negative for chills and fever. HENT: Negative for ear pain and sore throat. Eyes: Negative for pain and visual disturbance. Respiratory: Negative for cough and shortness of breath. Cardiovascular: Positive for leg swelling. Negative for chest pain and palpitations. Gastrointestinal: Negative for abdominal pain and vomiting. Genitourinary: Negative for dysuria and hematuria. Musculoskeletal: Negative for arthralgias and back pain. Skin: Negative for color change and rash. Neurological: Negative for seizures and syncope. All other systems reviewed and are negative. Physical Exam  ED Triage Vitals [08/13/23 1127]   Temperature Pulse Respirations Blood Pressure SpO2   97.5 °F (36.4 °C) 85 18 102/55 98 %      Temp Source Heart Rate Source Patient Position - Orthostatic VS BP Location FiO2 (%)   Temporal Monitor Lying Right arm --      Pain Score       No Pain             Orthostatic Vital Signs  Vitals:    08/13/23 1127 08/13/23 1130   BP: 102/55 111/63   Pulse: 85 71   Patient Position - Orthostatic VS: Lying        Physical Exam  Vitals and nursing note reviewed. Constitutional:       General: He is not in acute distress. Appearance: Normal appearance. HENT:      Head: Normocephalic and atraumatic. Right Ear: External ear normal.      Left Ear: External ear normal.      Nose: Nose normal.      Mouth/Throat:      Mouth: Mucous membranes are moist.   Eyes:      Conjunctiva/sclera: Conjunctivae normal.   Cardiovascular:      Rate and Rhythm: Normal rate. Rhythm irregular. Pulmonary:      Effort: Pulmonary effort is normal. No respiratory distress. Breath sounds: Normal breath sounds. No rhonchi or rales. Abdominal:      General: Abdomen is flat. Bowel sounds are normal.      Tenderness: There is no abdominal tenderness. There is no guarding or rebound. Musculoskeletal:         General: Normal range of motion. Cervical back: Normal range of motion. Right lower leg: Edema (+2) present. Left lower leg: Edema (+2) present. Skin:     General: Skin is warm and dry. Capillary Refill: Capillary refill takes less than 2 seconds. Neurological:      Mental Status: He is alert. Mental status is at baseline.    Psychiatric: Mood and Affect: Mood normal.         ED Medications  Medications   magnesium sulfate 2 g/50 mL IVPB (premix) 2 g (2 g Intravenous New Bag 8/13/23 1315)   busPIRone (BUSPAR) tablet 7.5 mg (has no administration in time range)   ipratropium (ATROVENT) 0.03 % nasal spray 2 spray (has no administration in time range)   metoprolol succinate (TOPROL-XL) 24 hr tablet 25 mg (has no administration in time range)   pyridoxine (VITAMIN B6) tablet 50 mg (has no administration in time range)   loperamide (IMODIUM) capsule 2 mg (has no administration in time range)   aspirin chewable tablet 81 mg (has no administration in time range)   atorvastatin (LIPITOR) tablet 40 mg (has no administration in time range)   warfarin (COUMADIN) tablet 8 mg (has no administration in time range)   acetaminophen (TYLENOL) tablet 650 mg (has no administration in time range)   ondansetron (ZOFRAN) injection 4 mg (has no administration in time range)   furosemide (LASIX) injection 40 mg (has no administration in time range)   insulin lispro (HumaLOG) 100 units/mL subcutaneous injection 1-5 Units (has no administration in time range)   furosemide (LASIX) injection 50 mg (50 mg Intravenous Given 8/13/23 1251)   aspirin chewable tablet 243 mg (243 mg Oral Given 8/13/23 1315)       Diagnostic Studies  Results Reviewed     Procedure Component Value Units Date/Time    Urine culture [[id number]]     Lab Status: No result Specimen: Urine, Clean Catch     MRSA culture [[id number]]     Lab Status: No result Specimen: Nares from [address] [place] [address], Influenza A/B, RSV PCR, [facility] [[id number]]     Lab Status: No result Specimen: Nares from Nasopharyngeal Swab     B-Type Natriuretic Peptide(BNP) [[id number]]  (Abnormal) Collected: 08/13/23 1216    Lab Status: Final result Specimen: Blood from Arm, Left Updated: 08/13/23 1252      pg/mL     Magnesium [[id number]]  (Abnormal) Collected: 08/13/23 1220    Lab Status: Final result Specimen: Blood from Arm, Left Updated: 08/13/23 1247     Magnesium 1.3 mg/dL     Comprehensive metabolic panel [[id number]]  (Abnormal) Collected: 08/13/23 1216    Lab Status: Final result Specimen: Blood from Arm, Left Updated: 08/13/23 1247     Sodium 141 mmol/L      Potassium 3.6 mmol/L      Chloride 107 mmol/L      CO2 25 mmol/L      ANION GAP 9 mmol/L      BUN 22 mg/dL      Creatinine 0.87 mg/dL      Glucose 155 mg/dL      Calcium 8.4 mg/dL      AST 18 U/L      ALT 30 U/L      Alkaline Phosphatase 55 U/L      Total Protein 5.8 g/dL      Albumin 3.5 g/dL      Total Bilirubin 1.05 mg/dL      eGFR 77 ml/min/1.73sq m     Narrative:      [place] guidelines for Chronic Kidney Disease (CKD):   •  Stage 1 with normal or high GFR (GFR > 90 mL/min/1.73 square meters)  •  Stage 2 Mild CKD (GFR = 60-89 mL/min/1.73 square meters)  •  Stage 3A Moderate CKD (GFR = 45-59 mL/min/1.73 square meters)  •  Stage 3B Moderate CKD (GFR = 30-44 mL/min/1.73 square meters)  •  Stage 4 Severe CKD (GFR = 15-29 mL/min/1.73 square meters)  •  Stage 5 End Stage CKD (GFR <15 mL/min/1.73 square meters)  Note: GFR calculation is accurate only with a steady state creatinine    Urine Microscopic [[id number]]  (Abnormal) Collected: 08/13/23 1219    Lab Status: Final result Specimen: Urine, Clean Catch Updated: 08/13/23 1246     RBC, UA None Seen /hpf      WBC, UA 0-1 /hpf      Epithelial Cells None Seen /hpf      Bacteria, UA None Seen /hpf      MUCUS THREADS Occasional    HS Troponin 0hr (reflex protocol) [[id number]]  (Abnormal) Collected: 08/13/23 1211    Lab Status: Final result Specimen: Blood from Arm, Right Updated: 08/13/23 1240     hs TnI 0hr 508 ng/L     HS Troponin I 2hr [[id number]]     Lab Status: No result Specimen: Blood     UA w Reflex to Microscopic w Reflex to Culture [[id number]]  (Abnormal) Collected: 08/13/23 1219    Lab Status: Final result Specimen: Urine, Clean Catch Updated: 08/13/23 1232     Color, UA Yellow     Clarity, UA Clear Specific Gravity, UA 1.025     pH, UA 5.0     Leukocytes, UA Negative     Nitrite, UA Negative     Protein, UA Trace mg/dl      Glucose,  (1/4%) mg/dl      Ketones, UA Negative mg/dl      Urobilinogen, UA 0.2 E.U./dl      Bilirubin, UA Negative     Occult Blood, UA Negative    CBC and differential [[id number]]  (Abnormal) Collected: 08/13/23 1216    Lab Status: Final result Specimen: Blood from Arm, Left Updated: 08/13/23 1230     WBC 3.63 Thousand/uL      RBC 2.63 Million/uL      Hemoglobin 8.1 g/dL      Hematocrit 25.8 %      MCV 98 fL      MCH 30.8 pg      MCHC 31.4 g/dL      RDW 25.2 %      MPV 9.8 fL      Platelets 157 Thousands/uL      nRBC 1 /100 WBCs      Neutrophils Relative 61 %      Immat GRANS % 1 %      Lymphocytes Relative 30 %      Monocytes Relative 7 %      Eosinophils Relative 1 %      Basophils Relative 0 %      Neutrophils Absolute 2.22 Thousands/µL      Immature Grans Absolute 0.03 Thousand/uL      Lymphocytes Absolute 1.08 Thousands/µL      Monocytes Absolute 0.26 Thousand/µL      Eosinophils Absolute 0.03 Thousand/µL      Basophils Absolute 0.01 Thousands/µL                  XR chest pa & lateral   ED Interpretation by Cristian Chacko MD (08/13 1250)   No acute cardio-pulmonary disease. Independently interpreted by myself.         VAS lower limb venous duplex study, complete bilateral    (Results Pending)         Procedures  ECG 12 Lead Documentation Only    Date/Time: 8/13/2023 12:51 PM    Performed by: Cristian Chacko MD  Authorized by: Cristian Chacko MD    Indications / Diagnosis:  Leg swelling history of CHF, A-fib  ECG reviewed by me, the ED Provider: yes    Patient location:  ED  Previous ECG:     Previous ECG:  Compared to current    Comparison ECG info:  QT has lengthened    Similarity:  Changes noted  Interpretation:     Interpretation: abnormal    Rate:     ECG rate:  90    ECG rate assessment: normal    Rhythm:     Rhythm: atrial fibrillation    Ectopy: Ectopy: none    QRS:     QRS axis:  Normal    QRS intervals:  Normal  Conduction:     Conduction: normal    ST segments:     ST segments:  Normal  T waves:     T waves: non-specific and flattening      Flattening:  V3, V4, II, III, aVF and I  Other findings:     Other findings: prolonged qTc interval    Comments:      Atrial fibrillation at 90 bpm, prolonged QTc, nonspecific T wave flattening of leads I, 2, 3, aVF, V3, V4          ED Course  ED Course as of 08/13/23 1410   Sun Aug 13, 2023   1236 Hemoglobin(!): 8.1  Consistent with patient's baseline on previous admission   1241 hs TnI 0hr(!): 508   1250 Magnesium(!): 1.3   1254 BNP(!): 506                             SBIRT 22yo+    Flowsheet Row Most Recent Value   Initial Alcohol Screen: US AUDIT-C     1. How often do you have a drink containing alcohol? 0 Filed at: 08/13/2023 1129   2. How many drinks containing alcohol do you have on a typical day you are drinking? 0 Filed at: 08/13/2023 1129   3a. Male UNDER 65: How often do you have five or more drinks on one occasion? 0 Filed at: 08/13/2023 1129   Audit-C Score 0 Filed at: 08/13/2023 1129   RICK: How many times in the past year have you. .. Used an illegal drug or used a prescription medication for non-medical reasons? Never Filed at: 08/13/2023 1129                Medical Decision Making  Patient presents with:  Leg Swelling: Bilateral lower leg swelling that started today per patients wife. States that he has been having a hard time getting around due to the swelling. Denies any shortness of breath       Patient seen and examined noted to have irregularly irregular rhythm, clear lungs to auscultation bilaterally, 2+ pitting edema of the bilateral lower extremities, abdomen soft nontender to palpation without guarding or rebound.       Temp:  (97.5 °F (36.4 °C)) 97.5 °F (36.4 °C)  HR:  (71-85) 71  Resp:  (18) 18  BP: (102-111)/(55-63) 111/63    Differential diagnosis includes but is not limited to CHF, ACS, lymphedema.       Due to patient's history and presentation the following laboratory evidence was collected:  Recent Results (from the past 12 hour(s))  -HS Troponin 0hr (reflex protocol):   Collection Time: 08/13/23 12:11 PM       Result                      Value             Ref Range           hs TnI 0hr                  508 (H)           "Refer to [facility]*  -CBC and differential:   Collection Time: 08/13/23 12:16 PM       Result                      Value             Ref Range           WBC                         3.63 (L)          4.31 - 10.16*       RBC                         2.63 (L)          3.88 - 5.62 *       Hemoglobin                  8.1 (L)           12.0 - 17.0 *       Hematocrit                  25.8 (L)          36.5 - 49.3 %       MCV                         98                82 - 98 fL          MCH                         30.8              26.8 - 34.3 *       MCHC                        31.4              31.4 - 37.4 *       RDW                         25.2 (H)          11.6 - 15.1 %       MPV                         9.8               8.9 - 12.7 fL       Platelets                   112 (L)           149 - 390 Th*       nRBC                        1                 /100 WBCs           Neutrophils Relative        61                43 - 75 %           Immat GRANS %               1                 0 - 2 %             Lymphocytes Relative        30                14 - 44 %           Monocytes Relative          7                 4 - 12 %            Eosinophils Relative        1                 0 - 6 %             Basophils Relative          0                 0 - 1 %             Neutrophils Absolute        2.22              1.85 - 7.62 *       Immature Grans Absolute     0.03              0.00 - 0.20 *       Lymphocytes Absolute        1.08              0.60 - 4.47 *       Monocytes Absolute          0.26              0.17 - 1.22 *       Eosinophils Absolute        0.03              0.00 - 0.61 * Basophils Absolute          0.01              0.00 - 0.10 *  -Comprehensive metabolic panel:   Collection Time: 08/13/23 12:16 PM       Result                      Value             Ref Range           Sodium                      141               135 - 147 mm*       Potassium                   3.6               3.5 - 5.3 mm*       Chloride                    107               96 - 108 mmo*       CO2                         25                21 - 32 mmol*       ANION GAP                   9                 mmol/L              BUN                         22                5 - 25 mg/dL        Creatinine                  0.87              0.60 - 1.30 *       Glucose                     155 (H)           65 - 140 mg/*       Calcium                     8.4               8.4 - 10.2 m*       AST                         18                13 - 39 U/L         ALT                         30                7 - 52 U/L          Alkaline Phosphatase        55                34 - 104 U/L        Total Protein               5.8 (L)           6.4 - 8.4 g/*       Albumin                     3.5               3.5 - 5.0 g/*       Total Bilirubin             1.05 (H)          0.20 - 1.00 *       eGFR                        77                ml/min/1.73s*  -B-Type Natriuretic Peptide(BNP):   Collection Time: 08/13/23 12:16 PM       Result                      Value             Ref Range           BNP                         506 (H)           0 - 100 pg/mL  -UA w Reflex to Microscopic w Reflex to Culture:   Collection Time: 08/13/23 12:19 PM  Specimen: Urine, Clean Catch       Result                      Value             Ref Range           Color, UA                   Yellow                                Clarity, UA                 Clear                                 Specific Justice, UA        1.025             1.003 - 1.030       pH, UA                      5.0               4.5, 5.0, 5.*       Leukocytes, UA              Negative Negative            Nitrite, UA                 Negative          Negative            Protein, UA                 Trace (A)         Negative mg/*       Glucose, UA                                   Negative mg/*   250 (1/4%) (A)       Ketones, UA                 Negative          Negative mg/*       Urobilinogen, UA            0.2               0.2, 1.0 E.U*       Bilirubin, UA               Negative          Negative            Occult Blood, UA            Negative          Negative       -Urine Microscopic:   Collection Time: 08/13/23 12:19 PM       Result                      Value             Ref Range           RBC, UA                     None Seen         None Seen, 0*       WBC, UA                     0-1               None Seen, 0*       Epithelial Cells            None Seen         None Seen, O*       Bacteria, UA                None Seen         None Seen, O*       MUCUS THREADS                                 None Seen       Occasional (A)  -Magnesium:   Collection Time: 08/13/23 12:20 PM       Result                      Value             Ref Range           Magnesium                   1.3 (L)           1.9 - 2.7 mg*    Due to patient's history and presentation the following imaging was collected:  XR chest pa & lateral   ED Interpretation    No acute cardio-pulmonary disease. Independently interpreted by myself. EKG: interpreted by myself as above.      Patient was administered:      Medication Administration - last 24 hours from 08/12/2023 1407 to   08/13/2023 1407       Date/Time Order Dose Route Action Action by     08/13/2023 1251 EDT furosemide (LASIX) injection 50 mg 50 mg Intravenous   Given Prem Buchanan RN     08/13/2023 1315 EDT magnesium sulfate 2 g/50 mL IVPB (premix) 2 g 2 g   Intravenous [address] Prem Buchanan RN     08/13/2023 1315 EDT aspirin chewable tablet 243 mg 243 mg Oral Given   Prem Buchanan RN      At this time given patient has had no chest pain or other concerning signs or symptoms of ACS in conjunction with elevated troponin likely increased secondary due to CHF at this time. I doubt ACS, or other pathology better explains patient's elevated troponin at this time. Discussed patient's case with Dr. She Stevens [facility]) regarding admission who accepted the patient for further evaluation and management. CHF exacerbation ([address]): acute illness or injury that poses a threat to life or bodily functions  Elevated troponin: acute illness or injury that poses a threat to life or bodily functions  Hypomagnesemia: acute illness or injury  Amount and/or Complexity of Data Reviewed  Labs: ordered. Decision-making details documented in ED Course. Radiology: ordered and independent interpretation performed. Risk  OTC drugs. Prescription drug management. Decision regarding hospitalization. Disposition  Final diagnoses:   CHF exacerbation ([address])   Elevated troponin   Hypomagnesemia     Time reflects when diagnosis was documented in both MDM as applicable and the Disposition within this note     Time User Action Codes Description Comment    8/13/2023  1:10 PM de Eliot Motto Add [I50.9] CHF exacerbation ([address])     8/13/2023  1:10 PM de Eliot Motto Add [R77.8] Elevated troponin     8/13/2023  1:10 PM de Eliot Whittingtonto Add [E83.42] Hypomagnesemia       ED Disposition     ED Disposition   Admit    Condition   Stable    Date/Time   Sun Aug 13, 2023  1:10 PM    Comment   Case was discussed with Dr. She Stevens and the patient's admission status was agreed to be Admission Status: inpatient status to the service of Dr. She Stevens. Follow-up Information    None         Current Discharge Medication List      CONTINUE these medications which have NOT CHANGED    Details   ASPIRIN 81 PO Take by oral route.       atorvastatin (LIPITOR) 40 mg tablet TAKE 1 TABLET BY MOUTH DAILY  Qty: 90 tablet, Refills: 3    Associated Diagnoses: Persistent atrial fibrillation ([address]) busPIRone (BUSPAR) 7.5 mg tablet Take 1 tablet (7.5 mg total) by mouth 2 (two) times a day  Qty: 180 tablet, Refills: 3    Associated Diagnoses: Reactive depression      econazole nitrate 1 % cream       furosemide (LASIX) 20 mg tablet TAKE 1 TABLET BY MOUTH 3 DAYS WEEKLY  Qty: 45 tablet, Refills: 3    Associated Diagnoses: Edema, unspecified type      glimepiride (AMARYL) 1 mg tablet Take 1 tablet (1 mg total) by mouth daily with breakfast  Qty: 90 tablet, Refills: 3    Associated Diagnoses: Type 2 diabetes mellitus without complication, without long-term current use of insulin ([facility] [facility]      ipratropium (ATROVENT) 0.03 % nasal spray 2 sprays into each nostril 3 (three) times a day  Qty: 30 mL, Refills: 5    Associated Diagnoses: Nonallergic rhinitis      loperamide (IMODIUM A-D) 2 MG tablet Take 2 mg by mouth 3 (three) times a day as needed for diarrhea. Indications: Diarrhea      metoprolol succinate (Toprol XL) 25 mg 24 hr tablet Take 1 tablet (25 mg total) by mouth daily  Qty: 90 tablet, Refills: 3    Associated Diagnoses: Persistent atrial fibrillation (HCC)      OneTouch Delica Lancets 96U MISC USE TO TEST ONCE DAILY  Qty: 100 each, Refills: 5    Associated Diagnoses: Diabetes mellitus type 2 in nonobese ([facility])      OneTouch Ultra test strip TEST ONCE DAILY  Qty: 100 each, Refills: 1    Associated Diagnoses: Diabetes mellitus type 2 in nonobese ([facility])      Pyridoxine HCl (vitamin B-6) 50 MG TABS Take 1 tablet (50 mg total) by mouth daily  Qty: 90 tablet, Refills: 1    Associated Diagnoses: Neuropathy      warfarin (COUMADIN) 4 mg tablet TAKE 2 TABLETS BY MOUTH DAILY OR AS DIRECTED  Qty: 180 tablet, Refills: 5    Associated Diagnoses: Persistent atrial fibrillation (HCC)           No discharge procedures on file. PDMP Review     None           ED Provider  Attending physically available and evaluated Coty Browning. I managed the patient along with the ED Attending.     Electronically Signed by Aicha Bo MD  08/13/23 8406

## 2023-08-13 NOTE — ASSESSMENT & PLAN NOTE
· Troponin 508 > 591  · Continue to trend  · Denies CP  · Maintain on aspirin and statin therapy  · Suspect myocardial injury in the setting of CHF exacerbation  · Cardiology input would be appreciated

## 2023-08-13 NOTE — ED ATTENDING ATTESTATION
8/13/2023  IIsidro MD, saw and evaluated the patient. I have discussed the patient with the resident/non-physician practitioner and agree with the resident's/non-physician practitioner's findings, Plan of Care, and MDM as documented in the resident's/non-physician practitioner's note, except where noted. All available labs and Radiology studies were reviewed. I was present for key portions of any procedure(s) performed by the resident/non-physician practitioner and I was immediately available to provide assistance. At this point I agree with the current assessment done in the Emergency Department. I have conducted an independent evaluation of this patient a history and physical is as follows:    55-year-old male, presents with complaints of increased swelling in bilateral legs. On exam, patient appears comfortable in no distress, normal respiratory effort and speaking full sentences. Noted to have significant edema to her bilateral lower extremities. ED Course     Chest x-ray dependently reviewed myself, no infiltrate, effusion, pulmonary edema, no acute findings. Noted to have increased to BNP, elevated troponin. Patient denies any chest pain, no acute ischemic findings on EKG. IV Lasix given in ED, patient to be admitted for further monitoring, evaluation, and treatment.     Critical Care Time  Procedures

## 2023-08-13 NOTE — ASSESSMENT & PLAN NOTE
· Baseline hgb around 10 - 12  · Now with a hgb of 8.1  · Possible part dilutional with CHF exacerbation  · Could also be related to recent diarrheal illness  · Will continue to trend   · Denies hematochezia, melena  · Check B12, folate, iron panel  · Should hgb continue to drop, discontinue coumadin therapy but will avoid bridge therapy with lovenox for now

## 2023-08-13 NOTE — PROGRESS NOTES
[address]  Progress Note  Name: Anthony Young  MRN: [id number]  Unit/Bed#: 141-98 I Date of Admission: 8/13/2023   Date of Service: 8/13/2023 I Hospital Day: 0    Assessment/Plan   * Acute on chronic diastolic (congestive) heart failure (HCC)  Assessment & Plan  Wt Readings from Last 3 Encounters:   08/13/23 92.1 kg (203 lb 0.7 oz)   08/03/23 86 kg (189 lb 9.5 oz)   06/16/23 86.2 kg (190 lb)     · Up approximately 13 lbs from dry weight  · Just completed steroid taper and had a recent diarrheal illness  · Last ECHO in 8315 with diastolic dysfunction   · Repeat ECHO at this time  · Initiate on lasix 40 mg IV daily  · Monitor I/O and daily weights  · Low salt diet with fluid restriction initiated  · Cardiology consulted        Anemia  Assessment & Plan  · Baseline hgb around 10 - 12  · Now with a hgb of 8.1  · Possible part dilutional with CHF exacerbation  · Could also be related to recent diarrheal illness  · Will continue to trend   · Denies hematochezia, melena  · Check B12, folate, iron panel  · Should hgb continue to drop, discontinue coumadin therapy but will avoid bridge therapy with lovenox for now    Troponin level elevated  Assessment & Plan  · Troponin 508 > 591  · Continue to trend  · Denies CP  · Maintain on aspirin and statin therapy  · Suspect myocardial injury in the setting of CHF exacerbation  · Cardiology input would be appreciated    Type 2 diabetes mellitus without complication, without long-term current use of insulin ([facility])  Assessment & Plan  Lab Results   Component Value Date    HGBA1C 10.6 (H) 08/02/2023       Recent Labs     08/13/23  1618   POCGLU 155*       Blood Sugar Average: Last 72 hrs:  (P) 155   Poor control per last A1c  Only on gliperide at home  Initiate on ADA diet and SSI  Monitor glucose levels closely and escalate insulin regimen as needed  Will need endocrine follow up on discharge likely    Malignant neoplasm of prostate ([address])  Assessment & Plan  · History of prostate cancer s/p radiation  · Does not take any medications to help urine flow  · Reported abdominal pain and difficulty urinating last night  · Now resolved but will continue with bladder scans   · Should he be retaining urine consistently, can consider flomax    Persistent atrial fibrillation (HCC)  Assessment & Plan  · INR is subtherapeutic at this time  · Supposed to be on coumadin therapy  · Re-initiate at this time and trend INR levels  · Currently rate controlled with metoprolol         VTE Pharmacologic Prophylaxis: VTE Score: 4 Moderate Risk (Score 3-4) - Pharmacological DVT Prophylaxis Ordered: warfarin (Coumadin). Code Status: Level 1 - Full Code   Discussion with family: Updated  (wife) at bedside. Anticipated Length of Stay: Patient will be admitted on an inpatient basis with an anticipated length of stay of greater than 2 midnights secondary to anemia work up, IV diuresis, cardiology eval.    Total Time Spent on Date of Encounter in care of patient: 65 minutes This time was spent on one or more of the following: performing physical exam; counseling and coordination of care; obtaining or reviewing history; documenting in the medical record; reviewing/ordering tests, medications or procedures; communicating with other healthcare professionals and discussing with patient's family/caregivers. Chief Complaint: leg swelling    History of Present Illness:  Coty Browning is a 80 y.o. male with a PMH of prostate cancer, PAD, DM, CHF, afib who presents with leg swelling. Patient recently discharged from the hospital for diarrheal illness. Stated he was doing well but night before admission he began having difficulty with urinating and developed lower abdominal pain with this. The next morning, he discovered his legs were swollen up to his thigh. He does wear daily compressive stockings but this swelling was significantly more than his baseline.  Abdominal pain is currently resolved and he states he is urinating normally again. He states his bowel movements have returned to normal and he is eating a normal diet again. Just completed a lengthy steroid taper. He denies SOB, CP, vomiting, diarrhea, hematochezia, melena. Found to be anemic on presentation with baseline hgb around 10 and presentation was at 8.1. BNP is elevated and he has pitting edema. Given IV lasix in the ED. He has already begun urinating well with this. Review of Systems:  Review of Systems    Past Medical and Surgical History:   Past Medical History:   Diagnosis Date   • Anxiety    • Atrial fibrillation ([address])    • Atrial flutter (HCC)    • Congestive heart failure (CHF) ([facility])    • COPD (chronic obstructive pulmonary disease) (HCC)    • Coronary artery disease    • DVT (deep venous thrombosis) ([facility])    • ED (erectile dysfunction)    • Hearing loss    • History of echocardiogram 04/05/2018    EF 0.55, Mild LVH. Mild moderate mitral regurg. Trace aortic regurg. • Hx of radiation therapy     XRT   • Hypercholesterolemia    • Hyperlipidemia    • Hypertension    • Long term (current) use of anticoagulants 8/21/2018   • Neuropathy of both feet    • Peripheral neuropathy    • Prostate cancer ([address])    • Type 2 diabetes mellitus [facility])        Past Surgical History:   Procedure Laterality Date   • CARDIAC CATHETERIZATION  01/15/1988    Left main: unobstructed. Posterolateral branch 90% narrowed. • COLONOSCOPY  10/05/2017    Dr. Blake Rajan   • PROSTATE SURGERY         Meds/Allergies:  Prior to Admission medications    Medication Sig Start Date End Date Taking? Authorizing Provider   ASPIRIN 81 PO Take by oral route.    Yes Historical Provider, MD   atorvastatin (LIPITOR) 40 mg tablet TAKE 1 TABLET BY MOUTH DAILY 5/2/23  Yes Rodney Kumar, DO   busPIRone (BUSPAR) 7.5 mg tablet Take 1 tablet (7.5 mg total) by mouth 2 (two) times a day 7/31/23  Yes Elijah Borja, DO   econazole nitrate 1 % cream  6/1/23  Yes Historical Provider, MD   furosemide (LASIX) 20 mg tablet TAKE 1 TABLET BY MOUTH 3 DAYS WEEKLY 7/17/23  Yes Rodney Kumar,    glimepiride (AMARYL) 1 mg tablet Take 1 tablet (1 mg total) by mouth daily with breakfast 6/2/23 5/27/24 Yes Elijah Borja,    ipratropium (ATROVENT) 0.03 % nasal spray 2 sprays into each nostril 3 (three) times a day 8/10/23  Yes Bry Rivas PA-C   loperamide (IMODIUM A-D) 2 MG tablet Take 2 mg by mouth 3 (three) times a day as needed for diarrhea. Indications: Diarrhea   Yes Historical Provider, MD   metoprolol succinate (Toprol XL) 25 mg 24 hr tablet Take 1 tablet (25 mg total) by mouth daily 6/16/23  Yes Elijah Borja,    OneTouch Delica Lancets 31E MISC USE TO TEST ONCE DAILY 1/3/23  Yes Elijah Borja, DO   OneTouch Ultra test strip TEST ONCE DAILY 1/3/23  Yes Elijah Borja DO   Pyridoxine HCl (vitamin B-6) 50 MG TABS Take 1 tablet (50 mg total) by mouth daily 6/16/23  Yes Elijah Borja DO   warfarin (COUMADIN) 4 mg tablet TAKE 2 TABLETS BY MOUTH DAILY OR AS DIRECTED  Patient taking differently: No sig reported 2/23/23  Yes Katerine Bauer, DO     I have reviewed home medications using recent Epic encounter.     Allergies: No Known Allergies    Social History:  Marital Status: /Civil Union   Occupation: none  Patient Pre-hospital Living Situation: Home  Patient Pre-hospital Level of Mobility: walks  Patient Pre-hospital Diet Restrictions: none  Substance Use History:   Social History     Substance and Sexual Activity   Alcohol Use Not Currently     Social History     Tobacco Use   Smoking Status Never   Smokeless Tobacco Never     Social History     Substance and Sexual Activity   Drug Use Never       Family History:  Family History   Problem Relation Age of Onset   • Cancer Brother         bladder   • Colon cancer Brother    • Heart disease Other    • Hypertension Other    • Cancer Other         bladder   • Colon cancer Other        Physical Exam: Vitals:   Blood Pressure: 103/59 (08/13/23 1422)  Pulse: 63 (08/13/23 1422)  Temperature: 97.5 °F (36.4 °C) (08/13/23 1127)  Temp Source: Temporal (08/13/23 1127)  Respirations: 15 (08/13/23 1422)  Height: 6' 1" (185.4 cm) (08/13/23 1422)  Weight - Scale: 92.1 kg (203 lb 0.7 oz) (08/13/23 1422)  SpO2: 100 % (08/13/23 1430)    Physical Exam  Vitals and nursing note reviewed. Constitutional:       General: He is not in acute distress. Appearance: He is ill-appearing. Cardiovascular:      Rate and Rhythm: Normal rate. Rhythm irregular. Pulses: Normal pulses. Heart sounds: Normal heart sounds. Pulmonary:      Effort: Pulmonary effort is normal.      Breath sounds: No wheezing, rhonchi or rales. Abdominal:      General: Bowel sounds are normal.      Tenderness: There is no abdominal tenderness. There is no guarding. Musculoskeletal:      Right lower leg: Edema present. Left lower leg: Edema present. Skin:     General: Skin is warm and dry. Neurological:      General: No focal deficit present. Mental Status: He is alert and oriented to person, place, and time. Mental status is at baseline.    Psychiatric:         Mood and Affect: Mood normal.         Behavior: Behavior normal.          Additional Data:     Lab Results:  Results from last 7 days   Lab Units 08/13/23  1216   WBC Thousand/uL 3.63*   HEMOGLOBIN g/dL 8.1*   HEMATOCRIT % 25.8*   PLATELETS Thousands/uL 112*   NEUTROS PCT % 61   LYMPHS PCT % 30   MONOS PCT % 7   EOS PCT % 1     Results from last 7 days   Lab Units 08/13/23  1216   SODIUM mmol/L 141   POTASSIUM mmol/L 3.6   CHLORIDE mmol/L 107   CO2 mmol/L 25   BUN mg/dL 22   CREATININE mg/dL 0.87   ANION GAP mmol/L 9   CALCIUM mg/dL 8.4   ALBUMIN g/dL 3.5   TOTAL BILIRUBIN mg/dL 1.05*   ALK PHOS U/L 55   ALT U/L 30   AST U/L 18   GLUCOSE RANDOM mg/dL 155*     Results from last 7 days   Lab Units 08/13/23  1540   INR  1.11     Results from last 7 days   Lab Units 08/13/23  1618   POC GLUCOSE mg/dl 155*               Lines/Drains:  Invasive Devices     Peripheral Intravenous Line  Duration           Peripheral IV 08/13/23 Distal;Right;Upper;Ventral (anterior) Arm <1 day                    Imaging: Reviewed radiology reports from this admission including: chest xray  XR chest pa & lateral   ED Interpretation by Irwin Miller MD (08/13 1250)   No acute cardio-pulmonary disease. Independently interpreted by myself. VAS lower limb venous duplex study, complete bilateral    (Results Pending)       EKG and Other Studies Reviewed on Admission:   · EKG: Atrial fibrillation. HR 90.    ** Please Note: This note has been constructed using a voice recognition system.  **

## 2023-08-13 NOTE — ASSESSMENT & PLAN NOTE
Wt Readings from Last 3 Encounters:   08/13/23 92.1 kg (203 lb 0.7 oz)   08/03/23 86 kg (189 lb 9.5 oz)   06/16/23 86.2 kg (190 lb)     · Up approximately 13 lbs from dry weight  · Just completed steroid taper and had a recent diarrheal illness  · Last ECHO in 1736 with diastolic dysfunction   · Repeat ECHO at this time  · Initiate on lasix 40 mg IV daily  · Monitor I/O and daily weights  · Low salt diet with fluid restriction initiated  · Cardiology consulted

## 2023-08-13 NOTE — ASSESSMENT & PLAN NOTE
· History of prostate cancer s/p radiation  · Does not take any medications to help urine flow  · Reported abdominal pain and difficulty urinating last night  · Now resolved but will continue with bladder scans   · Should he be retaining urine consistently, can consider flomax

## 2023-08-13 NOTE — H&P
[address]  H&P  Name: Joseph Newberry 80 y.o. male I MRN: [id number]  Unit/Bed#: 427-01 I Date of Admission: 8/13/2023   Date of Service: 8/13/2023 I Hospital Day: 0      Assessment/Plan   * Acute on chronic diastolic (congestive) heart failure (HCC)  Assessment & Plan  Wt Readings from Last 3 Encounters:   08/13/23 92.1 kg (203 lb 0.7 oz)   08/03/23 86 kg (189 lb 9.5 oz)   06/16/23 86.2 kg (190 lb)     · Up approximately 13 lbs from dry weight  · Just completed steroid taper and had a recent diarrheal illness  · Last ECHO in 7978 with diastolic dysfunction   · Repeat ECHO at this time  · Initiate on lasix 40 mg IV daily  · Monitor I/O and daily weights  · Low salt diet with fluid restriction initiated  · Cardiology consulted        Anemia  Assessment & Plan  · Baseline hgb around 10 - 12  · Now with a hgb of 8.1  · Possible part dilutional with CHF exacerbation  · Could also be related to recent diarrheal illness  · Will continue to trend   · Denies hematochezia, melena  · Check B12, folate, iron panel  · Should hgb continue to drop, discontinue coumadin therapy but will avoid bridge therapy with lovenox for now    Troponin level elevated  Assessment & Plan  · Troponin 508 > 591  · Continue to trend  · Denies CP  · Maintain on aspirin and statin therapy  · Suspect myocardial injury in the setting of CHF exacerbation  · Cardiology input would be appreciated    Type 2 diabetes mellitus without complication, without long-term current use of insulin ([facility])  Assessment & Plan  Lab Results   Component Value Date    HGBA1C 10.6 (H) 08/02/2023       Recent Labs     08/13/23  1618   POCGLU 155*       Blood Sugar Average: Last 72 hrs:  (P) 155   Poor control per last A1c  Only on gliperide at home  Initiate on ADA diet and SSI  Monitor glucose levels closely and escalate insulin regimen as needed  Will need endocrine follow up on discharge likely    Malignant neoplasm of prostate ([address])  Assessment & Plan  · History of prostate cancer s/p radiation  · Does not take any medications to help urine flow  · Reported abdominal pain and difficulty urinating last night  · Now resolved but will continue with bladder scans   · Should he be retaining urine consistently, can consider flomax    Persistent atrial fibrillation (HCC)  Assessment & Plan  · INR is subtherapeutic at this time  · Supposed to be on coumadin therapy  · Re-initiate at this time and trend INR levels  · Currently rate controlled with metoprolol                VTE Pharmacologic Prophylaxis: VTE Score: 4 Moderate Risk (Score 3-4) - Pharmacological DVT Prophylaxis Ordered: warfarin (Coumadin). Code Status: Level 1 - Full Code   Discussion with family: Updated  (wife) at bedside.     Anticipated Length of Stay: Patient will be admitted on an inpatient basis with an anticipated length of stay of greater than 2 midnights secondary to anemia work up, IV diuresis, cardiology eval.     Total Time Spent on Date of Encounter in care of patient: 65 minutes This time was spent on one or more of the following: performing physical exam; counseling and coordination of care; obtaining or reviewing history; documenting in the medical record; reviewing/ordering tests, medications or procedures; communicating with other healthcare professionals and discussing with patient's family/caregivers.     Chief Complaint: leg swelling     History of Present Illness:  Nikki Jack is a 80 y.o. male with a PMH of prostate cancer, PAD, DM, CHF, afib who presents with leg swelling. Patient recently discharged from the hospital for diarrheal illness. Stated he was doing well but night before admission he began having difficulty with urinating and developed lower abdominal pain with this. The next morning, he discovered his legs were swollen up to his thigh. He does wear daily compressive stockings but this swelling was significantly more than his baseline.  Abdominal pain is currently resolved and he states he is urinating normally again. He states his bowel movements have returned to normal and he is eating a normal diet again. Just completed a lengthy steroid taper. He denies SOB, CP, vomiting, diarrhea, hematochezia, melena. Found to be anemic on presentation with baseline hgb around 10 and presentation was at 8.1. BNP is elevated and he has pitting edema. Given IV lasix in the ED. He has already begun urinating well with this.      Review of Systems:  Review of Systems     Past Medical and Surgical History:   Medical History        Past Medical History:   Diagnosis Date   • Anxiety     • Atrial fibrillation (HCC)     • Atrial flutter (HCC)     • Congestive heart failure (CHF) (HCC)     • COPD (chronic obstructive pulmonary disease) (HCC)     • Coronary artery disease     • DVT (deep venous thrombosis) (HCC)     • ED (erectile dysfunction)     • Hearing loss     • History of echocardiogram 04/05/2018     EF 0.55, Mild LVH. Mild moderate mitral regurg. Trace aortic regurg. • Hx of radiation therapy       XRT   • Hypercholesterolemia     • Hyperlipidemia     • Hypertension     • Long term (current) use of anticoagulants 8/21/2018   • Neuropathy of both feet     • Peripheral neuropathy     • Prostate cancer [facility])     • Type 2 diabetes mellitus [facility])              Surgical History         Past Surgical History:   Procedure Laterality Date   • CARDIAC CATHETERIZATION   01/15/1988     Left main: unobstructed. Posterolateral branch 90% narrowed. • COLONOSCOPY   10/05/2017     Dr. Alexander Rios                Meds/Allergies:          Prior to Admission medications    Medication Sig Start Date End Date Taking?  Authorizing Provider   ASPIRIN 81 PO Take by oral route.     Yes Historical Provider, MD   atorvastatin (LIPITOR) 40 mg tablet TAKE 1 TABLET BY MOUTH DAILY 5/2/23   Yes Rodney Kumar DO   busPIRone (BUSPAR) 7.5 mg tablet Take 1 tablet (7.5 mg total) by mouth 2 (two) times a day 7/31/23   Yes Savannah Linton DO   econazole nitrate 1 % cream   6/1/23   Yes Historical Provider, MD   furosemide (LASIX) 20 mg tablet TAKE 1 TABLET BY MOUTH 3 DAYS WEEKLY 7/17/23   Yes Katerine Bauer DO   glimepiride (AMARYL) 1 mg tablet Take 1 tablet (1 mg total) by mouth daily with breakfast 6/2/23 5/27/24 Yes Elijah Borja,    ipratropium (ATROVENT) 0.03 % nasal spray 2 sprays into each nostril 3 (three) times a day 8/10/23   Yes Bry Rivas PA-C   loperamide (IMODIUM A-D) 2 MG tablet Take 2 mg by mouth 3 (three) times a day as needed for diarrhea.  Indications: Diarrhea     Yes Historical Provider, MD   metoprolol succinate (Toprol XL) 25 mg 24 hr tablet Take 1 tablet (25 mg total) by mouth daily 6/16/23   Yes Elijah Borja DO   OneTouch Delica Lancets 36F MISC USE TO TEST ONCE DAILY 1/3/23   Yes Elijah Borja DO   OneTouch Ultra test strip TEST ONCE DAILY 1/3/23   Yes Savannah Linton DO   Pyridoxine HCl (vitamin B-6) 50 MG TABS Take 1 tablet (50 mg total) by mouth daily 6/16/23   Yes Savannah Linton DO   warfarin (COUMADIN) 4 mg tablet TAKE 2 TABLETS BY MOUTH DAILY OR AS DIRECTED  Patient taking differently: No sig reported 2/23/23   Yes Katerine Bauer DO      I have reviewed home medications using recent Epic encounter.     Allergies: No Known Allergies     Social History:  Marital Status: /Civil Union   Occupation: none  Patient Pre-hospital Living Situation: Home  Patient Pre-hospital Level of Mobility: walks  Patient Pre-hospital Diet Restrictions: none  Substance Use History:   Social History          Substance and Sexual Activity   Alcohol Use Not Currently      Social History          Tobacco Use   Smoking Status Never   Smokeless Tobacco Never      Social History          Substance and Sexual Activity   Drug Use Never         Family History:  Family History         Family History   Problem Relation Age of Onset   • Cancer Brother           bladder   • Colon cancer Brother     • Heart disease Other     • Hypertension Other     • Cancer Other           bladder   • Colon cancer Other              Physical Exam:      Vitals:   Blood Pressure: 103/59 (08/13/23 1422)  Pulse: 63 (08/13/23 1422)  Temperature: 97.5 °F (36.4 °C) (08/13/23 1127)  Temp Source: Temporal (08/13/23 1127)  Respirations: 15 (08/13/23 1422)  Height: 6' 1" (185.4 cm) (08/13/23 1422)  Weight - Scale: 92.1 kg (203 lb 0.7 oz) (08/13/23 1422)  SpO2: 100 % (08/13/23 1430)     Physical Exam  Vitals and nursing note reviewed. Constitutional:       General: He is not in acute distress. Appearance: He is ill-appearing. Cardiovascular:      Rate and Rhythm: Normal rate. Rhythm irregular. Pulses: Normal pulses. Heart sounds: Normal heart sounds. Pulmonary:      Effort: Pulmonary effort is normal.      Breath sounds: No wheezing, rhonchi or rales. Abdominal:      General: Bowel sounds are normal.      Tenderness: There is no abdominal tenderness. There is no guarding. Musculoskeletal:      Right lower leg: Edema present. Left lower leg: Edema present. Skin:     General: Skin is warm and dry. Neurological:      General: No focal deficit present. Mental Status: He is alert and oriented to person, place, and time. Mental status is at baseline.    Psychiatric:         Mood and Affect: Mood normal.         Behavior: Behavior normal.            Additional Data:      Lab Results:       Results from last 7 days   Lab Units 08/13/23  1216   WBC Thousand/uL 3.63*   HEMOGLOBIN g/dL 8.1*   HEMATOCRIT % 25.8*   PLATELETS Thousands/uL 112*   NEUTROS PCT % 61   LYMPHS PCT % 30   MONOS PCT % 7   EOS PCT % 1           Results from last 7 days   Lab Units 08/13/23  1216   SODIUM mmol/L 141   POTASSIUM mmol/L 3.6   CHLORIDE mmol/L 107   CO2 mmol/L 25   BUN mg/dL 22   CREATININE mg/dL 0.87   ANION GAP mmol/L 9   CALCIUM mg/dL 8.4   ALBUMIN g/dL 3.5   TOTAL BILIRUBIN mg/dL 1.05*   ALK PHOS U/L 55   ALT U/L 30   AST U/L 18   GLUCOSE RANDOM mg/dL 155*           Results from last 7 days   Lab Units 08/13/23  1540   INR   1.11           Results from last 7 days   Lab Units 08/13/23  1618   POC GLUCOSE mg/dl 155*                 Lines/Drains:      Invasive Devices      Peripheral Intravenous Line  Duration             Peripheral IV 08/13/23 Distal;Right;Upper;Ventral (anterior) Arm <1 day                          Imaging: Reviewed radiology reports from this admission including: chest xray  XR chest pa & lateral   ED Interpretation by Johana Moss MD (08/13 1250)   No acute cardio-pulmonary disease. Independently interpreted by myself.          VAS lower limb venous duplex study, complete bilateral    (Results Pending)         EKG and Other Studies Reviewed on Admission:   • EKG: Atrial fibrillation. HR 90.     ** Please Note: This note has been constructed using a voice recognition system.  **

## 2023-08-13 NOTE — PLAN OF CARE
Problem: PAIN - ADULT  Goal: Verbalizes/displays adequate comfort level or baseline comfort level  Description: Interventions:  - Encourage patient to monitor pain and request assistance  - Assess pain using appropriate pain scale  - Administer analgesics based on type and severity of pain and evaluate response  - Implement non-pharmacological measures as appropriate and evaluate response  - Consider cultural and social influences on pain and pain management  - Notify physician/advanced practitioner if interventions unsuccessful or patient reports new pain  Outcome: Progressing     Problem: INFECTION - ADULT  Goal: Absence or prevention of progression during hospitalization  Description: INTERVENTIONS:  - Assess and monitor for signs and symptoms of infection  - Monitor lab/diagnostic results  - Monitor all insertion sites, i.e. indwelling lines, tubes, and drains  - Monitor endotracheal if appropriate and nasal secretions for changes in amount and color  - [place] appropriate cooling/warming therapies per order  - Administer medications as ordered  - Instruct and encourage patient and family to use good hand hygiene technique  - Identify and instruct in appropriate isolation precautions for identified infection/condition  Outcome: Progressing  Goal: Absence of fever/infection during neutropenic period  Description: INTERVENTIONS:  - Monitor WBC    Outcome: Progressing     Problem: SAFETY ADULT  Goal: Patient will remain free of falls  Description: INTERVENTIONS:  - Educate patient/family on patient safety including physical limitations  - Instruct patient to call for assistance with activity   - Consult OT/PT to assist with strengthening/mobility   - Keep Call bell within reach  - Keep bed low and locked with side rails adjusted as appropriate  - Keep care items and personal belongings within reach  - Initiate and maintain comfort rounds  - Make Fall Risk Sign visible to staff  - Offer Toileting every 2 Hours, in advance of need  - Initiate/Maintain bed/chair alarm  - Obtain necessary fall risk management equipment: bed/chair alarm, nonskid socks   - Apply yellow socks and bracelet for high fall risk patients  - Consider moving patient to room near nurses station  Outcome: Progressing  Goal: Maintain or return to baseline ADL function  Description: INTERVENTIONS:  -  Assess patient's ability to carry out ADLs; assess patient's baseline for ADL function and identify physical deficits which impact ability to perform ADLs (bathing, care of mouth/teeth, toileting, grooming, dressing, etc.)  - Assess/evaluate cause of self-care deficits   - Assess range of motion  - Assess patient's mobility; develop plan if impaired  - Assess patient's need for assistive devices and provide as appropriate  - Encourage maximum independence but intervene and supervise when necessary  - Involve family in performance of ADLs  - Assess for home care needs following discharge   - Consider OT consult to assist with ADL evaluation and planning for discharge  - Provide patient education as appropriate  Outcome: Progressing  Goal: Maintains/Returns to pre admission functional level  Description: INTERVENTIONS:  - Perform BMAT or MOVE assessment daily.   - Set and communicate daily mobility goal to care team and patient/family/caregiver. - Collaborate with rehabilitation services on mobility goals if consulted  - Perform Range of Motion 3 times a day. - Reposition patient every 3 hours.   - Dangle patient 3 times a day  - Stand patient 3 times a day  - Ambulate patient 3 times a day  - Out of bed to chair 3 times a day   - Out of bed for meals 3 times a day  - Out of bed for toileting  - Record patient progress and toleration of activity level   Outcome: Progressing     Problem: DISCHARGE PLANNING  Goal: Discharge to home or other facility with appropriate resources  Description: INTERVENTIONS:  - Identify barriers to discharge w/patient and caregiver  - Arrange for needed discharge resources and transportation as appropriate  - Identify discharge learning needs (meds, wound care, etc.)  - Arrange for interpretive services to assist at discharge as needed  - Refer to Case Management Department for coordinating discharge planning if the patient needs post-hospital services based on physician/advanced practitioner order or complex needs related to functional status, cognitive ability, or social support system  Outcome: Progressing     Problem: Knowledge Deficit  Goal: Patient/family/caregiver demonstrates understanding of disease process, treatment plan, medications, and discharge instructions  Description: Complete learning assessment and assess knowledge base.   Interventions:  - Provide teaching at level of understanding  - Provide teaching via preferred learning methods  Outcome: Progressing

## 2023-08-13 NOTE — ASSESSMENT & PLAN NOTE
Lab Results   Component Value Date    HGBA1C 10.6 (H) 08/02/2023       Recent Labs     08/13/23  1618   POCGLU 155*       Blood Sugar Average: Last 72 hrs:  (P) 155   Poor control per last A1c  Only on gliperide at home  Initiate on ADA diet and SSI  Monitor glucose levels closely and escalate insulin regimen as needed  Will need endocrine follow up on discharge likely

## 2023-08-13 NOTE — ED NOTES
Patient transported to inpatient room by this nurse. Floor staff aware patient is in bed.       Alon Loomis RN  08/13/23 2313

## 2023-08-13 NOTE — ASSESSMENT & PLAN NOTE
· INR is subtherapeutic at this time  · Supposed to be on coumadin therapy  · Re-initiate at this time and trend INR levels  · Currently rate controlled with metoprolol

## 2023-08-14 ENCOUNTER — APPOINTMENT (INPATIENT)
Dept: NON INVASIVE DIAGNOSTICS | Facility: HOSPITAL | Age: 88
DRG: 291 | End: 2023-08-14
Payer: COMMERCIAL

## 2023-08-14 LAB
ALBUMIN SERPL BCP-MCNC: 3 G/DL (ref 3.5–5)
ALP SERPL-CCNC: 48 U/L (ref 34–104)
ALT SERPL W P-5'-P-CCNC: 25 U/L (ref 7–52)
ANION GAP SERPL CALCULATED.3IONS-SCNC: 7 MMOL/L
ANISOCYTOSIS BLD QL SMEAR: PRESENT
AORTIC ROOT: 3.9 CM
APICAL FOUR CHAMBER EJECTION FRACTION: 54 %
ASCENDING AORTA: 3.9 CM
AST SERPL W P-5'-P-CCNC: 18 U/L (ref 13–39)
AV LVOT MEAN GRADIENT: 1 MMHG
AV LVOT PEAK GRADIENT: 2 MMHG
BASOPHILS # BLD AUTO: 0.02 THOUSANDS/ÂΜL (ref 0–0.1)
BASOPHILS NFR BLD AUTO: 1 % (ref 0–1)
BILIRUB SERPL-MCNC: 1.02 MG/DL (ref 0.2–1)
BUN SERPL-MCNC: 20 MG/DL (ref 5–25)
CALCIUM ALBUM COR SERPL-MCNC: 8.8 MG/DL (ref 8.3–10.1)
CALCIUM SERPL-MCNC: 8 MG/DL (ref 8.4–10.2)
CARDIAC TROPONIN I PNL SERPL HS: 286 NG/L (ref 8–18)
CHLORIDE SERPL-SCNC: 108 MMOL/L (ref 96–108)
CO2 SERPL-SCNC: 26 MMOL/L (ref 21–32)
CREAT SERPL-MCNC: 0.83 MG/DL (ref 0.6–1.3)
DOP CALC LVOT PEAK VEL VTI: 12.81 CM
DOP CALC LVOT PEAK VEL: 0.73 M/S
EOSINOPHIL # BLD AUTO: 0.03 THOUSAND/ÂΜL (ref 0–0.61)
EOSINOPHIL NFR BLD AUTO: 1 % (ref 0–6)
ERYTHROCYTE [DISTWIDTH] IN BLOOD BY AUTOMATED COUNT: 25.1 % (ref 11.6–15.1)
FDP BLD QL AGGL: >10 <20
FERRITIN SERPL-MCNC: 772 NG/ML (ref 24–336)
FOLATE SERPL-MCNC: 4.3 NG/ML
FRACTIONAL SHORTENING: 34 % (ref 28–44)
GFR SERPL CREATININE-BSD FRML MDRD: 79 ML/MIN/1.73SQ M
GLUCOSE SERPL-MCNC: 103 MG/DL (ref 65–140)
GLUCOSE SERPL-MCNC: 120 MG/DL (ref 65–140)
GLUCOSE SERPL-MCNC: 195 MG/DL (ref 65–140)
GLUCOSE SERPL-MCNC: 217 MG/DL (ref 65–140)
GLUCOSE SERPL-MCNC: 248 MG/DL (ref 65–140)
HCT VFR BLD AUTO: 23.2 % (ref 36.5–49.3)
HCT VFR BLD AUTO: 24.4 % (ref 36.5–49.3)
HGB BLD-MCNC: 7.3 G/DL (ref 12–17)
HGB BLD-MCNC: 7.5 G/DL (ref 12–17)
IMM GRANULOCYTES # BLD AUTO: 0.02 THOUSAND/UL (ref 0–0.2)
IMM GRANULOCYTES NFR BLD AUTO: 1 % (ref 0–2)
INR PPP: 1.25 (ref 0.84–1.19)
INTERVENTRICULAR SEPTUM IN DIASTOLE (PARASTERNAL SHORT AXIS VIEW): 1.1 CM
INTERVENTRICULAR SEPTUM: 1.1 CM (ref 0.6–1.1)
IRON SATN MFR SERPL: 21 % (ref 20–50)
IRON SERPL-MCNC: 48 UG/DL (ref 65–175)
LAAS-AP2: 28.9 CM2
LAAS-AP4: 33 CM2
LDH SERPL-CCNC: 118 U/L (ref 140–271)
LEFT ATRIUM SIZE: 6.3 CM
LEFT ATRIUM VOLUME (MOD BIPLANE): 117 ML
LEFT INTERNAL DIMENSION IN SYSTOLE: 2.7 CM (ref 2.1–4)
LEFT VENTRICLE DIASTOLIC VOLUME (MOD BIPLANE): 81 ML
LEFT VENTRICLE SYSTOLIC VOLUME (MOD BIPLANE): 40 ML
LEFT VENTRICULAR INTERNAL DIMENSION IN DIASTOLE: 4.1 CM (ref 3.5–6)
LEFT VENTRICULAR POSTERIOR WALL IN END DIASTOLE: 1.1 CM
LEFT VENTRICULAR STROKE VOLUME: 46 ML
LG PLATELETS BLD QL SMEAR: PRESENT
LV EF: 51 %
LVSV (TEICH): 46 ML
LYMPHOCYTES # BLD AUTO: 1.01 THOUSANDS/ÂΜL (ref 0.6–4.47)
LYMPHOCYTES NFR BLD AUTO: 31 % (ref 14–44)
LYMPHOCYTES NFR BLD: 25 % (ref 14–44)
MAGNESIUM SERPL-MCNC: 1.6 MG/DL (ref 1.9–2.7)
MCH RBC QN AUTO: 30.5 PG (ref 26.8–34.3)
MCHC RBC AUTO-ENTMCNC: 31.5 G/DL (ref 31.4–37.4)
MCV RBC AUTO: 97 FL (ref 82–98)
MICROCYTES BLD QL AUTO: PRESENT
MONOCYTES # BLD AUTO: 0.23 THOUSAND/ÂΜL (ref 0.17–1.22)
MONOCYTES NFR BLD AUTO: 3 % (ref 4–12)
MONOCYTES NFR BLD AUTO: 7 % (ref 4–12)
MV E'TISSUE VEL-LAT: 13 CM/S
MV E'TISSUE VEL-SEP: 9 CM/S
NEUTROPHILS # BLD AUTO: 2 THOUSANDS/ÂΜL (ref 1.85–7.62)
NEUTS BAND NFR BLD MANUAL: 6 THOUSAND/UL
NEUTS SEG NFR BLD AUTO: 59 % (ref 43–75)
NEUTS SEG NFR BLD AUTO: 66 % (ref 45–77)
NRBC BLD AUTO-RTO: 1 /100 WBCS
OVALOCYTES BLD QL SMEAR: PRESENT
PHOSPHATE SERPL-MCNC: 2.4 MG/DL (ref 2.3–4.1)
PLATELET # BLD AUTO: 100 THOUSANDS/UL (ref 149–390)
PLATELET BLD QL SMEAR: ABNORMAL
PMV BLD AUTO: 9.1 FL (ref 8.9–12.7)
POLYCHROMASIA BLD QL SMEAR: PRESENT
POTASSIUM SERPL-SCNC: 2.9 MMOL/L (ref 3.5–5.3)
PROCALCITONIN SERPL-MCNC: 0.08 NG/ML
PROT SERPL-MCNC: 5 G/DL (ref 6.4–8.4)
PROTHROMBIN TIME: 15.9 SECONDS (ref 11.6–14.5)
RBC # BLD AUTO: 2.39 MILLION/UL (ref 3.88–5.62)
RBC MORPH BLD: PRESENT
RIGHT ATRIUM AREA SYSTOLE A4C: 26.2 CM2
RIGHT VENTRICLE ID DIMENSION: 2.8 CM
SCHISTOCYTES BLD QL SMEAR: PRESENT
SL CV LEFT ATRIUM LENGTH A2C: 6.9 CM
SL CV LV EF: 60
SL CV PED ECHO LEFT VENTRICLE DIASTOLIC VOLUME (MOD BIPLANE) 2D: 72 ML
SL CV PED ECHO LEFT VENTRICLE SYSTOLIC VOLUME (MOD BIPLANE) 2D: 26 ML
SODIUM SERPL-SCNC: 141 MMOL/L (ref 135–147)
TIBC SERPL-MCNC: 226 UG/DL (ref 250–450)
TOTAL CELLS COUNTED SPEC: 100
TRICUSPID ANNULAR PLANE SYSTOLIC EXCURSION: 2.7 CM
VIT B12 SERPL-MCNC: 470 PG/ML (ref 180–914)
WBC # BLD AUTO: 3.31 THOUSAND/UL (ref 4.31–10.16)

## 2023-08-14 PROCEDURE — 93970 EXTREMITY STUDY: CPT

## 2023-08-14 PROCEDURE — 84100 ASSAY OF PHOSPHORUS: CPT | Performed by: INTERNAL MEDICINE

## 2023-08-14 PROCEDURE — 92610 EVALUATE SWALLOWING FUNCTION: CPT

## 2023-08-14 PROCEDURE — 99232 SBSQ HOSP IP/OBS MODERATE 35: CPT | Performed by: PHYSICIAN ASSISTANT

## 2023-08-14 PROCEDURE — 93306 TTE W/DOPPLER COMPLETE: CPT

## 2023-08-14 PROCEDURE — 84484 ASSAY OF TROPONIN QUANT: CPT | Performed by: INTERNAL MEDICINE

## 2023-08-14 PROCEDURE — 93306 TTE W/DOPPLER COMPLETE: CPT | Performed by: INTERNAL MEDICINE

## 2023-08-14 PROCEDURE — 82948 REAGENT STRIP/BLOOD GLUCOSE: CPT

## 2023-08-14 PROCEDURE — 83735 ASSAY OF MAGNESIUM: CPT | Performed by: PHYSICIAN ASSISTANT

## 2023-08-14 PROCEDURE — 85007 BL SMEAR W/DIFF WBC COUNT: CPT | Performed by: PHYSICIAN ASSISTANT

## 2023-08-14 PROCEDURE — 85610 PROTHROMBIN TIME: CPT | Performed by: PHYSICIAN ASSISTANT

## 2023-08-14 PROCEDURE — 80053 COMPREHEN METABOLIC PANEL: CPT | Performed by: PHYSICIAN ASSISTANT

## 2023-08-14 PROCEDURE — 97167 OT EVAL HIGH COMPLEX 60 MIN: CPT

## 2023-08-14 PROCEDURE — 97163 PT EVAL HIGH COMPLEX 45 MIN: CPT

## 2023-08-14 PROCEDURE — 85018 HEMOGLOBIN: CPT | Performed by: PHYSICIAN ASSISTANT

## 2023-08-14 PROCEDURE — 85025 COMPLETE CBC W/AUTO DIFF WBC: CPT | Performed by: PHYSICIAN ASSISTANT

## 2023-08-14 PROCEDURE — 85362 FIBRIN DEGRADATION PRODUCTS: CPT | Performed by: PHYSICIAN ASSISTANT

## 2023-08-14 PROCEDURE — 83615 LACTATE (LD) (LDH) ENZYME: CPT | Performed by: PHYSICIAN ASSISTANT

## 2023-08-14 PROCEDURE — 83010 ASSAY OF HAPTOGLOBIN QUANT: CPT | Performed by: PHYSICIAN ASSISTANT

## 2023-08-14 PROCEDURE — 99222 1ST HOSP IP/OBS MODERATE 55: CPT | Performed by: INTERNAL MEDICINE

## 2023-08-14 PROCEDURE — 85014 HEMATOCRIT: CPT | Performed by: PHYSICIAN ASSISTANT

## 2023-08-14 PROCEDURE — 82270 OCCULT BLOOD FECES: CPT | Performed by: INTERNAL MEDICINE

## 2023-08-14 PROCEDURE — 84145 PROCALCITONIN (PCT): CPT | Performed by: INTERNAL MEDICINE

## 2023-08-14 RX ORDER — POTASSIUM CHLORIDE 20 MEQ/1
40 TABLET, EXTENDED RELEASE ORAL ONCE
Status: COMPLETED | OUTPATIENT
Start: 2023-08-14 | End: 2023-08-14

## 2023-08-14 RX ORDER — TAMSULOSIN HYDROCHLORIDE 0.4 MG/1
0.4 CAPSULE ORAL
Status: DISCONTINUED | OUTPATIENT
Start: 2023-08-14 | End: 2023-08-16

## 2023-08-14 RX ORDER — POTASSIUM CHLORIDE 14.9 MG/ML
20 INJECTION INTRAVENOUS
Status: COMPLETED | OUTPATIENT
Start: 2023-08-14 | End: 2023-08-14

## 2023-08-14 RX ORDER — AMOXICILLIN 250 MG
2 CAPSULE ORAL ONCE
Status: COMPLETED | OUTPATIENT
Start: 2023-08-14 | End: 2023-08-14

## 2023-08-14 RX ORDER — MAGNESIUM SULFATE HEPTAHYDRATE 40 MG/ML
2 INJECTION, SOLUTION INTRAVENOUS ONCE
Status: COMPLETED | OUTPATIENT
Start: 2023-08-14 | End: 2023-08-14

## 2023-08-14 RX ADMIN — ATORVASTATIN CALCIUM 40 MG: 40 TABLET, FILM COATED ORAL at 09:44

## 2023-08-14 RX ADMIN — TAMSULOSIN HYDROCHLORIDE 0.4 MG: 0.4 CAPSULE ORAL at 17:00

## 2023-08-14 RX ADMIN — SENNOSIDES AND DOCUSATE SODIUM 2 TABLET: 50; 8.6 TABLET ORAL at 15:16

## 2023-08-14 RX ADMIN — ASPIRIN 81 MG 81 MG: 81 TABLET ORAL at 09:43

## 2023-08-14 RX ADMIN — INSULIN LISPRO 2 UNITS: 100 INJECTION, SOLUTION INTRAVENOUS; SUBCUTANEOUS at 11:56

## 2023-08-14 RX ADMIN — FUROSEMIDE 40 MG: 10 INJECTION, SOLUTION INTRAMUSCULAR; INTRAVENOUS at 10:00

## 2023-08-14 RX ADMIN — Medication 50 MG: at 09:45

## 2023-08-14 RX ADMIN — IPRATROPIUM BROMIDE 2 SPRAY: 21 SPRAY, METERED NASAL at 21:06

## 2023-08-14 RX ADMIN — METOPROLOL SUCCINATE 25 MG: 25 TABLET, EXTENDED RELEASE ORAL at 10:00

## 2023-08-14 RX ADMIN — POTASSIUM CHLORIDE 20 MEQ: 14.9 INJECTION, SOLUTION INTRAVENOUS at 12:02

## 2023-08-14 RX ADMIN — POTASSIUM CHLORIDE 20 MEQ: 14.9 INJECTION, SOLUTION INTRAVENOUS at 15:16

## 2023-08-14 RX ADMIN — INSULIN LISPRO 1 UNITS: 100 INJECTION, SOLUTION INTRAVENOUS; SUBCUTANEOUS at 16:57

## 2023-08-14 RX ADMIN — IPRATROPIUM BROMIDE 2 SPRAY: 21 SPRAY, METERED NASAL at 17:01

## 2023-08-14 RX ADMIN — FOLIC ACID 1 MG: 5 INJECTION, SOLUTION INTRAMUSCULAR; INTRAVENOUS; SUBCUTANEOUS at 18:00

## 2023-08-14 RX ADMIN — MAGNESIUM SULFATE HEPTAHYDRATE 2 G: 40 INJECTION, SOLUTION INTRAVENOUS at 09:44

## 2023-08-14 RX ADMIN — POTASSIUM CHLORIDE 40 MEQ: 1500 TABLET, EXTENDED RELEASE ORAL at 09:45

## 2023-08-14 RX ADMIN — IPRATROPIUM BROMIDE 2 SPRAY: 21 SPRAY, METERED NASAL at 07:54

## 2023-08-14 RX ADMIN — BUSPIRONE HYDROCHLORIDE 7.5 MG: 5 TABLET ORAL at 17:00

## 2023-08-14 RX ADMIN — BUSPIRONE HYDROCHLORIDE 7.5 MG: 5 TABLET ORAL at 09:44

## 2023-08-14 NOTE — PLAN OF CARE
Problem: PAIN - ADULT  Goal: Verbalizes/displays adequate comfort level or baseline comfort level  Description: Interventions:  - Encourage patient to monitor pain and request assistance  - Assess pain using appropriate pain scale  - Administer analgesics based on type and severity of pain and evaluate response  - Implement non-pharmacological measures as appropriate and evaluate response  - Consider cultural and social influences on pain and pain management  - Notify physician/advanced practitioner if interventions unsuccessful or patient reports new pain  Outcome: Progressing     Problem: INFECTION - ADULT  Goal: Absence or prevention of progression during hospitalization  Description: INTERVENTIONS:  - Assess and monitor for signs and symptoms of infection  - Monitor lab/diagnostic results  - Monitor all insertion sites, i.e. indwelling lines, tubes, and drains  - Monitor endotracheal if appropriate and nasal secretions for changes in amount and color  - [place] appropriate cooling/warming therapies per order  - Administer medications as ordered  - Instruct and encourage patient and family to use good hand hygiene technique  - Identify and instruct in appropriate isolation precautions for identified infection/condition  Outcome: Progressing  Goal: Absence of fever/infection during neutropenic period  Description: INTERVENTIONS:  - Monitor WBC    Outcome: Progressing     Problem: SAFETY ADULT  Goal: Patient will remain free of falls  Description: INTERVENTIONS:  - Educate patient/family on patient safety including physical limitations  - Instruct patient to call for assistance with activity   - Consult OT/PT to assist with strengthening/mobility   - Keep Call bell within reach  - Keep bed low and locked with side rails adjusted as appropriate  - Keep care items and personal belongings within reach  - Initiate and maintain comfort rounds  - Make Fall Risk Sign visible to staff  - Offer Toileting every 2 Hours, in advance of need  - Initiate/Maintain bed/chair alarm  - Obtain necessary fall risk management equipment: bed/chair alarm, nonskid socks   - Apply yellow socks and bracelet for high fall risk patients  - Consider moving patient to room near nurses station  Outcome: Progressing  Goal: Maintain or return to baseline ADL function  Description: INTERVENTIONS:  -  Assess patient's ability to carry out ADLs; assess patient's baseline for ADL function and identify physical deficits which impact ability to perform ADLs (bathing, care of mouth/teeth, toileting, grooming, dressing, etc.)  - Assess/evaluate cause of self-care deficits   - Assess range of motion  - Assess patient's mobility; develop plan if impaired  - Assess patient's need for assistive devices and provide as appropriate  - Encourage maximum independence but intervene and supervise when necessary  - Involve family in performance of ADLs  - Assess for home care needs following discharge   - Consider OT consult to assist with ADL evaluation and planning for discharge  - Provide patient education as appropriate  Outcome: Progressing  Goal: Maintains/Returns to pre admission functional level  Description: INTERVENTIONS:  - Perform BMAT or MOVE assessment daily.   - Set and communicate daily mobility goal to care team and patient/family/caregiver. - Collaborate with rehabilitation services on mobility goals if consulted  - Perform Range of Motion 3 times a day. - Reposition patient every 3 hours.   - Dangle patient 3 times a day  - Stand patient 3 times a day  - Ambulate patient 3 times a day  - Out of bed to chair 3 times a day   - Out of bed for meals 3 times a day  - Out of bed for toileting  - Record patient progress and toleration of activity level   Outcome: Progressing     Problem: DISCHARGE PLANNING  Goal: Discharge to home or other facility with appropriate resources  Description: INTERVENTIONS:  - Identify barriers to discharge w/patient and caregiver  - Arrange for needed discharge resources and transportation as appropriate  - Identify discharge learning needs (meds, wound care, etc.)  - Arrange for interpretive services to assist at discharge as needed  - Refer to Case Management Department for coordinating discharge planning if the patient needs post-hospital services based on physician/advanced practitioner order or complex needs related to functional status, cognitive ability, or social support system  Outcome: Progressing     Problem: Knowledge Deficit  Goal: Patient/family/caregiver demonstrates understanding of disease process, treatment plan, medications, and discharge instructions  Description: Complete learning assessment and assess knowledge base.   Interventions:  - Provide teaching at level of understanding  - Provide teaching via preferred learning methods  Outcome: Progressing

## 2023-08-14 NOTE — PHYSICAL THERAPY NOTE
Physical Therapy Evaluation    Patient Name: Anthony Leavitt    CFORM'W Date: 8/14/2023     Problem List  Principal Problem:    Acute on chronic diastolic (congestive) heart failure (HCC)  Active Problems:    Persistent atrial fibrillation (HCC)    Malignant neoplasm of prostate (HCC)    Type 2 diabetes mellitus without complication, without long-term current use of insulin (HCC)    Troponin level elevated    Anemia       Past Medical History  Past Medical History:   Diagnosis Date    Anxiety     Atrial fibrillation (HCC)     Atrial flutter (HCC)     Congestive heart failure (CHF) (HCC)     COPD (chronic obstructive pulmonary disease) (HCC)     Coronary artery disease     DVT (deep venous thrombosis) (HCC)     ED (erectile dysfunction)     Hearing loss     History of echocardiogram 04/05/2018    EF 0.55, Mild LVH. Mild moderate mitral regurg. Trace aortic regurg. Hx of radiation therapy     XRT    Hypercholesterolemia     Hyperlipidemia     Hypertension     Long term (current) use of anticoagulants 8/21/2018    Neuropathy of both feet     Peripheral neuropathy     Prostate cancer (HCC)     Type 2 diabetes mellitus [facility])         Past Surgical History  Past Surgical History:   Procedure Laterality Date    CARDIAC CATHETERIZATION  01/15/1988    Left main: unobstructed. Posterolateral branch 90% narrowed. COLONOSCOPY  10/05/2017    Dr. [place] Necessary             08/14/23 1034   PT Last Visit   PT Visit Date 08/14/23   Note Type   Note type Evaluation   Pain Assessment   Pain Assessment Tool 0-10   Pain Score No Pain   Restrictions/Precautions   Weight Bearing Precautions Per Order No   Other Precautions Pain; Fall Risk;Bed Alarm; Chair Alarm   Home Living   Type of [address] [place] [address]  Two level;Bed/bath upstairs; Ramped entrance  (stairglide to 2nd floor)   Bathroom Shower/Tub Walk-in shower   [address] [facility] bars in shower; Shower chair; Toilet raiser   [address] Walker;Cane;Wheelchair-manual;Electric scooter  (pt has SW and RW)   Additional Comments pt uses RW at baseline   Prior Function   Level of [place] Independent with functional mobility; Needs assistance with ADLs; Needs assistance with IADLS   Lives With Spouse   Receives Help From Family   IADLs Family/Friend/Other provides transportation   Falls in the last 6 months 0   Vocational Retired   General   Family/Caregiver Present No   Cognition   Overall Cognitive Status WFL   Arousal/Participation Alert   Orientation Level Oriented X4   Following Commands Follows all commands and directions without difficulty   Subjective   Subjective pt pleasant and cooperative; agreeable to PT intervention   RLE Assessment   RLE Assessment X  (4-/5 grossly)   LLE Assessment   LLE Assessment X  (4-/5 grossly)   Bed Mobility   Supine to Sit 4  Minimal assistance   Additional items Assist x 1;HOB elevated; Bedrails; Increased time required;Verbal cues   Sit to Supine   (pt OOB at end of session)   Transfers   Sit to Stand 5  Supervision   Additional items Increased time required;Verbal cues   Stand to Sit 5  Supervision   Additional items Increased time required;Verbal cues   Additional Comments RW used   Ambulation/Elevation   Gait pattern Excessively slow; Short stride; Foward flexed; Wide LAYA; Improper Weight shift   Gait Assistance 5  Supervision   Additional items Verbal cues   Assistive Device Rolling walker   Distance 150'   Balance   Static Sitting Good   Dynamic Sitting Fair +   Static Standing Fair   Dynamic Standing [address] [facility] -  (with RW)   Endurance Deficit   Endurance Deficit Yes   Endurance Deficit Description pt fatigues with increased ambulation   Activity Tolerance   Activity Tolerance Patient limited by fatigue   Assessment   Prognosis Good   Problem List Decreased strength;Decreased endurance; Impaired balance;Decreased mobility   Assessment Patient is a 80 y.o. male evaluated by Physical Therapy s/p admit to [address] on 8/13/2023 with admitting diagnosis of: Hypomagnesemia, Leg swelling, Elevated troponin, CHF exacerbation, and principal problem of: Acute on chronic diastolic (congestive) heart failure. PT was consulted to assess patient's functional mobility and discharge needs. Ordered are PT Evaluation and treatment with activity level of: up and OOB as tolerated. Comorbidities affecting patient's physical performance at time of assessment include: a-fib, CHF, HLD, DM, peripheral neuropathy, CAD, a-flutter, prostate cncer, COPD, hx of DVT, hypercholesterolemia, HTN. Personal factors affecting the patient at time of IE include: lives in 2 story home, ambulating with assistive device, inability to navigate community distances, inability/difficulty performing IADLs and inability/difficulty performing ADLs. Please locate objective findings from PT assessment regarding body systems outlined above. Upon evaluation, pt able to perform all functional mobility with SUP-Anil, RW, and increased time. Occasional verbal cuing provided for safety awareness and sequencing. Pt able to ambulate ~150' before taking seated rest break. Moderate postural sway demonstrated with mobility but no true LOB experienced. HR and SpO2 remained WFL on RA throughout. The patient's AM-PAC Basic Mobility Inpatient Short Form Raw Score is 18. A Raw score of greater than 16 suggests the patient may benefit from discharge to home. Please also refer to the recommendation of the Physical Therapist for safe discharge planning. Co treatment with OT secondary to complex medical condition of pt, possible A of 2 required to achieve and maintain transitional movements, requiring the need of skilled therapeutic intervention of 2 therapists to achieve delivery of services.  Pt will benefit from continued PT intervention during LOS to address current deficits, increase LOF, and facilitate safe d/c to next level of care when medically appropriate. D/c recommendation at this time is home with continued home health rehabilitation. Goals   Patient Goals to go home   LTG Expiration Date 08/28/23   Long Term Goal #1 Pt will participate in B LE strengthening exercises to facilitate improved functional activity tolerance. Pt will perform all functional transfers and bed mobility mod(I) with good safety awareness. Pt will ambulate 250' mod(I) with LRAD while maintaining good functional dynamic balance. Plan   Treatment/Interventions Functional transfer training;LE strengthening/ROM; Therapeutic exercise; Endurance training;Bed mobility;Gait training   PT Frequency 3-5x/wk   Recommendation   PT Discharge Recommendation Home with home health rehabilitation   AM-PAC Basic Mobility Inpatient   Turning in Flat Bed Without Bedrails 3   Lying on Back to Sitting on Edge of Flat Bed Without Bedrails 3   Moving Bed to Chair 3   Standing Up From Chair Using Arms 3   Walk in Room 3   Climb 3-5 Stairs With Railing 3   Basic Mobility Inpatient Raw Score 18   Basic Mobility Standardized Score 41.05   Highest Level Of Mobility   JH-HLM Goal 6: Walk 10 steps or more   JH-HLM Achieved 7: Walk 25 feet or more   End of Consult   Patient Position at End of Consult Bedside chair;Bed/Chair alarm activated; All needs within reach

## 2023-08-14 NOTE — PLAN OF CARE
Problem: OCCUPATIONAL THERAPY ADULT  Goal: Performs self-care activities at highest level of function for planned discharge setting. See evaluation for individualized goals. Description: Treatment Interventions: ADL retraining, UE strengthening/ROM, Endurance training, Patient/family training, Cognitive reorientation, Equipment evaluation/education, Activityengagement, Functional transfer training          See flowsheet documentation for full assessment, interventions and recommendations. Note: Limitation: Decreased ADL status, Decreased UE strength, Decreased Safe judgement during ADL, Decreased endurance, Decreased high-level ADLs, Decreased self-care trans     Assessment: Pt is a 80 y.o. male seen for OT evaluation s/p admit to [facility] on 8/13/2023 w/ Acute on chronic diastolic (congestive) heart failure ([address]). Comorbidities affecting pt's functional performance at time of assessment include: a-fib, CHF, HLD, DM, anxiety, CAD, prostate CA, DVT, [place], hypercholesterolemia, ED, neuropathy. Personal factors affecting pt at time of IE include:steps to enter environment, difficulty performing ADLS, difficulty performing IADLS , decreased initiation and engagement  and health management . Prior to admission, pt was (A) with ADLs and IADLs with use of RW during mobility. Upon evaluation: Pt requires (S)-max (A) x1 with use of RW during mobility 2* the following deficits impacting occupational performance: weakness, decreased strength, decreased balance, decreased tolerance, impaired initiation and decreased safety awareness. Pt to benefit from continued skilled OT tx while in the hospital to address deficits as defined above and maximize level of functional independence w ADL's and functional mobility. Occupational Performance areas to address include: grooming, bathing/shower, toilet hygiene, dressing, functional mobility, community mobility and clothing management.  The patient's raw score on the AM-PAC Daily Activity Inpatient Short Form is 19. A raw score of greater than or equal to 19 suggests the patient may benefit from discharge to home. Please refer to the recommendation of the Occupational Therapist for safe discharge planning. Pt benefited from co-evaluation of skilled OT and PT therapists in order to most appropriately address functional deficits d/t extensive assistance required for safe functional mobility, decreased activity tolerance, and regression from functioning level prior to admission and/or onset of present illness. OT/PT objectives were addressed separately; please see PT note for specific goal areas targeted.      OT Discharge Recommendation: Home with home health rehabilitation

## 2023-08-14 NOTE — ASSESSMENT & PLAN NOTE
Lab Results   Component Value Date    HGBA1C 10.6 (H) 08/02/2023       Recent Labs     08/13/23  1618 08/14/23  0708 08/14/23  1109   POCGLU 155* 120 217*       Blood Sugar Average: Last 72 hrs:  (P) 164   Poor control per last A1c  Only on gliperide at home  Initiate on ADA diet and SSI  Monitor glucose levels closely and escalate insulin regimen as needed  Will need endocrine follow up on discharge likely

## 2023-08-14 NOTE — CONSULTS
Consultation - Cardiology   Noah Copeland 80 y.o. male MRN: [id number]  Unit/Bed#: 332-31 Encounter: [id number]    Inpatient consult to Cardiology  Consult performed by: Sen Arcos PA-C  Consult ordered by: Giselle Myers PA-C            Physician Requesting Consult: Trice Padgett DO  Reason for Consult / Principal Problem: CHF exacerbation, elevated troponin    Assessment/Plan:  1. Acute on chronic diastolic congestive heart failure   - patient presented with worsening bilateral lower extremity edema, possibly in the setting of receiving fluids from recent diarrheal illness   - CXR: no acute abnormality, BNP: 506   - home diuretic regimen: lasix 20 mg three days per week. - received 50 mg IV lasix yesterday   - dry weight reported to be around 190 lbs, standing weight today 196 lbs. - would give an additional dose of IV lasix today. Patient still with lower extremity edema although he states this is his baseline. Would consider compression stockings/ACE wraps. - continue daily standing weights, low sodium diet, fluid restriction, strict I/O.   - replace potassium    2. Non-ischemic myocardial injury   - hs troponin levels were found to be elevated but downtrendin, 591, 458, 286   - patient denies any chest pain; EKG without ischemic changes   - an echocardiogram was ordered by the primary team which will be reviewed   - no additional cardiac evaluation recommended at this time    3. Persistent atrial fibrillation    - rate controlled on low dose metoprolol   - on anticoagulation with coumadin for goal INR 2-3; subtherapeutic on admission.    - watch coumadin use with dropping hemoglobin - 7.3 today.     4. Anemia   - baseline hemoglobin was previously 10-12, at discharge earlier this month hemoglobin was 8.5, this admission 8.1, today 7.3   - further management per primary team.    History of Present Illness   HPI: Ana Bose is a 80y.o. year old male with persistent atrial fibrillation on anticoagulation with coumadin, chronic diastolic congestive heart failure, dyslipidemia, diabetes, cryptogenic organizing pneumonia, who follows with Dr. Ebenezer Pineda. The patient presented to the ER yesterday for the evaluation of bilateral lower extremity edema. Patient was admitted at this facility from 8/1 to 8/3 with diarrhea. He was treated conservatively. He states he felt well upon discharge home. Subsequently he developed lower extremity edema. He denies any dyspnea on exertion, orthopnea, or PND. He weighs himself on a regular basis at home. He states he lost approximately 30 pounds over the last 8 months. His weight most recently has been between 185 and 190 pounds on his home scale. He does occasionally eat high sodium foods. He denies chest pain/pressure/discomfort. He denies lightheadedness, dizziness, or syncope. He denies palpitations. Upon admission he underwent a chest x-ray which was negative for any acute cardiopulmonary abnormality. BNP was elevated at 506. He was given 50 mg of IV Lasix. He reports that his lower extremity edema is back to baseline today. Standing weight today was 196 pounds which is above his dry weight. Was also found to have elevated high-sensitivity troponin levels at 508, 591, 458, 286. EKG showed sinus rhythm without any ischemic changes. Cardiology was consulted for further evaluation and management. Review of Systems   Cardiovascular: Positive for leg swelling. All other systems reviewed and are negative. Historical Information   Past Medical History:   Diagnosis Date   • Anxiety    • Atrial fibrillation (HCC)    • Atrial flutter (HCC)    • Congestive heart failure (CHF) ([facility])    • COPD (chronic obstructive pulmonary disease) ([facility])    • Coronary artery disease    • DVT (deep venous thrombosis) ([facility])    • ED (erectile dysfunction)    • Hearing loss    • History of echocardiogram 04/05/2018    EF 0.55, Mild LVH.  Mild moderate mitral regurg. Trace aortic regurg. • Hx of radiation therapy     XRT   • Hypercholesterolemia    • Hyperlipidemia    • Hypertension    • Long term (current) use of anticoagulants 8/21/2018   • Neuropathy of both feet    • Peripheral neuropathy    • Prostate cancer ([address])    • Type 2 diabetes mellitus [facility])      Past Surgical History:   Procedure Laterality Date   • CARDIAC CATHETERIZATION  01/15/1988    Left main: unobstructed. Posterolateral branch 90% narrowed. • COLONOSCOPY  10/05/2017    Dr. Sanchez Dec   • PROSTATE SURGERY       Social History     Substance and Sexual Activity   Alcohol Use Not Currently     Social History     Substance and Sexual Activity   Drug Use Never     Social History     Tobacco Use   Smoking Status Never   Smokeless Tobacco Never     Family History: non-contributory    Meds/Allergies   all current active meds have been reviewed       No Known Allergies    Objective   Vitals: Blood pressure 98/60, pulse 96, temperature 97.5 °F (36.4 °C), resp. rate 15, height 6' 1" (1.854 m), weight 89.3 kg (196 lb 13.9 oz), SpO2 100 %. , Body mass index is 25.97 kg/m².,   Orthostatic Blood Pressures    Flowsheet Row Most Recent Value   Blood Pressure 98/60 filed at 08/14/2023 0607   Patient Position - Orthostatic VS Lying filed at 08/13/2023 0620        Systolic (07SHN), JIH:957 , Min:98 , PRB:862     Diastolic (91ZGE), KHC:80, Min:55, Max:63      Intake/Output Summary (Last 24 hours) at 8/14/2023 0807  Last data filed at 8/14/2023 0550  Gross per 24 hour   Intake 280 ml   Output 1450 ml   Net -1170 ml       Weight (last 2 days)     Date/Time Weight    08/14/23 0541 89.3 (196.87)    08/13/23 14:22:43 92.1 (203.04)          Invasive Devices     Peripheral Intravenous Line  Duration           Peripheral IV 08/13/23 Distal;Right;Upper;Ventral (anterior) Arm <1 day                  Physical Exam  Vitals reviewed. Constitutional:       General: He is not in acute distress.      Appearance: He is well-developed. He is not diaphoretic. HENT:      Head: Normocephalic and atraumatic. Eyes:      Pupils: Pupils are equal, round, and reactive to light. Neck:      Vascular: No carotid bruit. Cardiovascular:      Rate and Rhythm: Normal rate. Rhythm irregularly irregular. Pulses:           Radial pulses are 2+ on the right side and 2+ on the left side. Heart sounds: S1 normal and S2 normal. No murmur heard. Pulmonary:      Effort: Pulmonary effort is normal. No respiratory distress. Breath sounds: Examination of the left-lower field reveals decreased breath sounds. Decreased breath sounds present. No wheezing or rales. Abdominal:      General: There is no distension. Palpations: Abdomen is soft. Tenderness: There is no abdominal tenderness. Musculoskeletal:         General: Normal range of motion. Cervical back: Normal range of motion. Right lower leg: Edema present. Left lower leg: Edema present. Skin:     General: Skin is warm and dry. Findings: No erythema. Neurological:      General: No focal deficit present. Mental Status: He is alert and oriented to person, place, and time.    Psychiatric:         Mood and Affect: Mood normal.         Behavior: Behavior normal.             Laboratory Results:        CBC with diff:   Results from last 7 days   Lab Units 08/14/23  0503 08/13/23  1216   WBC Thousand/uL 3.31* 3.63*   HEMOGLOBIN g/dL 7.3* 8.1*   HEMATOCRIT % 23.2* 25.8*   MCV fL 97 98   PLATELETS Thousands/uL 100* 112*   RBC Million/uL 2.39* 2.63*   MCH pg 30.5 30.8   MCHC g/dL 31.5 31.4   RDW % 25.1* 25.2*   MPV fL 9.1 9.8   NRBC AUTO /100 WBCs 1 1         CMP:  Results from last 7 days   Lab Units 08/14/23  0503 08/13/23  1216   POTASSIUM mmol/L 2.9* 3.6   CHLORIDE mmol/L 108 107   CO2 mmol/L 26 25   BUN mg/dL 20 22   CREATININE mg/dL 0.83 0.87   CALCIUM mg/dL 8.0* 8.4   AST U/L 18 18   ALT U/L 25 30   ALK PHOS U/L 48 55   EGFR ml/min/1.73sq m 79 77         BMP:  Results from last 7 days   Lab Units 23  0503 23  1216   POTASSIUM mmol/L 2.9* 3.6   CHLORIDE mmol/L 108 107   CO2 mmol/L 26 25   BUN mg/dL 20 22   CREATININE mg/dL 0.83 0.87   CALCIUM mg/dL 8.0* 8.4       BNP:    Recent Labs     23  1216   *       Magnesium:   Results from last 7 days   Lab Units 23  0503 23  1220   MAGNESIUM mg/dL 1.6* 1.3*       Coags:   Results from last 7 days   Lab Units 23  0503 23  1540 08/10/23  1352   INR  1.25* 1.11 1.21*       TSH:       Hemoglobin A1C       Lipid Profile:         Cardiac testing:   Results for orders placed during the hospital encounter of 21    Echo complete with contrast if indicated    Narrative  [address] [place] [address], [address] [place] [address]    Transthoracic Echocardiogram  2D, M-mode, Doppler, and Color Doppler    Study date:  2021    Patient: Jamee Almonte  MR number: [id number]  Account number: [de-identified]  : 1935  Age: 80 years  Gender: Male  Status: Outpatient  Location: [facility]  Height: 72 in  Weight: 229.5 lb  BP: 126/ 86 mmHg    Indications: Atrial fibrillation. Diagnoses: I10. - Essential (primary) hypertension, I48.0 - Atrial fibrillation    Sonographer:  Ambrose Hanks RDCS  Primary Physician:  Ghazala Mondragon DO  Referring Physician: rBittny Rabago D.O. Group:  [facility]  Interpreting Physician: Brittny Rabago D.O.    SUMMARY    LEFT VENTRICLE:  Systolic function was normal. Ejection fraction was estimated to be 65 %. There were no regional wall motion abnormalities. Wall thickness was mildly increased. There was mild concentric hypertrophy. RIGHT VENTRICLE:  The size was at the upper limits of normal.  Systolic function was normal.    LEFT ATRIUM:  The atrium was moderately dilated. RIGHT ATRIUM:  The atrium was mildly dilated.     MITRAL VALVE:  There was mild annular calcification. There was mild regurgitation. TRICUSPID VALVE:  There was trace regurgitation. HISTORY: PRIOR HISTORY: CAD, prostate cancer, DVT Congestive heart failure. Atrial fibrillation. Risk factors: hypertension, diabetes, and hypercholesterolemia. Chronic lung disease. PRIOR PROCEDURES: Diagnostic cath. PROCEDURE: The study was performed in the [facility] and Vascular Center. This was a routine study. The transthoracic approach was used. The study included complete 2D imaging, M-mode, complete spectral Doppler, and color Doppler. The  heart rate was 63 bpm, at the start of the study. Images were obtained from the parasternal, apical, subcostal, and suprasternal notch acoustic windows. Echocardiographic views were limited due to lung interference. This was a technically  difficult study. LEFT VENTRICLE: Size was normal. Systolic function was normal. Ejection fraction was estimated to be 65 %. There were no regional wall motion abnormalities. Wall thickness was mildly increased. There was mild concentric hypertrophy. No  evidence of apical thrombus. DOPPLER: The study was not technically sufficient to allow evaluation of LV diastolic function. RIGHT VENTRICLE: The size was at the upper limits of normal. Systolic function was normal. Wall thickness was normal.    LEFT ATRIUM: The atrium was moderately dilated. RIGHT ATRIUM: The atrium was mildly dilated. MITRAL VALVE: There was mild annular calcification. Valve structure was normal. There was normal leaflet separation. DOPPLER: The transmitral velocity was within the normal range. There was no evidence for stenosis. There was mild  regurgitation. AORTIC VALVE: The valve was trileaflet. Leaflets exhibited normal thickness and normal cuspal separation. DOPPLER: Transaortic velocity was within the normal range. There was no evidence for stenosis. There was no significant  regurgitation.     TRICUSPID VALVE: The valve structure was normal. There was normal leaflet separation. DOPPLER: The transtricuspid velocity was within the normal range. There was no evidence for stenosis. There was trace regurgitation. PULMONIC VALVE: Leaflets exhibited normal thickness, no calcification, and normal cuspal separation. DOPPLER: The transpulmonic velocity was within the normal range. There was no significant regurgitation. PERICARDIUM: There was no pericardial effusion. The pericardium was normal in appearance. AORTA: The root exhibited normal size. SYSTEMIC VEINS: IVC: The inferior vena cava was normal in size. SYSTEM MEASUREMENT TABLES    2D  %FS: 28.04 %  Ao Diam: 3.28 cm  Ao asc: 3.77 cm  EDV(Teich): 81.23 ml  EF(Teich): 54.6 %  ESV(Teich): 36.88 ml  IVSd: 1.31 cm  LA Area: 27.54 cm2  LA Diam: 5.16 cm  LVEDV MOD A4C: 108.04 ml  LVEF MOD A4C: 64.4 %  LVESV MOD A4C: 38.47 ml  LVIDd: 4.26 cm  LVIDs: 3.06 cm  LVLd A4C: 6.42 cm  LVLs A4C: 4.92 cm  LVOT Diam: 2.02 cm  LVPWd: 1.2 cm  RA Area: 17.43 cm2  RVIDd: 3.32 cm  SV MOD A4C: 69.57 ml  SV(Teich): 44.35 ml    CW  AR Dec [place]: 1.76 m/s2  AR Dec Time: 2258.44 ms  AR PHT: 654.95 ms  AR Vmax: 3.98 m/s  AR maxP.37 mmHg  AV Env. Ti: 344.43 ms  AV VTI: 18.22 cm  AV Vmax: 0.76 m/s  AV Vmean: 0.53 m/s  AV maxP.29 mmHg  AV meanP.28 mmHg  MR VTI: 131.67 cm  MR Vmax: 3.44 m/s  MR Vmean: 2.91 m/s  MR maxP.22 mmHg  MR meanP.8 mmHg  TR Vmax: 3 m/s  TR maxP.07 mmHg    MM  TAPSE: 1.91 cm    PW  AROLDO (VTI): 2.45 cm2  AROLDO Vmax: 2.46 cm2  AVAI (VTI): 0 cm2/m2  AVAI Vmax: 0 cm2/m2  LVOT Env. Ti: 346.34 ms  LVOT VTI: 13.95 cm  LVOT Vmax: 0.58 m/s  LVOT Vmean: 0.4 m/s  LVOT maxP.35 mmHg  LVOT meanP.75 mmHg  LVSI Dopp: 19.72 ml/m2  LVSV Dopp: 44.57 ml    [facility] Accredited Echocardiography Laboratory    Prepared and electronically signed by    Sarah Roman D.O. Signed 2021 12:00:12    No results found for this or any previous visit.     No results found for this or any previous visit. No results found for this or any previous visit. Imaging: I have personally reviewed pertinent reports. XR chest pa & lateral    Result Date: 8/14/2023  Narrative: CHEST INDICATION:   c/f chf overload. COMPARISON: 08/01/2023 EXAM PERFORMED/VIEWS:  XR CHEST PA & LATERAL  The frontal view was performed utilizing dual energy radiographic technique. Images: 5 FINDINGS: Cardiomediastinal silhouette appears unremarkable. The lungs are clear. No pneumothorax or pleural effusion. Osseous structures appear within normal limits for patient age. Impression: No acute cardiopulmonary disease. Workstation performed: [id number]     XR chest 1 view portable    Result Date: 8/1/2023  Narrative: CHEST INDICATION:   weakness. COMPARISON: 5/8/2023 EXAM PERFORMED/VIEWS:  XR CHEST PORTABLE FINDINGS: Cardiomediastinal silhouette appears unremarkable. The lungs are clear. No pneumothorax or pleural effusion. Osseous structures appear within normal limits for patient age. Impression: No acute cardiopulmonary disease. Workstation performed: [id number]     CT chest abdomen pelvis w contrast    Result Date: 8/1/2023  Narrative: CT CHEST, ABDOMEN AND PELVIS WITH IV CONTRAST INDICATION:   Sepsis weakness, elevated lactic acid, diarrhea, prior pneumonia. COMPARISON: CT chest 5/12/2023 TECHNIQUE: CT examination of the chest, abdomen and pelvis was performed. In addition to portal venous phase postcontrast scanning through the abdomen and pelvis, delayed phase postcontrast scanning was performed through the upper abdominal viscera. Multiplanar 2D reformatted images were created from the source data. This examination, like all CT scans performed in the [facility], was performed utilizing techniques to minimize radiation dose exposure, including the use of iterative reconstruction and automated exposure control.  Radiation dose length product (DLP) for this visit:  1154.82 mGy-cm IV Contrast:  100 mL of iohexol (OMNIPAQUE) Enteric Contrast: Enteric contrast was administered. FINDINGS: CHEST LUNGS: Bibasilar subsegmental atelectasis. There is no tracheal or endobronchial lesion. Scattered calcified granuloma. 4 mm nodule along the right major fissure in the right lower lobe (4/67), likely representing intrapulmonary lymph node. PLEURA:  Unremarkable. HEART/GREAT VESSELS: Cardiomegaly with left atrial enlargement. Coronary artery calcifications. .  No thoracic aortic aneurysm. MEDIASTINUM AND LEA:  Unremarkable. CHEST WALL AND LOWER NECK:  Unremarkable. ABDOMEN LIVER/BILIARY TREE:  Unremarkable. A few simple hepatic cysts. GALLBLADDER:  No calcified gallstones. No pericholecystic inflammatory change. SPLEEN:  Unremarkable. PANCREAS:  Unremarkable. ADRENAL GLANDS:  Unremarkable. KIDNEYS/URETERS: Punctate right renal upper pole nonobstructing calculus. A small right renal cortical and a few bilateral parapelvic cysts. Bilateral subcentimeter low-attenuation lesions, too small to characterize. No hydronephrosis. STOMACH AND BOWEL: A few secretions are seen in the thoracic esophagus. Unremarkable. APPENDIX:  No findings to suggest appendicitis. ABDOMINOPELVIC CAVITY:  No ascites. No pneumoperitoneum. No lymphadenopathy. VESSELS:  Unremarkable for patient's age. PELVIS REPRODUCTIVE ORGANS:  Unremarkable for patient's age. URINARY BLADDER: A small right posterior urinary bladder diverticulum. . ABDOMINAL WALL/INGUINAL REGIONS:  Unremarkable. OSSEOUS STRUCTURES:  No acute fracture or destructive osseous lesion. Impression: No acute intra-abdominal process Cardiomegaly with left atrial enlargement Secretions in the thoracic esophagus, representing gastroesophageal reflux. This puts the patient at increased risk for aspiration pneumonitis. Punctate right upper renal pole nonobstructing calculus. The study was marked in [facility] for immediate notification.  Workstation performed: [id number]       EKG reviewed personally: atrial fibrillation, nonspecific ST abnormality  Telemetry reviewed personally: rate controlled a fib    Assessment:  Principal Problem:    Acute on chronic diastolic (congestive) heart failure (HCC)  Active Problems:    Persistent atrial fibrillation (HCC)    Malignant neoplasm of prostate ([address])    Type 2 diabetes mellitus without complication, without long-term current use of insulin (HCC)    Troponin level elevated    Anemia        Code Status: Level 1 - Full Code

## 2023-08-14 NOTE — ASSESSMENT & PLAN NOTE
· Baseline hgb around 10 - 12  · Now with a hgb of 8.1  · Possible part dilutional with CHF exacerbation  · Could also be related to recent diarrheal illness  · Will continue to trend   · Denies hematochezia, melena  · Check B12, folate, iron panel  · Folic acid low; replete  · hgb dropped further; stop coumadin therapy and with schistocytes present on peripheral smear consult hematology for further recommendations  · No other lab work consistent with hemolysis at this time

## 2023-08-14 NOTE — PLAN OF CARE
Problem: PAIN - ADULT  Goal: Verbalizes/displays adequate comfort level or baseline comfort level  Description: Interventions:  - Encourage patient to monitor pain and request assistance  - Assess pain using appropriate pain scale  - Administer analgesics based on type and severity of pain and evaluate response  - Implement non-pharmacological measures as appropriate and evaluate response  - Consider cultural and social influences on pain and pain management  - Notify physician/advanced practitioner if interventions unsuccessful or patient reports new pain  Outcome: Progressing     Problem: INFECTION - ADULT  Goal: Absence or prevention of progression during hospitalization  Description: INTERVENTIONS:  - Assess and monitor for signs and symptoms of infection  - Monitor lab/diagnostic results  - Monitor all insertion sites, i.e. indwelling lines, tubes, and drains  - Monitor endotracheal if appropriate and nasal secretions for changes in amount and color  - [place] appropriate cooling/warming therapies per order  - Administer medications as ordered  - Instruct and encourage patient and family to use good hand hygiene technique  - Identify and instruct in appropriate isolation precautions for identified infection/condition  Outcome: Progressing  Goal: Absence of fever/infection during neutropenic period  Description: INTERVENTIONS:  - Monitor WBC    Outcome: Progressing     Problem: SAFETY ADULT  Goal: Patient will remain free of falls  Description: INTERVENTIONS:  - Educate patient/family on patient safety including physical limitations  - Instruct patient to call for assistance with activity   - Consult OT/PT to assist with strengthening/mobility   - Keep Call bell within reach  - Keep bed low and locked with side rails adjusted as appropriate  - Keep care items and personal belongings within reach  - Initiate and maintain comfort rounds  - Make Fall Risk Sign visible to staff  - Offer Toileting every 2 Hours, in advance of need  - Initiate/Maintain bed/chair alarm  - Obtain necessary fall risk management equipment: bed/chair alarm, nonskid socks   - Apply yellow socks and bracelet for high fall risk patients  - Consider moving patient to room near nurses station  Outcome: Progressing  Goal: Maintain or return to baseline ADL function  Description: INTERVENTIONS:  -  Assess patient's ability to carry out ADLs; assess patient's baseline for ADL function and identify physical deficits which impact ability to perform ADLs (bathing, care of mouth/teeth, toileting, grooming, dressing, etc.)  - Assess/evaluate cause of self-care deficits   - Assess range of motion  - Assess patient's mobility; develop plan if impaired  - Assess patient's need for assistive devices and provide as appropriate  - Encourage maximum independence but intervene and supervise when necessary  - Involve family in performance of ADLs  - Assess for home care needs following discharge   - Consider OT consult to assist with ADL evaluation and planning for discharge  - Provide patient education as appropriate  Outcome: Progressing  Goal: Maintains/Returns to pre admission functional level  Description: INTERVENTIONS:  - Perform BMAT or MOVE assessment daily.   - Set and communicate daily mobility goal to care team and patient/family/caregiver. - Collaborate with rehabilitation services on mobility goals if consulted  - Perform Range of Motion 3 times a day. - Reposition patient every 3 hours.   - Dangle patient 3 times a day  - Stand patient 3 times a day  - Ambulate patient 3 times a day  - Out of bed to chair 3 times a day   - Out of bed for meals 3 times a day  - Out of bed for toileting  - Record patient progress and toleration of activity level   Outcome: Progressing     Problem: DISCHARGE PLANNING  Goal: Discharge to home or other facility with appropriate resources  Description: INTERVENTIONS:  - Identify barriers to discharge w/patient and caregiver  - Arrange for needed discharge resources and transportation as appropriate  - Identify discharge learning needs (meds, wound care, etc.)  - Arrange for interpretive services to assist at discharge as needed  - Refer to Case Management Department for coordinating discharge planning if the patient needs post-hospital services based on physician/advanced practitioner order or complex needs related to functional status, cognitive ability, or social support system  Outcome: Progressing     Problem: Knowledge Deficit  Goal: Patient/family/caregiver demonstrates understanding of disease process, treatment plan, medications, and discharge instructions  Description: Complete learning assessment and assess knowledge base.   Interventions:  - Provide teaching at level of understanding  - Provide teaching via preferred learning methods  Outcome: Progressing

## 2023-08-14 NOTE — ASSESSMENT & PLAN NOTE
Wt Readings from Last 3 Encounters:   08/14/23 89.3 kg (196 lb 13.9 oz)   08/03/23 86 kg (189 lb 9.5 oz)   06/16/23 86.2 kg (190 lb)     · Up approximately 13 lbs from dry weight  · Just completed steroid taper and had a recent diarrheal illness  · Last ECHO in 0069 with diastolic dysfunction   · Repeat ECHO at this time showing EF of 60%   · Initiate on lasix 40 mg IV daily; hopefully transition to oral diuretic therapy 8/15  · Monitor I/O and daily weights  · Low salt diet with fluid restriction initiated  · Cardiology consult appreciated

## 2023-08-14 NOTE — PROGRESS NOTES
[address]  Progress Note  Name: Christo Trujillo  MRN: [id number]  Unit/Bed#: 169-87 I Date of Admission: 8/13/2023   Date of Service: 8/14/2023 I Hospital Day: 1    Assessment/Plan   * Acute on chronic diastolic (congestive) heart failure (HCC)  Assessment & Plan  Wt Readings from Last 3 Encounters:   08/14/23 89.3 kg (196 lb 13.9 oz)   08/03/23 86 kg (189 lb 9.5 oz)   06/16/23 86.2 kg (190 lb)     · Up approximately 13 lbs from dry weight  · Just completed steroid taper and had a recent diarrheal illness  · Last ECHO in 4931 with diastolic dysfunction   · Repeat ECHO at this time showing EF of 60%   · Initiate on lasix 40 mg IV daily; hopefully transition to oral diuretic therapy 8/15  · Monitor I/O and daily weights  · Low salt diet with fluid restriction initiated  · Cardiology consult appreciated        Anemia  Assessment & Plan  · Baseline hgb around 10 - 12  · Now with a hgb of 8.1  · Possible part dilutional with CHF exacerbation  · Could also be related to recent diarrheal illness  · Will continue to trend   · Denies hematochezia, melena  · Check B12, folate, iron panel  · Folic acid low; replete  · hgb dropped further; stop coumadin therapy and with schistocytes present on peripheral smear consult hematology for further recommendations  · No other lab work consistent with hemolysis at this time    Troponin level elevated  Assessment & Plan  · Troponin 508 > 591  · Continue to trend  · Denies CP  · Maintain on aspirin and statin therapy  · Suspect myocardial injury in the setting of CHF exacerbation  · Cardiology input would be appreciated    Type 2 diabetes mellitus without complication, without long-term current use of insulin [facility])  Assessment & Plan  Lab Results   Component Value Date    HGBA1C 10.6 (H) 08/02/2023       Recent Labs     08/13/23  1618 08/14/23  0708 08/14/23  1109   POCGLU 155* 120 217*       Blood Sugar Average: Last 72 hrs:  (P) 164   Poor control per last A1c  Only on gliperide at home  Initiate on ADA diet and SSI  Monitor glucose levels closely and escalate insulin regimen as needed  Will need endocrine follow up on discharge likely    Malignant neoplasm of prostate [facility])  Assessment & Plan  · History of prostate cancer s/p radiation  · Does not take any medications to help urine flow  · Reported abdominal pain and difficulty urinating last night  · Now resolved but will continue with bladder scans   · Should he be retaining urine consistently, can consider flomax  · Retaining urine but not enough for straight caths but he continues to be uncomfortable with urination; initiate on flomax 8/15 and monitor blood pressures thereafter    Persistent atrial fibrillation ([address])  Assessment & Plan  · On coumadin therapy; currently subtherapeutic  · Stop coumadin therapy and avoid AC at this time with continuing dropping hgb levels  · Currently rate controlled with metoprolol           VTE Pharmacologic Prophylaxis: VTE Score: 4 Moderate Risk (Score 3-4) - Pharmacological DVT Prophylaxis Contraindicated. Sequential Compression Devices Ordered. Patient Centered Rounds: I performed bedside rounds with nursing staff today. Discussions with Specialists or Other Care Team Provider: case management    Education and Discussions with Family / Patient: Updated  (wife) at bedside. Total Time Spent on Date of Encounter in care of patient: 45 minutes This time was spent on one or more of the following: performing physical exam; counseling and coordination of care; obtaining or reviewing history; documenting in the medical record; reviewing/ordering tests, medications or procedures; communicating with other healthcare professionals and discussing with patient's family/caregivers.     Current Length of Stay: 1 day(s)  Current Patient Status: Inpatient   Certification Statement: The patient will continue to require additional inpatient hospital stay due to dropping hgb, continued diuresis  Discharge Plan: Anticipate discharge in 48 hrs to home with home services. Code Status: Level 1 - Full Code    Subjective:   Patient reports having difficulty with constipation at this time. Still experiencing discomfort with urination feeling as if he is not completely emptying. Objective:     Vitals:   Temp (24hrs), Av.6 °F (36.4 °C), Min:97.5 °F (36.4 °C), Max:97.6 °F (36.4 °C)    Temp:  [97.5 °F (36.4 °C)-97.6 °F (36.4 °C)] 97.5 °F (36.4 °C)  HR:  [] 81  Resp:  [15] 15  BP: ()/(59-62) 110/60  SpO2:  [100 %] 100 %  Body mass index is 25.97 kg/m². Input and Output Summary (last 24 hours): Intake/Output Summary (Last 24 hours) at 2023 1425  Last data filed at 2023 1119  Gross per 24 hour   Intake 640 ml   Output 1450 ml   Net -810 ml       Physical Exam:   Physical Exam  Vitals and nursing note reviewed. Constitutional:       Appearance: He is ill-appearing. Cardiovascular:      Rate and Rhythm: Normal rate. Rhythm irregular. Pulses: Normal pulses. Heart sounds: Normal heart sounds. Pulmonary:      Effort: Pulmonary effort is normal.   Abdominal:      General: Bowel sounds are normal.      Palpations: Abdomen is soft. Musculoskeletal:      Right lower leg: Edema present. Left lower leg: Edema present. Skin:     Coloration: Skin is pale. Neurological:      Mental Status: He is alert. Mental status is at baseline.    Psychiatric:         Mood and Affect: Mood normal.         Behavior: Behavior normal.          Additional Data:     Labs:  Results from last 7 days   Lab Units 23  1138 23  0503   WBC Thousand/uL  --  3.31*   HEMOGLOBIN g/dL 7.5* 7.3*   HEMATOCRIT % 24.4* 23.2*   PLATELETS Thousands/uL  --  100*   NEUTROS PCT %  --  59   LYMPHS PCT %  --  31   MONOS PCT %  --  7   EOS PCT %  --  1     Results from last 7 days   Lab Units 23  0503   SODIUM mmol/L 141   POTASSIUM mmol/L 2.9*   CHLORIDE mmol/L 108   CO2 mmol/L 26   BUN mg/dL 20   CREATININE mg/dL 0.83   ANION GAP mmol/L 7   CALCIUM mg/dL 8.0*   ALBUMIN g/dL 3.0*   TOTAL BILIRUBIN mg/dL 1.02*   ALK PHOS U/L 48   ALT U/L 25   AST U/L 18   GLUCOSE RANDOM mg/dL 103     Results from last 7 days   Lab Units 23  0503   INR  1.25*     Results from last 7 days   Lab Units 23  1109 23  0708 23  1618   POC GLUCOSE mg/dl 217* 120 155*         Results from last 7 days   Lab Units 23  0503   PROCALCITONIN ng/ml 0.08       Lines/Drains:  Invasive Devices     Peripheral Intravenous Line  Duration           Peripheral IV 23 Distal;Right;Upper;Ventral (anterior) Arm 1 day                  Telemetry:  Telemetry Orders (From admission, onward)             24 Hour Telemetry Monitoring  Continuous x 24 Hours (Telem)           Question:  Reason for 24 Hour Telemetry  Answer:  Alcohol withdrawal and CIWA >7, electrolyte abnormalities, abnormal ECG and/or heart disease                 Telemetry Reviewed: Atrial fibrillation. HR averaging 90  Indication for Continued Telemetry Use: No indication for continued use. Will discontinue.               Imaging: Reviewed radiology reports from this admission including: ECHO    Recent Cultures (last 7 days):   Results from last 7 days   Lab Units 23  1041   C DIFF TOXIN B BY PCR  Negative       Last 24 Hours Medication List:   Current Facility-Administered Medications   Medication Dose Route Frequency Provider Last Rate   • acetaminophen  650 mg Oral Q6H PRN Chase Grace PA-C     • aspirin  81 mg Oral Daily Chase Grace PA-C     • atorvastatin  40 mg Oral Daily Chase Grace PA-C     • busPIRone  7.5 mg Oral BID Chase Grace PA-C     • folic acid 1 mg in sodium chloride 0.9 % 50 mL IVPB  1 mg Intravenous Daily Chase Grace PA-C     • insulin lispro  1-5 Units Subcutaneous TID AC Chase Grace PA-C     • ipratropium  2 spray Nasal TID Chase Grace PA-C     • loperamide  2 mg Oral TID PRN Chase Grace PA-C • metoprolol succinate  25 mg Oral Daily Carola Porras PA-C     • ondansetron  4 mg Intravenous Q6H PRN Carola Porras PA-C     • potassium chloride  20 mEq Intravenous Q2H Carola Porras PA-C 20 mEq (08/14/23 1202)   • vitamin B-6  50 mg Oral Daily Carola Porras PA-C     • senna-docusate sodium  2 tablet Oral Once Carola Porras PA-C     • tamsulosin  0.4 mg Oral Daily With Dinner Carola Porras PA-C          Today, Patient Was Seen By: Carola Porras PA-C    **Please Note: This note may have been constructed using a voice recognition system. **

## 2023-08-14 NOTE — ASSESSMENT & PLAN NOTE
· History of prostate cancer s/p radiation  · Does not take any medications to help urine flow  · Reported abdominal pain and difficulty urinating last night  · Now resolved but will continue with bladder scans   · Should he be retaining urine consistently, can consider flomax  · Retaining urine but not enough for straight caths but he continues to be uncomfortable with urination; initiate on flomax 8/15 and monitor blood pressures thereafter

## 2023-08-14 NOTE — UTILIZATION REVIEW
Initial Clinical Review    Admission: Date/Time/Statement:   Admission Orders (From admission, onward)     Ordered        08/13/23 1310  Inpatient Admission  Once                      Orders Placed This Encounter   Procedures   • Inpatient Admission     Standing Status:   Standing     Number of Occurrences:   1     Order Specific Question:   Level of Care     Answer:   Med Surg [16]     Order Specific Question:   Estimated length of stay     Answer:   More than 2 Midnights     Order Specific Question:   Certification     Answer:   I certify that inpatient services are medically necessary for this patient for a duration of greater than two midnights. See H&P and MD Progress Notes for additional information about the patient's course of treatment. ED Arrival Information     Expected   -    Arrival   8/13/2023 11:18    Acuity   Urgent            Means of arrival   Wheelchair    Escorted by   Spouse    Service   Hospitalist    Admission type   Emergency            Arrival complaint   Leg Swelling           Chief Complaint   Patient presents with   • Leg Swelling     Bilateral lower leg swelling that started today per patients wife. States that he has been having a hard time getting around due to the swelling. Denies any shortness of breath        Initial Presentation: 80 y.o. male presents to ed from home for evaluation and treatment of lower extremity edema limiting his mobility. Patient reports his edema is worse than normal.  PMHX: CHF, AFIB on coumadin, prostate cancer. Clinical assessment significant for 2+ edema. , MAG 1.3, TROP 508, WBC 3.63, HB 8.1( baseline)   Initially treated with iv lasix , iv mag, aspirin. Admit to inpatient for CHF acute on chronic, chronic anemia. Plan includes: ikv lasix daily, trend troponin x2, consult cardiology, check stool for occult blood, follow up CBC. Date: 8-14-23   Day 2: inpatient med surg   Cardiology consult completed.   Continue iv lasix and obtain Echo. New order for IV kcl 20 meq q2 hr, iv mag x1, lipitor and aspirin. Obtain PT/OT/ST evaluations. Check venous duplex study. ED Triage Vitals [08/13/23 1127]   97.5 °F (36.4 °C) 85 18 102/55 98 %      Temporal Monitor         No Pain          08/14/23 89.3 kg (196 lb 13.9 oz)     Additional Vital Signs:        BP Temp Pulse Resp SpO2   08/14/23 1119 110/60 -- -- -- --   08/14/23 0944 107/59 -- 92 -- 100 %   08/14/23 0607 98/60 97.5 °F (36.4 °C) 96 15 100 %   08/13/23 2144 116/62 97.6 °F (36.4 °C) 100 -- 100 %   08/13/23 1430 -- -- -- -- 100 %   08/13/23 1422 103/59 -- 63 15 97 %             Pertinent Labs/Diagnostic Test Results:     Date/Time: 8/13/2023 12:51 PM    Indications / Diagnosis:  Leg swelling history of CHF, A-fib  Previous ECG:     Previous ECG:  Compared to current    Comparison ECG info:  QT has lengthened    Similarity:  Changes noted  Interpretation:     Interpretation: abnormal    Rate:     ECG rate:  90    ECG rate assessment: normal    Rhythm:     Rhythm: atrial fibrillation    Ectopy:     Ectopy: none    QRS:     QRS axis:  Normal    QRS intervals:  Normal  Conduction:     Conduction: normal    ST segments:     ST segments:  Normal  T waves:     T waves: non-specific and flattening      Flattening:  V3, V4, II, III, aVF and I  Other findings:     Other findings: prolonged qTc interval    Comments:      Atrial fibrillation at 90 bpm, prolonged QTc, nonspecific T wave flattening of leads I, 2, 3, aVF, V3, V4    XR chest pa & lateral      Final  (08/14 0753)      No acute cardiopulmonary disease.         Results from last 7 days   Lab Units 08/13/23  1509   SARS-COV-2  Negative     Results from last 7 days   Lab Units 08/14/23  0503 08/13/23  1216   WBC Thousand/uL 3.31* 3.63*   HEMOGLOBIN g/dL 7.3* 8.1*   HEMATOCRIT % 23.2* 25.8*   PLATELETS Thousands/uL 100* 112*   NEUTROS ABS Thousands/µL 2.00 2.22         Results from last 7 days   Lab Units 08/14/23  0503 08/13/23  1220 08/13/23  1216 SODIUM mmol/L 141  --  141   POTASSIUM mmol/L 2.9*  --  3.6   CHLORIDE mmol/L 108  --  107   CO2 mmol/L 26  --  25   ANION GAP mmol/L 7  --  9   BUN mg/dL 20  --  22   CREATININE mg/dL 0.83  --  0.87   EGFR ml/min/1.73sq m 79  --  77   CALCIUM mg/dL 8.0*  --  8.4   MAGNESIUM mg/dL 1.6* 1.3*  --    PHOSPHORUS mg/dL 2.4  --   --      Results from last 7 days   Lab Units 08/14/23  0503 08/13/23  1216   AST U/L 18 18   ALT U/L 25 30   ALK PHOS U/L 48 55   TOTAL PROTEIN g/dL 5.0* 5.8*   ALBUMIN g/dL 3.0* 3.5   TOTAL BILIRUBIN mg/dL 1.02* 1.05*     Results from last 7 days   Lab Units 08/14/23  1109 08/14/23  0708 08/13/23  1618   POC GLUCOSE mg/dl 217* 120 155*     Results from last 7 days   Lab Units 08/14/23  0503 08/13/23  1216   GLUCOSE RANDOM mg/dL 103 155*       Results from last 7 days   Lab Units 08/13/23  1756 08/13/23  1542 08/13/23  1211   HS TNI 0HR ng/L  --   --  508*   HS TNI 2HR ng/L  --  591*  --    HSTNI D2 ng/L  --  83*  --    HS TNI 4HR ng/L 458*  --   --    HSTNI D4 ng/L -50  --   --          Results from last 7 days   Lab Units 08/14/23  0503 08/13/23  1540 08/10/23  1352   PROTIME seconds 15.9* 14.5 15.5*   INR  1.25* 1.11 1.21*     Results from last 7 days   Lab Units 08/13/23  1220   TSH 3RD GENERATON uIU/mL 3.529     Results from last 7 days   Lab Units 08/14/23  0503   PROCALCITONIN ng/ml 0.08       Results from last 7 days   Lab Units 08/13/23  1216   BNP pg/mL 506*     Results from last 7 days   Lab Units 08/13/23  1540   FERRITIN ng/mL 772*   IRON SATURATION % 21   IRON ug/dL 48*   TIBC ug/dL 226*       Results from last 7 days   Lab Units 08/13/23  1219   CLARITY UA  Clear   COLOR UA  Yellow   SPEC GRAV UA  1.025   PH UA  5.0   GLUCOSE UA mg/dl 250 (1/4%)*   KETONES UA mg/dl Negative   BLOOD UA  Negative   PROTEIN UA mg/dl Trace*   NITRITE UA  Negative   BILIRUBIN UA  Negative   UROBILINOGEN UA E.U./dl 0.2   LEUKOCYTES UA  Negative   WBC UA /hpf 0-1   RBC UA /hpf None Seen   BACTERIA UA /hpf None Seen   EPITHELIAL CELLS WET PREP /hpf None Seen   MUCUS THREADS  Occasional*     Results from last 7 days   Lab Units 08/13/23  1509   INFLUENZA A PCR  Negative   INFLUENZA B PCR  Negative   RSV PCR  Negative       Results from last 7 days   Lab Units 08/12/23  1041   C DIFF TOXIN B BY PCR  Negative       ED Treatment:   Medication Administration from 08/13/2023 1117 to 08/13/2023 1353       Date/Time Order Dose Route Action     08/13/2023 1251 EDT furosemide (LASIX) injection 50 mg 50 mg Intravenous Given     08/13/2023 1315 EDT magnesium sulfate 2 g/50 mL IVPB (premix) 2 g 2 g Intravenous New Bag     08/13/2023 1315 EDT aspirin chewable tablet 243 mg 243 mg Oral Given        Past Medical History:   Diagnosis Date   • Anxiety    • Atrial fibrillation (HCC)    • Atrial flutter (HCC)    • Congestive heart failure (CHF) ([facility])    • COPD (chronic obstructive pulmonary disease) ([address] [place] [address])    • Coronary artery disease    • DVT (deep venous thrombosis) ([facility])    • ED (erectile dysfunction)    • Hearing loss    • History of echocardiogram 04/05/2018    EF 0.55, Mild LVH. Mild moderate mitral regurg. Trace aortic regurg.    • Hx of radiation therapy     XRT   • Hypercholesterolemia    • Hyperlipidemia    • Hypertension    • Long term (current) use of anticoagulants 8/21/2018   • Neuropathy of both feet    • Peripheral neuropathy    • Prostate cancer ([facility])    • Type 2 diabetes mellitus ([address] [place] [address])      Present on Admission:  • Type 2 diabetes mellitus without complication, without long-term current use of insulin ([facility])  • Persistent atrial fibrillation ([facility])  • Malignant neoplasm of prostate ([address] [place] [address])      Admitting Diagnosis:    Hypomagnesemia [E83.42]  Leg swelling [M79.89]  Elevated troponin [R77.8]  CHF exacerbation ([facility]) [I50.9]    Age/Sex: 80 y.o. male    Scheduled Medications:    aspirin, 81 mg, Oral, Daily  atorvastatin, 40 mg, Oral, Daily  busPIRone, 7.5 mg, Oral, BID  furosemide, 40 mg, Intravenous, Daily  insulin lispro, 1-5 Units, Subcutaneous, TID AC  ipratropium, 2 spray, Nasal, TID  metoprolol succinate, 25 mg, Oral, Daily  potassium chloride, 20 mEq, Intravenous, Q2H  vitamin B-6, 50 mg, Oral, Daily      Continuous IV Infusions:     PRN Meds:  acetaminophen, 650 mg, Oral, Q6H PRN  loperamide, 2 mg, Oral, TID PRN  ondansetron, 4 mg, Intravenous, Q6H PRN        IP CONSULT TO CARDIOLOGY    Network Utilization Review Department  ATTENTION: Please call with any questions or concerns to [phone] and carefully listen to the prompts so that you are directed to the right person. All voicemails are confidential.  Joseph Esters all requests for admission clinical reviews, approved or denied determinations and any other requests to dedicated fax number below belonging to the campus where the patient is receiving treatment.  List of dedicated fax numbers for the Facilities:  [place] DENIALS (Administrative/Medical Necessity) [phone] [facility] [phone]   [address] [place] [address] Drive [phone]   [facility] [address] [facility] [phone]   [facility] [address] [facility] [address] [place] [address] [id number] [place] [address] [phone]   [address] [address]  [phone]

## 2023-08-14 NOTE — PLAN OF CARE
Problem: PHYSICAL THERAPY ADULT  Goal: Performs mobility at highest level of function for planned discharge setting. See evaluation for individualized goals. Description: Treatment/Interventions: Functional transfer training, LE strengthening/ROM, Therapeutic exercise, Endurance training, Bed mobility, Gait training          See flowsheet documentation for full assessment, interventions and recommendations. Note: Prognosis: Good  Problem List: Decreased strength, Decreased endurance, Impaired balance, Decreased mobility  Assessment: Patient is a 80 y.o. male evaluated by Physical Therapy s/p admit to [address] [place] [address] on 8/13/2023 with admitting diagnosis of: Hypomagnesemia, Leg swelling, Elevated troponin, CHF exacerbation, and principal problem of: Acute on chronic diastolic (congestive) heart failure. PT was consulted to assess patient's functional mobility and discharge needs. Ordered are PT Evaluation and treatment with activity level of: up and OOB as tolerated. Comorbidities affecting patient's physical performance at time of assessment include: a-fib, CHF, HLD, DM, peripheral neuropathy, CAD, a-flutter, prostate cncer, COPD, hx of DVT, hypercholesterolemia, HTN. Personal factors affecting the patient at time of IE include: lives in 2 story home, ambulating with assistive device, inability to navigate community distances, inability/difficulty performing IADLs and inability/difficulty performing ADLs. Please locate objective findings from PT assessment regarding body systems outlined above. Upon evaluation, pt able to perform all functional mobility with SUP-Anil, RW, and increased time. Occasional verbal cuing provided for safety awareness and sequencing. Pt able to ambulate ~150' before taking seated rest break. Moderate postural sway demonstrated with mobility but no true LOB experienced. HR and SpO2 remained WFL on RA throughout.  The patient's AM-PAC Basic Mobility Inpatient Short Form Raw Score is 18. A Raw score of greater than 16 suggests the patient may benefit from discharge to home. Please also refer to the recommendation of the Physical Therapist for safe discharge planning. Co treatment with OT secondary to complex medical condition of pt, possible A of 2 required to achieve and maintain transitional movements, requiring the need of skilled therapeutic intervention of 2 therapists to achieve delivery of services. Pt will benefit from continued PT intervention during LOS to address current deficits, increase LOF, and facilitate safe d/c to next level of care when medically appropriate. D/c recommendation at this time is home with continued home health rehabilitation. PT Discharge Recommendation: Home with home health rehabilitation    See flowsheet documentation for full assessment.

## 2023-08-14 NOTE — OCCUPATIONAL THERAPY NOTE
Occupational Therapy Evaluation     Patient Name: Jessica Weems  [id number] Date: 8/14/2023  Problem List  Principal Problem:    Acute on chronic diastolic (congestive) heart failure (HCC)  Active Problems:    Persistent atrial fibrillation (HCC)    Malignant neoplasm of prostate (HCC)    Type 2 diabetes mellitus without complication, without long-term current use of insulin (HCC)    Troponin level elevated    Anemia    Past Medical History  Past Medical History:   Diagnosis Date    Anxiety     Atrial fibrillation (HCC)     Atrial flutter (HCC)     Congestive heart failure (CHF) (HCC)     COPD (chronic obstructive pulmonary disease) ([address])     Coronary artery disease     DVT (deep venous thrombosis) (HCC)     ED (erectile dysfunction)     Hearing loss     History of echocardiogram 04/05/2018    EF 0.55, Mild LVH. Mild moderate mitral regurg. Trace aortic regurg. Hx of radiation therapy     XRT    Hypercholesterolemia     Hyperlipidemia     Hypertension     Long term (current) use of anticoagulants 8/21/2018    Neuropathy of both feet     Peripheral neuropathy     Prostate cancer (HCC)     Type 2 diabetes mellitus [facility])      Past Surgical History  Past Surgical History:   Procedure Laterality Date    CARDIAC CATHETERIZATION  01/15/1988    Left main: unobstructed. Posterolateral branch 90% narrowed. COLONOSCOPY  10/05/2017    Dr. Alana Clayton               08/14/23 1033   OT Last Visit   OT Visit Date 08/14/23   Note Type   Note type Evaluation   Pain Assessment   Pain Score No Pain   Restrictions/Precautions   Weight Bearing Precautions Per Order No   Other Precautions Pain; Fall Risk;Bed Alarm; Chair Alarm;Multiple lines;Telemetry   Home Living   Type of [address] [place] [address]  Two level;Bed/bath upstairs; Ramped entrance; Other (Comment);1/2 bath on main level  (stairglide to 2nd)   Bathroom Shower/Tub Walk-in shower   Bathroom Toilet Standard   Bathroom Equipment Grab bars in shower; Shower chair; Toilet raiser   [address] Walker;Cane;Wheelchair-manual;Electric scooter;Grab bars; Other (Comment)  (SW and RW options)   Additional Comments pt reports use of RW during mobility   Prior Function   Level of [place] Independent with functional mobility; Needs assistance with ADLs; Needs assistance with IADLS   Lives With Spouse   Receives Help From Family   IADLs Family/Friend/Other provides transportation   Falls in the last 6 months 0   Vocational Retired   Subjective   Subjective "I remember you guys from last time"   ADL   Where Assessed Edge of bed   LB Dressing Assistance 2  Maximal Assistance   LB Dressing Deficit Thread RLE into underwear; Thread LLE into underwear;Pull up over hips   Toileting Assistance  5  Supervision/Setup   Toileting Deficit Use of bedpan/urinal setup   Bed Mobility   Supine to Sit 4  Minimal assistance   Additional items Assist x 1;Bedrails   Sit to Supine   (seated in chair at end of session)   Additional Comments pt on RA during session; SPO2 WFL and HR elevated into 130s during mobility   Transfers   Sit to Stand 5  Supervision   Additional items Increased time required;Verbal cues  (RW)   Stand to Sit 5  Supervision   Additional items Increased time required;Verbal cues  (RW)   Additional Comments pt performs functional transfers with RW; no significant LOB or instability   Functional Mobility   Functional Mobility 5  Supervision   Additional Comments pt performs functional mobility with RW; no significant LOB or instability; performs ~125ft with long standing break in hallway   Additional items Rolling walker   Balance   Static Sitting Good   Dynamic Sitting Fair +   Static Standing Fair   Dynamic Standing Fair -   Ambulatory Fair -   Activity Tolerance   Activity Tolerance Patient limited by fatigue   RUE Assessment   RUE Assessment WFL   LUE Assessment   LUE Assessment WFL   Hand Function   Gross Motor Coordination Functional Fine Motor Coordination Functional   Sensation   Light Touch No apparent deficits   Sharp/Dull No apparent deficits   Psychosocial   Psychosocial (WDL) WDL   Cognition   Overall Cognitive Status WFL   Arousal/Participation Alert   Attention Within functional limits   Orientation Level Oriented X4   Memory Within functional limits   Following Commands Follows all commands and directions without difficulty   Assessment   Limitation Decreased ADL status; Decreased UE strength;Decreased Safe judgement during ADL;Decreased endurance;Decreased high-level ADLs; Decreased self-care trans   Assessment Pt is a 80 y.o. male seen for OT evaluation s/p admit to [facility] on 8/13/2023 w/ Acute on chronic diastolic (congestive) heart failure ([address]). Comorbidities affecting pt's functional performance at time of assessment include: a-fib, CHF, HLD, DM, anxiety, CAD, prostate CA, DVT, [place], hypercholesterolemia, ED, neuropathy. Personal factors affecting pt at time of IE include:steps to enter environment, difficulty performing ADLS, difficulty performing IADLS , decreased initiation and engagement  and health management . Prior to admission, pt was (A) with ADLs and IADLs with use of RW during mobility. Upon evaluation: Pt requires (S)-max (A) x1 with use of RW during mobility 2* the following deficits impacting occupational performance: weakness, decreased strength, decreased balance, decreased tolerance, impaired initiation and decreased safety awareness. Pt to benefit from continued skilled OT tx while in the hospital to address deficits as defined above and maximize level of functional independence w ADL's and functional mobility. Occupational Performance areas to address include: grooming, bathing/shower, toilet hygiene, dressing, functional mobility, community mobility and clothing management. The patient's raw score on the -PAC Daily Activity Inpatient Short Form is 19.  A raw score of greater than or equal to 19 suggests the patient may benefit from discharge to home. Please refer to the recommendation of the Occupational Therapist for safe discharge planning. Pt benefited from co-evaluation of skilled OT and PT therapists in order to most appropriately address functional deficits d/t extensive assistance required for safe functional mobility, decreased activity tolerance, and regression from functioning level prior to admission and/or onset of present illness. OT/PT objectives were addressed separately; please see PT note for specific goal areas targeted. Goals   Patient Goals to go home   Short Term Goal  pt will perform UE strengthening exercises   Long Term Goal #1 pt will demonstrate toilet transfers and hygiene at (I) level   Long Term Goal #2 pt will demonstrate functional mobility with RW at mod (I) level   Long Term Goal pt will perform UB/LB bathing and grooming tasks at (I) level   Plan   Treatment Interventions ADL retraining;UE strengthening/ROM; Endurance training;Patient/family training;Cognitive reorientation;Equipment evaluation/education; Activityengagement; Functional transfer training   Goal Expiration Date 08/28/23   OT Frequency 3-5x/wk   Recommendation   OT Discharge Recommendation Home with home health rehabilitation   Additional Comments  continue with home health PT   AM-PAC Daily Activity Inpatient   Lower Body Dressing 2   Bathing 2   Toileting 3   Upper Body Dressing 4   Grooming 4   Eating 4   Daily Activity Raw Score 19   Daily Activity Standardized Score (Calc for Raw Score >=11) 40.22   AM-PAC Applied Cognition Inpatient   Following a Speech/Presentation 4   Understanding Ordinary Conversation 4   Taking Medications 4   Remembering Where Things Are Placed or Put Away 3   Remembering List of 4-5 Errands 3   Taking Care of Complicated Tasks 3   Applied Cognition Raw Score 21   Applied Cognition Standardized Score 44.3

## 2023-08-14 NOTE — ASSESSMENT & PLAN NOTE
· On coumadin therapy; currently subtherapeutic  · Stop coumadin therapy and avoid AC at this time with continuing dropping hgb levels  · Currently rate controlled with metoprolol

## 2023-08-15 PROBLEM — D61.818 PANCYTOPENIA (HCC): Status: ACTIVE | Noted: 2023-08-13

## 2023-08-15 LAB
ABO GROUP BLD: NORMAL
ABO GROUP BLD: NORMAL
ALBUMIN SERPL BCP-MCNC: 2.9 G/DL (ref 3.5–5)
ALP SERPL-CCNC: 50 U/L (ref 34–104)
ALT SERPL W P-5'-P-CCNC: 24 U/L (ref 7–52)
ANION GAP SERPL CALCULATED.3IONS-SCNC: 8 MMOL/L
AST SERPL W P-5'-P-CCNC: 16 U/L (ref 13–39)
ATRIAL RATE: 80 BPM
BACTERIA UR CULT: NORMAL
BASOPHILS # BLD AUTO: 0.01 THOUSANDS/ÂΜL (ref 0–0.1)
BASOPHILS NFR BLD AUTO: 0 % (ref 0–1)
BILIRUB DIRECT SERPL-MCNC: 0.29 MG/DL (ref 0–0.2)
BILIRUB SERPL-MCNC: 1.06 MG/DL (ref 0.2–1)
BLD GP AB SCN SERPL QL: NEGATIVE
BUN SERPL-MCNC: 19 MG/DL (ref 5–25)
CALCIUM SERPL-MCNC: 8 MG/DL (ref 8.4–10.2)
CHLORIDE SERPL-SCNC: 108 MMOL/L (ref 96–108)
CO2 SERPL-SCNC: 25 MMOL/L (ref 21–32)
CREAT SERPL-MCNC: 0.81 MG/DL (ref 0.6–1.3)
EOSINOPHIL # BLD AUTO: 0.03 THOUSAND/ÂΜL (ref 0–0.61)
EOSINOPHIL NFR BLD AUTO: 1 % (ref 0–6)
ERYTHROCYTE [DISTWIDTH] IN BLOOD BY AUTOMATED COUNT: 25.1 % (ref 11.6–15.1)
GFR SERPL CREATININE-BSD FRML MDRD: 79 ML/MIN/1.73SQ M
GLUCOSE SERPL-MCNC: 124 MG/DL (ref 65–140)
GLUCOSE SERPL-MCNC: 127 MG/DL (ref 65–140)
GLUCOSE SERPL-MCNC: 154 MG/DL (ref 65–140)
GLUCOSE SERPL-MCNC: 217 MG/DL (ref 65–140)
GLUCOSE SERPL-MCNC: 239 MG/DL (ref 65–140)
HAPTOGLOB SERPL-MCNC: 195 MG/DL (ref 38–329)
HCT VFR BLD AUTO: 22.5 % (ref 36.5–49.3)
HCT VFR BLD AUTO: 23.3 % (ref 36.5–49.3)
HGB BLD-MCNC: 7 G/DL (ref 12–17)
HGB BLD-MCNC: 7.2 G/DL (ref 12–17)
IMM GRANULOCYTES # BLD AUTO: 0.03 THOUSAND/UL (ref 0–0.2)
IMM GRANULOCYTES NFR BLD AUTO: 1 % (ref 0–2)
INR PPP: 1.24 (ref 0.84–1.19)
LYMPHOCYTES # BLD AUTO: 1.13 THOUSANDS/ÂΜL (ref 0.6–4.47)
LYMPHOCYTES NFR BLD AUTO: 33 % (ref 14–44)
MCH RBC QN AUTO: 30.8 PG (ref 26.8–34.3)
MCHC RBC AUTO-ENTMCNC: 31.1 G/DL (ref 31.4–37.4)
MCV RBC AUTO: 99 FL (ref 82–98)
MONOCYTES # BLD AUTO: 0.25 THOUSAND/ÂΜL (ref 0.17–1.22)
MONOCYTES NFR BLD AUTO: 7 % (ref 4–12)
MRSA NOSE QL CULT: NORMAL
NEUTROPHILS # BLD AUTO: 1.98 THOUSANDS/ÂΜL (ref 1.85–7.62)
NEUTS SEG NFR BLD AUTO: 58 % (ref 43–75)
NRBC BLD AUTO-RTO: 1 /100 WBCS
PLATELET # BLD AUTO: 102 THOUSANDS/UL (ref 149–390)
PMV BLD AUTO: 9.7 FL (ref 8.9–12.7)
POTASSIUM SERPL-SCNC: 3.5 MMOL/L (ref 3.5–5.3)
PROT SERPL-MCNC: 4.8 G/DL (ref 6.4–8.4)
PROTHROMBIN TIME: 15.8 SECONDS (ref 11.6–14.5)
QRS AXIS: 37 DEGREES
QRSD INTERVAL: 86 MS
QT INTERVAL: 390 MS
QTC INTERVAL: 477 MS
RBC # BLD AUTO: 2.27 MILLION/UL (ref 3.88–5.62)
RH BLD: POSITIVE
RH BLD: POSITIVE
SODIUM SERPL-SCNC: 141 MMOL/L (ref 135–147)
SPECIMEN EXPIRATION DATE: NORMAL
T WAVE AXIS: -35 DEGREES
VENTRICULAR RATE: 90 BPM
WBC # BLD AUTO: 3.43 THOUSAND/UL (ref 4.31–10.16)

## 2023-08-15 PROCEDURE — 86900 BLOOD TYPING SEROLOGIC ABO: CPT

## 2023-08-15 PROCEDURE — 82948 REAGENT STRIP/BLOOD GLUCOSE: CPT

## 2023-08-15 PROCEDURE — 85610 PROTHROMBIN TIME: CPT | Performed by: PHYSICIAN ASSISTANT

## 2023-08-15 PROCEDURE — 86901 BLOOD TYPING SEROLOGIC RH(D): CPT

## 2023-08-15 PROCEDURE — 86920 COMPATIBILITY TEST SPIN: CPT

## 2023-08-15 PROCEDURE — 85018 HEMOGLOBIN: CPT

## 2023-08-15 PROCEDURE — 86850 RBC ANTIBODY SCREEN: CPT

## 2023-08-15 PROCEDURE — 99232 SBSQ HOSP IP/OBS MODERATE 35: CPT

## 2023-08-15 PROCEDURE — 80048 BASIC METABOLIC PNL TOTAL CA: CPT | Performed by: PHYSICIAN ASSISTANT

## 2023-08-15 PROCEDURE — 93010 ELECTROCARDIOGRAM REPORT: CPT | Performed by: INTERNAL MEDICINE

## 2023-08-15 PROCEDURE — 85025 COMPLETE CBC W/AUTO DIFF WBC: CPT | Performed by: PHYSICIAN ASSISTANT

## 2023-08-15 PROCEDURE — 99232 SBSQ HOSP IP/OBS MODERATE 35: CPT | Performed by: INTERNAL MEDICINE

## 2023-08-15 PROCEDURE — 85014 HEMATOCRIT: CPT

## 2023-08-15 PROCEDURE — 93970 EXTREMITY STUDY: CPT | Performed by: SURGERY

## 2023-08-15 PROCEDURE — 80076 HEPATIC FUNCTION PANEL: CPT

## 2023-08-15 RX ORDER — FUROSEMIDE 10 MG/ML
80 INJECTION INTRAMUSCULAR; INTRAVENOUS ONCE
Status: DISCONTINUED | OUTPATIENT
Start: 2023-08-15 | End: 2023-08-16

## 2023-08-15 RX ADMIN — Medication 50 MG: at 08:21

## 2023-08-15 RX ADMIN — BUSPIRONE HYDROCHLORIDE 7.5 MG: 5 TABLET ORAL at 17:18

## 2023-08-15 RX ADMIN — INSULIN LISPRO 2 UNITS: 100 INJECTION, SOLUTION INTRAVENOUS; SUBCUTANEOUS at 17:19

## 2023-08-15 RX ADMIN — FOLIC ACID 1 MG: 5 INJECTION, SOLUTION INTRAMUSCULAR; INTRAVENOUS; SUBCUTANEOUS at 08:21

## 2023-08-15 RX ADMIN — IPRATROPIUM BROMIDE 2 SPRAY: 21 SPRAY, METERED NASAL at 21:57

## 2023-08-15 RX ADMIN — BUSPIRONE HYDROCHLORIDE 7.5 MG: 5 TABLET ORAL at 08:21

## 2023-08-15 RX ADMIN — ATORVASTATIN CALCIUM 40 MG: 40 TABLET, FILM COATED ORAL at 08:21

## 2023-08-15 RX ADMIN — ASPIRIN 81 MG 81 MG: 81 TABLET ORAL at 08:20

## 2023-08-15 RX ADMIN — TAMSULOSIN HYDROCHLORIDE 0.4 MG: 0.4 CAPSULE ORAL at 17:18

## 2023-08-15 RX ADMIN — IPRATROPIUM BROMIDE 2 SPRAY: 21 SPRAY, METERED NASAL at 08:02

## 2023-08-15 RX ADMIN — IPRATROPIUM BROMIDE 2 SPRAY: 21 SPRAY, METERED NASAL at 17:19

## 2023-08-15 RX ADMIN — INSULIN LISPRO 2 UNITS: 100 INJECTION, SOLUTION INTRAVENOUS; SUBCUTANEOUS at 11:11

## 2023-08-15 NOTE — ASSESSMENT & PLAN NOTE
· On coumadin therapy; currently subtherapeutic  · Continue off coumadin therapy and avoid AC at this time with continuing dropping hgb levels  · Currently rate controlled with metoprolol

## 2023-08-15 NOTE — ASSESSMENT & PLAN NOTE
· History of prostate cancer s/p radiation  · Reported abdominal pain and difficulty urinating - Now resolved but will continue with bladder scans   · Initiated on Flomax tonight   · Urinary retention protocol

## 2023-08-15 NOTE — ASSESSMENT & PLAN NOTE
· Hgb 7.0, Plts 100, WBC 3.3  · (Baseline hgb around 10 - 12)  · Could also be related to recent diarrheal illness  · No s/s of GIB  · Check B12, folate, iron panel  · Folic acid low; replete  · FIT negative x 1   · Coumadin held  · Peripheral smear w/ schistocytes, LDH/haptoglobin wnl  · Hematology consult appreciated   · Transfuse for Hgb <7, Plts <20  · Consent obtained   · trend

## 2023-08-15 NOTE — UTILIZATION REVIEW
Date: 8/15/2023  Day 3: Has surpassed a 2nd midnight with active treatments and services, which include IV diuresis for CHF, consult to hematology for dropping Hg levels

## 2023-08-15 NOTE — PLAN OF CARE
Problem: PAIN - ADULT  Goal: Verbalizes/displays adequate comfort level or baseline comfort level  Description: Interventions:  - Encourage patient to monitor pain and request assistance  - Assess pain using appropriate pain scale  - Administer analgesics based on type and severity of pain and evaluate response  - Implement non-pharmacological measures as appropriate and evaluate response  - Consider cultural and social influences on pain and pain management  - Notify physician/advanced practitioner if interventions unsuccessful or patient reports new pain  Outcome: Progressing     Problem: INFECTION - ADULT  Goal: Absence or prevention of progression during hospitalization  Description: INTERVENTIONS:  - Assess and monitor for signs and symptoms of infection  - Monitor lab/diagnostic results  - Monitor all insertion sites, i.e. indwelling lines, tubes, and drains  - Monitor endotracheal if appropriate and nasal secretions for changes in amount and color  - [place] appropriate cooling/warming therapies per order  - Administer medications as ordered  - Instruct and encourage patient and family to use good hand hygiene technique  - Identify and instruct in appropriate isolation precautions for identified infection/condition  Outcome: Progressing  Goal: Absence of fever/infection during neutropenic period  Description: INTERVENTIONS:  - Monitor WBC    Outcome: Progressing     Problem: SAFETY ADULT  Goal: Patient will remain free of falls  Description: INTERVENTIONS:  - Educate patient/family on patient safety including physical limitations  - Instruct patient to call for assistance with activity   - Consult OT/PT to assist with strengthening/mobility   - Keep Call bell within reach  - Keep bed low and locked with side rails adjusted as appropriate  - Keep care items and personal belongings within reach  - Initiate and maintain comfort rounds  - Make Fall Risk Sign visible to staff  - Offer Toileting every 2 Hours, in advance of need  - Initiate/Maintain bed/chair alarm  - Obtain necessary fall risk management equipment: bed/chair alarm, nonskid socks   - Apply yellow socks and bracelet for high fall risk patients  - Consider moving patient to room near nurses station  Outcome: Progressing  Goal: Maintain or return to baseline ADL function  Description: INTERVENTIONS:  -  Assess patient's ability to carry out ADLs; assess patient's baseline for ADL function and identify physical deficits which impact ability to perform ADLs (bathing, care of mouth/teeth, toileting, grooming, dressing, etc.)  - Assess/evaluate cause of self-care deficits   - Assess range of motion  - Assess patient's mobility; develop plan if impaired  - Assess patient's need for assistive devices and provide as appropriate  - Encourage maximum independence but intervene and supervise when necessary  - Involve family in performance of ADLs  - Assess for home care needs following discharge   - Consider OT consult to assist with ADL evaluation and planning for discharge  - Provide patient education as appropriate  Outcome: Progressing  Goal: Maintains/Returns to pre admission functional level  Description: INTERVENTIONS:  - Perform BMAT or MOVE assessment daily.   - Set and communicate daily mobility goal to care team and patient/family/caregiver. - Collaborate with rehabilitation services on mobility goals if consulted  - Perform Range of Motion 3 times a day. - Reposition patient every 3 hours.   - Dangle patient 3 times a day  - Stand patient 3 times a day  - Ambulate patient 3 times a day  - Out of bed to chair 3 times a day   - Out of bed for meals 3 times a day  - Out of bed for toileting  - Record patient progress and toleration of activity level   Outcome: Progressing     Problem: DISCHARGE PLANNING  Goal: Discharge to home or other facility with appropriate resources  Description: INTERVENTIONS:  - Identify barriers to discharge w/patient and caregiver  - Arrange for needed discharge resources and transportation as appropriate  - Identify discharge learning needs (meds, wound care, etc.)  - Arrange for interpretive services to assist at discharge as needed  - Refer to Case Management Department for coordinating discharge planning if the patient needs post-hospital services based on physician/advanced practitioner order or complex needs related to functional status, cognitive ability, or social support system  Outcome: Progressing     Problem: Knowledge Deficit  Goal: Patient/family/caregiver demonstrates understanding of disease process, treatment plan, medications, and discharge instructions  Description: Complete learning assessment and assess knowledge base. Interventions:  - Provide teaching at level of understanding  - Provide teaching via preferred learning methods  Outcome: Progressing     Problem: MOBILITY - ADULT  Goal: Maintain or return to baseline ADL function  Description: INTERVENTIONS:  -  Assess patient's ability to carry out ADLs; assess patient's baseline for ADL function and identify physical deficits which impact ability to perform ADLs (bathing, care of mouth/teeth, toileting, grooming, dressing, etc.)  - Assess/evaluate cause of self-care deficits   - Assess range of motion  - Assess patient's mobility; develop plan if impaired  - Assess patient's need for assistive devices and provide as appropriate  - Encourage maximum independence but intervene and supervise when necessary  - Involve family in performance of ADLs  - Assess for home care needs following discharge   - Consider OT consult to assist with ADL evaluation and planning for discharge  - Provide patient education as appropriate  Outcome: Progressing  Goal: Maintains/Returns to pre admission functional level  Description: INTERVENTIONS:  - Perform BMAT or MOVE assessment daily.   - Set and communicate daily mobility goal to care team and patient/family/caregiver.    - Collaborate with rehabilitation services on mobility goals if consulted  - Perform Range of Motion 3 times a day. - Reposition patient every 3 hours.   - Dangle patient 3 times a day  - Stand patient 3 times a day  - Ambulate patient 3 times a day  - Out of bed to chair 3 times a day   - Out of bed for meals 3 times a day  - Out of bed for toileting  - Record patient progress and toleration of activity level   Outcome: Progressing

## 2023-08-15 NOTE — PROGRESS NOTES
Cardiology Progress Note - Fern Copeland 80 y.o. male MRN: [id number]    Unit/Bed#: 427-01 Encounter: [id number]    Assessment/Plan:  1. Acute on chronic diastolic congestive heart failure              - patient presented with worsening bilateral lower extremity edema, possibly in the setting of receiving fluids from recent diarrheal illness              - CXR: no acute abnormality, BNP: 506              - home diuretic regimen: lasix 20 mg three days per week. - received 50 mg IV lasix on admission, 40 mg IV yesterday. Urinary output just over 1 L. Patient was given flomax with improvement. - dry weight reported to be around 190 lbs, standing weight today 197 lbs, 1 lb higher than yesterday              - would give an additional dose of IV lasix today at a higher dose of 80 mg IV. Patient still with lower extremity edema although he states this is his baseline. Would consider compression stockings/ACE wraps (patient states he utilizes both at home)   - patient may require a unit of PRBC's, monitor volume status with this. - continue daily standing weights, low sodium diet, fluid restriction, strict I/O.     2. Non-ischemic myocardial injury              - hs troponin levels were found to be elevated but downtrendin, 591, 458, 286              - patient denies any chest pain; EKG without ischemic changes              - echocardiogram this admission shows preserved LV systolic function without any wall motion abnormalities with mild-moderate mitral regurgitation and mild aortic insufficiency               - no additional cardiac evaluation recommended at this time     3. Persistent atrial fibrillation               - rate controlled on low dose metoprolol              - on anticoagulation with coumadin for goal INR 2-3; subtherapeutic on admission.               - coumadin currently on hold given anemia    4.  Anemia              - baseline hemoglobin was previously 10-12, at discharge earlier this month hemoglobin was 8.5, this admission 8.1, today 7.0              - further management per primary team.    Subjective:   Patient seen and examined. He feels well and offers no complaints. Feels edema is at baseline. Review of Systems   Cardiovascular: Positive for leg swelling. All other systems reviewed and are negative. Objective:   Vitals: Blood pressure 98/57, pulse 101, temperature 98.5 °F (36.9 °C), resp. rate 15, height 6' 1" (1.854 m), weight 89.6 kg (197 lb 8.5 oz), SpO2 98 %. , Body mass index is 26.06 kg/m².,   Orthostatic Blood Pressures    Flowsheet Row Most Recent Value   Blood Pressure 98/57 filed at 08/15/2023 0818   Patient Position - Orthostatic VS Lying filed at 08/14/2023 6547         Systolic (92FSU), LVJ:091 , Min:92 , WZN:609     Diastolic (47YIJ), ILZ:69, Min:53, Max:62      Intake/Output Summary (Last 24 hours) at 8/15/2023 0948  Last data filed at 8/15/2023 1118  Gross per 24 hour   Intake 900 ml   Output 1050 ml   Net -150 ml     Weight (last 2 days)     Date/Time Weight    08/15/23 0856 89.6 (197.53)    08/15/23 0536 91.3 (201.28)    08/14/23 1119 89.3 (196.87)    08/14/23 0541 89.3 (196.87)    08/13/23 14:22:43 92.1 (203.04)            Telemetry Review: patient is not on telemetry  EKG personally reviewed by Alix Omer PA-C. Physical Exam  Vitals reviewed. Constitutional:       General: He is not in acute distress. Appearance: He is well-developed. He is not diaphoretic. HENT:      Head: Normocephalic and atraumatic. Eyes:      Pupils: Pupils are equal, round, and reactive to light. Neck:      Vascular: No carotid bruit. Cardiovascular:      Rate and Rhythm: Normal rate. Rhythm irregularly irregular. Pulses:           Radial pulses are 2+ on the right side and 2+ on the left side. Heart sounds: S1 normal and S2 normal. No murmur heard.   Pulmonary:      Effort: Pulmonary effort is normal. No respiratory distress. Breath sounds: Normal breath sounds. No wheezing or rales. Abdominal:      General: There is no distension. Palpations: Abdomen is soft. Tenderness: There is no abdominal tenderness. Musculoskeletal:         General: Normal range of motion. Cervical back: Normal range of motion. Right lower leg: Edema (+2 pretibial edema bilaterally) present. Left lower leg: Edema present. Skin:     General: Skin is warm and dry. Findings: No erythema. Neurological:      General: No focal deficit present. Mental Status: He is alert and oriented to person, place, and time.    Psychiatric:         Mood and Affect: Mood normal.         Behavior: Behavior normal.           Laboratory Results:        CBC with diff:   Results from last 7 days   Lab Units 08/15/23  0455 08/14/23  1138 08/14/23  0503 08/13/23  1216   WBC Thousand/uL 3.43*  --  3.31* 3.63*   HEMOGLOBIN g/dL 7.0* 7.5* 7.3* 8.1*   HEMATOCRIT % 22.5* 24.4* 23.2* 25.8*   MCV fL 99*  --  97 98   PLATELETS Thousands/uL 102*  --  100* 112*   RBC Million/uL 2.27*  --  2.39* 2.63*   MCH pg 30.8  --  30.5 30.8   MCHC g/dL 31.1*  --  31.5 31.4   RDW % 25.1*  --  25.1* 25.2*   MPV fL 9.7  --  9.1 9.8   NRBC AUTO /100 WBCs 1  --  1 1         CMP:  Results from last 7 days   Lab Units 08/15/23  0455 08/14/23  0503 08/13/23  1216   POTASSIUM mmol/L 3.5 2.9* 3.6   CHLORIDE mmol/L 108 108 107   CO2 mmol/L 25 26 25   BUN mg/dL 19 20 22   CREATININE mg/dL 0.81 0.83 0.87   CALCIUM mg/dL 8.0* 8.0* 8.4   AST U/L 16 18 18   ALT U/L 24 25 30   ALK PHOS U/L 50 48 55   EGFR ml/min/1.73sq m 79 79 77         BMP:  Results from last 7 days   Lab Units 08/15/23  0455 08/14/23  0503 08/13/23  1216   POTASSIUM mmol/L 3.5 2.9* 3.6   CHLORIDE mmol/L 108 108 107   CO2 mmol/L 25 26 25   BUN mg/dL 19 20 22   CREATININE mg/dL 0.81 0.83 0.87   CALCIUM mg/dL 8.0* 8.0* 8.4       BNP:   Recent Labs     08/13/23  1216   *       Magnesium:   Results from last 7 days   Lab Units 23  0503 23  1220   MAGNESIUM mg/dL 1.6* 1.3*       Coags:   Results from last 7 days   Lab Units 08/15/23  0455 23  0503 23  1540 08/10/23  1352   INR  1.24* 1.25* 1.11 1.21*       TSH:        Hemoglobin A1C       Lipid Profile:       Cardiac testing:   Results for orders placed during the hospital encounter of 21    Echo complete with contrast if indicated    Narrative  [address], [address] [place] [address]    Transthoracic Echocardiogram  2D, M-mode, Doppler, and Color Doppler    Study date:  2021    Patient: Lala Garcia  MR number: [id number]  Account number: [de-identified]  : 1935  Age: 80 years  Gender: Male  Status: Outpatient  Location: [facility]  Height: 72 in  Weight: 229.5 lb  BP: 126/ 86 mmHg    Indications: Atrial fibrillation. Diagnoses: I10. - Essential (primary) hypertension, I48.0 - Atrial fibrillation    Sonographer:  Iris Aguilar RDCS  Primary Physician:  Sharon Bernal DO  Referring Physician: Farzana Elmore D.O. Group:  [facility]  Interpreting Physician: Farzana Elmore D.O.    SUMMARY    LEFT VENTRICLE:  Systolic function was normal. Ejection fraction was estimated to be 65 %. There were no regional wall motion abnormalities. Wall thickness was mildly increased. There was mild concentric hypertrophy. RIGHT VENTRICLE:  The size was at the upper limits of normal.  Systolic function was normal.    LEFT ATRIUM:  The atrium was moderately dilated. RIGHT ATRIUM:  The atrium was mildly dilated. MITRAL VALVE:  There was mild annular calcification. There was mild regurgitation. TRICUSPID VALVE:  There was trace regurgitation. HISTORY: PRIOR HISTORY: CAD, prostate cancer, DVT Congestive heart failure. Atrial fibrillation. Risk factors: hypertension, diabetes, and hypercholesterolemia. Chronic lung disease.  PRIOR PROCEDURES: Diagnostic cath. PROCEDURE: The study was performed in the [facility] and Vascular Center. This was a routine study. The transthoracic approach was used. The study included complete 2D imaging, M-mode, complete spectral Doppler, and color Doppler. The  heart rate was 63 bpm, at the start of the study. Images were obtained from the parasternal, apical, subcostal, and suprasternal notch acoustic windows. Echocardiographic views were limited due to lung interference. This was a technically  difficult study. LEFT VENTRICLE: Size was normal. Systolic function was normal. Ejection fraction was estimated to be 65 %. There were no regional wall motion abnormalities. Wall thickness was mildly increased. There was mild concentric hypertrophy. No  evidence of apical thrombus. DOPPLER: The study was not technically sufficient to allow evaluation of LV diastolic function. RIGHT VENTRICLE: The size was at the upper limits of normal. Systolic function was normal. Wall thickness was normal.    LEFT ATRIUM: The atrium was moderately dilated. RIGHT ATRIUM: The atrium was mildly dilated. MITRAL VALVE: There was mild annular calcification. Valve structure was normal. There was normal leaflet separation. DOPPLER: The transmitral velocity was within the normal range. There was no evidence for stenosis. There was mild  regurgitation. AORTIC VALVE: The valve was trileaflet. Leaflets exhibited normal thickness and normal cuspal separation. DOPPLER: Transaortic velocity was within the normal range. There was no evidence for stenosis. There was no significant  regurgitation. TRICUSPID VALVE: The valve structure was normal. There was normal leaflet separation. DOPPLER: The transtricuspid velocity was within the normal range. There was no evidence for stenosis. There was trace regurgitation. PULMONIC VALVE: Leaflets exhibited normal thickness, no calcification, and normal cuspal separation.  DOPPLER: The transpulmonic velocity was within the normal range. There was no significant regurgitation. PERICARDIUM: There was no pericardial effusion. The pericardium was normal in appearance. AORTA: The root exhibited normal size. SYSTEMIC VEINS: IVC: The inferior vena cava was normal in size. SYSTEM MEASUREMENT TABLES    2D  %FS: 28.04 %  Ao Diam: 3.28 cm  Ao asc: 3.77 cm  EDV(Teich): 81.23 ml  EF(Teich): 54.6 %  ESV(Teich): 36.88 ml  IVSd: 1.31 cm  LA Area: 27.54 cm2  LA Diam: 5.16 cm  LVEDV MOD A4C: 108.04 ml  LVEF MOD A4C: 64.4 %  LVESV MOD A4C: 38.47 ml  LVIDd: 4.26 cm  LVIDs: 3.06 cm  LVLd A4C: 6.42 cm  LVLs A4C: 4.92 cm  LVOT Diam: 2.02 cm  LVPWd: 1.2 cm  RA Area: 17.43 cm2  RVIDd: 3.32 cm  SV MOD A4C: 69.57 ml  SV(Teich): 44.35 ml    CW  AR Dec Green: 1.76 m/s2  AR Dec Time: 2258.44 ms  AR PHT: 654.95 ms  AR Vmax: 3.98 m/s  AR maxP.37 mmHg  AV Env. Ti: 344.43 ms  AV VTI: 18.22 cm  AV Vmax: 0.76 m/s  AV Vmean: 0.53 m/s  AV maxP.29 mmHg  AV meanP.28 mmHg  MR VTI: 131.67 cm  MR Vmax: 3.44 m/s  MR Vmean: 2.91 m/s  MR maxP.22 mmHg  MR meanP.8 mmHg  TR Vmax: 3 m/s  TR maxP.07 mmHg    MM  TAPSE: 1.91 cm    PW  AROLDO (VTI): 2.45 cm2  AROLDO Vmax: 2.46 cm2  AVAI (VTI): 0 cm2/m2  AVAI Vmax: 0 cm2/m2  LVOT Env. Ti: 346.34 ms  LVOT VTI: 13.95 cm  LVOT Vmax: 0.58 m/s  LVOT Vmean: 0.4 m/s  LVOT maxP.35 mmHg  LVOT meanP.75 mmHg  LVSI Dopp: 19.72 ml/m2  LVSV Dopp: 44.57 ml    [facility]redited [facility]    Prepared and electronically signed by    Mercedez Onofre D.O. Signed 2021 12:00:12    No results found for this or any previous visit. No results found for this or any previous visit. No results found for this or any previous visit.       Meds/Allergies   all current active meds have been reviewed  Medications Prior to Admission   Medication   • ASPIRIN 81 PO   • atorvastatin (LIPITOR) 40 mg tablet   • busPIRone (BUSPAR) 7.5 mg tablet   • econazole nitrate 1 % cream   • furosemide (LASIX) 20 mg tablet   • glimepiride (AMARYL) 1 mg tablet   • ipratropium (ATROVENT) 0.03 % nasal spray   • loperamide (IMODIUM A-D) 2 MG tablet   • metoprolol succinate (Toprol XL) 25 mg 24 hr tablet   • OneTouch Delica Lancets 62V MISC   • OneTouch Ultra test strip   • Pyridoxine HCl (vitamin B-6) 50 MG TABS   • warfarin (COUMADIN) 4 mg tablet          Assessment:  Principal Problem:    Acute on chronic diastolic (congestive) heart failure (HCC)  Active Problems:    Persistent atrial fibrillation (HCC)    Malignant neoplasm of prostate (HCC)    Type 2 diabetes mellitus without complication, without long-term current use of insulin (HCC)    Troponin level elevated    Anemia

## 2023-08-15 NOTE — ASSESSMENT & PLAN NOTE
Wt Readings from Last 3 Encounters:   08/15/23 89.6 kg (197 lb 8.5 oz)   08/03/23 86 kg (189 lb 9.5 oz)   06/16/23 86.2 kg (190 lb)     · Up approximately 13 lbs from dry weight  · Just completed steroid taper and had a recent diarrheal illness   · Repeat ECHO at this time showing EF of 60%   · Initiate on lasix 40 mg IV daily  · Given IV lasix 80mg x1 today per cards  · Daily weights, I&Os, Na/fluid restriction  · Cardiology consult appreciated

## 2023-08-15 NOTE — ASSESSMENT & PLAN NOTE
Lab Results   Component Value Date    HGBA1C 10.6 (H) 08/02/2023       Recent Labs     08/14/23  1519 08/14/23  2042 08/15/23  0706 08/15/23  1052   POCGLU 195* 248* 127 239*       Blood Sugar Average: Last 72 hrs:  (P) 185.5984230752438046   · Poor control per last A1c  · Only on gliperide at home  · Initiate on ADA diet and SSI  · Monitor glucose levels closely and escalate insulin regimen as needed  · Will need endocrine follow up on discharge likely

## 2023-08-15 NOTE — ASSESSMENT & PLAN NOTE
· Troponin 508 > 591  · Continue to trend  · Denies CP  · Maintain on aspirin and statin therapy  · Suspect myocardial injury in the setting of CHF exacerbation  · Cardiology input appreciated who concur

## 2023-08-15 NOTE — PROGRESS NOTES
[address]  Progress Note  Name: Pastor Duckworth  MRN: [id number]  Unit/Bed#: 907-19 I Date of Admission: 8/13/2023   Date of Service: 8/15/2023 I Hospital Day: 2    Assessment/Plan   * Acute on chronic diastolic (congestive) heart failure (HCC)  Assessment & Plan  Wt Readings from Last 3 Encounters:   08/15/23 89.6 kg (197 lb 8.5 oz)   08/03/23 86 kg (189 lb 9.5 oz)   06/16/23 86.2 kg (190 lb)     · Up approximately 13 lbs from dry weight  · Just completed steroid taper and had a recent diarrheal illness   · Repeat ECHO at this time showing EF of 60%   · Initiate on lasix 40 mg IV daily  · Given IV lasix 80mg x1 today per cards  · Daily weights, I&Os, Na/fluid restriction  · Cardiology consult appreciated        Pancytopenia ([address])  Assessment & Plan  · Hgb 7.0, Plts 100, WBC 3.3  · (Baseline hgb around 10 - 12)  · Could also be related to recent diarrheal illness  · No s/s of GIB  · Check B12, folate, iron panel  · Folic acid low; replete  · FIT negative x 1   · Coumadin held  · Peripheral smear w/ schistocytes, LDH/haptoglobin wnl  · Hematology consult appreciated   · Transfuse for Hgb <7, Plts <20  · Consent obtained   · trend    Troponin level elevated  Assessment & Plan  · Troponin 508 > 591  · Continue to trend  · Denies CP  · Maintain on aspirin and statin therapy  · Suspect myocardial injury in the setting of CHF exacerbation  · Cardiology input appreciated who concur     Type 2 diabetes mellitus without complication, without long-term current use of insulin [facility])  Assessment & Plan  Lab Results   Component Value Date    HGBA1C 10.6 (H) 08/02/2023       Recent Labs     08/14/23  1519 08/14/23  2042 08/15/23  0706 08/15/23  1052   POCGLU 195* 248* 127 239*       Blood Sugar Average: Last 72 hrs:  (P) 185.9297378544038925   · Poor control per last A1c  · Only on gliperide at home  · Initiate on ADA diet and SSI  · Monitor glucose levels closely and escalate insulin regimen as needed  · Will need endocrine follow up on discharge likely    Malignant neoplasm of prostate ([address])  Assessment & Plan  · History of prostate cancer s/p radiation  · Reported abdominal pain and difficulty urinating - Now resolved but will continue with bladder scans   · Initiated on Flomax tonight   · Urinary retention protocol    Persistent atrial fibrillation ([address])  Assessment & Plan  · On coumadin therapy; currently subtherapeutic  · Continue off coumadin therapy and avoid AC at this time with continuing dropping hgb levels  · Currently rate controlled with metoprolol               VTE Pharmacologic Prophylaxis: VTE Score: 4 contraindicated, SCDs    Patient Centered Rounds: I performed bedside rounds with nursing staff today. Discussions with Specialists or Other Care Team Provider:     Education and Discussions with Family / Patient: Updated  (wife) at bedside. Total Time Spent on Date of Encounter in care of patient: 35 minutes This time was spent on one or more of the following: performing physical exam; counseling and coordination of care; obtaining or reviewing history; documenting in the medical record; reviewing/ordering tests, medications or procedures; communicating with other healthcare professionals and discussing with patient's family/caregivers. Current Length of Stay: 2 day(s)  Current Patient Status: Inpatient   Certification Statement: The patient will continue to require additional inpatient hospital stay due to pancytopenia likely going to require tranfusion, monitoring for stability, IV diuresis  Discharge Plan: Anticipate discharge in 24-48 hrs to home with home services. Code Status: Level 1 - Full Code    Subjective:   Reports feeling well today. Denies SOB. Notes his leg swelling is significantly improved. No chest pain. No melena, hematemesis, hematochezia.  Reports improvement in fatigue    Objective:   Vitals:   Temp (24hrs), Av.1 °F (36.7 °C), Min:97.5 °F (36.4 °C), Max:98.5 °F (36.9 °C)    Temp:  [97.5 °F (36.4 °C)-98.5 °F (36.9 °C)] 98.5 °F (36.9 °C)  HR:  [] 93  Resp:  [15-20] 15  BP: ()/(53-62) 94/59  SpO2:  [97 %-100 %] 100 %  Body mass index is 26.06 kg/m². Input and Output Summary (last 24 hours): Intake/Output Summary (Last 24 hours) at 8/15/2023 1459  Last data filed at 8/15/2023 1219  Gross per 24 hour   Intake 720 ml   Output 1300 ml   Net -580 ml       Physical Exam:   Physical Exam  Constitutional:       General: He is not in acute distress. HENT:      Head: Normocephalic. Cardiovascular:      Rate and Rhythm: Normal rate. Rhythm irregular. Pulses: Normal pulses. Heart sounds: Normal heart sounds. Pulmonary:      Effort: Pulmonary effort is normal. No respiratory distress. Breath sounds: Normal breath sounds. Abdominal:      General: Abdomen is flat. Bowel sounds are normal. There is no distension. Palpations: Abdomen is soft. Musculoskeletal:      Right lower leg: Edema present. Left lower leg: Edema present. Skin:     Coloration: Skin is pale. Neurological:      General: No focal deficit present. Mental Status: He is alert. Mental status is at baseline. Cranial Nerves: No cranial nerve deficit.    Psychiatric:         Mood and Affect: Mood normal.         Behavior: Behavior normal.          Additional Data:   Labs:  Results from last 7 days   Lab Units 08/15/23  1426 08/15/23  0455   WBC Thousand/uL  --  3.43*   HEMOGLOBIN g/dL 7.2* 7.0*   HEMATOCRIT % 23.3* 22.5*   PLATELETS Thousands/uL  --  102*   NEUTROS PCT %  --  58   LYMPHS PCT %  --  33   MONOS PCT %  --  7   EOS PCT %  --  1     Results from last 7 days   Lab Units 08/15/23  0455   SODIUM mmol/L 141   POTASSIUM mmol/L 3.5   CHLORIDE mmol/L 108   CO2 mmol/L 25   BUN mg/dL 19   CREATININE mg/dL 0.81   ANION GAP mmol/L 8   CALCIUM mg/dL 8.0*   ALBUMIN g/dL 2.9*   TOTAL BILIRUBIN mg/dL 1.06*   ALK PHOS U/L 50   ALT U/L 24   AST U/L 16   GLUCOSE RANDOM mg/dL 124     Results from last 7 days   Lab Units 08/15/23  0455   INR  1.24*     Results from last 7 days   Lab Units 08/15/23  1052 08/15/23  0706 08/14/23  2042 08/14/23  1519 08/14/23  1109 08/14/23  0708 08/13/23  1618   POC GLUCOSE mg/dl 239* 127 248* 195* 217* 120 155*         Results from last 7 days   Lab Units 08/14/23  0503   PROCALCITONIN ng/ml 0.08       Lines/Drains:  Invasive Devices     Peripheral Intravenous Line  Duration           Peripheral IV 08/15/23 Dorsal (posterior); Right Forearm <1 day                      Imaging: No pertinent imaging reviewed. Recent Cultures (last 7 days):   Results from last 7 days   Lab Units 08/13/23  1505 08/12/23  1041   URINE CULTURE  No Growth <1000 cfu/mL  --    C DIFF TOXIN B BY PCR   --  Negative       Last 24 Hours Medication List:   Current Facility-Administered Medications   Medication Dose Route Frequency Provider Last Rate   • acetaminophen  650 mg Oral Q6H PRN Tiara BAMBI Ramesh     • aspirin  81 mg Oral Daily Tiara BAMBI Ramesh     • atorvastatin  40 mg Oral Daily Tiara BAMBI Ramesh     • busPIRone  7.5 mg Oral BID Tiara BAMBI Ramesh     • furosemide  80 mg Intravenous Once Ministerio Cedeño PA-C     • insulin lispro  1-5 Units Subcutaneous TID AC Tiara BAMBI Ramesh     • ipratropium  2 spray Nasal TID Tiara RhBAMBI faria     • loperamide  2 mg Oral TID PRN Tiara RhBAMBI faria     • metoprolol succinate  25 mg Oral Daily Tiara RhBAMBI faria     • ondansetron  4 mg Intravenous Q6H PRN Tiara RhBAMBI faria     • oxyCODONE  2.5 mg Oral Q6H PRN Tiara RhBAMBI faria     • vitamin B-6  50 mg Oral Daily Tiara RhBAMBI faria     • tamsulosin  0.4 mg Oral Daily With Dinner Tiara BAMBI Ramesh          Today, Patient Was Seen By: Tasha Burr PA-C    **Please Note: This note may have been constructed using a voice recognition system. **

## 2023-08-16 LAB
ALBUMIN SERPL BCP-MCNC: 2.9 G/DL (ref 3.5–5)
ALP SERPL-CCNC: 49 U/L (ref 34–104)
ALT SERPL W P-5'-P-CCNC: 21 U/L (ref 7–52)
ANION GAP SERPL CALCULATED.3IONS-SCNC: 7 MMOL/L
APTT PPP: 31 SECONDS (ref 23–37)
AST SERPL W P-5'-P-CCNC: 15 U/L (ref 13–39)
BASOPHILS # BLD AUTO: 0.01 THOUSANDS/ÂΜL (ref 0–0.1)
BASOPHILS NFR BLD AUTO: 0 % (ref 0–1)
BILIRUB SERPL-MCNC: 1.18 MG/DL (ref 0.2–1)
BUN SERPL-MCNC: 18 MG/DL (ref 5–25)
CALCIUM ALBUM COR SERPL-MCNC: 9.1 MG/DL (ref 8.3–10.1)
CALCIUM SERPL-MCNC: 8.2 MG/DL (ref 8.4–10.2)
CHLORIDE SERPL-SCNC: 108 MMOL/L (ref 96–108)
CO2 SERPL-SCNC: 24 MMOL/L (ref 21–32)
CREAT SERPL-MCNC: 0.76 MG/DL (ref 0.6–1.3)
EOSINOPHIL # BLD AUTO: 0.03 THOUSAND/ÂΜL (ref 0–0.61)
EOSINOPHIL NFR BLD AUTO: 1 % (ref 0–6)
ERYTHROCYTE [DISTWIDTH] IN BLOOD BY AUTOMATED COUNT: 24.6 % (ref 11.6–15.1)
GFR SERPL CREATININE-BSD FRML MDRD: 82 ML/MIN/1.73SQ M
GLUCOSE SERPL-MCNC: 124 MG/DL (ref 65–140)
GLUCOSE SERPL-MCNC: 153 MG/DL (ref 65–140)
GLUCOSE SERPL-MCNC: 211 MG/DL (ref 65–140)
GLUCOSE SERPL-MCNC: 223 MG/DL (ref 65–140)
HCT VFR BLD AUTO: 21.4 % (ref 36.5–49.3)
HCT VFR BLD AUTO: 26.3 % (ref 36.5–49.3)
HGB BLD-MCNC: 6.7 G/DL (ref 12–17)
HGB BLD-MCNC: 8.3 G/DL (ref 12–17)
IMM GRANULOCYTES # BLD AUTO: 0.02 THOUSAND/UL (ref 0–0.2)
IMM GRANULOCYTES NFR BLD AUTO: 1 % (ref 0–2)
INR PPP: 1.2 (ref 0.84–1.19)
LYMPHOCYTES # BLD AUTO: 1.04 THOUSANDS/ÂΜL (ref 0.6–4.47)
LYMPHOCYTES NFR BLD AUTO: 30 % (ref 14–44)
MCH RBC QN AUTO: 31 PG (ref 26.8–34.3)
MCHC RBC AUTO-ENTMCNC: 31.3 G/DL (ref 31.4–37.4)
MCV RBC AUTO: 99 FL (ref 82–98)
MONOCYTES # BLD AUTO: 0.24 THOUSAND/ÂΜL (ref 0.17–1.22)
MONOCYTES NFR BLD AUTO: 7 % (ref 4–12)
NEUTROPHILS # BLD AUTO: 2.12 THOUSANDS/ÂΜL (ref 1.85–7.62)
NEUTS SEG NFR BLD AUTO: 61 % (ref 43–75)
NRBC BLD AUTO-RTO: 1 /100 WBCS
PLATELET # BLD AUTO: 108 THOUSANDS/UL (ref 149–390)
PMV BLD AUTO: 10.1 FL (ref 8.9–12.7)
POTASSIUM SERPL-SCNC: 3.6 MMOL/L (ref 3.5–5.3)
PROT SERPL-MCNC: 4.8 G/DL (ref 6.4–8.4)
PROTHROMBIN TIME: 15.4 SECONDS (ref 11.6–14.5)
RBC # BLD AUTO: 2.16 MILLION/UL (ref 3.88–5.62)
SODIUM SERPL-SCNC: 139 MMOL/L (ref 135–147)
WBC # BLD AUTO: 3.46 THOUSAND/UL (ref 4.31–10.16)

## 2023-08-16 PROCEDURE — 85014 HEMATOCRIT: CPT

## 2023-08-16 PROCEDURE — 85018 HEMOGLOBIN: CPT

## 2023-08-16 PROCEDURE — 99232 SBSQ HOSP IP/OBS MODERATE 35: CPT | Performed by: INTERNAL MEDICINE

## 2023-08-16 PROCEDURE — P9016 RBC LEUKOCYTES REDUCED: HCPCS

## 2023-08-16 PROCEDURE — 82948 REAGENT STRIP/BLOOD GLUCOSE: CPT

## 2023-08-16 PROCEDURE — 99232 SBSQ HOSP IP/OBS MODERATE 35: CPT

## 2023-08-16 PROCEDURE — 85610 PROTHROMBIN TIME: CPT

## 2023-08-16 PROCEDURE — 85025 COMPLETE CBC W/AUTO DIFF WBC: CPT

## 2023-08-16 PROCEDURE — 85730 THROMBOPLASTIN TIME PARTIAL: CPT

## 2023-08-16 PROCEDURE — 80053 COMPREHEN METABOLIC PANEL: CPT

## 2023-08-16 RX ORDER — FUROSEMIDE 10 MG/ML
40 INJECTION INTRAMUSCULAR; INTRAVENOUS ONCE
Status: COMPLETED | OUTPATIENT
Start: 2023-08-16 | End: 2023-08-16

## 2023-08-16 RX ADMIN — BUSPIRONE HYDROCHLORIDE 7.5 MG: 5 TABLET ORAL at 17:29

## 2023-08-16 RX ADMIN — INSULIN LISPRO 2 UNITS: 100 INJECTION, SOLUTION INTRAVENOUS; SUBCUTANEOUS at 11:24

## 2023-08-16 RX ADMIN — INSULIN LISPRO 2 UNITS: 100 INJECTION, SOLUTION INTRAVENOUS; SUBCUTANEOUS at 17:30

## 2023-08-16 RX ADMIN — BUSPIRONE HYDROCHLORIDE 7.5 MG: 5 TABLET ORAL at 08:01

## 2023-08-16 RX ADMIN — IPRATROPIUM BROMIDE 2 SPRAY: 21 SPRAY, METERED NASAL at 17:30

## 2023-08-16 RX ADMIN — Medication 50 MG: at 08:01

## 2023-08-16 RX ADMIN — INSULIN LISPRO 1 UNITS: 100 INJECTION, SOLUTION INTRAVENOUS; SUBCUTANEOUS at 08:05

## 2023-08-16 RX ADMIN — FUROSEMIDE 40 MG: 10 INJECTION, SOLUTION INTRAMUSCULAR; INTRAVENOUS at 11:59

## 2023-08-16 RX ADMIN — IPRATROPIUM BROMIDE 2 SPRAY: 21 SPRAY, METERED NASAL at 10:00

## 2023-08-16 RX ADMIN — ATORVASTATIN CALCIUM 40 MG: 40 TABLET, FILM COATED ORAL at 08:00

## 2023-08-16 NOTE — PROGRESS NOTES
[address]  Progress Note  Name: Cheyenne Mooney I  MRN: [id number]  Unit/Bed#: 292-99 I Date of Admission: 8/13/2023   Date of Service: 8/16/2023 I Hospital Day: 3    Assessment/Plan   * Acute on chronic diastolic (congestive) heart failure (HCC)  Assessment & Plan  Wt Readings from Last 3 Encounters:   08/16/23 90.2 kg (198 lb 13.7 oz)   08/03/23 86 kg (189 lb 9.5 oz)   06/16/23 86.2 kg (190 lb)     · Up approximately 9-10 lbs from dry weight, improved from admission  · Just completed steroid taper and had a recent diarrheal illness   · Repeat ECHO at this time showing EF of 60%   · S/p IV lasix 40mg daily, to given IV lasix after blood transfusion today  · Daily weights, I&Os, Na/fluid restriction  · Cardiology consult appreciated        Pancytopenia ([address])  Assessment & Plan  · Hgb 6.7, Plts 100, WBC 3.4  · Given 1 unit pRBCs this morning  · No overt signs of GIB, FIT negative x 1  · Could also be related to recent diarrheal illness  · Check B12, folate, iron panel  · Folic acid low; replete  · Coumadin held  · Peripheral smear w/ schistocytes, LDH/haptoglobin wnl, T bili marginally elevated  · D/w hematology today.  Less concern for hemolysis given normal LDH/haptoglobin & T bili  · Plan for repeat hemolytic workup in a.m. to ensure stability   · Trend cell lines   · Hematology consult appreciated   · Transfuse for Hgb <7, Plts <20 (consent in chart)    Type 2 diabetes mellitus without complication, without long-term current use of insulin [facility])  Assessment & Plan  Lab Results   Component Value Date    HGBA1C 10.6 (H) 08/02/2023       Recent Labs     08/15/23  1548 08/15/23  2016 08/16/23  0718 08/16/23  1044   POCGLU 217* 154* 153* 211*       Blood Sugar Average: Last 72 hrs:  (P) 185.9767623505492003   · Poor control per last A1c  · Only on gliperide at home  · Continue SSI  · Will need endocrine follow up on discharge likely    Malignant neoplasm of prostate ([address])  Assessment & Plan  · History of prostate cancer s/p radiation  · Reported abdominal pain and difficulty urinating - Now resolved but will continue with bladder scans   · Wishes to discontinue Flomax   · Urinary retention protocol    Persistent atrial fibrillation (HCC)  Assessment & Plan  · On coumadin therapy; currently subtherapeutic  · Continue off coumadin therapy and avoid AC at this time with continuing dropping hgb levels  · Currently rate controlled with metoprolol             VTE Pharmacologic Prophylaxis: VTE Score: 4 SCDs    Patient Centered Rounds: I performed bedside rounds with nursing staff today. Discussions with Specialists or Other Care Team Provider: cardiology, hematology    Education and Discussions with Family / Patient: Updated  (wife) at bedside. Total Time Spent on Date of Encounter in care of patient: 35 minutes This time was spent on one or more of the following: performing physical exam; counseling and coordination of care; obtaining or reviewing history; documenting in the medical record; reviewing/ordering tests, medications or procedures; communicating with other healthcare professionals and discussing with patient's family/caregivers. Current Length of Stay: 3 day(s)  Current Patient Status: Inpatient   Certification Statement: The patient will continue to require additional inpatient hospital stay due to worsening pancytopenia, blood transfusion, monitoring cell lines for clinical stability  Discharge Plan: Anticipate discharge in 48-72 hrs to home with home services. Code Status: Level 1 - Full Code    Subjective:   Feels well today. Denies fatigue, dizziness, chest pain, SOB. Still with peripheral edema.  No bloody BMs overnight, no hematachezia    Objective:   Vitals:   Temp (24hrs), Av.5 °F (36.9 °C), Min:98.2 °F (36.8 °C), Max:98.8 °F (37.1 °C)    Temp:  [98.2 °F (36.8 °C)-98.8 °F (37.1 °C)] 98.4 °F (36.9 °C)  HR:  [] 82  Resp:  [14-18] 15  BP: ()/(49-90) 107/65  SpO2:  [95 %-100 %] 100 %  Body mass index is 26.24 kg/m². Input and Output Summary (last 24 hours): Intake/Output Summary (Last 24 hours) at 8/16/2023 1457  Last data filed at 8/16/2023 1400  Gross per 24 hour   Intake 982.92 ml   Output 500 ml   Net 482.92 ml       Physical Exam:   Physical Exam  HENT:      Head: Normocephalic and atraumatic. Cardiovascular:      Rate and Rhythm: Normal rate. Rhythm irregular. Pulses: Normal pulses. Heart sounds: Normal heart sounds. Pulmonary:      Effort: Pulmonary effort is normal.      Breath sounds: Normal breath sounds. Abdominal:      General: Abdomen is flat. Bowel sounds are normal. There is no distension. Palpations: Abdomen is soft. Musculoskeletal:      Right lower leg: Edema present. Left lower leg: Edema present. Skin:     General: Skin is warm and dry. Coloration: Skin is pale. Neurological:      General: No focal deficit present. Mental Status: He is alert and oriented to person, place, and time.    Psychiatric:         Mood and Affect: Mood normal.         Behavior: Behavior normal.          Additional Data:   Labs:  Results from last 7 days   Lab Units 08/16/23  0457   WBC Thousand/uL 3.46*   HEMOGLOBIN g/dL 6.7*   HEMATOCRIT % 21.4*   PLATELETS Thousands/uL 108*   NEUTROS PCT % 61   LYMPHS PCT % 30   MONOS PCT % 7   EOS PCT % 1     Results from last 7 days   Lab Units 08/16/23  0457   SODIUM mmol/L 139   POTASSIUM mmol/L 3.6   CHLORIDE mmol/L 108   CO2 mmol/L 24   BUN mg/dL 18   CREATININE mg/dL 0.76   ANION GAP mmol/L 7   CALCIUM mg/dL 8.2*   ALBUMIN g/dL 2.9*   TOTAL BILIRUBIN mg/dL 1.18*   ALK PHOS U/L 49   ALT U/L 21   AST U/L 15   GLUCOSE RANDOM mg/dL 124     Results from last 7 days   Lab Units 08/16/23  0937   INR  1.20*     Results from last 7 days   Lab Units 08/16/23  1044 08/16/23  0718 08/15/23  2016 08/15/23  1548 08/15/23  1052 08/15/23  0706 08/14/23  2042 08/14/23  1519 08/14/23  1109 08/14/23  0708 08/13/23  1618   POC GLUCOSE mg/dl 211* 153* 154* 217* 239* 127 248* 195* 217* 120 155*         Results from last 7 days   Lab Units 08/14/23  0503   PROCALCITONIN ng/ml 0.08       Lines/Drains:  Invasive Devices     Peripheral Intravenous Line  Duration           Peripheral IV 08/15/23 Dorsal (posterior); Right Forearm 1 day                      Imaging: No pertinent imaging reviewed. Recent Cultures (last 7 days):   Results from last 7 days   Lab Units 08/13/23  1505 08/12/23  1041   URINE CULTURE  No Growth <1000 cfu/mL  --    C DIFF TOXIN B BY PCR   --  Negative       Last 24 Hours Medication List:   Current Facility-Administered Medications   Medication Dose Route Frequency Provider Last Rate   • acetaminophen  650 mg Oral Q6H PRN Fabian Diaz PA-C     • aspirin  81 mg Oral Daily Fabian Diaz PA-C     • atorvastatin  40 mg Oral Daily Fabian Diaz PA-C     • busPIRone  7.5 mg Oral BID Fabian Diaz PA-C     • insulin lispro  1-5 Units Subcutaneous TID AC Fabian Diaz PA-C     • ipratropium  2 spray Nasal TID Fabian Diaz PA-C     • loperamide  2 mg Oral TID PRN Fabian Diaz PA-C     • metoprolol succinate  25 mg Oral Daily Fabian Diaz PA-C     • ondansetron  4 mg Intravenous Q6H PRN Fabian Diaz PA-C     • oxyCODONE  2.5 mg Oral Q6H PRN Fabian Diaz PA-C     • vitamin B-6  50 mg Oral Daily Fabian Diaz PA-C          Today, Patient Was Seen By: Chris Kaur PA-C    **Please Note: This note may have been constructed using a voice recognition system. **

## 2023-08-16 NOTE — ASSESSMENT & PLAN NOTE
· Hgb 6.7, Plts 100, WBC 3.4  · Given 1 unit pRBCs this morning  · No overt signs of GIB, FIT negative x 1  · Could also be related to recent diarrheal illness  · Check B12, folate, iron panel  · Folic acid low; replete  · Coumadin held  · Peripheral smear w/ schistocytes, LDH/haptoglobin wnl, T bili marginally elevated  · D/w hematology today.  Less concern for hemolysis given normal LDH/haptoglobin & T bili  · Plan for repeat hemolytic workup in a.m. to ensure stability   · Trend cell lines   · Hematology consult appreciated   · Transfuse for Hgb <7, Plts <20 (consent in chart)

## 2023-08-16 NOTE — ASSESSMENT & PLAN NOTE
· History of prostate cancer s/p radiation  · Reported abdominal pain and difficulty urinating - Now resolved but will continue with bladder scans   · Wishes to discontinue Flomax   · Urinary retention protocol

## 2023-08-16 NOTE — PLAN OF CARE
Problem: PAIN - ADULT  Goal: Verbalizes/displays adequate comfort level or baseline comfort level  Description: Interventions:  - Encourage patient to monitor pain and request assistance  - Assess pain using appropriate pain scale  - Administer analgesics based on type and severity of pain and evaluate response  - Implement non-pharmacological measures as appropriate and evaluate response  - Consider cultural and social influences on pain and pain management  - Notify physician/advanced practitioner if interventions unsuccessful or patient reports new pain  Outcome: Progressing     Problem: INFECTION - ADULT  Goal: Absence or prevention of progression during hospitalization  Description: INTERVENTIONS:  - Assess and monitor for signs and symptoms of infection  - Monitor lab/diagnostic results  - Monitor all insertion sites, i.e. indwelling lines, tubes, and drains  - Monitor endotracheal if appropriate and nasal secretions for changes in amount and color  - [place] appropriate cooling/warming therapies per order  - Administer medications as ordered  - Instruct and encourage patient and family to use good hand hygiene technique  - Identify and instruct in appropriate isolation precautions for identified infection/condition  Outcome: Progressing  Goal: Absence of fever/infection during neutropenic period  Description: INTERVENTIONS:  - Monitor WBC    Outcome: Progressing     Problem: SAFETY ADULT  Goal: Patient will remain free of falls  Description: INTERVENTIONS:  - Educate patient/family on patient safety including physical limitations  - Instruct patient to call for assistance with activity   - Consult OT/PT to assist with strengthening/mobility   - Keep Call bell within reach  - Keep bed low and locked with side rails adjusted as appropriate  - Keep care items and personal belongings within reach  - Initiate and maintain comfort rounds  - Make Fall Risk Sign visible to staff  - Offer Toileting every 2 Hours, in advance of need  - Initiate/Maintain bed/chair alarm  - Obtain necessary fall risk management equipment: bed/chair alarm, nonskid socks   - Apply yellow socks and bracelet for high fall risk patients  - Consider moving patient to room near nurses station  Outcome: Progressing  Goal: Maintain or return to baseline ADL function  Description: INTERVENTIONS:  -  Assess patient's ability to carry out ADLs; assess patient's baseline for ADL function and identify physical deficits which impact ability to perform ADLs (bathing, care of mouth/teeth, toileting, grooming, dressing, etc.)  - Assess/evaluate cause of self-care deficits   - Assess range of motion  - Assess patient's mobility; develop plan if impaired  - Assess patient's need for assistive devices and provide as appropriate  - Encourage maximum independence but intervene and supervise when necessary  - Involve family in performance of ADLs  - Assess for home care needs following discharge   - Consider OT consult to assist with ADL evaluation and planning for discharge  - Provide patient education as appropriate  Outcome: Progressing  Goal: Maintains/Returns to pre admission functional level  Description: INTERVENTIONS:  - Perform BMAT or MOVE assessment daily.   - Set and communicate daily mobility goal to care team and patient/family/caregiver. - Collaborate with rehabilitation services on mobility goals if consulted  - Perform Range of Motion 3 times a day. - Reposition patient every 3 hours.   - Dangle patient 3 times a day  - Stand patient 3 times a day  - Ambulate patient 3 times a day  - Out of bed to chair 3 times a day   - Out of bed for meals 3 times a day  - Out of bed for toileting  - Record patient progress and toleration of activity level   Outcome: Progressing     Problem: DISCHARGE PLANNING  Goal: Discharge to home or other facility with appropriate resources  Description: INTERVENTIONS:  - Identify barriers to discharge w/patient and caregiver  - Arrange for needed discharge resources and transportation as appropriate  - Identify discharge learning needs (meds, wound care, etc.)  - Arrange for interpretive services to assist at discharge as needed  - Refer to Case Management Department for coordinating discharge planning if the patient needs post-hospital services based on physician/advanced practitioner order or complex needs related to functional status, cognitive ability, or social support system  Outcome: Progressing     Problem: Knowledge Deficit  Goal: Patient/family/caregiver demonstrates understanding of disease process, treatment plan, medications, and discharge instructions  Description: Complete learning assessment and assess knowledge base. Interventions:  - Provide teaching at level of understanding  - Provide teaching via preferred learning methods  Outcome: Progressing     Problem: MOBILITY - ADULT  Goal: Maintain or return to baseline ADL function  Description: INTERVENTIONS:  -  Assess patient's ability to carry out ADLs; assess patient's baseline for ADL function and identify physical deficits which impact ability to perform ADLs (bathing, care of mouth/teeth, toileting, grooming, dressing, etc.)  - Assess/evaluate cause of self-care deficits   - Assess range of motion  - Assess patient's mobility; develop plan if impaired  - Assess patient's need for assistive devices and provide as appropriate  - Encourage maximum independence but intervene and supervise when necessary  - Involve family in performance of ADLs  - Assess for home care needs following discharge   - Consider OT consult to assist with ADL evaluation and planning for discharge  - Provide patient education as appropriate  Outcome: Progressing  Goal: Maintains/Returns to pre admission functional level  Description: INTERVENTIONS:  - Perform BMAT or MOVE assessment daily.   - Set and communicate daily mobility goal to care team and patient/family/caregiver.    - Collaborate with rehabilitation services on mobility goals if consulted  - Perform Range of Motion 3 times a day. - Reposition patient every 3 hours.   - Dangle patient 3 times a day  - Stand patient 3 times a day  - Ambulate patient 3 times a day  - Out of bed to chair 3 times a day   - Out of bed for meals 3 times a day  - Out of bed for toileting  - Record patient progress and toleration of activity level   Outcome: Progressing     Problem: METABOLIC, FLUID AND ELECTROLYTES - ADULT  Goal: Fluid balance maintained  Description: INTERVENTIONS:  - Monitor labs   - Monitor I/O and WT  - Instruct patient on fluid and nutrition as appropriate  - Assess for signs & symptoms of volume excess or deficit  Outcome: Progressing

## 2023-08-16 NOTE — ASSESSMENT & PLAN NOTE
Wt Readings from Last 3 Encounters:   08/16/23 90.2 kg (198 lb 13.7 oz)   08/03/23 86 kg (189 lb 9.5 oz)   06/16/23 86.2 kg (190 lb)     · Up approximately 9-10 lbs from dry weight, improved from admission  · Just completed steroid taper and had a recent diarrheal illness   · Repeat ECHO at this time showing EF of 60%   · S/p IV lasix 40mg daily, to given IV lasix after blood transfusion today  · Daily weights, I&Os, Na/fluid restriction  · Cardiology consult appreciated

## 2023-08-16 NOTE — PLAN OF CARE
Problem: PAIN - ADULT  Goal: Verbalizes/displays adequate comfort level or baseline comfort level  Description: Interventions:  - Encourage patient to monitor pain and request assistance  - Assess pain using appropriate pain scale  - Administer analgesics based on type and severity of pain and evaluate response  - Implement non-pharmacological measures as appropriate and evaluate response  - Consider cultural and social influences on pain and pain management  - Notify physician/advanced practitioner if interventions unsuccessful or patient reports new pain  8/16/2023 0724 by Rei Umaña RN  Outcome: Progressing  8/16/2023 0724 by Rei Umaña RN  Outcome: Progressing     Problem: INFECTION - ADULT  Goal: Absence or prevention of progression during hospitalization  Description: INTERVENTIONS:  - Assess and monitor for signs and symptoms of infection  - Monitor lab/diagnostic results  - Monitor all insertion sites, i.e. indwelling lines, tubes, and drains  - Monitor endotracheal if appropriate and nasal secretions for changes in amount and color  - [place] appropriate cooling/warming therapies per order  - Administer medications as ordered  - Instruct and encourage patient and family to use good hand hygiene technique  - Identify and instruct in appropriate isolation precautions for identified infection/condition  8/16/2023 0724 by Rei Umaña RN  Outcome: Progressing  8/16/2023 0724 by Rei Umaña RN  Outcome: Progressing     Problem: SAFETY ADULT  Goal: Patient will remain free of falls  Description: INTERVENTIONS:  - Educate patient/family on patient safety including physical limitations  - Instruct patient to call for assistance with activity   - Consult OT/PT to assist with strengthening/mobility   - Keep Call bell within reach  - Keep bed low and locked with side rails adjusted as appropriate  - Keep care items and personal belongings within reach  - Initiate and maintain comfort rounds  - Make Fall Risk Sign visible to staff  - Offer Toileting every 2 Hours, in advance of need  - Initiate/Maintain bed/chair alarm  - Obtain necessary fall risk management equipment: bed/chair alarm, nonskid socks   - Apply yellow socks and bracelet for high fall risk patients  - Consider moving patient to room near nurses station  8/16/2023 0724 by Belén Bond RN  Outcome: Progressing  8/16/2023 0724 by Belén Bond RN  Outcome: Progressing  Goal: Maintain or return to baseline ADL function  Description: INTERVENTIONS:  -  Assess patient's ability to carry out ADLs; assess patient's baseline for ADL function and identify physical deficits which impact ability to perform ADLs (bathing, care of mouth/teeth, toileting, grooming, dressing, etc.)  - Assess/evaluate cause of self-care deficits   - Assess range of motion  - Assess patient's mobility; develop plan if impaired  - Assess patient's need for assistive devices and provide as appropriate  - Encourage maximum independence but intervene and supervise when necessary  - Involve family in performance of ADLs  - Assess for home care needs following discharge   - Consider OT consult to assist with ADL evaluation and planning for discharge  - Provide patient education as appropriate  8/16/2023 0724 by Belén Bond RN  Outcome: Progressing  8/16/2023 0724 by Belén Bond RN  Outcome: Progressing  Goal: Maintains/Returns to pre admission functional level  Description: INTERVENTIONS:  - Perform BMAT or MOVE assessment daily.   - Set and communicate daily mobility goal to care team and patient/family/caregiver. - Collaborate with rehabilitation services on mobility goals if consulted  - Perform Range of Motion 3 times a day. - Reposition patient every 3 hours.   - Dangle patient 3 times a day  - Stand patient 3 times a day  - Ambulate patient 3 times a day  - Out of bed to chair 3 times a day   - Out of bed for meals 3 times a day  - Out of bed for toileting  - Record patient progress and toleration of activity level   8/16/2023 0724 by Belén Bond RN  Outcome: Progressing  8/16/2023 0724 by Belén Bond RN  Outcome: Progressing     Problem: DISCHARGE PLANNING  Goal: Discharge to home or other facility with appropriate resources  Description: INTERVENTIONS:  - Identify barriers to discharge w/patient and caregiver  - Arrange for needed discharge resources and transportation as appropriate  - Identify discharge learning needs (meds, wound care, etc.)  - Arrange for interpretive services to assist at discharge as needed  - Refer to Case Management Department for coordinating discharge planning if the patient needs post-hospital services based on physician/advanced practitioner order or complex needs related to functional status, cognitive ability, or social support system  8/16/2023 0724 by Belén Bond RN  Outcome: Progressing  8/16/2023 0724 by Belén Bond RN  Outcome: Progressing     Problem: Knowledge Deficit  Goal: Patient/family/caregiver demonstrates understanding of disease process, treatment plan, medications, and discharge instructions  Description: Complete learning assessment and assess knowledge base.   Interventions:  - Provide teaching at level of understanding  - Provide teaching via preferred learning methods  8/16/2023 0724 by Belén Bond RN  Outcome: Progressing  8/16/2023 0724 by Belén Bond RN  Outcome: Progressing     Problem: MOBILITY - ADULT  Goal: Maintain or return to baseline ADL function  Description: INTERVENTIONS:  -  Assess patient's ability to carry out ADLs; assess patient's baseline for ADL function and identify physical deficits which impact ability to perform ADLs (bathing, care of mouth/teeth, toileting, grooming, dressing, etc.)  - Assess/evaluate cause of self-care deficits   - Assess range of motion  - Assess patient's mobility; develop plan if impaired  - Assess patient's need for assistive devices and provide as appropriate  - Encourage maximum independence but intervene and supervise when necessary  - Involve family in performance of ADLs  - Assess for home care needs following discharge   - Consider OT consult to assist with ADL evaluation and planning for discharge  - Provide patient education as appropriate  8/16/2023 0724 by Kane Aguirre RN  Outcome: Progressing  8/16/2023 0724 by Kane Aguirre RN  Outcome: Progressing  Goal: Maintains/Returns to pre admission functional level  Description: INTERVENTIONS:  - Perform BMAT or MOVE assessment daily.   - Set and communicate daily mobility goal to care team and patient/family/caregiver. - Collaborate with rehabilitation services on mobility goals if consulted  - Perform Range of Motion 3 times a day. - Reposition patient every 3 hours.   - Dangle patient 3 times a day  - Stand patient 3 times a day  - Ambulate patient 3 times a day  - Out of bed to chair 3 times a day   - Out of bed for meals 3 times a day  - Out of bed for toileting  - Record patient progress and toleration of activity level   8/16/2023 0724 by Kane Aguirre RN  Outcome: Progressing  8/16/2023 0724 by Kane Aguirre RN  Outcome: Progressing     Problem: METABOLIC, FLUID AND ELECTROLYTES - ADULT  Goal: Fluid balance maintained  Description: INTERVENTIONS:  - Monitor labs   - Monitor I/O and WT  - Instruct patient on fluid and nutrition as appropriate  - Assess for signs & symptoms of volume excess or deficit  8/16/2023 0724 by Kane Aguirre RN  Outcome: Progressing  8/16/2023 0724 by Kane Aguirre RN  Outcome: Progressing

## 2023-08-16 NOTE — PHYSICAL THERAPY NOTE
Physical Therapy Cancellation Note                       08/16/23 1255   PT Last Visit   PT Visit Date 08/16/23   Note Type   Note Type Cancelled Session   Cancel Reasons Refusal  (Pt resting in bed. Wife reports she just  ambulated pt in [place] with RW. Notified RN Lyle Lemus that pt ambulated with wife.   Will continue to follow.)

## 2023-08-16 NOTE — ASSESSMENT & PLAN NOTE
Lab Results   Component Value Date    HGBA1C 10.6 (H) 08/02/2023       Recent Labs     08/15/23  1548 08/15/23  2016 08/16/23  0718 08/16/23  1044   POCGLU 217* 154* 153* 211*       Blood Sugar Average: Last 72 hrs:  (P) [id number]   · Poor control per last A1c  · Only on gliperide at home  · Continue SSI  · Will need endocrine follow up on discharge likely

## 2023-08-16 NOTE — PROGRESS NOTES
Cardiology Progress Note - Barb Copeland 80 y.o. male MRN: [id number]    Unit/Bed#: 427-01 Encounter: [id number]    Assessment and plan  #1 acute on chronic diastolic congestive heart failure  #2 nonischemic myocardial injury  #3 pancytopenia  #4 persistent atrial fibrillation  #5 hypotension    Recommendations: Patient's hemoglobin is down to 6.7 today. No obvious source of blood loss needs evaluation by hematology today. Give a dose of packed RBCs today we will give 1 dose of IV Lasix after the infusion. From a cardiac standpoint he is quite stable. Coumadin has been held due to significant anemia. Subjective:    No significant events overnight. Patient states he is feeling well hemoglobin continues to drop. Denies any chest pain or anginal sounding symptoms shortness of breath has improved lower extremity edema has improved    ROS    Objective:   Vitals: Blood pressure (!) 86/57, pulse 94, temperature 98.3 °F (36.8 °C), resp. rate 15, height 6' 1" (1.854 m), weight 90.2 kg (198 lb 13.7 oz), SpO2 95 %. , Body mass index is 26.24 kg/m².,   Orthostatic Blood Pressures    Flowsheet Row Most Recent Value   Blood Pressure 86/57 filed at 08/16/2023 0719   Patient Position - Orthostatic VS Lying filed at 08/15/2023 8749         Systolic (95ZDA), QAP:63 , Min:86 , VDZ:250     Diastolic (07EPJ), QZS:03, Min:56, Max:59      Intake/Output Summary (Last 24 hours) at 8/16/2023 0852  Last data filed at 8/15/2023 1736  Gross per 24 hour   Intake 540 ml   Output 350 ml   Net 190 ml     Weight (last 2 days)     Date/Time Weight    08/16/23 0600 90.2 (198.86)    08/16/23 0452 90.2 (198.86)    08/15/23 0856 89.6 (197.53)    08/15/23 0536 91.3 (201.28)    08/14/23 1119 89.3 (196.87)    08/14/23 0541 89.3 (196.87)            Telemetry Review: No significant arrhythmias seen on telemetry review. EKG personally reviewed by Albina Figueroa DO. Physical Exam  Vitals and nursing note reviewed.    Constitutional: General: He is not in acute distress. Appearance: He is well-developed. HENT:      Head: Normocephalic and atraumatic. Eyes:      Conjunctiva/sclera: Conjunctivae normal.      Pupils: Pupils are equal, round, and reactive to light. Cardiovascular:      Rate and Rhythm: Normal rate and regular rhythm. Heart sounds: Normal heart sounds. No murmur heard. No friction rub. Pulmonary:      Effort: Pulmonary effort is normal. No respiratory distress. Breath sounds: Normal breath sounds. No wheezing or rales. Abdominal:      General: Bowel sounds are normal. There is no distension. Palpations: Abdomen is soft. Tenderness: There is no abdominal tenderness. There is no rebound. Musculoskeletal:         General: No tenderness or deformity. Normal range of motion. Cervical back: Neck supple. Skin:     General: Skin is warm and dry. Findings: No erythema. Neurological:      Mental Status: He is alert and oriented to person, place, and time. Cranial Nerves: No cranial nerve deficit.            Laboratory Results:        CBC with diff:   Results from last 7 days   Lab Units 08/16/23  0457 08/15/23  1426 08/15/23  0455 08/14/23  1138 08/14/23  0503 08/13/23  1216   WBC Thousand/uL 3.46*  --  3.43*  --  3.31* 3.63*   HEMOGLOBIN g/dL 6.7* 7.2* 7.0* 7.5* 7.3* 8.1*   HEMATOCRIT % 21.4* 23.3* 22.5* 24.4* 23.2* 25.8*   MCV fL 99*  --  99*  --  97 98   PLATELETS Thousands/uL 108*  --  102*  --  100* 112*   RBC Million/uL 2.16*  --  2.27*  --  2.39* 2.63*   MCH pg 31.0  --  30.8  --  30.5 30.8   MCHC g/dL 31.3*  --  31.1*  --  31.5 31.4   RDW % 24.6*  --  25.1*  --  25.1* 25.2*   MPV fL 10.1  --  9.7  --  9.1 9.8   NRBC AUTO /100 WBCs 1  --  1  --  1 1         CMP:  Results from last 7 days   Lab Units 08/16/23  0457 08/15/23  0455 08/14/23  0503 08/13/23  1216   POTASSIUM mmol/L 3.6 3.5 2.9* 3.6   CHLORIDE mmol/L 108 108 108 107   CO2 mmol/L 24 25 26 25   BUN mg/dL 18 19 20 22 CREATININE mg/dL 0.76 0.81 0.83 0.87   CALCIUM mg/dL 8.2* 8.0* 8.0* 8.4   AST U/L 15 16 18 18   ALT U/L 21 24 25 30   ALK PHOS U/L 49 50 48 55   EGFR ml/min/1.73sq m 82 79 79 77         BMP:  Results from last 7 days   Lab Units 23  0457 08/15/23  0455 23  0503 23  1216   POTASSIUM mmol/L 3.6 3.5 2.9* 3.6   CHLORIDE mmol/L 108 108 108 107   CO2 mmol/L 24 25 26 25   BUN mg/dL 18 19 20 22   CREATININE mg/dL 0.76 0.81 0.83 0.87   CALCIUM mg/dL 8.2* 8.0* 8.0* 8.4       BNP:   Recent Labs     23  1216   *       Magnesium:   Results from last 7 days   Lab Units 23  0503 23  1220   MAGNESIUM mg/dL 1.6* 1.3*       Coags:   Results from last 7 days   Lab Units 08/15/23  0455 23  0503 23  1540 08/10/23  1352   INR  1.24* 1.25* 1.11 1.21*       TSH:        Hemoglobin A1C       Lipid Profile:       Cardiac testing:   Results for orders placed during the hospital encounter of 21    Echo complete with contrast if indicated    Narrative  [address] [place] [address], [address] [place] [address]    Transthoracic Echocardiogram  2D, M-mode, Doppler, and Color Doppler    Study date:  2021    Patient: Luis Davison  MR number: [id number]  Account number: [de-identified]  : 1935  Age: 80 years  Gender: Male  Status: Outpatient  Location: [address] [facility]  Height: 72 in  Weight: 229.5 lb  BP: 126/ 86 mmHg    Indications: Atrial fibrillation. Diagnoses: I10. - Essential (primary) hypertension, I48.0 - Atrial fibrillation    Sonographer:  Erica Gomez RDCS  Primary Physician:  Beverly Olmstead DO  Referring Physician: Anette Shanks D.O. Group:  [facility]  Interpreting Physician: Anette Shanks D.O.    SUMMARY    LEFT VENTRICLE:  Systolic function was normal. Ejection fraction was estimated to be 65 %. There were no regional wall motion abnormalities.   Wall thickness was mildly increased. There was mild concentric hypertrophy. RIGHT VENTRICLE:  The size was at the upper limits of normal.  Systolic function was normal.    LEFT ATRIUM:  The atrium was moderately dilated. RIGHT ATRIUM:  The atrium was mildly dilated. MITRAL VALVE:  There was mild annular calcification. There was mild regurgitation. TRICUSPID VALVE:  There was trace regurgitation. HISTORY: PRIOR HISTORY: CAD, prostate cancer, DVT Congestive heart failure. Atrial fibrillation. Risk factors: hypertension, diabetes, and hypercholesterolemia. Chronic lung disease. PRIOR PROCEDURES: Diagnostic cath. PROCEDURE: The study was performed in the [facility] and Vascular Center. This was a routine study. The transthoracic approach was used. The study included complete 2D imaging, M-mode, complete spectral Doppler, and color Doppler. The  heart rate was 63 bpm, at the start of the study. Images were obtained from the parasternal, apical, subcostal, and suprasternal notch acoustic windows. Echocardiographic views were limited due to lung interference. This was a technically  difficult study. LEFT VENTRICLE: Size was normal. Systolic function was normal. Ejection fraction was estimated to be 65 %. There were no regional wall motion abnormalities. Wall thickness was mildly increased. There was mild concentric hypertrophy. No  evidence of apical thrombus. DOPPLER: The study was not technically sufficient to allow evaluation of LV diastolic function. RIGHT VENTRICLE: The size was at the upper limits of normal. Systolic function was normal. Wall thickness was normal.    LEFT ATRIUM: The atrium was moderately dilated. RIGHT ATRIUM: The atrium was mildly dilated. MITRAL VALVE: There was mild annular calcification. Valve structure was normal. There was normal leaflet separation. DOPPLER: The transmitral velocity was within the normal range. There was no evidence for stenosis.  There was mild  regurgitation. AORTIC VALVE: The valve was trileaflet. Leaflets exhibited normal thickness and normal cuspal separation. DOPPLER: Transaortic velocity was within the normal range. There was no evidence for stenosis. There was no significant  regurgitation. TRICUSPID VALVE: The valve structure was normal. There was normal leaflet separation. DOPPLER: The transtricuspid velocity was within the normal range. There was no evidence for stenosis. There was trace regurgitation. PULMONIC VALVE: Leaflets exhibited normal thickness, no calcification, and normal cuspal separation. DOPPLER: The transpulmonic velocity was within the normal range. There was no significant regurgitation. PERICARDIUM: There was no pericardial effusion. The pericardium was normal in appearance. AORTA: The root exhibited normal size. SYSTEMIC VEINS: IVC: The inferior vena cava was normal in size. SYSTEM MEASUREMENT TABLES    2D  %FS: 28.04 %  Ao Diam: 3.28 cm  Ao asc: 3.77 cm  EDV(Teich): 81.23 ml  EF(Teich): 54.6 %  ESV(Teich): 36.88 ml  IVSd: 1.31 cm  LA Area: 27.54 cm2  LA Diam: 5.16 cm  LVEDV MOD A4C: 108.04 ml  LVEF MOD A4C: 64.4 %  LVESV MOD A4C: 38.47 ml  LVIDd: 4.26 cm  LVIDs: 3.06 cm  LVLd A4C: 6.42 cm  LVLs A4C: 4.92 cm  LVOT Diam: 2.02 cm  LVPWd: 1.2 cm  RA Area: 17.43 cm2  RVIDd: 3.32 cm  SV MOD A4C: 69.57 ml  SV(Teich): 44.35 ml    CW  AR Dec [place]: 1.76 m/s2  AR Dec Time: 2258.44 ms  AR PHT: 654.95 ms  AR Vmax: 3.98 m/s  AR maxP.37 mmHg  AV Env. Ti: 344.43 ms  AV VTI: 18.22 cm  AV Vmax: 0.76 m/s  AV Vmean: 0.53 m/s  AV maxP.29 mmHg  AV meanP.28 mmHg  MR VTI: 131.67 cm  MR Vmax: 3.44 m/s  MR Vmean: 2.91 m/s  MR maxP.22 mmHg  MR meanP.8 mmHg  TR Vmax: 3 m/s  TR maxP.07 mmHg    MM  TAPSE: 1.91 cm    PW  AROLDO (VTI): 2.45 cm2  AROLDO Vmax: 2.46 cm2  AVAI (VTI): 0 cm2/m2  AVAI Vmax: 0 cm2/m2  LVOT Env. Ti: 346.34 ms  LVOT VTI: 13.95 cm  LVOT Vmax: 0.58 m/s  LVOT Vmean: 0.4 m/s  LVOT maxP.35 mmHg  LVOT meanP.75 mmHg  LVSI Dopp: 19.72 ml/m2  LVSV Dopp: 44.57 ml    [facility]    Prepared and electronically signed by    Yanna Suarez D.O. Signed 2021 12:00:12    No results found for this or any previous visit. No results found for this or any previous visit. No results found for this or any previous visit. Meds/Allergies   all current active meds have been reviewed  Medications Prior to Admission   Medication   • ASPIRIN 81 PO   • atorvastatin (LIPITOR) 40 mg tablet   • busPIRone (BUSPAR) 7.5 mg tablet   • econazole nitrate 1 % cream   • furosemide (LASIX) 20 mg tablet   • glimepiride (AMARYL) 1 mg tablet   • ipratropium (ATROVENT) 0.03 % nasal spray   • loperamide (IMODIUM A-D) 2 MG tablet   • metoprolol succinate (Toprol XL) 25 mg 24 hr tablet   • OneTouch Delica Lancets 73X MISC   • OneTouch Ultra test strip   • Pyridoxine HCl (vitamin B-6) 50 MG TABS   • warfarin (COUMADIN) 4 mg tablet          Assessment:  Principal Problem:    Acute on chronic diastolic (congestive) heart failure (HCC)  Active Problems:    Persistent atrial fibrillation (HCC)    Malignant neoplasm of prostate (HCC)    Type 2 diabetes mellitus without complication, without long-term current use of insulin (HCC)    Troponin level elevated    Pancytopenia (HCC)            Counseling / Coordination of Care  Total floor / unit time spent today 25 minutes. Greater than 50% of total time was spent with the patient and / or family counseling and / or coordination of care. A description of the counseling / coordination of care: Navya Newberry

## 2023-08-16 NOTE — NURSING NOTE
Blood transfusion started at this time. Pt offers no complaints at this time. Will continue to monitor.

## 2023-08-17 VITALS
SYSTOLIC BLOOD PRESSURE: 108 MMHG | TEMPERATURE: 97.8 F | HEART RATE: 90 BPM | WEIGHT: 197.97 LBS | DIASTOLIC BLOOD PRESSURE: 63 MMHG | RESPIRATION RATE: 17 BRPM | HEIGHT: 73 IN | OXYGEN SATURATION: 98 % | BODY MASS INDEX: 26.24 KG/M2

## 2023-08-17 LAB
ABO GROUP BLD BPU: NORMAL
ACANTHOCYTES BLD QL SMEAR: PRESENT
ALBUMIN SERPL BCP-MCNC: 2.8 G/DL (ref 3.5–5)
ALP SERPL-CCNC: 52 U/L (ref 34–104)
ALT SERPL W P-5'-P-CCNC: 18 U/L (ref 7–52)
ANION GAP SERPL CALCULATED.3IONS-SCNC: 9 MMOL/L
ANISOCYTOSIS BLD QL SMEAR: PRESENT
APTT PPP: 33 SECONDS (ref 23–37)
AST SERPL W P-5'-P-CCNC: 14 U/L (ref 13–39)
BILIRUB SERPL-MCNC: 1.36 MG/DL (ref 0.2–1)
BPU ID: NORMAL
BUN SERPL-MCNC: 17 MG/DL (ref 5–25)
CALCIUM ALBUM COR SERPL-MCNC: 8.8 MG/DL (ref 8.3–10.1)
CALCIUM SERPL-MCNC: 7.8 MG/DL (ref 8.4–10.2)
CHLORIDE SERPL-SCNC: 107 MMOL/L (ref 96–108)
CO2 SERPL-SCNC: 23 MMOL/L (ref 21–32)
CREAT SERPL-MCNC: 0.9 MG/DL (ref 0.6–1.3)
CROSSMATCH: NORMAL
ERYTHROCYTE [DISTWIDTH] IN BLOOD BY AUTOMATED COUNT: 23.2 % (ref 11.6–15.1)
GFR SERPL CREATININE-BSD FRML MDRD: 75 ML/MIN/1.73SQ M
GLUCOSE SERPL-MCNC: 133 MG/DL (ref 65–140)
GLUCOSE SERPL-MCNC: 134 MG/DL (ref 65–140)
GLUCOSE SERPL-MCNC: 181 MG/DL (ref 65–140)
GLUCOSE SERPL-MCNC: 183 MG/DL (ref 65–140)
HCT VFR BLD AUTO: 24.2 % (ref 36.5–49.3)
HCT VFR BLD AUTO: 29.5 % (ref 36.5–49.3)
HGB BLD-MCNC: 7.7 G/DL (ref 12–17)
HGB BLD-MCNC: 9.4 G/DL (ref 12–17)
INR PPP: 1.22 (ref 0.84–1.19)
LDH SERPL-CCNC: 132 U/L (ref 140–271)
LG PLATELETS BLD QL SMEAR: PRESENT
LYMPHOCYTES NFR BLD: 26 % (ref 14–44)
MAGNESIUM SERPL-MCNC: 1.8 MG/DL (ref 1.9–2.7)
MCH RBC QN AUTO: 31.2 PG (ref 26.8–34.3)
MCHC RBC AUTO-ENTMCNC: 31.8 G/DL (ref 31.4–37.4)
MCV RBC AUTO: 98 FL (ref 82–98)
MONOCYTES NFR BLD AUTO: 3 % (ref 4–12)
NEUTS SEG NFR BLD AUTO: 71 % (ref 45–77)
NRBC BLD AUTO-RTO: 1 /100 WBCS
OVALOCYTES BLD QL SMEAR: PRESENT
PATHOLOGY REVIEW: YES
PLATELET # BLD AUTO: 118 THOUSANDS/UL (ref 149–390)
PLATELET BLD QL SMEAR: ABNORMAL
PMV BLD AUTO: 10.2 FL (ref 8.9–12.7)
POIKILOCYTOSIS BLD QL SMEAR: PRESENT
POLYCHROMASIA BLD QL SMEAR: PRESENT
POTASSIUM SERPL-SCNC: 3.2 MMOL/L (ref 3.5–5.3)
PROT SERPL-MCNC: 4.7 G/DL (ref 6.4–8.4)
PROTHROMBIN TIME: 15.6 SECONDS (ref 11.6–14.5)
RBC # BLD AUTO: 2.47 MILLION/UL (ref 3.88–5.62)
RBC MORPH BLD: PRESENT
RETICS # AUTO: ABNORMAL 10*3/UL (ref 14356–105094)
RETICS # CALC: 3.33 % (ref 0.37–1.87)
SCHISTOCYTES BLD QL SMEAR: PRESENT
SODIUM SERPL-SCNC: 139 MMOL/L (ref 135–147)
TOTAL CELLS COUNTED SPEC: 100
UNIT DISPENSE STATUS: NORMAL
UNIT PRODUCT CODE: NORMAL
UNIT PRODUCT VOLUME: 350 ML
UNIT RH: NORMAL
WBC # BLD AUTO: 3.5 THOUSAND/UL (ref 4.31–10.16)

## 2023-08-17 PROCEDURE — 85014 HEMATOCRIT: CPT | Performed by: PHYSICIAN ASSISTANT

## 2023-08-17 PROCEDURE — 85730 THROMBOPLASTIN TIME PARTIAL: CPT

## 2023-08-17 PROCEDURE — 82948 REAGENT STRIP/BLOOD GLUCOSE: CPT

## 2023-08-17 PROCEDURE — 85610 PROTHROMBIN TIME: CPT

## 2023-08-17 PROCEDURE — 85045 AUTOMATED RETICULOCYTE COUNT: CPT

## 2023-08-17 PROCEDURE — 97530 THERAPEUTIC ACTIVITIES: CPT

## 2023-08-17 PROCEDURE — 83735 ASSAY OF MAGNESIUM: CPT | Performed by: PHYSICIAN ASSISTANT

## 2023-08-17 PROCEDURE — 83010 ASSAY OF HAPTOGLOBIN QUANT: CPT

## 2023-08-17 PROCEDURE — 85018 HEMOGLOBIN: CPT | Performed by: PHYSICIAN ASSISTANT

## 2023-08-17 PROCEDURE — 85007 BL SMEAR W/DIFF WBC COUNT: CPT

## 2023-08-17 PROCEDURE — 80053 COMPREHEN METABOLIC PANEL: CPT

## 2023-08-17 PROCEDURE — 97116 GAIT TRAINING THERAPY: CPT

## 2023-08-17 PROCEDURE — 99239 HOSP IP/OBS DSCHRG MGMT >30: CPT | Performed by: PHYSICIAN ASSISTANT

## 2023-08-17 PROCEDURE — 83615 LACTATE (LD) (LDH) ENZYME: CPT

## 2023-08-17 RX ORDER — MAGNESIUM OXIDE 420 MG/1
1 TABLET ORAL 2 TIMES DAILY
Qty: 60 TABLET | Refills: 0 | Status: SHIPPED | OUTPATIENT
Start: 2023-08-17

## 2023-08-17 RX ORDER — FOLIC ACID 1 MG/1
1 TABLET ORAL DAILY
Qty: 30 TABLET | Refills: 1 | Status: SHIPPED | OUTPATIENT
Start: 2023-08-18

## 2023-08-17 RX ORDER — FUROSEMIDE 20 MG/1
40 TABLET ORAL DAILY
Qty: 60 TABLET | Refills: 0 | Status: SHIPPED | OUTPATIENT
Start: 2023-08-17 | End: 2023-08-24

## 2023-08-17 RX ORDER — POTASSIUM CHLORIDE 20 MEQ/1
40 TABLET, EXTENDED RELEASE ORAL ONCE
Status: COMPLETED | OUTPATIENT
Start: 2023-08-17 | End: 2023-08-17

## 2023-08-17 RX ORDER — FOLIC ACID 1 MG/1
1 TABLET ORAL DAILY
Status: DISCONTINUED | OUTPATIENT
Start: 2023-08-17 | End: 2023-08-17 | Stop reason: HOSPADM

## 2023-08-17 RX ADMIN — BUSPIRONE HYDROCHLORIDE 7.5 MG: 5 TABLET ORAL at 08:00

## 2023-08-17 RX ADMIN — Medication 50 MG: at 08:00

## 2023-08-17 RX ADMIN — POTASSIUM CHLORIDE 40 MEQ: 1500 TABLET, EXTENDED RELEASE ORAL at 13:25

## 2023-08-17 RX ADMIN — INSULIN LISPRO 1 UNITS: 100 INJECTION, SOLUTION INTRAVENOUS; SUBCUTANEOUS at 13:00

## 2023-08-17 RX ADMIN — IPRATROPIUM BROMIDE 2 SPRAY: 21 SPRAY, METERED NASAL at 08:02

## 2023-08-17 RX ADMIN — ATORVASTATIN CALCIUM 40 MG: 40 TABLET, FILM COATED ORAL at 08:00

## 2023-08-17 RX ADMIN — FOLIC ACID 1 MG: 1 TABLET ORAL at 13:25

## 2023-08-17 RX ADMIN — ASPIRIN 81 MG 81 MG: 81 TABLET ORAL at 08:00

## 2023-08-17 NOTE — PLAN OF CARE
Problem: PAIN - ADULT  Goal: Verbalizes/displays adequate comfort level or baseline comfort level  Description: Interventions:  - Encourage patient to monitor pain and request assistance  - Assess pain using appropriate pain scale  - Administer analgesics based on type and severity of pain and evaluate response  - Implement non-pharmacological measures as appropriate and evaluate response  - Consider cultural and social influences on pain and pain management  - Notify physician/advanced practitioner if interventions unsuccessful or patient reports new pain  Outcome: Progressing     Problem: INFECTION - ADULT  Goal: Absence or prevention of progression during hospitalization  Description: INTERVENTIONS:  - Assess and monitor for signs and symptoms of infection  - Monitor lab/diagnostic results  - Monitor all insertion sites, i.e. indwelling lines, tubes, and drains  - Monitor endotracheal if appropriate and nasal secretions for changes in amount and color  - Wheeler appropriate cooling/warming therapies per order  - Administer medications as ordered  - Instruct and encourage patient and family to use good hand hygiene technique  - Identify and instruct in appropriate isolation precautions for identified infection/condition  Outcome: Progressing     Problem: SAFETY ADULT  Goal: Patient will remain free of falls  Description: INTERVENTIONS:  - Educate patient/family on patient safety including physical limitations  - Instruct patient to call for assistance with activity   - Consult OT/PT to assist with strengthening/mobility   - Keep Call bell within reach  - Keep bed low and locked with side rails adjusted as appropriate  - Keep care items and personal belongings within reach  - Initiate and maintain comfort rounds  - Make Fall Risk Sign visible to staff  - Offer Toileting every 2 Hours, in advance of need  - Initiate/Maintain bed/chair alarm  - Obtain necessary fall risk management equipment: bed/chair alarm, nonskid socks   - Apply yellow socks and bracelet for high fall risk patients  - Consider moving patient to room near nurses station  Outcome: Progressing  Goal: Maintain or return to baseline ADL function  Description: INTERVENTIONS:  -  Assess patient's ability to carry out ADLs; assess patient's baseline for ADL function and identify physical deficits which impact ability to perform ADLs (bathing, care of mouth/teeth, toileting, grooming, dressing, etc.)  - Assess/evaluate cause of self-care deficits   - Assess range of motion  - Assess patient's mobility; develop plan if impaired  - Assess patient's need for assistive devices and provide as appropriate  - Encourage maximum independence but intervene and supervise when necessary  - Involve family in performance of ADLs  - Assess for home care needs following discharge   - Consider OT consult to assist with ADL evaluation and planning for discharge  - Provide patient education as appropriate  Outcome: Progressing  Goal: Maintains/Returns to pre admission functional level  Description: INTERVENTIONS:  - Perform BMAT or MOVE assessment daily.   - Set and communicate daily mobility goal to care team and patient/family/caregiver. - Collaborate with rehabilitation services on mobility goals if consulted  - Perform Range of Motion 3 times a day. - Reposition patient every 3 hours.   - Dangle patient 3 times a day  - Stand patient 3 times a day  - Ambulate patient 3 times a day  - Out of bed to chair 3 times a day   - Out of bed for meals 3 times a day  - Out of bed for toileting  - Record patient progress and toleration of activity level   Outcome: Progressing     Problem: DISCHARGE PLANNING  Goal: Discharge to home or other facility with appropriate resources  Description: INTERVENTIONS:  - Identify barriers to discharge w/patient and caregiver  - Arrange for needed discharge resources and transportation as appropriate  - Identify discharge learning needs (meds, wound care, etc.)  - Arrange for interpretive services to assist at discharge as needed  - Refer to Case Management Department for coordinating discharge planning if the patient needs post-hospital services based on physician/advanced practitioner order or complex needs related to functional status, cognitive ability, or social support system  Outcome: Progressing     Problem: Knowledge Deficit  Goal: Patient/family/caregiver demonstrates understanding of disease process, treatment plan, medications, and discharge instructions  Description: Complete learning assessment and assess knowledge base. Interventions:  - Provide teaching at level of understanding  - Provide teaching via preferred learning methods  Outcome: Progressing     Problem: MOBILITY - ADULT  Goal: Maintain or return to baseline ADL function  Description: INTERVENTIONS:  -  Assess patient's ability to carry out ADLs; assess patient's baseline for ADL function and identify physical deficits which impact ability to perform ADLs (bathing, care of mouth/teeth, toileting, grooming, dressing, etc.)  - Assess/evaluate cause of self-care deficits   - Assess range of motion  - Assess patient's mobility; develop plan if impaired  - Assess patient's need for assistive devices and provide as appropriate  - Encourage maximum independence but intervene and supervise when necessary  - Involve family in performance of ADLs  - Assess for home care needs following discharge   - Consider OT consult to assist with ADL evaluation and planning for discharge  - Provide patient education as appropriate  Outcome: Progressing  Goal: Maintains/Returns to pre admission functional level  Description: INTERVENTIONS:  - Perform BMAT or MOVE assessment daily.   - Set and communicate daily mobility goal to care team and patient/family/caregiver. - Collaborate with rehabilitation services on mobility goals if consulted  - Perform Range of Motion 3 times a day.   - Reposition patient every 3 hours.   - Dangle patient 3 times a day  - Stand patient 3 times a day  - Ambulate patient 3 times a day  - Out of bed to chair 3 times a day   - Out of bed for meals 3 times a day  - Out of bed for toileting  - Record patient progress and toleration of activity level   Outcome: Progressing     Problem: METABOLIC, FLUID AND ELECTROLYTES - ADULT  Goal: Fluid balance maintained  Description: INTERVENTIONS:  - Monitor labs   - Monitor I/O and WT  - Instruct patient on fluid and nutrition as appropriate  - Assess for signs & symptoms of volume excess or deficit  Outcome: Progressing     Problem: Prexisting or High Potential for Compromised Skin Integrity  Goal: Skin integrity is maintained or improved  Description: INTERVENTIONS:  - Identify patients at risk for skin breakdown  - Assess and monitor skin integrity  - Assess and monitor nutrition and hydration status  - Monitor labs   - Assess for incontinence   - Turn and reposition patient  - Assist with mobility/ambulation  - Relieve pressure over bony prominences  - Avoid friction and shearing  - Provide appropriate hygiene as needed including keeping skin clean and dry  - Evaluate need for skin moisturizer/barrier cream  - Collaborate with interdisciplinary team   - Patient/family teaching  - Consider wound care consult   Outcome: Progressing

## 2023-08-17 NOTE — ASSESSMENT & PLAN NOTE
· Hgb 9.4, Plts 118, WBC 3.4  · Given 1 unit pRBCs this morning  · No overt signs of GIB, FIT negative x 1  · Could also be related to recent diarrheal illness  · Check B12, folate, iron panel  · Folic acid low; replete and continue outpatient  · Coumadin held  · Peripheral smear w/ schistocytes, LDH/haptoglobin wnl, T bili marginally elevated  · D/w hematology today.  Less concern for hemolysis given normal LDH/haptoglobin & T bili  · Plan for repeat hemolytic workup in a.m. to ensure stability   · Trend cell lines   · Hematology consult appreciated; Dr. Meeta Hodgson feels this is related to recent diarrheal illness, does not feel that he needs further work up or to remain in the hospital  · Transfuse for Hgb <7, Plts <20 (consent in chart)

## 2023-08-17 NOTE — PLAN OF CARE
Problem: PAIN - ADULT  Goal: Verbalizes/displays adequate comfort level or baseline comfort level  Description: Interventions:  - Encourage patient to monitor pain and request assistance  - Assess pain using appropriate pain scale  - Administer analgesics based on type and severity of pain and evaluate response  - Implement non-pharmacological measures as appropriate and evaluate response  - Consider cultural and social influences on pain and pain management  - Notify physician/advanced practitioner if interventions unsuccessful or patient reports new pain  Outcome: Progressing     Problem: INFECTION - ADULT  Goal: Absence or prevention of progression during hospitalization  Description: INTERVENTIONS:  - Assess and monitor for signs and symptoms of infection  - Monitor lab/diagnostic results  - Monitor all insertion sites, i.e. indwelling lines, tubes, and drains  - Monitor endotracheal if appropriate and nasal secretions for changes in amount and color  - Winston appropriate cooling/warming therapies per order  - Administer medications as ordered  - Instruct and encourage patient and family to use good hand hygiene technique  - Identify and instruct in appropriate isolation precautions for identified infection/condition  Outcome: Progressing     Problem: SAFETY ADULT  Goal: Patient will remain free of falls  Description: INTERVENTIONS:  - Educate patient/family on patient safety including physical limitations  - Instruct patient to call for assistance with activity   - Consult OT/PT to assist with strengthening/mobility   - Keep Call bell within reach  - Keep bed low and locked with side rails adjusted as appropriate  - Keep care items and personal belongings within reach  - Initiate and maintain comfort rounds  - Make Fall Risk Sign visible to staff  - Offer Toileting every 2 Hours, in advance of need  - Initiate/Maintain bed/chair alarm  - Obtain necessary fall risk management equipment: bed/chair alarm, nonskid socks   - Apply yellow socks and bracelet for high fall risk patients  - Consider moving patient to room near nurses station  Outcome: Progressing  Goal: Maintain or return to baseline ADL function  Description: INTERVENTIONS:  -  Assess patient's ability to carry out ADLs; assess patient's baseline for ADL function and identify physical deficits which impact ability to perform ADLs (bathing, care of mouth/teeth, toileting, grooming, dressing, etc.)  - Assess/evaluate cause of self-care deficits   - Assess range of motion  - Assess patient's mobility; develop plan if impaired  - Assess patient's need for assistive devices and provide as appropriate  - Encourage maximum independence but intervene and supervise when necessary  - Involve family in performance of ADLs  - Assess for home care needs following discharge   - Consider OT consult to assist with ADL evaluation and planning for discharge  - Provide patient education as appropriate  Outcome: Progressing  Goal: Maintains/Returns to pre admission functional level  Description: INTERVENTIONS:  - Perform BMAT or MOVE assessment daily.   - Set and communicate daily mobility goal to care team and patient/family/caregiver. - Collaborate with rehabilitation services on mobility goals if consulted  - Perform Range of Motion 3 times a day. - Reposition patient every 3 hours.   - Dangle patient 3 times a day  - Stand patient 3 times a day  - Ambulate patient 3 times a day  - Out of bed to chair 3 times a day   - Out of bed for meals 3 times a day  - Out of bed for toileting  - Record patient progress and toleration of activity level   Outcome: Progressing     Problem: DISCHARGE PLANNING  Goal: Discharge to home or other facility with appropriate resources  Description: INTERVENTIONS:  - Identify barriers to discharge w/patient and caregiver  - Arrange for needed discharge resources and transportation as appropriate  - Identify discharge learning needs (meds, wound care, etc.)  - Arrange for interpretive services to assist at discharge as needed  - Refer to Case Management Department for coordinating discharge planning if the patient needs post-hospital services based on physician/advanced practitioner order or complex needs related to functional status, cognitive ability, or social support system  Outcome: Progressing     Problem: Knowledge Deficit  Goal: Patient/family/caregiver demonstrates understanding of disease process, treatment plan, medications, and discharge instructions  Description: Complete learning assessment and assess knowledge base. Interventions:  - Provide teaching at level of understanding  - Provide teaching via preferred learning methods  Outcome: Progressing     Problem: MOBILITY - ADULT  Goal: Maintain or return to baseline ADL function  Description: INTERVENTIONS:  -  Assess patient's ability to carry out ADLs; assess patient's baseline for ADL function and identify physical deficits which impact ability to perform ADLs (bathing, care of mouth/teeth, toileting, grooming, dressing, etc.)  - Assess/evaluate cause of self-care deficits   - Assess range of motion  - Assess patient's mobility; develop plan if impaired  - Assess patient's need for assistive devices and provide as appropriate  - Encourage maximum independence but intervene and supervise when necessary  - Involve family in performance of ADLs  - Assess for home care needs following discharge   - Consider OT consult to assist with ADL evaluation and planning for discharge  - Provide patient education as appropriate  Outcome: Progressing  Goal: Maintains/Returns to pre admission functional level  Description: INTERVENTIONS:  - Perform BMAT or MOVE assessment daily.   - Set and communicate daily mobility goal to care team and patient/family/caregiver. - Collaborate with rehabilitation services on mobility goals if consulted  - Perform Range of Motion 3 times a day.   - Reposition patient every 3 hours.   - Dangle patient 3 times a day  - Stand patient 3 times a day  - Ambulate patient 3 times a day  - Out of bed to chair 3 times a day   - Out of bed for meals 3 times a day  - Out of bed for toileting  - Record patient progress and toleration of activity level   Outcome: Progressing     Problem: METABOLIC, FLUID AND ELECTROLYTES - ADULT  Goal: Fluid balance maintained  Description: INTERVENTIONS:  - Monitor labs   - Monitor I/O and WT  - Instruct patient on fluid and nutrition as appropriate  - Assess for signs & symptoms of volume excess or deficit  Outcome: Progressing     Problem: Prexisting or High Potential for Compromised Skin Integrity  Goal: Skin integrity is maintained or improved  Description: INTERVENTIONS:  - Identify patients at risk for skin breakdown  - Assess and monitor skin integrity  - Assess and monitor nutrition and hydration status  - Monitor labs   - Assess for incontinence   - Turn and reposition patient  - Assist with mobility/ambulation  - Relieve pressure over bony prominences  - Avoid friction and shearing  - Provide appropriate hygiene as needed including keeping skin clean and dry  - Evaluate need for skin moisturizer/barrier cream  - Collaborate with interdisciplinary team   - Patient/family teaching  - Consider wound care consult   Outcome: Progressing

## 2023-08-17 NOTE — PHYSICAL THERAPY NOTE
PHYSICAL THERAPY NOTE          Patient Name: Elvira ELMOREC'N Date: 8/17/2023 08/17/23 2700   PT Last Visit   PT Visit Date 08/17/23   Note Type   Note Type Treatment   Pain Assessment   Pain Assessment Tool 0-10   Pain Score No Pain   Restrictions/Precautions   Weight Bearing Precautions Per Order No   Other Precautions   (; Fall Risk; Bed Alarm; Chair Alarm; Multiple lines)   General   Family/Caregiver Present No   Cognition   Overall Cognitive Status WFL   Arousal/Participation Alert   Following Commands Follows all commands and directions without difficulty   Subjective   Subjective Agreeable to therapy. Bed Mobility   Supine to Sit 4  Minimal assistance   Additional items Assist x 1;HOB elevated; Bedrails; Increased time required   Additional Comments Sat EOB with fair+ sitting balance. No c/o dizziness. Transfers   Sit to Stand 5  Supervision   Additional items Increased time required;Verbal cues   Stand to Sit 5  Supervision   Additional items Armrests; Increased time required;Verbal cues   Stand pivot 5  Supervision   Additional items Verbal cues   Additional Comments RW used   Ambulation/Elevation   Gait pattern   (Excessively slow; Short stride; Foward flexed; Wide LAYA; Improper Weight shift)   Gait Assistance 5  Supervision   Additional items Verbal cues   Assistive Device Rolling walker   Distance 150'   Balance   Static Sitting Good   Dynamic Sitting Fair +   Static Standing Fair   Dynamic Standing Fair   Ambulatory Fair -  (RW)   Endurance Deficit   Endurance Deficit Yes   Endurance Deficit Description HR  with ambulation   Activity Tolerance   Activity Tolerance Patient limited by fatigue   Assessment   Prognosis Good   Problem List   (Decreased strength; Decreased endurance;  Impaired balance; Decreased mobility)   Assessment Pt. seen for PT treatment session this date with interventions consisting of bed mobility, transfers and  gait training w/ emphasis on improving pt's ability to ambulate. In comparison to previous session, Pt. With no change  in activity tolerance. Pt is in need of continued activity in PT to improve strength balance endurance mobility transfers and ambulation with return to maximize LOF. From PT/mobility standpoint, recommendation at time of d/c would be HHPT in order to promote return to PLOF and independence. The patient's AM-PAC Basic Mobility Inpatient Short Form Raw Score is 18. A Raw score of greater than 16 suggests the patient may benefit from discharge to home. Please also refer to physical therapy recommendation for safe DC planning. Goals   LTG Expiration Date 08/28/23   PT Treatment Day 1   Plan   Treatment/Interventions   (Functional transfer training; LE strengthening/ROM; Therapeutic exercise; Endurance training; Bed mobility; Gait training)   Progress Progressing toward goals   PT Frequency 3-5x/wk   Recommendation   PT Discharge Recommendation Home with home health rehabilitation   [address] in Flat Bed Without Bedrails 3   Lying on Back to Sitting on Edge of Flat Bed Without Bedrails 3   Moving Bed to Chair 3   Standing Up From Chair Using Arms 3   Walk in Room 3   Climb 3-5 Stairs With Railing 3   Basic Mobility Inpatient Raw Score 18   Basic Mobility Standardized Score 41.05   Highest Level Of Mobility   JH-HLM Goal 6: Walk 10 steps or more   JH-HLM Achieved 7: Walk 25 feet or more   Education   Education Provided Mobility training   Patient Demonstrates verbal understanding   End of Consult   Patient Position at End of Consult Bedside chair; All needs within reach;Bed/Chair alarm activated   End of Consult Comments discussed POC with PT

## 2023-08-17 NOTE — PLAN OF CARE
Problem: PHYSICAL THERAPY ADULT  Goal: Performs mobility at highest level of function for planned discharge setting. See evaluation for individualized goals. Description: Treatment/Interventions: Functional transfer training, LE strengthening/ROM, Therapeutic exercise, Endurance training, Bed mobility, Gait training          See flowsheet documentation for full assessment, interventions and recommendations. Outcome: Progressing  Note: Prognosis: Good  Problem List:  (Decreased strength; Decreased endurance; Impaired balance; Decreased mobility)  Assessment: Pt. seen for PT treatment session this date with interventions consisting of bed mobility, transfers and  gait training w/ emphasis on improving pt's ability to ambulate. In comparison to previous session, Pt. With no change  in activity tolerance. Pt is in need of continued activity in PT to improve strength balance endurance mobility transfers and ambulation with return to maximize LOF. From PT/mobility standpoint, recommendation at time of d/c would be HHPT in order to promote return to PLOF and independence. The patient's AM-PAC Basic Mobility Inpatient Short Form Raw Score is 18. A Raw score of greater than 16 suggests the patient may benefit from discharge to home. Please also refer to physical therapy recommendation for safe DC planning. PT Discharge Recommendation: Home with home health rehabilitation    See flowsheet documentation for full assessment.

## 2023-08-17 NOTE — DISCHARGE SUMMARY
[address]  Discharge- Fern Copeland 1935, 80 y.o. male MRN: [id number]  Unit/Bed#: 443-83 Encounter: [id number]  Primary Care Provider: Ti Freed DO   Date and time admitted to hospital: 8/13/2023 11:21 AM    * Acute on chronic diastolic (congestive) heart failure (HCC)  Assessment & Plan  Wt Readings from Last 3 Encounters:   08/17/23 89.8 kg (197 lb 15.6 oz)   08/03/23 86 kg (189 lb 9.5 oz)   06/16/23 86.2 kg (190 lb)     · Up approximately 9-10 lbs from dry weight, improved from admission  · Just completed steroid taper and had a recent diarrheal illness   · Repeat ECHO at this time showing EF of 60%   · S/p IV lasix 40mg daily, to given IV lasix after blood transfusion   · Daily weights, I&Os, Na/fluid restriction  · Cardiology consult appreciated  · Stable, can be discharged on lasix 40 mg daily        Pancytopenia (HCC)  Assessment & Plan  · Hgb 9.4, Plts 118, WBC 3.4  · Given 1 unit pRBCs this morning  · No overt signs of GIB, FIT negative x 1  · Could also be related to recent diarrheal illness  · Check B12, folate, iron panel  · Folic acid low; replete and continue outpatient  · Coumadin held  · Peripheral smear w/ schistocytes, LDH/haptoglobin wnl, T bili marginally elevated  · D/w hematology today.  Less concern for hemolysis given normal LDH/haptoglobin & T bili  · Plan for repeat hemolytic workup in a.m. to ensure stability   · Trend cell lines   · Hematology consult appreciated; Dr. Annie Silva feels this is related to recent diarrheal illness, does not feel that he needs further work up or to remain in the hospital  · Transfuse for Hgb <7, Plts <20 (consent in chart)    Troponin level elevated  Assessment & Plan  · Troponin 508 > 591  · Continue to trend  · Denies CP  · Maintain on aspirin and statin therapy  · Suspect myocardial injury in the setting of CHF exacerbation  · Cardiology input appreciated who concur     Type 2 diabetes mellitus without complication, without long-term current use of insulin [facility])  Assessment & Plan  Lab Results   Component Value Date    HGBA1C 10.6 (H) 08/02/2023       Recent Labs     08/16/23  1546 08/16/23  2055 08/17/23  0731 08/17/23  1125   POCGLU 223* 181* 134 183*       Blood Sugar Average: Last 72 hrs:  (P) 185.0472039145794728   · Poor control per last A1c  · Only on gliperide at home  · Continue SSI  · Will need endocrine follow up on discharge likely    Malignant neoplasm of prostate ([address])  Assessment & Plan  · History of prostate cancer s/p radiation  · Reported abdominal pain and difficulty urinating - Now resolved but will continue with bladder scans   · Wishes to discontinue Flomax   · Urinary retention protocol    Persistent atrial fibrillation (HCC)  Assessment & Plan  · On coumadin therapy; currently subtherapeutic  · Now with stable hgb, can resume coumadin with INR check in a couple of days and repeat CBC  · Currently rate controlled with metoprolol      Medical Problems     Resolved Problems  Date Reviewed: 8/16/2023   None       Discharging Physician / Practitioner: Denver Corona PA-C  PCP: Jona Diaz DO  Admission Date:   Admission Orders (From admission, onward)     Ordered        08/13/23 1310  Inpatient Admission  Once                      Discharge Date: 08/17/23    Consultations During Hospital Stay:  · cardiology    Procedures Performed:   · none    Significant Findings / Test Results:   VAS lower limb venous duplex study, complete bilateral   Final Result      XR chest pa & lateral   ED Interpretation   No acute cardio-pulmonary disease. Independently interpreted by myself. Final Result      No acute cardiopulmonary disease.                   Workstation performed: [id number]               Incidental Findings:   · none    Test Results Pending at Discharge (will require follow up):   · none     Outpatient Tests Requested:  · CBC, BMP    Complications:  none    Reason for Admission: CHF    Hospital Course: Lala Garcia is a 80 y.o. male patient who originally presented to the hospital on 8/13/2023 due to difficulty with urinating and developed lower abdominal pain with this. The next morning, he discovered his legs were swollen up to his thigh. He does wear daily compressive stockings but this swelling was significantly more than his baseline. Abdominal pain is currently resolved and he states he is urinating normally again. He states his bowel movements have returned to normal and he is eating a normal diet again. Just completed a lengthy steroid taper. He denies SOB, CP, vomiting, diarrhea, hematochezia, melena. Found to be anemic on presentation with baseline hgb around 10 and presentation was at 8.1. BNP is elevated and he has pitting edema. Given IV lasix in the ED. He has already begun urinating well with this.     Diuresed well. Concern was over hgb dropping and he did require I unit PRBC while admitted. Folic acid level found to be low and was repleted. No occult blood found in stools. Hematology did not feel lab work was consistent with hemolysis and rather more likely to be related to recent illness. Hgb now improved to 9.4 on day of discharge. Please see above list of diagnoses and related plan for additional information. Condition at Discharge: good    Discharge Day Visit / Exam:   Subjective:  Patient reports feeling well and wanting to go home today  Vitals: Blood Pressure: 108/63 (08/17/23 0732)  Pulse: 90 (08/17/23 0732)  Temperature: 97.8 °F (36.6 °C) (08/17/23 0732)  Temp Source: Oral (08/16/23 2223)  Respirations: 17 (08/17/23 0732)  Height: 6' 1" (185.4 cm) (08/14/23 1119)  Weight - Scale: 89.8 kg (197 lb 15.6 oz) (08/17/23 0555)  SpO2: 98 % (08/17/23 0732)  Exam:   Physical Exam  Vitals and nursing note reviewed. Constitutional:       General: He is not in acute distress. Cardiovascular:      Rate and Rhythm: Normal rate and regular rhythm. Pulses: Normal pulses.       Heart sounds: Normal heart sounds. Pulmonary:      Effort: Pulmonary effort is normal.      Breath sounds: Normal breath sounds. Abdominal:      General: Bowel sounds are normal.      Palpations: Abdomen is soft. Musculoskeletal:      Right lower leg: Edema (at baseline) present. Left lower leg: Edema (at baseline) present. Skin:     General: Skin is warm and dry. Neurological:      General: No focal deficit present. Mental Status: He is alert. Mental status is at baseline. Psychiatric:         Mood and Affect: Mood normal.         Behavior: Behavior normal.          Discussion with Family: Updated  (son) at bedside. Discharge instructions/Information to patient and family:   See after visit summary for information provided to patient and family. Provisions for Follow-Up Care:  See after visit summary for information related to follow-up care and any pertinent home health orders. Disposition:   Home with [facility] Services (Reminder: Complete face to face encounter)    Planned Readmission: none     Discharge Statement:  I spent 68 minutes discharging the patient. This time was spent on the day of discharge. I had direct contact with the patient on the day of discharge. Greater than 50% of the total time was spent examining patient, answering all patient questions, arranging and discussing plan of care with patient as well as directly providing post-discharge instructions. Additional time then spent on discharge activities. Discharge Medications:  See after visit summary for reconciled discharge medications provided to patient and/or family.       **Please Note: This note may have been constructed using a voice recognition system**

## 2023-08-17 NOTE — ASSESSMENT & PLAN NOTE
Lab Results   Component Value Date    HGBA1C 10.6 (H) 08/02/2023       Recent Labs     08/16/23  1546 08/16/23  2055 08/17/23  0731 08/17/23  1125   POCGLU 223* 181* 134 183*       Blood Sugar Average: Last 72 hrs:  (P) [id number]   · Poor control per last A1c  · Only on gliperide at home  · Continue SSI  · Will need endocrine follow up on discharge likely

## 2023-08-17 NOTE — ASSESSMENT & PLAN NOTE
Wt Readings from Last 3 Encounters:   08/17/23 89.8 kg (197 lb 15.6 oz)   08/03/23 86 kg (189 lb 9.5 oz)   06/16/23 86.2 kg (190 lb)     · Up approximately 9-10 lbs from dry weight, improved from admission  · Just completed steroid taper and had a recent diarrheal illness   · Repeat ECHO at this time showing EF of 60%   · S/p IV lasix 40mg daily, to given IV lasix after blood transfusion   · Daily weights, I&Os, Na/fluid restriction  · Cardiology consult appreciated  · Stable, can be discharged on lasix 40 mg daily

## 2023-08-17 NOTE — PLAN OF CARE
Problem: PAIN - ADULT  Goal: Verbalizes/displays adequate comfort level or baseline comfort level  Description: Interventions:  - Encourage patient to monitor pain and request assistance  - Assess pain using appropriate pain scale  - Administer analgesics based on type and severity of pain and evaluate response  - Implement non-pharmacological measures as appropriate and evaluate response  - Consider cultural and social influences on pain and pain management  - Notify physician/advanced practitioner if interventions unsuccessful or patient reports new pain  8/17/2023 1438 by Stiven Stokes RN  Outcome: Adequate for Discharge  8/17/2023 0721 by Stiven Stokes RN  Outcome: Progressing     Problem: INFECTION - ADULT  Goal: Absence or prevention of progression during hospitalization  Description: INTERVENTIONS:  - Assess and monitor for signs and symptoms of infection  - Monitor lab/diagnostic results  - Monitor all insertion sites, i.e. indwelling lines, tubes, and drains  - Monitor endotracheal if appropriate and nasal secretions for changes in amount and color  - [place] appropriate cooling/warming therapies per order  - Administer medications as ordered  - Instruct and encourage patient and family to use good hand hygiene technique  - Identify and instruct in appropriate isolation precautions for identified infection/condition  8/17/2023 1438 by Stiven Stokes RN  Outcome: Adequate for Discharge  8/17/2023 0721 by Stiven Stokes RN  Outcome: Progressing     Problem: SAFETY ADULT  Goal: Patient will remain free of falls  Description: INTERVENTIONS:  - Educate patient/family on patient safety including physical limitations  - Instruct patient to call for assistance with activity   - Consult OT/PT to assist with strengthening/mobility   - Keep Call bell within reach  - Keep bed low and locked with side rails adjusted as appropriate  - Keep care items and personal belongings within reach  - Initiate and maintain comfort rounds  - Make Fall Risk Sign visible to staff  - Offer Toileting every 2 Hours, in advance of need  - Initiate/Maintain bed/chair alarm  - Obtain necessary fall risk management equipment: bed/chair alarm, nonskid socks   - Apply yellow socks and bracelet for high fall risk patients  - Consider moving patient to room near nurses station  8/17/2023 1438 by Concepcion Newman RN  Outcome: Adequate for Discharge  8/17/2023 0721 by Concepcion Newman RN  Outcome: Progressing  Goal: Maintain or return to baseline ADL function  Description: INTERVENTIONS:  -  Assess patient's ability to carry out ADLs; assess patient's baseline for ADL function and identify physical deficits which impact ability to perform ADLs (bathing, care of mouth/teeth, toileting, grooming, dressing, etc.)  - Assess/evaluate cause of self-care deficits   - Assess range of motion  - Assess patient's mobility; develop plan if impaired  - Assess patient's need for assistive devices and provide as appropriate  - Encourage maximum independence but intervene and supervise when necessary  - Involve family in performance of ADLs  - Assess for home care needs following discharge   - Consider OT consult to assist with ADL evaluation and planning for discharge  - Provide patient education as appropriate  8/17/2023 1438 by Concepcion Newman RN  Outcome: Adequate for Discharge  8/17/2023 0721 by Concepcion Newman RN  Outcome: Progressing  Goal: Maintains/Returns to pre admission functional level  Description: INTERVENTIONS:  - Perform BMAT or MOVE assessment daily.   - Set and communicate daily mobility goal to care team and patient/family/caregiver. - Collaborate with rehabilitation services on mobility goals if consulted  - Perform Range of Motion 3 times a day. - Reposition patient every 3 hours.   - Dangle patient 3 times a day  - Stand patient 3 times a day  - Ambulate patient 3 times a day  - Out of bed to chair 3 times a day   - Out of bed for meals 3 times a day  - Out of bed for toileting  - Record patient progress and toleration of activity level   8/17/2023 1438 by Ayla Shields RN  Outcome: Adequate for Discharge  8/17/2023 0721 by Ayla Shields RN  Outcome: Progressing     Problem: DISCHARGE PLANNING  Goal: Discharge to home or other facility with appropriate resources  Description: INTERVENTIONS:  - Identify barriers to discharge w/patient and caregiver  - Arrange for needed discharge resources and transportation as appropriate  - Identify discharge learning needs (meds, wound care, etc.)  - Arrange for interpretive services to assist at discharge as needed  - Refer to Case Management Department for coordinating discharge planning if the patient needs post-hospital services based on physician/advanced practitioner order or complex needs related to functional status, cognitive ability, or social support system  8/17/2023 1438 by Ayla Shields RN  Outcome: Adequate for Discharge  8/17/2023 0721 by Ayla Shields RN  Outcome: Progressing     Problem: Knowledge Deficit  Goal: Patient/family/caregiver demonstrates understanding of disease process, treatment plan, medications, and discharge instructions  Description: Complete learning assessment and assess knowledge base.   Interventions:  - Provide teaching at level of understanding  - Provide teaching via preferred learning methods  8/17/2023 1438 by Ayla Shields RN  Outcome: Adequate for Discharge  8/17/2023 0721 by Ayla Shields RN  Outcome: Progressing     Problem: MOBILITY - ADULT  Goal: Maintain or return to baseline ADL function  Description: INTERVENTIONS:  -  Assess patient's ability to carry out ADLs; assess patient's baseline for ADL function and identify physical deficits which impact ability to perform ADLs (bathing, care of mouth/teeth, toileting, grooming, dressing, etc.)  - Assess/evaluate cause of self-care deficits   - Assess range of motion  - Assess patient's mobility; develop plan if impaired  - Assess patient's need for assistive devices and provide as appropriate  - Encourage maximum independence but intervene and supervise when necessary  - Involve family in performance of ADLs  - Assess for home care needs following discharge   - Consider OT consult to assist with ADL evaluation and planning for discharge  - Provide patient education as appropriate  8/17/2023 1438 by Kane Aguirre RN  Outcome: Adequate for Discharge  8/17/2023 0721 by Kane Aguirre RN  Outcome: Progressing  Goal: Maintains/Returns to pre admission functional level  Description: INTERVENTIONS:  - Perform BMAT or MOVE assessment daily.   - Set and communicate daily mobility goal to care team and patient/family/caregiver. - Collaborate with rehabilitation services on mobility goals if consulted  - Perform Range of Motion 3 times a day. - Reposition patient every 3 hours.   - Dangle patient 3 times a day  - Stand patient 3 times a day  - Ambulate patient 3 times a day  - Out of bed to chair 3 times a day   - Out of bed for meals 3 times a day  - Out of bed for toileting  - Record patient progress and toleration of activity level   8/17/2023 1438 by Kane Aguirre RN  Outcome: Adequate for Discharge  8/17/2023 0721 by Kane Aguirre RN  Outcome: Progressing     Problem: METABOLIC, FLUID AND ELECTROLYTES - ADULT  Goal: Fluid balance maintained  Description: INTERVENTIONS:  - Monitor labs   - Monitor I/O and WT  - Instruct patient on fluid and nutrition as appropriate  - Assess for signs & symptoms of volume excess or deficit  8/17/2023 1438 by Kane Aguirre RN  Outcome: Adequate for Discharge  8/17/2023 0721 by Kane Aguirre RN  Outcome: Progressing     Problem: Prexisting or High Potential for Compromised Skin Integrity  Goal: Skin integrity is maintained or improved  Description: INTERVENTIONS:  - Identify patients at risk for skin breakdown  - Assess and monitor skin integrity  - Assess and monitor nutrition and hydration status  - Monitor labs   - Assess for incontinence   - Turn and reposition patient  - Assist with mobility/ambulation  - Relieve pressure over bony prominences  - Avoid friction and shearing  - Provide appropriate hygiene as needed including keeping skin clean and dry  - Evaluate need for skin moisturizer/barrier cream  - Collaborate with interdisciplinary team   - Patient/family teaching  - Consider wound care consult   8/17/2023 1438 by Concepcion Newman RN  Outcome: Adequate for Discharge  8/17/2023 0721 by Concepcion Newman RN  Outcome: Progressing

## 2023-08-17 NOTE — CASE MANAGEMENT
Case Management Discharge Planning Note    Patient name Coty Nair /[id number] MRN [id number]  : 1935 Date 2023       Current Admission Date: 2023  Current Admission Diagnosis:Acute on chronic diastolic (congestive) heart failure [facility])   Patient Active Problem List    Diagnosis Date Noted   • Acute on chronic diastolic (congestive) heart failure ([address]) 2023   • Troponin level elevated 2023   • Pancytopenia ([address]) 2023   • Plantar fascial fibromatosis 2023   • Diarrhea of presumed infectious origin 2023   • Cryptogenic organizing pneumonia ([address]) 2023   • Type 2 diabetes mellitus without complication, without long-term current use of insulin ([address]) 2023   • Peripheral arterial disease ([address]) 2023   • 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   • Malignant neoplasm of prostate ([address]) 2021   • Chronic diastolic congestive heart failure ([address]) 2020   • Chronic venous insufficiency 2019   • Long term (current) use of anticoagulants 2018   • Persistent atrial fibrillation ([address]) 2018   • Dermatofibroma 2018   • Excessive anticoagulation 2018   • Exostosis 2017   • Olecranon bursitis 2017   • FH: colon cancer 2017   • Family history of colonic polyps 2017   • History of colonic polyps 2017   • Abnormality of gait 2016   • Idiopathic peripheral neuropathy 2016   • Paresthesias 2016      LOS (days): 4  Geometric Mean LOS (GMLOS) (days): 3.90  Days to GMLOS:-0.2     OBJECTIVE:  Risk of Unplanned Readmission Score: 22.75         Current admission status: Inpatient   Preferred Pharmacy:   [address] [place] [address] South, [facility] [place] [address]  [address]  [address] [place] [address]  Phone: [phone] Fax: [phone] - [address], [facility]1 [facility] [address]  Phone: [phone] Fax: [phone]    Primary Care Provider: Devendra Aguero DO    Primary Insurance: Michelle [address]  Secondary Insurance:     DISCHARGE DETAILS:  Pt is being dc'd home on this date with OP follow up and resumption of [facility].      Discharge planning discussed with[de-identified] patient and wife  Freedom of Choice: Yes  Comments - Freedom of Choice: resumption of [facility]  CM contacted family/caregiver?: Yes  Were Treatment Team discharge recommendations reviewed with patient/caregiver?: Yes  Did patient/caregiver verbalize understanding of patient care needs?: Yes  Were patient/caregiver advised of the risks associated with not following Treatment Team discharge recommendations?: Yes         [address] requested[de-identified] Nursing, Occupational Therapy, Physical [address] Name[de-identified] [facility] External Referral Reason (only applicable if external A name selected): Patient has established relationship with provider  [address] Provider[de-identified] PCP  Home Health Services Needed[de-identified] Strengthening/Theraputic Exercises to Improve Function, Evaluate Functional Status and Safety, Heart Failure Management, Gait/ADL Training  Homebound Criteria Met[de-identified] Uses an Assist Device (i.e. cane, walker, etc)  Supporting Clincal Findings[de-identified] Fatigues Easliy in Short Distances    Discharge Destination Plan[de-identified] Home with [address] [place] [address] at Discharge : Family(Wife)

## 2023-08-17 NOTE — ASSESSMENT & PLAN NOTE
· On coumadin therapy; currently subtherapeutic  · Now with stable hgb, can resume coumadin with INR check in a couple of days and repeat CBC  · Currently rate controlled with metoprolol

## 2023-08-17 NOTE — CASE MANAGEMENT
Case Management Discharge Planning Note    Patient name Alexa Parents  Location /[id number] MRN [id number]  : 1935 Date 2023       Current Admission Date: 2023  Current Admission Diagnosis:Acute on chronic diastolic (congestive) heart failure [facility])   Patient Active Problem List    Diagnosis Date Noted   • Acute on chronic diastolic (congestive) heart failure ([address]) 2023   • Troponin level elevated 2023   • Pancytopenia ([address]) 2023   • Plantar fascial fibromatosis 2023   • Diarrhea of presumed infectious origin 2023   • Cryptogenic organizing pneumonia ([address]) 2023   • Type 2 diabetes mellitus without complication, without long-term current use of insulin ([address]) 2023   • Peripheral arterial disease ([address]) 2023   • 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   • Malignant neoplasm of prostate ([address]) 2021   • Chronic diastolic congestive heart failure ([address]) 2020   • Chronic venous insufficiency 2019   • Long term (current) use of anticoagulants 2018   • Persistent atrial fibrillation ([address]) 2018   • Dermatofibroma 2018   • Excessive anticoagulation 2018   • Exostosis 2017   • Olecranon bursitis 2017   • FH: colon cancer 2017   • Family history of colonic polyps 2017   • History of colonic polyps 2017   • Abnormality of gait 2016   • Idiopathic peripheral neuropathy 2016   • Paresthesias 2016      LOS (days): 4  Geometric Mean LOS (GMLOS) (days): 3.90  Days to GMLOS:-0.2     OBJECTIVE:  Risk of Unplanned Readmission Score: 22.75         Current admission status: Inpatient   Preferred Pharmacy:   [address] [place] [address] [place], [address]  [address]  [address] [place] [address]  Phone: [phone] Fax: [phone] - [address], [facility]1 [facility] [address]  Phone: [phone] Fax: [phone]    Primary Care Provider: Nisha Silva DO    Primary Insurance: Erick Acosta [facility]  Secondary Insurance:     DISCHARGE DETAILS:  Pt is being dc'd home on this date with OP follow up and resumption of [facility].

## 2023-08-18 ENCOUNTER — HOME CARE VISIT (OUTPATIENT)
Dept: HOME HEALTH SERVICES | Facility: HOME HEALTHCARE | Age: 88
End: 2023-08-18
Payer: COMMERCIAL

## 2023-08-18 ENCOUNTER — TRANSITIONAL CARE MANAGEMENT (OUTPATIENT)
Dept: FAMILY MEDICINE CLINIC | Facility: CLINIC | Age: 88
End: 2023-08-18

## 2023-08-18 ENCOUNTER — ANTICOAG VISIT (OUTPATIENT)
Dept: CARDIOLOGY CLINIC | Facility: CLINIC | Age: 88
End: 2023-08-18
Payer: COMMERCIAL

## 2023-08-18 DIAGNOSIS — I48.19 PERSISTENT ATRIAL FIBRILLATION (HCC): Primary | ICD-10-CM

## 2023-08-18 DIAGNOSIS — Z79.01 LONG TERM (CURRENT) USE OF ANTICOAGULANTS: ICD-10-CM

## 2023-08-18 LAB
DATE SPECIMEN #1: NORMAL
HAPTOGLOB SERPL-MCNC: 169 MG/DL (ref 38–329)
HEMOCCULT SP1 STL QL: NEGATIVE
HEMOCCULT SP2 STL QL: NORMAL
HEMOCCULT SP3 STL QL: NORMAL

## 2023-08-18 PROCEDURE — 93793 ANTICOAG MGMT PT WARFARIN: CPT | Performed by: PHYSICIAN ASSISTANT

## 2023-08-18 NOTE — UTILIZATION REVIEW
NOTIFICATION OF ADMISSION DISCHARGE   This is a Notification of Discharge from [facility]. Please be advised that this patient has been discharge from our facility. Below you will find the admission and discharge date and time including the patient’s disposition. UTILIZATION REVIEW CONTACT:  Stephanie Yang  Utilization   Network Utilization Review Department  Phone: [phone] x carefully listen to the prompts. All voicemails are confidential.  Email: [email or web address]     ADMISSION INFORMATION  PRESENTATION DATE: 8/13/2023 11:21 AM  OBERVATION ADMISSION DATE:   INPATIENT ADMISSION DATE: 8/13/23  1:10 PM   DISCHARGE DATE: 8/17/2023  3:35 PM   DISPOSITION:Home with Home Health Care    IMPORTANT INFORMATION:  Send all requests for admission clinical reviews, approved or denied determinations and any other requests to dedicated fax number below belonging to the campus where the patient is receiving treatment.  List of dedicated fax numbers:  [place] DENIALS (Administrative/Medical Necessity) [phone] [address] [facility] (Maternity/NICU/Pediatrics) [phone]   [facility] [phone]   [facility] [phone] [address] [facility] [facility] [address] [phone]   [address] [place] [address] [phone]   [facility] [phone]   [address] [place] [address] [address] [place] [address] [phone]   [address] [place] [address] [phone] [address] [facility] [phone] 27[address] [facility] [address] [facility] [phone]

## 2023-08-18 NOTE — PATIENT INSTRUCTIONS
Spoke with Patient: No changes in medication health or diet. No missed or extra doses. No s/s of bleeding TIA or CVA. No new ABX, NSAIDs OTC meds, or supplements. Dose as instructed and recheck in one week.    Prabhjot Vicente PA-C

## 2023-08-22 ENCOUNTER — HOME CARE VISIT (OUTPATIENT)
Dept: HOME HEALTH SERVICES | Facility: HOME HEALTHCARE | Age: 88
End: 2023-08-22
Payer: COMMERCIAL

## 2023-08-22 VITALS
HEART RATE: 84 BPM | OXYGEN SATURATION: 98 % | RESPIRATION RATE: 16 BRPM | DIASTOLIC BLOOD PRESSURE: 58 MMHG | SYSTOLIC BLOOD PRESSURE: 96 MMHG | TEMPERATURE: 97.5 F

## 2023-08-22 PROCEDURE — G0151 HHCP-SERV OF PT,EA 15 MIN: HCPCS

## 2023-08-22 PROCEDURE — G0299 HHS/HOSPICE OF RN EA 15 MIN: HCPCS

## 2023-08-22 NOTE — CASE COMMUNICATION
PT reeval 8/22/23. PT POC for 1wk1 2wk2. Progress HEP/LE ther ex, stair training, transfer/gait /balance training, edu.      Thank you, Corby Lamb PT

## 2023-08-24 ENCOUNTER — APPOINTMENT (OUTPATIENT)
Dept: LAB | Facility: HOSPITAL | Age: 88
End: 2023-08-24
Payer: COMMERCIAL

## 2023-08-24 ENCOUNTER — OFFICE VISIT (OUTPATIENT)
Dept: CARDIOLOGY CLINIC | Facility: CLINIC | Age: 88
End: 2023-08-24
Payer: COMMERCIAL

## 2023-08-24 VITALS
RESPIRATION RATE: 16 BRPM | HEART RATE: 64 BPM | HEIGHT: 73 IN | OXYGEN SATURATION: 95 % | SYSTOLIC BLOOD PRESSURE: 108 MMHG | BODY MASS INDEX: 27.33 KG/M2 | DIASTOLIC BLOOD PRESSURE: 62 MMHG | WEIGHT: 206.2 LBS

## 2023-08-24 DIAGNOSIS — D61.818 PANCYTOPENIA (HCC): ICD-10-CM

## 2023-08-24 DIAGNOSIS — I48.19 PERSISTENT ATRIAL FIBRILLATION (HCC): ICD-10-CM

## 2023-08-24 DIAGNOSIS — Z79.01 LONG TERM (CURRENT) USE OF ANTICOAGULANTS: ICD-10-CM

## 2023-08-24 DIAGNOSIS — I50.33 ACUTE ON CHRONIC DIASTOLIC CONGESTIVE HEART FAILURE (HCC): Primary | ICD-10-CM

## 2023-08-24 LAB
INR PPP: 1.13 (ref 0.84–1.19)
PROTHROMBIN TIME: 14.7 SECONDS (ref 11.6–14.5)

## 2023-08-24 PROCEDURE — 36415 COLL VENOUS BLD VENIPUNCTURE: CPT

## 2023-08-24 PROCEDURE — 85610 PROTHROMBIN TIME: CPT

## 2023-08-24 PROCEDURE — 99214 OFFICE O/P EST MOD 30 MIN: CPT | Performed by: INTERNAL MEDICINE

## 2023-08-24 RX ORDER — TORSEMIDE 100 MG/1
50 TABLET ORAL DAILY
Qty: 30 TABLET | Refills: 3 | Status: SHIPPED | OUTPATIENT
Start: 2023-08-24

## 2023-08-24 RX ORDER — POTASSIUM CHLORIDE 20 MEQ/1
20 TABLET, EXTENDED RELEASE ORAL DAILY
Qty: 30 TABLET | Refills: 3 | Status: SHIPPED | OUTPATIENT
Start: 2023-08-24

## 2023-08-24 NOTE — PROGRESS NOTES
Subjective:        Patient ID: Espinoza Dorado is a 80 y.o. male. Chief Complaint:  He was in the hospital after a bout of diarrhea with progressive anemia, under the care of hematology now, status post transfusion, with exacerbation of systolic heart failure with mild troponin spill felt to be not consistent with ACS, no angina, presents for follow-up with persistent marked lower extremity edema, says the stockings do help but not wearing them today, mild dyspnea and fatigue though overall slightly improved he says, no chest pains or palpitations, but he has not lost any weight since discharged last week. Potassium low on last assessment, no evidence for recheck. He has not been taking aspirin for many months. I removed it from his list.    The following portions of the patient's history were reviewed and updated as appropriate: allergies, current medications, past family history, past medical history, past social history, past surgical history and problem list.  Review of Systems   Constitutional: Positive for malaise/fatigue. Negative for chills, diaphoresis and weight gain. HENT: Negative for nosebleeds and stridor. Eyes: Negative for double vision, vision loss in left eye, vision loss in right eye and visual disturbance. Cardiovascular: Positive for dyspnea on exertion and leg swelling. Negative for chest pain, claudication, cyanosis, irregular heartbeat, near-syncope, orthopnea, palpitations, paroxysmal nocturnal dyspnea and syncope. Respiratory: Negative for cough, shortness of breath, snoring and wheezing. Endocrine: Negative for polydipsia, polyphagia and polyuria. Hematologic/Lymphatic: Negative for bleeding problem. Does not bruise/bleed easily. Skin: Negative for flushing and rash. Musculoskeletal: Positive for muscle weakness and stiffness. Negative for falls and myalgias. Gastrointestinal: Negative for abdominal pain, heartburn, hematemesis, hematochezia, melena and nausea. Genitourinary: Negative for hematuria. Neurological: Negative for brief paralysis, dizziness, focal weakness, headaches, light-headedness, loss of balance and vertigo. Psychiatric/Behavioral: Negative for altered mental status and substance abuse. Allergic/Immunologic: Negative for hives. Objective:      /62 (BP Location: Left arm, Patient Position: Sitting, Cuff Size: Large)   Pulse 64   Resp 16   Ht 6' 1" (1.854 m)   Wt 93.5 kg (206 lb 3.2 oz)   SpO2 95%   BMI 27.20 kg/m²   Physical Exam  Constitutional:       General: He is not in acute distress. Appearance: He is well-developed. He is not diaphoretic. HENT:      Head: Normocephalic and atraumatic. Eyes:      General: No scleral icterus. Pupils: Pupils are equal, round, and reactive to light. Neck:      Thyroid: No thyromegaly. Vascular: No carotid bruit or JVD. Cardiovascular:      Rate and Rhythm: Normal rate. Rhythm irregular. Heart sounds: Normal heart sounds. No murmur heard. No friction rub. No gallop. Pulmonary:      Effort: Pulmonary effort is normal. No respiratory distress. Breath sounds: Normal breath sounds. No stridor. No wheezing or rales. Abdominal:      General: Bowel sounds are normal. There is no distension. Palpations: Abdomen is soft. There is no mass. Tenderness: There is no abdominal tenderness. Musculoskeletal:         General: No deformity. Normal range of motion. Cervical back: Normal range of motion and neck supple. Right lower leg: Edema present. Left lower leg: Edema present. Skin:     General: Skin is warm and dry. Coloration: Skin is not pale. Findings: No erythema. Neurological:      Mental Status: He is alert and oriented to person, place, and time. Mental status is at baseline.       Coordination: Coordination normal.   Psychiatric:         Mood and Affect: Mood normal.         Behavior: Behavior normal.         Thought Content: Thought content normal.         Judgment: Judgment normal.     +3-4 pitting edema bilateral below knees    Lab Review:   No results displayed because visit has over 200 results.       Appointment on 08/12/2023   Component Date Value   • C.difficile toxin by PCR 08/12/2023 Negative    Admission on 08/01/2023, Discharged on 08/03/2023   Component Date Value   • WBC 08/01/2023 6.38    • RBC 08/01/2023 3.55 (L)    • Hemoglobin 08/01/2023 10.3 (L)    • Hematocrit 08/01/2023 32.1 (L)    • MCV 08/01/2023 90    • MCH 08/01/2023 29.0    • MCHC 08/01/2023 32.1    • RDW 08/01/2023 22.7 (H)    • MPV 08/01/2023 10.1    • Platelets 23/44/8198 143 (L)    • nRBC 08/01/2023 2    • Neutrophils Relative 08/01/2023 57    • Immat GRANS % 08/01/2023 1    • Lymphocytes Relative 08/01/2023 36    • Monocytes Relative 08/01/2023 5    • Eosinophils Relative 08/01/2023 1    • Basophils Relative 08/01/2023 0    • Neutrophils Absolute 08/01/2023 3.65    • Immature Grans Absolute 08/01/2023 0.07    • Lymphocytes Absolute 08/01/2023 2.27    • Monocytes Absolute 08/01/2023 0.33    • Eosinophils Absolute 08/01/2023 0.04    • Basophils Absolute 08/01/2023 0.02    • Sodium 08/01/2023 140    • Potassium 08/01/2023 3.2 (L)    • Chloride 08/01/2023 100    • CO2 08/01/2023 26    • ANION GAP 08/01/2023 14    • BUN 08/01/2023 26 (H)    • Creatinine 08/01/2023 1.06    • Glucose 08/01/2023 180 (H)    • Calcium 08/01/2023 7.4 (L)    • AST 08/01/2023 25    • ALT 08/01/2023 71 (H)    • Alkaline Phosphatase 08/01/2023 54    • Total Protein 08/01/2023 5.7 (L)    • Albumin 08/01/2023 3.6    • Total Bilirubin 08/01/2023 1.22 (H)    • eGFR 08/01/2023 62    • Protime 08/01/2023 17.4 (H)    • INR 08/01/2023 1.40 (H)    • PTT 08/01/2023 28    • TSH 3RD GENERATON 08/01/2023 4.064    • Magnesium 08/01/2023 0.8 (LL)    • LACTIC ACID 08/01/2023 2.9 (HH)    • hs TnI 0hr 08/01/2023 23    • SARS-CoV-2 08/01/2023 Negative    • INFLUENZA A PCR 08/01/2023 Negative    • INFLUENZA B PCR 08/01/2023 Negative    • RSV PCR 08/01/2023 Negative    • Color, UA 08/01/2023 Yellow    • Clarity, UA 08/01/2023 Clear    • Specific Gravity, UA 08/01/2023 1.010    • pH, UA 08/01/2023 5.0    • Leukocytes, UA 08/01/2023 Negative    • Nitrite, UA 08/01/2023 Negative    • Protein, UA 08/01/2023 Negative    • Glucose, UA 08/01/2023 100 (1/10%) (A)    • Ketones, UA 08/01/2023 Negative    • Urobilinogen, UA 08/01/2023 0.2    • Bilirubin, UA 08/01/2023 Negative    • Occult Blood, UA 08/01/2023 Negative    • Procalcitonin 08/01/2023 0.13    • BNP 08/01/2023 262 (H)    • Ventricular Rate 08/01/2023 88    • Atrial Rate 08/01/2023 88    • QRSD Interval 08/01/2023 84    • QT Interval 08/01/2023 342    • QTC Interval 08/01/2023 413    • QRS Axis 08/01/2023 18    • T Wave Axis 08/01/2023 -34    • LACTIC ACID 08/01/2023 1.8    • hs TnI 2hr 08/01/2023 19    • Delta 2hr hsTnI 08/01/2023 -4    • hs TnI 4hr 08/01/2023 18    • Delta 4hr hsTnI 08/01/2023 -5    • TSH 3RD GENERATON 08/01/2023 1.858    • Sodium 08/02/2023 141    • Potassium 08/02/2023 2.8 (L)    • Chloride 08/02/2023 104    • CO2 08/02/2023 27    • ANION GAP 08/02/2023 10    • BUN 08/02/2023 18    • Creatinine 08/02/2023 0.90    • Glucose 08/02/2023 128    • Calcium 08/02/2023 7.0 (L)    • Corrected Calcium 08/02/2023 7.8 (L)    • AST 08/02/2023 26    • ALT 08/02/2023 60 (H)    • Alkaline Phosphatase 08/02/2023 47    • Total Protein 08/02/2023 5.0 (L)    • Albumin 08/02/2023 3.0 (L)    • Total Bilirubin 08/02/2023 0.89    • eGFR 08/02/2023 76    • Magnesium 08/02/2023 1.7 (L)    • Phosphorus 08/02/2023 2.2 (L)    • WBC 08/02/2023 4.88    • RBC 08/02/2023 2.97 (L)    • Hemoglobin 08/02/2023 8.7 (L)    • Hematocrit 08/02/2023 27.1 (L)    • MCV 08/02/2023 91    • MCH 08/02/2023 29.3    • MCHC 08/02/2023 32.1    • RDW 08/02/2023 23.3 (H)    • MPV 08/02/2023 10.4    • Platelets 95/49/2833 113 (L)    • nRBC 08/02/2023 1    • Neutrophils Relative 08/02/2023 62 • Immat GRANS % 08/02/2023 1    • Lymphocytes Relative 08/02/2023 31    • Monocytes Relative 08/02/2023 6    • Eosinophils Relative 08/02/2023 0    • Basophils Relative 08/02/2023 0    • Neutrophils Absolute 08/02/2023 2.97    • Immature Grans Absolute 08/02/2023 0.05    • Lymphocytes Absolute 08/02/2023 1.53    • Monocytes Absolute 08/02/2023 0.30    • Eosinophils Absolute 08/02/2023 0.02    • Basophils Absolute 08/02/2023 0.01    • Protime 08/02/2023 19.8 (H)    • INR 08/02/2023 1.66 (H)    • Hemoglobin A1C 08/02/2023 10.6 (H)    • EAG 08/02/2023 258    • POC Glucose 08/02/2023 474 (H)    • POC Glucose 08/02/2023 450 (H)    • POC Glucose 08/02/2023 274 (H)    • WBC 08/03/2023 4.36    • RBC 08/03/2023 2.64 (L)    • Hemoglobin 08/03/2023 7.8 (L)    • Hematocrit 08/03/2023 24.0 (L)    • MCV 08/03/2023 91    • MCH 08/03/2023 29.5    • MCHC 08/03/2023 32.5    • RDW 08/03/2023 23.4 (H)    • MPV 08/03/2023 9.8    • Platelets 25/30/2438 93 (L)    • nRBC 08/03/2023 1    • Neutrophils Relative 08/03/2023 65    • Immat GRANS % 08/03/2023 1    • Lymphocytes Relative 08/03/2023 28    • Monocytes Relative 08/03/2023 6    • Eosinophils Relative 08/03/2023 0    • Basophils Relative 08/03/2023 0    • Neutrophils Absolute 08/03/2023 2.84    • Immature Grans Absolute 08/03/2023 0.05    • Lymphocytes Absolute 08/03/2023 1.20    • Monocytes Absolute 08/03/2023 0.25    • Eosinophils Absolute 08/03/2023 0.01    • Basophils Absolute 08/03/2023 0.01    • Sodium 08/03/2023 139    • Potassium 08/03/2023 3.2 (L)    • Chloride 08/03/2023 107    • CO2 08/03/2023 24    • ANION GAP 08/03/2023 8    • BUN 08/03/2023 18    • Creatinine 08/03/2023 0.75    • Glucose 08/03/2023 78    • Calcium 08/03/2023 6.7 (L)    • Corrected Calcium 08/03/2023 7.7 (L)    • AST 08/03/2023 20    • ALT 08/03/2023 50    • Alkaline Phosphatase 08/03/2023 42    • Total Protein 08/03/2023 4.6 (L)    • Albumin 08/03/2023 2.7 (L)    • Total Bilirubin 08/03/2023 0.72    • eGFR 08/03/2023 82    • Magnesium 08/03/2023 1.8 (L)    • Phosphorus 08/03/2023 2.0 (L)    • Protime 08/03/2023 23.0 (H)    • INR 08/03/2023 2.01 (H)    • POC Glucose 08/03/2023 100    • Hemoglobin 08/03/2023 8.5 (L)    • Hematocrit 08/03/2023 26.5 (L)    • POC Glucose 08/03/2023 207 (H)    Orders Only on 07/25/2023   Component Date Value   • Protime 08/10/2023 15.5 (H)    • INR 08/10/2023 1.21 (H)    Appointment on 07/25/2023   Component Date Value   • Protime 07/25/2023 22.8 (H)    • INR 07/25/2023 1.98 (H)    Appointment on 07/17/2023   Component Date Value   • Protime 07/17/2023 26.0 (H)    • INR 07/17/2023 2.35 (H)    Orders Only on 06/26/2023   Component Date Value   • Protime 07/11/2023 52.5 (H)    • INR 07/11/2023 5.82 (HH)    Appointment on 06/26/2023   Component Date Value   • Protime 06/26/2023 29.4 (H)    • INR 06/26/2023 2.75 (H)    • PSA, Diagnostic 06/26/2023 0.02      VAS lower limb venous duplex study, complete bilateral    Result Date: 8/15/2023  Narrative:  [facility] REPORT [facility]INICAL: Indications: Patient presents with bilateral lower extremity edema x 2 days. Operative History: No cardiovascular surgeries noted. Risk Factors The patient has history of HTN, Diabetes, CHF, A-Fib, Hyperlipidemia and DVT. Patient is on Coumadin. FINDINGS:  Right           Impression                           GSV Prox Thigh  Occlusive Segmental                  CFV             Occlusive Segmental                  GSV Mid Thigh   E1. Non Occlusive Thrombus (Chronic)  GSV Dist Thigh  E1. Non Occlusive Thrombus (Chronic)  FV Prox         Occlusive Segmental                  FV Mid          Occlusive Segmental                  Popliteal       Occlusive Segmental                   Left            Impression           Calf Veins      Occlusive Segmental  Gastrocnemius   Occlusive Segmental     CONCLUSION: Impression: RIGHT LOWER LIMB: Evidence of non-occlusive acute vs subacute deep vein thrombosis noted in the distal common femoral, proximal femoral, and mid/distal femoral veins. Evidence of acute vs subacute superficial thrombophlebitis noted in the proximal greater saphenous vein, and non-occlusive chronic superficial thrombophlebitis noted in the mid/distal thigh greater saphenous vein. Doppler evaluation shows a normal response to augmentation maneuvers. . Popliteal, posterior tibial and anterior tibial arterial Doppler waveforms are triphasic. LEFT LOWER LIMB: Evidence of non-occlusive acute vs subacute deep vein thrombosis noted in a gastrocnemius vein of the proximal calf and a soleal vein of the mid calf. No evidence of superficial thrombophlebitis noted. Doppler evaluation shows a normal response to augmentation maneuvers. Popliteal, posterior tibial and anterior tibial arterial Doppler waveforms are triphasic. SIGNATURE: Electronically Signed by: Sunshine Hdz MD [facility] on 2023-08-15 11:20:21 AM    Echo complete w/ contrast if indicated    Result Date: 8/14/2023  Narrative: •  Left Ventricle: Left ventricular cavity size is normal. Wall thickness is normal. The left ventricular ejection fraction is 60%. Systolic function is normal. Wall motion is normal. •  Left Atrium: The atrium is moderately dilated. •  Right Atrium: The atrium is moderately dilated. •  Aortic Valve: There is mild regurgitation. •  Mitral Valve: There is mild to moderate regurgitation. •  Tricuspid Valve: There is mild regurgitation. XR chest pa & lateral    Result Date: 8/14/2023  Narrative: CHEST INDICATION:   c/f chf overload. COMPARISON: 08/01/2023 EXAM PERFORMED/VIEWS:  XR CHEST PA & LATERAL  The frontal view was performed utilizing dual energy radiographic technique. Images: 5 FINDINGS: Cardiomediastinal silhouette appears unremarkable. The lungs are clear. No pneumothorax or pleural effusion. Osseous structures appear within normal limits for patient age. Impression: No acute cardiopulmonary disease.  Workstation performed: [id number]     XR chest 1 view portable    Result Date: 8/1/2023  Narrative: CHEST INDICATION:   weakness. COMPARISON: 5/8/2023 EXAM PERFORMED/VIEWS:  XR CHEST PORTABLE FINDINGS: Cardiomediastinal silhouette appears unremarkable. The lungs are clear. No pneumothorax or pleural effusion. Osseous structures appear within normal limits for patient age. Impression: No acute cardiopulmonary disease. Workstation performed: [id number]     CT chest abdomen pelvis w contrast    Result Date: 8/1/2023  Narrative: CT CHEST, ABDOMEN AND PELVIS WITH IV CONTRAST INDICATION:   Sepsis weakness, elevated lactic acid, diarrhea, prior pneumonia. COMPARISON: CT chest 5/12/2023 TECHNIQUE: CT examination of the chest, abdomen and pelvis was performed. In addition to portal venous phase postcontrast scanning through the abdomen and pelvis, delayed phase postcontrast scanning was performed through the upper abdominal viscera. Multiplanar 2D reformatted images were created from the source data. This examination, like all CT scans performed in the [facility], was performed utilizing techniques to minimize radiation dose exposure, including the use of iterative reconstruction and automated exposure control. Radiation dose length product (DLP) for this visit:  1154.82 mGy-cm IV Contrast:  100 mL of iohexol (OMNIPAQUE) Enteric Contrast: Enteric contrast was administered. FINDINGS: CHEST LUNGS: Bibasilar subsegmental atelectasis. There is no tracheal or endobronchial lesion. Scattered calcified granuloma. 4 mm nodule along the right major fissure in the right lower lobe (4/67), likely representing intrapulmonary lymph node. PLEURA:  Unremarkable. HEART/GREAT VESSELS: Cardiomegaly with left atrial enlargement. Coronary artery calcifications. .  No thoracic aortic aneurysm. MEDIASTINUM AND LEA:  Unremarkable. CHEST WALL AND LOWER NECK:  Unremarkable. ABDOMEN LIVER/BILIARY TREE:  Unremarkable. A few simple hepatic cysts. GALLBLADDER:  No calcified gallstones. No pericholecystic inflammatory change. SPLEEN:  Unremarkable. PANCREAS:  Unremarkable. ADRENAL GLANDS:  Unremarkable. KIDNEYS/URETERS: Punctate right renal upper pole nonobstructing calculus. A small right renal cortical and a few bilateral parapelvic cysts. Bilateral subcentimeter low-attenuation lesions, too small to characterize. No hydronephrosis. STOMACH AND BOWEL: A few secretions are seen in the thoracic esophagus. Unremarkable. APPENDIX:  No findings to suggest appendicitis. ABDOMINOPELVIC CAVITY:  No ascites. No pneumoperitoneum. No lymphadenopathy. VESSELS:  Unremarkable for patient's age. PELVIS REPRODUCTIVE ORGANS:  Unremarkable for patient's age. URINARY BLADDER: A small right posterior urinary bladder diverticulum. . ABDOMINAL WALL/INGUINAL REGIONS:  Unremarkable. OSSEOUS STRUCTURES:  No acute fracture or destructive osseous lesion. Impression: No acute intra-abdominal process Cardiomegaly with left atrial enlargement Secretions in the thoracic esophagus, representing gastroesophageal reflux. This puts the patient at increased risk for aspiration pneumonitis. Punctate right upper renal pole nonobstructing calculus. The study was marked in [facility] for immediate notification. Workstation performed: [id number]         Assessment:       1. Acute on chronic diastolic congestive heart failure (HCC)  torsemide (DEMADEX) 100 mg tablet    potassium chloride (K-DUR,KLOR-CON) 20 mEq tablet    Basic metabolic panel      2. Pancytopenia ([address])        3. Persistent atrial fibrillation (HCC)             Plan:       Marked edema, weight unchanged since discharge, recent admission for heart failure exacerbation, will adjust diuretics without allergies we will switch to torsemide 50 mg daily every morning. Discontinue Lasix.   Asked he and his wife present with him today to call me if he loses 8 pounds prior to his next visit with me in 1 month's time as I will likely then lower his diuretic therapy, he understands. I did add potassium daily to his regimen. They watch sodium and salt aggressively. Is not having any angina I do not think any ischemic testing is warranted, troponin spill consistent with heart failure and significant anemia. Echo proves stable LV function and wall motion. Urged him to get the blood work done ordered on discharge prior to their visit with you. I ordered a follow-up BMP in 1 week time with home care. I will see him back in a month, asked him to call sooner with problems.

## 2023-08-25 ENCOUNTER — OFFICE VISIT (OUTPATIENT)
Dept: FAMILY MEDICINE CLINIC | Facility: CLINIC | Age: 88
End: 2023-08-25
Payer: COMMERCIAL

## 2023-08-25 ENCOUNTER — ANTICOAG VISIT (OUTPATIENT)
Dept: CARDIOLOGY CLINIC | Facility: CLINIC | Age: 88
End: 2023-08-25
Payer: COMMERCIAL

## 2023-08-25 VITALS
DIASTOLIC BLOOD PRESSURE: 68 MMHG | HEIGHT: 72 IN | SYSTOLIC BLOOD PRESSURE: 124 MMHG | RESPIRATION RATE: 16 BRPM | BODY MASS INDEX: 28.04 KG/M2 | WEIGHT: 207 LBS | HEART RATE: 64 BPM | TEMPERATURE: 97.5 F

## 2023-08-25 DIAGNOSIS — E11.9 TYPE 2 DIABETES MELLITUS WITHOUT COMPLICATION, WITHOUT LONG-TERM CURRENT USE OF INSULIN (HCC): ICD-10-CM

## 2023-08-25 DIAGNOSIS — I87.2 CHRONIC VENOUS INSUFFICIENCY: ICD-10-CM

## 2023-08-25 DIAGNOSIS — Z85.46 HISTORY OF PROSTATE CANCER: ICD-10-CM

## 2023-08-25 DIAGNOSIS — I48.19 PERSISTENT ATRIAL FIBRILLATION (HCC): Primary | ICD-10-CM

## 2023-08-25 DIAGNOSIS — I48.19 PERSISTENT ATRIAL FIBRILLATION (HCC): ICD-10-CM

## 2023-08-25 DIAGNOSIS — I50.33 ACUTE ON CHRONIC DIASTOLIC (CONGESTIVE) HEART FAILURE (HCC): Primary | ICD-10-CM

## 2023-08-25 DIAGNOSIS — Z79.01 LONG TERM (CURRENT) USE OF ANTICOAGULANTS: ICD-10-CM

## 2023-08-25 DIAGNOSIS — D61.818 PANCYTOPENIA (HCC): ICD-10-CM

## 2023-08-25 DIAGNOSIS — R19.7 DIARRHEA, UNSPECIFIED TYPE: ICD-10-CM

## 2023-08-25 DIAGNOSIS — K52.9 GASTROENTERITIS: ICD-10-CM

## 2023-08-25 PROCEDURE — 93793 ANTICOAG MGMT PT WARFARIN: CPT | Performed by: NURSE PRACTITIONER

## 2023-08-25 PROCEDURE — 99495 TRANSJ CARE MGMT MOD F2F 14D: CPT | Performed by: FAMILY MEDICINE

## 2023-08-25 RX ORDER — BLOOD SUGAR DIAGNOSTIC
STRIP MISCELLANEOUS
Qty: 100 EACH | Refills: 3 | Status: SHIPPED | OUTPATIENT
Start: 2023-08-25

## 2023-08-25 RX ORDER — SACCHAROMYCES BOULARDII 250 MG
250 CAPSULE ORAL 2 TIMES DAILY
Qty: 60 CAPSULE | Refills: 3 | Status: SHIPPED | OUTPATIENT
Start: 2023-08-25

## 2023-08-25 RX ORDER — LANCETS 33 GAUGE
EACH MISCELLANEOUS
Qty: 100 EACH | Refills: 3 | Status: SHIPPED | OUTPATIENT
Start: 2023-08-25

## 2023-08-25 RX ORDER — BLOOD-GLUCOSE METER
KIT MISCELLANEOUS
Qty: 1 KIT | Refills: 0 | Status: SHIPPED | OUTPATIENT
Start: 2023-08-25

## 2023-08-25 NOTE — PATIENT INSTRUCTIONS
INR received from lab and reviewed.     Dose per calendar  Recheck INR in one week-instructions given to pt by phone  Meagan Ariza, [address] [place] [address]

## 2023-08-25 NOTE — PROGRESS NOTES
Jeevan Copeland 80 y.o. male   Date:  8/25/2023    TCM Call     Date and time call was made  8/18/2023  1:06 PM    Hospital care reviewed  Records reviewed    Patient was hospitialized at  [facility]    Date of Admission  08/13/23    Date of discharge  08/17/23    Diagnosis  A/C diastolic CHF    Disposition  Home; Home health services    Were the patients medications reviewed and updated  Yes  START - folic acid 1 mg qd and Magnesium 400mg qd CHANGE - Lasix to 40mg daily    Current Symptoms  None      TCM Call     Post hospital issues  None    Should patient be enrolled in anticoag monitoring? Yes  managed by cardiology    Scheduled for follow up? Yes  8/25/23 @ 3:45pm    Patients specialists  Cardiologist    Cardiologist name  need a f/u with cardiology    Did you obtain your prescribed medications  Yes    Why were you unable to obtain your medications  none prescribed    Do you need help managing your prescriptions or medications  No    Is transportation to your appointment needed  No    I have advised the patient to call PCP with any new or worsening symptoms  Maranda VIGIL    Living Arrangements  Spouse or Significiant other    Support System  Spouse    The type of support provided  Emotional; Physical    Are you recieving any outpatient services  No    Are you recieving home care services  Yes  [facility]    Types of home care services  Home PT; Nurse visit; Other (comment)    Comment  OT    Are you using any community resources  No    Current waiver services  No    Interperter language line needed  No    Comments  wife had no questions at time of call        Hospital records were reviewed. Medications upon discharge reviewed/updated. Discharge Disposition:    Follow up visits with other specialists:       Assessment and Plan: Patient is an 80-year-old male who originally presented to the hospital August 13 due to difficulty with urinating and developed lower abdominal pain with this.   The next morning he described his legs were swollen up to his thighs. He does wear daily compressive stockings but the swelling was significantly more than his baseline. The abdominal pain resolved and the patient states he was urinating normally. Patient states his bowel movements initially were diarrhea have returned to normal and he is eating a normal diet. The patient just completed a lengthy steroid taper for multifocal pneumonia. The patient was found to be anemic on presentation with pancytopenia    Patient was diuresed well with concerns over the hemoglobin dropping as he did require 1 unit of packed red blood cells while in the hospital.  Folic acid levels are found to be low and were repleted no occult blood was found in his stools. Will refer to hematology for further evaluation    The patient saw cardiology yesterday and Lasix was changed to [facility] he is diuresing well    Acute on chronic diastolic congestive heart failure echocardiogram revealed an ejection fraction of 60%    Patient has type 2 diabetes hemoglobin A1c August 2 was at 10.6 however the patient at the time was on prednisone taper    History of prostate cancer doing well status post radiation    Persistent atrial fibrillation anticoagulated with Coumadin      With his recent bout of gastroenteritis we will provide a probiotic to take twice daily    Lu Campuzano was seen today for transition of care management. Diagnoses and all orders for this visit:    Acute on chronic diastolic (congestive) heart failure (HCC)  -     Basic metabolic panel; Future    Persistent atrial fibrillation (HCC)    Diarrhea, unspecified type    Chronic venous insufficiency    Type 2 diabetes mellitus without complication, without long-term current use of insulin (HCC)  -     Basic metabolic panel; Future  -     Blood Glucose Monitoring Suppl (OneTouch Verio Reflect) w/Device KIT; Check blood sugars once daily.  Please substitute with appropriate alternative as covered by patient's insurance. Dx: E11.65  -     glucose blood (OneTouch Verio) test strip; Check blood sugars once daily. Please substitute with appropriate alternative as covered by patient's insurance. Dx: E11.65  -     OneTouch Delica Lancets 34Y MISC; Check blood sugars once daily. Please substitute with appropriate alternative as covered by patient's insurance. Dx: E11.65    History of prostate cancer    Pancytopenia ([address])  -     Ambulatory Referral to Hematology / Oncology; Future  -     Iron Panel (Includes Ferritin, Iron Sat%, Iron, and TIBC); Future  -     Retic Count; Future    Gastroenteritis  -     saccharomyces boulardii (FLORASTOR) 250 mg capsule; Take 1 capsule (250 mg total) by mouth 2 (two) times a day            HPI:  Patient presents for transition of care management was hospitalized at the [address] [place] [address] from August 13, 2023 through August 17, 2023 diagnosed with acute on chronic congestive heart failure        ROS: Review of Systems   Constitutional: Positive for fatigue. Negative for chills and fever. HENT: Negative for ear pain and sore throat. Eyes: Negative for pain and visual disturbance. Respiratory: Negative for cough and shortness of breath. Cardiovascular: Positive for leg swelling. Negative for chest pain and palpitations. Gastrointestinal: Negative for abdominal pain and vomiting. Genitourinary: Negative for dysuria and hematuria. Musculoskeletal: Negative for arthralgias and back pain. Skin: Negative for color change and rash. Neurological: Negative for seizures and syncope. All other systems reviewed and are negative.       Past Medical History:   Diagnosis Date   • Anxiety    • Atrial fibrillation (HCC)    • Atrial flutter (HCC)    • Congestive heart failure (CHF) (HCC)    • COPD (chronic obstructive pulmonary disease) (HCC)    • Coronary artery disease    • DVT (deep venous thrombosis) (HCC)    • ED (erectile dysfunction)    • Hearing loss    • History of echocardiogram 04/05/2018    EF 0.55, Mild LVH. Mild moderate mitral regurg. Trace aortic regurg. • Hx of radiation therapy     XRT   • Hypercholesterolemia    • Hyperlipidemia    • Hypertension    • Long term (current) use of anticoagulants 8/21/2018   • Neuropathy of both feet    • Peripheral neuropathy    • Prostate cancer ([address] [facility])    • Type 2 diabetes mellitus [facility])        Past Surgical History:   Procedure Laterality Date   • CARDIAC CATHETERIZATION  01/15/1988    Left main: unobstructed. Posterolateral branch 90% narrowed. • COLONOSCOPY  10/05/2017    Dr. Ivonne Coronel   • PROSTATE SURGERY         Social History     Socioeconomic History   • Marital status: /Civil Union     Spouse name: None   • Number of children: None   • Years of education: None   • Highest education level: None   Occupational History   • Occupation: Retired-"[employer]   Tobacco Use   • Smoking status: Never   • Smokeless tobacco: Never   Vaping Use   • Vaping Use: Never used   Substance and Sexual Activity   • Alcohol use: Not Currently   • Drug use: Never   • Sexual activity: None   Other Topics Concern   • None   Social History Narrative   • None     Social Determinants of Health     Financial Resource Strain: Low Risk  (9/14/2022)    Overall Financial Resource Strain (CARDIA)    • Difficulty of Paying Living Expenses: Not very hard   Food Insecurity: No Food Insecurity (8/14/2023)    Hunger Vital Sign    • Worried About Running Out of Food in the Last Year: Never true    • Ran Out of Food in the Last Year: Never true   Transportation Needs: No Transportation Needs (8/14/2023)    PRAPARE - Transportation    • Lack of Transportation (Medical): No    • Lack of Transportation (Non-Medical):  No   Physical Activity: Not on file   Stress: Not on file   Social Connections: Not on file   Intimate Partner Violence: Not on file   Housing Stability: Low Risk  (8/14/2023)    Housing Stability Vital Sign    • Unable to Pay for Housing in the Last Year: No    • Number of Places Lived in the Last Year: 1    • Unstable Housing in the Last Year: No       Family History   Problem Relation Age of Onset   • Cancer Brother         bladder   • Colon cancer Brother    • Heart disease Other    • Hypertension Other    • Cancer Other         bladder   • Colon cancer Other        No Known Allergies      Current Outpatient Medications:   •  Blood Glucose Monitoring Suppl (OneTouch Verio Reflect) w/Device KIT, Check blood sugars once daily. Please substitute with appropriate alternative as covered by patient's insurance. Dx: E11.65, Disp: 1 kit, Rfl: 0  •  glucose blood (OneTouch Verio) test strip, Check blood sugars once daily. Please substitute with appropriate alternative as covered by patient's insurance. Dx: E11.65, Disp: 100 each, Rfl: 3  •  OneTouch Delica Lancets 30A MISC, Check blood sugars once daily. Please substitute with appropriate alternative as covered by patient's insurance. Dx: E11.65, Disp: 100 each, Rfl: 3  •  saccharomyces boulardii (FLORASTOR) 250 mg capsule, Take 1 capsule (250 mg total) by mouth 2 (two) times a day, Disp: 60 capsule, Rfl: 3  •  atorvastatin (LIPITOR) 40 mg tablet, TAKE 1 TABLET BY MOUTH DAILY, Disp: 90 tablet, Rfl: 3  •  busPIRone (BUSPAR) 7.5 mg tablet, Take 1 tablet (7.5 mg total) by mouth 2 (two) times a day, Disp: 180 tablet, Rfl: 3  •  econazole nitrate 1 % cream, , Disp: , Rfl:   •  folic acid (FOLVITE) 1 mg tablet, Take 1 tablet (1 mg total) by mouth daily Do not start before August 18, 2023., Disp: 30 tablet, Rfl: 1  •  glimepiride (AMARYL) 1 mg tablet, Take 1 tablet (1 mg total) by mouth daily with breakfast, Disp: 90 tablet, Rfl: 3  •  ipratropium (ATROVENT) 0.03 % nasal spray, 2 sprays into each nostril 3 (three) times a day, Disp: 30 mL, Rfl: 5  •  loperamide (IMODIUM A-D) 2 MG tablet, Take 2 mg by mouth 3 (three) times a day as needed for diarrhea.  Indications: Diarrhea, Disp: , Rfl:   •  Magnesium Oxide 420 MG TABS, Take 1 tablet (420 mg total) by mouth 2 (two) times a day, Disp: 60 tablet, Rfl: 0  •  metoprolol succinate (Toprol XL) 25 mg 24 hr tablet, Take 1 tablet (25 mg total) by mouth daily, Disp: 90 tablet, Rfl: 3  •  OneTouch Delica Lancets 20C MISC, USE TO TEST ONCE DAILY, Disp: 100 each, Rfl: 5  •  OneTouch Ultra test strip, TEST ONCE DAILY, Disp: 100 each, Rfl: 1  •  potassium chloride (K-DUR,KLOR-CON) 20 mEq tablet, Take 1 tablet (20 mEq total) by mouth daily, Disp: 30 tablet, Rfl: 3  •  Pyridoxine HCl (vitamin B-6) 50 MG TABS, Take 1 tablet (50 mg total) by mouth daily, Disp: 90 tablet, Rfl: 1  •  torsemide (DEMADEX) 100 mg tablet, Take 0.5 tablets (50 mg total) by mouth daily, Disp: 30 tablet, Rfl: 3  •  warfarin (COUMADIN) 4 mg tablet, TAKE 2 TABLETS BY MOUTH DAILY OR AS DIRECTED (Patient taking differently: No sig reported), Disp: 180 tablet, Rfl: 5      Physical Exam:  /68   Pulse 64   Temp 97.5 °F (36.4 °C)   Resp 16   Ht 6' (1.829 m)   Wt 93.9 kg (207 lb)   BMI 28.07 kg/m²     Physical Exam  Constitutional:       Appearance: Normal appearance. He is well-developed. HENT:      Head: Normocephalic and atraumatic. Right Ear: Tympanic membrane, ear canal and external ear normal.      Left Ear: Tympanic membrane, ear canal and external ear normal.      Nose: Nose normal.      Mouth/Throat:      Mouth: Mucous membranes are moist.      Pharynx: Oropharynx is clear. Eyes:      Extraocular Movements: Extraocular movements intact. Conjunctiva/sclera: Conjunctivae normal.      Pupils: Pupils are equal, round, and reactive to light. Cardiovascular:      Rate and Rhythm: Normal rate and regular rhythm. Pulses: Normal pulses. Heart sounds: Normal heart sounds. Pulmonary:      Effort: Pulmonary effort is normal.      Breath sounds: Normal breath sounds. Abdominal:      General: Abdomen is flat. Bowel sounds are normal.      Palpations: Abdomen is soft. Tenderness:  There is no abdominal tenderness. Musculoskeletal:         General: Normal range of motion. Cervical back: Normal range of motion and neck supple. Skin:     General: Skin is warm and dry. Capillary Refill: Capillary refill takes less than 2 seconds. Neurological:      General: No focal deficit present. Mental Status: He is alert and oriented to person, place, and time. Psychiatric:         Mood and Affect: Mood normal.         Behavior: Behavior normal.         Thought Content:  Thought content normal.         Judgment: Judgment normal.             Labs:  Lab Results   Component Value Date    WBC 4.34 08/24/2023    HGB 8.9 (L) 08/24/2023    HCT 29.2 (L) 08/24/2023     (H) 08/24/2023     08/24/2023     Lab Results   Component Value Date     09/23/2015    K 3.5 08/24/2023     08/24/2023    CO2 25 08/24/2023    ANIONGAP 9 09/23/2015    BUN 18 08/24/2023    CREATININE 1.07 08/24/2023    GLUCOSE 133 09/23/2015    GLUF 159 (H) 07/11/2023    CALCIUM 8.5 08/24/2023    CORRECTEDCA 8.8 08/17/2023    AST 14 08/17/2023    ALT 18 08/17/2023    ALKPHOS 52 08/17/2023    PROT 7.1 09/23/2015    BILITOT 0.85 09/23/2015    EGFR 61 08/24/2023

## 2023-08-27 ENCOUNTER — HOME CARE VISIT (OUTPATIENT)
Dept: HOME HEALTH SERVICES | Facility: HOME HEALTHCARE | Age: 88
End: 2023-08-27
Payer: COMMERCIAL

## 2023-08-28 ENCOUNTER — HOME CARE VISIT (OUTPATIENT)
Dept: HOME HEALTH SERVICES | Facility: HOME HEALTHCARE | Age: 88
End: 2023-08-28
Payer: COMMERCIAL

## 2023-08-28 VITALS — DIASTOLIC BLOOD PRESSURE: 50 MMHG | OXYGEN SATURATION: 99 % | HEART RATE: 80 BPM | SYSTOLIC BLOOD PRESSURE: 110 MMHG

## 2023-08-28 DIAGNOSIS — S51.809A: Primary | ICD-10-CM

## 2023-08-28 PROCEDURE — G0151 HHCP-SERV OF PT,EA 15 MIN: HCPCS

## 2023-08-28 NOTE — CASE COMMUNICATION
Patient reports upon taking food out of microwave- door bounced back at hit L arm- picture in media, bleeding on bandage. Refer to Media for picture. Please update on any instructions. BLE increased swelling this date; pt admitted to eating hotdogs & baked beans yesterday.      Thank Yoan Ortega PT

## 2023-08-28 NOTE — CASE COMMUNICATION
----- Message -----  From: Elicia Grimm DO  Sent: 8/28/2023  12:11 PM EDT  To: Maci Riggins PT      Place Neosporin to wound twice daily  ----- Message -----  From: Maci Riggins PT  Sent: 8/28/2023  10:33 AM EDT  To: Blossom Ribeiro RN; Elicia Grimm DO    Patient reports upon taking food out of microwave- door bounced back at hit L arm- picture in media, bleeding on bandage. Refer to Media for picture. Please update on  any instructions. BLE increased swelling this date; pt admitted to eating hotdogs & baked beans yesterday.      Thank Calista Mckeon PT

## 2023-08-29 ENCOUNTER — HOME CARE VISIT (OUTPATIENT)
Dept: HOME HEALTH SERVICES | Facility: HOME HEALTHCARE | Age: 88
End: 2023-08-29
Payer: COMMERCIAL

## 2023-08-29 ENCOUNTER — LAB REQUISITION (OUTPATIENT)
Dept: LAB | Facility: HOSPITAL | Age: 88
End: 2023-08-29
Payer: COMMERCIAL

## 2023-08-29 VITALS
OXYGEN SATURATION: 97 % | DIASTOLIC BLOOD PRESSURE: 64 MMHG | SYSTOLIC BLOOD PRESSURE: 102 MMHG | RESPIRATION RATE: 18 BRPM | HEART RATE: 96 BPM | TEMPERATURE: 97.7 F

## 2023-08-29 DIAGNOSIS — I50.33 ACUTE ON CHRONIC DIASTOLIC (CONGESTIVE) HEART FAILURE (HCC): ICD-10-CM

## 2023-08-29 DIAGNOSIS — E11.9 TYPE 2 DIABETES MELLITUS WITHOUT COMPLICATIONS (HCC): ICD-10-CM

## 2023-08-29 LAB
ANION GAP SERPL CALCULATED.3IONS-SCNC: 12 MMOL/L
BUN SERPL-MCNC: 23 MG/DL (ref 5–25)
CALCIUM SERPL-MCNC: 8.4 MG/DL (ref 8.4–10.2)
CHLORIDE SERPL-SCNC: 104 MMOL/L (ref 96–108)
CO2 SERPL-SCNC: 25 MMOL/L (ref 21–32)
CREAT SERPL-MCNC: 1.27 MG/DL (ref 0.6–1.3)
FERRITIN SERPL-MCNC: 717 NG/ML (ref 24–336)
GFR SERPL CREATININE-BSD FRML MDRD: 50 ML/MIN/1.73SQ M
GLUCOSE SERPL-MCNC: 122 MG/DL (ref 65–140)
IRON SATN MFR SERPL: 27 % (ref 15–50)
IRON SERPL-MCNC: 64 UG/DL (ref 50–212)
POTASSIUM SERPL-SCNC: 3.5 MMOL/L (ref 3.5–5.3)
RETICS # AUTO: ABNORMAL 10*3/UL (ref 14356–105094)
RETICS # CALC: 3.26 % (ref 0.37–1.87)
SODIUM SERPL-SCNC: 141 MMOL/L (ref 135–147)
TIBC SERPL-MCNC: 239 UG/DL (ref 250–450)
UIBC SERPL-MCNC: 175 UG/DL (ref 155–355)

## 2023-08-29 PROCEDURE — 83550 IRON BINDING TEST: CPT | Performed by: FAMILY MEDICINE

## 2023-08-29 PROCEDURE — 82728 ASSAY OF FERRITIN: CPT | Performed by: FAMILY MEDICINE

## 2023-08-29 PROCEDURE — 80048 BASIC METABOLIC PNL TOTAL CA: CPT | Performed by: FAMILY MEDICINE

## 2023-08-29 PROCEDURE — 83540 ASSAY OF IRON: CPT | Performed by: FAMILY MEDICINE

## 2023-08-29 PROCEDURE — 85045 AUTOMATED RETICULOCYTE COUNT: CPT | Performed by: FAMILY MEDICINE

## 2023-08-29 PROCEDURE — G0299 HHS/HOSPICE OF RN EA 15 MIN: HCPCS

## 2023-08-30 ENCOUNTER — OFFICE VISIT (OUTPATIENT)
Dept: PULMONOLOGY | Facility: CLINIC | Age: 88
End: 2023-08-30
Payer: COMMERCIAL

## 2023-08-30 VITALS
OXYGEN SATURATION: 94 % | DIASTOLIC BLOOD PRESSURE: 71 MMHG | HEART RATE: 92 BPM | WEIGHT: 200 LBS | BODY MASS INDEX: 27.09 KG/M2 | HEIGHT: 72 IN | TEMPERATURE: 97.6 F | SYSTOLIC BLOOD PRESSURE: 105 MMHG

## 2023-08-30 DIAGNOSIS — J84.116 CRYPTOGENIC ORGANIZING PNEUMONIA (HCC): Primary | ICD-10-CM

## 2023-08-30 PROCEDURE — 99214 OFFICE O/P EST MOD 30 MIN: CPT | Performed by: INTERNAL MEDICINE

## 2023-08-30 NOTE — PROGRESS NOTES
Pulmonary Follow Up Note   Rodolfo Copeland 80 y.o. male MRN: [id number]  8/30/2023    Assessment:    Cryptogenic organizing pneumonia [facility])  There is no radiographic evidence of recurrence. He is off prednisone. Will perform cautious observation. No scheduled imaging required. Plan:    Diagnoses and all orders for this visit:    Cryptogenic organizing pneumonia ([address] [facility])   - Discontinue prednisone   - Discontinue bactrim    No follow-ups on file. History of Present Illness   HPI:  Era Rosario is a 80 y.o. male who presents for follow up. No current concerns. No shortness of breath, cough, or flu-like symptoms. He was hospitalized recently and underwent CT C/A/P with no evidence of recurrence. He is aware that this disease may recur and was counseled to avoid precipitating things like infections and exposure to sick individuals. Review of Systems   Constitutional: Negative for fatigue and fever. Respiratory: Negative for cough and shortness of breath. All other systems reviewed and are negative. Historical Information   Past Medical History:   Diagnosis Date   • Anxiety    • Atrial fibrillation (HCC)    • Atrial flutter (HCC)    • Congestive heart failure (CHF) (HCC)    • COPD (chronic obstructive pulmonary disease) (HCC)    • Coronary artery disease    • DVT (deep venous thrombosis) ([facility])    • ED (erectile dysfunction)    • Hearing loss    • History of echocardiogram 04/05/2018    EF 0.55, Mild LVH. Mild moderate mitral regurg. Trace aortic regurg. • Hx of radiation therapy     XRT   • Hypercholesterolemia    • Hyperlipidemia    • Hypertension    • Long term (current) use of anticoagulants 8/21/2018   • Neuropathy of both feet    • Peripheral neuropathy    • Prostate cancer ([address] [facility])    • Type 2 diabetes mellitus [facility])      Past Surgical History:   Procedure Laterality Date   • CARDIAC CATHETERIZATION  01/15/1988    Left main: unobstructed. Posterolateral branch 90% narrowed.    • COLONOSCOPY 10/05/2017    Dr. Ruperto Velasco   • PROSTATE SURGERY       Family History   Problem Relation Age of Onset   • Cancer Brother         bladder   • Colon cancer Brother    • Heart disease Other    • Hypertension Other    • Cancer Other         bladder   • Colon cancer Other        Meds/Allergies     Current Outpatient Medications:   •  atorvastatin (LIPITOR) 40 mg tablet, TAKE 1 TABLET BY MOUTH DAILY, Disp: 90 tablet, Rfl: 3  •  Blood Glucose Monitoring Suppl (OneTouch Verio Reflect) w/Device KIT, Check blood sugars once daily. Please substitute with appropriate alternative as covered by patient's insurance. Dx: E11.65, Disp: 1 kit, Rfl: 0  •  busPIRone (BUSPAR) 7.5 mg tablet, Take 1 tablet (7.5 mg total) by mouth 2 (two) times a day, Disp: 180 tablet, Rfl: 3  •  econazole nitrate 1 % cream, , Disp: , Rfl:   •  folic acid (FOLVITE) 1 mg tablet, Take 1 tablet (1 mg total) by mouth daily Do not start before August 18, 2023., Disp: 30 tablet, Rfl: 1  •  glimepiride (AMARYL) 1 mg tablet, Take 1 tablet (1 mg total) by mouth daily with breakfast, Disp: 90 tablet, Rfl: 3  •  glucose blood (OneTouch Verio) test strip, Check blood sugars once daily. Please substitute with appropriate alternative as covered by patient's insurance. Dx: E11.65, Disp: 100 each, Rfl: 3  •  ipratropium (ATROVENT) 0.03 % nasal spray, 2 sprays into each nostril 3 (three) times a day, Disp: 30 mL, Rfl: 5  •  loperamide (IMODIUM A-D) 2 MG tablet, Take 2 mg by mouth 3 (three) times a day as needed for diarrhea.  Indications: Diarrhea, Disp: , Rfl:   •  Magnesium Oxide 420 MG TABS, Take 1 tablet (420 mg total) by mouth 2 (two) times a day, Disp: 60 tablet, Rfl: 0  •  metoprolol succinate (Toprol XL) 25 mg 24 hr tablet, Take 1 tablet (25 mg total) by mouth daily, Disp: 90 tablet, Rfl: 3  •  neomycin-polymyxin-pramoxine (ANTIBIOTIC CREAM PLUS PAIN RELIEF) 1 % cream, Apply topically 2 (two) times a day, Disp: 14 g, Rfl: 0  •  OneTouch Delica Lancets 11I MISC, USE TO TEST ONCE DAILY, Disp: 100 each, Rfl: 5  •  OneTouch Delica Lancets 92Z MISC, Check blood sugars once daily. Please substitute with appropriate alternative as covered by patient's insurance. Dx: E11.65, Disp: 100 each, Rfl: 3  •  OneTouch Ultra test strip, TEST ONCE DAILY, Disp: 100 each, Rfl: 1  •  potassium chloride (K-DUR,KLOR-CON) 20 mEq tablet, Take 1 tablet (20 mEq total) by mouth daily, Disp: 30 tablet, Rfl: 3  •  Pyridoxine HCl (vitamin B-6) 50 MG TABS, Take 1 tablet (50 mg total) by mouth daily, Disp: 90 tablet, Rfl: 1  •  saccharomyces boulardii (FLORASTOR) 250 mg capsule, Take 1 capsule (250 mg total) by mouth 2 (two) times a day, Disp: 60 capsule, Rfl: 3  •  torsemide (DEMADEX) 100 mg tablet, Take 0.5 tablets (50 mg total) by mouth daily, Disp: 30 tablet, Rfl: 3  •  warfarin (COUMADIN) 4 mg tablet, TAKE 2 TABLETS BY MOUTH DAILY OR AS DIRECTED (Patient taking differently: No sig reported), Disp: 180 tablet, Rfl: 5  No Known Allergies    Vitals: Blood pressure 105/71, pulse 92, temperature 97.6 °F (36.4 °C), temperature source Tympanic, height 6' (1.829 m), weight 90.7 kg (200 lb), SpO2 94 %. Body mass index is 27.12 kg/m². Oxygen Therapy  SpO2: 94 %  Oxygen Therapy: None (Room air)    Physical Exam  Physical Exam  Vitals reviewed. Constitutional:       General: He is not in acute distress. Appearance: Normal appearance. He is well-developed. He is not ill-appearing. HENT:      Head: Normocephalic and atraumatic. Eyes:      General: No scleral icterus. Conjunctiva/sclera: Conjunctivae normal.   Neck:      Thyroid: No thyromegaly. Vascular: No JVD. Cardiovascular:      Rate and Rhythm: Normal rate and regular rhythm. Heart sounds: Normal heart sounds. No murmur heard. No friction rub. No gallop. Pulmonary:      Effort: Pulmonary effort is normal. No respiratory distress. Breath sounds: Normal breath sounds. No wheezing or rales.    Musculoskeletal:      Cervical back: Neck supple. Right lower leg: Edema present. Left lower leg: Edema present. Skin:     General: Skin is warm and dry. Findings: No rash. Neurological:      General: No focal deficit present. Mental Status: He is alert and oriented to person, place, and time. Mental status is at baseline. Psychiatric:         Mood and Affect: Mood normal.         Behavior: Behavior normal.     Labs: I have personally reviewed pertinent lab results. Lab Results   Component Value Date    WBC 4.34 08/24/2023    HGB 8.9 (L) 08/24/2023    HCT 29.2 (L) 08/24/2023     (H) 08/24/2023     08/24/2023     Lab Results   Component Value Date    GLUCOSE 133 09/23/2015    CALCIUM 8.4 08/29/2023     09/23/2015    K 3.5 08/29/2023    CO2 25 08/29/2023     08/29/2023    BUN 23 08/29/2023    CREATININE 1.27 08/29/2023     No results found for: "IGE"  Lab Results   Component Value Date    ALT 18 08/17/2023    AST 14 08/17/2023    ALKPHOS 52 08/17/2023    BILITOT 0.85 09/23/2015       Imaging and other studies: I have personally reviewed relevant films in PACS. EKG, Pathology, and Other Studies: I have personally reviewed relevant reports in Epic. Grady Christianson M.D.   [facility]

## 2023-08-30 NOTE — ASSESSMENT & PLAN NOTE
There is no radiographic evidence of recurrence. He is off prednisone. Will perform cautious observation. No scheduled imaging required.

## 2023-08-31 ENCOUNTER — HOME CARE VISIT (OUTPATIENT)
Dept: HOME HEALTH SERVICES | Facility: HOME HEALTHCARE | Age: 88
End: 2023-08-31
Payer: COMMERCIAL

## 2023-08-31 PROCEDURE — G0151 HHCP-SERV OF PT,EA 15 MIN: HCPCS

## 2023-09-01 ENCOUNTER — TELEPHONE (OUTPATIENT)
Dept: FAMILY MEDICINE CLINIC | Facility: CLINIC | Age: 88
End: 2023-09-01

## 2023-09-01 ENCOUNTER — HOME CARE VISIT (OUTPATIENT)
Dept: HOME HEALTH SERVICES | Facility: HOME HEALTHCARE | Age: 88
End: 2023-09-01
Payer: COMMERCIAL

## 2023-09-01 VITALS — OXYGEN SATURATION: 98 % | SYSTOLIC BLOOD PRESSURE: 100 MMHG | HEART RATE: 80 BPM | DIASTOLIC BLOOD PRESSURE: 80 MMHG

## 2023-09-01 DIAGNOSIS — N13.8 BPH WITH OBSTRUCTION/LOWER URINARY TRACT SYMPTOMS: Primary | ICD-10-CM

## 2023-09-01 DIAGNOSIS — N40.1 BPH WITH OBSTRUCTION/LOWER URINARY TRACT SYMPTOMS: Primary | ICD-10-CM

## 2023-09-01 RX ORDER — TAMSULOSIN HYDROCHLORIDE 0.4 MG/1
0.4 CAPSULE ORAL
Qty: 90 CAPSULE | Refills: 3 | Status: SHIPPED | OUTPATIENT
Start: 2023-09-01

## 2023-09-01 NOTE — TELEPHONE ENCOUNTER
Patient has urinary urge and hesitancy - wife was talking to son and said maybe he can be started on Flomax - is [facility] a possibility?

## 2023-09-01 NOTE — CASE COMMUNICATION
requesting 2 more PT visits for wk of 9/3/23 as pt is having difficulty with rolling side to side. decreased BLE strength/balance/ambulation. Pt needing further visits as impaired mobility following 3 hospital stays.    Thank you, Lorena Moss PT

## 2023-09-05 ENCOUNTER — HOME CARE VISIT (OUTPATIENT)
Dept: HOME HEALTH SERVICES | Facility: HOME HEALTHCARE | Age: 88
End: 2023-09-05
Payer: COMMERCIAL

## 2023-09-05 ENCOUNTER — TELEPHONE (OUTPATIENT)
Dept: FAMILY MEDICINE CLINIC | Facility: CLINIC | Age: 88
End: 2023-09-05

## 2023-09-05 ENCOUNTER — ANTICOAG VISIT (OUTPATIENT)
Dept: CARDIOLOGY CLINIC | Facility: CLINIC | Age: 88
End: 2023-09-05
Payer: COMMERCIAL

## 2023-09-05 ENCOUNTER — ANTICOAG VISIT (OUTPATIENT)
Dept: CARDIOLOGY CLINIC | Facility: CLINIC | Age: 88
End: 2023-09-05

## 2023-09-05 VITALS
SYSTOLIC BLOOD PRESSURE: 88 MMHG | DIASTOLIC BLOOD PRESSURE: 46 MMHG | OXYGEN SATURATION: 93 % | RESPIRATION RATE: 18 BRPM | TEMPERATURE: 98 F | HEART RATE: 58 BPM

## 2023-09-05 DIAGNOSIS — I48.19 PERSISTENT ATRIAL FIBRILLATION (HCC): Primary | ICD-10-CM

## 2023-09-05 DIAGNOSIS — Z79.01 LONG TERM (CURRENT) USE OF ANTICOAGULANTS: ICD-10-CM

## 2023-09-05 LAB
INR PPP: 1.3 (ref 0.84–1.19)
INR PPP: 1.3 (ref 0.84–1.19)

## 2023-09-05 PROCEDURE — G0299 HHS/HOSPICE OF RN EA 15 MIN: HCPCS

## 2023-09-05 PROCEDURE — 93793 ANTICOAG MGMT PT WARFARIN: CPT | Performed by: NURSE PRACTITIONER

## 2023-09-05 NOTE — TELEPHONE ENCOUNTER
Visiting Nurse, Cassy Burton, called. Patient's BP 88/46 left arm, 92/48 right arm today. States patient averages 493 systolic for her.      Please advise

## 2023-09-05 NOTE — PATIENT INSTRUCTIONS
INR received from home care nurse  Increase dose to 4 mg daily  Recheck INR in one week  DOE Jimenez

## 2023-09-05 NOTE — TELEPHONE ENCOUNTER
Decrease Toprol to 12.5 mg a day one half of the 25 mg tablet. Put Demadex on hold take it as needed for leg swelling.   Wife stated that his legs are swollen so they will continue the Demadex but but decrease the Toprol to 12 .5 milligrams the wife understood the instructions

## 2023-09-08 ENCOUNTER — HOME CARE VISIT (OUTPATIENT)
Dept: HOME HEALTH SERVICES | Facility: HOME HEALTHCARE | Age: 88
End: 2023-09-08
Payer: COMMERCIAL

## 2023-09-08 VITALS — OXYGEN SATURATION: 100 % | HEART RATE: 68 BPM | DIASTOLIC BLOOD PRESSURE: 50 MMHG | SYSTOLIC BLOOD PRESSURE: 78 MMHG

## 2023-09-08 PROCEDURE — G0151 HHCP-SERV OF PT,EA 15 MIN: HCPCS

## 2023-09-08 NOTE — CASE COMMUNICATION
D/C home PT on 9/8/23. Pt's BP on LUE 78/50; on R 90/60. Taken with pt's cuff on L UE 93/52. Pt denies dizziness. Resting pulse ox 100% and HR 68 per pulse oximeter. After activity, difficulty to get pulse oximeter reading due to cold hands, HR radially is 78. After some time, pulse ox 88-90%  then to 100%. Pt/wife report difficulty in past with getting pulse ox readings. Discussed possible OPT in future. They will no sandra ISLAS if need order.      Thank you, Jared Police Brunilda Brought PT

## 2023-09-13 ENCOUNTER — APPOINTMENT (OUTPATIENT)
Dept: LAB | Facility: MEDICAL CENTER | Age: 88
End: 2023-09-13
Payer: COMMERCIAL

## 2023-09-13 DIAGNOSIS — E11.9 TYPE 2 DIABETES MELLITUS WITHOUT COMPLICATION, WITHOUT LONG-TERM CURRENT USE OF INSULIN (HCC): ICD-10-CM

## 2023-09-13 DIAGNOSIS — D61.818 PANCYTOPENIA (HCC): ICD-10-CM

## 2023-09-13 DIAGNOSIS — I50.33 ACUTE ON CHRONIC DIASTOLIC (CONGESTIVE) HEART FAILURE (HCC): ICD-10-CM

## 2023-09-13 LAB
ANION GAP SERPL CALCULATED.3IONS-SCNC: 10 MMOL/L
BUN SERPL-MCNC: 19 MG/DL (ref 5–25)
CALCIUM SERPL-MCNC: 8.6 MG/DL (ref 8.4–10.2)
CHLORIDE SERPL-SCNC: 101 MMOL/L (ref 96–108)
CO2 SERPL-SCNC: 28 MMOL/L (ref 21–32)
CREAT SERPL-MCNC: 1.21 MG/DL (ref 0.6–1.3)
FERRITIN SERPL-MCNC: 609 NG/ML (ref 24–336)
GFR SERPL CREATININE-BSD FRML MDRD: 53 ML/MIN/1.73SQ M
GLUCOSE SERPL-MCNC: 220 MG/DL (ref 65–140)
IRON SATN MFR SERPL: 15 % (ref 15–50)
IRON SERPL-MCNC: 40 UG/DL (ref 50–212)
POTASSIUM SERPL-SCNC: 4.1 MMOL/L (ref 3.5–5.3)
RETICS # AUTO: ABNORMAL 10*3/UL (ref 14356–105094)
RETICS # CALC: 2.3 % (ref 0.37–1.87)
SODIUM SERPL-SCNC: 139 MMOL/L (ref 135–147)
TIBC SERPL-MCNC: 271 UG/DL (ref 250–450)
UIBC SERPL-MCNC: 231 UG/DL (ref 155–355)

## 2023-09-13 PROCEDURE — 80048 BASIC METABOLIC PNL TOTAL CA: CPT

## 2023-09-13 PROCEDURE — 82728 ASSAY OF FERRITIN: CPT

## 2023-09-13 PROCEDURE — 83540 ASSAY OF IRON: CPT

## 2023-09-13 PROCEDURE — 83550 IRON BINDING TEST: CPT

## 2023-09-13 PROCEDURE — 85045 AUTOMATED RETICULOCYTE COUNT: CPT

## 2023-09-14 ENCOUNTER — ANTICOAG VISIT (OUTPATIENT)
Dept: CARDIOLOGY CLINIC | Facility: CLINIC | Age: 88
End: 2023-09-14
Payer: COMMERCIAL

## 2023-09-14 DIAGNOSIS — I48.19 PERSISTENT ATRIAL FIBRILLATION (HCC): Primary | ICD-10-CM

## 2023-09-14 DIAGNOSIS — Z79.01 LONG TERM (CURRENT) USE OF ANTICOAGULANTS: ICD-10-CM

## 2023-09-14 PROCEDURE — 93793 ANTICOAG MGMT PT WARFARIN: CPT | Performed by: NURSE PRACTITIONER

## 2023-09-14 NOTE — PATIENT INSTRUCTIONS
INR received from lab and reviewed.     Increase dose as per calendar  Recheck INR in one week-results and instructions given to pt's wife by phone  Veronica Yuan, [address] [place] [address]

## 2023-09-15 ENCOUNTER — HOME CARE VISIT (OUTPATIENT)
Dept: HOME HEALTH SERVICES | Facility: HOME HEALTHCARE | Age: 88
End: 2023-09-15
Payer: COMMERCIAL

## 2023-09-18 ENCOUNTER — OFFICE VISIT (OUTPATIENT)
Dept: FAMILY MEDICINE CLINIC | Facility: CLINIC | Age: 88
End: 2023-09-18
Payer: COMMERCIAL

## 2023-09-18 VITALS
WEIGHT: 208.8 LBS | OXYGEN SATURATION: 97 % | HEIGHT: 72 IN | RESPIRATION RATE: 16 BRPM | HEART RATE: 100 BPM | SYSTOLIC BLOOD PRESSURE: 108 MMHG | TEMPERATURE: 97.8 F | BODY MASS INDEX: 28.28 KG/M2 | DIASTOLIC BLOOD PRESSURE: 60 MMHG

## 2023-09-18 DIAGNOSIS — I87.2 CHRONIC VENOUS INSUFFICIENCY: ICD-10-CM

## 2023-09-18 DIAGNOSIS — D50.9 IRON DEFICIENCY ANEMIA, UNSPECIFIED IRON DEFICIENCY ANEMIA TYPE: ICD-10-CM

## 2023-09-18 DIAGNOSIS — Z00.00 MEDICARE ANNUAL WELLNESS VISIT, SUBSEQUENT: Primary | ICD-10-CM

## 2023-09-18 DIAGNOSIS — E11.9 TYPE 2 DIABETES MELLITUS WITHOUT COMPLICATION, WITHOUT LONG-TERM CURRENT USE OF INSULIN (HCC): ICD-10-CM

## 2023-09-18 DIAGNOSIS — I48.19 PERSISTENT ATRIAL FIBRILLATION (HCC): ICD-10-CM

## 2023-09-18 DIAGNOSIS — D61.818 PANCYTOPENIA (HCC): ICD-10-CM

## 2023-09-18 DIAGNOSIS — I50.33 ACUTE ON CHRONIC DIASTOLIC CONGESTIVE HEART FAILURE (HCC): ICD-10-CM

## 2023-09-18 PROCEDURE — G0439 PPPS, SUBSEQ VISIT: HCPCS | Performed by: FAMILY MEDICINE

## 2023-09-18 PROCEDURE — 99214 OFFICE O/P EST MOD 30 MIN: CPT | Performed by: FAMILY MEDICINE

## 2023-09-18 RX ORDER — TORSEMIDE 100 MG/1
50 TABLET ORAL DAILY
Qty: 30 TABLET | Refills: 3 | Status: SHIPPED | OUTPATIENT
Start: 2023-09-18

## 2023-09-18 RX ORDER — POTASSIUM CHLORIDE 20 MEQ/1
20 TABLET, EXTENDED RELEASE ORAL DAILY
Qty: 30 TABLET | Refills: 3 | Status: SHIPPED | OUTPATIENT
Start: 2023-09-18

## 2023-09-18 RX ORDER — QUINIDINE GLUCONATE 324 MG
240 TABLET, EXTENDED RELEASE ORAL
Qty: 180 TABLET | Refills: 1 | Status: SHIPPED | OUTPATIENT
Start: 2023-09-18

## 2023-09-18 RX ORDER — POTASSIUM CHLORIDE 20 MEQ/1
20 TABLET, EXTENDED RELEASE ORAL DAILY
Qty: 30 TABLET | Refills: 3 | Status: SHIPPED | OUTPATIENT
Start: 2023-09-18 | End: 2023-09-18 | Stop reason: SDUPTHER

## 2023-09-18 NOTE — PATIENT INSTRUCTIONS
Medicare Preventive Visit Patient Instructions  Thank you for completing your Welcome to Medicare Visit or Medicare Annual Wellness Visit today. Your next wellness visit will be due in one year (9/18/2024). The screening/preventive services that you may require over the next 5-10 years are detailed below. Some tests may not apply to you based off risk factors and/or age. Screening tests ordered at today's visit but not completed yet may show as past due. Also, please note that scanned in results may not display below. Preventive Screenings:  Service Recommendations Previous Testing/Comments   Colorectal Cancer Screening  · Colonoscopy    · Fecal Occult Blood Test (FOBT)/Fecal Immunochemical Test (FIT)  · Fecal DNA/Cologuard Test  · Flexible Sigmoidoscopy Age: 43-73 years old   Colonoscopy: every 10 years (May be performed more frequently if at higher risk)  OR  FOBT/FIT: every 1 year  OR  Cologuard: every 3 years  OR  Sigmoidoscopy: every 5 years  Screening may be recommended earlier than age 39 if at higher risk for colorectal cancer. Also, an individualized decision between you and your healthcare provider will decide whether screening between the ages of 77-80 would be appropriate.  Colonoscopy: Not on file  FOBT/FIT: 08/14/2023  Cologuard: Not on file  Sigmoidoscopy: Not on file    Screening Not Indicated     Prostate Cancer Screening Individualized decision between patient and health care provider in men between ages of 53-66   Medicare will cover every 12 months beginning on the day after your 50th birthday PSA: 0.02 ng/mL     History Prostate Cancer  Screening Not Indicated     Hepatitis C Screening Once for adults born between 1945 and 1965  More frequently in patients at high risk for Hepatitis C Hep C Antibody: Not on file        Diabetes Screening 1-2 times per year if you're at risk for diabetes or have pre-diabetes Fasting glucose: 159 mg/dL (7/11/2023)  A1C: 10.6 % (8/2/2023)  Screening Not Indicated  History Diabetes   Cholesterol Screening Once every 5 years if you don't have a lipid disorder. May order more often based on risk factors. Lipid panel: 06/19/2023  Screening Not Indicated  History Lipid Disorder      Other Preventive Screenings Covered by Medicare:  1. Abdominal Aortic Aneurysm (AAA) Screening: covered once if your at risk. You're considered to be at risk if you have a family history of AAA or a male between the age of 70-76 who smoking at least 100 cigarettes in your lifetime. 2. Lung Cancer Screening: covers low dose CT scan once per year if you meet all of the following conditions: (1) Age 48-67; (2) No signs or symptoms of lung cancer; (3) Current smoker or have quit smoking within the last 15 years; (4) You have a tobacco smoking history of at least 20 pack years (packs per day x number of years you smoked); (5) You get a written order from a healthcare provider. 3. Glaucoma Screening: covered annually if you're considered high risk: (1) You have diabetes OR (2) Family history of glaucoma OR (3)  aged 48 and older OR (3)  American aged 72 and older  3. Osteoporosis Screening: covered every 2 years if you meet one of the following conditions: (1) Have a vertebral abnormality; (2) On glucocorticoid therapy for more than 3 months; (3) Have primary hyperparathyroidism; (4) On osteoporosis medications and need to assess response to drug therapy. 5. HIV Screening: covered annually if you're between the age of 14-79. Also covered annually if you are younger than 13 and older than 72 with risk factors for HIV infection. For pregnant patients, it is covered up to 3 times per pregnancy.     Immunizations:  Immunization Recommendations   Influenza Vaccine Annual influenza vaccination during flu season is recommended for all persons aged >= 6 months who do not have contraindications   Pneumococcal Vaccine   * Pneumococcal conjugate vaccine = PCV13 (Prevnar 13), PCV15 (Vaxneuvance), PCV20 (Prevnar 20)  * Pneumococcal polysaccharide vaccine = PPSV23 (Pneumovax) Adults 2364 years old: 1-3 doses may be recommended based on certain risk factors  Adults 72 years old: 1-2 doses may be recommended based off what pneumonia vaccine you previously received   Hepatitis B Vaccine 3 dose series if at intermediate or high risk (ex: diabetes, end stage renal disease, liver disease)   Tetanus (Td) Vaccine - COST NOT COVERED BY MEDICARE PART B Following completion of primary series, a booster dose should be given every 10 years to maintain immunity against tetanus. Td may also be given as tetanus wound prophylaxis. Tdap Vaccine - COST NOT COVERED BY MEDICARE PART B Recommended at least once for all adults. For pregnant patients, recommended with each pregnancy. Shingles Vaccine (Shingrix) - COST NOT COVERED BY MEDICARE PART B  2 shot series recommended in those aged 48 and above     Health Maintenance Due:  There are no preventive care reminders to display for this patient. Immunizations Due:      Topic Date Due   • COVID-19 Vaccine (5 - Moderna series) 09/12/2022   • Influenza Vaccine (1) 09/01/2023     Advance Directives   What are advance directives? Advance directives are legal documents that state your wishes and plans for medical care. These plans are made ahead of time in case you lose your ability to make decisions for yourself. Advance directives can apply to any medical decision, such as the treatments you want, and if you want to donate organs. What are the types of advance directives? There are many types of advance directives, and each state has rules about how to use them. You may choose a combination of any of the following:  · Living will: This is a written record of the treatment you want. You can also choose which treatments you do not want, which to limit, and which to stop at a certain time. This includes surgery, medicine, IV fluid, and tube feedings.    · Durable power of  for [facility]): This is a written record that states who you want to make healthcare choices for you when you are unable to make them for yourself. This person, called a proxy, is usually a family member or a friend. You may choose more than 1 proxy. · Do not resuscitate (DNR) order:  A DNR order is used in case your heart stops beating or you stop breathing. It is a request not to have certain forms of treatment, such as CPR. A DNR order may be included in other types of advance directives. · Medical directive: This covers the care that you want if you are in a coma, near death, or unable to make decisions for yourself. You can list the treatments you want for each condition. Treatment may include pain medicine, surgery, blood transfusions, dialysis, IV or tube feedings, and a ventilator (breathing machine). · Values history: This document has questions about your views, beliefs, and how you feel and think about life. This information can help others choose the care that you would choose. Why are advance directives important? An advance directive helps you control your care. Although spoken wishes may be used, it is better to have your wishes written down. Spoken wishes can be misunderstood, or not followed. Treatments may be given even if you do not want them. An advance directive may make it easier for your family to make difficult choices about your care. Weight Management   Why it is important to manage your weight:  Being overweight increases your risk of health conditions such as heart disease, high blood pressure, type 2 diabetes, and certain types of cancer. It can also increase your risk for osteoarthritis, sleep apnea, and other respiratory problems. Aim for a slow, steady weight loss. Even a small amount of weight loss can lower your risk of health problems.   How to lose weight safely:  A safe and healthy way to lose weight is to eat fewer calories and get regular exercise. You can lose up about 1 pound a week by decreasing the number of calories you eat by 500 calories each day. Healthy meal plan for weight management:  A healthy meal plan includes a variety of foods, contains fewer calories, and helps you stay healthy. A healthy meal plan includes the following:  · Eat whole-grain foods more often. A healthy meal plan should contain fiber. Fiber is the part of grains, fruits, and vegetables that is not broken down by your body. Whole-grain foods are healthy and provide extra fiber in your diet. Some examples of whole-grain foods are whole-wheat breads and pastas, oatmeal, brown rice, and bulgur. · Eat a variety of vegetables every day. Include dark, leafy greens such as spinach, kale, sobia greens, and mustard greens. Eat yellow and orange vegetables such as carrots, sweet potatoes, and winter squash. · Eat a variety of fruits every day. Choose fresh or canned fruit (canned in its own juice or light syrup) instead of juice. Fruit juice has very little or no fiber. · Eat low-fat dairy foods. Drink fat-free (skim) milk or 1% milk. Eat fat-free yogurt and low-fat cottage cheese. Try low-fat cheeses such as mozzarella and other reduced-fat cheeses. · Choose meat and other protein foods that are low in fat. Choose beans or other legumes such as split peas or lentils. Choose fish, skinless poultry (chicken or turkey), or lean cuts of red meat (beef or pork). Before you cook meat or poultry, cut off any visible fat. · Use less fat and oil. Try baking foods instead of frying them. Add less fat, such as margarine, sour cream, regular salad dressing and mayonnaise to foods. Eat fewer high-fat foods. Some examples of high-fat foods include french fries, doughnuts, ice cream, and cakes. · Eat fewer sweets. Limit foods and drinks that are high in sugar. This includes candy, cookies, regular soda, and sweetened drinks.   Exercise:  Exercise at least 30 minutes per day on most days of the week. Some examples of exercise include walking, biking, dancing, and swimming. You can also fit in more physical activity by taking the stairs instead of the elevator or parking farther away from stores. Ask your healthcare provider about the best exercise plan for you. © Copyright [employer] 2018 Information is for End User's use only and may not be sold, redistributed or otherwise used for commercial purposes.  All illustrations and images included in CareNotes® are the copyrighted property of [employer] or  [address]

## 2023-09-18 NOTE — PROGRESS NOTES
Assessment and Plan: Iron deficiency anemia will be treated with iron supplementation pending visit with hematology oncology. Diabetes mellitus type 2 with blood sugar running between 110 and 150    Acute on chronic diastolic congestive heart failure treated with Demadex and K. Dur    Venous hypertension with lower leg edema bilaterally the patient wears compression stockings    Persistent atrial fibrillation anticoagulated with Coumadin rate controlled with Toprol XL 25     Problem List Items Addressed This Visit        Endocrine    Type 2 diabetes mellitus without complication, without long-term current use of insulin (HCC)    Relevant Orders    Albumin / creatinine urine ratio    Comprehensive metabolic panel    Hemoglobin A1C    CBC and differential    TSH, 3rd generation with Free T4 reflex    Lipid Panel with Direct LDL reflex       Cardiovascular and Mediastinum    Persistent atrial fibrillation (HCC)    Chronic venous insufficiency       Hematopoietic and Hemostatic    Pancytopenia (HCC)    Relevant Medications    ferrous gluconate (FERGON) 240 (27 FE) MG tablet   Other Visit Diagnoses     Medicare annual wellness visit, subsequent    -  Primary    Acute on chronic diastolic congestive heart failure (HCC)        Relevant Medications    torsemide (DEMADEX) 100 mg tablet    potassium chloride (K-DUR,KLOR-CON) 20 mEq tablet    Iron deficiency anemia, unspecified iron deficiency anemia type        Relevant Medications    ferrous gluconate (FERGON) 240 (27 FE) MG tablet        BMI Counseling: Body mass index is 28.32 kg/m². The BMI is above normal. Nutrition recommendations include decreasing portion sizes, encouraging healthy choices of fruits and vegetables, decreasing fast food intake, consuming healthier snacks, limiting drinks that contain sugar, moderation in carbohydrate intake, increasing intake of lean protein, reducing intake of saturated and trans fat and reducing intake of cholesterol.  Exercise recommendations include moderate physical activity 150 minutes/week. Rationale for BMI follow-up plan is due to patient being overweight or obese. Depression Screening and Follow-up Plan: Patient was screened for depression during today's encounter. They screened negative with a PHQ-2 score of 0. Preventive health issues were discussed with patient, and age appropriate screening tests were ordered as noted in patient's After Visit Summary. Personalized health advice and appropriate referrals for health education or preventive services given if needed, as noted in patient's After Visit Summary. History of Present Illness:     Patient presents for a Medicare Wellness Visit    Patient presents for annual Medicare wellness visit     Patient Care Team:  Christos Henning DO as PCP - Keara Horne MD     Review of Systems:     Review of Systems   Constitutional: Positive for fatigue. Negative for chills and fever. HENT: Negative for ear pain and sore throat. Eyes: Negative for pain and visual disturbance. Respiratory: Negative for cough and shortness of breath. Cardiovascular: Positive for leg swelling. Negative for chest pain and palpitations. Gastrointestinal: Negative for abdominal pain and vomiting. Genitourinary: Negative for dysuria and hematuria. Musculoskeletal: Negative for arthralgias and back pain. Skin: Negative for color change and rash. Neurological: Negative for seizures and syncope. All other systems reviewed and are negative.        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[address])   • Abnormality of gait   • Dermatofibroma   • Excessive anticoagulation   • FH: colon cancer   • Family history of colonic polyps   • Exostosis   • History of colonic polyps   • Idiopathic peripheral neuropathy   • Olecranon bursitis   • Paresthesias   • Plantar fascial fibromatosis   • Diarrhea of presumed infectious origin   • Acute on chronic diastolic (congestive) heart failure (HCC)   • Troponin level elevated   • Pancytopenia (HCC)      Past Medical and Surgical History:     Past Medical History:   Diagnosis Date   • Anxiety    • Atrial fibrillation (HCC)    • Atrial flutter (HCC)    • Congestive heart failure (CHF) (HCC)    • COPD (chronic obstructive pulmonary disease) (HCC)    • Coronary artery disease    • DVT (deep venous thrombosis) ([facility])    • ED (erectile dysfunction)    • Hearing loss    • History of echocardiogram 04/05/2018    EF 0.55, Mild LVH. Mild moderate mitral regurg. Trace aortic regurg. • Hx of radiation therapy     XRT   • Hypercholesterolemia    • Hyperlipidemia    • Hypertension    • Long term (current) use of anticoagulants 8/21/2018   • Neuropathy of both feet    • Peripheral neuropathy    • Prostate cancer ([address])    • Type 2 diabetes mellitus [facility])      Past Surgical History:   Procedure Laterality Date   • CARDIAC CATHETERIZATION  01/15/1988    Left main: unobstructed. Posterolateral branch 90% narrowed.    • COLONOSCOPY  10/05/2017    Dr. Margarito Maxwell   • PROSTATE SURGERY        Family History:     Family History   Problem Relation Age of Onset   • Cancer Brother         bladder   • Colon cancer Brother    • Heart disease Other    • Hypertension Other    • Cancer Other         bladder   • Colon cancer Other       Social History:     Social History     Socioeconomic History   • Marital status: /Civil Union     Spouse name: None   • Number of children: None   • Years of education: None   • Highest education level: None   Occupational History   • Occupation: Retired-[employer]   Tobacco Use   • Smoking status: Never   • Smokeless tobacco: Never   Vaping Use   • Vaping Use: Never used   Substance and Sexual Activity   • Alcohol use: Not Currently   • Drug use: Never   • Sexual activity: None   Other Topics Concern   • None   Social History Narrative   • None     Social Determinants of Health     Financial Resource Strain: Low Risk  (9/18/2023)    Overall Financial Resource Strain (CARDIA)    • Difficulty of Paying Living Expenses: Not hard at all   Food Insecurity: No Food Insecurity (8/14/2023)    Hunger Vital Sign    • Worried About Running Out of Food in the Last Year: Never true    • Ran Out of Food in the Last Year: Never true   Transportation Needs: No Transportation Needs (9/18/2023)    PRAPARE - Transportation    • Lack of Transportation (Medical): No    • Lack of Transportation (Non-Medical): No   Physical Activity: Not on file   Stress: Not on file   Social Connections: Not on file   Intimate Partner Violence: Not on file   Housing Stability: Low Risk  (8/14/2023)    Housing Stability Vital Sign    • Unable to Pay for Housing in the Last Year: No    • Number of Places Lived in the Last Year: 1    • Unstable Housing in the Last Year: No      Medications and Allergies:     Current Outpatient Medications   Medication Sig Dispense Refill   • ferrous gluconate (FERGON) 240 (27 FE) MG tablet Take 1 tablet (240 mg total) by mouth 2 (two) times a day before lunch and dinner 180 tablet 1   • potassium chloride (K-DUR,KLOR-CON) 20 mEq tablet Take 1 tablet (20 mEq total) by mouth daily 30 tablet 3   • torsemide (DEMADEX) 100 mg tablet Take 0.5 tablets (50 mg total) by mouth daily 30 tablet 3   • atorvastatin (LIPITOR) 40 mg tablet TAKE 1 TABLET BY MOUTH DAILY 90 tablet 3   • Blood Glucose Monitoring Suppl (OneTouch Verio Reflect) w/Device KIT Check blood sugars once daily.  Please substitute with appropriate alternative as covered by patient's insurance. Dx: E11.65 1 kit 0   • busPIRone (BUSPAR) 7.5 mg tablet Take 1 tablet (7.5 mg total) by mouth 2 (two) times a day 180 tablet 3   • econazole nitrate 1 % cream      • folic acid (FOLVITE) 1 mg tablet Take 1 tablet (1 mg total) by mouth daily Do not start before August 18, 2023. 30 tablet 1   • glimepiride (AMARYL) 1 mg tablet Take 1 tablet (1 mg total) by mouth daily with breakfast 90 tablet 3   • glucose blood (OneTouch Verio) test strip Check blood sugars once daily. Please substitute with appropriate alternative as covered by patient's insurance. Dx: E11.65 100 each 3   • ipratropium (ATROVENT) 0.03 % nasal spray 2 sprays into each nostril 3 (three) times a day 30 mL 5   • loperamide (IMODIUM A-D) 2 MG tablet Take 2 mg by mouth 3 (three) times a day as needed for diarrhea. Indications: Diarrhea     • Magnesium Oxide 420 MG TABS Take 1 tablet (420 mg total) by mouth 2 (two) times a day 60 tablet 0   • metoprolol succinate (Toprol XL) 25 mg 24 hr tablet Take 1 tablet (25 mg total) by mouth daily 90 tablet 3   • neomycin-polymyxin-pramoxine (ANTIBIOTIC CREAM PLUS PAIN RELIEF) 1 % cream Apply topically 2 (two) times a day 14 g 0   • OneTouch Delica Lancets 72I MISC USE TO TEST ONCE DAILY 100 each 5   • OneTouch Delica Lancets 97G MISC Check blood sugars once daily. Please substitute with appropriate alternative as covered by patient's insurance.  Dx: E11.65 100 each 3   • OneTouch Ultra test strip TEST ONCE DAILY 100 each 1   • Pyridoxine HCl (vitamin B-6) 50 MG TABS Take 1 tablet (50 mg total) by mouth daily 90 tablet 1   • saccharomyces boulardii (FLORASTOR) 250 mg capsule Take 1 capsule (250 mg total) by mouth 2 (two) times a day 60 capsule 3   • tamsulosin (FLOMAX) 0.4 mg Take 1 capsule (0.4 mg total) by mouth daily with dinner 90 capsule 3   • warfarin (COUMADIN) 4 mg tablet TAKE 2 TABLETS BY MOUTH DAILY OR AS DIRECTED (Patient taking differently: No sig reported) 180 tablet 5 No current facility-administered medications for this visit. No Known Allergies   Immunizations:     Immunization History   Administered Date(s) Administered   • COVID-19 MODERNA VACC 0.25 ML IM BOOSTER 11/01/2021   • COVID-19 MODERNA VACC 0.5 ML IM 01/21/2021, 02/19/2021, 07/18/2022   • INFLUENZA 10/09/2018, 09/16/2020   • Influenza, high dose seasonal 0.7 mL 08/31/2021, 09/15/2022   • Pneumococcal Conjugate 13-Valent 10/30/2016   • Pneumococcal Polysaccharide PPV23 08/28/2020   • Zoster Vaccine Recombinant 09/23/2019, 12/14/2019   • influenza, trivalent, adjuvanted 09/16/2019      Health Maintenance: There are no preventive care reminders to display for this patient. Topic Date Due   • COVID-19 Vaccine (5 - Moderna series) 09/12/2022   • Influenza Vaccine (1) 09/01/2023      Medicare Screening Tests and Risk Assessments:     Last Medicare Wellness visit information reviewed, patient interviewed and updates made to the record today. Health Risk Assessment:   Patient rates overall health as good. Patient feels that their physical health rating is same. Patient is very satisfied with their life. Eyesight was rated as same. Hearing was rated as slightly worse. Patient feels that their emotional and mental health rating is same. Patients states they are never, rarely angry. Patient states they are sometimes unusually tired/fatigued. Pain experienced in the last 7 days has been none. Patient states that he has experienced no weight loss or gain in last 6 months. Seeing ENT for fluid in ear - unable to hear in Left ear    Fall Risk Screening: In the past year, patient has experienced: no history of falling in past year      Home Safety:  Patient does not have trouble with stairs inside or outside of their home. Patient has working smoke alarms and has working carbon monoxide detector. Home safety hazards include: none.  Has a stair lift and a ramp to get into house    Nutrition:   Current diet is Diabetic. Medications:   Patient is not currently taking any over-the-counter supplements. Patient is able to manage medications. Activities of Daily Living (ADLs)/Instrumental Activities of Daily Living (IADLs):   Walk and transfer into and out of bed and chair?: Yes  Dress and groom yourself?: Yes    Bathe or shower yourself?: Yes    Feed yourself? Yes  Do your laundry/housekeeping?: No  Manage your money, pay your bills and track your expenses?: Yes  Make your own meals?: No    Do your own shopping?: No    Previous Hospitalizations:   Any hospitalizations or ED visits within the last 12 months?: Yes    How many hospitalizations have you had in the last year?: 1-2    Advance Care Planning:   Living will: No    Durable POA for healthcare: Yes    Advanced directive: No      PREVENTIVE SCREENINGS      Cardiovascular Screening:    General: Screening Not Indicated and History Lipid Disorder      Diabetes Screening:     General: Screening Not Indicated and History Diabetes      Colorectal Cancer Screening:     General: Screening Not Indicated      Prostate Cancer Screening:    General: History Prostate Cancer and Screening Not Indicated      Lung Cancer Screening:     General: Screening Not Indicated    Screening, Brief Intervention, and Referral to Treatment (SBIRT)    Screening      AUDIT-C Screenin) How often did you have a drink containing alcohol in the past year? never  2) How many drinks did you have on a typical day when you were drinking in the past year? 0  3) How often did you have 6 or more drinks on one occasion in the past year? never    AUDIT-C Score: 0  Interpretation: Score 0-3 (male): Negative screen for alcohol misuse    No results found.      Physical Exam:     /60 (BP Location: Left arm, Patient Position: Sitting, Cuff Size: Standard)   Pulse 100   Temp 97.8 °F (36.6 °C) (Tympanic)   Resp 16   Ht 6' (1.829 m)   Wt 94.7 kg (208 lb 12.8 oz)   SpO2 97%   BMI 28.32 kg/m² Physical Exam  Vitals and nursing note reviewed. Constitutional:       General: He is not in acute distress. Appearance: Normal appearance. He is well-developed. HENT:      Head: Normocephalic and atraumatic. Right Ear: Tympanic membrane, ear canal and external ear normal.      Left Ear: Tympanic membrane, ear canal and external ear normal.      Nose: Nose normal.      Mouth/Throat:      Mouth: Mucous membranes are moist.      Pharynx: Oropharynx is clear. Eyes:      Extraocular Movements: Extraocular movements intact. Conjunctiva/sclera: Conjunctivae normal.      Pupils: Pupils are equal, round, and reactive to light. Cardiovascular:      Rate and Rhythm: Normal rate. Rhythm irregular. Heart sounds: No murmur heard. Pulmonary:      Effort: Pulmonary effort is normal. No respiratory distress. Breath sounds: Normal breath sounds. Abdominal:      General: Abdomen is flat. Bowel sounds are normal.      Palpations: Abdomen is soft. Tenderness: There is no abdominal tenderness. Musculoskeletal:         General: No swelling. Normal range of motion. Cervical back: Normal range of motion and neck supple. Right lower leg: Edema present. Left lower leg: Edema present. Skin:     General: Skin is warm and dry. Capillary Refill: Capillary refill takes less than 2 seconds. Neurological:      General: No focal deficit present. Mental Status: He is alert and oriented to person, place, and time. Psychiatric:         Mood and Affect: Mood normal.         Behavior: Behavior normal.         Thought Content:  Thought content normal.         Judgment: Judgment normal.          Evaristo Mehta, DO

## 2023-09-21 ENCOUNTER — APPOINTMENT (OUTPATIENT)
Dept: LAB | Facility: MEDICAL CENTER | Age: 88
End: 2023-09-21
Payer: COMMERCIAL

## 2023-09-22 ENCOUNTER — ANTICOAG VISIT (OUTPATIENT)
Dept: CARDIOLOGY CLINIC | Facility: CLINIC | Age: 88
End: 2023-09-22
Payer: COMMERCIAL

## 2023-09-22 DIAGNOSIS — Z79.01 LONG TERM (CURRENT) USE OF ANTICOAGULANTS: ICD-10-CM

## 2023-09-22 DIAGNOSIS — I48.19 PERSISTENT ATRIAL FIBRILLATION (HCC): Primary | ICD-10-CM

## 2023-09-22 PROCEDURE — 93793 ANTICOAG MGMT PT WARFARIN: CPT | Performed by: NURSE PRACTITIONER

## 2023-09-22 NOTE — PATIENT INSTRUCTIONS
INR received from lab and reviewed.     Dose per calendar  Recheck INR in 2 weeks-results and instructions given to pt by phone  Mona Monte, [address] [place] [address]

## 2023-09-26 DIAGNOSIS — G62.9 NEUROPATHY: ICD-10-CM

## 2023-09-27 RX ORDER — LANOLIN ALCOHOL/MO/W.PET/CERES
50 CREAM (GRAM) TOPICAL DAILY
Qty: 100 TABLET | Refills: 0 | Status: SHIPPED | OUTPATIENT
Start: 2023-09-27

## 2023-09-28 ENCOUNTER — OFFICE VISIT (OUTPATIENT)
Dept: CARDIOLOGY CLINIC | Facility: CLINIC | Age: 88
End: 2023-09-28
Payer: COMMERCIAL

## 2023-09-28 VITALS
WEIGHT: 206.2 LBS | OXYGEN SATURATION: 100 % | HEIGHT: 70 IN | HEART RATE: 72 BPM | SYSTOLIC BLOOD PRESSURE: 118 MMHG | DIASTOLIC BLOOD PRESSURE: 72 MMHG | RESPIRATION RATE: 16 BRPM | BODY MASS INDEX: 29.52 KG/M2

## 2023-09-28 DIAGNOSIS — I48.19 PERSISTENT ATRIAL FIBRILLATION (HCC): ICD-10-CM

## 2023-09-28 DIAGNOSIS — E78.5 DYSLIPIDEMIA ASSOCIATED WITH TYPE 2 DIABETES MELLITUS: ICD-10-CM

## 2023-09-28 DIAGNOSIS — I50.32 CHRONIC DIASTOLIC CONGESTIVE HEART FAILURE (HCC): Primary | ICD-10-CM

## 2023-09-28 DIAGNOSIS — E11.69 DYSLIPIDEMIA ASSOCIATED WITH TYPE 2 DIABETES MELLITUS: ICD-10-CM

## 2023-09-28 PROCEDURE — 99213 OFFICE O/P EST LOW 20 MIN: CPT | Performed by: INTERNAL MEDICINE

## 2023-09-28 NOTE — PROGRESS NOTES
Subjective:        Patient ID: Francine Blas is a 80 y.o. male. Chief Complaint:  Roger Abel is here for follow-up, doing better on the torsemide and furosemide, leg swelling much improved, can get some shoes back on again, weight down a couple pounds. Denies any chest pains palpitations dyspnea. The following portions of the patient's history were reviewed and updated as appropriate: allergies, current medications, past family history, past medical history, past social history, past surgical history and problem list.  Review of Systems   Constitutional: Negative for chills, diaphoresis, malaise/fatigue and weight gain. HENT: Negative for nosebleeds and stridor. Eyes: Negative for double vision, vision loss in left eye, vision loss in right eye and visual disturbance. Cardiovascular: Negative for chest pain, claudication, cyanosis, dyspnea on exertion, irregular heartbeat, leg swelling, near-syncope, orthopnea, palpitations, paroxysmal nocturnal dyspnea and syncope. Respiratory: Negative for cough, shortness of breath, snoring and wheezing. Endocrine: Negative for polydipsia, polyphagia and polyuria. Hematologic/Lymphatic: Negative for bleeding problem. Does not bruise/bleed easily. Skin: Negative for flushing and rash. Musculoskeletal: Positive for arthritis, muscle weakness and stiffness. Negative for falls and myalgias. Gastrointestinal: Negative for abdominal pain, heartburn, hematemesis, hematochezia, melena and nausea. Genitourinary: Negative for hematuria. Neurological: Negative for brief paralysis, dizziness, focal weakness, headaches, light-headedness, loss of balance and vertigo. Psychiatric/Behavioral: Negative for altered mental status and substance abuse. Allergic/Immunologic: Negative for hives.           Objective:      /72 (BP Location: Left arm, Patient Position: Sitting, Cuff Size: Standard)   Pulse 72   Resp 16   Ht 5' 9.69" (1.77 m)   Wt 93.5 kg (206 lb 3.2 oz)   SpO2 100%   BMI 29.85 kg/m²   Physical Exam  Constitutional:       General: He is not in acute distress. Appearance: He is well-developed. He is not diaphoretic. HENT:      Head: Normocephalic and atraumatic. Eyes:      General: No scleral icterus. Pupils: Pupils are equal, round, and reactive to light. Neck:      Thyroid: No thyromegaly. Vascular: No carotid bruit or JVD. Cardiovascular:      Rate and Rhythm: Normal rate and regular rhythm. Heart sounds: Normal heart sounds. No murmur heard. No friction rub. No gallop. Pulmonary:      Effort: Pulmonary effort is normal. No respiratory distress. Breath sounds: Normal breath sounds. No stridor. No wheezing or rales. Abdominal:      General: Bowel sounds are normal. There is no distension. Palpations: Abdomen is soft. There is no mass. Tenderness: There is no abdominal tenderness. Musculoskeletal:      Cervical back: Normal range of motion and neck supple. Right lower leg: Edema present. Left lower leg: Edema present. Skin:     General: Skin is warm and dry. Coloration: Skin is not pale. Findings: No erythema. Neurological:      Mental Status: He is alert and oriented to person, place, and time. Mental status is at baseline.       Coordination: Coordination normal.   Psychiatric:         Mood and Affect: Mood normal.         Behavior: Behavior normal.     Mild bilateral edema of lower extremities    Lab Review:   Ancillary Orders on 09/15/2023   Component Date Value   • Protime 09/21/2023 17.8 (H)    • INR 09/21/2023 1.44 (H)    Appointment on 09/13/2023   Component Date Value   • Retic Ct Abs 09/13/2023 62,100    • Retic Ct Pct 09/13/2023 2.30 (H)    • Sodium 09/13/2023 139    • Potassium 09/13/2023 4.1    • Chloride 09/13/2023 101    • CO2 09/13/2023 28    • ANION GAP 09/13/2023 10    • BUN 09/13/2023 19    • Creatinine 09/13/2023 1.21    • Glucose 09/13/2023 220 (H)    • Calcium 09/13/2023 8.6    • eGFR 09/13/2023 53    • Iron Saturation 09/13/2023 15    • TIBC 09/13/2023 271    • Iron 09/13/2023 40 (L)    • UIBC 09/13/2023 231    • Ferritin 09/13/2023 609 (H)    Anticoag visit on 09/05/2023   Component Date Value   • INR 09/05/2023 1.30 (A)    Anticoag visit on 09/05/2023   Component Date Value   • INR 09/05/2023 1.30 (A)    Lab Requisition on 08/29/2023   Component Date Value   • Sodium 08/29/2023 141    • Potassium 08/29/2023 3.5    • Chloride 08/29/2023 104    • CO2 08/29/2023 25    • ANION GAP 08/29/2023 12    • BUN 08/29/2023 23    • Creatinine 08/29/2023 1.27    • Glucose 08/29/2023 122    • Calcium 08/29/2023 8.4    • eGFR 08/29/2023 50    • Retic Ct Abs 08/29/2023 87,400    • Retic Ct Pct 08/29/2023 3.26 (H)    • Iron Saturation 08/29/2023 27    • TIBC 08/29/2023 239 (L)    • Iron 08/29/2023 64    • UIBC 08/29/2023 175    • Ferritin 08/29/2023 717 (H)    Appointment on 08/24/2023   Component Date Value   • Protime 08/24/2023 14.7 (H)    • INR 08/24/2023 1.13    Appointment on 08/24/2023   Component Date Value   • Magnesium 08/24/2023 2.1    • Sodium 08/24/2023 139    • Potassium 08/24/2023 3.5    • Chloride 08/24/2023 105    • CO2 08/24/2023 25    • ANION GAP 08/24/2023 9    • BUN 08/24/2023 18    • Creatinine 08/24/2023 1.07    • Glucose 08/24/2023 172 (H)    • Calcium 08/24/2023 8.5    • eGFR 08/24/2023 61    • WBC 08/24/2023 4.34    • RBC 08/24/2023 2.84 (L)    • Hemoglobin 08/24/2023 8.9 (L)    • Hematocrit 08/24/2023 29.2 (L)    • MCV 08/24/2023 103 (H)    • MCH 08/24/2023 31.3    • MCHC 08/24/2023 30.5 (L)    • RDW 08/24/2023 21.3 (H)    • MPV 08/24/2023 9.5    • Platelets 83/73/6511 188    • nRBC 08/24/2023 0    • Neutrophils Relative 08/24/2023 58    • Immat GRANS % 08/24/2023 1    • Lymphocytes Relative 08/24/2023 32    • Monocytes Relative 08/24/2023 7    • Eosinophils Relative 08/24/2023 1    • Basophils Relative 08/24/2023 1    • Neutrophils Absolute 08/24/2023 2.57 • Immature Grans Absolute 08/24/2023 0.02    • Lymphocytes Absolute 08/24/2023 1.40    • Monocytes Absolute 08/24/2023 0.29    • Eosinophils Absolute 08/24/2023 0.04    • Basophils Absolute 08/24/2023 0.02    Ancillary Orders on 08/18/2023   Component Date Value   • Protime 09/13/2023 16.2 (H)    • INR 09/13/2023 1.28 (H)    No results displayed because visit has over 200 results. Appointment on 08/12/2023   Component Date Value   • C.difficile toxin by PCR 08/12/2023 Negative    There may be more visits with results that are not included. No results found. Assessment:       1. Chronic diastolic congestive heart failure (HCC)        2. Persistent atrial fibrillation ([address])        3. Dyslipidemia associated with type 2 diabetes mellitus              Plan:       Rohini Wiseman is clinically improved from a heart failure perspective, renal function stable, I recommended no changes today. He is not having any angina. Normotensive. A-fib is rate controlled, continue present dose Toprol, and I see the plan to slowly escalate his Coumadin dosage with his recent transfusion seems more than reasonable. Goal INR 2.0-3.0. On proper statin. I made no medication changes today, we will see him back in 3 months, asked him to call sooner with any concerning potential cardiac problems prior.

## 2023-09-30 PROBLEM — R19.7 DIARRHEA OF PRESUMED INFECTIOUS ORIGIN: Status: RESOLVED | Noted: 2023-08-01 | Resolved: 2023-09-30

## 2023-09-30 PROBLEM — J84.116 CRYPTOGENIC ORGANIZING PNEUMONIA (HCC): Status: RESOLVED | Noted: 2023-06-16 | Resolved: 2023-09-30

## 2023-10-05 ENCOUNTER — APPOINTMENT (OUTPATIENT)
Dept: LAB | Facility: MEDICAL CENTER | Age: 88
End: 2023-10-05
Payer: COMMERCIAL

## 2023-10-05 DIAGNOSIS — I48.19 PERSISTENT ATRIAL FIBRILLATION (HCC): ICD-10-CM

## 2023-10-05 DIAGNOSIS — Z79.01 LONG TERM (CURRENT) USE OF ANTICOAGULANTS: ICD-10-CM

## 2023-10-05 LAB
INR PPP: 1.58 (ref 0.84–1.19)
PROTHROMBIN TIME: 18.7 SECONDS (ref 11.6–14.5)

## 2023-10-05 PROCEDURE — 36415 COLL VENOUS BLD VENIPUNCTURE: CPT

## 2023-10-05 PROCEDURE — 85610 PROTHROMBIN TIME: CPT

## 2023-10-06 ENCOUNTER — ANTICOAG VISIT (OUTPATIENT)
Dept: CARDIOLOGY CLINIC | Facility: CLINIC | Age: 88
End: 2023-10-06
Payer: COMMERCIAL

## 2023-10-06 DIAGNOSIS — I48.19 PERSISTENT ATRIAL FIBRILLATION (HCC): Primary | ICD-10-CM

## 2023-10-06 DIAGNOSIS — Z79.01 LONG TERM (CURRENT) USE OF ANTICOAGULANTS: ICD-10-CM

## 2023-10-06 PROCEDURE — 93793 ANTICOAG MGMT PT WARFARIN: CPT | Performed by: NURSE PRACTITIONER

## 2023-10-16 ENCOUNTER — TELEPHONE (OUTPATIENT)
Dept: FAMILY MEDICINE CLINIC | Facility: CLINIC | Age: 88
End: 2023-10-16

## 2023-10-16 DIAGNOSIS — D64.9 ANEMIA, UNSPECIFIED TYPE: ICD-10-CM

## 2023-10-16 DIAGNOSIS — I50.33 ACUTE ON CHRONIC DIASTOLIC CONGESTIVE HEART FAILURE (HCC): ICD-10-CM

## 2023-10-16 RX ORDER — TORSEMIDE 100 MG/1
50 TABLET ORAL DAILY
Qty: 45 TABLET | Refills: 1 | Status: SHIPPED | OUTPATIENT
Start: 2023-10-16

## 2023-10-16 RX ORDER — FOLIC ACID 1 MG/1
1 TABLET ORAL DAILY
Qty: 90 TABLET | Refills: 1 | Status: SHIPPED | OUTPATIENT
Start: 2023-10-16

## 2023-10-17 ENCOUNTER — CONSULT (OUTPATIENT)
Dept: HEMATOLOGY ONCOLOGY | Facility: CLINIC | Age: 88
End: 2023-10-17

## 2023-10-17 VITALS
HEIGHT: 70 IN | RESPIRATION RATE: 16 BRPM | OXYGEN SATURATION: 97 % | WEIGHT: 211 LBS | BODY MASS INDEX: 30.21 KG/M2 | DIASTOLIC BLOOD PRESSURE: 70 MMHG | HEART RATE: 70 BPM | SYSTOLIC BLOOD PRESSURE: 118 MMHG | TEMPERATURE: 97.7 F

## 2023-10-17 DIAGNOSIS — E53.8 VITAMIN B12 DEFICIENCY: ICD-10-CM

## 2023-10-17 DIAGNOSIS — I82.4Z2 LOWER LEG DVT (DEEP VENOUS THROMBOEMBOLISM), ACUTE, LEFT (HCC): ICD-10-CM

## 2023-10-17 DIAGNOSIS — Z79.01 LONG TERM (CURRENT) USE OF ANTICOAGULANTS: ICD-10-CM

## 2023-10-17 DIAGNOSIS — I82.411 DEEP VEIN THROMBOSIS (DVT) OF FEMORAL VEIN OF RIGHT LOWER EXTREMITY, UNSPECIFIED CHRONICITY (HCC): ICD-10-CM

## 2023-10-17 DIAGNOSIS — D61.818 PANCYTOPENIA (HCC): ICD-10-CM

## 2023-10-17 DIAGNOSIS — I48.19 PERSISTENT ATRIAL FIBRILLATION (HCC): Primary | ICD-10-CM

## 2023-10-17 DIAGNOSIS — D50.8 IRON DEFICIENCY ANEMIA SECONDARY TO INADEQUATE DIETARY IRON INTAKE: ICD-10-CM

## 2023-10-17 DIAGNOSIS — E53.8 FOLATE DEFICIENCY: ICD-10-CM

## 2023-10-17 NOTE — PROGRESS NOTES
[address] [place] [address] HEMATOLOGY ONCOLOGY [address] [id number] [phone]  Hematology Ambulatory Consult  Jonathon Crews, 1935, [id number]  10/17/2023      Assessment and Plan   1. Pancytopenia ([address])  2. Persistent atrial fibrillation ([address])  3. Long term (current) use of anticoagulants  4. Lower leg DVT (deep venous thromboembolism), acute, left ([address])  5. Deep vein thrombosis (DVT) of femoral vein of right lower extremity, unspecified chronicity ([address])   Patient is an 80-year-old male with a history of A-fib, CHF, COPD, CAD, DVT on long-term anticoagulation, hypercholesterolemia, hypertension, hyperlipidemia, prostate cancer, type 2 diabetes, and iron deficiency. He was referred to us for further evaluation and management of pancytopenia. Patient is here with his wife who reviewed his history. Patient was seen in the hospital in April 2023 secondary to bacterial pneumonia, noted to have leukocytosis. Influenza, COVID, RSV was negative. Patient was on prednisone tapering dose until at least June 2023. He was following with pulmonary for cryptogenic organizing pneumonia. He was also on Bactrim for period of time. In early August 2023 he was admitted to the hospital with diarrhea, gait abnormality, and ongoing problems with walking pneumonia. He was discharged home only to be readmitted on August 13, 2023 with acute on chronic CHF, anemia, elevated troponin. He had presented with leg swelling and difficulty urinating. He was found to have pancytopenia with a hemoglobin of 9.4, platelets 629, WBC 3.4. He was given 1 unit of PRBCs, no overt GI bleed signs. FIT was negative. Peripheral smear showed schistocytes, LDH haptoglobin within normal limits, T. bili was marginally elevated. Hematology was consulted who felt it was related to recent diarrheal illness. He followed with his primary physician in September who repeated blood work.   CBC on 8/24/2023 showed a hemoglobin of 8.9, WBC normal 4.34, platelets 267, normal differential.  Metabolic panel showed a creatinine of 1.07, calcium 8.5, EGFR 61. Iron panel showed a saturation of 27% however ferritin was elevated at 717, likely reactive. He was noted to have a decreased folate level on 8/13/2023 of 4.3. He was started on folic acid 505 mcg daily. Vitamin B12 was 470. We discussed repeating blood work since he has recovered from frequent hospitalizations and infections. He is no longer on prednisone or antibiotics. Patient is taking ferrous gluconate and folic acid. I will see him in 4 weeks however if all is stable I will message him through [employer] with results and likely push out follow-up to 3 months. Overall he is able to function without restriction. He continues to get stronger. Patient and his wife verbalized understanding and are in agreement with the plan. - Ambulatory Referral to Hematology / Oncology    Barrier(s) to care: None. The patient is  able to self care. [facility]    Subjective     Chief Complaint   Patient presents with    Consult     Referring provider    Winsome Boyle DO  [address] [facility]  [address]  [place],  [address]    History of present illness:  Patient is an 70-year-old male with a history of A-fib, CHF, COPD, CAD, DVT on long-term anticoagulation, hypercholesterolemia, hypertension, hyperlipidemia, prostate cancer, type 2 diabetes, and iron deficiency. He was referred to us for further evaluation and management of pancytopenia. 10/17/23: clinically stable    Review of Systems   Constitutional:  Negative for activity change, appetite change, fatigue, fever and unexpected weight change. Respiratory:  Negative for cough and shortness of breath. Cardiovascular:  Negative for chest pain and leg swelling.    Gastrointestinal:  Negative for abdominal pain, constipation, diarrhea and nausea. Endocrine: Negative for cold intolerance and heat intolerance. Musculoskeletal:  Negative for arthralgias and myalgias. Skin: Negative. Neurological:  Negative for dizziness, weakness and headaches. Hematological:  Negative for adenopathy. Does not bruise/bleed easily. Past Medical History:   Diagnosis Date    Anxiety     Atrial fibrillation (HCC)     Atrial flutter (HCC)     Congestive heart failure (CHF) (HCC)     COPD (chronic obstructive pulmonary disease) (HCC)     Coronary artery disease     DVT (deep venous thrombosis) (HCC)     ED (erectile dysfunction)     Hearing loss     History of echocardiogram 04/05/2018    EF 0.55, Mild LVH. Mild moderate mitral regurg. Trace aortic regurg. Hx of radiation therapy     XRT    Hypercholesterolemia     Hyperlipidemia     Hypertension     Long term (current) use of anticoagulants 8/21/2018    Neuropathy of both feet     Peripheral neuropathy     Prostate cancer (HCC)     Type 2 diabetes mellitus ([facility])      Past Surgical History:   Procedure Laterality Date    CARDIAC CATHETERIZATION  01/15/1988    Left main: unobstructed. Posterolateral branch 90% narrowed.     COLONOSCOPY  10/05/2017    Dr. Rosendo Vidales History   Problem Relation Age of Onset    Cancer Brother         bladder    Colon cancer Brother     Heart disease Other     Hypertension Other     Cancer Other         bladder    Colon cancer Other      Social History     Socioeconomic History    Marital status: /Civil Union     Spouse name: None    Number of children: None    Years of education: None    Highest education level: None   Occupational History    Occupation: Retired-[employer]   Tobacco Use    Smoking status: Never    Smokeless tobacco: Never   Vaping Use    Vaping Use: Never used   Substance and Sexual Activity    Alcohol use: Not Currently    Drug use: Never    Sexual activity: None   Other Topics Concern    None   Social History Narrative None     Social Determinants of Health     Financial Resource Strain: Low Risk  (9/18/2023)    Overall Financial Resource Strain (CARDIA)     Difficulty of Paying Living Expenses: Not hard at all   Food Insecurity: No Food Insecurity (8/14/2023)    Hunger Vital Sign     Worried About Running Out of Food in the Last Year: Never true     Ran Out of Food in the Last Year: Never true   Transportation Needs: No Transportation Needs (9/18/2023)    PRAPARE - Transportation     Lack of Transportation (Medical): No     Lack of Transportation (Non-Medical): No   Physical Activity: Not on file   Stress: Not on file   Social Connections: Not on file   Intimate Partner Violence: Not on file   Housing Stability: Low Risk  (8/14/2023)    Housing Stability Vital Sign     Unable to Pay for Housing in the Last Year: No     Number of Places Lived in the Last Year: 1     Unstable Housing in the Last Year: No       Current Outpatient Medications:     atorvastatin (LIPITOR) 40 mg tablet, TAKE 1 TABLET BY MOUTH DAILY, Disp: 90 tablet, Rfl: 3    Blood Glucose Monitoring Suppl (OneTouch Verio Reflect) w/Device KIT, Check blood sugars once daily. Please substitute with appropriate alternative as covered by patient's insurance. Dx: E11.65, Disp: 1 kit, Rfl: 0    busPIRone (BUSPAR) 7.5 mg tablet, Take 1 tablet (7.5 mg total) by mouth 2 (two) times a day, Disp: 180 tablet, Rfl: 3    econazole nitrate 1 % cream, , Disp: , Rfl:     ferrous gluconate (FERGON) 240 (27 FE) MG tablet, Take 1 tablet (240 mg total) by mouth 2 (two) times a day before lunch and dinner, Disp: 180 tablet, Rfl: 1    folic acid (FOLVITE) 1 mg tablet, Take 1 tablet (1 mg total) by mouth daily, Disp: 90 tablet, Rfl: 1    glimepiride (AMARYL) 1 mg tablet, Take 1 tablet (1 mg total) by mouth daily with breakfast, Disp: 90 tablet, Rfl: 3    glucose blood (OneTouch Verio) test strip, Check blood sugars once daily.  Please substitute with appropriate alternative as covered by patient's insurance. Dx: E11.65, Disp: 100 each, Rfl: 3    ipratropium (ATROVENT) 0.03 % nasal spray, 2 sprays into each nostril 3 (three) times a day, Disp: 30 mL, Rfl: 5    loperamide (IMODIUM A-D) 2 MG tablet, Take 2 mg by mouth 3 (three) times a day as needed for diarrhea. Indications: Diarrhea, Disp: , Rfl:     Magnesium Oxide 420 MG TABS, Take 1 tablet (420 mg total) by mouth 2 (two) times a day, Disp: 60 tablet, Rfl: 0    metoprolol succinate (Toprol XL) 25 mg 24 hr tablet, Take 1 tablet (25 mg total) by mouth daily, Disp: 90 tablet, Rfl: 3    OneTouch Delica Lancets 28V MISC, USE TO TEST ONCE DAILY, Disp: 100 each, Rfl: 5    OneTouch Delica Lancets 84R MISC, Check blood sugars once daily. Please substitute with appropriate alternative as covered by patient's insurance. Dx: E11.65, Disp: 100 each, Rfl: 3    OneTouch Ultra test strip, TEST ONCE DAILY, Disp: 100 each, Rfl: 1    potassium chloride (K-DUR,KLOR-CON) 20 mEq tablet, Take 1 tablet (20 mEq total) by mouth daily, Disp: 30 tablet, Rfl: 3    pyridoxine (VITAMIN B6) 50 mg tablet, TAKE 1 TABLET BY MOUTH EVERY DAY, Disp: 100 tablet, Rfl: 0    saccharomyces boulardii (FLORASTOR) 250 mg capsule, Take 1 capsule (250 mg total) by mouth 2 (two) times a day, Disp: 60 capsule, Rfl: 3    tamsulosin (FLOMAX) 0.4 mg, Take 1 capsule (0.4 mg total) by mouth daily with dinner, Disp: 90 capsule, Rfl: 3    torsemide (DEMADEX) 100 mg tablet, Take 0.5 tablets (50 mg total) by mouth daily, Disp: 45 tablet, Rfl: 1    warfarin (COUMADIN) 4 mg tablet, TAKE 2 TABLETS BY MOUTH DAILY OR AS DIRECTED (Patient taking differently: No sig reported), Disp: 180 tablet, Rfl: 5  No Known Allergies    Objective   /70 (BP Location: Right arm, Patient Position: Sitting, Cuff Size: Adult)   Pulse 70   Temp 97.7 °F (36.5 °C)   Resp 16   Ht 5' 9.69" (1.77 m)   Wt 95.7 kg (211 lb)   SpO2 97%   BMI 30.55 kg/m²   Physical Exam  Vitals reviewed.    Constitutional:       Appearance: Normal appearance. He is well-developed. HENT:      Head: Normocephalic and atraumatic. Eyes:      Pupils: Pupils are equal, round, and reactive to light. Pulmonary:      Effort: Pulmonary effort is normal. No respiratory distress. Musculoskeletal:         General: Normal range of motion. Cervical back: Normal range of motion. Lymphadenopathy:      Cervical: No cervical adenopathy. Skin:     General: Skin is dry. Neurological:      Mental Status: He is alert and oriented to person, place, and time. Psychiatric:         Behavior: Behavior normal.     Result Review  Labs:  Appointment on 10/05/2023   Component Date Value Ref Range Status    Protime 10/05/2023 18.7 (H)  11.6 - 14.5 seconds Final    INR 10/05/2023 1.58 (H)  0.84 - 1.19 Final   Ancillary Orders on 09/15/2023   Component Date Value Ref Range Status    Protime 09/21/2023 17.8 (H)  11.6 - 14.5 seconds Final    INR 09/21/2023 1.44 (H)  0.84 - 1.19 Final   Appointment on 09/13/2023   Component Date Value Ref Range Status    Retic Ct Abs 09/13/2023 62,100  14,356 - 105,094 Final    Retic Ct Pct 09/13/2023 2.30 (H)  0.37 - 1.87 % Final    Sodium 09/13/2023 139  135 - 147 mmol/L Final    Potassium 09/13/2023 4.1  3.5 - 5.3 mmol/L Final    Chloride 09/13/2023 101  96 - 108 mmol/L Final    CO2 09/13/2023 28  21 - 32 mmol/L Final    ANION GAP 09/13/2023 10  mmol/L Final    BUN 09/13/2023 19  5 - 25 mg/dL Final    Creatinine 09/13/2023 1.21  0.60 - 1.30 mg/dL Final    Standardized to IDMS reference method    Glucose 09/13/2023 220 (H)  65 - 140 mg/dL Final    If the patient is fasting, the ADA then defines impaired fasting glucose as > 100 mg/dL and diabetes as > or equal to 123 mg/dL.     Calcium 09/13/2023 8.6  8.4 - 10.2 mg/dL Final    eGFR 09/13/2023 53  ml/min/1.73sq m Final    Iron Saturation 09/13/2023 15  15 - 50 % Final    TIBC 09/13/2023 271  250 - 450 ug/dL Final    Iron 09/13/2023 40 (L)  50 - 212 ug/dL Final    Patients treated with metal-binding drugs (ie. Deferoxamine) may have depressed iron values. UIBC 09/13/2023 231  155 - 355 ug/dL Final    Ferritin 09/13/2023 609 (H)  24 - 336 ng/mL Final   Anticoag visit on 09/05/2023   Component Date Value Ref Range Status    INR 09/05/2023 1.30 (A)  0.84 - 1.19 Final   Anticoag visit on 09/05/2023   Component Date Value Ref Range Status    INR 09/05/2023 1.30 (A)  0.84 - 1.19 Final   Lab Requisition on 08/29/2023   Component Date Value Ref Range Status    Sodium 08/29/2023 141  135 - 147 mmol/L Final    Potassium 08/29/2023 3.5  3.5 - 5.3 mmol/L Final    Chloride 08/29/2023 104  96 - 108 mmol/L Final    CO2 08/29/2023 25  21 - 32 mmol/L Final    ANION GAP 08/29/2023 12  mmol/L Final    BUN 08/29/2023 23  5 - 25 mg/dL Final    Creatinine 08/29/2023 1.27  0.60 - 1.30 mg/dL Final    Standardized to IDMS reference method    Glucose 08/29/2023 122  65 - 140 mg/dL Final    If the patient is fasting, the ADA then defines impaired fasting glucose as > 100 mg/dL and diabetes as > or equal to 123 mg/dL. Calcium 08/29/2023 8.4  8.4 - 10.2 mg/dL Final    eGFR 08/29/2023 50  ml/min/1.73sq m Final    Retic Ct Abs 08/29/2023 87,400  14,356 - 105,094 Final    Retic Ct Pct 08/29/2023 3.26 (H)  0.37 - 1.87 % Final    Iron Saturation 08/29/2023 27  15 - 50 % Final    TIBC 08/29/2023 239 (L)  250 - 450 ug/dL Final    Iron 08/29/2023 64  50 - 212 ug/dL Final    Patients treated with metal-binding drugs (ie. Deferoxamine) may have depressed iron values.     UIBC 08/29/2023 175  155 - 355 ug/dL Final    Ferritin 08/29/2023 717 (H)  24 - 336 ng/mL Final   Appointment on 08/24/2023   Component Date Value Ref Range Status    Protime 08/24/2023 14.7 (H)  11.6 - 14.5 seconds Final    INR 08/24/2023 1.13  0.84 - 1.19 Final   Appointment on 08/24/2023   Component Date Value Ref Range Status    Magnesium 08/24/2023 2.1  1.9 - 2.7 mg/dL Final    Sodium 08/24/2023 139  135 - 147 mmol/L Final    Potassium 08/24/2023 3.5  3.5 - 5.3 mmol/L Final    Chloride 08/24/2023 105  96 - 108 mmol/L Final    CO2 08/24/2023 25  21 - 32 mmol/L Final    ANION GAP 08/24/2023 9  mmol/L Final    BUN 08/24/2023 18  5 - 25 mg/dL Final    Creatinine 08/24/2023 1.07  0.60 - 1.30 mg/dL Final    Standardized to IDMS reference method    Glucose 08/24/2023 172 (H)  65 - 140 mg/dL Final    If the patient is fasting, the ADA then defines impaired fasting glucose as > 100 mg/dL and diabetes as > or equal to 123 mg/dL.     Calcium 08/24/2023 8.5  8.4 - 10.2 mg/dL Final    eGFR 08/24/2023 61  ml/min/1.73sq m Final    WBC 08/24/2023 4.34  4.31 - 10.16 Thousand/uL Final    RBC 08/24/2023 2.84 (L)  3.88 - 5.62 Million/uL Final    Hemoglobin 08/24/2023 8.9 (L)  12.0 - 17.0 g/dL Final    Hematocrit 08/24/2023 29.2 (L)  36.5 - 49.3 % Final    MCV 08/24/2023 103 (H)  82 - 98 fL Final    MCH 08/24/2023 31.3  26.8 - 34.3 pg Final    MCHC 08/24/2023 30.5 (L)  31.4 - 37.4 g/dL Final    RDW 08/24/2023 21.3 (H)  11.6 - 15.1 % Final    MPV 08/24/2023 9.5  8.9 - 12.7 fL Final    Platelets 87/63/8307 188  149 - 390 Thousands/uL Final    nRBC 08/24/2023 0  /100 WBCs Final    Neutrophils Relative 08/24/2023 58  43 - 75 % Final    Immat GRANS % 08/24/2023 1  0 - 2 % Final    Lymphocytes Relative 08/24/2023 32  14 - 44 % Final    Monocytes Relative 08/24/2023 7  4 - 12 % Final    Eosinophils Relative 08/24/2023 1  0 - 6 % Final    Basophils Relative 08/24/2023 1  0 - 1 % Final    Neutrophils Absolute 08/24/2023 2.57  1.85 - 7.62 Thousands/µL Final    Immature Grans Absolute 08/24/2023 0.02  0.00 - 0.20 Thousand/uL Final    Lymphocytes Absolute 08/24/2023 1.40  0.60 - 4.47 Thousands/µL Final    Monocytes Absolute 08/24/2023 0.29  0.17 - 1.22 Thousand/µL Final    Eosinophils Absolute 08/24/2023 0.04  0.00 - 0.61 Thousand/µL Final    Basophils Absolute 08/24/2023 0.02  0.00 - 0.10 Thousands/µL Final   Ancillary Orders on 08/18/2023   Component Date Value Ref Range Status    Protime 09/13/2023 16. 2 (H)  11.6 - 14.5 seconds Final    INR 09/13/2023 1.28 (H)  0.84 - 1.19 Final     Imaging:   CT chest abdomen pelvis w contrast    Result Date: 8/1/2023  Narrative CT CHEST, ABDOMEN AND PELVIS WITH IV CONTRAST INDICATION:   Sepsis weakness, elevated lactic acid, diarrhea, prior pneumonia. COMPARISON: CT chest 5/12/2023 TECHNIQUE: CT examination of the chest, abdomen and pelvis was performed. In addition to portal venous phase postcontrast scanning through the abdomen and pelvis, delayed phase postcontrast scanning was performed through the upper abdominal viscera. Multiplanar 2D reformatted images were created from the source data. This examination, like all CT scans performed in the [facility], was performed utilizing techniques to minimize radiation dose exposure, including the use of iterative reconstruction and automated exposure control. Radiation dose length product (DLP) for this visit:  1154.82 mGy-cm IV Contrast:  100 mL of iohexol (OMNIPAQUE) Enteric Contrast: Enteric contrast was administered. FINDINGS: CHEST LUNGS: Bibasilar subsegmental atelectasis. There is no tracheal or endobronchial lesion. Scattered calcified granuloma. 4 mm nodule along the right major fissure in the right lower lobe (4/67), likely representing intrapulmonary lymph node. PLEURA:  Unremarkable. HEART/GREAT VESSELS: Cardiomegaly with left atrial enlargement. Coronary artery calcifications. .  No thoracic aortic aneurysm. MEDIASTINUM AND LEA:  Unremarkable. CHEST WALL AND LOWER NECK:  Unremarkable. ABDOMEN LIVER/BILIARY TREE:  Unremarkable. A few simple hepatic cysts. GALLBLADDER:  No calcified gallstones. No pericholecystic inflammatory change. SPLEEN:  Unremarkable. PANCREAS:  Unremarkable. ADRENAL GLANDS:  Unremarkable. KIDNEYS/URETERS: Punctate right renal upper pole nonobstructing calculus. A small right renal cortical and a few bilateral parapelvic cysts.  Bilateral subcentimeter low-attenuation lesions, too small to characterize. No hydronephrosis. STOMACH AND BOWEL: A few secretions are seen in the thoracic esophagus. Unremarkable. APPENDIX:  No findings to suggest appendicitis. ABDOMINOPELVIC CAVITY:  No ascites. No pneumoperitoneum. No lymphadenopathy. VESSELS:  Unremarkable for patient's age. PELVIS REPRODUCTIVE ORGANS:  Unremarkable for patient's age. URINARY BLADDER: A small right posterior urinary bladder diverticulum. . ABDOMINAL WALL/INGUINAL REGIONS:  Unremarkable. OSSEOUS STRUCTURES:  No acute fracture or destructive osseous lesion. Impression No acute intra-abdominal process Cardiomegaly with left atrial enlargement Secretions in the thoracic esophagus, representing gastroesophageal reflux. This puts the patient at increased risk for aspiration pneumonitis. Punctate right upper renal pole nonobstructing calculus. The study was marked in [facility] for immediate notification. Workstation performed: [id number]       Please note: This report has been generated by a voice recognition software system. Therefore there may be syntax, spelling, and/or grammatical errors. Please call if you have any questions.

## 2023-10-19 ENCOUNTER — OFFICE VISIT (OUTPATIENT)
Dept: OTOLARYNGOLOGY | Facility: CLINIC | Age: 88
End: 2023-10-19
Payer: COMMERCIAL

## 2023-10-19 ENCOUNTER — APPOINTMENT (OUTPATIENT)
Dept: LAB | Facility: MEDICAL CENTER | Age: 88
End: 2023-10-19
Payer: COMMERCIAL

## 2023-10-19 VITALS
HEART RATE: 88 BPM | TEMPERATURE: 97.5 F | DIASTOLIC BLOOD PRESSURE: 72 MMHG | OXYGEN SATURATION: 99 % | SYSTOLIC BLOOD PRESSURE: 110 MMHG | WEIGHT: 207.6 LBS | BODY MASS INDEX: 28.12 KG/M2 | HEIGHT: 72 IN

## 2023-10-19 DIAGNOSIS — E53.8 FOLATE DEFICIENCY: ICD-10-CM

## 2023-10-19 DIAGNOSIS — J31.0 NONALLERGIC RHINITIS: ICD-10-CM

## 2023-10-19 DIAGNOSIS — H69.92 DYSFUNCTION OF LEFT EUSTACHIAN TUBE: ICD-10-CM

## 2023-10-19 DIAGNOSIS — H65.02 NON-RECURRENT ACUTE SEROUS OTITIS MEDIA OF LEFT EAR: Primary | ICD-10-CM

## 2023-10-19 DIAGNOSIS — Z79.01 LONG TERM (CURRENT) USE OF ANTICOAGULANTS: ICD-10-CM

## 2023-10-19 DIAGNOSIS — D50.8 IRON DEFICIENCY ANEMIA SECONDARY TO INADEQUATE DIETARY IRON INTAKE: ICD-10-CM

## 2023-10-19 DIAGNOSIS — I82.4Z2 LOWER LEG DVT (DEEP VENOUS THROMBOEMBOLISM), ACUTE, LEFT (HCC): ICD-10-CM

## 2023-10-19 DIAGNOSIS — I82.411 DEEP VEIN THROMBOSIS (DVT) OF FEMORAL VEIN OF RIGHT LOWER EXTREMITY, UNSPECIFIED CHRONICITY (HCC): ICD-10-CM

## 2023-10-19 DIAGNOSIS — D61.818 PANCYTOPENIA (HCC): ICD-10-CM

## 2023-10-19 DIAGNOSIS — I48.19 PERSISTENT ATRIAL FIBRILLATION (HCC): ICD-10-CM

## 2023-10-19 DIAGNOSIS — E53.8 VITAMIN B12 DEFICIENCY: ICD-10-CM

## 2023-10-19 DIAGNOSIS — H91.90 HEARING LOSS, UNSPECIFIED HEARING LOSS TYPE, UNSPECIFIED LATERALITY: ICD-10-CM

## 2023-10-19 LAB
ALBUMIN SERPL BCP-MCNC: 3.8 G/DL (ref 3.5–5)
ALP SERPL-CCNC: 81 U/L (ref 34–104)
ALT SERPL W P-5'-P-CCNC: 12 U/L (ref 7–52)
ANION GAP SERPL CALCULATED.3IONS-SCNC: 10 MMOL/L
AST SERPL W P-5'-P-CCNC: 15 U/L (ref 13–39)
BASOPHILS # BLD AUTO: 0.02 THOUSANDS/ÂΜL (ref 0–0.1)
BASOPHILS NFR BLD AUTO: 1 % (ref 0–1)
BILIRUB SERPL-MCNC: 0.79 MG/DL (ref 0.2–1)
BUN SERPL-MCNC: 20 MG/DL (ref 5–25)
CALCIUM SERPL-MCNC: 9.1 MG/DL (ref 8.4–10.2)
CHLORIDE SERPL-SCNC: 102 MMOL/L (ref 96–108)
CO2 SERPL-SCNC: 29 MMOL/L (ref 21–32)
CREAT SERPL-MCNC: 1.26 MG/DL (ref 0.6–1.3)
EOSINOPHIL # BLD AUTO: 0.1 THOUSAND/ÂΜL (ref 0–0.61)
EOSINOPHIL NFR BLD AUTO: 2 % (ref 0–6)
ERYTHROCYTE [DISTWIDTH] IN BLOOD BY AUTOMATED COUNT: 14 % (ref 11.6–15.1)
FERRITIN SERPL-MCNC: 459 NG/ML (ref 24–336)
FOLATE SERPL-MCNC: >22.3 NG/ML
GFR SERPL CREATININE-BSD FRML MDRD: 50 ML/MIN/1.73SQ M
GLUCOSE P FAST SERPL-MCNC: 142 MG/DL (ref 65–99)
HCT VFR BLD AUTO: 30.7 % (ref 36.5–49.3)
HGB BLD-MCNC: 9.8 G/DL (ref 12–17)
IMM GRANULOCYTES # BLD AUTO: 0.01 THOUSAND/UL (ref 0–0.2)
IMM GRANULOCYTES NFR BLD AUTO: 0 % (ref 0–2)
IRON SATN MFR SERPL: 28 % (ref 15–50)
IRON SERPL-MCNC: 68 UG/DL (ref 50–212)
LYMPHOCYTES # BLD AUTO: 1.36 THOUSANDS/ÂΜL (ref 0.6–4.47)
LYMPHOCYTES NFR BLD AUTO: 32 % (ref 14–44)
MCH RBC QN AUTO: 31.6 PG (ref 26.8–34.3)
MCHC RBC AUTO-ENTMCNC: 31.9 G/DL (ref 31.4–37.4)
MCV RBC AUTO: 99 FL (ref 82–98)
MONOCYTES # BLD AUTO: 0.32 THOUSAND/ÂΜL (ref 0.17–1.22)
MONOCYTES NFR BLD AUTO: 8 % (ref 4–12)
NEUTROPHILS # BLD AUTO: 2.39 THOUSANDS/ÂΜL (ref 1.85–7.62)
NEUTS SEG NFR BLD AUTO: 57 % (ref 43–75)
NRBC BLD AUTO-RTO: 0 /100 WBCS
PLATELET # BLD AUTO: 104 THOUSANDS/UL (ref 149–390)
PMV BLD AUTO: 10.5 FL (ref 8.9–12.7)
POTASSIUM SERPL-SCNC: 3.8 MMOL/L (ref 3.5–5.3)
PROT SERPL-MCNC: 6.4 G/DL (ref 6.4–8.4)
RBC # BLD AUTO: 3.1 MILLION/UL (ref 3.88–5.62)
SODIUM SERPL-SCNC: 141 MMOL/L (ref 135–147)
TIBC SERPL-MCNC: 239 UG/DL (ref 250–450)
UIBC SERPL-MCNC: 171 UG/DL (ref 155–355)
VIT B12 SERPL-MCNC: 276 PG/ML (ref 180–914)
WBC # BLD AUTO: 4.2 THOUSAND/UL (ref 4.31–10.16)

## 2023-10-19 PROCEDURE — 80053 COMPREHEN METABOLIC PANEL: CPT

## 2023-10-19 PROCEDURE — 82728 ASSAY OF FERRITIN: CPT

## 2023-10-19 PROCEDURE — 83540 ASSAY OF IRON: CPT

## 2023-10-19 PROCEDURE — 92511 NASOPHARYNGOSCOPY: CPT | Performed by: PHYSICIAN ASSISTANT

## 2023-10-19 PROCEDURE — 82607 VITAMIN B-12: CPT

## 2023-10-19 PROCEDURE — 85025 COMPLETE CBC W/AUTO DIFF WBC: CPT

## 2023-10-19 PROCEDURE — 83550 IRON BINDING TEST: CPT

## 2023-10-19 PROCEDURE — 99214 OFFICE O/P EST MOD 30 MIN: CPT | Performed by: PHYSICIAN ASSISTANT

## 2023-10-19 PROCEDURE — 82746 ASSAY OF FOLIC ACID SERUM: CPT

## 2023-10-19 NOTE — PROGRESS NOTES
[facility]'s Otolaryngology Follow up visit      Carlos Hartley is a 80 y.o. male who presents with a chief complaint of hearing loss    Independent historian: wife      Pertinent elements of the history:  Here for wax removal  Doing well  Last visit, was noted to have L JONAS after pneumonia    Hx of BMT x 2 in [place] years ago    Chronic runny nose, uses Atrovent 0.03% BID    No aural fullness  No hearing aids   No otalgia  No otorrhea     Review of any relevant imaging: images from any scan reviewed personally  Scans: MRI neck 06/2014 ordered for L ear blockage to rule out NP mass: small left otomastoid effusion. There is stable sclerosis of bilateral inferior mastoid air cells. Otherwise clear. Labs:   Notes:     Review of Systems:  As above    PMHx:  Past Medical History:   Diagnosis Date    Anxiety     Atrial fibrillation (HCC)     Atrial flutter (HCC)     Congestive heart failure (CHF) (HCC)     COPD (chronic obstructive pulmonary disease) (HCC)     Coronary artery disease     DVT (deep venous thrombosis) (HCC)     ED (erectile dysfunction)     Hearing loss     History of echocardiogram 04/05/2018    EF 0.55, Mild LVH. Mild moderate mitral regurg. Trace aortic regurg.     Hx of radiation therapy     XRT    Hypercholesterolemia     Hyperlipidemia     Hypertension     Long term (current) use of anticoagulants 8/21/2018    Neuropathy of both feet     Peripheral neuropathy     Prostate cancer (HCC)     Type 2 diabetes mellitus (HCC)         FAMHx:  Family History   Problem Relation Age of Onset    Cancer Brother         bladder    Colon cancer Brother     Heart disease Other     Hypertension Other     Cancer Other         bladder    Colon cancer Other        SOCHx:  Social History     Socioeconomic History    Marital status: /Civil Union     Spouse name: None    Number of children: None    Years of education: None    Highest education level: None   Occupational History    Occupation: Retired-[employer] Tobacco Use    Smoking status: Never    Smokeless tobacco: Never   Vaping Use    Vaping Use: Never used   Substance and Sexual Activity    Alcohol use: Not Currently    Drug use: Never    Sexual activity: None   Other Topics Concern    None   Social History Narrative    None     Social Determinants of Health     Financial Resource Strain: Low Risk  (9/18/2023)    Overall Financial Resource Strain (CARDIA)     Difficulty of Paying Living Expenses: Not hard at all   Food Insecurity: No Food Insecurity (8/14/2023)    Hunger Vital Sign     Worried About Running Out of Food in the Last Year: Never true     Ran Out of Food in the Last Year: Never true   Transportation Needs: No Transportation Needs (9/18/2023)    PRAPARE - Transportation     Lack of Transportation (Medical): No     Lack of Transportation (Non-Medical): No   Physical Activity: Not on file   Stress: Not on file   Social Connections: Not on file   Intimate Partner Violence: Not on file   Housing Stability: Low Risk  (8/14/2023)    Housing Stability Vital Sign     Unable to Pay for Housing in the Last Year: No     Number of Places Lived in the Last Year: 1     Unstable Housing in the Last Year: No       Allergies:  No Known Allergies     MEDS:  Reviewed      Physical exam: (abnormal findings appear in bold and supercede any conflicting normal findings listed below)    /72 (BP Location: Right arm, Cuff Size: Large)   Pulse 88   Temp 97.5 °F (36.4 °C) (Temporal)   Ht 6' 1" (1.854 m)   Wt 94.2 kg (207 lb 9.6 oz)   SpO2 99%   BMI 27.39 kg/m²     Constitutional:  Well developed, well nourished and groomed, in no acute distress. Eyes:  Extra-ocular movements intact, pupils equally round and reactive to light and accommodation, the lids and conjunctivae are normal in appearance.     Head: Atraumatic, normocephalic, no visible scalp lesions, bony palpation unremarkable without stepoffs, parotid and submandibular salivary glands non-tender to palpation and without masses bilaterally. Ears:  Auricles normal in appearance bilaterally, mastoid prominence non-tender, external auditory canals clear bilaterally, tympanic membranes intact bilaterally without evidence of middle ear effusion or masses, normal appearing ossicles. L JONAS vs. TM retraction. Nose/Sinuses:  External appearance unremarkable, no maxillary or frontal sinus tenderness to palpation bilaterally. Anterior rhinoscopy demonstrates pink mucosa. No polyps or other masses identified. Turbinates are non-edematous. No evidence of purulent drainage. Oral Cavity:  Moist mucus membranes, gums and dentition unremarkable, no oral mucosal masses or lesions, floor of mouth soft, tongue mobile without masses or lesions. Oropharynx:  Base of tongue soft and without masses, tonsils bilaterally unremarkable, soft palate mucosa unremarkable. Neck:  No visible or palpable cervical lesions or lymphadenopathy, thyroid gland is normal in size and symmetry and without masses, normal laryngeal elevation with swallowing. Cardiovascular:  Normal rate and rhythm, no palpable thrills, no jugulovenous distension observed. Respiratory:  Normal respiratory effort without evidence of retractions or use of accessory muscles. Integument:  Normal appearing without observed masses or lesions. Neurologic:  Cranial nerves II-XII intact bilaterally. Psychiatric:  Alert and oriented to time, place and person, normal affect. Procedure:      Nasopharyngoscopy          Performed by Valeria Griffin PA-C      Authorized by Valeria Griffin PA-C        Consent: Verbal consent obtained.   Consent given by: patient  Patient understanding: patient states understanding of the procedure being performed        Local anesthesia used: yes      Anesthesia    Local anesthesia used: yes  Local Anesthetic: topical anesthetic      Sedation    Patient sedated: no           Culture: no   Procedure Details    Procedure Notes:  Nasopharynx unremarkable, with normal eustachian tube orifices and normal Fossa of Rosenmuller bilaterally. Posterior nasopharyngeal wall normal.       Patient tolerance: Patient tolerated the procedure well with no immediate complications         Audiometry: defers    Assessment:  1. Non-recurrent acute serous otitis media of left ear        2. Dysfunction of left eustachian tube        3. Hearing loss, unspecified hearing loss type, unspecified laterality        4. Nonallergic rhinitis              Plan:  1. Nash Christensen is a 80 y.o. male with acute and chronic problems as above who presents for re-evaluation of his ears. He was having hearing loss L>R last visit. Was found to have a L samaniego effusion at that time. Today, he feels well. No significant blockage. TM is intact. Effusion vs. TM retraction. Defers hearing test    He has had BMT x 2 in [place] in the past. He is not interested in another tympanostomy tube placement. He is also asymptomatic, so it's reasonable to observe. NP scope is clear today. Continue Atrovent for RO. Offered to increase to 0.06% for better symptomatic control, but he would like to continue with 0.03% strength. F/u 6 months. ** Please Note: Portions of the record may have been created with voice recognition software. Occasional wrong word or "sound a like" substitutions may have occurred due to the inherent limitations of voice recognition software. There may also be notations and random deletions of words or characters from malfunctioning software. Read the chart carefully and recognize, using context, where substitutions/deletions have occurred. **

## 2023-10-20 ENCOUNTER — ANTICOAG VISIT (OUTPATIENT)
Dept: CARDIOLOGY CLINIC | Facility: CLINIC | Age: 88
End: 2023-10-20
Payer: COMMERCIAL

## 2023-10-20 DIAGNOSIS — E53.8 VITAMIN B12 DEFICIENCY: ICD-10-CM

## 2023-10-20 DIAGNOSIS — Z79.01 LONG TERM (CURRENT) USE OF ANTICOAGULANTS: ICD-10-CM

## 2023-10-20 DIAGNOSIS — D61.818 PANCYTOPENIA (HCC): Primary | ICD-10-CM

## 2023-10-20 DIAGNOSIS — D50.8 IRON DEFICIENCY ANEMIA SECONDARY TO INADEQUATE DIETARY IRON INTAKE: ICD-10-CM

## 2023-10-20 DIAGNOSIS — I48.19 PERSISTENT ATRIAL FIBRILLATION (HCC): Primary | ICD-10-CM

## 2023-10-20 PROCEDURE — 93793 ANTICOAG MGMT PT WARFARIN: CPT | Performed by: NURSE PRACTITIONER

## 2023-10-20 NOTE — PATIENT INSTRUCTIONS
INR received from lab and reviewed.     Increase to 6 mg daily  Recheck INR in one week-results and instructions given to pt by phone  Ector Weir, [address] [place] [address]

## 2023-10-27 ENCOUNTER — APPOINTMENT (OUTPATIENT)
Dept: LAB | Facility: MEDICAL CENTER | Age: 88
End: 2023-10-27
Payer: COMMERCIAL

## 2023-10-27 ENCOUNTER — ANTICOAG VISIT (OUTPATIENT)
Dept: CARDIOLOGY CLINIC | Facility: CLINIC | Age: 88
End: 2023-10-27
Payer: COMMERCIAL

## 2023-10-27 DIAGNOSIS — D61.818 PANCYTOPENIA (HCC): ICD-10-CM

## 2023-10-27 DIAGNOSIS — E53.8 VITAMIN B12 DEFICIENCY: ICD-10-CM

## 2023-10-27 DIAGNOSIS — I48.19 PERSISTENT ATRIAL FIBRILLATION (HCC): ICD-10-CM

## 2023-10-27 DIAGNOSIS — D50.8 IRON DEFICIENCY ANEMIA SECONDARY TO INADEQUATE DIETARY IRON INTAKE: ICD-10-CM

## 2023-10-27 DIAGNOSIS — Z79.01 LONG TERM (CURRENT) USE OF ANTICOAGULANTS: ICD-10-CM

## 2023-10-27 DIAGNOSIS — I48.19 PERSISTENT ATRIAL FIBRILLATION (HCC): Primary | ICD-10-CM

## 2023-10-27 LAB
FERRITIN SERPL-MCNC: 372 NG/ML (ref 24–336)
INR PPP: 1.86 (ref 0.84–1.19)
PROTHROMBIN TIME: 21.2 SECONDS (ref 11.6–14.5)
VIT B12 SERPL-MCNC: 260 PG/ML (ref 180–914)

## 2023-10-27 PROCEDURE — 82728 ASSAY OF FERRITIN: CPT

## 2023-10-27 PROCEDURE — 82607 VITAMIN B-12: CPT

## 2023-10-27 PROCEDURE — 93793 ANTICOAG MGMT PT WARFARIN: CPT | Performed by: PHYSICIAN ASSISTANT

## 2023-10-27 PROCEDURE — 85610 PROTHROMBIN TIME: CPT

## 2023-10-27 PROCEDURE — 36415 COLL VENOUS BLD VENIPUNCTURE: CPT

## 2023-10-27 NOTE — PATIENT INSTRUCTIONS
Spoke with Patient: No changes in medication health or diet. No missed or extra doses. No s/s of bleeding TIA or CVA. No new ABX, NSAIDs OTC meds, or supplements. Dose as instructed and recheck in two weeks.    [facility]ir[facility] LabsBAMBI

## 2023-11-06 ENCOUNTER — OFFICE VISIT (OUTPATIENT)
Dept: FAMILY MEDICINE CLINIC | Facility: CLINIC | Age: 88
End: 2023-11-06
Payer: COMMERCIAL

## 2023-11-06 VITALS
DIASTOLIC BLOOD PRESSURE: 76 MMHG | RESPIRATION RATE: 20 BRPM | HEART RATE: 84 BPM | SYSTOLIC BLOOD PRESSURE: 130 MMHG | TEMPERATURE: 96.3 F

## 2023-11-06 DIAGNOSIS — J18.9 MULTIFOCAL PNEUMONIA: ICD-10-CM

## 2023-11-06 DIAGNOSIS — E11.9 TYPE 2 DIABETES MELLITUS WITHOUT COMPLICATION, WITHOUT LONG-TERM CURRENT USE OF INSULIN (HCC): ICD-10-CM

## 2023-11-06 DIAGNOSIS — I11.0 HYPERTENSIVE HEART DISEASE WITH HEART FAILURE (HCC): ICD-10-CM

## 2023-11-06 DIAGNOSIS — E11.69 DYSLIPIDEMIA ASSOCIATED WITH TYPE 2 DIABETES MELLITUS: ICD-10-CM

## 2023-11-06 DIAGNOSIS — E78.5 DYSLIPIDEMIA ASSOCIATED WITH TYPE 2 DIABETES MELLITUS: ICD-10-CM

## 2023-11-06 DIAGNOSIS — R53.1 WEAKNESS GENERALIZED: Primary | ICD-10-CM

## 2023-11-06 DIAGNOSIS — I48.19 PERSISTENT ATRIAL FIBRILLATION (HCC): ICD-10-CM

## 2023-11-06 PROCEDURE — 99214 OFFICE O/P EST MOD 30 MIN: CPT | Performed by: FAMILY MEDICINE

## 2023-11-06 RX ORDER — BENZONATATE 200 MG/1
200 CAPSULE ORAL 3 TIMES DAILY PRN
Qty: 20 CAPSULE | Refills: 0 | Status: SHIPPED | OUTPATIENT
Start: 2023-11-06

## 2023-11-06 RX ORDER — AZITHROMYCIN 250 MG/1
TABLET, FILM COATED ORAL
Qty: 6 TABLET | Refills: 0 | Status: SHIPPED | OUTPATIENT
Start: 2023-11-06 | End: 2023-11-11

## 2023-11-06 NOTE — PROGRESS NOTES
Assessment/Plan: Fever with fatigue in a patient with a history of multifocal pneumonia. We will begin Zithromax and Tessalon Perles as needed for cough    Diabetes mellitus type 2 the patient states his blood sugar this morning was 110    Chronic congestive heart failure treated with Demadex and K. Dur    Persistent atrial fibrillation anticoagulated with Coumadin rate controlled with Toprol-XL 25    Problem List Items Addressed This Visit     Persistent atrial fibrillation ([address])    Hypertensive heart disease with heart failure ([address])    Type 2 diabetes mellitus without complication, without long-term current use of insulin ([address])   Other Visit Diagnoses     Weakness generalized    -  Primary    Multifocal pneumonia        Relevant Medications    azithromycin (Zithromax) 250 mg tablet    benzonatate (TESSALON) 200 MG capsule    Dyslipidemia associated with type 2 diabetes mellitus         Relevant Orders    IRIS Diabetic eye exam             Diagnoses and all orders for this visit:    Weakness generalized    Multifocal pneumonia  -     azithromycin (Zithromax) 250 mg tablet; Take 2 tablets (500 mg total) by mouth daily for 1 day, THEN 1 tablet (250 mg total) daily for 4 days. -     benzonatate (TESSALON) 200 MG capsule; Take 1 capsule (200 mg total) by mouth 3 (three) times a day as needed for cough    Type 2 diabetes mellitus without complication, without long-term current use of insulin (HCC)    Persistent atrial fibrillation ([address])    Hypertensive heart disease with heart failure ([address])    Dyslipidemia associated with type 2 diabetes mellitus   -     IRIS Diabetic eye exam        No problem-specific Assessment & Plan notes found for this encounter. PHQ-2/9 Depression Screening            There is no height or weight on file to calculate BMI. BMI Counseling: There is no height or weight on file to calculate BMI. The BMI     Subjective:      Patient ID: López Orourke is a 80 y.o. male.     Patient presents with cold-like symptoms of 4 days duration with fatigue fever nasal congestion. Had a fall yesterday has a mild abrasion of the back    Fatigue  Associated symptoms include fatigue and a fever. Pertinent negatives include no abdominal pain, arthralgias, chest pain, chills, coughing, rash, sore throat or vomiting. Fever  Associated symptoms include fatigue and a fever. Pertinent negatives include no abdominal pain, arthralgias, chest pain, chills, coughing, rash, sore throat or vomiting. Fall  Associated symptoms include a fever. Pertinent negatives include no abdominal pain, hematuria or vomiting. The following portions of the patient's history were reviewed and updated as appropriate:   He has a past medical history of Anxiety, Atrial fibrillation (HCC), Atrial flutter ([address]), Congestive heart failure (CHF) ([address]), COPD (chronic obstructive pulmonary disease) ([address]), Coronary artery disease, DVT (deep venous thrombosis) ([address]), ED (erectile dysfunction), Hearing loss, History of echocardiogram (04/05/2018), radiation therapy, Hypercholesterolemia, Hyperlipidemia, Hypertension, Long term (current) use of anticoagulants (8/21/2018), Neuropathy of both feet, Peripheral neuropathy, Prostate cancer ([address]), and Type 2 diabetes mellitus ([address]). ,  does not have any pertinent problems on file. ,   has a past surgical history that includes Cardiac catheterization (01/15/1988); Colonoscopy (10/05/2017); and Prostate surgery. ,  family history includes Cancer in his brother and other; Colon cancer in his brother and other; Heart disease in his other; Hypertension in his other. ,   reports that he has never smoked. He has never used smokeless tobacco. He reports that he does not currently use alcohol. He reports that he does not use drugs. ,  has No Known Allergies. .  Current Outpatient Medications   Medication Sig Dispense Refill   • azithromycin (Zithromax) 250 mg tablet Take 2 tablets (500 mg total) by mouth daily for 1 day, THEN 1 tablet (250 mg total) daily for 4 days. 6 tablet 0   • benzonatate (TESSALON) 200 MG capsule Take 1 capsule (200 mg total) by mouth 3 (three) times a day as needed for cough 20 capsule 0   • atorvastatin (LIPITOR) 40 mg tablet TAKE 1 TABLET BY MOUTH DAILY 90 tablet 3   • Blood Glucose Monitoring Suppl (OneTouch Verio Reflect) w/Device KIT Check blood sugars once daily. Please substitute with appropriate alternative as covered by patient's insurance. Dx: E11.65 1 kit 0   • busPIRone (BUSPAR) 7.5 mg tablet Take 1 tablet (7.5 mg total) by mouth 2 (two) times a day 180 tablet 3   • econazole nitrate 1 % cream      • ferrous gluconate (FERGON) 240 (27 FE) MG tablet Take 1 tablet (240 mg total) by mouth 2 (two) times a day before lunch and dinner 317 tablet 1   • folic acid (FOLVITE) 1 mg tablet Take 1 tablet (1 mg total) by mouth daily 90 tablet 1   • glimepiride (AMARYL) 1 mg tablet Take 1 tablet (1 mg total) by mouth daily with breakfast 90 tablet 3   • glucose blood (OneTouch Verio) test strip Check blood sugars once daily. Please substitute with appropriate alternative as covered by patient's insurance. Dx: E11.65 100 each 3   • ipratropium (ATROVENT) 0.03 % nasal spray 2 sprays into each nostril 3 (three) times a day 30 mL 5   • loperamide (IMODIUM A-D) 2 MG tablet Take 2 mg by mouth 3 (three) times a day as needed for diarrhea. Indications: Diarrhea     • Magnesium Oxide 420 MG TABS Take 1 tablet (420 mg total) by mouth 2 (two) times a day 60 tablet 0   • metoprolol succinate (Toprol XL) 25 mg 24 hr tablet Take 1 tablet (25 mg total) by mouth daily 90 tablet 3   • OneTouch Delica Lancets 06P MISC USE TO TEST ONCE DAILY 100 each 5   • OneTouch Delica Lancets 75D MISC Check blood sugars once daily. Please substitute with appropriate alternative as covered by patient's insurance.  Dx: E11.65 100 each 3   • OneTouch Ultra test strip TEST ONCE DAILY 100 each 1   • potassium chloride (K-DUR,KLOR-CON) 20 mEq tablet Take 1 tablet (20 mEq total) by mouth daily 30 tablet 3   • pyridoxine (VITAMIN B6) 50 mg tablet TAKE 1 TABLET BY MOUTH EVERY  tablet 0   • saccharomyces boulardii (FLORASTOR) 250 mg capsule Take 1 capsule (250 mg total) by mouth 2 (two) times a day 60 capsule 3   • tamsulosin (FLOMAX) 0.4 mg Take 1 capsule (0.4 mg total) by mouth daily with dinner 90 capsule 3   • torsemide (DEMADEX) 100 mg tablet Take 0.5 tablets (50 mg total) by mouth daily 45 tablet 1   • warfarin (COUMADIN) 4 mg tablet TAKE 2 TABLETS BY MOUTH DAILY OR AS DIRECTED (Patient taking differently: No sig reported) 180 tablet 5     No current facility-administered medications for this visit. Review of Systems   Constitutional:  Positive for fatigue and fever. Negative for chills. HENT:  Negative for ear pain and sore throat. Eyes:  Negative for pain and visual disturbance. Respiratory:  Negative for cough and shortness of breath. Cardiovascular:  Negative for chest pain and palpitations. Gastrointestinal:  Negative for abdominal pain and vomiting. Genitourinary:  Negative for dysuria and hematuria. Musculoskeletal:  Negative for arthralgias and back pain. Skin:  Negative for color change and rash. Neurological:  Negative for seizures and syncope. All other systems reviewed and are negative. Objective:    /76   Pulse 84   Temp (!) 96.3 °F (35.7 °C) Comment: tylenol 650mg at 930am  Resp 20   There is no height or weight on file to calculate BMI. Physical Exam  Constitutional:       Appearance: Normal appearance. He is well-developed. HENT:      Head: Normocephalic and atraumatic. Right Ear: Tympanic membrane, ear canal and external ear normal.      Left Ear: Tympanic membrane, ear canal and external ear normal.      Nose: Nose normal.      Mouth/Throat:      Mouth: Mucous membranes are moist.      Pharynx: Oropharynx is clear.    Eyes:      Extraocular Movements: Extraocular movements intact. Conjunctiva/sclera: Conjunctivae normal.      Pupils: Pupils are equal, round, and reactive to light. Cardiovascular:      Rate and Rhythm: Normal rate. Rhythm irregular. Pulses: Normal pulses. Heart sounds: Normal heart sounds. Pulmonary:      Effort: Pulmonary effort is normal.      Breath sounds: Normal breath sounds. Abdominal:      General: Abdomen is flat. Bowel sounds are normal.      Palpations: Abdomen is soft. Tenderness: There is no abdominal tenderness. Musculoskeletal:         General: Normal range of motion. Cervical back: Normal range of motion and neck supple. Skin:     General: Skin is warm and dry. Capillary Refill: Capillary refill takes less than 2 seconds. Neurological:      General: No focal deficit present. Mental Status: He is alert and oriented to person, place, and time. Psychiatric:         Mood and Affect: Mood normal.         Behavior: Behavior normal.         Thought Content:  Thought content normal.         Judgment: Judgment normal.

## 2023-11-14 ENCOUNTER — TELEPHONE (OUTPATIENT)
Dept: CARDIOLOGY CLINIC | Facility: CLINIC | Age: 88
End: 2023-11-14

## 2023-11-16 ENCOUNTER — APPOINTMENT (OUTPATIENT)
Dept: LAB | Facility: MEDICAL CENTER | Age: 88
End: 2023-11-16
Payer: COMMERCIAL

## 2023-11-16 DIAGNOSIS — I48.19 PERSISTENT ATRIAL FIBRILLATION (HCC): ICD-10-CM

## 2023-11-16 DIAGNOSIS — Z79.01 LONG TERM (CURRENT) USE OF ANTICOAGULANTS: ICD-10-CM

## 2023-11-16 LAB
INR PPP: 2.25 (ref 0.84–1.19)
PROTHROMBIN TIME: 24.4 SECONDS (ref 11.6–14.5)

## 2023-11-16 PROCEDURE — 36415 COLL VENOUS BLD VENIPUNCTURE: CPT

## 2023-11-16 PROCEDURE — 85610 PROTHROMBIN TIME: CPT

## 2023-11-17 ENCOUNTER — ANTICOAG VISIT (OUTPATIENT)
Dept: CARDIOLOGY CLINIC | Facility: CLINIC | Age: 88
End: 2023-11-17
Payer: COMMERCIAL

## 2023-11-17 DIAGNOSIS — Z79.01 LONG TERM (CURRENT) USE OF ANTICOAGULANTS: ICD-10-CM

## 2023-11-17 DIAGNOSIS — I48.19 PERSISTENT ATRIAL FIBRILLATION (HCC): Primary | ICD-10-CM

## 2023-11-17 PROCEDURE — 93793 ANTICOAG MGMT PT WARFARIN: CPT | Performed by: PHYSICIAN ASSISTANT

## 2023-11-17 NOTE — PATIENT INSTRUCTIONS
Spoke with Patient: No changes in medication health or diet. No missed or extra doses. No s/s of bleeding TIA or CVA. No new ABX, NSAIDs OTC meds, or supplements. Dose as instructed and recheck in one month.    Molly Borjas PA-C

## 2023-11-29 LAB
LEFT EYE DIABETIC RETINOPATHY: POSITIVE
RIGHT EYE DIABETIC RETINOPATHY: POSITIVE
SEVERITY (EYE EXAM): NORMAL

## 2023-11-30 ENCOUNTER — APPOINTMENT (OUTPATIENT)
Dept: LAB | Facility: MEDICAL CENTER | Age: 88
End: 2023-11-30
Payer: COMMERCIAL

## 2023-11-30 DIAGNOSIS — I48.19 PERSISTENT ATRIAL FIBRILLATION (HCC): ICD-10-CM

## 2023-11-30 DIAGNOSIS — Z79.01 LONG TERM (CURRENT) USE OF ANTICOAGULANTS: ICD-10-CM

## 2023-11-30 LAB
INR PPP: 2.32 (ref 0.84–1.19)
PROTHROMBIN TIME: 25 SECONDS (ref 11.6–14.5)

## 2023-11-30 PROCEDURE — 36415 COLL VENOUS BLD VENIPUNCTURE: CPT

## 2023-11-30 PROCEDURE — 85610 PROTHROMBIN TIME: CPT

## 2023-12-01 ENCOUNTER — ANTICOAG VISIT (OUTPATIENT)
Dept: CARDIOLOGY CLINIC | Facility: CLINIC | Age: 88
End: 2023-12-01
Payer: COMMERCIAL

## 2023-12-01 DIAGNOSIS — Z79.01 LONG TERM (CURRENT) USE OF ANTICOAGULANTS: ICD-10-CM

## 2023-12-01 DIAGNOSIS — I48.19 PERSISTENT ATRIAL FIBRILLATION (HCC): Primary | ICD-10-CM

## 2023-12-01 PROCEDURE — 93793 ANTICOAG MGMT PT WARFARIN: CPT | Performed by: NURSE PRACTITIONER

## 2023-12-01 NOTE — PATIENT INSTRUCTIONS
INR received from lab and reviewed. No changes needed; continue current dosing schedule.   Recheck INR in one month-results and instructions given to pt by phone  Ector Weir, [address] [place] [address]

## 2023-12-10 ENCOUNTER — RA CDI HCC (OUTPATIENT)
Dept: OTHER | Facility: HOSPITAL | Age: 88
End: 2023-12-10

## 2023-12-10 NOTE — PROGRESS NOTES
E11.65, E11.22, E11.51  [address] [facility] coding opportunities          Chart Reviewed number of suggestions sent to Provider: 3     Patients Insurance     Medicare Insurance: [employer]

## 2023-12-16 ENCOUNTER — TELEPHONE (OUTPATIENT)
Dept: OTHER | Facility: OTHER | Age: 88
End: 2023-12-16

## 2023-12-16 ENCOUNTER — LAB (OUTPATIENT)
Dept: LAB | Facility: MEDICAL CENTER | Age: 88
DRG: 835 | End: 2023-12-16
Payer: COMMERCIAL

## 2023-12-16 DIAGNOSIS — E11.9 TYPE 2 DIABETES MELLITUS WITHOUT COMPLICATION, WITHOUT LONG-TERM CURRENT USE OF INSULIN (HCC): ICD-10-CM

## 2023-12-16 LAB
ACANTHOCYTES BLD QL SMEAR: PRESENT
ALBUMIN SERPL BCP-MCNC: 3.8 G/DL (ref 3.5–5)
ALP SERPL-CCNC: 89 U/L (ref 34–104)
ALT SERPL W P-5'-P-CCNC: 9 U/L (ref 7–52)
ANION GAP SERPL CALCULATED.3IONS-SCNC: 7 MMOL/L
ANISOCYTOSIS BLD QL SMEAR: PRESENT
AST SERPL W P-5'-P-CCNC: 12 U/L (ref 13–39)
BASOPHILS # BLD MANUAL: 0.01 THOUSAND/UL (ref 0–0.1)
BASOPHILS NFR MAR MANUAL: 1 % (ref 0–1)
BILIRUB SERPL-MCNC: 1.33 MG/DL (ref 0.2–1)
BUN SERPL-MCNC: 27 MG/DL (ref 5–25)
CALCIUM SERPL-MCNC: 9 MG/DL (ref 8.4–10.2)
CHLORIDE SERPL-SCNC: 107 MMOL/L (ref 96–108)
CHOLEST SERPL-MCNC: 95 MG/DL
CO2 SERPL-SCNC: 28 MMOL/L (ref 21–32)
CREAT SERPL-MCNC: 1.45 MG/DL (ref 0.6–1.3)
CREAT UR-MCNC: 96.5 MG/DL
EOSINOPHIL # BLD MANUAL: 0.01 THOUSAND/UL (ref 0–0.4)
EOSINOPHIL NFR BLD MANUAL: 1 % (ref 0–6)
ERYTHROCYTE [DISTWIDTH] IN BLOOD BY AUTOMATED COUNT: 16.9 % (ref 11.6–15.1)
EST. AVERAGE GLUCOSE BLD GHB EST-MCNC: 169 MG/DL
GFR SERPL CREATININE-BSD FRML MDRD: 42 ML/MIN/1.73SQ M
GLUCOSE P FAST SERPL-MCNC: 138 MG/DL (ref 65–99)
HBA1C MFR BLD: 7.5 %
HCT VFR BLD AUTO: 20.7 % (ref 36.5–49.3)
HDLC SERPL-MCNC: 35 MG/DL
HGB BLD-MCNC: 7.1 G/DL (ref 12–17)
LDLC SERPL CALC-MCNC: 48 MG/DL (ref 0–100)
LYMPHOCYTES # BLD AUTO: 1.07 THOUSAND/UL (ref 0.6–4.47)
LYMPHOCYTES # BLD AUTO: 70 % (ref 14–44)
MCH RBC QN AUTO: 32.9 PG (ref 26.8–34.3)
MCHC RBC AUTO-ENTMCNC: 34.3 G/DL (ref 31.4–37.4)
MCV RBC AUTO: 96 FL (ref 82–98)
MICROALBUMIN UR-MCNC: <7 MG/L
MICROALBUMIN/CREAT 24H UR: <7 MG/G CREATININE (ref 0–30)
MONOCYTES # BLD AUTO: 0.09 THOUSAND/UL (ref 0–1.22)
MONOCYTES NFR BLD: 6 % (ref 4–12)
NEUTROPHILS # BLD MANUAL: 0.3 THOUSAND/UL (ref 1.85–7.62)
NEUTS BAND NFR BLD MANUAL: 2 % (ref 0–8)
NEUTS SEG NFR BLD AUTO: 18 % (ref 43–75)
NRBC BLD AUTO-RTO: 3 /100 WBC (ref 0–2)
OVALOCYTES BLD QL SMEAR: PRESENT
PLATELET BLD QL SMEAR: ABNORMAL
PMV BLD AUTO: 14.4 FL (ref 8.9–12.7)
POIKILOCYTOSIS BLD QL SMEAR: PRESENT
POTASSIUM SERPL-SCNC: 4 MMOL/L (ref 3.5–5.3)
PROT SERPL-MCNC: 6.5 G/DL (ref 6.4–8.4)
RBC # BLD AUTO: 2.16 MILLION/UL (ref 3.88–5.62)
RBC MORPH BLD: PRESENT
SODIUM SERPL-SCNC: 142 MMOL/L (ref 135–147)
TRIGL SERPL-MCNC: 60 MG/DL
TSH SERPL DL<=0.05 MIU/L-ACNC: 2.24 UIU/ML (ref 0.45–4.5)
VARIANT LYMPHS # BLD AUTO: 2 %
WBC # BLD AUTO: 1.49 THOUSAND/UL (ref 4.31–10.16)

## 2023-12-16 PROCEDURE — 80053 COMPREHEN METABOLIC PANEL: CPT

## 2023-12-16 PROCEDURE — 82043 UR ALBUMIN QUANTITATIVE: CPT

## 2023-12-16 PROCEDURE — 85007 BL SMEAR W/DIFF WBC COUNT: CPT

## 2023-12-16 PROCEDURE — 85027 COMPLETE CBC AUTOMATED: CPT

## 2023-12-16 PROCEDURE — 82570 ASSAY OF URINE CREATININE: CPT

## 2023-12-16 PROCEDURE — 80061 LIPID PANEL: CPT

## 2023-12-16 PROCEDURE — 36415 COLL VENOUS BLD VENIPUNCTURE: CPT

## 2023-12-16 PROCEDURE — 84443 ASSAY THYROID STIM HORMONE: CPT

## 2023-12-16 PROCEDURE — 83036 HEMOGLOBIN GLYCOSYLATED A1C: CPT

## 2023-12-17 ENCOUNTER — HOSPITAL ENCOUNTER (INPATIENT)
Facility: HOSPITAL | Age: 88
LOS: 4 days | Discharge: HOME | DRG: 835 | End: 2023-12-21
Attending: EMERGENCY MEDICINE | Admitting: INTERNAL MEDICINE
Payer: COMMERCIAL

## 2023-12-17 DIAGNOSIS — C92.00 ACUTE MYELOID LEUKEMIA NOT HAVING ACHIEVED REMISSION (HCC): ICD-10-CM

## 2023-12-17 DIAGNOSIS — D61.818 PANCYTOPENIA (HCC): Primary | ICD-10-CM

## 2023-12-17 DIAGNOSIS — R21 RASH AND NONSPECIFIC SKIN ERUPTION: ICD-10-CM

## 2023-12-17 LAB
2HR DELTA HS TROPONIN: 12 NG/L
4HR DELTA HS TROPONIN: 22 NG/L
ABO GROUP BLD: NORMAL
ACANTHOCYTES BLD QL SMEAR: PRESENT
ALBUMIN SERPL BCP-MCNC: 4.2 G/DL (ref 3.5–5)
ALP SERPL-CCNC: 87 U/L (ref 34–104)
ALT SERPL W P-5'-P-CCNC: 11 U/L (ref 7–52)
ANION GAP SERPL CALCULATED.3IONS-SCNC: 9 MMOL/L
ANISOCYTOSIS BLD QL SMEAR: PRESENT
APTT PPP: 41 SECONDS (ref 23–37)
AST SERPL W P-5'-P-CCNC: 15 U/L (ref 13–39)
BASOPHILS # BLD MANUAL: 0 THOUSAND/UL (ref 0–0.1)
BASOPHILS NFR MAR MANUAL: 0 % (ref 0–1)
BILIRUB SERPL-MCNC: 1.6 MG/DL (ref 0.2–1)
BLD GP AB SCN SERPL QL: NEGATIVE
BUN SERPL-MCNC: 25 MG/DL (ref 5–25)
CALCIUM SERPL-MCNC: 9.3 MG/DL (ref 8.4–10.2)
CARDIAC TROPONIN I PNL SERPL HS: 44 NG/L
CARDIAC TROPONIN I PNL SERPL HS: 56 NG/L
CARDIAC TROPONIN I PNL SERPL HS: 66 NG/L
CHLORIDE SERPL-SCNC: 105 MMOL/L (ref 96–108)
CO2 SERPL-SCNC: 28 MMOL/L (ref 21–32)
CREAT SERPL-MCNC: 1.39 MG/DL (ref 0.6–1.3)
EOSINOPHIL # BLD MANUAL: 0 THOUSAND/UL (ref 0–0.4)
EOSINOPHIL NFR BLD MANUAL: 0 % (ref 0–6)
ERYTHROCYTE [DISTWIDTH] IN BLOOD BY AUTOMATED COUNT: 16.9 % (ref 11.6–15.1)
GFR SERPL CREATININE-BSD FRML MDRD: 44 ML/MIN/1.73SQ M
GIANT PLATELETS BLD QL SMEAR: PRESENT
GLUCOSE SERPL-MCNC: 128 MG/DL (ref 65–140)
GLUCOSE SERPL-MCNC: 84 MG/DL (ref 65–140)
HCT VFR BLD AUTO: 21.8 % (ref 36.5–49.3)
HGB BLD-MCNC: 7.1 G/DL (ref 12–17)
INR PPP: 2.13 (ref 0.84–1.19)
LDH SERPL-CCNC: 81 U/L (ref 140–271)
LYMPHOCYTES # BLD AUTO: 0.76 THOUSAND/UL (ref 0.6–4.47)
LYMPHOCYTES # BLD AUTO: 54 % (ref 14–44)
MCH RBC QN AUTO: 31.6 PG (ref 26.8–34.3)
MCHC RBC AUTO-ENTMCNC: 32.6 G/DL (ref 31.4–37.4)
MCV RBC AUTO: 97 FL (ref 82–98)
MICROCYTES BLD QL AUTO: PRESENT
MONOCYTES # BLD AUTO: 0.06 THOUSAND/UL (ref 0–1.22)
MONOCYTES NFR BLD: 4 % (ref 4–12)
NEUTROPHILS # BLD MANUAL: 0.59 THOUSAND/UL (ref 1.85–7.62)
NEUTS SEG NFR BLD AUTO: 42 % (ref 43–75)
OVALOCYTES BLD QL SMEAR: PRESENT
PLATELET # BLD AUTO: 29 THOUSANDS/UL (ref 149–390)
PLATELET BLD QL SMEAR: ABNORMAL
POTASSIUM SERPL-SCNC: 3.8 MMOL/L (ref 3.5–5.3)
PROT SERPL-MCNC: 6.9 G/DL (ref 6.4–8.4)
PROTHROMBIN TIME: 23.4 SECONDS (ref 11.6–14.5)
RBC # BLD AUTO: 2.25 MILLION/UL (ref 3.88–5.62)
RBC MORPH BLD: PRESENT
RETICS # AUTO: NORMAL 10*3/UL (ref 14356–105094)
RETICS # CALC: 0.78 % (ref 0.37–1.87)
RH BLD: POSITIVE
SICKLE CELLS BLD QL SMEAR: PRESENT
SODIUM SERPL-SCNC: 142 MMOL/L (ref 135–147)
SPECIMEN EXPIRATION DATE: NORMAL
WBC # BLD AUTO: 1.41 THOUSAND/UL (ref 4.31–10.16)

## 2023-12-17 PROCEDURE — 30233N1 TRANSFUSION OF NONAUTOLOGOUS RED BLOOD CELLS INTO PERIPHERAL VEIN, PERCUTANEOUS APPROACH: ICD-10-PCS | Performed by: EMERGENCY MEDICINE

## 2023-12-17 PROCEDURE — 86923 COMPATIBILITY TEST ELECTRIC: CPT

## 2023-12-17 PROCEDURE — P9016 RBC LEUKOCYTES REDUCED: HCPCS

## 2023-12-17 PROCEDURE — 86901 BLOOD TYPING SEROLOGIC RH(D): CPT | Performed by: EMERGENCY MEDICINE

## 2023-12-17 PROCEDURE — 99283 EMERGENCY DEPT VISIT LOW MDM: CPT

## 2023-12-17 PROCEDURE — 85045 AUTOMATED RETICULOCYTE COUNT: CPT | Performed by: EMERGENCY MEDICINE

## 2023-12-17 PROCEDURE — 83010 ASSAY OF HAPTOGLOBIN QUANT: CPT | Performed by: EMERGENCY MEDICINE

## 2023-12-17 PROCEDURE — 82948 REAGENT STRIP/BLOOD GLUCOSE: CPT

## 2023-12-17 PROCEDURE — 85027 COMPLETE CBC AUTOMATED: CPT | Performed by: EMERGENCY MEDICINE

## 2023-12-17 PROCEDURE — 84484 ASSAY OF TROPONIN QUANT: CPT | Performed by: EMERGENCY MEDICINE

## 2023-12-17 PROCEDURE — 99285 EMERGENCY DEPT VISIT HI MDM: CPT | Performed by: EMERGENCY MEDICINE

## 2023-12-17 PROCEDURE — 85007 BL SMEAR W/DIFF WBC COUNT: CPT | Performed by: EMERGENCY MEDICINE

## 2023-12-17 PROCEDURE — 83615 LACTATE (LD) (LDH) ENZYME: CPT | Performed by: EMERGENCY MEDICINE

## 2023-12-17 PROCEDURE — 86850 RBC ANTIBODY SCREEN: CPT | Performed by: EMERGENCY MEDICINE

## 2023-12-17 PROCEDURE — 36430 TRANSFUSION BLD/BLD COMPNT: CPT

## 2023-12-17 PROCEDURE — 85610 PROTHROMBIN TIME: CPT | Performed by: EMERGENCY MEDICINE

## 2023-12-17 PROCEDURE — 99223 1ST HOSP IP/OBS HIGH 75: CPT | Performed by: INTERNAL MEDICINE

## 2023-12-17 PROCEDURE — 36415 COLL VENOUS BLD VENIPUNCTURE: CPT | Performed by: EMERGENCY MEDICINE

## 2023-12-17 PROCEDURE — 80053 COMPREHEN METABOLIC PANEL: CPT | Performed by: EMERGENCY MEDICINE

## 2023-12-17 PROCEDURE — 86900 BLOOD TYPING SEROLOGIC ABO: CPT | Performed by: EMERGENCY MEDICINE

## 2023-12-17 PROCEDURE — 85730 THROMBOPLASTIN TIME PARTIAL: CPT | Performed by: EMERGENCY MEDICINE

## 2023-12-17 PROCEDURE — 84484 ASSAY OF TROPONIN QUANT: CPT | Performed by: PHYSICIAN ASSISTANT

## 2023-12-17 RX ORDER — METOPROLOL SUCCINATE 25 MG/1
25 TABLET, EXTENDED RELEASE ORAL DAILY
Status: DISCONTINUED | OUTPATIENT
Start: 2023-12-17 | End: 2023-12-21 | Stop reason: HOSPADM

## 2023-12-17 RX ORDER — FOLIC ACID 1 MG/1
1 TABLET ORAL DAILY
Status: DISCONTINUED | OUTPATIENT
Start: 2023-12-17 | End: 2023-12-20

## 2023-12-17 RX ORDER — POTASSIUM CHLORIDE 20 MEQ/1
20 TABLET, EXTENDED RELEASE ORAL DAILY
Status: DISCONTINUED | OUTPATIENT
Start: 2023-12-17 | End: 2023-12-21 | Stop reason: HOSPADM

## 2023-12-17 RX ORDER — FUROSEMIDE 10 MG/ML
20 INJECTION INTRAMUSCULAR; INTRAVENOUS ONCE
Status: COMPLETED | OUTPATIENT
Start: 2023-12-17 | End: 2023-12-17

## 2023-12-17 RX ORDER — LOPERAMIDE HYDROCHLORIDE 2 MG/1
2 CAPSULE ORAL 4 TIMES DAILY PRN
Status: DISCONTINUED | OUTPATIENT
Start: 2023-12-17 | End: 2023-12-21 | Stop reason: HOSPADM

## 2023-12-17 RX ORDER — IPRATROPIUM BROMIDE 21 UG/1
2 SPRAY, METERED NASAL 3 TIMES DAILY
Status: DISCONTINUED | OUTPATIENT
Start: 2023-12-17 | End: 2023-12-21 | Stop reason: HOSPADM

## 2023-12-17 RX ORDER — FERROUS GLUCONATE 324(38)MG
324 TABLET ORAL
Status: DISCONTINUED | OUTPATIENT
Start: 2023-12-17 | End: 2023-12-21 | Stop reason: HOSPADM

## 2023-12-17 RX ORDER — MAGNESIUM OXIDE 400 MG/1
400 TABLET ORAL 2 TIMES DAILY
Status: DISCONTINUED | OUTPATIENT
Start: 2023-12-17 | End: 2023-12-17

## 2023-12-17 RX ORDER — ACETAMINOPHEN 325 MG/1
650 TABLET ORAL EVERY 6 HOURS PRN
Status: DISCONTINUED | OUTPATIENT
Start: 2023-12-17 | End: 2023-12-21 | Stop reason: HOSPADM

## 2023-12-17 RX ORDER — SACCHAROMYCES BOULARDII 250 MG
250 CAPSULE ORAL 2 TIMES DAILY
Status: DISCONTINUED | OUTPATIENT
Start: 2023-12-17 | End: 2023-12-21 | Stop reason: HOSPADM

## 2023-12-17 RX ORDER — BUSPIRONE HYDROCHLORIDE 5 MG/1
7.5 TABLET ORAL 2 TIMES DAILY
Status: DISCONTINUED | OUTPATIENT
Start: 2023-12-17 | End: 2023-12-21 | Stop reason: HOSPADM

## 2023-12-17 RX ORDER — ATORVASTATIN CALCIUM 40 MG/1
40 TABLET, FILM COATED ORAL DAILY
Status: DISCONTINUED | OUTPATIENT
Start: 2023-12-17 | End: 2023-12-21 | Stop reason: HOSPADM

## 2023-12-17 RX ORDER — ONDANSETRON 2 MG/ML
4 INJECTION INTRAMUSCULAR; INTRAVENOUS EVERY 6 HOURS PRN
Status: DISCONTINUED | OUTPATIENT
Start: 2023-12-17 | End: 2023-12-21 | Stop reason: HOSPADM

## 2023-12-17 RX ORDER — LANOLIN ALCOHOL/MO/W.PET/CERES
400 CREAM (GRAM) TOPICAL 2 TIMES DAILY
Status: DISCONTINUED | OUTPATIENT
Start: 2023-12-17 | End: 2023-12-21 | Stop reason: HOSPADM

## 2023-12-17 RX ORDER — INSULIN LISPRO 100 [IU]/ML
1-6 INJECTION, SOLUTION INTRAVENOUS; SUBCUTANEOUS
Status: DISCONTINUED | OUTPATIENT
Start: 2023-12-17 | End: 2023-12-21 | Stop reason: HOSPADM

## 2023-12-17 RX ORDER — TORSEMIDE 100 MG/1
50 TABLET ORAL DAILY
Status: DISCONTINUED | OUTPATIENT
Start: 2023-12-17 | End: 2023-12-21 | Stop reason: HOSPADM

## 2023-12-17 RX ORDER — TAMSULOSIN HYDROCHLORIDE 0.4 MG/1
0.4 CAPSULE ORAL
Status: DISCONTINUED | OUTPATIENT
Start: 2023-12-17 | End: 2023-12-21 | Stop reason: HOSPADM

## 2023-12-17 RX ORDER — PYRIDOXINE HCL (VITAMIN B6) 50 MG
50 TABLET ORAL DAILY
Status: DISCONTINUED | OUTPATIENT
Start: 2023-12-17 | End: 2023-12-21 | Stop reason: HOSPADM

## 2023-12-17 RX ADMIN — Medication 250 MG: at 17:26

## 2023-12-17 RX ADMIN — POTASSIUM CHLORIDE 20 MEQ: 1500 TABLET, EXTENDED RELEASE ORAL at 15:51

## 2023-12-17 RX ADMIN — IPRATROPIUM BROMIDE 2 SPRAY: 21 SPRAY, METERED NASAL at 20:29

## 2023-12-17 RX ADMIN — FOLIC ACID 1 MG: 1 TABLET ORAL at 15:51

## 2023-12-17 RX ADMIN — FUROSEMIDE 20 MG: 10 INJECTION, SOLUTION INTRAMUSCULAR; INTRAVENOUS at 18:27

## 2023-12-17 RX ADMIN — Medication 50 MG: at 15:51

## 2023-12-17 RX ADMIN — TAMSULOSIN HYDROCHLORIDE 0.4 MG: 0.4 CAPSULE ORAL at 15:51

## 2023-12-17 RX ADMIN — Medication 400 MG: at 17:26

## 2023-12-17 RX ADMIN — BUSPIRONE HYDROCHLORIDE 7.5 MG: 5 TABLET ORAL at 17:26

## 2023-12-17 RX ADMIN — ATORVASTATIN CALCIUM 40 MG: 40 TABLET, FILM COATED ORAL at 15:53

## 2023-12-17 NOTE — ASSESSMENT & PLAN NOTE
Wt Readings from Last 3 Encounters:   12/17/23 94.2 kg (207 lb 10.8 oz)   10/19/23 94.2 kg (207 lb 9.6 oz)   10/17/23 95.7 kg (211 lb)       Does not appear to be in acute exacerbation at this time  Receiving lasix with blood transfusion   Continue on home torsemide

## 2023-12-17 NOTE — ED PROVIDER NOTES
EMERGENCY DEPARTMENT ENCOUNTER NOTE    This note has been generated using a voice recognition software. There may be typographic, grammatic, or word substitution errors that have escaped editorial review.    Emergency Department Note- Freddy Copeland 88 y.o. male MRN: [id number]    Unit/Bed#: RM14 Encounter: [id number]  ?  CHIEF COMPLAINT  Chief Complaint   Patient presents with    Abnormal Lab     Patient reports had blood work done yesterday and last night received a phone call stating his platelets were low. Patient offers no complaints at this time.        HPI  Freddy Copeland is a 88 y.o. male with PMH of CAD, CHF, Afib on warfarin, DM recent issues with pancytopenia followed by hematology, presenting with abnormal outpatient labs. Patient hospitalized earlier this year with cryptogenic organizing pneumonia, found to have pancytopenia at that time. The pneumonia resolved, patient had follow up with both pulm and with heme onc and during heme-onc follow up on 10/17, his pancytopenia was improving and plan was for surveillance. Patient had outpatient labs in preparation for his PCP visit on 12/18 and was found to have worsening pancytopenia and his plt count could not be estimated due to clumping. He was referred to ER for further evaluation. On ROS, patient does note increased fatigue, as well as maybe some vague chest pressure for the past two nights, but reports feeling at baseline otherwise: no chest pain at present, no shortness of breath. No N/V/D. No abd pain. No blood in stool. Has been taking all medications and folate as prescribed.     REVIEW OF SYSTEMS    Constitutional: No fevers  Cardiac: no chest pain at present, as above otherwise  Respiratory: no shortness of breath, no cough  GI: no abdominal pain  Endocrine: history of diabetes  HemeOnc: pancytopenia, as above  Neuro: no new focal weakness or numbness    PAST MEDICAL HISTORY  Past Medical History:   Diagnosis Date    Anxiety     Atrial  fibrillation (HCC)     Atrial flutter (HCC)     Congestive heart failure (CHF) (HCC)     COPD (chronic obstructive pulmonary disease) (HCC)     Coronary artery disease     DVT (deep venous thrombosis) (HCC)     ED (erectile dysfunction)     Hearing loss     History of echocardiogram 04/05/2018    EF 0.55, Mild LVH. Mild moderate mitral regurg. Trace aortic regurg.    Hx of radiation therapy     XRT    Hypercholesterolemia     Hyperlipidemia     Hypertension     Long term (current) use of anticoagulants 8/21/2018    Neuropathy of both feet     Peripheral neuropathy     Prostate cancer (HCC)     Type 2 diabetes mellitus (HCC)        SURGICAL HISTORY  Past Surgical History:   Procedure Laterality Date    CARDIAC CATHETERIZATION  01/15/1988    Left main: unobstructed. Posterolateral branch 90% narrowed.    COLONOSCOPY  10/05/2017    Dr. Saeed High    PROSTATE SURGERY         FAMILY HISTORY  Family History   Problem Relation Age of Onset    Cancer Brother         bladder    Colon cancer Brother     Heart disease Other     Hypertension Other     Cancer Other         bladder    Colon cancer Other         CURRENT MEDICATIONS  No current facility-administered medications on file prior to encounter.     Current Outpatient Medications on File Prior to Encounter   Medication Sig    atorvastatin (LIPITOR) 40 mg tablet TAKE 1 TABLET BY MOUTH DAILY    benzonatate (TESSALON) 200 MG capsule Take 1 capsule (200 mg total) by mouth 3 (three) times a day as needed for cough    Blood Glucose Monitoring Suppl (OneTouch Verio Reflect) w/Device KIT Check blood sugars once daily. Please substitute with appropriate alternative as covered by patient's insurance. Dx: E11.65    busPIRone (BUSPAR) 7.5 mg tablet Take 1 tablet (7.5 mg total) by mouth 2 (two) times a day    econazole nitrate 1 % cream     ferrous gluconate (FERGON) 240 (27 FE) MG tablet Take 1 tablet (240 mg total) by mouth 2 (two) times a day before lunch and dinner    folic acid  (FOLVITE) 1 mg tablet Take 1 tablet (1 mg total) by mouth daily    glimepiride (AMARYL) 1 mg tablet Take 1 tablet (1 mg total) by mouth daily with breakfast    glucose blood (OneTouch Verio) test strip Check blood sugars once daily. Please substitute with appropriate alternative as covered by patient's insurance. Dx: E11.65    ipratropium (ATROVENT) 0.03 % nasal spray 2 sprays into each nostril 3 (three) times a day    loperamide (IMODIUM A-D) 2 MG tablet Take 2 mg by mouth 3 (three) times a day as needed for diarrhea. Indications: Diarrhea    Magnesium Oxide 420 MG TABS Take 1 tablet (420 mg total) by mouth 2 (two) times a day    metoprolol succinate (Toprol XL) 25 mg 24 hr tablet Take 1 tablet (25 mg total) by mouth daily    OneTouch Delica Lancets 30G MISC USE TO TEST ONCE DAILY    OneTouch Delica Lancets 33G MISC Check blood sugars once daily. Please substitute with appropriate alternative as covered by patient's insurance. Dx: E11.65    OneTouch Ultra test strip TEST ONCE DAILY    potassium chloride (K-DUR,KLOR-CON) 20 mEq tablet Take 1 tablet (20 mEq total) by mouth daily    pyridoxine (VITAMIN B6) 50 mg tablet TAKE 1 TABLET BY MOUTH EVERY DAY    saccharomyces boulardii (FLORASTOR) 250 mg capsule Take 1 capsule (250 mg total) by mouth 2 (two) times a day    tamsulosin (FLOMAX) 0.4 mg Take 1 capsule (0.4 mg total) by mouth daily with dinner    torsemide (DEMADEX) 100 mg tablet Take 0.5 tablets (50 mg total) by mouth daily    warfarin (COUMADIN) 4 mg tablet TAKE 2 TABLETS BY MOUTH DAILY OR AS DIRECTED (Patient taking differently: No sig reported)       ALLERGIES  No Known Allergies    SOCIAL HISTORY  Social History     Socioeconomic History    Marital status: /Civil Union     Spouse name: None    Number of children: None    Years of education: None    Highest education level: None   Occupational History    Occupation: Retired-[employer]   Tobacco Use    Smoking status: Never    Smokeless tobacco: Never    Vaping Use    Vaping status: Never Used   Substance and Sexual Activity    Alcohol use: Not Currently    Drug use: Never    Sexual activity: None   Other Topics Concern    None   Social History Narrative    None     Social Determinants of Health     Financial Resource Strain: Low Risk  (9/18/2023)    Overall Financial Resource Strain (CARDIA)     Difficulty of Paying Living Expenses: Not hard at all   Food Insecurity: No Food Insecurity (8/14/2023)    Hunger Vital Sign     Worried About Running Out of Food in the Last Year: Never true     Ran Out of Food in the Last Year: Never true   Transportation Needs: No Transportation Needs (9/18/2023)    PRAPARE - Transportation     Lack of Transportation (Medical): No     Lack of Transportation (Non-Medical): No   Physical Activity: Not on file   Stress: Not on file   Social Connections: Not on file   Intimate Partner Violence: Not on file   Housing Stability: Low Risk  (8/14/2023)    Housing Stability Vital Sign     Unable to Pay for Housing in the Last Year: No     Number of Places Lived in the Last Year: 1     Unstable Housing in the Last Year: No       PHYSICAL EXAM    ED Triage Vitals [12/17/23 0841]   Temperature Pulse Respirations Blood Pressure SpO2   (!) 97 °F (36.1 °C) 85 18 131/73 99 %      Temp Source Heart Rate Source Patient Position - Orthostatic VS BP Location FiO2 (%)   Temporal Monitor Sitting Right arm --      Pain Score       No Pain           Vital signs and nursing notes reviewed    CONSTITUTIONAL: male appearing stated age resting in bed, in no acute distress  HEENT: atraumatic, normocephalic. Sclera anicteric, conjunctiva are not injected. Moist oral mucosa  CARDIOVASCULAR/CHEST: RRR, no M/R/G. 2+ radial pulses  PULMONARY: Breathing comfortably on RA. Breath sounds are equal and clear to auscultation  ABDOMEN: non-distended. BS present, normoactive. Non-tender  MSK: moves all extremities, no deformities, no peripheral edema, no calf  asymmetry  NEURO: Awake, alert, and oriented x 3. Face symmetric. Moves all extremities spontaneously. No focal neurologic deficits  SKIN: Warm, appears well-perfused  MENTAL STATUS: Normal affect      ?  LABS AND TESTS    Results Reviewed       Procedure Component Value Units Date/Time    HS Troponin I 2hr [[id number]]  (Abnormal) Collected: 12/17/23 1332    Lab Status: Final result Specimen: Blood from Arm, Left Updated: 12/17/23 1406     hs TnI 2hr 56 ng/L      Delta 2hr hsTnI 12 ng/L     HS Troponin I 4hr [[id number]]     Lab Status: No result Specimen: Blood     RBC Morphology Reflex Test [[id number]] Collected: 12/17/23 1058    Lab Status: Final result Specimen: Blood from Arm, Left Updated: 12/17/23 1201    CBC and differential [[id number]]  (Abnormal) Collected: 12/17/23 1058    Lab Status: Final result Specimen: Blood from Arm, Left Updated: 12/17/23 1141     WBC 1.41 Thousand/uL      RBC 2.25 Million/uL      Hemoglobin 7.1 g/dL      Hematocrit 21.8 %      MCV 97 fL      MCH 31.6 pg      MCHC 32.6 g/dL      RDW 16.9 %      Platelets 29 Thousands/uL     Narrative:      This is an appended report.  These results have been appended to a previously verified report.    Manual Differential(PHLEBS Do Not Order) [[id number]]  (Abnormal) Collected: 12/17/23 1058    Lab Status: Final result Specimen: Blood from Arm, Left Updated: 12/17/23 1132     Segmented % 42 %      Lymphocytes % 54 %      Monocytes % 4 %      Eosinophils, % 0 %      Basophils % 0 %      Absolute Neutrophils 0.59 Thousand/uL      Lymphocytes Absolute 0.76 Thousand/uL      Monocytes Absolute 0.06 Thousand/uL      Eosinophils Absolute 0.00 Thousand/uL      Basophils Absolute 0.00 Thousand/uL      Total Counted --     RBC Morphology Present     Platelet Estimate Decreased     Giant PLTs Present     Acanthocytes Present     Anisocytosis Present     Microcytes Present     Ovalocytes Present     Sickle Cells Present    Reticulocytes [[id number]]  (Normal)  Collected: 12/17/23 1058    Lab Status: Final result Specimen: Blood from Arm, Left Updated: 12/17/23 1131     Retic Ct Abs 17,600     Retic Ct Pct 0.78 %     HS Troponin 0hr (reflex protocol) [[id number]]  (Normal) Collected: 12/17/23 1058    Lab Status: Final result Specimen: Blood from Arm, Left Updated: 12/17/23 1128     hs TnI 0hr 44 ng/L     LD,Blood [[id number]]  (Abnormal) Collected: 12/17/23 1058    Lab Status: Final result Specimen: Blood from Arm, Left Updated: 12/17/23 1123     LD 81 U/L     Comprehensive metabolic panel [[id number]]  (Abnormal) Collected: 12/17/23 1058    Lab Status: Final result Specimen: Blood from Arm, Left Updated: 12/17/23 1123     Sodium 142 mmol/L      Potassium 3.8 mmol/L      Chloride 105 mmol/L      CO2 28 mmol/L      ANION GAP 9 mmol/L      BUN 25 mg/dL      Creatinine 1.39 mg/dL      Glucose 128 mg/dL      Calcium 9.3 mg/dL      AST 15 U/L      ALT 11 U/L      Alkaline Phosphatase 87 U/L      Total Protein 6.9 g/dL      Albumin 4.2 g/dL      Total Bilirubin 1.60 mg/dL      eGFR 44 ml/min/1.73sq m     Narrative:      National Kid[employer] guidelines for Chronic Kidney Disease (CKD):     Stage 1 with normal or high GFR (GFR > 90 mL/min/1.73 square meters)    Stage 2 Mild CKD (GFR = 60-89 mL/min/1.73 square meters)    Stage 3A Moderate CKD (GFR = 45-59 mL/min/1.73 square meters)    Stage 3B Moderate CKD (GFR = 30-44 mL/min/1.73 square meters)    Stage 4 Severe CKD (GFR = 15-29 mL/min/1.73 square meters)    Stage 5 End Stage CKD (GFR <15 mL/min/1.73 square meters)  Note: GFR calculation is accurate only with a steady state creatinine    Protime-INR [[id number]]  (Abnormal) Collected: 12/17/23 1058    Lab Status: Final result Specimen: Blood from Arm, Left Updated: 12/17/23 1116     Protime 23.4 seconds      INR 2.13    APTT [[id number]]  (Abnormal) Collected: 12/17/23 1058    Lab Status: Final result Specimen: Blood from Arm, Left Updated: 12/17/23 1116     PTT 41  seconds     Haptoglobin [[id number]] Collected: 12/17/23 1058    Lab Status: In process Specimen: Blood from Arm, Left Updated: 12/17/23 1103            No orders to display       ED COURSE & MEDICAL DECISION MAKING  ECG 12 Lead Documentation Only    Date/Time: 12/17/2023 10:45 AM    Performed by: Kristin Grijalva MD  Authorized by: Kritsin Grijalva MD    Comments:      Atrial fibrillation, ventricular rate 85, QRS 82, QTc 499, normal axis, nonspecific ST segment abnormality in lateral precordial and inferior leads, no STEMI, overall, no significant change from prior EKG dated 8/13/2023.  QTc prolongation is present.  CriticalCare Time    Date/Time: 12/17/2023 1:00 PM    Performed by: Kristin Grijalva MD  Authorized by: Kristin Grijalva MD    Critical care provider statement:     Critical care time (minutes):  30    Critical care time was exclusive of:  Separately billable procedures and treating other patients and teaching time    Critical care was necessary to treat or prevent imminent or life-threatening deterioration of the following conditions:  Circulatory failure (Symptomatic anemia requiring blood transfusion, pancytopenia)    Critical care was time spent personally by me on the following activities:  Obtaining history from patient or surrogate, examination of patient, review of old charts, ordering and review of laboratory studies, discussions with consultants, development of treatment plan with patient or surrogate, re-evaluation of patient's condition and ordering and performing treatments and interventions               Medications   furosemide (LASIX) injection 20 mg (20 mg Intravenous Given 12/17/23 1827)     88-year-old male presenting with worsening pancytopenia on outpatient labs, as well as some increased fatigue, as well as episodes of chest pressure for the past 2 nights.  At present, vital signs reviewed, afebrile, not tachycardic, not hypotensive, not hypoxic.  Past medical record including most recent  hospitalization notes in August, pulmonology notes from August, as well as hematology/oncology note reviewed.  Labs reviewed.  Patient has worsened pancytopenia.  He is not on any new medications at present and has been taking all supplements as prescribed.  Differential diagnosis includes recurrence of infection that could be suppressing bone marrow, bone marrow failure, MDS, malignancy, versus another etiology of symptoms.  Labs obtained, white blood cell count is 1.41, hemoglobin is 7.1, platelet count is 29.  Of these values, I believe patient's hemoglobin requires a blood transfusion with packed red blood cells, since patient has underlying history of cardiac disease and has had episodes of chest pressure thus raising his transfusion threshold to 8.0.  In reviewing the record, it appears that platelet threshold should be 20.  Rest of labs reveal normal/low LDH, making hemolysis less likely, normal reticulocyte count.  High-sensitivity troponin is 44, will trend.  Again, patient has no chest pain at present.  CMP is with baseline creatinine.  INR is 2.13, in keeping with anticoagulation with warfarin.  Informed consent obtained for blood transfusion, first unit of packed red blood cells ordered and Lasix ordered for subsequent diuresis.  Case discussed with Dr. Castillo with [facility] who agrees with plan for packed red blood cells transfusion, and agrees with need for bone marrow biopsy.  Unfortunately, we are unable to ascertain whether IR would be available to perform this procedure at our campus during the week and is our on-call physician for IR at present is uncertain about this.  Patient admitted to internal medicine for further evaluation and treatment.  Patient and wife updated at bedside throughout ED course.       Medical Decision Making  Amount and/or Complexity of Data Reviewed  Independent Historian: spouse  External Data Reviewed: labs, ECG and notes.  Labs: ordered. Decision-making details documented  in ED Course.  ECG/medicine tests: ordered and independent interpretation performed. Decision-making details documented in ED Course.    Risk  Prescription drug management.  Decision regarding hospitalization.        CLINICAL IMPRESSION  Final diagnoses:   Pancytopenia (HCC)       DISPOSITION  Time reflects when diagnosis was documented in both MDM as applicable and the Disposition within this note       Time User Action Codes Description Comment    12/17/2023  1:37 PM Tara Jose Add [D61.818] Pancytopenia (HCC)     12/17/2023  1:38 PM Kristin Grijalva Modify [D61.818] Pancytopenia (HCC)           ED Disposition       ED Disposition   Admit    Condition   Stable    Date/Time   Sun Dec 17, 2023  1:31 PM    Comment   Case was discussed with Dr. Jacobsen and the patient's admission status was agreed to be Admission Status: inpatient status to the service of Dr. Jacobsen.               Follow-up Information    None          Kristin Grijalva MD  12/18/23 1766

## 2023-12-17 NOTE — H&P
"[facility]  H&P  Name: Freddy Copeland 88 y.o. male I MRN: [id number]  Unit/Bed#: 402-01 I Date of Admission: 12/17/2023   Date of Service: 12/17/2023 I Hospital Day: 0      Assessment/Plan   * Pancytopenia (HCC)  Assessment & Plan  This is an ongoing and acutely worsening issue  Has followed with hematology x 1 outpatient  Morphology in the past reviewed and not concerning for primary bone neoplasia but was recommended to complete flow cytometry which was not completed  Complete flow cytometry at this time  Haptoglobin, fibrinogen, LDH, reticulocytes all wnl in the recent past  Hematology recommends patient remain inpatient for bone marrow biopsy  Borderline for neutropenia - keep on neutropenic precautions for now  Tranfuse 1 PRBC now  Transfuse for plts <20   Formal hematology consult requested    Type 2 diabetes mellitus without complication, without long-term current use of insulin (HCC)  Assessment & Plan  Lab Results   Component Value Date    HGBA1C 7.5 (H) 12/16/2023       No results for input(s): \"POCGLU\" in the last 72 hours.    Blood Sugar Average: Last 72 hrs:    Near goal per last A1c  Hold oral medications  SSI  ADA diet      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)  Assessment & Plan  Wt Readings from Last 3 Encounters:   12/17/23 94.2 kg (207 lb 10.8 oz)   10/19/23 94.2 kg (207 lb 9.6 oz)   10/17/23 95.7 kg (211 lb)       Does not appear to be in acute exacerbation at this time  Receiving lasix with blood transfusion   Continue on home torsemide        Persistent atrial fibrillation (HCC)  Assessment & Plan  INR currently therapeutic, patient is maintained on coumadin  HOLD coumadin with anticipation of bone marrow biopsy  Check INR daily  Rate controlled on metoprolol           VTE Pharmacologic Prophylaxis: VTE Score: 7 High Risk (Score >/= 5) - Pharmacological DVT Prophylaxis Contraindicated. Sequential Compression Devices Ordered.  Code Status: Level 1 - Full " Code   Discussion with family: Updated  (wife) at bedside.    Anticipated Length of Stay: Patient will be admitted on an inpatient basis with an anticipated length of stay of greater than 2 midnights secondary to ir bone marow biopsy, blood transfusion, work up.    Total Time Spent on Date of Encounter in care of patient: 65 mins. This time was spent on one or more of the following: performing physical exam; counseling and coordination of care; obtaining or reviewing history; documenting in the medical record; reviewing/ordering tests, medications or procedures; communicating with other healthcare professionals and discussing with patient's family/caregivers.    Chief Complaint: abnormal blood work    History of Present Illness:  Freddy Copeland is a 88 y.o. male with a PMH of CHF, a fib, DVT who presents with abnormal lab values.  Patient was admitted to the hospital in August 2023 with pancytopenia which was attributed to diarrheal illness at that time.  Patient followed up with hematology and was stable at that time.  He had repeat lab work completed on 12/16 which now shows significantly worsening pancytopenia with hemoglobin of 7, platelets of 29 and white blood cells of 1.4.  Patient reports that he has been feeling progressively more weak, tired and that the last several nights he has been having chest pressure at nighttime.  He and his wife has also noticed that he has had a widespread rash of petechiae and purpura.  Patient denies rectal bleeding, hematemesis, hematuria, gum bleeding, epistaxis.  He denies shortness of breath, abdominal pain, weight loss, fevers, recent illness.    Review of Systems:  Review of Systems   Constitutional:  Positive for activity change and fatigue. Negative for fever.   Eyes:  Negative for photophobia and visual disturbance.   Respiratory:  Negative for cough and shortness of breath.    Cardiovascular:  Positive for chest pain.   Gastrointestinal:  Positive for  constipation. Negative for diarrhea, nausea and vomiting.   Genitourinary:  Negative for difficulty urinating and hematuria.   Musculoskeletal:  Negative for arthralgias, back pain and gait problem.   Skin:  Positive for pallor and rash.   Neurological:  Positive for weakness. Negative for dizziness and headaches.   Psychiatric/Behavioral:  Negative for confusion. The patient is not nervous/anxious.        Past Medical and Surgical History:   Past Medical History:   Diagnosis Date    Anxiety     Atrial fibrillation (HCC)     Atrial flutter (HCC)     Congestive heart failure (CHF) (HCC)     COPD (chronic obstructive pulmonary disease) (HCC)     Coronary artery disease     DVT (deep venous thrombosis) (HCC)     ED (erectile dysfunction)     Hearing loss     History of echocardiogram 04/05/2018    EF 0.55, Mild LVH. Mild moderate mitral regurg. Trace aortic regurg.    Hx of radiation therapy     XRT    Hypercholesterolemia     Hyperlipidemia     Hypertension     Long term (current) use of anticoagulants 8/21/2018    Neuropathy of both feet     Peripheral neuropathy     Prostate cancer (HCC)     Type 2 diabetes mellitus (HCC)        Past Surgical History:   Procedure Laterality Date    CARDIAC CATHETERIZATION  01/15/1988    Left main: unobstructed. Posterolateral branch 90% narrowed.    COLONOSCOPY  10/05/2017    Dr. Saeed High    PROSTATE SURGERY         Meds/Allergies:  Prior to Admission medications    Medication Sig Start Date End Date Taking? Authorizing Provider   atorvastatin (LIPITOR) 40 mg tablet TAKE 1 TABLET BY MOUTH DAILY 5/2/23   Rodney Kumar, DO   benzonatate (TESSALON) 200 MG capsule Take 1 capsule (200 mg total) by mouth 3 (three) times a day as needed for cough 11/6/23   Elijah Borja, DO   Blood Glucose Monitoring Suppl (OneTouch Verio Reflect) w/Device KIT Check blood sugars once daily. Please substitute with appropriate alternative as covered by patient's insurance. Dx: E11.65 8/25/23   Elijah  Huong, DO   busPIRone (BUSPAR) 7.5 mg tablet Take 1 tablet (7.5 mg total) by mouth 2 (two) times a day 7/31/23   Elijah Borja, DO   econazole nitrate 1 % cream  6/1/23   Historical Provider, MD   ferrous gluconate (FERGON) 240 (27 FE) MG tablet Take 1 tablet (240 mg total) by mouth 2 (two) times a day before lunch and dinner 9/18/23   Elijah Borja, DO   folic acid (FOLVITE) 1 mg tablet Take 1 tablet (1 mg total) by mouth daily 10/16/23   Elijah Borja, DO   glimepiride (AMARYL) 1 mg tablet Take 1 tablet (1 mg total) by mouth daily with breakfast 6/2/23 5/27/24  Elijah Borja, DO   glucose blood (OneTouch Verio) test strip Check blood sugars once daily. Please substitute with appropriate alternative as covered by patient's insurance. Dx: E11.65 8/25/23   Elijah Borja, DO   ipratropium (ATROVENT) 0.03 % nasal spray 2 sprays into each nostril 3 (three) times a day 8/10/23   Danielle Bravo PA-C   loperamide (IMODIUM A-D) 2 MG tablet Take 2 mg by mouth 3 (three) times a day as needed for diarrhea. Indications: Diarrhea    Historical Provider, MD   Magnesium Oxide 420 MG TABS Take 1 tablet (420 mg total) by mouth 2 (two) times a day 8/17/23   Tara Jose PA-C   metoprolol succinate (Toprol XL) 25 mg 24 hr tablet Take 1 tablet (25 mg total) by mouth daily 6/16/23   Elijah Borja, DO   OneTouch Delica Lancets 30G MISC USE TO TEST ONCE DAILY 1/3/23   Elijah Borja, DO   OneTouch Delica Lancets 33G MISC Check blood sugars once daily. Please substitute with appropriate alternative as covered by patient's insurance. Dx: E11.65 8/25/23   Elijah Borja, DO   OneTouch Ultra test strip TEST ONCE DAILY 1/3/23   Elijah Borja, DO   potassium chloride (K-DUR,KLOR-CON) 20 mEq tablet Take 1 tablet (20 mEq total) by mouth daily 9/18/23   Elijah Borja, DO   pyridoxine (VITAMIN B6) 50 mg tablet TAKE 1 TABLET BY MOUTH EVERY DAY 9/27/23   Elijah Borja, DO   saccharomyces boulardii (FLORASTOR) 250  mg capsule Take 1 capsule (250 mg total) by mouth 2 (two) times a day 8/25/23   Elijah Borja, DO   tamsulosin (FLOMAX) 0.4 mg Take 1 capsule (0.4 mg total) by mouth daily with dinner 9/1/23   Elijah Maria Del Rosariovishnu, DO   torsemide (DEMADEX) 100 mg tablet Take 0.5 tablets (50 mg total) by mouth daily 10/16/23   Elijah Borja, DO   warfarin (COUMADIN) 4 mg tablet TAKE 2 TABLETS BY MOUTH DAILY OR AS DIRECTED  Patient taking differently: No sig reported 2/23/23   Rodney Kumar DO     I have reviewed home medications using recent Epic encounter.    Allergies: No Known Allergies    Social History:  Marital Status: /Civil Union   Occupation: none  Patient Pre-hospital Living Situation: Home  Patient Pre-hospital Level of Mobility: walks  Patient Pre-hospital Diet Restrictions: none  Substance Use History:   Social History     Substance and Sexual Activity   Alcohol Use Not Currently     Social History     Tobacco Use   Smoking Status Never   Smokeless Tobacco Never     Social History     Substance and Sexual Activity   Drug Use Never       Family History:  Family History   Problem Relation Age of Onset    Cancer Brother         bladder    Colon cancer Brother     Heart disease Other     Hypertension Other     Cancer Other         bladder    Colon cancer Other        Physical Exam:     Vitals:   Blood Pressure: 108/56 (12/17/23 1426)  Pulse: 78 (12/17/23 1426)  Temperature: 97.5 °F (36.4 °C) (12/17/23 1426)  Temp Source: Temporal (12/17/23 1355)  Respirations: 19 (12/17/23 1426)  Weight - Scale: 94.2 kg (207 lb 10.8 oz) (12/17/23 0844)  SpO2: 100 % (12/17/23 1426)    Physical Exam  Vitals and nursing note reviewed.   Constitutional:       Appearance: He is ill-appearing.   Cardiovascular:      Rate and Rhythm: Normal rate. Rhythm irregular.      Pulses: Normal pulses.      Heart sounds: Normal heart sounds. No murmur heard.     No gallop.   Pulmonary:      Effort: Pulmonary effort is normal.      Breath sounds:  Normal breath sounds. No wheezing, rhonchi or rales.   Abdominal:      General: Bowel sounds are normal.      Palpations: Abdomen is soft.      Tenderness: There is no abdominal tenderness. There is no guarding or rebound.   Musculoskeletal:      Right lower leg: No edema.      Left lower leg: No edema.   Skin:     General: Skin is warm.      Coloration: Skin is pale.      Comments: Diffuse petechiae and purpura   Neurological:      Mental Status: He is alert and oriented to person, place, and time. Mental status is at baseline.   Psychiatric:         Mood and Affect: Mood normal.         Behavior: Behavior normal.          Additional Data:     Lab Results:  Results from last 7 days   Lab Units 12/17/23  1058 12/16/23  0920   WBC Thousand/uL 1.41* 1.49*   HEMOGLOBIN g/dL 7.1* 7.1*   HEMATOCRIT % 21.8* 20.7*   PLATELETS Thousands/uL 29*  --    BANDS PCT %  --  2   LYMPHO PCT % 54* 70*   MONO PCT % 4 6   EOS PCT % 0 1     Results from last 7 days   Lab Units 12/17/23  1058   SODIUM mmol/L 142   POTASSIUM mmol/L 3.8   CHLORIDE mmol/L 105   CO2 mmol/L 28   BUN mg/dL 25   CREATININE mg/dL 1.39*   ANION GAP mmol/L 9   CALCIUM mg/dL 9.3   ALBUMIN g/dL 4.2   TOTAL BILIRUBIN mg/dL 1.60*   ALK PHOS U/L 87   ALT U/L 11   AST U/L 15   GLUCOSE RANDOM mg/dL 128     Results from last 7 days   Lab Units 12/17/23  1058   INR  2.13*         Results from last 7 days   Lab Units 12/16/23  0920   HEMOGLOBIN A1C % 7.5*           Lines/Drains:  Invasive Devices       Peripheral Intravenous Line  Duration             Peripheral IV 12/17/23 Left;Ventral (anterior) Forearm <1 day                        Imaging: No pertinent imaging reviewed.  No orders to display       EKG and Other Studies Reviewed on Admission:   EKG: No EKG obtained.    ** Please Note: This note has been constructed using a voice recognition system. **

## 2023-12-17 NOTE — ASSESSMENT & PLAN NOTE
"Lab Results   Component Value Date    HGBA1C 7.5 (H) 12/16/2023       No results for input(s): \"POCGLU\" in the last 72 hours.    Blood Sugar Average: Last 72 hrs:    Near goal per last A1c  Hold oral medications  SSI  ADA diet    "

## 2023-12-17 NOTE — PLAN OF CARE
Problem: PAIN - ADULT  Goal: Verbalizes/displays adequate comfort level or baseline comfort level  Description: Interventions:  - Encourage patient to monitor pain and request assistance  - Assess pain using appropriate pain scale  - Administer analgesics based on type and severity of pain and evaluate response  - Implement non-pharmacological measures as appropriate and evaluate response  - Consider cultural and social influences on pain and pain management  - Notify physician/advanced practitioner if interventions unsuccessful or patient reports new pain  Outcome: Progressing     Problem: SAFETY ADULT  Goal: Patient will remain free of falls  Description: INTERVENTIONS:  - Educate patient/family on patient safety including physical limitations  - Instruct patient to call for assistance with activity   - Consult OT/PT to assist with strengthening/mobility   - Keep Call bell within reach  - Keep bed low and locked with side rails adjusted as appropriate  - Keep care items and personal belongings within reach  - Initiate and maintain comfort rounds  - Make Fall Risk Sign visible to staff  - Offer Toileting every 2 Hours, in advance of need  - Initiate/Maintain bed/chair alarm  - Obtain necessary fall risk management equipment: non skid socks  - Apply yellow socks and bracelet for high fall risk patients  - Consider moving patient to room near nurses station  Outcome: Progressing  Goal: Maintain or return to baseline ADL function  Description: INTERVENTIONS:  -  Assess patient's ability to carry out ADLs; assess patient's baseline for ADL function and identify physical deficits which impact ability to perform ADLs (bathing, care of mouth/teeth, toileting, grooming, dressing, etc.)  - Assess/evaluate cause of self-care deficits   - Assess range of motion  - Assess patient's mobility; develop plan if impaired  - Assess patient's need for assistive devices and provide as appropriate  - Encourage maximum independence  but intervene and supervise when necessary  - Involve family in performance of ADLs  - Assess for home care needs following discharge   - Consider OT consult to assist with ADL evaluation and planning for discharge  - Provide patient education as appropriate  Outcome: Progressing  Goal: Maintains/Returns to pre admission functional level  Description: INTERVENTIONS:  - Perform AM-PAC 6 Click Basic Mobility/ Daily Activity assessment daily.  - Set and communicate daily mobility goal to care team and patient/family/caregiver.   - Collaborate with rehabilitation services on mobility goals if consulted  - Perform Range of Motion 3 times a day.  - Reposition patient every 2 hours.  - Dangle patient 3 times a day  - Stand patient 3 times a day  - Ambulate patient 3 times a day  - Out of bed to chair 3 times a day   - Out of bed for meals 3 times a day  - Out of bed for toileting  - Record patient progress and toleration of activity level   Outcome: Progressing     Problem: METABOLIC, FLUID AND ELECTROLYTES - ADULT  Goal: Electrolytes maintained within normal limits  Description: INTERVENTIONS:  - Monitor labs and assess patient for signs and symptoms of electrolyte imbalances  - Administer electrolyte replacement as ordered  - Monitor response to electrolyte replacements, including repeat lab results as appropriate  - Instruct patient on fluid and nutrition as appropriate  Outcome: Progressing  Goal: Fluid balance maintained  Description: INTERVENTIONS:  - Monitor labs   - Monitor I/O and WT  - Instruct patient on fluid and nutrition as appropriate  - Assess for signs & symptoms of volume excess or deficit  Outcome: Progressing  Goal: Glucose maintained within target range  Description: INTERVENTIONS:  - Monitor Blood Glucose as ordered  - Assess for signs and symptoms of hyperglycemia and hypoglycemia  - Administer ordered medications to maintain glucose within target range  - Assess nutritional intake and initiate  nutrition service referral as needed  Outcome: Progressing     Problem: HEMATOLOGIC - ADULT  Goal: Maintains hematologic stability  Description: INTERVENTIONS  - Assess for signs and symptoms of bleeding or hemorrhage  - Monitor labs  - Administer supportive blood products/factors as ordered and appropriate  Outcome: Progressing

## 2023-12-17 NOTE — TELEPHONE ENCOUNTER
Lab Result: WBC 1.49/Platelet 29    Date/Time Drawn: 12/16/23 920   Ordering Provider: Dr. Yasmani Martinez Name: Brian Hanks       The following critical/stat result was read back to the lab as stated above and [employer] messaged to the on-call provider. The provider confirmed receipt of the message.

## 2023-12-17 NOTE — ASSESSMENT & PLAN NOTE
INR currently therapeutic, patient is maintained on coumadin  HOLD coumadin with anticipation of bone marrow biopsy  Check INR daily  Rate controlled on metoprolol

## 2023-12-17 NOTE — ASSESSMENT & PLAN NOTE
This is an ongoing and acutely worsening issue  Has followed with hematology x 1 outpatient  Morphology in the past reviewed and not concerning for primary bone neoplasia but was recommended to complete flow cytometry which was not completed  Complete flow cytometry at this time  Haptoglobin, fibrinogen, LDH, reticulocytes all wnl in the recent past  Hematology recommends patient remain inpatient for bone marrow biopsy  Borderline for neutropenia - keep on neutropenic precautions for now  Tranfuse 1 PRBC now  Transfuse for plts <20   Formal hematology consult requested

## 2023-12-18 LAB
ABO GROUP BLD BPU: NORMAL
ALBUMIN SERPL BCP-MCNC: 3.6 G/DL (ref 3.5–5)
ALP SERPL-CCNC: 77 U/L (ref 34–104)
ALT SERPL W P-5'-P-CCNC: 9 U/L (ref 7–52)
ANION GAP SERPL CALCULATED.3IONS-SCNC: 7 MMOL/L
AST SERPL W P-5'-P-CCNC: 13 U/L (ref 13–39)
BILIRUB SERPL-MCNC: 1.63 MG/DL (ref 0.2–1)
BPU ID: NORMAL
BUN SERPL-MCNC: 27 MG/DL (ref 5–25)
CALCIUM SERPL-MCNC: 8.8 MG/DL (ref 8.4–10.2)
CHLORIDE SERPL-SCNC: 106 MMOL/L (ref 96–108)
CO2 SERPL-SCNC: 28 MMOL/L (ref 21–32)
CREAT SERPL-MCNC: 1.42 MG/DL (ref 0.6–1.3)
CROSSMATCH: NORMAL
ERYTHROCYTE [DISTWIDTH] IN BLOOD BY AUTOMATED COUNT: 16.1 % (ref 11.6–15.1)
GFR SERPL CREATININE-BSD FRML MDRD: 43 ML/MIN/1.73SQ M
GLUCOSE SERPL-MCNC: 111 MG/DL (ref 65–140)
GLUCOSE SERPL-MCNC: 119 MG/DL (ref 65–140)
GLUCOSE SERPL-MCNC: 128 MG/DL (ref 65–140)
GLUCOSE SERPL-MCNC: 146 MG/DL (ref 65–140)
GLUCOSE SERPL-MCNC: 171 MG/DL (ref 65–140)
HCT VFR BLD AUTO: 20.4 % (ref 36.5–49.3)
HGB BLD-MCNC: 6.9 G/DL (ref 12–17)
INR PPP: 2.19 (ref 0.84–1.19)
LDH SERPL-CCNC: 59 U/L (ref 140–271)
MAGNESIUM SERPL-MCNC: 2.4 MG/DL (ref 1.9–2.7)
MCH RBC QN AUTO: 32.2 PG (ref 26.8–34.3)
MCHC RBC AUTO-ENTMCNC: 33.8 G/DL (ref 31.4–37.4)
MCV RBC AUTO: 95 FL (ref 82–98)
PHOSPHATE SERPL-MCNC: 3.1 MG/DL (ref 2.3–4.1)
PLATELET # BLD AUTO: 25 THOUSANDS/UL (ref 149–390)
PMV BLD AUTO: 13.4 FL (ref 8.9–12.7)
POTASSIUM SERPL-SCNC: 3.6 MMOL/L (ref 3.5–5.3)
PROCALCITONIN SERPL-MCNC: 0.07 NG/ML
PROT SERPL-MCNC: 5.8 G/DL (ref 6.4–8.4)
PROTHROMBIN TIME: 24 SECONDS (ref 11.6–14.5)
RBC # BLD AUTO: 2.14 MILLION/UL (ref 3.88–5.62)
SODIUM SERPL-SCNC: 141 MMOL/L (ref 135–147)
UNIT DISPENSE STATUS: NORMAL
UNIT PRODUCT CODE: NORMAL
UNIT PRODUCT VOLUME: 350 ML
UNIT RH: NORMAL
WBC # BLD AUTO: 1.52 THOUSAND/UL (ref 4.31–10.16)

## 2023-12-18 PROCEDURE — 83615 LACTATE (LD) (LDH) ENZYME: CPT | Performed by: INTERNAL MEDICINE

## 2023-12-18 PROCEDURE — 80053 COMPREHEN METABOLIC PANEL: CPT | Performed by: PHYSICIAN ASSISTANT

## 2023-12-18 PROCEDURE — 84100 ASSAY OF PHOSPHORUS: CPT | Performed by: INTERNAL MEDICINE

## 2023-12-18 PROCEDURE — 83735 ASSAY OF MAGNESIUM: CPT | Performed by: PHYSICIAN ASSISTANT

## 2023-12-18 PROCEDURE — 99232 SBSQ HOSP IP/OBS MODERATE 35: CPT | Performed by: PHYSICIAN ASSISTANT

## 2023-12-18 PROCEDURE — 85610 PROTHROMBIN TIME: CPT | Performed by: PHYSICIAN ASSISTANT

## 2023-12-18 PROCEDURE — 88185 FLOWCYTOMETRY/TC ADD-ON: CPT | Performed by: PHYSICIAN ASSISTANT

## 2023-12-18 PROCEDURE — P9016 RBC LEUKOCYTES REDUCED: HCPCS

## 2023-12-18 PROCEDURE — 84145 PROCALCITONIN (PCT): CPT | Performed by: INTERNAL MEDICINE

## 2023-12-18 PROCEDURE — 30233N1 TRANSFUSION OF NONAUTOLOGOUS RED BLOOD CELLS INTO PERIPHERAL VEIN, PERCUTANEOUS APPROACH: ICD-10-PCS | Performed by: INTERNAL MEDICINE

## 2023-12-18 PROCEDURE — 88184 FLOWCYTOMETRY/ TC 1 MARKER: CPT

## 2023-12-18 PROCEDURE — 85027 COMPLETE CBC AUTOMATED: CPT | Performed by: PHYSICIAN ASSISTANT

## 2023-12-18 PROCEDURE — 82948 REAGENT STRIP/BLOOD GLUCOSE: CPT

## 2023-12-18 PROCEDURE — NC001 PR NO CHARGE: Performed by: RADIOLOGY

## 2023-12-18 RX ORDER — FUROSEMIDE 10 MG/ML
40 INJECTION INTRAMUSCULAR; INTRAVENOUS ONCE
Status: COMPLETED | OUTPATIENT
Start: 2023-12-18 | End: 2023-12-18

## 2023-12-18 RX ADMIN — BUSPIRONE HYDROCHLORIDE 7.5 MG: 5 TABLET ORAL at 18:49

## 2023-12-18 RX ADMIN — BUSPIRONE HYDROCHLORIDE 7.5 MG: 5 TABLET ORAL at 08:23

## 2023-12-18 RX ADMIN — Medication 50 MG: at 08:24

## 2023-12-18 RX ADMIN — IPRATROPIUM BROMIDE 2 SPRAY: 21 SPRAY, METERED NASAL at 18:49

## 2023-12-18 RX ADMIN — TAMSULOSIN HYDROCHLORIDE 0.4 MG: 0.4 CAPSULE ORAL at 18:49

## 2023-12-18 RX ADMIN — Medication 250 MG: at 18:49

## 2023-12-18 RX ADMIN — FERROUS GLUCONATE 324 MG: 324 TABLET ORAL at 18:49

## 2023-12-18 RX ADMIN — INSULIN LISPRO 1 UNITS: 100 INJECTION, SOLUTION INTRAVENOUS; SUBCUTANEOUS at 13:16

## 2023-12-18 RX ADMIN — Medication 400 MG: at 18:49

## 2023-12-18 RX ADMIN — Medication 250 MG: at 08:24

## 2023-12-18 RX ADMIN — POTASSIUM CHLORIDE 20 MEQ: 1500 TABLET, EXTENDED RELEASE ORAL at 08:24

## 2023-12-18 RX ADMIN — ATORVASTATIN CALCIUM 40 MG: 40 TABLET, FILM COATED ORAL at 08:24

## 2023-12-18 RX ADMIN — FOLIC ACID 1 MG: 1 TABLET ORAL at 08:23

## 2023-12-18 RX ADMIN — IPRATROPIUM BROMIDE 2 SPRAY: 21 SPRAY, METERED NASAL at 21:47

## 2023-12-18 RX ADMIN — FERROUS GLUCONATE 324 MG: 324 TABLET ORAL at 13:09

## 2023-12-18 RX ADMIN — Medication 400 MG: at 08:24

## 2023-12-18 RX ADMIN — FUROSEMIDE 40 MG: 10 INJECTION, SOLUTION INTRAMUSCULAR; INTRAVENOUS at 18:49

## 2023-12-18 NOTE — PROGRESS NOTES
[facility]  Progress Note  Name: Freddy Copeland I  MRN: [id number]  Unit/Bed#: 402-01 I Date of Admission: 12/17/2023   Date of Service: 12/18/2023 I Hospital Day: 1    Assessment/Plan   * Pancytopenia (HCC)  Assessment & Plan  This is an ongoing and acutely worsening issue  Has followed with hematology x 1 outpatient  Morphology in the past reviewed and not concerning for primary bone neoplasia but was recommended to complete flow cytometry which was not completed  Complete flow cytometry at this time  Haptoglobin, fibrinogen, LDH, reticulocytes all wnl in the recent past  Hematology recommends patient remain inpatient for bone marrow biopsy scheduled 12/19  Borderline for neutropenia - keep on neutropenic precautions for now  Tranfuse 1 PRBC 12/17, another unit 12/18  Transfuse for plts <20   Formal hematology consult requested    Type 2 diabetes mellitus without complication, without long-term current use of insulin (HCC)  Assessment & Plan  Lab Results   Component Value Date    HGBA1C 7.5 (H) 12/16/2023       Recent Labs     12/17/23  1610 12/18/23  0700 12/18/23  1117   POCGLU 84 119 171*       Blood Sugar Average: Last 72 hrs:  (P) [id number]  Near goal per last A1c  Hold oral medications  SSI  ADA diet      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)  Assessment & Plan  Wt Readings from Last 3 Encounters:   12/17/23 94.2 kg (207 lb 10.8 oz)   10/19/23 94.2 kg (207 lb 9.6 oz)   10/17/23 95.7 kg (211 lb)       Does not appear to be in acute exacerbation at this time  Receiving lasix with blood transfusion   Continue on home torsemide        Persistent atrial fibrillation (HCC)  Assessment & Plan  INR currently therapeutic, patient is maintained on coumadin  HOLD coumadin with anticipation of bone marrow biopsy  Per IR OK to do biopsy with INR as is but continue to hold coumadin  Check INR daily  Rate controlled on metoprolol               VTE Pharmacologic Prophylaxis:  VTE Score: 7 High Risk (Score >/= 5) - Pharmacological DVT Prophylaxis Contraindicated. Sequential Compression Devices Ordered.    Mobility:   Basic Mobility Inpatient Raw Score: 21  -HLM Goal: 6: Walk 10 steps or more  JH-HLM Achieved: 4: Move to chair/commode  HLM Goal NOT achieved. Continue with multidisciplinary rounding and encourage appropriate mobility to improve upon HLM goals.    Patient Centered Rounds: I performed bedside rounds with nursing staff today.   Discussions with Specialists or Other Care Team Provider: case management, hematology, IR    Education and Discussions with Family / Patient: Updated  (wife) at bedside.    Total Time Spent on Date of Encounter in care of patient: 45 mins. This time was spent on one or more of the following: performing physical exam; counseling and coordination of care; obtaining or reviewing history; documenting in the medical record; reviewing/ordering tests, medications or procedures; communicating with other healthcare professionals and discussing with patient's family/caregivers.    Current Length of Stay: 1 day(s)  Current Patient Status: Inpatient   Certification Statement: The patient will continue to require additional inpatient hospital stay due to ir bone marrow biopsy. Blood transfusions  Discharge Plan: Anticipate discharge in 24-48 hrs to home.    Code Status: Level 1 - Full Code    Subjective:   Patient denies any SOB, chest pains/pressure, or bleeds.     Objective:     Vitals:   Temp (24hrs), Av.3 °F (36.8 °C), Min:98 °F (36.7 °C), Max:98.5 °F (36.9 °C)    Temp:  [98 °F (36.7 °C)-98.5 °F (36.9 °C)] 98 °F (36.7 °C)  HR:  [78-94] 82  Resp:  [17-18] 17  BP: ()/(52-82) 91/52  SpO2:  [83 %-100 %] 99 %  Body mass index is 28.17 kg/m².     Input and Output Summary (last 24 hours):     Intake/Output Summary (Last 24 hours) at 2023 1430  Last data filed at 2023 1251  Gross per 24 hour   Intake 953.33 ml   Output 550 ml   Net  403.33 ml       Physical Exam:   Physical Exam  Vitals and nursing note reviewed.   Constitutional:       Appearance: He is ill-appearing.   Cardiovascular:      Rate and Rhythm: Normal rate. Rhythm irregular.      Pulses: Normal pulses.      Heart sounds: Normal heart sounds.   Pulmonary:      Effort: Pulmonary effort is normal.      Breath sounds: Normal breath sounds.   Abdominal:      General: Bowel sounds are normal.      Palpations: Abdomen is soft.   Musculoskeletal:      Right lower leg: Edema present.      Left lower leg: Edema present.   Skin:     Comments: Petechiae, purpura that are diffuse   Neurological:      Mental Status: He is alert.   Psychiatric:         Mood and Affect: Mood normal.         Behavior: Behavior normal.          Additional Data:     Labs:  Results from last 7 days   Lab Units 12/18/23  0459 12/17/23  1058 12/16/23  0920 12/16/23  0920   WBC Thousand/uL 1.52* 1.41*  --  1.49*   HEMOGLOBIN g/dL 6.9* 7.1*  --  7.1*   HEMATOCRIT % 20.4* 21.8*  --  20.7*   PLATELETS Thousands/uL 25* 29*   < >  --    BANDS PCT %  --   --   --  2   LYMPHO PCT %  --  54*  --  70*   MONO PCT %  --  4  --  6   EOS PCT %  --  0  --  1    < > = values in this interval not displayed.     Results from last 7 days   Lab Units 12/18/23  0459   SODIUM mmol/L 141   POTASSIUM mmol/L 3.6   CHLORIDE mmol/L 106   CO2 mmol/L 28   BUN mg/dL 27*   CREATININE mg/dL 1.42*   ANION GAP mmol/L 7   CALCIUM mg/dL 8.8   ALBUMIN g/dL 3.6   TOTAL BILIRUBIN mg/dL 1.63*   ALK PHOS U/L 77   ALT U/L 9   AST U/L 13   GLUCOSE RANDOM mg/dL 111     Results from last 7 days   Lab Units 12/18/23  0502   INR  2.19*     Results from last 7 days   Lab Units 12/18/23  1117 12/18/23  0700 12/17/23  1610   POC GLUCOSE mg/dl 171* 119 84     Results from last 7 days   Lab Units 12/16/23  0920   HEMOGLOBIN A1C % 7.5*     Results from last 7 days   Lab Units 12/18/23  0459   PROCALCITONIN ng/ml 0.07       Lines/Drains:  Invasive Devices       Peripheral  Intravenous Line  Duration             Peripheral IV 12/17/23 Left;Ventral (anterior) Forearm 1 day                          Imaging: No pertinent imaging reviewed.    Recent Cultures (last 7 days):         Last 24 Hours Medication List:   Current Facility-Administered Medications   Medication Dose Route Frequency Provider Last Rate    acetaminophen  650 mg Oral Q6H PRN Tara Jose PA-C      atorvastatin  40 mg Oral Daily Tara Jose PA-C      busPIRone  7.5 mg Oral BID Tara Jose PA-C      ferrous gluconate  324 mg Oral BID before lunch/dinner Tara Jose PA-C      folic acid  1 mg Oral Daily Tara Jose PA-C      furosemide  40 mg Intravenous Once Tara Jose PA-C      insulin lispro  1-6 Units Subcutaneous TID AC Tara Jose PA-C      ipratropium  2 spray Nasal TID Tara Jose PA-C      loperamide  2 mg Oral 4x Daily PRN Tara Jose PA-C      magnesium Oxide  400 mg Oral BID Tara Jose PA-C      metoprolol succinate  25 mg Oral Daily Tara Jose PA-C      ondansetron  4 mg Intravenous Q6H PRN Tara Jose PA-C      potassium chloride  20 mEq Oral Daily Tara Jose PA-C      pyridoxine  50 mg Oral Daily Tara Jose PA-C      saccharomyces boulardii  250 mg Oral BID Tara Jose PA-C      tamsulosin  0.4 mg Oral Daily With Dinner Tara Jose PA-C      torsemide  50 mg Oral Daily Tara Jose PA-C          Today, Patient Was Seen By: Tara Jose PA-C    **Please Note: This note may have been constructed using a voice recognition system.**

## 2023-12-18 NOTE — CASE MANAGEMENT
Case Management Assessment & Discharge Planning Note    Patient name Freddy Nair /[id number] MRN [id number]  : 1935 Date 2023       Current Admission Date: 2023  Current Admission Diagnosis:Pancytopenia (HCC)   Patient Active Problem List    Diagnosis Date Noted    Lower leg DVT (deep venous thromboembolism), acute, left (HCC) 10/17/2023    Deep vein thrombosis (DVT) of femoral vein of right lower extremity (HCC) 10/17/2023    Acute on chronic diastolic (congestive) heart failure (HCC) 2023    Troponin level elevated 2023    Pancytopenia (HCC) 2023    Plantar fascial fibromatosis 2023    Type 2 diabetes mellitus without complication, without long-term current use of insulin (HCC) 2023    Peripheral arterial disease (HCC) 2023    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) 2021    Malignant neoplasm of prostate (HCC) 2021    Chronic diastolic congestive heart failure (HCC) 2020    Chronic venous insufficiency 2019    Long term (current) use of anticoagulants 2018    Persistent atrial fibrillation (HCC) 2018    Dermatofibroma 2018    Excessive anticoagulation 2018    Exostosis 2017    Olecranon bursitis 2017    FH: colon cancer 2017    Family history of colonic polyps 2017    History of colonic polyps 2017    Abnormality of gait 2016    Idiopathic peripheral neuropathy 2016    Paresthesias 2016      LOS (days): 1  Geometric Mean LOS (GMLOS) (days):   Days to GMLOS:     OBJECTIVE:    Risk of Unplanned Readmission Score: 27.22         Current admission status: Inpatient       Preferred Pharmacy:   [facility] - [place] - [address]  [address]  [place] [address]  Phone: [phone] Fax: [phone]    [facility] [place] [address]  [facility] [address]  Phone: [phone] Fax: [phone]    Primary Care Provider: Elijah Borja DO    Primary Insurance: RADHA WORRELL  Secondary Insurance:     ASSESSMENT:  Active Health Care Proxies       Cassandra Copelandmary Health Care Representative - Spouse   Primary Phone: [phone] (Mobile)  Home Phone: [phone]                 Advance Directives  Does patient have a Health Care POA?: No  Was patient offered paperwork?: Yes (declines)  Does patient currently have a Health Care decision maker?: Yes, please see Health Care Proxy section  Does patient have Advance Directives?: No  Was patient offered paperwork?: Yes (declines)  Primary Contact: Susan Copeland (Spouse)   [address] [place] [address]      [phone] (H)   [phone] (M)         Readmission Root Cause  30 Day Readmission: No    Patient Information  Admitted from:: Home  Mental Status: Alert  During Assessment patient was accompanied by: Not accompanied during assessment  Assessment information provided by:: Patient, Spouse  Primary Caregiver: Self  Support Systems: Spouse/significant other  [place] Residence: [facility]  [facility] do you live in[place]  Home entry access options. Select all that apply.: Ramp  Type of Current Residence: 2 story home  Upon entering residence, is there a bedroom on the main floor (no further steps)?: No  A bedroom is located on the following floor levels of residence (select all that apply):: 2nd Floor  Upon entering residence, is there a bathroom on the main floor (no further steps)?: No  Indicate which floors of current residence have a bathroom (select all the apply):: 2nd Floor  Number of steps to 2nd floor from main floor: One Flight (stairglide)  Living Arrangements: Lives w/ Spouse/significant other  Is patient a ?: Yes  [facility]: No    Activities of Daily Living Prior to Admission  Functional Status: Independent  Completes  ADLs independently?: Yes  Ambulates independently?: Yes  Does patient use assisted devices?: Yes  Assisted Devices (DME) used: Straight Cane, Walker  Does patient currently own DME?: Yes  What DME does the patient currently own?: Other (Comment), Walker, Straight Cane, Shower Chair, Wheelchair (john)  Does patient have a history of Outpatient Therapy (PT/OT)?: Yes (states they had OP therapy in the home in the past, unsure of who it was through)  Does the patient have a history of Short-Term Rehab?: No  Does patient have a history of HHC?: Yes (hx [facility])  Does patient currently have HHC?: No         Patient Information Continued  Income Source: Pension/custodial  Does patient have prescription coverage?: Yes  Does patient receive dialysis treatments?: No  Does patient have a history of substance abuse?: No  Does patient have a history of Mental Health Diagnosis?: No         Means of Transportation  [facility] (wife)      Housing Stability: Low Risk  (12/18/2023)    Housing Stability Vital Sign     Unable to Pay for Housing in the Last Year: No     Number of Places Lived in the Last Year: 1     Unstable Housing in the Last Year: No   Food Insecurity: No Food Insecurity (12/18/2023)    Hunger Vital Sign     Worried About Running Out of Food in the Last Year: Never true     Ran Out of Food in the Last Year: Never true   Transportation Needs: No Transportation Needs (12/18/2023)    PRAPARE - Transportation     Lack of Transportation (Medical): No     Lack of Transportation (Non-Medical): No   Utilities: Not At Risk (12/18/2023)    C Utilities     Threatened with loss of utilities: No       DISCHARGE DETAILS:    Discharge planning discussed with:: patient and wife     Comments - Freedom of Choice: Susan Copeland (Spouse)    [phone] (H)   [phone] (M)  CM contacted family/caregiver?: Yes  Were Treatment Team discharge recommendations reviewed with patient/caregiver?:  Yes  Did patient/caregiver verbalize understanding of patient care needs?: Yes  Were patient/caregiver advised of the risks associated with not following Treatment Team discharge recommendations?: Yes         Requested Home Health Care         Is the patient interested in HHC at discharge?: No    DME Referral Provided  Referral made for DME?: No         Would you like to participate in our [facility] service program?  : No - Declined       Discharge Destination Plan:: Home  Transport at Discharge : Family (wife)      Plans at this time are home on dc with OP follow up. Cm will follow and assist in dc planning.

## 2023-12-18 NOTE — ASSESSMENT & PLAN NOTE
This is an ongoing and acutely worsening issue  Has followed with hematology x 1 outpatient  Morphology in the past reviewed and not concerning for primary bone neoplasia but was recommended to complete flow cytometry which was not completed  Complete flow cytometry at this time  Haptoglobin, fibrinogen, LDH, reticulocytes all wnl in the recent past  Hematology recommends patient remain inpatient for bone marrow biopsy scheduled 12/19  Borderline for neutropenia - keep on neutropenic precautions for now  Tranfuse 1 PRBC 12/17, another unit 12/18  Transfuse for plts <20   Formal hematology consult requested

## 2023-12-18 NOTE — CONSULTS
[facility]  [place] Daily Progress Note         Assessment and Plan:   Assessment:   1 day old female , doing well.  Baby is working on nursing. Utilizing shield.  Some supplement.  Lactation involved. Voiding/ stooling well.  Bili low, passed O2 screen.      Plan:   -Normal  care  -Encourage exclusive breastfeeding  -Anticipate follow-up with 1-2 days  after discharge, AAP follow-up recommendations discussed  -Hearing screen and first hepatitis B vaccine prior to discharge per orders             Interval History:   Date and time of birth: 2017  7:29 AM    Stable, no new events    Risk factors for developing severe hyperbilirubinemia:None    Feeding: Breast feeding going not well but improving. Using shield.  Supplementing with dropper     I & O for past 24 hours  No data found.    Patient Vitals for the past 24 hrs:   Quality of Breastfeed   17 0900 Good breastfeed   17 1200 Good breastfeed   17 1500 Attempted breastfeed   17 1730 Attempted breastfeed   17 1930 Good breastfeed   17 0000 Good breastfeed   17 0310 Good breastfeed   17 0415 Good breastfeed     Patient Vitals for the past 24 hrs:   Urine Occurrence Stool Occurrence   17 1700 1 1   17 1722 - 1   17 0310 1 -   17 0507 1 1              Physical Exam:   Vital Signs:  Patient Vitals for the past 24 hrs:   Temp Temp src Heart Rate Resp Weight   17 0130 98.2  F (36.8  C) Axillary 140 40 3.5 kg (7 lb 11.5 oz)   17 1830 98.3  F (36.8  C) Axillary - - -   17 1600 98.5  F (36.9  C) Axillary 160 52 -   17 1030 98.5  F (36.9  C) Axillary 158 40 -   17 0910 98.4  F (36.9  C) Axillary 142 40 -   17 0845 98  F (36.7  C) Axillary 158 54 -     Wt Readings from Last 3 Encounters:   17 3.5 kg (7 lb 11.5 oz) (69 %)*     * Growth percentiles are based on WHO (Girls, 0-2 years) data.       Weight change since birth:  Interventional Radiology  Consultation 12/18/2023     Inpatient Consult to IR  Consult performed by: Yariel Leonard MD  Consult ordered by: BAMBI Louis   1935   [id number]      Assessment/Plan:  Recurring pancytopenia  Plan is BMBx. 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)    Overview Signed 8/30/2021  9:01 AM by Mehreen Shell MA     Pe CMS/ ICD 10 guidelines         Wt Readings from Last 3 Encounters:   12/17/23 94.2 kg (207 lb 10.8 oz)   10/19/23 94.2 kg (207 lb 9.6 oz)   10/17/23 95.7 kg (211 lb)                 Peripheral arterial disease (HCC)    Type 2 diabetes mellitus without complication, without long-term current use of insulin (HCC)    Lab Results   Component Value Date    HGBA1C 7.5 (H) 12/16/2023       Recent Labs     12/17/23  1610 12/18/23  0700   POCGLU 84 119       Blood Sugar Average: Last 72 hrs:  (P) 101.5        Abnormality of gait    Dermatofibroma    Excessive anticoagulation    FH: colon cancer    Overview Signed 8/1/2023 10:54 AM by Kaylee Jones PA-C     Formatting of this note might be different from the original.  Added automatically from request for surgery [id number]         Family history of colonic polyps    Overview Signed 8/1/2023 10:54 AM by Kaylee Jones PA-C     Formatting of this note might be different from the original.  Added automatically from request for surgery [id number]         Exostosis    History of colonic polyps    Overview Signed 8/1/2023 10:54 AM by Kaylee Jones PA-C      Formatting of this note might be different from the original.  Added automatically from request for surgery [id number]         Idiopathic peripheral neuropathy    Overview Signed 8/1/2023 10:54 AM by Kaylee Jones PA-C     Last Assessment & Plan:   Formatting of this note might be different from the original.  Numbness in the feet progressive over a few years and exam showing stocking-like sensory loss in the feet with normal joint position.  Ankle jerks are absent.  Given elevated A1c suspect this is diabetic neuropathy.  Idiopathic also possible.  Not a lot of pain.  Suspect mild balance difficulties are related and also age-related.  Will check EMG.  Check blood work.  Consider some physical therapy evaluation with a cane although his gait is good today.  Encouraged him to take his time when walking. Consider lyrica or gabapentin in future         Olecranon bursitis    Paresthesias    Plantar fascial fibromatosis    Acute on chronic diastolic (congestive) heart failure (HCC)    Wt Readings from Last 3 Encounters:   12/17/23 94.2 kg (207 lb 10.8 oz)   10/19/23 94.2 kg (207 lb 9.6 oz)   10/17/23 95.7 kg (211 lb)                 Troponin level elevated    Lower leg DVT (deep venous thromboembolism), acute, left (HCC)    Deep vein thrombosis (DVT) of femoral vein of right lower extremity (HCC)            Subjective:     Patient ID: Freddy Copeland is a 88 y.o. male.    History of Present Illness  Previous pancytopenia. Thought to be improving. Now with impressive leukopenia and thrombocytopenia    Review of Systems      Past Medical History:   Diagnosis Date    Anxiety     Atrial fibrillation (HCC)     Atrial flutter (HCC)     Congestive heart failure (CHF) (HCC)     COPD (chronic obstructive pulmonary disease) (HCC)     Coronary artery disease     DVT (deep venous thrombosis) (HCC)     ED (erectile dysfunction)     Hearing loss     History of echocardiogram 04/05/2018    EF 0.55, Mild LVH. Mild moderate mitral  1%    General:  alert and normally responsive  Skin:   Rash of erythema toxicum and some blotchy erythema of cheeks  Lungs:  clear, no retractions, no increased work of breathing  Heart:  normal rate, rhythm.  No murmurs.  Normal femoral pulses.  Abdomen  soft without mass, tenderness, organomegaly, hernia.  Umbilicus normal.         Data:   No results found for this or any previous visit (from the past 24 hour(s)).     bilitool    Attestation:  I have reviewed today's vital signs, notes, medications, labs and imaging.      Beverly Bear MD     regurg. Trace aortic regurg.    Hx of radiation therapy     XRT    Hypercholesterolemia     Hyperlipidemia     Hypertension     Long term (current) use of anticoagulants 8/21/2018    Neuropathy of both feet     Peripheral neuropathy     Prostate cancer (HCC)     Type 2 diabetes mellitus (HCC)         Past Surgical History:   Procedure Laterality Date    CARDIAC CATHETERIZATION  01/15/1988    Left main: unobstructed. Posterolateral branch 90% narrowed.    COLONOSCOPY  10/05/2017    Dr. Saeed High    PROSTATE SURGERY          Social History     Tobacco Use   Smoking Status Never   Smokeless Tobacco Never        Social History     Substance and Sexual Activity   Alcohol Use Not Currently        Social History     Substance and Sexual Activity   Drug Use Never        No Known Allergies    Current Facility-Administered Medications   Medication Dose Route Frequency Provider Last Rate Last Admin    acetaminophen (TYLENOL) tablet 650 mg  650 mg Oral Q6H PRN Tara Jose PA-C        atorvastatin (LIPITOR) tablet 40 mg  40 mg Oral Daily Tara Jose PA-C   40 mg at 12/18/23 0824    busPIRone (BUSPAR) tablet 7.5 mg  7.5 mg Oral BID Tara Jose PA-C   7.5 mg at 12/18/23 0823    ferrous gluconate (FERGON) tablet 324 mg  324 mg Oral BID before lunch/dinner Tara Jose PA-C        folic acid (FOLVITE) tablet 1 mg  1 mg Oral Daily Tara Jose PA-C   1 mg at 12/18/23 0823    insulin lispro (HumaLOG) 100 units/mL subcutaneous injection 1-6 Units  1-6 Units Subcutaneous TID AC Tara Jose PA-C        ipratropium (ATROVENT) 0.03 % nasal spray 2 spray  2 spray Nasal TID Tara Jose PA-C   2 spray at 12/17/23 2029    loperamide (IMODIUM) capsule 2 mg  2 mg Oral 4x Daily PRN Tara Jose PA-C        magnesium Oxide (MAG-OX) tablet 400 mg  400 mg Oral BID Tara Jose PA-C   400 mg at 12/18/23 0824    metoprolol succinate (TOPROL-XL) 24 hr tablet 25 mg  25 mg Oral Daily Tara Jose PA-C        ondansetron (ZOFRAN) injection 4 mg  4 mg  Intravenous Q6H PRN Tara Jose PA-C        potassium chloride (K-DUR,KLOR-CON) CR tablet 20 mEq  20 mEq Oral Daily CUATE Louis-C   20 mEq at 12/18/23 0824    pyridoxine (VITAMIN B6) tablet 50 mg  50 mg Oral Daily Tara Jose, PA-C   50 mg at 12/18/23 0824    saccharomyces boulardii (FLORASTOR) capsule 250 mg  250 mg Oral BID CUATE Louis-C   250 mg at 12/18/23 0824    tamsulosin (FLOMAX) capsule 0.4 mg  0.4 mg Oral Daily With Dinner CUATE Luois-C   0.4 mg at 12/17/23 1551    torsemide (DEMADEX) tablet 50 mg  50 mg Oral Daily Tara Jose PA-C              Objective:    Vitals:    12/17/23 1941 12/17/23 2307 12/18/23 0643 12/18/23 0700   BP: 105/54 103/82 102/57 91/52   BP Location: Right arm      Pulse: 85 94 82    Resp: 18  17    Temp: 98.5 °F (36.9 °C) 98.1 °F (36.7 °C) 98 °F (36.7 °C)    TempSrc: Oral      SpO2:  98% 99%    Weight: 94.2 kg (207 lb 10.8 oz)      Height: 6' (1.829 m)           Physical Exam      Lab Results   Component Value Date     (H) 08/13/2023      Lab Results   Component Value Date    WBC 1.52 (LL) 12/18/2023    HGB 6.9 (LL) 12/18/2023    HCT 20.4 (L) 12/18/2023    MCV 95 12/18/2023    PLT 25 (LL) 12/18/2023     Lab Results   Component Value Date    INR 2.19 (H) 12/18/2023    INR 2.13 (H) 12/17/2023    INR 2.32 (H) 11/30/2023    PROTIME 24.0 (H) 12/18/2023    PROTIME 23.4 (H) 12/17/2023    PROTIME 25.0 (H) 11/30/2023     Lab Results   Component Value Date    PTT 41 (H) 12/17/2023         I have personally reviewed pertinent imaging and laboratory results.     Code Status: Level 1 - Full Code  Advance Directive and Living Will:      Power of :    POLST:      IR has been consulted to evaluate the patient, determine the appropriate procedure, and whether or not a procedure can and should be performed.    Thank you for allowing me to participate in the care of Freddy Copeland. Please don't hesitate to call, text, email, or TigerText with any questions.     This text is  generated with voice recognition software. There may be translation, syntax,  or grammatical errors. If you have any questions, please contact the dictating provider.

## 2023-12-18 NOTE — ASSESSMENT & PLAN NOTE
Lab Results   Component Value Date    HGBA1C 7.5 (H) 12/16/2023       Recent Labs     12/17/23  1610 12/18/23  0700 12/18/23  1117   POCGLU 84 119 171*       Blood Sugar Average: Last 72 hrs:  (P) [id number]  Near goal per last A1c  Hold oral medications  SSI  ADA diet

## 2023-12-18 NOTE — TELEMEDICINE
REQUIRED DOCUMENTATION:     1. This service was provided via Telemedicine.  2. Provider located at .  3. TeleMed provider: James Segura MD.  4. Identify all parties in room with patient during tele consult:    5.Patient was then informed that this was a Telemedicine visit and that the exam was being conducted confidentially over secure lines. My office door was closed. No one else was in the room.  Patient acknowledged consent and understanding of privacy and security of the Telemedicine visit, and gave us permission to have the assistant stay in the room in order to assist with the history and to conduct the exam.  I informed the patient that I have reviewed their record in Epic and presented the opportunity for them to ask any questions regarding the visit today.  The patient agreed to participate.      Virtual telemedicine  Consultation - Medical Oncology   Freddy Copeland 88 y.o. male MRN: [id number]  Unit/Bed#: 402-01 Encounter: [id number]    Referring physician: [facility] internal medicine  Date of service: 12/18/2023  Reason for Consult: Profound pancytopenia  HPI: Freddy Copeland is a 88 y.o. year old male.  Patient has profound pancytopenia but no fevers, chills or bleeding.  Has generalized weakness and tiredness.  He stays in bed most of the time and needs help getting out of bed.  Gives history of peripheral neuropathy in his feet.  He has previous history of borderline leukopenia and thrombocytopenia and history of anemia.  He was seen by our group members.  Counts were fluctuating but this time patient has profound pancytopenia and he is scheduled to have bone marrow test tomorrow.  1 time he had decreased folate level.  Patient states he had diarrhea few days ago but not now  ROS:  12/18/23 Reviewed 12 systems: See symptoms in HPI  No headache, seizures, diplopia, dysphagia, hoarseness, chest pain, palpitations, shortness of breath at rest, cough, hemoptysis, melena or hematuria.  Other symptoms  are in HPI.  No swelling of the ankles.  No swollen glands.  Patient is anxious.       Historical Information   Past Medical History:   Diagnosis Date    Anxiety     Atrial fibrillation (HCC)     Atrial flutter (HCC)     Congestive heart failure (CHF) (HCC)     COPD (chronic obstructive pulmonary disease) (HCC)     Coronary artery disease     DVT (deep venous thrombosis) (HCC)     ED (erectile dysfunction)     Hearing loss     History of echocardiogram 04/05/2018    EF 0.55, Mild LVH. Mild moderate mitral regurg. Trace aortic regurg.    Hx of radiation therapy     XRT    Hypercholesterolemia     Hyperlipidemia     Hypertension     Long term (current) use of anticoagulants 8/21/2018    Neuropathy of both feet     Peripheral neuropathy     Prostate cancer (HCC)     Type 2 diabetes mellitus (HCC)      Past Surgical History:   Procedure Laterality Date    CARDIAC CATHETERIZATION  01/15/1988    Left main: unobstructed. Posterolateral branch 90% narrowed.    COLONOSCOPY  10/05/2017    Dr. Saeed High    PROSTATE SURGERY       Social History   Social History     Substance and Sexual Activity   Alcohol Use Not Currently     Social History     Substance and Sexual Activity   Drug Use Never     Social History     Tobacco Use   Smoking Status Never   Smokeless Tobacco Never     Family History:   Family History   Problem Relation Age of Onset    Cancer Brother         bladder    Colon cancer Brother     Heart disease Other     Hypertension Other     Cancer Other         bladder    Colon cancer Other          Current Facility-Administered Medications:     acetaminophen (TYLENOL) tablet 650 mg, 650 mg, Oral, Q6H PRN, Tara Jose PA-C    atorvastatin (LIPITOR) tablet 40 mg, 40 mg, Oral, Daily, Tara Jose PA-C, 40 mg at 12/18/23 0824    busPIRone (BUSPAR) tablet 7.5 mg, 7.5 mg, Oral, BID, Tara Jose PA-C, 7.5 mg at 12/18/23 0823    ferrous gluconate (FERGON) tablet 324 mg, 324 mg, Oral, BID before lunch/dinner, Tara Jose  BAMBI, 324 mg at 12/18/23 1309    folic acid (FOLVITE) tablet 1 mg, 1 mg, Oral, Daily, Tara Jose PA-C, 1 mg at 12/18/23 0823    furosemide (LASIX) injection 40 mg, 40 mg, Intravenous, Once, Tara Jose PA-C    insulin lispro (HumaLOG) 100 units/mL subcutaneous injection 1-6 Units, 1-6 Units, Subcutaneous, TID AC, 1 Units at 12/18/23 1316 **AND** Fingerstick Glucose (POCT), , , TID AC, Tara Jose PA-C    ipratropium (ATROVENT) 0.03 % nasal spray 2 spray, 2 spray, Nasal, TID, Tara Jose PA-C, 2 spray at 12/17/23 2029    loperamide (IMODIUM) capsule 2 mg, 2 mg, Oral, 4x Daily PRN, Tara Jose PA-C    magnesium Oxide (MAG-OX) tablet 400 mg, 400 mg, Oral, BID, Tara Jose PA-C, 400 mg at 12/18/23 0824    metoprolol succinate (TOPROL-XL) 24 hr tablet 25 mg, 25 mg, Oral, Daily, Tara Jose PA-C    ondansetron (ZOFRAN) injection 4 mg, 4 mg, Intravenous, Q6H PRN, Tara Jose PA-C    potassium chloride (K-DUR,KLOR-CON) CR tablet 20 mEq, 20 mEq, Oral, Daily, Tara Jose PA-C, 20 mEq at 12/18/23 0824    pyridoxine (VITAMIN B6) tablet 50 mg, 50 mg, Oral, Daily, aTra Jose PA-C, 50 mg at 12/18/23 0824    saccharomyces boulardii (FLORASTOR) capsule 250 mg, 250 mg, Oral, BID, Tara Jose PA-C, 250 mg at 12/18/23 0824    tamsulosin (FLOMAX) capsule 0.4 mg, 0.4 mg, Oral, Daily With Dinner, Tara Jose PA-C, 0.4 mg at 12/17/23 1551    torsemide (DEMADEX) tablet 50 mg, 50 mg, Oral, Daily, Tara Jose PA-C    No Known Allergies    Physical Exam:  Vitals:    12/17/23 2307 12/18/23 0643 12/18/23 0700 12/18/23 1442   BP: 103/82 102/57 91/52 109/56   BP Location:    Right arm   Pulse: 94 82  92   Resp:  17 18   Temp: 98.1 °F (36.7 °C) 98 °F (36.7 °C)  97.8 °F (36.6 °C)   TempSrc:    Oral   SpO2: 98% 99%  100%   Weight:       Height:         Patient is alert and oriented.  Not in distress.  Breathing comfortably  at rest.  Vital signs are above.  No icterus.  Looked pale.  No swelling around the neck.  Patient did not  "experience chest pain on deep breath.  He did not feel pain in the abdomen on palpation.  He did not experience pain in the calf areas on compression.  No swelling of the ankles.  He could move all 4 extremities.  He did not have any skin rash or lesion to show me.      Lab Results: I have reviewed all pertinent labs.  LABS:    Results for orders placed or performed during the hospital encounter of 12/17/23   Comprehensive metabolic panel   Result Value Ref Range    Sodium 142 135 - 147 mmol/L    Potassium 3.8 3.5 - 5.3 mmol/L    Chloride 105 96 - 108 mmol/L    CO2 28 21 - 32 mmol/L    ANION GAP 9 mmol/L    BUN 25 5 - 25 mg/dL    Creatinine 1.39 (H) 0.60 - 1.30 mg/dL    Glucose 128 65 - 140 mg/dL    Calcium 9.3 8.4 - 10.2 mg/dL    AST 15 13 - 39 U/L    ALT 11 7 - 52 U/L    Alkaline Phosphatase 87 34 - 104 U/L    Total Protein 6.9 6.4 - 8.4 g/dL    Albumin 4.2 3.5 - 5.0 g/dL    Total Bilirubin 1.60 (H) 0.20 - 1.00 mg/dL    eGFR 44 ml/min/1.73sq m   CBC and differential   Result Value Ref Range    WBC 1.41 (LL) 4.31 - 10.16 Thousand/uL    RBC 2.25 (L) 3.88 - 5.62 Million/uL    Hemoglobin 7.1 (L) 12.0 - 17.0 g/dL    Hematocrit 21.8 (L) 36.5 - 49.3 %    MCV 97 82 - 98 fL    MCH 31.6 26.8 - 34.3 pg    MCHC 32.6 31.4 - 37.4 g/dL    RDW 16.9 (H) 11.6 - 15.1 %    Platelets 29 (LL) 149 - 390 Thousands/uL   Protime-INR   Result Value Ref Range    Protime 23.4 (H) 11.6 - 14.5 seconds    INR 2.13 (H) 0.84 - 1.19   APTT   Result Value Ref Range    PTT 41 (H) 23 - 37 seconds   LD,Blood   Result Value Ref Range    LD 81 (L) 140 - 271 U/L   Reticulocytes   Result Value Ref Range    Retic Ct Abs 17,600 14,356 - 105,094    Retic Ct Pct 0.78 0.37 - 1.87 %   HS Troponin 0hr (reflex protocol)   Result Value Ref Range    hs TnI 0hr 44 \"Refer to ACS Flowchart\"- see link ng/L   HS Troponin I 2hr   Result Value Ref Range    hs TnI 2hr 56 (H) \"Refer to ACS Flowchart\"- see link ng/L    Delta 2hr hsTnI 12 <20 ng/L   HS Troponin I 4hr   Result " "Value Ref Range    hs TnI 4hr 66 (H) \"Refer to ACS Flowchart\"- see link ng/L    Delta 4hr hsTnI 22 (H) <20 ng/L   Comprehensive metabolic panel   Result Value Ref Range    Sodium 141 135 - 147 mmol/L    Potassium 3.6 3.5 - 5.3 mmol/L    Chloride 106 96 - 108 mmol/L    CO2 28 21 - 32 mmol/L    ANION GAP 7 mmol/L    BUN 27 (H) 5 - 25 mg/dL    Creatinine 1.42 (H) 0.60 - 1.30 mg/dL    Glucose 111 65 - 140 mg/dL    Calcium 8.8 8.4 - 10.2 mg/dL    AST 13 13 - 39 U/L    ALT 9 7 - 52 U/L    Alkaline Phosphatase 77 34 - 104 U/L    Total Protein 5.8 (L) 6.4 - 8.4 g/dL    Albumin 3.6 3.5 - 5.0 g/dL    Total Bilirubin 1.63 (H) 0.20 - 1.00 mg/dL    eGFR 43 ml/min/1.73sq m   Magnesium   Result Value Ref Range    Magnesium 2.4 1.9 - 2.7 mg/dL   CBC and differential   Result Value Ref Range    WBC 1.52 (LL) 4.31 - 10.16 Thousand/uL    RBC 2.14 (L) 3.88 - 5.62 Million/uL    Hemoglobin 6.9 (LL) 12.0 - 17.0 g/dL    Hematocrit 20.4 (L) 36.5 - 49.3 %    MCV 95 82 - 98 fL    MCH 32.2 26.8 - 34.3 pg    MCHC 33.8 31.4 - 37.4 g/dL    RDW 16.1 (H) 11.6 - 15.1 %    MPV 13.4 (H) 8.9 - 12.7 fL    Platelets 25 (LL) 149 - 390 Thousands/uL   Phosphorus   Result Value Ref Range    Phosphorus 3.1 2.3 - 4.1 mg/dL   Procalcitonin   Result Value Ref Range    Procalcitonin 0.07 <=0.25 ng/ml   Lactate dehydrogenase   Result Value Ref Range    LD 59 (L) 140 - 271 U/L   Protime-INR   Result Value Ref Range    Protime 24.0 (H) 11.6 - 14.5 seconds    INR 2.19 (H) 0.84 - 1.19   Type and screen   Result Value Ref Range    ABO Grouping AB     Rh Factor Positive     Antibody Screen Negative     Specimen Expiration Date 20231220    Prepare Leukoreduced RBC: 1 Units   Result Value Ref Range    Unit Product Code A8067B75     Unit Number [id number]     Unit ABO A     Unit RH POS     Crossmatch Compatible     Unit Dispense Status Presumed Trans     Unit Product Volume 350 ml   Prepare Leukoreduced RBC: 1 Units   Result Value Ref Range    Unit Product Code " M3130I69     Unit Number [id number]     Unit ABO A     Unit RH POS     Crossmatch Compatible     Unit Dispense Status Crossmatched     Unit Product Volume 350 ml   Fingerstick Glucose (POCT)   Result Value Ref Range    POC Glucose 84 65 - 140 mg/dl   Fingerstick Glucose (POCT)   Result Value Ref Range    POC Glucose 119 65 - 140 mg/dl   Fingerstick Glucose (POCT)   Result Value Ref Range    POC Glucose 171 (H) 65 - 140 mg/dl   Manual Differential(PHLEBS Do Not Order)   Result Value Ref Range    Segmented % 42 (L) 43 - 75 %    Lymphocytes % 54 (H) 14 - 44 %    Monocytes % 4 4 - 12 %    Eosinophils, % 0 0 - 6 %    Basophils % 0 0 - 1 %    Absolute Neutrophils 0.59 (L) 1.85 - 7.62 Thousand/uL    Lymphocytes Absolute 0.76 0.60 - 4.47 Thousand/uL    Monocytes Absolute 0.06 0.00 - 1.22 Thousand/uL    Eosinophils Absolute 0.00 0.00 - 0.40 Thousand/uL    Basophils Absolute 0.00 0.00 - 0.10 Thousand/uL    Total Counted      RBC Morphology Present     Platelet Estimate Decreased (A) Adequate    Giant PLTs Present     Acanthocytes Present     Anisocytosis Present     Microcytes Present     Ovalocytes Present     Sickle Cells Present          Imaging Studies: I have personally reviewed pertinent reports.    Pathology, and Other Studies: I have personally reviewed pertinent reports.        Assessment and Plan:  See diagnoses, orders instructions below  Pancytopenia and counts to be determined.  Patient will be going for bone marrow test tomorrow and I explained the procedure to the patient and the reason to do the test.  In the meantime counts to be monitored.  Transfusions as needed.  I discussed with hospitalist physician Dr. Tara Jose.   Outpatient hematology follow-up with our group member at [facility]     Patient will continue to follow with  primary physician and other consultants.  Patient voiced understanding and agrees      Disclaimer: This document was prepared using a dictation device . If a word or  phrase is confusing, or does not make sense, this is likely due to recognition error which was not discovered during the providers review. If you believe an error has occurred, please Contact me through [employer] [facility] Line service for howard?cation.    Counseling / Coordination of Care  ..  Provided counseling and support

## 2023-12-18 NOTE — ASSESSMENT & PLAN NOTE
INR currently therapeutic, patient is maintained on coumadin  HOLD coumadin with anticipation of bone marrow biopsy  Per IR OK to do biopsy with INR as is but continue to hold coumadin  Check INR daily  Rate controlled on metoprolol

## 2023-12-18 NOTE — UTILIZATION REVIEW
Initial Clinical Review    Admission: Date/Time/Statement:   Admission Orders (From admission, onward)       Ordered        12/17/23 1341      12/17/23 1338  INPATIENT ADMISSION  Once                        Inpatient Admission     Standing Status:   Standing     Number of Occurrences:   1     Order Specific Question:   Level of Care     Answer:   Med Surg [16]     Order Specific Question:   Estimated length of stay     Answer:   More than 2 Midnights     Order Specific Question:   Certification     Answer:   I certify that inpatient services are medically necessary for this patient for a duration of greater than two midnights. See H&P and MD Progress Notes for additional information about the patient's course of treatment.     ED Arrival Information       Expected   -    Arrival   12/17/2023 08:26    Acuity   Urgent              Means of arrival   Walk-In    Escorted by   Spouse    Service   Hospitalist    Admission type   Emergency              Arrival complaint   Abnormal Lab Result             Chief Complaint   Patient presents with    Abnormal Lab     Patient reports had blood work done yesterday and last night received a phone call stating his platelets were low. Patient offers no complaints at this time.        Initial Presentation: 88 y.o. male presents to ed from home on 12-17 for evaluation and treatment of low platelets. PMHX: CAD, CHF, AFIB on coumadin, PROSTATE CANCER. Clinical assessment significant for temp 96.9, O2 sats 75%,petechiae, purpura, blle edema.   Wbc 1.41, platelets 29, inr 2.13, T bili 1.60.  Admit to inpatient med surg for pancytopenia. Transfuse 1U PRBC#1, neutropenic precautions, consult IR, consult hematology    Date: 12-18-23    Day 2: inpatient med surg  NPO. Transfuse 1U PRBC #2.Give Iv lasix x1.   Trend with CBC with diff, check procalcitonin. IR consult  completed.  Plan bone marrow biopsy. Hold coumadin.  COMPLETE OLINDA WCYTOMETRY.     Date: 12-19-23     Day 3: Has surpassed a  2nd midnight with active treatments and services, which include IR bone marrow biopsy, trend HH,WBC, PLT, INR, neutropenic precautions, transfuse for platelets <20 and Hb <7.  .     ED Triage Vitals [12/17/23 0841]   (!) 97 °F (36.1 °C) 85 18 131/73 99 %      Temporal Monitor           Pain Score       No Pain          12/17/23 94.2 kg (207 lb 10.8 oz)     Additional Vital Signs:       Date/Time Temp Pulse Resp BP MAP (mmHg) SpO2 O2 Device   12/18/23 0700 -- -- -- 91/52 -- -- --   12/18/23 06:43:21 98 °F (36.7 °C) 82 17 102/57 72 99 % --   12/17/23 23:07:16 98.1 °F (36.7 °C) 94 -- 103/82 89 98 % --   12/17/23 1941 98.5 °F (36.9 °C) 85 18 105/54 -- -- --   12/17/23 18:13:40 -- 85 18 105/54 71 100 % --   12/17/23 1716 -- -- 18 -- -- -- None (Room air)   12/17/23 17:15:52 98.5 °F (36.9 °C) 86 -- 113/81 92 100 % --   12/17/23 15:46:33 -- 78 -- 106/56 73 83 %   Abnormal  --   12/17/23 14:26:40 97.5 °F (36.4 °C) 78 19 108/56 73 100 % --   12/17/23 1405 98 °F (36.7 °C) 82 17 116/56 -- 98 % None (Room air)   12/17/23 1355 97.9 °F (36.6 °C) 77 17 103/53 -- 100 % None (Room air)   12/17/23 1350 97 °F (36.1 °C) Abnormal  79 18 115/60 -- 99 % None (Room air)   12/17/23 1344 96.9 °F (36.1 °C) Abnormal  81 16 119/59 -- -- --   12/17/23 1343 97 °F (36.1 °C) Abnormal  79 18 115/60 -- 99 % None (Room air)   12/17/23 0930 -- 74 -- 121/63 86 75 % Abnormal  --   12/17/23 0841 97 °F (36.1 °C) Abnormal  85 18 131/73 95 99 % None (Room air)         Pertinent Labs/Diagnostic Test Results:         Results from last 7 days   Lab Units 12/18/23  0459 12/17/23  1058 12/16/23  0920   WBC Thousand/uL 1.52* 1.41* 1.49*   HEMOGLOBIN g/dL 6.9* 7.1* 7.1*   HEMATOCRIT % 20.4* 21.8* 20.7*   PLATELETS Thousands/uL 25* 29*  --    BANDS PCT %  --   --  2     Results from last 7 days   Lab Units 12/17/23  1058   RETIC CT ABS  17,600   RETIC CT PCT % 0.78     Results from last 7 days   Lab Units 12/18/23  0459 12/17/23  1058 12/16/23  0920   SODIUM mmol/L  141 142 142   POTASSIUM mmol/L 3.6 3.8 4.0   CHLORIDE mmol/L 106 105 107   CO2 mmol/L 28 28 28   ANION GAP mmol/L 7 9 7   BUN mg/dL 27* 25 27*   CREATININE mg/dL 1.42* 1.39* 1.45*   EGFR ml/min/1.73sq m 43 44 42   CALCIUM mg/dL 8.8 9.3 9.0   MAGNESIUM mg/dL 2.4  --   --    PHOSPHORUS mg/dL 3.1  --   --      Results from last 7 days   Lab Units 12/18/23  0459 12/17/23  1058 12/16/23  0920   AST U/L 13 15 12*   ALT U/L 9 11 9   ALK PHOS U/L 77 87 89   TOTAL PROTEIN g/dL 5.8* 6.9 6.5   ALBUMIN g/dL 3.6 4.2 3.8   TOTAL BILIRUBIN mg/dL 1.63* 1.60* 1.33*     Results from last 7 days   Lab Units 12/18/23  1117 12/18/23  0700 12/17/23  1610   POC GLUCOSE mg/dl 171* 119 84     Results from last 7 days   Lab Units 12/18/23  0459 12/17/23  1058   GLUCOSE RANDOM mg/dL 111 128         Results from last 7 days   Lab Units 12/16/23  0920   HEMOGLOBIN A1C % 7.5*   EAG mg/dl 169       Results from last 7 days   Lab Units 12/17/23  1811 12/17/23  1332 12/17/23  1058   HS TNI 0HR ng/L  --   --  44   HS TNI 2HR ng/L  --  56*  --    HSTNI D2 ng/L  --  12  --    HS TNI 4HR ng/L 66*  --   --    HSTNI D4 ng/L 22*  --   --          Results from last 7 days   Lab Units 12/18/23  0502 12/17/23  1058   PROTIME seconds 24.0* 23.4*   INR  2.19* 2.13*   PTT seconds  --  41*     Results from last 7 days   Lab Units 12/16/23  0920   TSH 3RD GENERATON uIU/mL 2.244     Results from last 7 days   Lab Units 12/18/23  0459   PROCALCITONIN ng/ml 0.07         Results from last 7 days   Lab Units 12/18/23  1250 12/18/23  0633   UNIT PRODUCT CODE  [id number]   UNIT NUMBER  [id number]   UNITABO  A A   UNITRH  POS POS   CROSSMATCH  Compatible Compatible   UNIT DISPENSE STATUS  Crossmatched Presumed Trans   UNIT PRODUCT VOL ml 350 350       Results from last 7 days   Lab Units 12/16/23  0920   CREATININE UR mg/dL 96.5       ED Treatment:   Medication Administration from 12/17/2023 0825 to 12/17/2023 1425       None          Past  Medical History:   Diagnosis Date    Anxiety     Atrial fibrillation (HCC)     Atrial flutter (HCC)     Congestive heart failure (CHF) (HCC)     COPD (chronic obstructive pulmonary disease) (HCC)     Coronary artery disease     DVT (deep venous thrombosis) (HCC)     ED (erectile dysfunction)     Hearing loss     History of echocardiogram 04/05/2018    EF 0.55, Mild LVH. Mild moderate mitral regurg. Trace aortic regurg.    Hx of radiation therapy     XRT    Hypercholesterolemia     Hyperlipidemia     Hypertension     Long term (current) use of anticoagulants 8/21/2018    Neuropathy of both feet     Peripheral neuropathy     Prostate cancer (HCC)     Type 2 diabetes mellitus (HCC)      Present on Admission:   Type 2 diabetes mellitus without complication, without long-term current use of insulin (HCC)   Persistent atrial fibrillation (HCC)   Pancytopenia (HCC)   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)      Admitting Diagnosis:     Abnormal laboratory test result [R89.9]  Pancytopenia (HCC) [D61.818]    Age/Sex: 88 y.o. male    Scheduled Medications:    atorvastatin, 40 mg, Oral, Daily  busPIRone, 7.5 mg, Oral, BID  ferrous gluconate, 324 mg, Oral, BID before lunch/dinner  folic acid, 1 mg, Oral, Daily  furosemide, 40 mg, Intravenous, Once  insulin lispro, 1-6 Units, Subcutaneous, TID AC  ipratropium, 2 spray, Nasal, TID  magnesium Oxide, 400 mg, Oral, BID  metoprolol succinate, 25 mg, Oral, Daily  potassium chloride, 20 mEq, Oral, Daily  pyridoxine, 50 mg, Oral, Daily  saccharomyces boulardii, 250 mg, Oral, BID  tamsulosin, 0.4 mg, Oral, Daily With Dinner  torsemide, 50 mg, Oral, Daily      Continuous IV Infusions:     PRN Meds:  acetaminophen, 650 mg, Oral, Q6H PRN  loperamide, 2 mg, Oral, 4x Daily PRN  ondansetron, 4 mg, Intravenous, Q6H PRN        IP CONSULT TO HEMATOLOGY  INPATIENT CONSULT TO IR    Network Utilization Review Department  ATTENTION: Please call with any questions or concerns to  [phone] and carefully listen to the prompts so that you are directed to the right person. All voicemails are confidential.   For Discharge needs, contact Care Management DC Support Team at [phone] opt. 2  Send all requests for admission clinical reviews, approved or denied determinations and any other requests to dedicated fax number below belonging to the [facility] where the patient is receiving treatment. List of dedicated fax numbers for the Facilities:  FACILITY NAME UR FAX NUMBER   ADMISSION DENIALS (Administrative/Medical Necessity) [phone]   DISCHARGE SUPPORT TEAM (NETWORK) [phone]   PARENT CHILD HEALTH (Maternity/NICU/Pediatrics) [phone]   [facility] [phone]   [facility] [phone]   [facility] [phone]   [facility] [phone]   [facility] [phone]   [facility] [phone]   [facility] [phone]   [facility] [phone]   [facility] [phone]   [facility] [phone]   [facility] [phone]

## 2023-12-19 ENCOUNTER — APPOINTMENT (INPATIENT)
Dept: INTERVENTIONAL RADIOLOGY/VASCULAR | Facility: HOSPITAL | Age: 88
DRG: 835 | End: 2023-12-19
Attending: RADIOLOGY
Payer: COMMERCIAL

## 2023-12-19 LAB
ABO GROUP BLD BPU: NORMAL
ALBUMIN SERPL BCP-MCNC: 3.5 G/DL (ref 3.5–5)
ALP SERPL-CCNC: 74 U/L (ref 34–104)
ALT SERPL W P-5'-P-CCNC: 9 U/L (ref 7–52)
ANION GAP SERPL CALCULATED.3IONS-SCNC: 8 MMOL/L
ANISOCYTOSIS BLD QL SMEAR: PRESENT
AST SERPL W P-5'-P-CCNC: 12 U/L (ref 13–39)
BASOPHILS # BLD MANUAL: 0 THOUSAND/UL (ref 0–0.1)
BASOPHILS NFR MAR MANUAL: 0 % (ref 0–1)
BILIRUB SERPL-MCNC: 2.12 MG/DL (ref 0.2–1)
BPU ID: NORMAL
BUN SERPL-MCNC: 27 MG/DL (ref 5–25)
BURR CELLS BLD QL SMEAR: PRESENT
CALCIUM SERPL-MCNC: 8.7 MG/DL (ref 8.4–10.2)
CHLORIDE SERPL-SCNC: 106 MMOL/L (ref 96–108)
CO2 SERPL-SCNC: 26 MMOL/L (ref 21–32)
CREAT SERPL-MCNC: 1.36 MG/DL (ref 0.6–1.3)
CROSSMATCH: NORMAL
EOSINOPHIL # BLD MANUAL: 0.09 THOUSAND/UL (ref 0–0.4)
EOSINOPHIL NFR BLD MANUAL: 6 % (ref 0–6)
ERYTHROCYTE [DISTWIDTH] IN BLOOD BY AUTOMATED COUNT: 15.8 % (ref 11.6–15.1)
ERYTHROCYTE [DISTWIDTH] IN BLOOD BY AUTOMATED COUNT: 15.8 % (ref 11.6–15.1)
GFR SERPL CREATININE-BSD FRML MDRD: 46 ML/MIN/1.73SQ M
GLUCOSE SERPL-MCNC: 125 MG/DL (ref 65–140)
GLUCOSE SERPL-MCNC: 129 MG/DL (ref 65–140)
GLUCOSE SERPL-MCNC: 146 MG/DL (ref 65–140)
GLUCOSE SERPL-MCNC: 185 MG/DL (ref 65–140)
GLUCOSE SERPL-MCNC: 211 MG/DL (ref 65–140)
HAPTOGLOB SERPL-MCNC: 109 MG/DL (ref 38–329)
HCT VFR BLD AUTO: 22 % (ref 36.5–49.3)
HCT VFR BLD AUTO: 27.5 % (ref 36.5–49.3)
HGB BLD-MCNC: 7.6 G/DL (ref 12–17)
HGB BLD-MCNC: 9.3 G/DL (ref 12–17)
INR PPP: 1.76 (ref 0.84–1.19)
LYMPHOCYTES # BLD AUTO: 0.87 THOUSAND/UL (ref 0.6–4.47)
LYMPHOCYTES # BLD AUTO: 52 % (ref 14–44)
MCH RBC QN AUTO: 32.5 PG (ref 26.8–34.3)
MCH RBC QN AUTO: 32.6 PG (ref 26.8–34.3)
MCHC RBC AUTO-ENTMCNC: 33.8 G/DL (ref 31.4–37.4)
MCHC RBC AUTO-ENTMCNC: 34.5 G/DL (ref 31.4–37.4)
MCV RBC AUTO: 94 FL (ref 82–98)
MCV RBC AUTO: 97 FL (ref 82–98)
MONOCYTES # BLD AUTO: 0.17 THOUSAND/UL (ref 0–1.22)
MONOCYTES NFR BLD: 11 % (ref 4–12)
NEUTROPHILS # BLD MANUAL: 0.42 THOUSAND/UL (ref 1.85–7.62)
NEUTS BAND NFR BLD MANUAL: 1 % (ref 0–8)
NEUTS SEG NFR BLD AUTO: 26 % (ref 43–75)
NRBC BLD AUTO-RTO: 2 /100 WBCS
NRBC BLD AUTO-RTO: 3 /100 WBC (ref 0–2)
OVALOCYTES BLD QL SMEAR: PRESENT
PLATELET # BLD AUTO: 23 THOUSANDS/UL (ref 149–390)
PLATELET # BLD AUTO: 30 THOUSANDS/UL (ref 149–390)
PLATELET BLD QL SMEAR: ABNORMAL
PMV BLD AUTO: 14 FL (ref 8.9–12.7)
POIKILOCYTOSIS BLD QL SMEAR: PRESENT
POTASSIUM SERPL-SCNC: 3.7 MMOL/L (ref 3.5–5.3)
PROT SERPL-MCNC: 5.7 G/DL (ref 6.4–8.4)
PROTHROMBIN TIME: 20.2 SECONDS (ref 11.6–14.5)
RBC # BLD AUTO: 2.34 MILLION/UL (ref 3.88–5.62)
RBC # BLD AUTO: 2.85 MILLION/UL (ref 3.88–5.62)
RBC MORPH BLD: PRESENT
SCAN RESULT: NORMAL
SODIUM SERPL-SCNC: 140 MMOL/L (ref 135–147)
UNIT DISPENSE STATUS: NORMAL
UNIT PRODUCT CODE: NORMAL
UNIT PRODUCT VOLUME: 350 ML
UNIT RH: NORMAL
VARIANT LYMPHS # BLD AUTO: 4 %
WBC # BLD AUTO: 1.36 THOUSAND/UL (ref 4.31–10.16)
WBC # BLD AUTO: 1.56 THOUSAND/UL (ref 4.31–10.16)

## 2023-12-19 PROCEDURE — 99152 MOD SED SAME PHYS/QHP 5/>YRS: CPT | Performed by: RADIOLOGY

## 2023-12-19 PROCEDURE — 85610 PROTHROMBIN TIME: CPT | Performed by: PHYSICIAN ASSISTANT

## 2023-12-19 PROCEDURE — 88305 TISSUE EXAM BY PATHOLOGIST: CPT | Performed by: PATHOLOGY

## 2023-12-19 PROCEDURE — 38222 DX BONE MARROW BX & ASPIR: CPT

## 2023-12-19 PROCEDURE — 88365 INSITU HYBRIDIZATION (FISH): CPT | Performed by: PATHOLOGY

## 2023-12-19 PROCEDURE — 85007 BL SMEAR W/DIFF WBC COUNT: CPT | Performed by: RADIOLOGY

## 2023-12-19 PROCEDURE — 88311 DECALCIFY TISSUE: CPT | Performed by: PATHOLOGY

## 2023-12-19 PROCEDURE — 85027 COMPLETE CBC AUTOMATED: CPT | Performed by: RADIOLOGY

## 2023-12-19 PROCEDURE — 38222 DX BONE MARROW BX & ASPIR: CPT | Performed by: RADIOLOGY

## 2023-12-19 PROCEDURE — 81450 HL NEO GSAP 5-50DNA/DNA&RNA: CPT | Performed by: RADIOLOGY

## 2023-12-19 PROCEDURE — 88341 IMHCHEM/IMCYTCHM EA ADD ANTB: CPT | Performed by: PATHOLOGY

## 2023-12-19 PROCEDURE — 07DR3ZX EXTRACTION OF ILIAC BONE MARROW, PERCUTANEOUS APPROACH, DIAGNOSTIC: ICD-10-PCS | Performed by: RADIOLOGY

## 2023-12-19 PROCEDURE — 88185 FLOWCYTOMETRY/TC ADD-ON: CPT | Performed by: RADIOLOGY

## 2023-12-19 PROCEDURE — 77002 NEEDLE LOCALIZATION BY XRAY: CPT

## 2023-12-19 PROCEDURE — NC001 PR NO CHARGE: Performed by: RADIOLOGY

## 2023-12-19 PROCEDURE — 88262 CHROMOSOME ANALYSIS 15-20: CPT

## 2023-12-19 PROCEDURE — 88360 TUMOR IMMUNOHISTOCHEM/MANUAL: CPT | Performed by: PATHOLOGY

## 2023-12-19 PROCEDURE — 36415 COLL VENOUS BLD VENIPUNCTURE: CPT

## 2023-12-19 PROCEDURE — 88342 IMHCHEM/IMCYTCHM 1ST ANTB: CPT | Performed by: PATHOLOGY

## 2023-12-19 PROCEDURE — 99152 MOD SED SAME PHYS/QHP 5/>YRS: CPT

## 2023-12-19 PROCEDURE — 99232 SBSQ HOSP IP/OBS MODERATE 35: CPT

## 2023-12-19 PROCEDURE — 88230 TISSUE CULTURE LYMPHOCYTE: CPT

## 2023-12-19 PROCEDURE — 88313 SPECIAL STAINS GROUP 2: CPT | Performed by: PATHOLOGY

## 2023-12-19 PROCEDURE — 85097 BONE MARROW INTERPRETATION: CPT | Performed by: PATHOLOGY

## 2023-12-19 PROCEDURE — 77002 NEEDLE LOCALIZATION BY XRAY: CPT | Performed by: RADIOLOGY

## 2023-12-19 PROCEDURE — 85027 COMPLETE CBC AUTOMATED: CPT | Performed by: INTERNAL MEDICINE

## 2023-12-19 PROCEDURE — NC001 PR NO CHARGE: Performed by: INTERNAL MEDICINE

## 2023-12-19 PROCEDURE — 82948 REAGENT STRIP/BLOOD GLUCOSE: CPT

## 2023-12-19 PROCEDURE — 80053 COMPREHEN METABOLIC PANEL: CPT | Performed by: PHYSICIAN ASSISTANT

## 2023-12-19 PROCEDURE — 88364 INSITU HYBRIDIZATION (FISH): CPT | Performed by: PATHOLOGY

## 2023-12-19 RX ORDER — LIDOCAINE WITH 8.4% SOD BICARB 0.9%(10ML)
SYRINGE (ML) INJECTION AS NEEDED
Status: COMPLETED | OUTPATIENT
Start: 2023-12-19 | End: 2023-12-19

## 2023-12-19 RX ORDER — FENTANYL CITRATE 50 UG/ML
INJECTION, SOLUTION INTRAMUSCULAR; INTRAVENOUS AS NEEDED
Status: COMPLETED | OUTPATIENT
Start: 2023-12-19 | End: 2023-12-19

## 2023-12-19 RX ORDER — MIDAZOLAM HYDROCHLORIDE 2 MG/2ML
INJECTION, SOLUTION INTRAMUSCULAR; INTRAVENOUS AS NEEDED
Status: COMPLETED | OUTPATIENT
Start: 2023-12-19 | End: 2023-12-19

## 2023-12-19 RX ADMIN — INSULIN LISPRO 1 UNITS: 100 INJECTION, SOLUTION INTRAVENOUS; SUBCUTANEOUS at 12:02

## 2023-12-19 RX ADMIN — BUSPIRONE HYDROCHLORIDE 7.5 MG: 5 TABLET ORAL at 09:03

## 2023-12-19 RX ADMIN — Medication 250 MG: at 09:03

## 2023-12-19 RX ADMIN — ATORVASTATIN CALCIUM 40 MG: 40 TABLET, FILM COATED ORAL at 09:03

## 2023-12-19 RX ADMIN — METOPROLOL SUCCINATE 25 MG: 25 TABLET, EXTENDED RELEASE ORAL at 10:14

## 2023-12-19 RX ADMIN — Medication 250 MG: at 16:54

## 2023-12-19 RX ADMIN — Medication 50 MG: at 09:03

## 2023-12-19 RX ADMIN — BUSPIRONE HYDROCHLORIDE 7.5 MG: 5 TABLET ORAL at 16:54

## 2023-12-19 RX ADMIN — Medication 10 ML: at 14:14

## 2023-12-19 RX ADMIN — FENTANYL CITRATE 50 MCG: 50 INJECTION, SOLUTION INTRAMUSCULAR; INTRAVENOUS at 14:16

## 2023-12-19 RX ADMIN — POTASSIUM CHLORIDE 20 MEQ: 1500 TABLET, EXTENDED RELEASE ORAL at 09:03

## 2023-12-19 RX ADMIN — FERROUS GLUCONATE 324 MG: 324 TABLET ORAL at 16:54

## 2023-12-19 RX ADMIN — TORSEMIDE 50 MG: 100 TABLET ORAL at 09:03

## 2023-12-19 RX ADMIN — MIDAZOLAM HYDROCHLORIDE 1 MG: 1 INJECTION, SOLUTION INTRAMUSCULAR; INTRAVENOUS at 14:07

## 2023-12-19 RX ADMIN — IPRATROPIUM BROMIDE 2 SPRAY: 21 SPRAY, METERED NASAL at 09:09

## 2023-12-19 RX ADMIN — IPRATROPIUM BROMIDE 2 SPRAY: 21 SPRAY, METERED NASAL at 20:33

## 2023-12-19 RX ADMIN — Medication 400 MG: at 16:53

## 2023-12-19 RX ADMIN — Medication 400 MG: at 09:03

## 2023-12-19 RX ADMIN — FENTANYL CITRATE 50 MCG: 50 INJECTION, SOLUTION INTRAMUSCULAR; INTRAVENOUS at 14:07

## 2023-12-19 RX ADMIN — TAMSULOSIN HYDROCHLORIDE 0.4 MG: 0.4 CAPSULE ORAL at 16:53

## 2023-12-19 RX ADMIN — IPRATROPIUM BROMIDE 2 SPRAY: 21 SPRAY, METERED NASAL at 16:55

## 2023-12-19 RX ADMIN — FOLIC ACID 1 MG: 1 TABLET ORAL at 09:03

## 2023-12-19 NOTE — ASSESSMENT & PLAN NOTE
This is an ongoing and acutely worsening issue  Has followed with hematology x 1 outpatient  Morphology in the past reviewed and not concerning for primary bone neoplasia but was recommended to complete flow cytometry which was not completed  Complete flow cytometry at this time  Haptoglobin, fibrinogen, LDH, reticulocytes all wnl in the recent past  Hematology recommends inpatient BMBx completed 12/19, appreciated  Borderline for neutropenia - keep on neutropenic precautions for now  D/w heme, no indication for Granix at this time  Tranfuse 1 PRBC 12/17, another unit 12/18  Transfuse as indicated    Appreciate hematology consult

## 2023-12-19 NOTE — PLAN OF CARE
Problem: PAIN - ADULT  Goal: Verbalizes/displays adequate comfort level or baseline comfort level  Description: Interventions:  - Encourage patient to monitor pain and request assistance  - Assess pain using appropriate pain scale  - Administer analgesics based on type and severity of pain and evaluate response  - Implement non-pharmacological measures as appropriate and evaluate response  - Consider cultural and social influences on pain and pain management  - Notify physician/advanced practitioner if interventions unsuccessful or patient reports new pain  Outcome: Progressing     Problem: SAFETY ADULT  Goal: Patient will remain free of falls  Description: INTERVENTIONS:  - Educate patient/family on patient safety including physical limitations  - Instruct patient to call for assistance with activity   - Consult OT/PT to assist with strengthening/mobility   - Keep Call bell within reach  - Keep bed low and locked with side rails adjusted as appropriate  - Keep care items and personal belongings within reach  - Initiate and maintain comfort rounds  - Make Fall Risk Sign visible to staff  - Offer Toileting every 2 Hours, in advance of need  - Initiate/Maintain bed/chair alarm  - Obtain necessary fall risk management equipment: non skid socks  - Apply yellow socks and bracelet for high fall risk patients  - Consider moving patient to room near nurses station  Outcome: Progressing  Goal: Maintain or return to baseline ADL function  Description: INTERVENTIONS:  -  Assess patient's ability to carry out ADLs; assess patient's baseline for ADL function and identify physical deficits which impact ability to perform ADLs (bathing, care of mouth/teeth, toileting, grooming, dressing, etc.)  - Assess/evaluate cause of self-care deficits   - Assess range of motion  - Assess patient's mobility; develop plan if impaired  - Assess patient's need for assistive devices and provide as appropriate  - Encourage maximum independence  but intervene and supervise when necessary  - Involve family in performance of ADLs  - Assess for home care needs following discharge   - Consider OT consult to assist with ADL evaluation and planning for discharge  - Provide patient education as appropriate  Outcome: Progressing  Goal: Maintains/Returns to pre admission functional level  Description: INTERVENTIONS:  - Perform AM-PAC 6 Click Basic Mobility/ Daily Activity assessment daily.  - Set and communicate daily mobility goal to care team and patient/family/caregiver.   - Collaborate with rehabilitation services on mobility goals if consulted  - Perform Range of Motion 3 times a day.  - Reposition patient every 2 hours.  - Dangle patient 3 times a day  - Stand patient 3 times a day  - Ambulate patient 3 times a day  - Out of bed to chair 3 times a day   - Out of bed for meals 3 times a day  - Out of bed for toileting  - Record patient progress and toleration of activity level   Outcome: Progressing     Problem: METABOLIC, FLUID AND ELECTROLYTES - ADULT  Goal: Electrolytes maintained within normal limits  Description: INTERVENTIONS:  - Monitor labs and assess patient for signs and symptoms of electrolyte imbalances  - Administer electrolyte replacement as ordered  - Monitor response to electrolyte replacements, including repeat lab results as appropriate  - Instruct patient on fluid and nutrition as appropriate  Outcome: Progressing  Goal: Fluid balance maintained  Description: INTERVENTIONS:  - Monitor labs   - Monitor I/O and WT  - Instruct patient on fluid and nutrition as appropriate  - Assess for signs & symptoms of volume excess or deficit  Outcome: Progressing  Goal: Glucose maintained within target range  Description: INTERVENTIONS:  - Monitor Blood Glucose as ordered  - Assess for signs and symptoms of hyperglycemia and hypoglycemia  - Administer ordered medications to maintain glucose within target range  - Assess nutritional intake and initiate  nutrition service referral as needed  Outcome: Progressing     Problem: HEMATOLOGIC - ADULT  Goal: Maintains hematologic stability  Description: INTERVENTIONS  - Assess for signs and symptoms of bleeding or hemorrhage  - Monitor labs  - Administer supportive blood products/factors as ordered and appropriate  Outcome: Progressing     Problem: Prexisting or High Potential for Compromised Skin Integrity  Goal: Skin integrity is maintained or improved  Description: INTERVENTIONS:  - Identify patients at risk for skin breakdown  - Assess and monitor skin integrity  - Assess and monitor nutrition and hydration status  - Monitor labs   - Assess for incontinence   - Turn and reposition patient  - Assist with mobility/ambulation  - Relieve pressure over bony prominences  - Avoid friction and shearing  - Provide appropriate hygiene as needed including keeping skin clean and dry  - Evaluate need for skin moisturizer/barrier cream  - Collaborate with interdisciplinary team   - Patient/family teaching  - Consider wound care consult   Outcome: Progressing     Problem: Nutrition/Hydration-ADULT  Goal: Nutrient/Hydration intake appropriate for improving, restoring or maintaining nutritional needs  Description: Monitor and assess patient's nutrition/hydration status for malnutrition. Collaborate with interdisciplinary team and initiate plan and interventions as ordered.  Monitor patient's weight and dietary intake as ordered or per policy. Utilize nutrition screening tool and intervene as necessary. Determine patient's food preferences and provide high-protein, high-caloric foods as appropriate.     INTERVENTIONS:  - Monitor oral intake, urinary output, labs, and treatment plans  - Assess nutrition and hydration status and recommend course of action  - Evaluate amount of meals eaten  - Assist patient with eating if necessary   - Allow adequate time for meals  - Recommend/ encourage appropriate diets, oral nutritional supplements,  and vitamin/mineral supplements  - Order, calculate, and assess calorie counts as needed  - Recommend, monitor, and adjust tube feedings and TPN/PPN based on assessed needs  - Assess need for intravenous fluids  - Provide specific nutrition/hydration education as appropriate  - Include patient/family/caregiver in decisions related to nutrition  Outcome: Progressing

## 2023-12-19 NOTE — ASSESSMENT & PLAN NOTE
Wt Readings from Last 3 Encounters:   12/17/23 94.2 kg (207 lb 10.8 oz)   10/19/23 94.2 kg (207 lb 9.6 oz)   10/17/23 95.7 kg (211 lb)       Does not appear to be in acute exacerbation at this time  Received lasix with blood transfusion   Continue on home torsemide

## 2023-12-19 NOTE — ASSESSMENT & PLAN NOTE
Lab Results   Component Value Date    HGBA1C 7.5 (H) 12/16/2023       Recent Labs     12/18/23  1523 12/18/23  2056 12/19/23  0700 12/19/23  1120   POCGLU 128 146* 146* 185*         Blood Sugar Average: Last 72 hrs:  (P) [id number]    Near goal per last A1c  Hold oral medications  SSI & acu-checks ac/hs

## 2023-12-19 NOTE — PROGRESS NOTES
General acute hospital  Progress Note  Name: Freddy Copeland I  MRN: [id number]  Unit/Bed#: 402-01 I Date of Admission: 12/17/2023   Date of Service: 12/19/2023 I Hospital Day: 2    Assessment/Plan   * Pancytopenia (HCC)  Assessment & Plan  This is an ongoing and acutely worsening issue  Has followed with hematology x 1 outpatient  Morphology in the past reviewed and not concerning for primary bone neoplasia but was recommended to complete flow cytometry which was not completed  Complete flow cytometry at this time  Haptoglobin, fibrinogen, LDH, reticulocytes all wnl in the recent past  Hematology recommends inpatient BMBx completed 12/19, appreciated  Borderline for neutropenia - keep on neutropenic precautions for now  D/w heme, no indication for Granix at this time  Tranfuse 1 PRBC 12/17, another unit 12/18  Transfuse as indicated    Appreciate hematology consult     Type 2 diabetes mellitus without complication, without long-term current use of insulin (HCC)  Assessment & Plan  Lab Results   Component Value Date    HGBA1C 7.5 (H) 12/16/2023       Recent Labs     12/18/23  1523 12/18/23  2056 12/19/23  0700 12/19/23  1120   POCGLU 128 146* 146* 185*         Blood Sugar Average: Last 72 hrs:  (P) 139.1936311510958557    Near goal per last A1c  Hold oral medications  SSI & acu-checks ac/hs      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)  Assessment & Plan  Wt Readings from Last 3 Encounters:   12/17/23 94.2 kg (207 lb 10.8 oz)   10/19/23 94.2 kg (207 lb 9.6 oz)   10/17/23 95.7 kg (211 lb)       Does not appear to be in acute exacerbation at this time  Received lasix with blood transfusion   Continue on home torsemide        Persistent atrial fibrillation (HCC)  Assessment & Plan    Continue to hold Coumadin   Check INR daily  Rate controlled on metoprolol               VTE Pharmacologic Prophylaxis: VTE Score: 7 SCDs    Mobility:   Basic Mobility Inpatient Raw Score: 21  JH-HLM Goal: 6: Walk 10  steps or more  JH-HLM Achieved: 7: Walk 25 feet or more  HLM Goal achieved. Continue to encourage appropriate mobility.    Patient Centered Rounds: I performed bedside rounds with nursing staff today.   Discussions with Specialists or Other Care Team Provider: rafal DUENAS    Education and Discussions with Family / Patient: Updated  (wife) at bedside.    Total Time Spent on Date of Encounter in care of patient:  mins. This time was spent on one or more of the following: performing physical exam; counseling and coordination of care; obtaining or reviewing history; documenting in the medical record; reviewing/ordering tests, medications or procedures; communicating with other healthcare professionals and discussing with patient's family/caregivers.    Current Length of Stay: 2 day(s)  Current Patient Status: Inpatient   Certification Statement: The patient will continue to require additional inpatient hospital stay due to bone marrow biopsy, serial laboratory monitoring, likely need for blood product transfusion  Discharge Plan: Anticipate discharge in 24-48 hrs to home.    Code Status: Level 1 - Full Code    Subjective:   No acute concerns today. Denies SOB, chest pain, hematemesis, melena, BRBPR, abdominal pain, N/V/D.    Objective:     Vitals:   Temp (24hrs), Av.7 °F (36.5 °C), Min:97.2 °F (36.2 °C), Max:98.3 °F (36.8 °C)    Temp:  [97.2 °F (36.2 °C)-98.3 °F (36.8 °C)] 97.6 °F (36.4 °C)  HR:  [72-90] 77  Resp:  [16-20] 17  BP: (102-141)/(48-70) 117/60  SpO2:  [96 %-100 %] 96 %  Body mass index is 28.17 kg/m².     Input and Output Summary (last 24 hours):     Intake/Output Summary (Last 24 hours) at 2023 1556  Last data filed at 2023 1236  Gross per 24 hour   Intake 608.75 ml   Output 1400 ml   Net -791.25 ml       Physical Exam:   Physical Exam  Constitutional:       Appearance: He is ill-appearing.   HENT:      Head: Normocephalic and atraumatic.   Cardiovascular:      Rate and Rhythm:  Normal rate. Rhythm irregular.   Pulmonary:      Effort: Pulmonary effort is normal.      Breath sounds: Normal breath sounds.   Abdominal:      General: Abdomen is flat. Bowel sounds are normal. There is no distension.      Palpations: Abdomen is soft.   Musculoskeletal:      Right lower leg: No edema.      Left lower leg: No edema.   Skin:     General: Skin is warm and dry.      Coloration: Skin is pale.      Comments: Scattered petechiae   Neurological:      General: No focal deficit present.      Mental Status: He is alert and oriented to person, place, and time.          Additional Data:     Labs:  Results from last 7 days   Lab Units 12/19/23  1400 12/18/23  0459 12/17/23  1058 12/16/23  0920   WBC Thousand/uL 1.56*   < > 1.41* 1.49*   HEMOGLOBIN g/dL 9.3*   < > 7.1* 7.1*   HEMATOCRIT % 27.5*   < > 21.8* 20.7*   PLATELETS Thousands/uL 30*   < > 29*  --    BANDS PCT %  --   --   --  2   LYMPHO PCT %  --   --  54* 70*   MONO PCT %  --   --  4 6   EOS PCT %  --   --  0 1    < > = values in this interval not displayed.     Results from last 7 days   Lab Units 12/19/23  0502   SODIUM mmol/L 140   POTASSIUM mmol/L 3.7   CHLORIDE mmol/L 106   CO2 mmol/L 26   BUN mg/dL 27*   CREATININE mg/dL 1.36*   ANION GAP mmol/L 8   CALCIUM mg/dL 8.7   ALBUMIN g/dL 3.5   TOTAL BILIRUBIN mg/dL 2.12*   ALK PHOS U/L 74   ALT U/L 9   AST U/L 12*   GLUCOSE RANDOM mg/dL 129     Results from last 7 days   Lab Units 12/19/23  0502   INR  1.76*     Results from last 7 days   Lab Units 12/19/23  1120 12/19/23  0700 12/18/23  2056 12/18/23  1523 12/18/23  1117 12/18/23  0700 12/17/23  1610   POC GLUCOSE mg/dl 185* 146* 146* 128 171* 119 84     Results from last 7 days   Lab Units 12/16/23  0920   HEMOGLOBIN A1C % 7.5*     Results from last 7 days   Lab Units 12/18/23  0459   PROCALCITONIN ng/ml 0.07       Lines/Drains:  Invasive Devices       Peripheral Intravenous Line  Duration             Peripheral IV 12/17/23 Left;Ventral (anterior)  Forearm 2 days                          Imaging: No pertinent imaging reviewed.    Recent Cultures (last 7 days):         Last 24 Hours Medication List:   Current Facility-Administered Medications   Medication Dose Route Frequency Provider Last Rate    acetaminophen  650 mg Oral Q6H PRN Tara Jose PA-C      atorvastatin  40 mg Oral Daily Tara Jose PA-C      busPIRone  7.5 mg Oral BID Tara Jose PA-C      ferrous gluconate  324 mg Oral BID before lunch/dinner Tara Jose PA-C      folic acid  1 mg Oral Daily Tara Jose PA-C      insulin lispro  1-6 Units Subcutaneous TID AC Tara Jose PA-C      ipratropium  2 spray Nasal TID Tara Jose PA-C      loperamide  2 mg Oral 4x Daily PRN Tara Jose PA-C      magnesium Oxide  400 mg Oral BID Tara Jose PA-C      metoprolol succinate  25 mg Oral Daily Tara Jose PA-C      ondansetron  4 mg Intravenous Q6H PRN Tara Jose PA-C      potassium chloride  20 mEq Oral Daily Tara Jose PA-C      pyridoxine  50 mg Oral Daily Tara Jose PA-C      saccharomyces boulardii  250 mg Oral BID Tara Jose PA-C      tamsulosin  0.4 mg Oral Daily With Dinner Tara Jose PA-C      torsemide  50 mg Oral Daily Tara Jose PA-C          Today, Patient Was Seen By: Kaycee Hines PA-C    **Please Note: This note may have been constructed using a voice recognition system.**

## 2023-12-19 NOTE — PROCEDURES
Interventional Radiology Procedure Note    PATIENT NAME: Freddy Copeland  : 1935  MRN: [id number]     Pre-op Diagnosis:   1. Pancytopenia (HCC)      2.        Post-op Diagnosis:   1. Pancytopenia (HCC)      2.   Same    Procedure: Fluoro Guided Biopsy of the bone marrow  Surgeon: Yariel Leonard MD  Assistants: No qualified resident was available, Resident is only observing  Estimated Blood Loss: 10 cc  Findings: Please see full report in PACS  Specimens: Please see full report in PACS  Complications: None  Anesthesia: conscious sedation and local  Prep: 2% Chlorhexidine and alcohol, allowed to dry prior to sterile draping  Timeout: Performed  Fluoro time: Please see full report in PACS  Radiation dose: Please see full report in PACS  Contrast dose: Please see full report in PACS  Contrast type: Please see full report in PACS  Contrast strength: Please see full report in PACS  Contrast route of administration: Please see full report in PACS  Antibiotics: None        Yariel Leonard MD     Date: 2023  Time: 4:40 PM

## 2023-12-19 NOTE — PLAN OF CARE
Problem: PAIN - ADULT  Goal: Verbalizes/displays adequate comfort level or baseline comfort level  Description: Interventions:  - Encourage patient to monitor pain and request assistance  - Assess pain using appropriate pain scale  - Administer analgesics based on type and severity of pain and evaluate response  - Implement non-pharmacological measures as appropriate and evaluate response  - Consider cultural and social influences on pain and pain management  - Notify physician/advanced practitioner if interventions unsuccessful or patient reports new pain  12/19/2023 1109 by Susanna Thacker RN  Outcome: Progressing  12/19/2023 0018 by Susanna Thacker RN  Outcome: Progressing     Problem: SAFETY ADULT  Goal: Patient will remain free of falls  Description: INTERVENTIONS:  - Educate patient/family on patient safety including physical limitations  - Instruct patient to call for assistance with activity   - Consult OT/PT to assist with strengthening/mobility   - Keep Call bell within reach  - Keep bed low and locked with side rails adjusted as appropriate  - Keep care items and personal belongings within reach  - Initiate and maintain comfort rounds  - Make Fall Risk Sign visible to staff  - Offer Toileting every 2 Hours, in advance of need  - Initiate/Maintain bed/chair alarm  - Obtain necessary fall risk management equipment: non skid socks  - Apply yellow socks and bracelet for high fall risk patients  - Consider moving patient to room near nurses station  12/19/2023 1109 by Susanna Thacker RN  Outcome: Progressing  12/19/2023 0018 by Susanna Thacker RN  Outcome: Progressing  Goal: Maintain or return to baseline ADL function  Description: INTERVENTIONS:  -  Assess patient's ability to carry out ADLs; assess patient's baseline for ADL function and identify physical deficits which impact ability to perform ADLs (bathing, care of mouth/teeth, toileting, grooming, dressing, etc.)  - Assess/evaluate cause of self-care  deficits   - Assess range of motion  - Assess patient's mobility; develop plan if impaired  - Assess patient's need for assistive devices and provide as appropriate  - Encourage maximum independence but intervene and supervise when necessary  - Involve family in performance of ADLs  - Assess for home care needs following discharge   - Consider OT consult to assist with ADL evaluation and planning for discharge  - Provide patient education as appropriate  12/19/2023 1109 by Susanna Thacker RN  Outcome: Progressing  12/19/2023 0018 by Susanna Thacker RN  Outcome: Progressing  Goal: Maintains/Returns to pre admission functional level  Description: INTERVENTIONS:  - Perform AM-PAC 6 Click Basic Mobility/ Daily Activity assessment daily.  - Set and communicate daily mobility goal to care team and patient/family/caregiver.   - Collaborate with rehabilitation services on mobility goals if consulted  - Perform Range of Motion 3 times a day.  - Reposition patient every 2 hours.  - Dangle patient 3 times a day  - Stand patient 3 times a day  - Ambulate patient 3 times a day  - Out of bed to chair 3 times a day   - Out of bed for meals 3 times a day  - Out of bed for toileting  - Record patient progress and toleration of activity level   12/19/2023 1109 by Susanna Thacker RN  Outcome: Progressing  12/19/2023 0018 by Susanna Thacker RN  Outcome: Progressing     Problem: METABOLIC, FLUID AND ELECTROLYTES - ADULT  Goal: Electrolytes maintained within normal limits  Description: INTERVENTIONS:  - Monitor labs and assess patient for signs and symptoms of electrolyte imbalances  - Administer electrolyte replacement as ordered  - Monitor response to electrolyte replacements, including repeat lab results as appropriate  - Instruct patient on fluid and nutrition as appropriate  12/19/2023 1109 by Susanna Thacker RN  Outcome: Progressing  12/19/2023 0018 by Susanna Thacker RN  Outcome: Progressing  Goal: Fluid balance maintained  Description:  INTERVENTIONS:  - Monitor labs   - Monitor I/O and WT  - Instruct patient on fluid and nutrition as appropriate  - Assess for signs & symptoms of volume excess or deficit  12/19/2023 1109 by Susanna Thacker RN  Outcome: Progressing  12/19/2023 0018 by Susanna Thacker RN  Outcome: Progressing  Goal: Glucose maintained within target range  Description: INTERVENTIONS:  - Monitor Blood Glucose as ordered  - Assess for signs and symptoms of hyperglycemia and hypoglycemia  - Administer ordered medications to maintain glucose within target range  - Assess nutritional intake and initiate nutrition service referral as needed  12/19/2023 1109 by Susanna Thacker RN  Outcome: Progressing  12/19/2023 0018 by Susanna Thacker RN  Outcome: Progressing     Problem: HEMATOLOGIC - ADULT  Goal: Maintains hematologic stability  Description: INTERVENTIONS  - Assess for signs and symptoms of bleeding or hemorrhage  - Monitor labs  - Administer supportive blood products/factors as ordered and appropriate  12/19/2023 1109 by Susanna Thacker RN  Outcome: Progressing  12/19/2023 0018 by Susanna Thacker RN  Outcome: Progressing     Problem: Prexisting or High Potential for Compromised Skin Integrity  Goal: Skin integrity is maintained or improved  Description: INTERVENTIONS:  - Identify patients at risk for skin breakdown  - Assess and monitor skin integrity  - Assess and monitor nutrition and hydration status  - Monitor labs   - Assess for incontinence   - Turn and reposition patient  - Assist with mobility/ambulation  - Relieve pressure over bony prominences  - Avoid friction and shearing  - Provide appropriate hygiene as needed including keeping skin clean and dry  - Evaluate need for skin moisturizer/barrier cream  - Collaborate with interdisciplinary team   - Patient/family teaching  - Consider wound care consult   12/19/2023 1109 by Susanna Thacker RN  Outcome: Progressing  12/19/2023 0018 by Susanna Thacker RN  Outcome: Progressing     Problem:  Nutrition/Hydration-ADULT  Goal: Nutrient/Hydration intake appropriate for improving, restoring or maintaining nutritional needs  Description: Monitor and assess patient's nutrition/hydration status for malnutrition. Collaborate with interdisciplinary team and initiate plan and interventions as ordered.  Monitor patient's weight and dietary intake as ordered or per policy. Utilize nutrition screening tool and intervene as necessary. Determine patient's food preferences and provide high-protein, high-caloric foods as appropriate.     INTERVENTIONS:  - Monitor oral intake, urinary output, labs, and treatment plans  - Assess nutrition and hydration status and recommend course of action  - Evaluate amount of meals eaten  - Assist patient with eating if necessary   - Allow adequate time for meals  - Recommend/ encourage appropriate diets, oral nutritional supplements, and vitamin/mineral supplements  - Order, calculate, and assess calorie counts as needed  - Recommend, monitor, and adjust tube feedings and TPN/PPN based on assessed needs  - Assess need for intravenous fluids  - Provide specific nutrition/hydration education as appropriate  - Include patient/family/caregiver in decisions related to nutrition  12/19/2023 1109 by Susanna Thacker, RN  Outcome: Progressing  12/19/2023 0018 by Susanna Thacker, RN  Outcome: Progressing

## 2023-12-19 NOTE — DISCHARGE INSTRUCTIONS
[facility]  Interventional Radiology  [phone]          Bone Marrow Biopsy     WHAT YOU NEED TO KNOW:   A bone marrow biopsy is a procedure to remove a small amount of bone marrow from your bone. Bone marrow is the soft tissue inside your bone that helps to make blood cells. The sample is tested for disease or infection.    DISCHARGE INSTRUCTIONS:     1. Limit your activities day of biopsy as directed by your doctor.    2. Use medication as ordered.    3. Return to your normal diet.Small sips of flat soda will help with nausea.    4. Remove band-aid or dressing 24 hours after procedure.    Contact Interventional Radiology at [phone] ([facility] PATIENTS: Contact Interventional Radiology at [phone]) ([facility]ESUS PATIENTS: Contact Interventional Radiology at [phone]) if:    1. Difficulty breathing, nausea or vomiting.    2. Chills or fever above 101 F.    3. Pain at biopsy site not relieved by medication.    4. Develop any redness, swelling, heat, unusual drainage, heavy bruising or bleeding from biopsy site.

## 2023-12-20 ENCOUNTER — TELEPHONE (OUTPATIENT)
Dept: FAMILY MEDICINE CLINIC | Facility: CLINIC | Age: 88
End: 2023-12-20

## 2023-12-20 PROBLEM — C92.00 AML (ACUTE MYELOID LEUKEMIA) (HCC): Status: ACTIVE | Noted: 2023-08-13

## 2023-12-20 LAB
ANION GAP SERPL CALCULATED.3IONS-SCNC: 8 MMOL/L
BASOPHILS # BLD AUTO: 0 THOUSANDS/ÂΜL (ref 0–0.1)
BASOPHILS NFR BLD AUTO: 0 % (ref 0–1)
BUN SERPL-MCNC: 26 MG/DL (ref 5–25)
CALCIUM SERPL-MCNC: 8.8 MG/DL (ref 8.4–10.2)
CHLORIDE SERPL-SCNC: 105 MMOL/L (ref 96–108)
CO2 SERPL-SCNC: 27 MMOL/L (ref 21–32)
CREAT SERPL-MCNC: 1.32 MG/DL (ref 0.6–1.3)
EOSINOPHIL # BLD AUTO: 0.05 THOUSAND/ÂΜL (ref 0–0.61)
EOSINOPHIL NFR BLD AUTO: 4 % (ref 0–6)
ERYTHROCYTE [DISTWIDTH] IN BLOOD BY AUTOMATED COUNT: 15.9 % (ref 11.6–15.1)
GFR SERPL CREATININE-BSD FRML MDRD: 47 ML/MIN/1.73SQ M
GLUCOSE SERPL-MCNC: 121 MG/DL (ref 65–140)
GLUCOSE SERPL-MCNC: 122 MG/DL (ref 65–140)
GLUCOSE SERPL-MCNC: 168 MG/DL (ref 65–140)
GLUCOSE SERPL-MCNC: 170 MG/DL (ref 65–140)
HCT VFR BLD AUTO: 22.9 % (ref 36.5–49.3)
HGB BLD-MCNC: 7.7 G/DL (ref 12–17)
IMM GRANULOCYTES # BLD AUTO: 0 THOUSAND/UL (ref 0–0.2)
IMM GRANULOCYTES NFR BLD AUTO: 0 % (ref 0–2)
INR PPP: 1.61 (ref 0.84–1.19)
LYMPHOCYTES # BLD AUTO: 0.81 THOUSANDS/ÂΜL (ref 0.6–4.47)
LYMPHOCYTES NFR BLD AUTO: 59 % (ref 14–44)
MCH RBC QN AUTO: 32.2 PG (ref 26.8–34.3)
MCHC RBC AUTO-ENTMCNC: 33.6 G/DL (ref 31.4–37.4)
MCV RBC AUTO: 96 FL (ref 82–98)
MONOCYTES # BLD AUTO: 0.11 THOUSAND/ÂΜL (ref 0.17–1.22)
MONOCYTES NFR BLD AUTO: 8 % (ref 4–12)
NEUTROPHILS # BLD AUTO: 0.4 THOUSANDS/ÂΜL (ref 1.85–7.62)
NEUTS SEG NFR BLD AUTO: 29 % (ref 43–75)
NRBC BLD AUTO-RTO: 2 /100 WBCS
PLATELET # BLD AUTO: 24 THOUSANDS/UL (ref 149–390)
PMV BLD AUTO: 13.7 FL (ref 8.9–12.7)
POTASSIUM SERPL-SCNC: 3.6 MMOL/L (ref 3.5–5.3)
PROTHROMBIN TIME: 18.9 SECONDS (ref 11.6–14.5)
RBC # BLD AUTO: 2.39 MILLION/UL (ref 3.88–5.62)
SCAN RESULT: NORMAL
SCAN RESULT: NORMAL
SODIUM SERPL-SCNC: 140 MMOL/L (ref 135–147)
WBC # BLD AUTO: 1.37 THOUSAND/UL (ref 4.31–10.16)

## 2023-12-20 PROCEDURE — NC001 PR NO CHARGE: Performed by: RADIOLOGY

## 2023-12-20 PROCEDURE — 80048 BASIC METABOLIC PNL TOTAL CA: CPT

## 2023-12-20 PROCEDURE — 85610 PROTHROMBIN TIME: CPT | Performed by: PHYSICIAN ASSISTANT

## 2023-12-20 PROCEDURE — 82948 REAGENT STRIP/BLOOD GLUCOSE: CPT

## 2023-12-20 PROCEDURE — 99232 SBSQ HOSP IP/OBS MODERATE 35: CPT

## 2023-12-20 PROCEDURE — 85025 COMPLETE CBC W/AUTO DIFF WBC: CPT

## 2023-12-20 RX ORDER — BENZOCAINE/MENTHOL 6 MG-10 MG
LOZENGE MUCOUS MEMBRANE 2 TIMES DAILY
Status: DISCONTINUED | OUTPATIENT
Start: 2023-12-20 | End: 2023-12-21 | Stop reason: HOSPADM

## 2023-12-20 RX ADMIN — FOLIC ACID 1 MG: 1 TABLET ORAL at 08:51

## 2023-12-20 RX ADMIN — Medication 400 MG: at 17:35

## 2023-12-20 RX ADMIN — FERROUS GLUCONATE 324 MG: 324 TABLET ORAL at 17:36

## 2023-12-20 RX ADMIN — BUSPIRONE HYDROCHLORIDE 7.5 MG: 5 TABLET ORAL at 17:35

## 2023-12-20 RX ADMIN — INSULIN LISPRO 1 UNITS: 100 INJECTION, SOLUTION INTRAVENOUS; SUBCUTANEOUS at 12:01

## 2023-12-20 RX ADMIN — POTASSIUM CHLORIDE 20 MEQ: 1500 TABLET, EXTENDED RELEASE ORAL at 08:51

## 2023-12-20 RX ADMIN — ATORVASTATIN CALCIUM 40 MG: 40 TABLET, FILM COATED ORAL at 08:51

## 2023-12-20 RX ADMIN — IPRATROPIUM BROMIDE 2 SPRAY: 21 SPRAY, METERED NASAL at 21:44

## 2023-12-20 RX ADMIN — Medication 50 MG: at 08:51

## 2023-12-20 RX ADMIN — IPRATROPIUM BROMIDE 2 SPRAY: 21 SPRAY, METERED NASAL at 19:00

## 2023-12-20 RX ADMIN — METOPROLOL SUCCINATE 25 MG: 25 TABLET, EXTENDED RELEASE ORAL at 08:51

## 2023-12-20 RX ADMIN — INSULIN LISPRO 1 UNITS: 100 INJECTION, SOLUTION INTRAVENOUS; SUBCUTANEOUS at 17:41

## 2023-12-20 RX ADMIN — Medication 250 MG: at 08:50

## 2023-12-20 RX ADMIN — HYDROCORTISONE: 1 CREAM TOPICAL at 17:43

## 2023-12-20 RX ADMIN — TAMSULOSIN HYDROCHLORIDE 0.4 MG: 0.4 CAPSULE ORAL at 17:35

## 2023-12-20 RX ADMIN — Medication 250 MG: at 17:35

## 2023-12-20 RX ADMIN — BUSPIRONE HYDROCHLORIDE 7.5 MG: 5 TABLET ORAL at 08:51

## 2023-12-20 RX ADMIN — TORSEMIDE 50 MG: 100 TABLET ORAL at 08:51

## 2023-12-20 RX ADMIN — FERROUS GLUCONATE 324 MG: 324 TABLET ORAL at 12:03

## 2023-12-20 RX ADMIN — IPRATROPIUM BROMIDE 2 SPRAY: 21 SPRAY, METERED NASAL at 08:50

## 2023-12-20 RX ADMIN — Medication 400 MG: at 08:51

## 2023-12-20 NOTE — PLAN OF CARE
Problem: PAIN - ADULT  Goal: Verbalizes/displays adequate comfort level or baseline comfort level  Description: Interventions:  - Encourage patient to monitor pain and request assistance  - Assess pain using appropriate pain scale  - Administer analgesics based on type and severity of pain and evaluate response  - Implement non-pharmacological measures as appropriate and evaluate response  - Consider cultural and social influences on pain and pain management  - Notify physician/advanced practitioner if interventions unsuccessful or patient reports new pain  Outcome: Progressing     Problem: SAFETY ADULT  Goal: Patient will remain free of falls  Description: INTERVENTIONS:  - Educate patient/family on patient safety including physical limitations  - Instruct patient to call for assistance with activity   - Consult OT/PT to assist with strengthening/mobility   - Keep Call bell within reach  - Keep bed low and locked with side rails adjusted as appropriate  - Keep care items and personal belongings within reach  - Initiate and maintain comfort rounds  - Make Fall Risk Sign visible to staff  - Offer Toileting every 2 Hours, in advance of need  - Initiate/Maintain bed/chair alarm  - Obtain necessary fall risk management equipment: non skid socks  - Apply yellow socks and bracelet for high fall risk patients  - Consider moving patient to room near nurses station  Outcome: Progressing  Goal: Maintain or return to baseline ADL function  Description: INTERVENTIONS:  -  Assess patient's ability to carry out ADLs; assess patient's baseline for ADL function and identify physical deficits which impact ability to perform ADLs (bathing, care of mouth/teeth, toileting, grooming, dressing, etc.)  - Assess/evaluate cause of self-care deficits   - Assess range of motion  - Assess patient's mobility; develop plan if impaired  - Assess patient's need for assistive devices and provide as appropriate  - Encourage maximum independence  but intervene and supervise when necessary  - Involve family in performance of ADLs  - Assess for home care needs following discharge   - Consider OT consult to assist with ADL evaluation and planning for discharge  - Provide patient education as appropriate  Outcome: Progressing  Goal: Maintains/Returns to pre admission functional level  Description: INTERVENTIONS:  - Perform AM-PAC 6 Click Basic Mobility/ Daily Activity assessment daily.  - Set and communicate daily mobility goal to care team and patient/family/caregiver.   - Collaborate with rehabilitation services on mobility goals if consulted  - Perform Range of Motion 3 times a day.  - Reposition patient every 2 hours.  - Dangle patient 3 times a day  - Stand patient 3 times a day  - Ambulate patient 3 times a day  - Out of bed to chair 3 times a day   - Out of bed for meals 3 times a day  - Out of bed for toileting  - Record patient progress and toleration of activity level   Outcome: Progressing     Problem: METABOLIC, FLUID AND ELECTROLYTES - ADULT  Goal: Electrolytes maintained within normal limits  Description: INTERVENTIONS:  - Monitor labs and assess patient for signs and symptoms of electrolyte imbalances  - Administer electrolyte replacement as ordered  - Monitor response to electrolyte replacements, including repeat lab results as appropriate  - Instruct patient on fluid and nutrition as appropriate  Outcome: Progressing  Goal: Fluid balance maintained  Description: INTERVENTIONS:  - Monitor labs   - Monitor I/O and WT  - Instruct patient on fluid and nutrition as appropriate  - Assess for signs & symptoms of volume excess or deficit  Outcome: Progressing  Goal: Glucose maintained within target range  Description: INTERVENTIONS:  - Monitor Blood Glucose as ordered  - Assess for signs and symptoms of hyperglycemia and hypoglycemia  - Administer ordered medications to maintain glucose within target range  - Assess nutritional intake and initiate  nutrition service referral as needed  Outcome: Progressing     Problem: HEMATOLOGIC - ADULT  Goal: Maintains hematologic stability  Description: INTERVENTIONS  - Assess for signs and symptoms of bleeding or hemorrhage  - Monitor labs  - Administer supportive blood products/factors as ordered and appropriate  Outcome: Progressing     Problem: Prexisting or High Potential for Compromised Skin Integrity  Goal: Skin integrity is maintained or improved  Description: INTERVENTIONS:  - Identify patients at risk for skin breakdown  - Assess and monitor skin integrity  - Assess and monitor nutrition and hydration status  - Monitor labs   - Assess for incontinence   - Turn and reposition patient  - Assist with mobility/ambulation  - Relieve pressure over bony prominences  - Avoid friction and shearing  - Provide appropriate hygiene as needed including keeping skin clean and dry  - Evaluate need for skin moisturizer/barrier cream  - Collaborate with interdisciplinary team   - Patient/family teaching  - Consider wound care consult   Outcome: Progressing     Problem: Nutrition/Hydration-ADULT  Goal: Nutrient/Hydration intake appropriate for improving, restoring or maintaining nutritional needs  Description: Monitor and assess patient's nutrition/hydration status for malnutrition. Collaborate with interdisciplinary team and initiate plan and interventions as ordered.  Monitor patient's weight and dietary intake as ordered or per policy. Utilize nutrition screening tool and intervene as necessary. Determine patient's food preferences and provide high-protein, high-caloric foods as appropriate.     INTERVENTIONS:  - Monitor oral intake, urinary output, labs, and treatment plans  - Assess nutrition and hydration status and recommend course of action  - Evaluate amount of meals eaten  - Assist patient with eating if necessary   - Allow adequate time for meals  - Recommend/ encourage appropriate diets, oral nutritional supplements,  and vitamin/mineral supplements  - Order, calculate, and assess calorie counts as needed  - Recommend, monitor, and adjust tube feedings and TPN/PPN based on assessed needs  - Assess need for intravenous fluids  - Provide specific nutrition/hydration education as appropriate  - Include patient/family/caregiver in decisions related to nutrition  Outcome: Progressing     Problem: INFECTION - ADULT  Goal: Absence of fever/infection during neutropenic period  Description: INTERVENTIONS:  - Monitor WBC    Outcome: Progressing

## 2023-12-20 NOTE — PROGRESS NOTES
General acute hospital  Progress Note  Name: Freddy Copeland I  MRN: [id number]  Unit/Bed#: 402-01 I Date of Admission: 12/17/2023   Date of Service: 12/20/2023 I Hospital Day: 3    Assessment/Plan   * AML (acute myeloid leukemia) (HCC)  Assessment & Plan  Presented to hospital due to acute on chronic pancytopenia   Underwent BMBx on 12/19 that resulted positive for AML  Discussed with hematology, recommending transfer to [facility] if patient wishes to begin treatment   Initially opting for treatment, however now wishing to take more palliative approach with transfusions as needed   S/p 2 units pRBCs total this admission, 12/17 &   Monitor counts. No granix w/ AML  Neutropenic precautions  Will place consult to palliative care         Type 2 diabetes mellitus without complication, without long-term current use of insulin (HCC)  Assessment & Plan  Lab Results   Component Value Date    HGBA1C 7.5 (H) 12/16/2023       Recent Labs     12/19/23  1612 12/19/23  2038 12/20/23  0723 12/20/23  1122   POCGLU 125 211* 121 170*         Blood Sugar Average: Last 72 hrs:  (P) 146    Near goal per last A1c  Hold oral medications  SSI & acu-checks ac/hs      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)  Assessment & Plan  Wt Readings from Last 3 Encounters:   12/17/23 94.2 kg (207 lb 10.8 oz)   10/19/23 94.2 kg (207 lb 9.6 oz)   10/17/23 95.7 kg (211 lb)       Does not appear to be in acute exacerbation at this time  Received lasix with blood transfusion   Continue on home torsemide        Persistent atrial fibrillation (HCC)  Assessment & Plan    Continue to hold Coumadin   Check INR daily  Rate controlled on metoprolol               VTE Pharmacologic Prophylaxis: VTE Score: 7 SCDs    Mobility:   Basic Mobility Inpatient Raw Score: 21  JH-HLM Goal: 6: Walk 10 steps or more  JH-HLM Achieved: 6: Walk 10 steps or more  HLM Goal achieved. Continue to encourage appropriate mobility.    Patient Centered Rounds: I  performed bedside rounds with nursing staff today.   Discussions with Specialists or Other Care Team Provider: heme    Education and Discussions with Family / Patient: Updated  (wife) at bedside.    Total Time Spent on Date of Encounter in care of patient:  mins. This time was spent on one or more of the following: performing physical exam; counseling and coordination of care; obtaining or reviewing history; documenting in the medical record; reviewing/ordering tests, medications or procedures; communicating with other healthcare professionals and discussing with patient's family/caregivers.    Current Length of Stay: 3 day(s)  Current Patient Status: Inpatient   Certification Statement: The patient will continue to require additional inpatient hospital stay due to monitoring blood counts, palliative care consult   Discharge Plan: Anticipate discharge tomorrow to home.    Code Status: Level 1 - Full Code    Subjective:   No acute concerns today. Denies fevers, chills, chest pain, SOB, abdominal pain, N/V/D    Objective:     Vitals:   Temp (24hrs), Av.8 °F (36.6 °C), Min:97.7 °F (36.5 °C), Max:98 °F (36.7 °C)    Temp:  [97.7 °F (36.5 °C)-98 °F (36.7 °C)] 97.7 °F (36.5 °C)  HR:  [79-96] 79  Resp:  [14-19] 18  BP: (103-113)/(59-64) 103/61  SpO2:  [99 %] 99 %  Body mass index is 28.17 kg/m².     Input and Output Summary (last 24 hours):     Intake/Output Summary (Last 24 hours) at 2023 1622  Last data filed at 2023 1200  Gross per 24 hour   Intake 476 ml   Output 500 ml   Net -24 ml       Physical Exam:   Physical Exam  Constitutional:       Appearance: He is ill-appearing.   HENT:      Head: Normocephalic and atraumatic.   Cardiovascular:      Rate and Rhythm: Normal rate and regular rhythm.   Pulmonary:      Effort: Pulmonary effort is normal. No respiratory distress.      Breath sounds: Normal breath sounds.   Abdominal:      General: Abdomen is flat. Bowel sounds are normal. There is no  distension.      Palpations: Abdomen is soft.   Skin:     General: Skin is warm and dry.      Coloration: Skin is pale.      Comments: Multiple petechiae and purpura   Neurological:      General: No focal deficit present.      Mental Status: He is alert and oriented to person, place, and time.   Psychiatric:         Mood and Affect: Mood normal.         Behavior: Behavior normal.          Additional Data:     Labs:  Results from last 7 days   Lab Units 12/20/23  0449 12/19/23  1400   WBC Thousand/uL 1.37* 1.56*   HEMOGLOBIN g/dL 7.7* 9.3*   HEMATOCRIT % 22.9* 27.5*   PLATELETS Thousands/uL 24* 30*   BANDS PCT %  --  1   NEUTROS PCT % 29*  --    LYMPHS PCT % 59*  --    LYMPHO PCT %  --  52*   MONOS PCT % 8  --    MONO PCT %  --  11   EOS PCT % 4 6     Results from last 7 days   Lab Units 12/20/23  0449 12/19/23  0502   SODIUM mmol/L 140 140   POTASSIUM mmol/L 3.6 3.7   CHLORIDE mmol/L 105 106   CO2 mmol/L 27 26   BUN mg/dL 26* 27*   CREATININE mg/dL 1.32* 1.36*   ANION GAP mmol/L 8 8   CALCIUM mg/dL 8.8 8.7   ALBUMIN g/dL  --  3.5   TOTAL BILIRUBIN mg/dL  --  2.12*   ALK PHOS U/L  --  74   ALT U/L  --  9   AST U/L  --  12*   GLUCOSE RANDOM mg/dL 122 129     Results from last 7 days   Lab Units 12/20/23  0702   INR  1.61*     Results from last 7 days   Lab Units 12/20/23  1122 12/20/23  0723 12/19/23  2038 12/19/23  1612 12/19/23  1120 12/19/23  0700 12/18/23  2056 12/18/23  1523 12/18/23  1117 12/18/23  0700 12/17/23  1610   POC GLUCOSE mg/dl 170* 121 211* 125 185* 146* 146* 128 171* 119 84     Results from last 7 days   Lab Units 12/16/23  0920   HEMOGLOBIN A1C % 7.5*     Results from last 7 days   Lab Units 12/18/23  0459   PROCALCITONIN ng/ml 0.07       Lines/Drains:  Invasive Devices       Peripheral Intravenous Line  Duration             Peripheral IV 12/17/23 Left;Ventral (anterior) Forearm 3 days                          Imaging: No pertinent imaging reviewed.    Recent Cultures (last 7 days):         Last 24  Hours Medication List:   Current Facility-Administered Medications   Medication Dose Route Frequency Provider Last Rate    acetaminophen  650 mg Oral Q6H PRN Tara Jose PA-C      atorvastatin  40 mg Oral Daily Tara Jose PA-C      busPIRone  7.5 mg Oral BID Tara Jose PA-C      ferrous gluconate  324 mg Oral BID before lunch/dinner Tara Jose PA-C      insulin lispro  1-6 Units Subcutaneous TID AC Tara Jose PA-C      ipratropium  2 spray Nasal TID Tara Jose PA-C      loperamide  2 mg Oral 4x Daily PRN Tara Jose PA-C      magnesium Oxide  400 mg Oral BID Tara Jose PA-C      metoprolol succinate  25 mg Oral Daily Tara Jose PA-C      ondansetron  4 mg Intravenous Q6H PRN Tara Jose PA-C      potassium chloride  20 mEq Oral Daily Tara Jose PA-C      pyridoxine  50 mg Oral Daily Tara Jose PA-C      saccharomyces boulardii  250 mg Oral BID Tara Jose PA-C      tamsulosin  0.4 mg Oral Daily With Dinner Tara Jose PA-C      torsemide  50 mg Oral Daily Tara Jose PA-C          Today, Patient Was Seen By: Kaycee Hines PA-C    **Please Note: This note may have been constructed using a voice recognition system.**

## 2023-12-20 NOTE — PLAN OF CARE
Problem: PAIN - ADULT  Goal: Verbalizes/displays adequate comfort level or baseline comfort level  Description: Interventions:  - Encourage patient to monitor pain and request assistance  - Assess pain using appropriate pain scale  - Administer analgesics based on type and severity of pain and evaluate response  - Implement non-pharmacological measures as appropriate and evaluate response  - Consider cultural and social influences on pain and pain management  - Notify physician/advanced practitioner if interventions unsuccessful or patient reports new pain  12/20/2023 0155 by Alexandra Steel RN  Outcome: Progressing  12/20/2023 0155 by Alexandra Steel RN  Outcome: Progressing     Problem: SAFETY ADULT  Goal: Patient will remain free of falls  Description: INTERVENTIONS:  - Educate patient/family on patient safety including physical limitations  - Instruct patient to call for assistance with activity   - Consult OT/PT to assist with strengthening/mobility   - Keep Call bell within reach  - Keep bed low and locked with side rails adjusted as appropriate  - Keep care items and personal belongings within reach  - Initiate and maintain comfort rounds  - Make Fall Risk Sign visible to staff  - Offer Toileting every 2 Hours, in advance of need  - Initiate/Maintain bed/chair alarm  - Obtain necessary fall risk management equipment: non skid socks  - Apply yellow socks and bracelet for high fall risk patients  - Consider moving patient to room near nurses station  12/20/2023 0155 by Alexandra Steel RN  Outcome: Progressing  12/20/2023 0155 by Alexandra Steel RN  Outcome: Progressing  Goal: Maintain or return to baseline ADL function  Description: INTERVENTIONS:  -  Assess patient's ability to carry out ADLs; assess patient's baseline for ADL function and identify physical deficits which impact ability to perform ADLs (bathing, care of mouth/teeth, toileting, grooming, dressing, etc.)  - Assess/evaluate cause of self-care  deficits   - Assess range of motion  - Assess patient's mobility; develop plan if impaired  - Assess patient's need for assistive devices and provide as appropriate  - Encourage maximum independence but intervene and supervise when necessary  - Involve family in performance of ADLs  - Assess for home care needs following discharge   - Consider OT consult to assist with ADL evaluation and planning for discharge  - Provide patient education as appropriate  12/20/2023 0155 by Alexandra Steel RN  Outcome: Progressing  12/20/2023 0155 by Alexandra Steel RN  Outcome: Progressing  Goal: Maintains/Returns to pre admission functional level  Description: INTERVENTIONS:  - Perform AM-PAC 6 Click Basic Mobility/ Daily Activity assessment daily.  - Set and communicate daily mobility goal to care team and patient/family/caregiver.   - Collaborate with rehabilitation services on mobility goals if consulted  - Perform Range of Motion 3 times a day.  - Reposition patient every 2 hours.  - Dangle patient 3 times a day  - Stand patient 3 times a day  - Ambulate patient 3 times a day  - Out of bed to chair 3 times a day   - Out of bed for meals 3 times a day  - Out of bed for toileting  - Record patient progress and toleration of activity level   12/20/2023 0155 by Alexandra Steel RN  Outcome: Progressing  12/20/2023 0155 by Alexandra Steel RN  Outcome: Progressing     Problem: METABOLIC, FLUID AND ELECTROLYTES - ADULT  Goal: Electrolytes maintained within normal limits  Description: INTERVENTIONS:  - Monitor labs and assess patient for signs and symptoms of electrolyte imbalances  - Administer electrolyte replacement as ordered  - Monitor response to electrolyte replacements, including repeat lab results as appropriate  - Instruct patient on fluid and nutrition as appropriate  12/20/2023 0155 by Alexandra Steel RN  Outcome: Progressing  12/20/2023 0155 by Alexandra Steel RN  Outcome: Progressing  Goal: Fluid balance maintained  Description:  INTERVENTIONS:  - Monitor labs   - Monitor I/O and WT  - Instruct patient on fluid and nutrition as appropriate  - Assess for signs & symptoms of volume excess or deficit  12/20/2023 0155 by Alexandra Steel RN  Outcome: Progressing  12/20/2023 0155 by Alexandra Steel RN  Outcome: Progressing  Goal: Glucose maintained within target range  Description: INTERVENTIONS:  - Monitor Blood Glucose as ordered  - Assess for signs and symptoms of hyperglycemia and hypoglycemia  - Administer ordered medications to maintain glucose within target range  - Assess nutritional intake and initiate nutrition service referral as needed  12/20/2023 0155 by Alexandra Steel RN  Outcome: Progressing  12/20/2023 0155 by Alexandra Steel RN  Outcome: Progressing     Problem: INFECTION - ADULT  Goal: Absence of fever/infection during neutropenic period  Description: INTERVENTIONS:  - Monitor WBC    Outcome: Progressing     Problem: HEMATOLOGIC - ADULT  Goal: Maintains hematologic stability  Description: INTERVENTIONS  - Assess for signs and symptoms of bleeding or hemorrhage  - Monitor labs  - Administer supportive blood products/factors as ordered and appropriate  12/20/2023 0155 by Alexandra Steel RN  Outcome: Progressing  12/20/2023 0155 by Alexandra Steel RN  Outcome: Progressing     Problem: Prexisting or High Potential for Compromised Skin Integrity  Goal: Skin integrity is maintained or improved  Description: INTERVENTIONS:  - Identify patients at risk for skin breakdown  - Assess and monitor skin integrity  - Assess and monitor nutrition and hydration status  - Monitor labs   - Assess for incontinence   - Turn and reposition patient  - Assist with mobility/ambulation  - Relieve pressure over bony prominences  - Avoid friction and shearing  - Provide appropriate hygiene as needed including keeping skin clean and dry  - Evaluate need for skin moisturizer/barrier cream  - Collaborate with interdisciplinary team   - Patient/family teaching  -  Consider wound care consult   12/20/2023 0155 by Alexandra Steel RN  Outcome: Progressing  12/20/2023 0155 by Alexandra Steel RN  Outcome: Progressing     Problem: Nutrition/Hydration-ADULT  Goal: Nutrient/Hydration intake appropriate for improving, restoring or maintaining nutritional needs  Description: Monitor and assess patient's nutrition/hydration status for malnutrition. Collaborate with interdisciplinary team and initiate plan and interventions as ordered.  Monitor patient's weight and dietary intake as ordered or per policy. Utilize nutrition screening tool and intervene as necessary. Determine patient's food preferences and provide high-protein, high-caloric foods as appropriate.     INTERVENTIONS:  - Monitor oral intake, urinary output, labs, and treatment plans  - Assess nutrition and hydration status and recommend course of action  - Evaluate amount of meals eaten  - Assist patient with eating if necessary   - Allow adequate time for meals  - Recommend/ encourage appropriate diets, oral nutritional supplements, and vitamin/mineral supplements  - Order, calculate, and assess calorie counts as needed  - Recommend, monitor, and adjust tube feedings and TPN/PPN based on assessed needs  - Assess need for intravenous fluids  - Provide specific nutrition/hydration education as appropriate  - Include patient/family/caregiver in decisions related to nutrition  12/20/2023 0155 by Alexandra Steel RN  Outcome: Progressing  12/20/2023 0155 by Alexandra Steel RN  Outcome: Progressing

## 2023-12-20 NOTE — TELEPHONE ENCOUNTER
Wife called she just wanted to inform you that the bone marrow came back with AML and he will be following through with pallative care

## 2023-12-20 NOTE — ASSESSMENT & PLAN NOTE
Presented to hospital due to acute on chronic pancytopenia   Underwent BMBx on 12/19 that resulted positive for AML  Discussed with hematology, recommending transfer to [facility] if patient wishes to begin treatment   Initially opting for treatment, however now wishing to take more palliative approach with transfusions as needed   S/p 2 units pRBCs total this admission, 12/17 &   Monitor counts. No granix w/ AML  Neutropenic precautions  Will place consult to palliative care

## 2023-12-20 NOTE — ASSESSMENT & PLAN NOTE
Lab Results   Component Value Date    HGBA1C 7.5 (H) 12/16/2023       Recent Labs     12/19/23  1612 12/19/23 2038 12/20/23  0723 12/20/23  1122   POCGLU 125 211* 121 170*         Blood Sugar Average: Last 72 hrs:  (P) 146    Near goal per last A1c  Hold oral medications  SSI & acu-checks ac/hs

## 2023-12-21 ENCOUNTER — TELEPHONE (OUTPATIENT)
Dept: ADMINISTRATIVE | Facility: OTHER | Age: 88
End: 2023-12-21

## 2023-12-21 VITALS
SYSTOLIC BLOOD PRESSURE: 94 MMHG | RESPIRATION RATE: 19 BRPM | DIASTOLIC BLOOD PRESSURE: 51 MMHG | HEIGHT: 72 IN | OXYGEN SATURATION: 97 % | BODY MASS INDEX: 28.13 KG/M2 | WEIGHT: 207.67 LBS | TEMPERATURE: 97.8 F | HEART RATE: 92 BPM

## 2023-12-21 LAB
BASOPHILS # BLD AUTO: 0.01 THOUSANDS/ÂΜL (ref 0–0.1)
BASOPHILS NFR BLD AUTO: 1 % (ref 0–1)
EOSINOPHIL # BLD AUTO: 0.04 THOUSAND/ÂΜL (ref 0–0.61)
EOSINOPHIL NFR BLD AUTO: 3 % (ref 0–6)
ERYTHROCYTE [DISTWIDTH] IN BLOOD BY AUTOMATED COUNT: 15.7 % (ref 11.6–15.1)
GLUCOSE SERPL-MCNC: 128 MG/DL (ref 65–140)
GLUCOSE SERPL-MCNC: 138 MG/DL (ref 65–140)
HCT VFR BLD AUTO: 22.1 % (ref 36.5–49.3)
HGB BLD-MCNC: 7.5 G/DL (ref 12–17)
IMM GRANULOCYTES # BLD AUTO: 0 THOUSAND/UL (ref 0–0.2)
IMM GRANULOCYTES NFR BLD AUTO: 0 % (ref 0–2)
INR PPP: 1.46 (ref 0.84–1.19)
LYMPHOCYTES # BLD AUTO: 0.87 THOUSANDS/ÂΜL (ref 0.6–4.47)
LYMPHOCYTES NFR BLD AUTO: 54 % (ref 14–44)
MCH RBC QN AUTO: 32.6 PG (ref 26.8–34.3)
MCHC RBC AUTO-ENTMCNC: 33.9 G/DL (ref 31.4–37.4)
MCV RBC AUTO: 96 FL (ref 82–98)
MONOCYTES # BLD AUTO: 0.07 THOUSAND/ÂΜL (ref 0.17–1.22)
MONOCYTES NFR BLD AUTO: 5 % (ref 4–12)
NEUTROPHILS # BLD AUTO: 0.57 THOUSANDS/ÂΜL (ref 1.85–7.62)
NEUTS SEG NFR BLD AUTO: 37 % (ref 43–75)
NRBC BLD AUTO-RTO: 2 /100 WBCS
PLATELET # BLD AUTO: 23 THOUSANDS/UL (ref 149–390)
PMV BLD AUTO: 13 FL (ref 8.9–12.7)
PROTHROMBIN TIME: 17.5 SECONDS (ref 11.6–14.5)
RBC # BLD AUTO: 2.3 MILLION/UL (ref 3.88–5.62)
WBC # BLD AUTO: 1.56 THOUSAND/UL (ref 4.31–10.16)

## 2023-12-21 PROCEDURE — 88342 IMHCHEM/IMCYTCHM 1ST ANTB: CPT | Performed by: PATHOLOGY

## 2023-12-21 PROCEDURE — 85025 COMPLETE CBC W/AUTO DIFF WBC: CPT

## 2023-12-21 PROCEDURE — 88341 IMHCHEM/IMCYTCHM EA ADD ANTB: CPT | Performed by: PATHOLOGY

## 2023-12-21 PROCEDURE — 85060 BLOOD SMEAR INTERPRETATION: CPT | Performed by: PATHOLOGY

## 2023-12-21 PROCEDURE — 85610 PROTHROMBIN TIME: CPT | Performed by: PHYSICIAN ASSISTANT

## 2023-12-21 PROCEDURE — 82948 REAGENT STRIP/BLOOD GLUCOSE: CPT

## 2023-12-21 PROCEDURE — 85097 BONE MARROW INTERPRETATION: CPT | Performed by: PATHOLOGY

## 2023-12-21 PROCEDURE — 88313 SPECIAL STAINS GROUP 2: CPT | Performed by: PATHOLOGY

## 2023-12-21 PROCEDURE — 99239 HOSP IP/OBS DSCHRG MGMT >30: CPT | Performed by: PHYSICIAN ASSISTANT

## 2023-12-21 PROCEDURE — 88365 INSITU HYBRIDIZATION (FISH): CPT | Performed by: PATHOLOGY

## 2023-12-21 PROCEDURE — 88360 TUMOR IMMUNOHISTOCHEM/MANUAL: CPT | Performed by: PATHOLOGY

## 2023-12-21 PROCEDURE — 88311 DECALCIFY TISSUE: CPT | Performed by: PATHOLOGY

## 2023-12-21 PROCEDURE — 88364 INSITU HYBRIDIZATION (FISH): CPT | Performed by: PATHOLOGY

## 2023-12-21 PROCEDURE — 88305 TISSUE EXAM BY PATHOLOGIST: CPT | Performed by: PATHOLOGY

## 2023-12-21 RX ORDER — BENZOCAINE/MENTHOL 6 MG-10 MG
LOZENGE MUCOUS MEMBRANE 2 TIMES DAILY
Qty: 14 G | Refills: 0 | Status: SHIPPED | OUTPATIENT
Start: 2023-12-21

## 2023-12-21 RX ADMIN — Medication 50 MG: at 08:43

## 2023-12-21 RX ADMIN — FERROUS GLUCONATE 324 MG: 324 TABLET ORAL at 12:05

## 2023-12-21 RX ADMIN — IPRATROPIUM BROMIDE 2 SPRAY: 21 SPRAY, METERED NASAL at 10:33

## 2023-12-21 RX ADMIN — POTASSIUM CHLORIDE 20 MEQ: 1500 TABLET, EXTENDED RELEASE ORAL at 08:43

## 2023-12-21 RX ADMIN — ATORVASTATIN CALCIUM 40 MG: 40 TABLET, FILM COATED ORAL at 08:43

## 2023-12-21 RX ADMIN — Medication 400 MG: at 08:43

## 2023-12-21 RX ADMIN — BUSPIRONE HYDROCHLORIDE 7.5 MG: 5 TABLET ORAL at 08:44

## 2023-12-21 RX ADMIN — Medication 250 MG: at 08:44

## 2023-12-21 RX ADMIN — HYDROCORTISONE: 1 CREAM TOPICAL at 08:44

## 2023-12-21 NOTE — ASSESSMENT & PLAN NOTE
Presented to hospital due to acute on chronic pancytopenia   Underwent BMBx on 12/19 that resulted positive for AML  Discussed with hematology, recommending transfer to [facility] if patient wishes to begin treatment   Initially opting for treatment, however now wishing to take more palliative approach with transfusions as needed   S/p 2 units pRBCs total this admission, 12/17 & 12/18  Monitor counts. No granix w/ AML  Neutropenic precautions  Patient wishes to take a more palliative route and declines chemotherapy at this time.   Plan is to continue to follow with hematology with frequent blood count monitoring and PRN transfusions. He will check blood work on 12/23 and then weekly CBC

## 2023-12-21 NOTE — DISCHARGE SUMMARY
[facility]  Discharge- Freddy Copeland 1935, 88 y.o. male MRN: [id number]  Unit/Bed#: 402-01 Encounter: [id number]  Primary Care Provider: Elijah Borja DO   Date and time admitted to hospital: 12/17/2023  8:31 AM    * AML (acute myeloid leukemia) (HCC)  Assessment & Plan  Presented to hospital due to acute on chronic pancytopenia   Underwent BMBx on 12/19 that resulted positive for AML  Discussed with hematology, recommending transfer to [facility] if patient wishes to begin treatment   Initially opting for treatment, however now wishing to take more palliative approach with transfusions as needed   S/p 2 units pRBCs total this admission, 12/17 & 12/18  Monitor counts. No granix w/ AML  Neutropenic precautions  Patient wishes to take a more palliative route and declines chemotherapy at this time.   Plan is to continue to follow with hematology with frequent blood count monitoring and PRN transfusions. He will check blood work on 12/23 and then weekly CBC        Type 2 diabetes mellitus without complication, without long-term current use of insulin ([facility])  Assessment & Plan  Lab Results   Component Value Date    HGBA1C 7.5 (H) 12/16/2023       Recent Labs     12/20/23  1122 12/20/23  1609 12/21/23  0705 12/21/23  1055   POCGLU 170* 168* 128 138         Blood Sugar Average: Last 72 hrs:  (P) 150.7319192888527541    Near goal per last A1c  Hold oral medications  SSI & acu-checks ac/hs      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)  Assessment & Plan  Wt Readings from Last 3 Encounters:   12/17/23 94.2 kg (207 lb 10.8 oz)   10/19/23 94.2 kg (207 lb 9.6 oz)   10/17/23 95.7 kg (211 lb)       Does not appear to be in acute exacerbation at this time  Received lasix with blood transfusion   Continue on home torsemide        Persistent atrial fibrillation (HCC)  Assessment & Plan    Continue to hold Coumadin   Check INR daily  Rate controlled on metoprolol  Discontinue coumadin on  discharge with high risk of bleed should he continue it         Medical Problems       Resolved Problems  Date Reviewed: 12/20/2023   None       Discharging Physician / Practitioner: Tara Jose PA-C  PCP: Elijah Borja DO  Admission Date:   Admission Orders (From admission, onward)       Ordered        12/17/23 1341  Inpatient Admission  Once            12/17/23 1338  INPATIENT ADMISSION  Once                          Discharge Date: 12/21/23    Consultations During Hospital Stay:  hematology    Procedures Performed:   IR bone marrow biopsy positive for AML    Significant Findings / Test Results:     IR biopsy bone marrow    (Results Pending)         Incidental Findings:   none    Test Results Pending at Discharge (will require follow up):   none     Outpatient Tests Requested:  CBC    Complications:  none    Reason for Admission: pancytopenia    Hospital Course:   Freddy Copeland is a 88 y.o. male patient who originally presented to the hospital on 12/17/2023 due to abnormal lab values.  Patient was admitted to the hospital in August 2023 with pancytopenia which was attributed to diarrheal illness at that time.  Patient followed up with hematology and was stable at that time.  He had repeat lab work completed on 12/16 which now shows significantly worsening pancytopenia with hemoglobin of 7, platelets of 29 and white blood cells of 1.4.  Patient reports that he has been feeling progressively more weak, tired and that the last several nights he has been having chest pressure at nighttime.  He and his wife has also noticed that he has had a widespread rash of petechiae and purpura.    Patient received 2 units of PRBC while inpatient. Hgb stabilized but running low.     Bone marrow biopsy completed in the hospital. Confirmed AML. Had discussions about transfer to [facility] for chemotherapy. Patient declined at this time and would like to go a more conservative route with prn blood transfusions.         Please see  above list of diagnoses and related plan for additional information.     Condition at Discharge: stable    Discharge Day Visit / Exam:   Subjective:  patient denies any acute complaints day of discharge. He is understandably upset but he and his wife have a good plan in place for home.   Vitals: Blood Pressure: 94/51 (12/21/23 0708)  Pulse: 92 (12/21/23 0708)  Temperature: 97.8 °F (36.6 °C) (12/21/23 0708)  Temp Source: Oral (12/18/23 1948)  Respirations: 19 (12/21/23 0708)  Height: 6' (182.9 cm) (12/17/23 1941)  Weight - Scale: 94.2 kg (207 lb 10.8 oz) (12/17/23 1941)  SpO2: 97 % (12/21/23 0708)  Exam:   Physical Exam  Vitals and nursing note reviewed.   Constitutional:       Appearance: He is ill-appearing.   Cardiovascular:      Rate and Rhythm: Normal rate. Rhythm irregular.      Pulses: Normal pulses.      Heart sounds: Normal heart sounds.   Pulmonary:      Effort: Pulmonary effort is normal.      Breath sounds: Normal breath sounds. No wheezing or rales.   Abdominal:      General: Bowel sounds are normal.      Palpations: Abdomen is soft.   Musculoskeletal:      Right lower leg: No edema.      Left lower leg: No edema.   Skin:     General: Skin is warm and dry.      Coloration: Skin is pale.   Neurological:      General: No focal deficit present.      Mental Status: He is alert. Mental status is at baseline.   Psychiatric:      Comments: Flat affect          Discussion with Family: Updated  (wife) at bedside.    Discharge instructions/Information to patient and family:   See after visit summary for information provided to patient and family.      Provisions for Follow-Up Care:  See after visit summary for information related to follow-up care and any pertinent home health orders.      Mobility at time of Discharge:   Basic Mobility Inpatient Raw Score: 21  JH-HLM Goal: 6: Walk 10 steps or more  JH-HLM Achieved: 7: Walk 25 feet or more  HLM Goal achieved. Continue to encourage appropriate  mobility.     Disposition:   Home    Planned Readmission: none     Discharge Statement:  I spent 55 minutes discharging the patient. This time was spent on the day of discharge. I had direct contact with the patient on the day of discharge. Greater than 50% of the total time was spent examining patient, answering all patient questions, arranging and discussing plan of care with patient as well as directly providing post-discharge instructions.  Additional time then spent on discharge activities.    Discharge Medications:  See after visit summary for reconciled discharge medications provided to patient and/or family.      **Please Note: This note may have been constructed using a voice recognition system**

## 2023-12-21 NOTE — TELEPHONE ENCOUNTER
Upon review of the In Basket request we were able to locate, review, and update the patient chart as requested for Diabetic Eye Exam.    Any additional questions or concerns should be emailed to the Practice Liaisons via the appropriate education email address, please do not reply via In Basket.    Thank you  Virginia Diaz MA

## 2023-12-21 NOTE — TELEPHONE ENCOUNTER
"----- Message from Teri Polischak sent at 12/21/2023 11:32 AM EST -----  Regarding: Care Gaps Request  12/21/23 11:32 AM    Hello, our patient Freddy Copeland has had Diabetic Eye Exam completed/performed. Please assist in updating the patient chart by pulling the document from Encounter Tab within Chart Review. The date of service is 12/19/2023 Scanned Document, \"Consult Notes Ext - Scan on 12/19/2023 11:21 AM: 11/29/2023 [facility]\".     Thank you,  Teri Polischak  [facility]       "

## 2023-12-21 NOTE — QUICK NOTE
I spoke with patient's wife yesterday.  She was in the room with the patient.  We discussed the new diagnosis of acute leukemia.  We discussed patient characteristics, comorbid conditions and poor performance status.  We discussed therapy for acute leukemia especially side effects.  We discussed supportive care and comfort versus transferring to [facility] for therapy.  I suggested supportive care and comforts in his condition.  She was also leaning towards that.  She was going to discuss with the patient and get back to primary physician.  She can have transfusions and outpatient follow-up in our office in [facility] and [facility].  She remembers seeing our nurse practitioner Melani before.  I passed this information to primary physician Kaycee Hines yesterday.

## 2023-12-21 NOTE — CASE MANAGEMENT
Case Management Discharge Planning Note    Patient name Freddy Nair /[id number] MRN [id number]  : 1935 Date 2023       Current Admission Date: 2023  Current Admission Diagnosis:AML (acute myeloid leukemia) (HCC)   Patient Active Problem List    Diagnosis Date Noted    Lower leg DVT (deep venous thromboembolism), acute, left (HCC) 10/17/2023    Deep vein thrombosis (DVT) of femoral vein of right lower extremity (HCC) 10/17/2023    Acute on chronic diastolic (congestive) heart failure (HCC) 2023    Troponin level elevated 2023    AML (acute myeloid leukemia) (HCC) 2023    Plantar fascial fibromatosis 2023    Type 2 diabetes mellitus without complication, without long-term current use of insulin (HCC) 2023    Peripheral arterial disease (HCC) 2023    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) 2021    Malignant neoplasm of prostate (HCC) 2021    Chronic diastolic congestive heart failure (HCC) 2020    Chronic venous insufficiency 2019    Long term (current) use of anticoagulants 2018    Persistent atrial fibrillation (HCC) 2018    Dermatofibroma 2018    Excessive anticoagulation 2018    Exostosis 2017    Olecranon bursitis 2017    FH: colon cancer 2017    Family history of colonic polyps 2017    History of colonic polyps 2017    Abnormality of gait 2016    Idiopathic peripheral neuropathy 2016    Paresthesias 2016      LOS (days): 4  Geometric Mean LOS (GMLOS) (days): 3.4  Days to GMLOS:-0.6     OBJECTIVE:  Risk of Unplanned Readmission Score: 27.49         Current admission status: Inpatient   Preferred Pharmacy:   [facility] - [place] - [address]  [address]  [place] [address]  Phone: [phone] Fax: [phone]    [facility] - [place] - [address] [place] [address]  [facility] [address]  Phone: [phone] Fax: [phone]    Primary Care Provider: Elijah Borja DO    Primary Insurance: RADHA WORRELL  Secondary Insurance:     DISCHARGE DETAILS:  Pt is being dc'd home on this date with OP follow up after dc. HHC was offered to pt/wife who are declining it at this time as they do not feel it is needed. Wife transporting pt home.

## 2023-12-21 NOTE — ASSESSMENT & PLAN NOTE
Lab Results   Component Value Date    HGBA1C 7.5 (H) 12/16/2023       Recent Labs     12/20/23  1122 12/20/23  1609 12/21/23  0705 12/21/23  1055   POCGLU 170* 168* 128 138         Blood Sugar Average: Last 72 hrs:  (P) 150.1861609885433570    Near goal per last A1c  Hold oral medications  SSI & acu-checks ac/hs

## 2023-12-21 NOTE — ASSESSMENT & PLAN NOTE
Continue to hold Coumadin   Check INR daily  Rate controlled on metoprolol  Discontinue coumadin on discharge with high risk of bleed should he continue it

## 2023-12-21 NOTE — CASE MANAGEMENT
Case Management Discharge Planning Note    Patient name Freddy Nair /[id number] MRN [id number]  : 1935 Date 2023       Current Admission Date: 2023  Current Admission Diagnosis:AML (acute myeloid leukemia) (HCC)   Patient Active Problem List    Diagnosis Date Noted    Lower leg DVT (deep venous thromboembolism), acute, left (HCC) 10/17/2023    Deep vein thrombosis (DVT) of femoral vein of right lower extremity (HCC) 10/17/2023    Acute on chronic diastolic (congestive) heart failure (HCC) 2023    Troponin level elevated 2023    AML (acute myeloid leukemia) (HCC) 2023    Plantar fascial fibromatosis 2023    Type 2 diabetes mellitus without complication, without long-term current use of insulin (HCC) 2023    Peripheral arterial disease (HCC) 2023    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) 2021    Malignant neoplasm of prostate (HCC) 2021    Chronic diastolic congestive heart failure (HCC) 2020    Chronic venous insufficiency 2019    Long term (current) use of anticoagulants 2018    Persistent atrial fibrillation (HCC) 2018    Dermatofibroma 2018    Excessive anticoagulation 2018    Exostosis 2017    Olecranon bursitis 2017    FH: colon cancer 2017    Family history of colonic polyps 2017    History of colonic polyps 2017    Abnormality of gait 2016    Idiopathic peripheral neuropathy 2016    Paresthesias 2016      LOS (days): 4  Geometric Mean LOS (GMLOS) (days): 3.4  Days to GMLOS:-0.6     OBJECTIVE:  Risk of Unplanned Readmission Score: 27.49         Current admission status: Inpatient   Preferred Pharmacy:   [facility] - [place] - [address]  [address]  [place] [address]  Phone: [phone] Fax: [phone]    [facility] - [place] - [address] [place] [address]  [facility] [address]  Phone: [phone] Fax: [phone]    Primary Care Provider: Elijah Borja DO    Primary Insurance: RADHA WORRELL  Secondary Insurance:     DISCHARGE DETAILS:  Palliative consult will now actually be done as an outpatient on pt.

## 2023-12-22 ENCOUNTER — ANTICOAG VISIT (OUTPATIENT)
Dept: CARDIOLOGY CLINIC | Facility: CLINIC | Age: 88
End: 2023-12-22

## 2023-12-22 ENCOUNTER — TRANSITIONAL CARE MANAGEMENT (OUTPATIENT)
Dept: FAMILY MEDICINE CLINIC | Facility: CLINIC | Age: 88
End: 2023-12-22

## 2023-12-22 DIAGNOSIS — Z79.01 LONG TERM (CURRENT) USE OF ANTICOAGULANTS: ICD-10-CM

## 2023-12-22 DIAGNOSIS — I48.19 PERSISTENT ATRIAL FIBRILLATION (HCC): Primary | ICD-10-CM

## 2023-12-23 ENCOUNTER — APPOINTMENT (OUTPATIENT)
Dept: LAB | Facility: MEDICAL CENTER | Age: 88
End: 2023-12-23
Payer: COMMERCIAL

## 2023-12-23 DIAGNOSIS — C92.00 ACUTE MYELOID LEUKEMIA NOT HAVING ACHIEVED REMISSION (HCC): ICD-10-CM

## 2023-12-23 LAB
ERYTHROCYTE [DISTWIDTH] IN BLOOD BY AUTOMATED COUNT: 16.1 % (ref 11.6–15.1)
HCT VFR BLD AUTO: 21.9 % (ref 36.5–49.3)
HGB BLD-MCNC: 7.4 G/DL (ref 12–17)
MCH RBC QN AUTO: 32.6 PG (ref 26.8–34.3)
MCHC RBC AUTO-ENTMCNC: 33.8 G/DL (ref 31.4–37.4)
MCV RBC AUTO: 97 FL (ref 82–98)
PLATELET # BLD AUTO: 21 THOUSANDS/UL (ref 149–390)
RBC # BLD AUTO: 2.27 MILLION/UL (ref 3.88–5.62)
WBC # BLD AUTO: 1.53 THOUSAND/UL (ref 4.31–10.16)

## 2023-12-23 PROCEDURE — 36415 COLL VENOUS BLD VENIPUNCTURE: CPT

## 2023-12-23 PROCEDURE — 85027 COMPLETE CBC AUTOMATED: CPT

## 2023-12-26 ENCOUNTER — RA CDI HCC (OUTPATIENT)
Dept: OTHER | Facility: HOSPITAL | Age: 88
End: 2023-12-26

## 2023-12-26 LAB
Lab: NORMAL
Lab: NORMAL
MISCELLANEOUS LAB TEST RESULT: NORMAL
STONE ANALYSIS-IMP: NORMAL

## 2023-12-27 ENCOUNTER — TELEPHONE (OUTPATIENT)
Dept: HEMATOLOGY ONCOLOGY | Facility: CLINIC | Age: 88
End: 2023-12-27

## 2023-12-27 ENCOUNTER — APPOINTMENT (OUTPATIENT)
Dept: LAB | Facility: MEDICAL CENTER | Age: 88
End: 2023-12-27
Payer: COMMERCIAL

## 2023-12-27 ENCOUNTER — TRANSCRIBE ORDERS (OUTPATIENT)
Dept: LAB | Facility: HOSPITAL | Age: 88
End: 2023-12-27

## 2023-12-27 DIAGNOSIS — D46.9 MDS (MYELODYSPLASTIC SYNDROME) (HCC): ICD-10-CM

## 2023-12-27 DIAGNOSIS — C92.00 ACUTE MYELOID LEUKEMIA NOT HAVING ACHIEVED REMISSION (HCC): ICD-10-CM

## 2023-12-27 DIAGNOSIS — D61.818 PANCYTOPENIA (HCC): ICD-10-CM

## 2023-12-27 DIAGNOSIS — D61.818 PANCYTOPENIA (HCC): Primary | ICD-10-CM

## 2023-12-27 LAB
ERYTHROCYTE [DISTWIDTH] IN BLOOD BY AUTOMATED COUNT: 16.1 % (ref 11.6–15.1)
HCT VFR BLD AUTO: 21 % (ref 36.5–49.3)
HGB BLD-MCNC: 7.1 G/DL (ref 12–17)
Lab: NORMAL
MCH RBC QN AUTO: 33 PG (ref 26.8–34.3)
MCHC RBC AUTO-ENTMCNC: 33.8 G/DL (ref 31.4–37.4)
MCV RBC AUTO: 98 FL (ref 82–98)
MISCELLANEOUS LAB TEST RESULT: NORMAL
PLATELET # BLD AUTO: 24 THOUSANDS/UL (ref 149–390)
RBC # BLD AUTO: 2.15 MILLION/UL (ref 3.88–5.62)
SPECIMEN SOURCE: NORMAL
WBC # BLD AUTO: 1.42 THOUSAND/UL (ref 4.31–10.16)

## 2023-12-27 PROCEDURE — 36415 COLL VENOUS BLD VENIPUNCTURE: CPT

## 2023-12-27 PROCEDURE — 85027 COMPLETE CBC AUTOMATED: CPT

## 2023-12-27 NOTE — TELEPHONE ENCOUNTER
Patient Call    Who are you speaking with? Spouse    If it is not the patient, are they listed on an active communication consent form? Yes   What is the reason for this call? Susan calling to speak with Melani Bridges in regards to patient's care plan.  Susan stated patient has an appointment with transitional care tomorrow and would like to know if Melani spoke with Dr. Segura about patient's recent hospitalization. Susan states patient will be getting weekly blood tests and would like to make sure Melani will see the results as well.  Susan would like a call back to discuss patient's care.     Patient is experiencing some itching and was prescribed a steroid cream for the itching.  Patient is using the cream as needed.  Susan wanted to inform Melani of this symptom as well.        Patient's follow up in January was rescheduled to 12/29/23.  Susan stated if Melani wanted to speak with her sooner she can call, if not Susan and patient will see Melani on Friday.     Does this require a call back? Yes   If a call back is required, please list [facility] call back number [phone]- cell  [phone]   If a call back is required, advise that a message will be forwarded to their care team and someone will return their call as soon as possible.   Did you relay this information to the patient? Yes           Appointment Change  Cancel, Reschedule, Change to Virtual      Who are you speaking with? Spouse   If it is not the patient, is the caller listed on the communication consent form? Yes   Which provider is the appointment scheduled with? DOE Kelly   When was the original appointment scheduled?    Please list date and time 1/26/24 10:30am   At which location is the appointment scheduled to take place? [place]   Was the appointment rescheduled?     Was the appointment changed from an in person visit to a virtual visit?    If so, please list the details of the change. 12/29/23 2:00pm    What is the reason for the appointment change? Patient was recently hospitalized, requested to be seen sooner to discuss care plan       Was STAR transport scheduled? No   Does STAR transport need to be scheduled for the new visit (if applicable) No   Does the patient need an infusion appointment rescheduled? No   Does the patient have an upcoming infusion appointment scheduled? If so, when? No   Is the patient undergoing chemotherapy? No   For appointments cancelled with less than 24 hours:  Was the no-show policy reviewed? Yes

## 2023-12-27 NOTE — PROGRESS NOTES
E11.65, E11.22, E11.51   HCC coding opportunities          Chart Reviewed number of suggestions sent to Provider: 3     Patients Insurance     Medicare Insurance: [employer] Advantage

## 2023-12-28 ENCOUNTER — OFFICE VISIT (OUTPATIENT)
Dept: FAMILY MEDICINE CLINIC | Facility: CLINIC | Age: 88
End: 2023-12-28
Payer: COMMERCIAL

## 2023-12-28 VITALS
TEMPERATURE: 95.9 F | DIASTOLIC BLOOD PRESSURE: 68 MMHG | HEIGHT: 72 IN | HEART RATE: 84 BPM | WEIGHT: 203 LBS | SYSTOLIC BLOOD PRESSURE: 110 MMHG | BODY MASS INDEX: 27.5 KG/M2 | RESPIRATION RATE: 20 BRPM

## 2023-12-28 DIAGNOSIS — I87.2 CHRONIC VENOUS INSUFFICIENCY: ICD-10-CM

## 2023-12-28 DIAGNOSIS — I48.19 PERSISTENT ATRIAL FIBRILLATION (HCC): ICD-10-CM

## 2023-12-28 DIAGNOSIS — C92.00 ACUTE MYELOID LEUKEMIA NOT HAVING ACHIEVED REMISSION (HCC): ICD-10-CM

## 2023-12-28 DIAGNOSIS — R07.9 CHEST PAIN, UNSPECIFIED TYPE: Primary | ICD-10-CM

## 2023-12-28 DIAGNOSIS — E11.9 TYPE 2 DIABETES MELLITUS WITHOUT COMPLICATION, WITHOUT LONG-TERM CURRENT USE OF INSULIN (HCC): ICD-10-CM

## 2023-12-28 PROCEDURE — 99495 TRANSJ CARE MGMT MOD F2F 14D: CPT | Performed by: FAMILY MEDICINE

## 2023-12-28 RX ORDER — NITROGLYCERIN 0.3 MG/1
0.3 TABLET SUBLINGUAL
Qty: 100 TABLET | Refills: 1 | Status: SHIPPED | OUTPATIENT
Start: 2023-12-28

## 2023-12-28 NOTE — PROGRESS NOTES
SEN  Freddy Copeland 88 y.o. male   Date:  12/28/2023    TCM Call       Date and time call was made  12/22/2023 10:17 AM    Hospital care reviewed  Records reviewed    Patient was hospitialized at  [facility]    Date of Admission  12/17/23    Date of discharge  12/21/23    Diagnosis  AML    Disposition  Home    Were the patients medications reviewed and updated  Yes  STOP - coumadin    Current Symptoms  --  Had a good night          TCM Call       Post hospital issues  None    Should patient be enrolled in anticoag monitoring?  --  not at this time - Susan is checking with cardiology    Scheduled for follow up?  Yes  12/28/2023 @ 12:00pm    Patients specialists  Other (comment)    Cardiologist name  need a f/u with cardiology    Other specialists names  Hematology f/u needed in one week    Other specialists contcat #  weekly CBC and platelet    Did you obtain your prescribed medications  No    Why were you unable to obtain your medications  none prescribed    Do you need help managing your prescriptions or medications  No    Is transportation to your appointment needed  No    I have advised the patient to call PCP with any new or worsening symptoms  Maranda VIGIL    Living Arrangements  Spouse or Significiant other    Support System  Spouse; Family    The type of support provided  Emotional; Physical    Are you recieving any outpatient services  No    Are you recieving home care services  No    Types of home care services  Home PT; Nurse visit; Other (comment)    Comment  OT    Are you using any community resources  No    Current waiver services  No    Interperter language line needed  No    Comments  wife had no questions or concerns except the coumadin          Hospital records were reviewed. Medications upon discharge reviewed/updated.   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   The patient is opting for palliative care and will take transfusions as needed defers chemotherapy    Diabetes mellitus type 2 currently stable    Hypertensive cardiovascular disease with a blood pressure controlled on the current regimen    Persistent atrial fibrillation the patient will continue to hold Coumadin the rate is controlled with metoprolol    Patient 2 days ago had an episode of chest pain of uncertain etiology the patient empirically will be given nitroglycerin to take sublingually for recurrence and the patient will be following with cardiology    Chronic venous insufficiency the patient wears compression stockings    Maximo was seen today for transition of care management.    Diagnoses and all orders for this visit:    Chest pain, unspecified type  -     ECG 12 lead; Future  -     nitroglycerin (NITROSTAT) 0.3 mg SL tablet; Place 1 tablet (0.3 mg total) under the tongue every 5 (five) minutes as needed for chest pain  -     Ambulatory Referral to Cardiology; Future          HPI:  Patient presents for transition of care management was hospitalized at the [facility] December 17 through December 21 with a diagnosis of acute myelogenous leukemia        ROS: Review of Systems   Constitutional:  Negative for chills and fever.   HENT:  Negative for ear pain and sore throat.    Eyes:  Negative for pain and visual disturbance.   Respiratory:  Negative for cough and shortness of breath.    Cardiovascular:  Negative for chest pain and palpitations.   Gastrointestinal:  Negative for abdominal pain and vomiting.   Genitourinary:  Negative for dysuria and hematuria.   Musculoskeletal:  Negative for arthralgias and back pain.   Skin:  Negative for color change and rash.   Neurological:  Negative for seizures and syncope.   All other systems reviewed and are negative.      Past Medical History:   Diagnosis Date    Anxiety     Atrial fibrillation (HCC)     Atrial flutter (HCC)     Congestive heart failure (CHF)  (HCC)     COPD (chronic obstructive pulmonary disease) (HCC)     Coronary artery disease     DVT (deep venous thrombosis) (HCC)     ED (erectile dysfunction)     Hearing loss     History of echocardiogram 04/05/2018    EF 0.55, Mild LVH. Mild moderate mitral regurg. Trace aortic regurg.    Hx of radiation therapy     XRT    Hypercholesterolemia     Hyperlipidemia     Hypertension     Long term (current) use of anticoagulants 8/21/2018    Neuropathy of both feet     Peripheral neuropathy     Prostate cancer (HCC)     Type 2 diabetes mellitus (HCC)        Past Surgical History:   Procedure Laterality Date    CARDIAC CATHETERIZATION  01/15/1988    Left main: unobstructed. Posterolateral branch 90% narrowed.    COLONOSCOPY  10/05/2017    Dr. Saeed High    IR BIOPSY BONE MARROW  12/19/2023    PROSTATE SURGERY         Social History     Socioeconomic History    Marital status: /Civil Union     Spouse name: None    Number of children: None    Years of education: None    Highest education level: None   Occupational History    Occupation: Retired-[employer]   Tobacco Use    Smoking status: Never    Smokeless tobacco: Never   Vaping Use    Vaping status: Never Used   Substance and Sexual Activity    Alcohol use: Not Currently    Drug use: Never    Sexual activity: None   Other Topics Concern    None   Social History Narrative    None     Social Determinants of Health     Financial Resource Strain: Low Risk  (9/18/2023)    Overall Financial Resource Strain (CARDIA)     Difficulty of Paying Living Expenses: Not hard at all   Food Insecurity: No Food Insecurity (12/18/2023)    Hunger Vital Sign     Worried About Running Out of Food in the Last Year: Never true     Ran Out of Food in the Last Year: Never true   Transportation Needs: No Transportation Needs (12/18/2023)    PRAPARE - Transportation     Lack of Transportation (Medical): No     Lack of Transportation (Non-Medical): No   Physical Activity: Not on file   Stress:  Not on file   Social Connections: Not on file   Intimate Partner Violence: Not on file   Housing Stability: Low Risk  (12/18/2023)    Housing Stability Vital Sign     Unable to Pay for Housing in the Last Year: No     Number of Places Lived in the Last Year: 1     Unstable Housing in the Last Year: No       Family History   Problem Relation Age of Onset    Cancer Brother         bladder    Colon cancer Brother     Heart disease Other     Hypertension Other     Cancer Other         bladder    Colon cancer Other        No Known Allergies      Current Outpatient Medications:     nitroglycerin (NITROSTAT) 0.3 mg SL tablet, Place 1 tablet (0.3 mg total) under the tongue every 5 (five) minutes as needed for chest pain, Disp: 100 tablet, Rfl: 1    atorvastatin (LIPITOR) 40 mg tablet, TAKE 1 TABLET BY MOUTH DAILY, Disp: 90 tablet, Rfl: 3    Blood Glucose Monitoring Suppl (OneTouch Verio Reflect) w/Device KIT, Check blood sugars once daily. Please substitute with appropriate alternative as covered by patient's insurance. Dx: E11.65, Disp: 1 kit, Rfl: 0    busPIRone (BUSPAR) 7.5 mg tablet, Take 1 tablet (7.5 mg total) by mouth 2 (two) times a day, Disp: 180 tablet, Rfl: 3    econazole nitrate 1 % cream, , Disp: , Rfl:     ferrous gluconate (FERGON) 240 (27 FE) MG tablet, Take 1 tablet (240 mg total) by mouth 2 (two) times a day before lunch and dinner, Disp: 180 tablet, Rfl: 1    folic acid (FOLVITE) 1 mg tablet, Take 1 tablet (1 mg total) by mouth daily, Disp: 90 tablet, Rfl: 1    glimepiride (AMARYL) 1 mg tablet, Take 1 tablet (1 mg total) by mouth daily with breakfast, Disp: 90 tablet, Rfl: 3    glucose blood (OneTouch Verio) test strip, Check blood sugars once daily. Please substitute with appropriate alternative as covered by patient's insurance. Dx: E11.65, Disp: 100 each, Rfl: 3    hydrocortisone 1 % cream, Apply topically 2 (two) times a day, Disp: 14 g, Rfl: 0    ipratropium (ATROVENT) 0.03 % nasal spray, 2 sprays  into each nostril 3 (three) times a day, Disp: 30 mL, Rfl: 5    loperamide (IMODIUM A-D) 2 MG tablet, Take 2 mg by mouth 3 (three) times a day as needed for diarrhea. Indications: Diarrhea, Disp: , Rfl:     Magnesium Oxide 420 MG TABS, Take 1 tablet (420 mg total) by mouth 2 (two) times a day, Disp: 60 tablet, Rfl: 0    metoprolol succinate (Toprol XL) 25 mg 24 hr tablet, Take 1 tablet (25 mg total) by mouth daily, Disp: 90 tablet, Rfl: 3    OneTouch Delica Lancets 30G MISC, USE TO TEST ONCE DAILY, Disp: 100 each, Rfl: 5    OneTouch Delica Lancets 33G MISC, Check blood sugars once daily. Please substitute with appropriate alternative as covered by patient's insurance. Dx: E11.65, Disp: 100 each, Rfl: 3    OneTouch Ultra test strip, TEST ONCE DAILY, Disp: 100 each, Rfl: 1    potassium chloride (K-DUR,KLOR-CON) 20 mEq tablet, Take 1 tablet (20 mEq total) by mouth daily, Disp: 30 tablet, Rfl: 3    pyridoxine (VITAMIN B6) 50 mg tablet, TAKE 1 TABLET BY MOUTH EVERY DAY, Disp: 100 tablet, Rfl: 0    saccharomyces boulardii (FLORASTOR) 250 mg capsule, Take 1 capsule (250 mg total) by mouth 2 (two) times a day, Disp: 60 capsule, Rfl: 3    tamsulosin (FLOMAX) 0.4 mg, Take 1 capsule (0.4 mg total) by mouth daily with dinner, Disp: 90 capsule, Rfl: 3    torsemide (DEMADEX) 100 mg tablet, Take 0.5 tablets (50 mg total) by mouth daily, Disp: 45 tablet, Rfl: 1      Physical Exam:  /68   Pulse 84   Temp (!) 95.9 °F (35.5 °C)   Resp 20   Ht 6' (1.829 m)   Wt 92.1 kg (203 lb)   BMI 27.53 kg/m²     Physical Exam  Constitutional:       Appearance: Normal appearance. He is well-developed.   HENT:      Head: Normocephalic and atraumatic.      Right Ear: Tympanic membrane, ear canal and external ear normal.      Left Ear: Tympanic membrane, ear canal and external ear normal.      Nose: Nose normal.      Mouth/Throat:      Mouth: Mucous membranes are moist.      Pharynx: Oropharynx is clear.   Eyes:      Extraocular Movements:  Extraocular movements intact.      Conjunctiva/sclera: Conjunctivae normal.      Pupils: Pupils are equal, round, and reactive to light.   Cardiovascular:      Rate and Rhythm: Normal rate and regular rhythm.      Pulses: Normal pulses.      Heart sounds: Normal heart sounds.   Pulmonary:      Effort: Pulmonary effort is normal.      Breath sounds: Normal breath sounds.   Abdominal:      General: Abdomen is flat. Bowel sounds are normal.      Palpations: Abdomen is soft.      Tenderness: There is no abdominal tenderness.   Musculoskeletal:         General: Normal range of motion.      Cervical back: Normal range of motion and neck supple.   Skin:     General: Skin is warm and dry.      Capillary Refill: Capillary refill takes less than 2 seconds.   Neurological:      General: No focal deficit present.      Mental Status: He is alert and oriented to person, place, and time.   Psychiatric:         Mood and Affect: Mood normal.         Behavior: Behavior normal.         Thought Content: Thought content normal.         Judgment: Judgment normal.             Labs:  Lab Results   Component Value Date    WBC 1.42 (LL) 12/27/2023    HGB 7.1 (L) 12/27/2023    HCT 21.0 (L) 12/27/2023    MCV 98 12/27/2023    PLT 24 (LL) 12/27/2023     Lab Results   Component Value Date     09/23/2015    K 3.6 12/20/2023     12/20/2023    CO2 27 12/20/2023    ANIONGAP 9 09/23/2015    BUN 26 (H) 12/20/2023    CREATININE 1.32 (H) 12/20/2023    GLUCOSE 133 09/23/2015    GLUF 138 (H) 12/16/2023    CALCIUM 8.8 12/20/2023    CORRECTEDCA 8.8 08/17/2023    AST 12 (L) 12/19/2023    ALT 9 12/19/2023    ALKPHOS 74 12/19/2023    PROT 7.1 09/23/2015    BILITOT 0.85 09/23/2015    EGFR 47 12/20/2023

## 2023-12-29 ENCOUNTER — OFFICE VISIT (OUTPATIENT)
Dept: HEMATOLOGY ONCOLOGY | Facility: CLINIC | Age: 88
End: 2023-12-29
Payer: COMMERCIAL

## 2023-12-29 VITALS
HEIGHT: 72 IN | TEMPERATURE: 96.8 F | SYSTOLIC BLOOD PRESSURE: 118 MMHG | WEIGHT: 204 LBS | BODY MASS INDEX: 27.63 KG/M2 | HEART RATE: 84 BPM | DIASTOLIC BLOOD PRESSURE: 75 MMHG

## 2023-12-29 DIAGNOSIS — C92.00 ACUTE MYELOID LEUKEMIA NOT HAVING ACHIEVED REMISSION (HCC): ICD-10-CM

## 2023-12-29 DIAGNOSIS — D69.6 PLATELETS DECREASED (HCC): ICD-10-CM

## 2023-12-29 DIAGNOSIS — D63.0 ANEMIA IN NEOPLASTIC DISEASE: Primary | ICD-10-CM

## 2023-12-29 PROCEDURE — 99215 OFFICE O/P EST HI 40 MIN: CPT | Performed by: NURSE PRACTITIONER

## 2023-12-29 RX ORDER — SODIUM CHLORIDE 9 MG/ML
20 INJECTION, SOLUTION INTRAVENOUS ONCE
OUTPATIENT
Start: 2024-01-03

## 2023-12-29 RX ORDER — SODIUM CHLORIDE 9 MG/ML
20 INJECTION, SOLUTION INTRAVENOUS ONCE
OUTPATIENT
Start: 2024-01-01

## 2023-12-29 RX ORDER — FUROSEMIDE 10 MG/ML
20 INJECTION INTRAMUSCULAR; INTRAVENOUS ONCE
Start: 2024-01-01 | End: 2024-01-01

## 2023-12-29 NOTE — PROGRESS NOTES
HEM ONC [facility] [place]  [address] [facility] [address] [phone]  Oncology Progress Note  Freddy Copeland, 1935, [id number]  12/29/2023        Assessment/Plan:   1. 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 Anemia in neoplastic disease  3. Platelets decreased (HCC)   Patient is an 88-year-old male with a history of A-fib, CHF, COPD, CAD, DVT, pancytopenia who was recently hospitalized in December 2023.  He had previously been seen by me for Workup and found to have a folate deficiency.  Patient also had bacterial pneumonia likely contributing to pancytopenia in August 2023.  He was started on folic acid and expected to see us in November 2023.    During his most recent hospitalization in December hematology oncology was consulted for further evaluation of profound pancytopenia.  He underwent a bone marrow biopsy on 12/19/2023 that showed acute myeloid leukemia with 20 to 25% myeloblasts by CD34 immunostain, and a hypercellular marrow 70% cellularity, increased iron stores with ringed sideroblasts.  Dr. Segura had a discussion with patient and his wife regarding treatment options.  Supportive care and comfort versus starting chemotherapy was discussed.  Patient and his wife agreed with supportive care including transfusion protocol.   Most recent CBC on 12/27/2023 shows a white blood cell count of 1.42, hemoglobin 7.1, MCV 98, platelets 24.  We reviewed transfusion protocol will require frequent blood draws with transfusions as indicated.  We will plan to transfuse for hemoglobin less than 7.0, platelets less than 20 or bleeding.  I obtained consent after reviewing adverse reactions to blood transfusions.  We will monitor labs on a weekly basis.  Patient and his wife understand that hospice is also an option and will likely transition in the future.  We will plan to see him in 6 weeks, patient and his wife verbalized understanding  and are in agreement with the plan.    - Ambulatory Referral to Hematology / Oncology  - CBC; Standing  - Blood Bank Hold Tube; Standing  - CBC  - Blood Bank Hold Tube    Goals and Barriers:    Current Goal:   Prolong Survival from Cancer.     Barriers: None.      Patient's Capacity to Self Care:  Patient is able to self care.    Melani AZAR  Medical Oncology/Hematology  [facility]  ______________________________________________________________________________________________________________    Subjective     History of present illness/Cancer History:   Oncology History    No history exists.        Lab Results   Component Value Date    WBC 1.42 (LL) 2023    HGB 7.1 (L) 2023    HCT 21.0 (L) 2023    MCV 98 2023    PLT 24 (LL) 2023     Lab Results   Component Value Date     2015    SODIUM 140 2023    K 3.6 2023     2023    CO2 27 2023    ANIONGAP 9 2015    AGAP 8 2023    BUN 26 (H) 2023    CREATININE 1.32 (H) 2023    GLUC 122 2023    GLUF 138 (H) 2023    CALCIUM 8.8 2023    AST 12 (L) 2023    ALT 9 2023    ALKPHOS 74 2023    PROT 7.1 2015    TP 5.7 (L) 2023    BILITOT 0.85 2015    TBILI 2.12 (H) 2023    EGFR 47 2023     Interval history:  clinically stable     ECO - Symptomatic but completely ambulatory    Review of Systems   Constitutional:  Positive for fatigue. Negative for activity change, appetite change, fever and unexpected weight change.   Respiratory:  Positive for shortness of breath. Negative for cough.    Cardiovascular:  Negative for chest pain and leg swelling.   Gastrointestinal:  Negative for abdominal pain, constipation, diarrhea and nausea.   Endocrine: Negative for cold intolerance and heat intolerance.   Musculoskeletal:  Negative for arthralgias and myalgias.   Skin: Negative.         itching    Neurological:  Negative for dizziness, weakness and headaches.   Hematological:  Negative for adenopathy. Bruises/bleeds easily.     Current Outpatient Medications:     atorvastatin (LIPITOR) 40 mg tablet, TAKE 1 TABLET BY MOUTH DAILY, Disp: 90 tablet, Rfl: 3    Blood Glucose Monitoring Suppl (OneTouch Verio Reflect) w/Device KIT, Check blood sugars once daily. Please substitute with appropriate alternative as covered by patient's insurance. Dx: E11.65, Disp: 1 kit, Rfl: 0    busPIRone (BUSPAR) 7.5 mg tablet, Take 1 tablet (7.5 mg total) by mouth 2 (two) times a day, Disp: 180 tablet, Rfl: 3    econazole nitrate 1 % cream, , Disp: , Rfl:     ferrous gluconate (FERGON) 240 (27 FE) MG tablet, Take 1 tablet (240 mg total) by mouth 2 (two) times a day before lunch and dinner, Disp: 180 tablet, Rfl: 1    folic acid (FOLVITE) 1 mg tablet, Take 1 tablet (1 mg total) by mouth daily, Disp: 90 tablet, Rfl: 1    glimepiride (AMARYL) 1 mg tablet, Take 1 tablet (1 mg total) by mouth daily with breakfast, Disp: 90 tablet, Rfl: 3    glucose blood (OneTouch Verio) test strip, Check blood sugars once daily. Please substitute with appropriate alternative as covered by patient's insurance. Dx: E11.65, Disp: 100 each, Rfl: 3    hydrocortisone 1 % cream, Apply topically 2 (two) times a day, Disp: 14 g, Rfl: 0    ipratropium (ATROVENT) 0.03 % nasal spray, 2 sprays into each nostril 3 (three) times a day, Disp: 30 mL, Rfl: 5    loperamide (IMODIUM A-D) 2 MG tablet, Take 2 mg by mouth 3 (three) times a day as needed for diarrhea. Indications: Diarrhea, Disp: , Rfl:     Magnesium Oxide 420 MG TABS, Take 1 tablet (420 mg total) by mouth 2 (two) times a day, Disp: 60 tablet, Rfl: 0    metoprolol succinate (Toprol XL) 25 mg 24 hr tablet, Take 1 tablet (25 mg total) by mouth daily, Disp: 90 tablet, Rfl: 3    nitroglycerin (NITROSTAT) 0.3 mg SL tablet, Place 1 tablet (0.3 mg total) under the tongue every 5 (five) minutes as needed for chest  pain, Disp: 100 tablet, Rfl: 1    OneTouch Delica Lancets 30G MISC, USE TO TEST ONCE DAILY, Disp: 100 each, Rfl: 5    OneTouch Delica Lancets 33G MISC, Check blood sugars once daily. Please substitute with appropriate alternative as covered by patient's insurance. Dx: E11.65, Disp: 100 each, Rfl: 3    OneTouch Ultra test strip, TEST ONCE DAILY, Disp: 100 each, Rfl: 1    potassium chloride (K-DUR,KLOR-CON) 20 mEq tablet, Take 1 tablet (20 mEq total) by mouth daily, Disp: 30 tablet, Rfl: 3    pyridoxine (VITAMIN B6) 50 mg tablet, TAKE 1 TABLET BY MOUTH EVERY DAY, Disp: 100 tablet, Rfl: 0    saccharomyces boulardii (FLORASTOR) 250 mg capsule, Take 1 capsule (250 mg total) by mouth 2 (two) times a day, Disp: 60 capsule, Rfl: 3    tamsulosin (FLOMAX) 0.4 mg, Take 1 capsule (0.4 mg total) by mouth daily with dinner, Disp: 90 capsule, Rfl: 3    torsemide (DEMADEX) 100 mg tablet, Take 0.5 tablets (50 mg total) by mouth daily, Disp: 45 tablet, Rfl: 1  No Known Allergies    Objective   /75 (BP Location: Left arm, Patient Position: Sitting, Cuff Size: Standard)   Pulse 84   Temp (!) 96.8 °F (36 °C) (Tympanic)   Ht 6' (1.829 m)   Wt 92.5 kg (204 lb)   BMI 27.67 kg/m²   Wt Readings from Last 6 Encounters:   12/29/23 92.5 kg (204 lb)   12/28/23 92.1 kg (203 lb)   12/17/23 94.2 kg (207 lb 10.8 oz)   10/19/23 94.2 kg (207 lb 9.6 oz)   10/17/23 95.7 kg (211 lb)   09/28/23 93.5 kg (206 lb 3.2 oz)     Physical Exam  Vitals reviewed.   Constitutional:       Appearance: Normal appearance. He is well-developed.   HENT:      Head: Normocephalic and atraumatic.   Eyes:      Pupils: Pupils are equal, round, and reactive to light.   Pulmonary:      Effort: Pulmonary effort is normal. No respiratory distress.   Musculoskeletal:         General: Normal range of motion.      Cervical back: Normal range of motion.   Lymphadenopathy:      Cervical: No cervical adenopathy.   Skin:     General: Skin is dry.   Neurological:      Mental  Status: He is alert and oriented to person, place, and time.   Psychiatric:         Behavior: Behavior normal.   The following historical data was reviewed:  Past Medical History:   Diagnosis Date    Anxiety     Atrial fibrillation (HCC)     Atrial flutter (HCC)     Congestive heart failure (CHF) (HCC)     COPD (chronic obstructive pulmonary disease) (HCC)     Coronary artery disease     DVT (deep venous thrombosis) (HCC)     ED (erectile dysfunction)     Hearing loss     History of echocardiogram 04/05/2018    EF 0.55, Mild LVH. Mild moderate mitral regurg. Trace aortic regurg.    Hx of radiation therapy     XRT    Hypercholesterolemia     Hyperlipidemia     Hypertension     Long term (current) use of anticoagulants 8/21/2018    Neuropathy of both feet     Peripheral neuropathy     Prostate cancer (HCC)     Type 2 diabetes mellitus (HCC)      Past Surgical History:   Procedure Laterality Date    CARDIAC CATHETERIZATION  01/15/1988    Left main: unobstructed. Posterolateral branch 90% narrowed.    COLONOSCOPY  10/05/2017    Dr. Saeed High    IR BIOPSY BONE MARROW  12/19/2023    PROSTATE SURGERY       Social History     Socioeconomic History    Marital status: /Civil Union     Spouse name: None    Number of children: None    Years of education: None    Highest education level: None   Occupational History    Occupation: Retired-[employer]   Tobacco Use    Smoking status: Never    Smokeless tobacco: Never   Vaping Use    Vaping status: Never Used   Substance and Sexual Activity    Alcohol use: Not Currently    Drug use: Never    Sexual activity: None   Other Topics Concern    None   Social History Narrative    None     Social Determinants of Health     Financial Resource Strain: Low Risk  (9/18/2023)    Overall Financial Resource Strain (CARDIA)     Difficulty of Paying Living Expenses: Not hard at all   Food Insecurity: No Food Insecurity (12/18/2023)    Hunger Vital Sign     Worried About Running Out of Food  in the Last Year: Never true     Ran Out of Food in the Last Year: Never true   Transportation Needs: No Transportation Needs (12/18/2023)    PRAPARE - Transportation     Lack of Transportation (Medical): No     Lack of Transportation (Non-Medical): No   Physical Activity: Not on file   Stress: Not on file   Social Connections: Not on file   Intimate Partner Violence: Not on file   Housing Stability: Low Risk  (12/18/2023)    Housing Stability Vital Sign     Unable to Pay for Housing in the Last Year: No     Number of Places Lived in the Last Year: 1     Unstable Housing in the Last Year: No     Family History   Problem Relation Age of Onset    Cancer Brother         bladder    Colon cancer Brother     Heart disease Other     Hypertension Other     Cancer Other         bladder    Colon cancer Other      Please note:  This report has been generated by a voice recognition software system. Therefore there may be syntax, spelling, and/or grammatical errors. Please call if you have any questions.

## 2024-01-01 ENCOUNTER — HOSPITAL ENCOUNTER (OUTPATIENT)
Facility: HOSPITAL | Age: 89
End: 2024-01-11
Attending: EMERGENCY MEDICINE | Admitting: FAMILY MEDICINE
Payer: COMMERCIAL

## 2024-01-01 ENCOUNTER — APPOINTMENT (EMERGENCY)
Dept: RADIOLOGY | Facility: HOSPITAL | Age: 89
End: 2024-01-01
Payer: COMMERCIAL

## 2024-01-01 ENCOUNTER — APPOINTMENT (EMERGENCY)
Dept: ULTRASOUND IMAGING | Facility: HOSPITAL | Age: 89
End: 2024-01-01
Payer: COMMERCIAL

## 2024-01-01 ENCOUNTER — HOSPITAL ENCOUNTER (INPATIENT)
Facility: HOSPITAL | Age: 89
LOS: 2 days | End: 2024-01-14
Attending: FAMILY MEDICINE | Admitting: FAMILY MEDICINE
Payer: COMMERCIAL

## 2024-01-01 ENCOUNTER — APPOINTMENT (EMERGENCY)
Dept: CT IMAGING | Facility: HOSPITAL | Age: 89
End: 2024-01-01
Payer: COMMERCIAL

## 2024-01-01 ENCOUNTER — TELEPHONE (OUTPATIENT)
Dept: FAMILY MEDICINE CLINIC | Facility: CLINIC | Age: 89
End: 2024-01-01

## 2024-01-01 ENCOUNTER — APPOINTMENT (OUTPATIENT)
Dept: LAB | Facility: HOSPITAL | Age: 89
End: 2024-01-01
Payer: COMMERCIAL

## 2024-01-01 ENCOUNTER — HOME CARE VISIT (OUTPATIENT)
Dept: HOME HEALTH SERVICES | Facility: HOME HEALTHCARE | Age: 89
End: 2024-01-01
Payer: MEDICARE

## 2024-01-01 ENCOUNTER — APPOINTMENT (OUTPATIENT)
Dept: LAB | Facility: MEDICAL CENTER | Age: 89
End: 2024-01-01
Payer: COMMERCIAL

## 2024-01-01 ENCOUNTER — TELEPHONE (OUTPATIENT)
Dept: CARDIOLOGY CLINIC | Facility: CLINIC | Age: 89
End: 2024-01-01

## 2024-01-01 ENCOUNTER — HOME CARE VISIT (OUTPATIENT)
Dept: HOME HOSPICE | Facility: HOSPICE | Age: 89
End: 2024-01-01
Payer: MEDICARE

## 2024-01-01 ENCOUNTER — TELEPHONE (OUTPATIENT)
Dept: OTHER | Facility: HOSPITAL | Age: 89
End: 2024-01-01

## 2024-01-01 ENCOUNTER — HOSPITAL ENCOUNTER (OUTPATIENT)
Dept: INFUSION CENTER | Facility: HOSPITAL | Age: 89
End: 2024-01-01

## 2024-01-01 ENCOUNTER — TELEPHONE (OUTPATIENT)
Dept: OTHER | Facility: OTHER | Age: 89
End: 2024-01-01

## 2024-01-01 ENCOUNTER — HOSPICE ADMISSION (OUTPATIENT)
Dept: HOME HOSPICE | Facility: HOSPICE | Age: 89
End: 2024-01-01
Payer: MEDICARE

## 2024-01-01 ENCOUNTER — TELEPHONE (OUTPATIENT)
Dept: HEMATOLOGY ONCOLOGY | Facility: CLINIC | Age: 89
End: 2024-01-01

## 2024-01-01 VITALS
BODY MASS INDEX: 27.12 KG/M2 | RESPIRATION RATE: 24 BRPM | OXYGEN SATURATION: 98 % | SYSTOLIC BLOOD PRESSURE: 107 MMHG | HEART RATE: 138 BPM | WEIGHT: 199.96 LBS | TEMPERATURE: 97.7 F | DIASTOLIC BLOOD PRESSURE: 62 MMHG

## 2024-01-01 VITALS
DIASTOLIC BLOOD PRESSURE: 54 MMHG | HEART RATE: 109 BPM | SYSTOLIC BLOOD PRESSURE: 98 MMHG | WEIGHT: 199 LBS | BODY MASS INDEX: 26.99 KG/M2 | TEMPERATURE: 97.2 F | RESPIRATION RATE: 24 BRPM

## 2024-01-01 VITALS
TEMPERATURE: 102.8 F | RESPIRATION RATE: 14 BRPM | HEART RATE: 122 BPM | DIASTOLIC BLOOD PRESSURE: 57 MMHG | OXYGEN SATURATION: 99 % | SYSTOLIC BLOOD PRESSURE: 79 MMHG

## 2024-01-01 VITALS
TEMPERATURE: 102.5 F | SYSTOLIC BLOOD PRESSURE: 78 MMHG | DIASTOLIC BLOOD PRESSURE: 57 MMHG | RESPIRATION RATE: 22 BRPM | HEART RATE: 125 BPM

## 2024-01-01 VITALS — RESPIRATION RATE: 19 BRPM | HEART RATE: 103 BPM

## 2024-01-01 DIAGNOSIS — C92.00 ACUTE MYELOID LEUKEMIA NOT HAVING ACHIEVED REMISSION (HCC): ICD-10-CM

## 2024-01-01 DIAGNOSIS — R79.89 ELEVATED BRAIN NATRIURETIC PEPTIDE (BNP) LEVEL: ICD-10-CM

## 2024-01-01 DIAGNOSIS — R74.01 TRANSAMINASEMIA: ICD-10-CM

## 2024-01-01 DIAGNOSIS — D50.8 IRON DEFICIENCY ANEMIA SECONDARY TO INADEQUATE DIETARY IRON INTAKE: ICD-10-CM

## 2024-01-01 DIAGNOSIS — J18.9 PNEUMONIA OF RIGHT LUNG DUE TO INFECTIOUS ORGANISM: ICD-10-CM

## 2024-01-01 DIAGNOSIS — D61.818 PANCYTOPENIA (HCC): ICD-10-CM

## 2024-01-01 DIAGNOSIS — D69.6 THROMBOCYTOPENIA (HCC): ICD-10-CM

## 2024-01-01 DIAGNOSIS — R65.21 SEPTIC SHOCK (HCC): Primary | ICD-10-CM

## 2024-01-01 DIAGNOSIS — C61 MALIGNANT NEOPLASM OF PROSTATE (HCC): ICD-10-CM

## 2024-01-01 DIAGNOSIS — R79.89 ELEVATED TROPONIN: ICD-10-CM

## 2024-01-01 DIAGNOSIS — M54.2 NECK PAIN: Primary | ICD-10-CM

## 2024-01-01 DIAGNOSIS — D63.0 ANEMIA IN NEOPLASTIC DISEASE: ICD-10-CM

## 2024-01-01 DIAGNOSIS — D64.9 ANEMIA: ICD-10-CM

## 2024-01-01 DIAGNOSIS — E53.8 VITAMIN B12 DEFICIENCY: ICD-10-CM

## 2024-01-01 DIAGNOSIS — E87.20 METABOLIC ACIDOSIS: ICD-10-CM

## 2024-01-01 DIAGNOSIS — N17.9 ACUTE KIDNEY INJURY (HCC): ICD-10-CM

## 2024-01-01 DIAGNOSIS — D63.0 ANEMIA IN NEOPLASTIC DISEASE: Primary | ICD-10-CM

## 2024-01-01 DIAGNOSIS — A41.9 SEPTIC SHOCK (HCC): Primary | ICD-10-CM

## 2024-01-01 LAB
2HR DELTA HS TROPONIN: 308 NG/L
4HR DELTA HS TROPONIN: 972 NG/L
ABO GROUP BLD BPU: NORMAL
ABO GROUP BLD: NORMAL
ABO GROUP BLD: NORMAL
ACANTHOCYTES BLD QL SMEAR: PRESENT
ALBUMIN SERPL BCP-MCNC: 3.8 G/DL (ref 3.5–5)
ALP SERPL-CCNC: 106 U/L (ref 34–104)
ALT SERPL W P-5'-P-CCNC: 400 U/L (ref 7–52)
ANION GAP SERPL CALCULATED.3IONS-SCNC: 21 MMOL/L
ANISOCYTOSIS BLD QL SMEAR: PRESENT
APTT PPP: 31 SECONDS (ref 23–37)
AST SERPL W P-5'-P-CCNC: 417 U/L (ref 13–39)
BACTERIA BLD CULT: NORMAL
BACTERIA BLD CULT: NORMAL
BASE EX.OXY STD BLDV CALC-SCNC: 39.8 % (ref 60–80)
BASE EX.OXY STD BLDV CALC-SCNC: 47.1 % (ref 60–80)
BASE EXCESS BLDV CALC-SCNC: -13.7 MMOL/L
BASE EXCESS BLDV CALC-SCNC: -14.9 MMOL/L
BASOPHILS # BLD AUTO: 0.01 THOUSANDS/ÂΜL (ref 0–0.1)
BASOPHILS # BLD MANUAL: 0 THOUSAND/UL (ref 0–0.1)
BASOPHILS NFR BLD AUTO: 0 % (ref 0–1)
BASOPHILS NFR MAR MANUAL: 0 % (ref 0–1)
BILIRUB SERPL-MCNC: 3.5 MG/DL (ref 0.2–1)
BLD GP AB SCN SERPL QL: NEGATIVE
BLD GP AB SCN SERPL QL: NEGATIVE
BNP SERPL-MCNC: 1176 PG/ML (ref 0–100)
BPU ID: NORMAL
BUN SERPL-MCNC: 36 MG/DL (ref 5–25)
CALCIUM SERPL-MCNC: 8.9 MG/DL (ref 8.4–10.2)
CARDIAC TROPONIN I PNL SERPL HS: 1750 NG/L
CARDIAC TROPONIN I PNL SERPL HS: 2058 NG/L
CARDIAC TROPONIN I PNL SERPL HS: 2722 NG/L
CHLORIDE SERPL-SCNC: 107 MMOL/L (ref 96–108)
CO2 SERPL-SCNC: 13 MMOL/L (ref 21–32)
CREAT SERPL-MCNC: 1.88 MG/DL (ref 0.6–1.3)
CROSSMATCH: NORMAL
D DIMER PPP FEU-MCNC: 2.61 UG/ML FEU
EOSINOPHIL # BLD AUTO: 0 THOUSAND/ÂΜL (ref 0–0.61)
EOSINOPHIL # BLD MANUAL: 0.03 THOUSAND/UL (ref 0–0.4)
EOSINOPHIL NFR BLD AUTO: 0 % (ref 0–6)
EOSINOPHIL NFR BLD MANUAL: 2 % (ref 0–6)
ERYTHROCYTE [DISTWIDTH] IN BLOOD BY AUTOMATED COUNT: 16.6 % (ref 11.6–15.1)
ERYTHROCYTE [DISTWIDTH] IN BLOOD BY AUTOMATED COUNT: 17 % (ref 11.6–15.1)
FLUAV RNA RESP QL NAA+PROBE: NEGATIVE
FLUBV RNA RESP QL NAA+PROBE: NEGATIVE
GFR SERPL CREATININE-BSD FRML MDRD: 31 ML/MIN/1.73SQ M
GLUCOSE SERPL-MCNC: 201 MG/DL (ref 65–140)
HCO3 BLDV-SCNC: 11.2 MMOL/L (ref 24–30)
HCO3 BLDV-SCNC: 11.2 MMOL/L (ref 24–30)
HCT VFR BLD AUTO: 18.6 % (ref 36.5–49.3)
HCT VFR BLD AUTO: 19 % (ref 36.5–49.3)
HGB BLD-MCNC: 6 G/DL (ref 12–17)
HGB BLD-MCNC: 6.2 G/DL (ref 12–17)
IMM GRANULOCYTES # BLD AUTO: 0.19 THOUSAND/UL (ref 0–0.2)
IMM GRANULOCYTES NFR BLD AUTO: 6 % (ref 0–2)
INR PPP: 2.03 (ref 0.84–1.19)
LACTATE SERPL-SCNC: 11.2 MMOL/L (ref 0.5–2)
LACTATE SERPL-SCNC: 9 MMOL/L (ref 0.5–2)
LIPASE SERPL-CCNC: 18 U/L (ref 11–82)
LYMPHOCYTES # BLD AUTO: 0.96 THOUSANDS/ÂΜL (ref 0.6–4.47)
LYMPHOCYTES # BLD AUTO: 0.98 THOUSAND/UL (ref 0.6–4.47)
LYMPHOCYTES # BLD AUTO: 65 % (ref 14–44)
LYMPHOCYTES NFR BLD AUTO: 31 % (ref 14–44)
MCH RBC QN AUTO: 32.8 PG (ref 26.8–34.3)
MCH RBC QN AUTO: 33.5 PG (ref 26.8–34.3)
MCHC RBC AUTO-ENTMCNC: 32.3 G/DL (ref 31.4–37.4)
MCHC RBC AUTO-ENTMCNC: 32.6 G/DL (ref 31.4–37.4)
MCV RBC AUTO: 101 FL (ref 82–98)
MCV RBC AUTO: 104 FL (ref 82–98)
MONOCYTES # BLD AUTO: 0.01 THOUSAND/UL (ref 0–1.22)
MONOCYTES # BLD AUTO: 0.25 THOUSAND/ÂΜL (ref 0.17–1.22)
MONOCYTES NFR BLD AUTO: 8 % (ref 4–12)
MONOCYTES NFR BLD: 1 % (ref 4–12)
NEUTROPHILS # BLD AUTO: 1.67 THOUSANDS/ÂΜL (ref 1.85–7.62)
NEUTROPHILS # BLD MANUAL: 0.44 THOUSAND/UL (ref 1.85–7.62)
NEUTS BAND NFR BLD MANUAL: 2 % (ref 0–8)
NEUTS SEG NFR BLD AUTO: 28 % (ref 43–75)
NEUTS SEG NFR BLD AUTO: 55 % (ref 43–75)
NRBC BLD AUTO-RTO: 21 /100 WBCS
NRBC BLD AUTO-RTO: 30 /100 WBC (ref 0–2)
O2 CT BLDV-SCNC: 3.9 ML/DL
O2 CT BLDV-SCNC: 4.4 ML/DL
OVALOCYTES BLD QL SMEAR: PRESENT
PCO2 BLDV: 22.4 MM HG (ref 42–50)
PCO2 BLDV: 27.4 MM HG (ref 42–50)
PH BLDV: 7.23 [PH] (ref 7.3–7.4)
PH BLDV: 7.32 [PH] (ref 7.3–7.4)
PLATELET # BLD AUTO: 22 THOUSANDS/UL (ref 149–390)
PLATELET # BLD AUTO: 29 THOUSANDS/UL (ref 149–390)
PLATELET BLD QL SMEAR: ABNORMAL
PMV BLD AUTO: 14.1 FL (ref 8.9–12.7)
PO2 BLDV: 32.1 MM HG (ref 35–45)
PO2 BLDV: 32.2 MM HG (ref 35–45)
POIKILOCYTOSIS BLD QL SMEAR: PRESENT
POTASSIUM SERPL-SCNC: 4.5 MMOL/L (ref 3.5–5.3)
PROCALCITONIN SERPL-MCNC: 0.36 NG/ML
PROT SERPL-MCNC: 6.4 G/DL (ref 6.4–8.4)
PROTHROMBIN TIME: 22.6 SECONDS (ref 11.6–14.5)
RBC # BLD AUTO: 1.79 MILLION/UL (ref 3.88–5.62)
RBC # BLD AUTO: 1.89 MILLION/UL (ref 3.88–5.62)
RBC MORPH BLD: PRESENT
RH BLD: POSITIVE
RH BLD: POSITIVE
RSV RNA RESP QL NAA+PROBE: NEGATIVE
SARS-COV-2 RNA RESP QL NAA+PROBE: NEGATIVE
SMUDGE CELLS BLD QL SMEAR: PRESENT
SODIUM SERPL-SCNC: 141 MMOL/L (ref 135–147)
SPECIMEN EXPIRATION DATE: NORMAL
SPECIMEN EXPIRATION DATE: NORMAL
TSH SERPL DL<=0.05 MIU/L-ACNC: 2.84 UIU/ML (ref 0.45–4.5)
UNIT DISPENSE STATUS: NORMAL
UNIT PRODUCT CODE: NORMAL
UNIT PRODUCT VOLUME: 350 ML
UNIT RH: NORMAL
VARIANT LYMPHS # BLD AUTO: 2 %
WBC # BLD AUTO: 1.46 THOUSAND/UL (ref 4.31–10.16)
WBC # BLD AUTO: 3.08 THOUSAND/UL (ref 4.31–10.16)

## 2024-01-01 PROCEDURE — 82805 BLOOD GASES W/O2 SATURATION: CPT | Performed by: EMERGENCY MEDICINE

## 2024-01-01 PROCEDURE — 86900 BLOOD TYPING SEROLOGIC ABO: CPT | Performed by: EMERGENCY MEDICINE

## 2024-01-01 PROCEDURE — 36415 COLL VENOUS BLD VENIPUNCTURE: CPT

## 2024-01-01 PROCEDURE — 83690 ASSAY OF LIPASE: CPT | Performed by: EMERGENCY MEDICINE

## 2024-01-01 PROCEDURE — 94760 N-INVAS EAR/PLS OXIMETRY 1: CPT

## 2024-01-01 PROCEDURE — 99285 EMERGENCY DEPT VISIT HI MDM: CPT

## 2024-01-01 PROCEDURE — P9040 RBC LEUKOREDUCED IRRADIATED: HCPCS

## 2024-01-01 PROCEDURE — 86923 COMPATIBILITY TEST ELECTRIC: CPT

## 2024-01-01 PROCEDURE — 83605 ASSAY OF LACTIC ACID: CPT | Performed by: EMERGENCY MEDICINE

## 2024-01-01 PROCEDURE — 74177 CT ABD & PELVIS W/CONTRAST: CPT

## 2024-01-01 PROCEDURE — 87040 BLOOD CULTURE FOR BACTERIA: CPT | Performed by: EMERGENCY MEDICINE

## 2024-01-01 PROCEDURE — 86850 RBC ANTIBODY SCREEN: CPT

## 2024-01-01 PROCEDURE — 71045 X-RAY EXAM CHEST 1 VIEW: CPT

## 2024-01-01 PROCEDURE — 86901 BLOOD TYPING SEROLOGIC RH(D): CPT | Performed by: EMERGENCY MEDICINE

## 2024-01-01 PROCEDURE — 36415 COLL VENOUS BLD VENIPUNCTURE: CPT | Performed by: EMERGENCY MEDICINE

## 2024-01-01 PROCEDURE — 94002 VENT MGMT INPAT INIT DAY: CPT

## 2024-01-01 PROCEDURE — 85610 PROTHROMBIN TIME: CPT | Performed by: EMERGENCY MEDICINE

## 2024-01-01 PROCEDURE — G0299 HHS/HOSPICE OF RN EA 15 MIN: HCPCS

## 2024-01-01 PROCEDURE — 84484 ASSAY OF TROPONIN QUANT: CPT | Performed by: EMERGENCY MEDICINE

## 2024-01-01 PROCEDURE — 99231 SBSQ HOSP IP/OBS SF/LOW 25: CPT | Performed by: FAMILY MEDICINE

## 2024-01-01 PROCEDURE — 84145 PROCALCITONIN (PCT): CPT | Performed by: EMERGENCY MEDICINE

## 2024-01-01 PROCEDURE — G0156 HHCP-SVS OF AIDE,EA 15 MIN: HCPCS

## 2024-01-01 PROCEDURE — 93005 ELECTROCARDIOGRAM TRACING: CPT

## 2024-01-01 PROCEDURE — 76705 ECHO EXAM OF ABDOMEN: CPT

## 2024-01-01 PROCEDURE — 86850 RBC ANTIBODY SCREEN: CPT | Performed by: EMERGENCY MEDICINE

## 2024-01-01 PROCEDURE — 0241U HB NFCT DS VIR RESP RNA 4 TRGT: CPT | Performed by: EMERGENCY MEDICINE

## 2024-01-01 PROCEDURE — G1004 CDSM NDSC: HCPCS

## 2024-01-01 PROCEDURE — 85027 COMPLETE CBC AUTOMATED: CPT

## 2024-01-01 PROCEDURE — 96365 THER/PROPH/DIAG IV INF INIT: CPT

## 2024-01-01 PROCEDURE — 84443 ASSAY THYROID STIM HORMONE: CPT | Performed by: EMERGENCY MEDICINE

## 2024-01-01 PROCEDURE — 85379 FIBRIN DEGRADATION QUANT: CPT | Performed by: EMERGENCY MEDICINE

## 2024-01-01 PROCEDURE — 85730 THROMBOPLASTIN TIME PARTIAL: CPT | Performed by: EMERGENCY MEDICINE

## 2024-01-01 PROCEDURE — 83880 ASSAY OF NATRIURETIC PEPTIDE: CPT | Performed by: EMERGENCY MEDICINE

## 2024-01-01 PROCEDURE — 85027 COMPLETE CBC AUTOMATED: CPT | Performed by: EMERGENCY MEDICINE

## 2024-01-01 PROCEDURE — 99238 HOSP IP/OBS DSCHRG MGMT 30/<: CPT | Performed by: INTERNAL MEDICINE

## 2024-01-01 PROCEDURE — G0379 DIRECT REFER HOSPITAL OBSERV: HCPCS

## 2024-01-01 PROCEDURE — 85007 BL SMEAR W/DIFF WBC COUNT: CPT

## 2024-01-01 PROCEDURE — 36430 TRANSFUSION BLD/BLD COMPNT: CPT

## 2024-01-01 PROCEDURE — 80053 COMPREHEN METABOLIC PANEL: CPT | Performed by: EMERGENCY MEDICINE

## 2024-01-01 PROCEDURE — 99497 ADVNCD CARE PLAN 30 MIN: CPT | Performed by: FAMILY MEDICINE

## 2024-01-01 PROCEDURE — 96367 TX/PROPH/DG ADDL SEQ IV INF: CPT

## 2024-01-01 PROCEDURE — 86901 BLOOD TYPING SEROLOGIC RH(D): CPT

## 2024-01-01 PROCEDURE — NC001 PR NO CHARGE: Performed by: EMERGENCY MEDICINE

## 2024-01-01 PROCEDURE — 86900 BLOOD TYPING SEROLOGIC ABO: CPT

## 2024-01-01 PROCEDURE — 99222 1ST HOSP IP/OBS MODERATE 55: CPT | Performed by: FAMILY MEDICINE

## 2024-01-01 PROCEDURE — 71275 CT ANGIOGRAPHY CHEST: CPT

## 2024-01-01 PROCEDURE — 99291 CRITICAL CARE FIRST HOUR: CPT | Performed by: EMERGENCY MEDICINE

## 2024-01-01 RX ORDER — ACETAMINOPHEN 650 MG/1
650 SUPPOSITORY RECTAL EVERY 4 HOURS PRN
Status: DISCONTINUED | OUTPATIENT
Start: 2024-01-01 | End: 2024-01-15 | Stop reason: HOSPADM

## 2024-01-01 RX ORDER — BISACODYL 10 MG
10 SUPPOSITORY, RECTAL RECTAL DAILY PRN
Status: DISCONTINUED | OUTPATIENT
Start: 2024-01-01 | End: 2024-01-15 | Stop reason: HOSPADM

## 2024-01-01 RX ORDER — LORAZEPAM 2 MG/ML
0.5 INJECTION INTRAMUSCULAR
Status: DISCONTINUED | OUTPATIENT
Start: 2024-01-01 | End: 2024-01-15 | Stop reason: HOSPADM

## 2024-01-01 RX ORDER — GLYCOPYRROLATE 0.2 MG/ML
0.1 INJECTION INTRAMUSCULAR; INTRAVENOUS EVERY 4 HOURS PRN
Status: DISCONTINUED | OUTPATIENT
Start: 2024-01-01 | End: 2024-01-15 | Stop reason: HOSPADM

## 2024-01-01 RX ORDER — HYDROMORPHONE HCL/PF 1 MG/ML
0.3 SYRINGE (ML) INJECTION EVERY 2 HOUR PRN
Status: DISCONTINUED | OUTPATIENT
Start: 2024-01-01 | End: 2024-01-01

## 2024-01-01 RX ORDER — ACETAMINOPHEN 650 MG/1
650 SUPPOSITORY RECTAL ONCE
Status: COMPLETED | OUTPATIENT
Start: 2024-01-01 | End: 2024-01-01

## 2024-01-01 RX ORDER — LORAZEPAM 2 MG/ML
1 INJECTION INTRAMUSCULAR
Status: DISCONTINUED | OUTPATIENT
Start: 2024-01-01 | End: 2024-01-01

## 2024-01-01 RX ORDER — HYDROMORPHONE HCL/PF 1 MG/ML
0.5 SYRINGE (ML) INJECTION
Status: DISCONTINUED | OUTPATIENT
Start: 2024-01-01 | End: 2024-01-01 | Stop reason: HOSPADM

## 2024-01-01 RX ORDER — HYDROMORPHONE HCL IN WATER/PF 6 MG/30 ML
0.2 PATIENT CONTROLLED ANALGESIA SYRINGE INTRAVENOUS
Status: DISCONTINUED | OUTPATIENT
Start: 2024-01-01 | End: 2024-01-01

## 2024-01-01 RX ORDER — GINSENG 100 MG
1 CAPSULE ORAL ONCE
Status: DISCONTINUED | OUTPATIENT
Start: 2024-01-01 | End: 2024-01-01

## 2024-01-01 RX ORDER — CEFEPIME HYDROCHLORIDE 2 G/50ML
2000 INJECTION, SOLUTION INTRAVENOUS ONCE
Status: COMPLETED | OUTPATIENT
Start: 2024-01-01 | End: 2024-01-01

## 2024-01-01 RX ORDER — ACETAMINOPHEN 325 MG/1
975 TABLET ORAL ONCE
Status: DISCONTINUED | OUTPATIENT
Start: 2024-01-01 | End: 2024-01-01

## 2024-01-01 RX ORDER — HYDROMORPHONE HCL/PF 1 MG/ML
0.5 SYRINGE (ML) INJECTION EVERY 6 HOURS
Status: DISCONTINUED | OUTPATIENT
Start: 2024-01-01 | End: 2024-01-15 | Stop reason: HOSPADM

## 2024-01-01 RX ORDER — LORAZEPAM 2 MG/ML
1 INJECTION INTRAMUSCULAR
Status: DISCONTINUED | OUTPATIENT
Start: 2024-01-01 | End: 2024-01-01 | Stop reason: HOSPADM

## 2024-01-01 RX ORDER — GLYCOPYRROLATE 0.2 MG/ML
0.1 INJECTION INTRAMUSCULAR; INTRAVENOUS EVERY 4 HOURS PRN
Status: DISCONTINUED | OUTPATIENT
Start: 2024-01-01 | End: 2024-01-01 | Stop reason: HOSPADM

## 2024-01-01 RX ORDER — METHOCARBAMOL 500 MG/1
500 TABLET, FILM COATED ORAL 4 TIMES DAILY PRN
Qty: 120 TABLET | Refills: 0 | Status: ON HOLD | OUTPATIENT
Start: 2024-01-01 | End: 2024-01-01

## 2024-01-01 RX ORDER — ONDANSETRON 2 MG/ML
4 INJECTION INTRAMUSCULAR; INTRAVENOUS EVERY 6 HOURS PRN
Status: DISCONTINUED | OUTPATIENT
Start: 2024-01-01 | End: 2024-01-15 | Stop reason: HOSPADM

## 2024-01-01 RX ORDER — HALOPERIDOL 5 MG/ML
1 INJECTION INTRAMUSCULAR
Status: DISCONTINUED | OUTPATIENT
Start: 2024-01-01 | End: 2024-01-15 | Stop reason: HOSPADM

## 2024-01-01 RX ORDER — HALOPERIDOL 5 MG/ML
0.5 INJECTION INTRAMUSCULAR
Status: DISCONTINUED | OUTPATIENT
Start: 2024-01-01 | End: 2024-01-01 | Stop reason: HOSPADM

## 2024-01-01 RX ORDER — HYDROMORPHONE HCL/PF 1 MG/ML
0.3 SYRINGE (ML) INJECTION
Status: DISCONTINUED | OUTPATIENT
Start: 2024-01-01 | End: 2024-01-01 | Stop reason: HOSPADM

## 2024-01-01 RX ORDER — HYDROMORPHONE HCL/PF 1 MG/ML
0.5 SYRINGE (ML) INJECTION
Status: DISCONTINUED | OUTPATIENT
Start: 2024-01-01 | End: 2024-01-15 | Stop reason: HOSPADM

## 2024-01-01 RX ORDER — HALOPERIDOL 5 MG/ML
0.5 INJECTION INTRAMUSCULAR EVERY 2 HOUR PRN
Status: DISCONTINUED | OUTPATIENT
Start: 2024-01-01 | End: 2024-01-01

## 2024-01-01 RX ORDER — LORAZEPAM 2 MG/ML
0.5 INJECTION INTRAMUSCULAR
Status: DISCONTINUED | OUTPATIENT
Start: 2024-01-01 | End: 2024-01-01 | Stop reason: HOSPADM

## 2024-01-01 RX ADMIN — HYDROMORPHONE HYDROCHLORIDE 0.5 MG: 1 INJECTION, SOLUTION INTRAMUSCULAR; INTRAVENOUS; SUBCUTANEOUS at 00:00

## 2024-01-01 RX ADMIN — MORPHINE SULFATE 2 MG: 2 INJECTION, SOLUTION INTRAMUSCULAR; INTRAVENOUS at 20:25

## 2024-01-01 RX ADMIN — VANCOMYCIN HYDROCHLORIDE 1250 MG: 500 INJECTION, POWDER, LYOPHILIZED, FOR SOLUTION INTRAVENOUS at 07:22

## 2024-01-01 RX ADMIN — HYDROMORPHONE HYDROCHLORIDE 0.5 MG: 1 INJECTION, SOLUTION INTRAMUSCULAR; INTRAVENOUS; SUBCUTANEOUS at 09:34

## 2024-01-01 RX ADMIN — HYDROMORPHONE HYDROCHLORIDE 0.5 MG: 1 INJECTION, SOLUTION INTRAMUSCULAR; INTRAVENOUS; SUBCUTANEOUS at 02:45

## 2024-01-01 RX ADMIN — ACETAMINOPHEN 650 MG: 650 SUPPOSITORY RECTAL at 07:00

## 2024-01-01 RX ADMIN — MORPHINE SULFATE 2 MG: 2 INJECTION, SOLUTION INTRAMUSCULAR; INTRAVENOUS at 12:48

## 2024-01-01 RX ADMIN — MORPHINE SULFATE 2 MG: 2 INJECTION, SOLUTION INTRAMUSCULAR; INTRAVENOUS at 11:03

## 2024-01-01 RX ADMIN — HYDROMORPHONE HYDROCHLORIDE 0.5 MG: 1 INJECTION, SOLUTION INTRAMUSCULAR; INTRAVENOUS; SUBCUTANEOUS at 19:42

## 2024-01-01 RX ADMIN — BISACODYL 10 MG: 10 SUPPOSITORY RECTAL at 22:59

## 2024-01-01 RX ADMIN — HYDROMORPHONE HYDROCHLORIDE 0.5 MG: 1 INJECTION, SOLUTION INTRAMUSCULAR; INTRAVENOUS; SUBCUTANEOUS at 14:53

## 2024-01-01 RX ADMIN — HYDROMORPHONE HYDROCHLORIDE 0.3 MG: 1 INJECTION, SOLUTION INTRAMUSCULAR; INTRAVENOUS; SUBCUTANEOUS at 14:31

## 2024-01-01 RX ADMIN — CEFEPIME HYDROCHLORIDE 2000 MG: 2 INJECTION, SOLUTION INTRAVENOUS at 06:42

## 2024-01-01 RX ADMIN — SODIUM CHLORIDE 1000 ML: 0.9 INJECTION, SOLUTION INTRAVENOUS at 07:00

## 2024-01-01 RX ADMIN — HYDROMORPHONE HYDROCHLORIDE 0.5 MG: 1 INJECTION, SOLUTION INTRAMUSCULAR; INTRAVENOUS; SUBCUTANEOUS at 12:31

## 2024-01-01 RX ADMIN — HYDROMORPHONE HYDROCHLORIDE 0.5 MG: 1 INJECTION, SOLUTION INTRAMUSCULAR; INTRAVENOUS; SUBCUTANEOUS at 20:52

## 2024-01-01 RX ADMIN — LORAZEPAM 0.5 MG: 2 INJECTION INTRAMUSCULAR; INTRAVENOUS at 19:21

## 2024-01-01 RX ADMIN — ONDANSETRON 4 MG: 2 INJECTION INTRAMUSCULAR; INTRAVENOUS at 22:54

## 2024-01-01 RX ADMIN — IOHEXOL 100 ML: 350 INJECTION, SOLUTION INTRAVENOUS at 07:06

## 2024-01-01 RX ADMIN — ACETAMINOPHEN 650 MG: 650 SUPPOSITORY RECTAL at 14:25

## 2024-01-01 RX ADMIN — HYDROMORPHONE HYDROCHLORIDE 0.5 MG: 1 INJECTION, SOLUTION INTRAMUSCULAR; INTRAVENOUS; SUBCUTANEOUS at 20:53

## 2024-01-01 RX ADMIN — ONDANSETRON 4 MG: 2 INJECTION INTRAMUSCULAR; INTRAVENOUS at 12:31

## 2024-01-01 RX ADMIN — LORAZEPAM 0.5 MG: 2 INJECTION INTRAMUSCULAR; INTRAVENOUS at 13:19

## 2024-01-01 RX ADMIN — HYDROMORPHONE HYDROCHLORIDE 0.5 MG: 1 INJECTION, SOLUTION INTRAMUSCULAR; INTRAVENOUS; SUBCUTANEOUS at 06:43

## 2024-01-01 RX ADMIN — HYDROMORPHONE HYDROCHLORIDE 0.5 MG: 1 INJECTION, SOLUTION INTRAMUSCULAR; INTRAVENOUS; SUBCUTANEOUS at 18:06

## 2024-01-01 RX ADMIN — HYDROMORPHONE HYDROCHLORIDE 0.5 MG: 1 INJECTION, SOLUTION INTRAMUSCULAR; INTRAVENOUS; SUBCUTANEOUS at 20:22

## 2024-01-01 RX ADMIN — HYDROMORPHONE HYDROCHLORIDE 0.5 MG: 1 INJECTION, SOLUTION INTRAMUSCULAR; INTRAVENOUS; SUBCUTANEOUS at 14:03

## 2024-01-01 RX ADMIN — SODIUM CHLORIDE 500 ML: 0.9 INJECTION, SOLUTION INTRAVENOUS at 06:39

## 2024-01-01 RX ADMIN — HYDROMORPHONE HYDROCHLORIDE 0.5 MG: 1 INJECTION, SOLUTION INTRAMUSCULAR; INTRAVENOUS; SUBCUTANEOUS at 08:08

## 2024-01-02 ENCOUNTER — LAB (OUTPATIENT)
Dept: LAB | Facility: MEDICAL CENTER | Age: 89
End: 2024-01-02
Payer: COMMERCIAL

## 2024-01-02 DIAGNOSIS — C92.00 ACUTE MYELOID LEUKEMIA NOT HAVING ACHIEVED REMISSION (HCC): ICD-10-CM

## 2024-01-02 LAB
ERYTHROCYTE [DISTWIDTH] IN BLOOD BY AUTOMATED COUNT: 16.2 % (ref 11.6–15.1)
HCT VFR BLD AUTO: 19.7 % (ref 36.5–49.3)
HGB BLD-MCNC: 6.3 G/DL (ref 12–17)
MCH RBC QN AUTO: 33.5 PG (ref 26.8–34.3)
MCHC RBC AUTO-ENTMCNC: 33 G/DL (ref 31.4–37.4)
MCV RBC AUTO: 102 FL (ref 82–98)
PLATELET # BLD AUTO: 28 THOUSANDS/UL (ref 149–390)
RBC # BLD AUTO: 1.94 MILLION/UL (ref 3.88–5.62)
WBC # BLD AUTO: 1.62 THOUSAND/UL (ref 4.31–10.16)

## 2024-01-02 PROCEDURE — 85027 COMPLETE CBC AUTOMATED: CPT

## 2024-01-02 PROCEDURE — 36415 COLL VENOUS BLD VENIPUNCTURE: CPT

## 2024-01-03 ENCOUNTER — HOSPITAL ENCOUNTER (EMERGENCY)
Facility: HOSPITAL | Age: 89
Discharge: HOME | End: 2024-01-03
Attending: EMERGENCY MEDICINE
Payer: COMMERCIAL

## 2024-01-03 ENCOUNTER — TELEPHONE (OUTPATIENT)
Dept: HEMATOLOGY ONCOLOGY | Facility: CLINIC | Age: 89
End: 2024-01-03

## 2024-01-03 VITALS
RESPIRATION RATE: 16 BRPM | SYSTOLIC BLOOD PRESSURE: 109 MMHG | TEMPERATURE: 97.7 F | HEART RATE: 87 BPM | DIASTOLIC BLOOD PRESSURE: 63 MMHG | OXYGEN SATURATION: 97 %

## 2024-01-03 DIAGNOSIS — D64.9 ANEMIA: Primary | ICD-10-CM

## 2024-01-03 DIAGNOSIS — D61.818 PANCYTOPENIA (HCC): ICD-10-CM

## 2024-01-03 LAB
ABO GROUP BLD: NORMAL
ACANTHOCYTES BLD QL SMEAR: PRESENT
ALBUMIN SERPL BCP-MCNC: 4 G/DL (ref 3.5–5)
ALP SERPL-CCNC: 101 U/L (ref 34–104)
ALT SERPL W P-5'-P-CCNC: 19 U/L (ref 7–52)
ANION GAP SERPL CALCULATED.3IONS-SCNC: 11 MMOL/L
ANISOCYTOSIS BLD QL SMEAR: PRESENT
AST SERPL W P-5'-P-CCNC: 19 U/L (ref 13–39)
BASOPHILS # BLD MANUAL: 0 THOUSAND/UL (ref 0–0.1)
BASOPHILS NFR MAR MANUAL: 0 % (ref 0–1)
BILIRUB SERPL-MCNC: 2.01 MG/DL (ref 0.2–1)
BLD GP AB SCN SERPL QL: NEGATIVE
BUN SERPL-MCNC: 25 MG/DL (ref 5–25)
CALCIUM SERPL-MCNC: 9.5 MG/DL (ref 8.4–10.2)
CHLORIDE SERPL-SCNC: 103 MMOL/L (ref 96–108)
CO2 SERPL-SCNC: 25 MMOL/L (ref 21–32)
CREAT SERPL-MCNC: 1.42 MG/DL (ref 0.6–1.3)
EOSINOPHIL # BLD MANUAL: 0.03 THOUSAND/UL (ref 0–0.4)
EOSINOPHIL NFR BLD MANUAL: 2 % (ref 0–6)
ERYTHROCYTE [DISTWIDTH] IN BLOOD BY AUTOMATED COUNT: 16.2 % (ref 11.6–15.1)
GFR SERPL CREATININE-BSD FRML MDRD: 43 ML/MIN/1.73SQ M
GLUCOSE SERPL-MCNC: 192 MG/DL (ref 65–140)
HCT VFR BLD AUTO: 19.5 % (ref 36.5–49.3)
HGB BLD-MCNC: 6.2 G/DL (ref 12–17)
LG PLATELETS BLD QL SMEAR: PRESENT
LYMPHOCYTES # BLD AUTO: 0.65 THOUSAND/UL (ref 0.6–4.47)
LYMPHOCYTES # BLD AUTO: 50 % (ref 14–44)
MCH RBC QN AUTO: 32.5 PG (ref 26.8–34.3)
MCHC RBC AUTO-ENTMCNC: 31.8 G/DL (ref 31.4–37.4)
MCV RBC AUTO: 102 FL (ref 82–98)
METAMYELOCYTES NFR BLD MANUAL: 8 % (ref 0–1)
MONOCYTES # BLD AUTO: 0.05 THOUSAND/UL (ref 0–1.22)
MONOCYTES NFR BLD: 4 % (ref 4–12)
MYELOCYTES NFR BLD MANUAL: 2 % (ref 0–1)
NEUTROPHILS # BLD MANUAL: 0.44 THOUSAND/UL (ref 1.85–7.62)
NEUTS SEG NFR BLD AUTO: 34 % (ref 43–75)
NRBC BLD AUTO-RTO: 6 /100 WBC (ref 0–2)
PLATELET # BLD AUTO: 27 THOUSANDS/UL (ref 149–390)
PLATELET BLD QL SMEAR: ABNORMAL
POTASSIUM SERPL-SCNC: 4 MMOL/L (ref 3.5–5.3)
PROT SERPL-MCNC: 6.9 G/DL (ref 6.4–8.4)
RBC # BLD AUTO: 1.91 MILLION/UL (ref 3.88–5.62)
RBC MORPH BLD: PRESENT
RH BLD: POSITIVE
SODIUM SERPL-SCNC: 139 MMOL/L (ref 135–147)
SPECIMEN EXPIRATION DATE: NORMAL
WBC # BLD AUTO: 1.3 THOUSAND/UL (ref 4.31–10.16)

## 2024-01-03 PROCEDURE — 85027 COMPLETE CBC AUTOMATED: CPT | Performed by: EMERGENCY MEDICINE

## 2024-01-03 PROCEDURE — 99285 EMERGENCY DEPT VISIT HI MDM: CPT

## 2024-01-03 PROCEDURE — 85007 BL SMEAR W/DIFF WBC COUNT: CPT | Performed by: EMERGENCY MEDICINE

## 2024-01-03 PROCEDURE — 86923 COMPATIBILITY TEST ELECTRIC: CPT

## 2024-01-03 PROCEDURE — 36430 TRANSFUSION BLD/BLD COMPNT: CPT

## 2024-01-03 PROCEDURE — P9040 RBC LEUKOREDUCED IRRADIATED: HCPCS

## 2024-01-03 PROCEDURE — 99284 EMERGENCY DEPT VISIT MOD MDM: CPT | Performed by: EMERGENCY MEDICINE

## 2024-01-03 PROCEDURE — 36415 COLL VENOUS BLD VENIPUNCTURE: CPT | Performed by: EMERGENCY MEDICINE

## 2024-01-03 PROCEDURE — 86850 RBC ANTIBODY SCREEN: CPT | Performed by: EMERGENCY MEDICINE

## 2024-01-03 PROCEDURE — 86901 BLOOD TYPING SEROLOGIC RH(D): CPT | Performed by: EMERGENCY MEDICINE

## 2024-01-03 PROCEDURE — 80053 COMPREHEN METABOLIC PANEL: CPT | Performed by: EMERGENCY MEDICINE

## 2024-01-03 PROCEDURE — 93005 ELECTROCARDIOGRAM TRACING: CPT

## 2024-01-03 PROCEDURE — 86900 BLOOD TYPING SEROLOGIC ABO: CPT | Performed by: EMERGENCY MEDICINE

## 2024-01-03 NOTE — ED PROVIDER NOTES
History  Chief Complaint   Patient presents with    Anemia     Hx of anemia, sent in for transfusion. Hgb 6.3     Patient is an 88-year-old male with history of: 1. 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 Anemia in neoplastic disease  3. Platelets decreased (HCC)              Patient is an 88-year-old male with a history of A-fib, CHF, COPD, CAD, DVT, pancytopenia who was recently hospitalized in December 2023.     Patient referred to emergency department for outpatient lab value indicating pancytopenia with a hemoglobin of 6.3 and recommended transfusion.  There was slight mixup with trying to have patient follow-up with the infusion center so patient was informed to proceed to the emergency department.  Patient complains of generalized fatigue but no new symptoms.  Denies any shortness of breath or chest pain.  Denies any episodes of lightheaded.  Patient is able to ambulate throughout his house without difficulty.  He is accompanied by his spouse.  Previous records and labs reviewed.          Prior to Admission Medications   Prescriptions Last Dose Informant Patient Reported? Taking?   Blood Glucose Monitoring Suppl (OneTouch Verio Reflect) w/Device KIT  Self, Spouse/Significant Other No No   Sig: Check blood sugars once daily. Please substitute with appropriate alternative as covered by patient's insurance. Dx: E11.65   Magnesium Oxide 420 MG TABS  Self, Spouse/Significant Other No No   Sig: Take 1 tablet (420 mg total) by mouth 2 (two) times a day   OneTouch Delica Lancets 30G MISC  Self, Spouse/Significant Other No No   Sig: USE TO TEST ONCE DAILY   OneTouch Delica Lancets 33G MISC  Self, Spouse/Significant Other No No   Sig: Check blood sugars once daily. Please substitute with appropriate alternative as covered by patient's insurance. Dx: E11.65   OneTouch Ultra test strip  Self, Spouse/Significant Other No No   Sig: TEST ONCE DAILY   atorvastatin (LIPITOR) 40 mg tablet  Self,  Spouse/Significant Other No No   Sig: TAKE 1 TABLET BY MOUTH DAILY   busPIRone (BUSPAR) 7.5 mg tablet  Self, Spouse/Significant Other No No   Sig: Take 1 tablet (7.5 mg total) by mouth 2 (two) times a day   econazole nitrate 1 % cream  Self, Spouse/Significant Other Yes No   ferrous gluconate (FERGON) 240 (27 FE) MG tablet  Self, Spouse/Significant Other No No   Sig: Take 1 tablet (240 mg total) by mouth 2 (two) times a day before lunch and dinner   folic acid (FOLVITE) 1 mg tablet  Self, Spouse/Significant Other No No   Sig: Take 1 tablet (1 mg total) by mouth daily   glimepiride (AMARYL) 1 mg tablet  Self, Spouse/Significant Other No No   Sig: Take 1 tablet (1 mg total) by mouth daily with breakfast   glucose blood (OneTouch Verio) test strip  Self, Spouse/Significant Other No No   Sig: Check blood sugars once daily. Please substitute with appropriate alternative as covered by patient's insurance. Dx: E11.65   hydrocortisone 1 % cream   No No   Sig: Apply topically 2 (two) times a day   ipratropium (ATROVENT) 0.03 % nasal spray  Self, Spouse/Significant Other No No   Si sprays into each nostril 3 (three) times a day   loperamide (IMODIUM A-D) 2 MG tablet  Self, Spouse/Significant Other Yes No   Sig: Take 2 mg by mouth 3 (three) times a day as needed for diarrhea. Indications: Diarrhea   metoprolol succinate (Toprol XL) 25 mg 24 hr tablet  Self, Spouse/Significant Other No No   Sig: Take 1 tablet (25 mg total) by mouth daily   nitroglycerin (NITROSTAT) 0.3 mg SL tablet   No No   Sig: Place 1 tablet (0.3 mg total) under the tongue every 5 (five) minutes as needed for chest pain   potassium chloride (K-DUR,KLOR-CON) 20 mEq tablet  Self, Spouse/Significant Other No No   Sig: Take 1 tablet (20 mEq total) by mouth daily   pyridoxine (VITAMIN B6) 50 mg tablet  Self, Spouse/Significant Other No No   Sig: TAKE 1 TABLET BY MOUTH EVERY DAY   saccharomyces boulardii (FLORASTOR) 250 mg capsule  Self, Spouse/Significant  Other No No   Sig: Take 1 capsule (250 mg total) by mouth 2 (two) times a day   tamsulosin (FLOMAX) 0.4 mg  Self, Spouse/Significant Other No No   Sig: Take 1 capsule (0.4 mg total) by mouth daily with dinner   torsemide (DEMADEX) 100 mg tablet  Self, Spouse/Significant Other No No   Sig: Take 0.5 tablets (50 mg total) by mouth daily      Facility-Administered Medications: None       Past Medical History:   Diagnosis Date    Anxiety     Atrial fibrillation (HCC)     Atrial flutter (HCC)     Congestive heart failure (CHF) (HCC)     COPD (chronic obstructive pulmonary disease) (HCC)     Coronary artery disease     DVT (deep venous thrombosis) (HCC)     ED (erectile dysfunction)     Hearing loss     History of echocardiogram 04/05/2018    EF 0.55, Mild LVH. Mild moderate mitral regurg. Trace aortic regurg.    Hx of radiation therapy     XRT    Hypercholesterolemia     Hyperlipidemia     Hypertension     Long term (current) use of anticoagulants 8/21/2018    Neuropathy of both feet     Peripheral neuropathy     Prostate cancer (HCC)     Type 2 diabetes mellitus (HCC)        Past Surgical History:   Procedure Laterality Date    CARDIAC CATHETERIZATION  01/15/1988    Left main: unobstructed. Posterolateral branch 90% narrowed.    COLONOSCOPY  10/05/2017    Dr. Saeed High    IR BIOPSY BONE MARROW  12/19/2023    PROSTATE SURGERY         Family History   Problem Relation Age of Onset    Cancer Brother         bladder    Colon cancer Brother     Heart disease Other     Hypertension Other     Cancer Other         bladder    Colon cancer Other      I have reviewed and agree with the history as documented.    E-Cigarette/Vaping    E-Cigarette Use Never User      E-Cigarette/Vaping Substances    Nicotine No     THC No     CBD No     Flavoring No     Other No     Unknown No      Social History     Tobacco Use    Smoking status: Never    Smokeless tobacco: Never   Vaping Use    Vaping status: Never Used   Substance Use Topics     Alcohol use: Not Currently    Drug use: Never       Review of Systems   Constitutional:  Positive for fatigue. Negative for activity change, appetite change, chills and fever.   HENT:  Negative for hearing loss, rhinorrhea, sneezing, sore throat and trouble swallowing.    Eyes:  Negative for visual disturbance.   Respiratory:  Negative for cough, choking, chest tightness, shortness of breath, wheezing and stridor.    Cardiovascular:  Negative for chest pain, palpitations and leg swelling.   Gastrointestinal:  Negative for abdominal pain, constipation, diarrhea, nausea and vomiting.   Genitourinary:  Negative for difficulty urinating, dysuria, frequency and testicular pain.   Musculoskeletal:  Negative for back pain, gait problem and neck pain.   Skin:  Negative for rash.   Allergic/Immunologic: Negative for immunocompromised state.   Neurological:  Negative for dizziness, seizures, syncope, speech difficulty, weakness, light-headedness, numbness and headaches.       Physical Exam  Physical Exam  Vitals and nursing note reviewed.   Constitutional:       General: He is not in acute distress.     Appearance: Normal appearance. He is well-developed and normal weight. He is not toxic-appearing.   HENT:      Head: Normocephalic and atraumatic.      Nose: Nose normal.      Mouth/Throat:      Mouth: Mucous membranes are moist.      Pharynx: Oropharynx is clear.   Eyes:      Extraocular Movements: Extraocular movements intact.      Conjunctiva/sclera: Conjunctivae normal.   Cardiovascular:      Rate and Rhythm: Normal rate. Rhythm irregularly irregular.      Pulses: Normal pulses.      Heart sounds: No murmur heard.     Comments: History of A-fib  Pulmonary:      Effort: Pulmonary effort is normal. No respiratory distress.      Breath sounds: Normal breath sounds.   Abdominal:      General: There is no distension.      Palpations: Abdomen is soft. There is no mass.      Tenderness: There is no abdominal tenderness.    Musculoskeletal:         General: No swelling or tenderness. Normal range of motion.      Cervical back: Normal range of motion and neck supple. No rigidity.      Right lower leg: Edema present.      Left lower leg: Edema present.      Comments: 1+ bilateral lower extremity edema.  There is no calf pain tenderness or asymmetry.   Skin:     General: Skin is warm and dry.      Capillary Refill: Capillary refill takes less than 2 seconds.   Neurological:      General: No focal deficit present.      Mental Status: He is alert and oriented to person, place, and time. Mental status is at baseline.   Psychiatric:         Mood and Affect: Mood normal.         Behavior: Behavior normal.         Thought Content: Thought content normal.         Judgment: Judgment normal.         Vital Signs  ED Triage Vitals   Temperature Pulse Respirations Blood Pressure SpO2   01/03/24 1108 01/03/24 1107 01/03/24 1107 01/03/24 1107 01/03/24 1107   (!) 97 °F (36.1 °C) 61 20 (!) 86/60 100 %      Temp Source Heart Rate Source Patient Position - Orthostatic VS BP Location FiO2 (%)   01/03/24 1108 01/03/24 1107 01/03/24 1107 01/03/24 1107 --   Temporal Monitor Sitting Right arm       Pain Score       --                  Vitals:    01/03/24 1355 01/03/24 1400 01/03/24 1430 01/03/24 1500   BP: 110/59 113/61 109/71 109/63   Pulse: 90 91 89 87   Patient Position - Orthostatic VS:             Visual Acuity      ED Medications  Medications - No data to display    Diagnostic Studies  Results Reviewed       Procedure Component Value Units Date/Time    Manual Differential(PHLEBS Do Not Order) [[id number]]  (Abnormal) Collected: 01/03/24 1115    Lab Status: Final result Specimen: Blood from Arm, Left Updated: 01/03/24 1147     Segmented % 34 %      Lymphocytes % 50 %      Monocytes % 4 %      Eosinophils, % 2 %      Basophils % 0 %      Metamyelocytes% 8 %      Myelocytes % 2 %      Absolute Neutrophils 0.44 Thousand/uL      Lymphocytes Absolute 0.65  Thousand/uL      Monocytes Absolute 0.05 Thousand/uL      Eosinophils Absolute 0.03 Thousand/uL      Basophils Absolute 0.00 Thousand/uL      Total Counted --     nRBC 6 /100 WBC      RBC Morphology Present     Platelet Estimate Decreased     Large Platelet Present     Acanthocytes Present     Anisocytosis Present    Comprehensive metabolic panel [[id number]]  (Abnormal) Collected: 01/03/24 1115    Lab Status: Final result Specimen: Blood from Arm, Left Updated: 01/03/24 1139     Sodium 139 mmol/L      Potassium 4.0 mmol/L      Chloride 103 mmol/L      CO2 25 mmol/L      ANION GAP 11 mmol/L      BUN 25 mg/dL      Creatinine 1.42 mg/dL      Glucose 192 mg/dL      Calcium 9.5 mg/dL      AST 19 U/L      ALT 19 U/L      Alkaline Phosphatase 101 U/L      Total Protein 6.9 g/dL      Albumin 4.0 g/dL      Total Bilirubin 2.01 mg/dL      eGFR 43 ml/min/1.73sq m     Narrative:      [employer] guidelines for Chronic Kidney Disease (CKD):     Stage 1 with normal or high GFR (GFR > 90 mL/min/1.73 square meters)    Stage 2 Mild CKD (GFR = 60-89 mL/min/1.73 square meters)    Stage 3A Moderate CKD (GFR = 45-59 mL/min/1.73 square meters)    Stage 3B Moderate CKD (GFR = 30-44 mL/min/1.73 square meters)    Stage 4 Severe CKD (GFR = 15-29 mL/min/1.73 square meters)    Stage 5 End Stage CKD (GFR <15 mL/min/1.73 square meters)  Note: GFR calculation is accurate only with a steady state creatinine    CBC and differential [[id number]]  (Abnormal) Collected: 01/03/24 1115    Lab Status: Final result Specimen: Blood from Arm, Left Updated: 01/03/24 1127     WBC 1.30 Thousand/uL      RBC 1.91 Million/uL      Hemoglobin 6.2 g/dL      Hematocrit 19.5 %       fL      MCH 32.5 pg      MCHC 31.8 g/dL      RDW 16.2 %      Platelets 27 Thousands/uL     Narrative:      This is an appended report.  These results have been appended to a previously verified report.                   No orders to display               Procedures  Procedures         ED Course                                             Medical Decision Making  80-year-old male presenting for pancytopenia    Problems Addressed:  Anemia: complicated acute illness or injury with systemic symptoms  Pancytopenia (HCC): complicated acute illness or injury with systemic symptoms    Amount and/or Complexity of Data Reviewed  Independent Historian: caregiver and spouse  External Data Reviewed: labs and notes.  Labs: ordered. Decision-making details documented in ED Course.  Discussion of management or test interpretation with external provider(s): Discussed with DOE Reis for hematology oncology who is familiar with the patient.  She requested we administer 1 unit of packed red blood cells and discharge.  She will follow-up and arrange for future infusions.    Risk  Prescription drug management.  Decision regarding hospitalization.  Risk Details: Labs reviewed.  Blood consent signed and placed on the chart.  Patient was transfused 1 unit of packed red blood cells.  Patient tolerated procedure well.  Will follow-up with hematology oncology as well as primary care physician.  Reviewed return precautions with patient and spouse understanding.  Discharge planning at this time.  Remains afebrile.  Normotensive.  No respiratory distress.  Ambulatory upon discharge.             Disposition  Final diagnoses:   Anemia   Pancytopenia (HCC)     Time reflects when diagnosis was documented in both MDM as applicable and the Disposition within this note       Time User Action Codes Description Comment    1/3/2024  1:43 PM Fredy Mcnally Add [D64.9] Anemia     1/3/2024  1:43 PM Fredy Mcnally Add [D61.818] Pancytopenia (HCC)           ED Disposition       ED Disposition   Discharge    Condition   Stable    Date/Time   Wed Keon 3, 2024  1:43 PM    Comment   Freddy Copeland discharge to home/self care.                   Follow-up Information       Follow up With  Specialties Details Why Contact Info    Elijah Borja DO Family Medicine Schedule an appointment as soon as possible for a visit   [address]  [address]  [place] [address] [phone]      [facility] Schedule an appointment as soon as possible for a visit   [address]  [address]  [place] [address]  [phone]              Current Discharge Medication List        CONTINUE these medications which have NOT CHANGED    Details   atorvastatin (LIPITOR) 40 mg tablet TAKE 1 TABLET BY MOUTH DAILY  Qty: 90 tablet, Refills: 3    Associated Diagnoses: Persistent atrial fibrillation (HCC)      Blood Glucose Monitoring Suppl (OneTouch Verio Reflect) w/Device KIT Check blood sugars once daily. Please substitute with appropriate alternative as covered by patient's insurance. Dx: E11.65  Qty: 1 kit, Refills: 0    Associated Diagnoses: Type 2 diabetes mellitus without complication, without long-term current use of insulin (HCC)      busPIRone (BUSPAR) 7.5 mg tablet Take 1 tablet (7.5 mg total) by mouth 2 (two) times a day  Qty: 180 tablet, Refills: 3    Associated Diagnoses: Reactive depression      econazole nitrate 1 % cream       ferrous gluconate (FERGON) 240 (27 FE) MG tablet Take 1 tablet (240 mg total) by mouth 2 (two) times a day before lunch and dinner  Qty: 180 tablet, Refills: 1    Associated Diagnoses: Pancytopenia (HCC)      folic acid (FOLVITE) 1 mg tablet Take 1 tablet (1 mg total) by mouth daily  Qty: 90 tablet, Refills: 1    Associated Diagnoses: Anemia, unspecified type      glimepiride (AMARYL) 1 mg tablet Take 1 tablet (1 mg total) by mouth daily with breakfast  Qty: 90 tablet, Refills: 3    Associated Diagnoses: Type 2 diabetes mellitus without complication, without long-term current use of insulin (HCC)      !! glucose blood (OneTouch Verio) test strip Check blood sugars once daily. Please substitute with appropriate alternative as covered by patient's insurance.  Dx: E11.65  Qty: 100 each, Refills: 3    Associated Diagnoses: Type 2 diabetes mellitus without complication, without long-term current use of insulin ([facility])      hydrocortisone 1 % cream Apply topically 2 (two) times a day  Qty: 14 g, Refills: 0    Associated Diagnoses: Rash and nonspecific skin eruption      ipratropium (ATROVENT) 0.03 % nasal spray 2 sprays into each nostril 3 (three) times a day  Qty: 30 mL, Refills: 5    Associated Diagnoses: Nonallergic rhinitis      loperamide (IMODIUM A-D) 2 MG tablet Take 2 mg by mouth 3 (three) times a day as needed for diarrhea. Indications: Diarrhea      Magnesium Oxide 420 MG TABS Take 1 tablet (420 mg total) by mouth 2 (two) times a day  Qty: 60 tablet, Refills: 0    Associated Diagnoses: Hypomagnesemia      metoprolol succinate (Toprol XL) 25 mg 24 hr tablet Take 1 tablet (25 mg total) by mouth daily  Qty: 90 tablet, Refills: 3    Associated Diagnoses: Persistent atrial fibrillation ([facility])      nitroglycerin (NITROSTAT) 0.3 mg SL tablet Place 1 tablet (0.3 mg total) under the tongue every 5 (five) minutes as needed for chest pain  Qty: 100 tablet, Refills: 1    Associated Diagnoses: Chest pain, unspecified type      !! OneTouch Delica Lancets 30G MISC USE TO TEST ONCE DAILY  Qty: 100 each, Refills: 5    Associated Diagnoses: Diabetes mellitus type 2 in nonobese ([facility])      !! OneTouch Delica Lancets 33G MISC Check blood sugars once daily. Please substitute with appropriate alternative as covered by patient's insurance. Dx: E11.65  Qty: 100 each, Refills: 3    Associated Diagnoses: Type 2 diabetes mellitus without complication, without long-term current use of insulin ([facility])      !! OneTouch Ultra test strip TEST ONCE DAILY  Qty: 100 each, Refills: 1    Associated Diagnoses: Diabetes mellitus type 2 in nonobese ([facility])      potassium chloride (K-DUR,KLOR-CON) 20 mEq tablet Take 1 tablet (20 mEq total) by mouth daily  Qty: 30 tablet, Refills: 3    Associated Diagnoses: Acute  on chronic diastolic congestive heart failure (HCC)      pyridoxine (VITAMIN B6) 50 mg tablet TAKE 1 TABLET BY MOUTH EVERY DAY  Qty: 100 tablet, Refills: 0    Associated Diagnoses: Neuropathy      saccharomyces boulardii (FLORASTOR) 250 mg capsule Take 1 capsule (250 mg total) by mouth 2 (two) times a day  Qty: 60 capsule, Refills: 3    Associated Diagnoses: Gastroenteritis      tamsulosin (FLOMAX) 0.4 mg Take 1 capsule (0.4 mg total) by mouth daily with dinner  Qty: 90 capsule, Refills: 3    Associated Diagnoses: BPH with obstruction/lower urinary tract symptoms      torsemide (DEMADEX) 100 mg tablet Take 0.5 tablets (50 mg total) by mouth daily  Qty: 45 tablet, Refills: 1    Associated Diagnoses: Acute on chronic diastolic congestive heart failure (HCC)       !! - Potential duplicate medications found. Please discuss with provider.          No discharge procedures on file.    PDMP Review       None            ED Provider  Electronically Signed by             Fredy Mcnally DO  01/03/24 3670

## 2024-01-03 NOTE — TELEPHONE ENCOUNTER
Patient Call    Who are you speaking with? Spouse    If it is not the patient, are they listed on an active communication consent form? Yes   What is the reason for this call? At ER in [place] waiting for blood, please advise   Does this require a call back? Yes   If a call back is required, please list best call back number [phone]    If a call back is required, advise that a message will be forwarded to their care team and someone will return their call as soon as possible.   Did you relay this information to the patient? Yes

## 2024-01-03 NOTE — TELEPHONE ENCOUNTER
Left VM for patient's spouse letting her know pt will have blood transfusion in ED today but going forward we can schedule blood transfusions at the infusion center when needed and he will need to have a type and screen lab drawn the day prior to transfusions. Left number for call back for further questions or concerns

## 2024-01-04 ENCOUNTER — OFFICE VISIT (OUTPATIENT)
Dept: CARDIOLOGY CLINIC | Facility: CLINIC | Age: 89
End: 2024-01-04
Payer: COMMERCIAL

## 2024-01-04 VITALS
DIASTOLIC BLOOD PRESSURE: 48 MMHG | BODY MASS INDEX: 27.09 KG/M2 | SYSTOLIC BLOOD PRESSURE: 88 MMHG | HEIGHT: 72 IN | WEIGHT: 200 LBS | HEART RATE: 72 BPM

## 2024-01-04 DIAGNOSIS — R07.9 CHEST PAIN, UNSPECIFIED TYPE: Primary | ICD-10-CM

## 2024-01-04 DIAGNOSIS — I50.32 CHRONIC DIASTOLIC CONGESTIVE HEART FAILURE (HCC): ICD-10-CM

## 2024-01-04 DIAGNOSIS — I48.19 PERSISTENT ATRIAL FIBRILLATION (HCC): ICD-10-CM

## 2024-01-04 LAB
ABO GROUP BLD BPU: NORMAL
BPU ID: NORMAL
CROSSMATCH: NORMAL
UNIT DISPENSE STATUS: NORMAL
UNIT PRODUCT CODE: NORMAL
UNIT PRODUCT VOLUME: 350 ML
UNIT RH: NORMAL

## 2024-01-04 PROCEDURE — 99214 OFFICE O/P EST MOD 30 MIN: CPT | Performed by: INTERNAL MEDICINE

## 2024-01-04 RX ORDER — METOPROLOL SUCCINATE 25 MG/1
12.5 TABLET, EXTENDED RELEASE ORAL DAILY
Start: 2024-01-04

## 2024-01-04 RX ORDER — TORSEMIDE 20 MG/1
20 TABLET ORAL DAILY
Qty: 30 TABLET | Refills: 3 | Status: SHIPPED | OUTPATIENT
Start: 2024-01-04

## 2024-01-04 NOTE — PROGRESS NOTES
Subjective:        Patient ID: Freddy Copeland is a 88 y.o. male.    Chief Complaint:  Maximo had a couple episodes of chest pain while at rest.  Recently noted to be pancytopenic, seeing Hematology, had PRBC transfusion yesterday, getting CBC weekly now.  BP noted to be low lately.        The following portions of the patient's history were reviewed and updated as appropriate: allergies, current medications, past family history, past medical history, past social history, past surgical history, and problem list.  Review of Systems   Constitutional: Positive for malaise/fatigue. Negative for chills, diaphoresis and weight gain.   HENT:  Negative for nosebleeds and stridor.    Eyes:  Negative for double vision, vision loss in left eye, vision loss in right eye and visual disturbance.   Cardiovascular:  Positive for chest pain. Negative for claudication, cyanosis, dyspnea on exertion, irregular heartbeat, leg swelling, near-syncope, orthopnea, palpitations, paroxysmal nocturnal dyspnea and syncope.   Respiratory:  Negative for cough, shortness of breath, snoring and wheezing.    Endocrine: Negative for polydipsia, polyphagia and polyuria.   Hematologic/Lymphatic: Negative for bleeding problem. Does not bruise/bleed easily.   Skin:  Negative for flushing and rash.   Musculoskeletal:  Negative for falls and myalgias.   Gastrointestinal:  Negative for abdominal pain, heartburn, hematemesis, hematochezia, melena and nausea.   Genitourinary:  Negative for hematuria.   Neurological:  Negative for brief paralysis, dizziness, focal weakness, headaches, light-headedness, loss of balance and vertigo.   Psychiatric/Behavioral:  Negative for altered mental status and substance abuse.    Allergic/Immunologic: Negative for hives.          Objective:      BP (!) 88/48   Pulse 72   Ht 6' (1.829 m)   Wt 90.7 kg (200 lb)   BMI 27.12 kg/m²   Physical Exam  Constitutional:       General: He is not in acute distress.     Appearance: He  is well-developed. He is not diaphoretic.   HENT:      Head: Normocephalic and atraumatic.   Eyes:      General: No scleral icterus.     Pupils: Pupils are equal, round, and reactive to light.   Neck:      Thyroid: No thyromegaly.      Vascular: No carotid bruit or JVD.   Cardiovascular:      Rate and Rhythm: Normal rate. Rhythm irregular.      Heart sounds: Normal heart sounds. No murmur heard.     No friction rub. No gallop.   Pulmonary:      Effort: Pulmonary effort is normal. No respiratory distress.      Breath sounds: Normal breath sounds. No stridor. No wheezing or rales.   Abdominal:      General: Bowel sounds are normal. There is no distension.      Palpations: Abdomen is soft. There is no mass.      Tenderness: There is no abdominal tenderness.   Musculoskeletal:         General: No deformity. Normal range of motion.      Cervical back: Normal range of motion and neck supple.      Right lower leg: No edema.      Left lower leg: No edema.   Skin:     General: Skin is warm and dry.      Coloration: Skin is pale.      Findings: No erythema.   Neurological:      Mental Status: He is alert and oriented to person, place, and time.      Coordination: Coordination normal.   Psychiatric:         Mood and Affect: Mood normal.         Behavior: Behavior normal.         Lab Review:   Admission on 01/03/2024, Discharged on 01/03/2024   Component Date Value    WBC 01/03/2024 1.30 (LL)     RBC 01/03/2024 1.91 (L)     Hemoglobin 01/03/2024 6.2 (LL)     Hematocrit 01/03/2024 19.5 (L)     MCV 01/03/2024 102 (H)     MCH 01/03/2024 32.5     MCHC 01/03/2024 31.8     RDW 01/03/2024 16.2 (H)     Platelets 01/03/2024 27 (LL)     Sodium 01/03/2024 139     Potassium 01/03/2024 4.0     Chloride 01/03/2024 103     CO2 01/03/2024 25     ANION GAP 01/03/2024 11     BUN 01/03/2024 25     Creatinine 01/03/2024 1.42 (H)     Glucose 01/03/2024 192 (H)     Calcium 01/03/2024 9.5     AST 01/03/2024 19     ALT 01/03/2024 19     Alkaline  Phosphatase 01/03/2024 101     Total Protein 01/03/2024 6.9     Albumin 01/03/2024 4.0     Total Bilirubin 01/03/2024 2.01 (H)     eGFR 01/03/2024 43     ABO Grouping 01/03/2024 AB     Rh Factor 01/03/2024 Positive     Antibody Screen 01/03/2024 Negative     Specimen Expiration Date 01/03/2024 [id number]     Segmented % 01/03/2024 34 (L)     Lymphocytes % 01/03/2024 50 (H)     Monocytes % 01/03/2024 4     Eosinophils, % 01/03/2024 2     Basophils % 01/03/2024 0     Metamyelocytes% 01/03/2024 8 (H)     Myelocytes % 01/03/2024 2 (H)     Absolute Neutrophils 01/03/2024 0.44 (L)     Lymphocytes Absolute 01/03/2024 0.65     Monocytes Absolute 01/03/2024 0.05     Eosinophils Absolute 01/03/2024 0.03     Basophils Absolute 01/03/2024 0.00     nRBC 01/03/2024 6 (H)     RBC Morphology 01/03/2024 Present     Platelet Estimate 01/03/2024 Decreased (A)     Large Platelet 01/03/2024 Present     Acanthocytes 01/03/2024 Present     Anisocytosis 01/03/2024 Present     Unit Product Code 01/04/2024 [id number]     Unit Number 01/04/2024 [id number]     Unit ABO 01/04/2024 A     Unit RH 01/04/2024 POS     Crossmatch 01/04/2024 Compatible     Unit Dispense Status 01/04/2024 Presumed Trans     Unit Product Volume 01/04/2024 350    Lab on 01/02/2024   Component Date Value    WBC 01/02/2024 1.62 (LL)     RBC 01/02/2024 1.94 (L)     Hemoglobin 01/02/2024 6.3 (LL)     Hematocrit 01/02/2024 19.7 (L)     MCV 01/02/2024 102 (H)     MCH 01/02/2024 33.5     MCHC 01/02/2024 33.0     RDW 01/02/2024 16.2 (H)     Platelets 01/02/2024 28 (LL)    Transcribe Orders on 12/27/2023   Component Date Value    Scan Result 12/19/2023 SEE WRITTEN REPORT     Miscellaneous Lab Test R* 12/19/2023 [facility] advanced pan heme fusion  bone marrow    Appointment on 12/27/2023   Component Date Value    WBC 12/27/2023 1.42 (LL)     RBC 12/27/2023 2.15 (L)     Hemoglobin 12/27/2023 7.1 (L)     Hematocrit 12/27/2023 21.0 (L)     MCV 12/27/2023 98     MCH 12/27/2023 33.0      MCHC 12/27/2023 33.8     RDW 12/27/2023 16.1 (H)     Platelets 12/27/2023 24 (LL)     Source 12/19/2023 BON[facility]OW    Appointment on 12/23/2023   Component Date Value    WBC 12/23/2023 1.53 (LL)     RBC 12/23/2023 2.27 (L)     Hemoglobin 12/23/2023 7.4 (L)     Hematocrit 12/23/2023 21.9 (L)     MCV 12/23/2023 97     MCH 12/23/2023 32.6     MCHC 12/23/2023 33.8     RDW 12/23/2023 16.1 (H)     Platelets 12/23/2023 21 (LL)    No results displayed because visit has over 200 results.      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     Left Eye Diabetic Retino* 11/29/2023 Positive    Lab on 12/16/2023   Component Date Value    Creatinine, Ur 12/16/2023 96.5     Albumin,U,Random 12/16/2023 <7.0     Albumin Creat Ratio 12/16/2023 <7     Sodium 12/16/2023 142     Potassium 12/16/2023 4.0     Chloride 12/16/2023 107     CO2 12/16/2023 28     ANION GAP 12/16/2023 7     BUN 12/16/2023 27 (H)     Creatinine 12/16/2023 1.45 (H)     Glucose, Fasting 12/16/2023 138 (H)     Calcium 12/16/2023 9.0     AST 12/16/2023 12 (L)     ALT 12/16/2023 9     Alkaline Phosphatase 12/16/2023 89     Total Protein 12/16/2023 6.5     Albumin 12/16/2023 3.8     Total Bilirubin 12/16/2023 1.33 (H)     eGFR 12/16/2023 42     Hemoglobin A1C 12/16/2023 7.5 (H)     EAG 12/16/2023 169     WBC 12/16/2023 1.49 (LL)     RBC 12/16/2023 2.16 (L)     Hemoglobin 12/16/2023 7.1 (L)     Hematocrit 12/16/2023 20.7 (L)     MCV 12/16/2023 96     MCH 12/16/2023 32.9     MCHC 12/16/2023 34.3     RDW 12/16/2023 16.9 (H)     MPV 12/16/2023 14.4 (H)     Platelets 12/16/2023      TSH 3RD GENERATON 12/16/2023 2.244     Cholesterol 12/16/2023 95     Triglycerides 12/16/2023 60     HDL, Direct 12/16/2023 35 (L)     LDL Calculated 12/16/2023 48     Segmented % 12/16/2023 18 (L)     Bands % 12/16/2023 2     Lymphocytes % 12/16/2023 70 (H)     Monocytes % 12/16/2023 6     Eosinophils, %  12/16/2023 1     Basophils % 12/16/2023 1     Atypical Lymphocytes % 12/16/2023 2 (H)     Absolute Neutrophils 12/16/2023 0.30 (L)     Lymphocytes Absolute 12/16/2023 1.07     Monocytes Absolute 12/16/2023 0.09     Eosinophils Absolute 12/16/2023 0.01     Basophils Absolute 12/16/2023 0.01     nRBC 12/16/2023 3 (H)     RBC Morphology 12/16/2023 Present     Platelet Estimate 12/16/2023 Unable to Estimate due to clumped platelets (A)     Acanthocytes 12/16/2023 Present     Anisocytosis 12/16/2023 Present     Ovalocytes 12/16/2023 Present     Poikilocytes 12/16/2023 Present    Appointment on 11/30/2023   Component Date Value    Protime 11/30/2023 25.0 (H)     INR 11/30/2023 2.32 (H)    Appointment on 11/16/2023   Component Date Value    Protime 11/16/2023 24.4 (H)     INR 11/16/2023 2.25 (H)    There may be more visits with results that are not included.     IR biopsy bone marrow    Result Date: 12/26/2023  Narrative: Examination: Fluoroscopically guided percutaneous biopsy of bone marrow HISTORY: TECHNIQUE: Informed consent was obtained.  The patient was brought to interventional radiology.  The right posterior superior iliac spine was prepped and draped in usual sterile fashion.  Timeout was performed.  Lidocaine was given as local anesthesia.  Monitored intravenous conscious sedation was provided. Using fluoroscopic guidance, a needle was advanced into the marrow space.  Aspiration was performed.  The motor unit was then connected, activated and advanced.  Core biopsy was performed.  Specimens were submitted to pathology staff. The patient tolerated the procedure well.  There are no immediate complications or complaints. FINDINGS: No focal lesion of bone PLAN: Patient will be transferred to same day surgery, for recovery from conscious sedation, and monitoring of vital signs and symptoms.     Impression: Technically successful fluoroscopically guided bone marrow biopsy This is the end of the clinically relevant  "portion of this report.  Subsequent information is for compliance, documentation, and coding purposes. Automated exposure control was utilized, and was minimize, in accordance with the application of the ALARA technique. Chlorhexidine and alcohol was used for cleansing and sterile preparation of the skin. This was allowed to dry prior to the application of sterile draping. Timeout was performed, with all team members present, and in agreement.  Confirmation of patient, procedure, laterally, allergies, and all needed equipment was performed. During the course of informed consent, information was communicated, verbally to the patient regarding the procedure.  The patient was informed of; the name of the procedure, nature of the procedure, nature of the condition, risks, benefits, alternatives, and potential complications, as well as the consequences of not having the procedure.  All the patient's questions were answered.  Informed consent was signed.  Quoted risks included bleeding and infection a rate of about 1%. The patient was examined, and is in satisfactory condition for planned moderate sedation. Intravenous conscious sedation was provided by an independent, trained observer.  There was continuous monitoring of blood pressure, heart rate, respiratory rate, and oxygen saturation by the independent, trained observer. After the procedure, the patient was recovered, and return to their baseline status, and was deemed fit for discharge from . Conscious sedation time: 10 minutes Versed dose: 1 milligrams Fentanyl dose: 100 micrograms Radiation dose: 9 mGy Mallampati score: 1 PROCEDURE: Image guided aspiration and biopsy of bone marrow Preprocedure diagnosis: Please see \"history \"above Postprocedure diagnosis: Same Antibiotics: None Estimated blood loss: Less than 5 mL Specimen: Aspiration and core biopsy sent to pathology Anesthesia: Local and monitored intravenous conscious sedation. Workstation performed: " [id number]         Assessment:       1. Chest pain, unspecified type  Ambulatory Referral to Cardiology      2. Persistent atrial fibrillation (HCC)  metoprolol succinate (Toprol XL) 25 mg 24 hr tablet      3. Chronic diastolic congestive heart failure (HCC)  torsemide (DEMADEX) 20 mg tablet           Plan:       Angina likely on basis of anemia and hypotension(resulting in reduced coronary artery perfusion pressure with underlying CHF).  Continue prn SL NTG.  High risk cath case, not rec'd, patient and wife agree.  Will cut Toprol to 12.5 mg daily.    May swtich to Dig if BP remains low-should control HR with his sedentary lifestyle now.  Will cut Torsemide to 20 mg daily (call if edema arises).  Advise transfuse to keep HgB > 7 at all times, if breakthrough angina despite this raise HgB goal to 8.  RTO 6 weeks.

## 2024-01-04 NOTE — PATIENT INSTRUCTIONS
Chest Pain   AMBULATORY CARE:   Chest pain  can be caused by a range of conditions, from not serious to life-threatening. It is important to follow up with your healthcare provider to find the cause of your chest pain.  Common symptoms you may have with chest pain:   Fever or sweating    Nausea or vomiting    Shortness of breath    Discomfort or pressure that spreads from your chest to your back, jaw, or arm    A racing or slow heartbeat    Feeling weak, tired, or faint    Call your local emergency number (911 in the [place]) or have someone call if:   You have any of the following signs of a heart attack:      Squeezing, pressure, or pain in your chest    You may  also have any of the following:     Discomfort or pain in your back, neck, jaw, stomach, or arm    Shortness of breath    Nausea or vomiting    Lightheadedness or a sudden cold sweat      Seek care immediately if:   You have chest discomfort that gets worse, even with medicine.    You cough or vomit blood.    Your bowel movements are black or bloody.    You cannot stop vomiting, or it hurts to swallow.    Call your doctor if:   You have questions or concerns about your condition or care.      Treatment for chest pain  may include medicine to treat your symptoms while your healthcare provider finds the cause of your chest pain.  Medicines  may be given to treat the cause of your chest pain. Examples include pain medicine, anxiety medicine, or medicines to increase blood flow to your heart.    Do not take certain medicines without asking your healthcare provider first.  These include NSAIDs, herbal or vitamin supplements, and hormones, such as estrogen or progestin.    One or more stents  may need to be placed in your heart if pain was caused by blockage. A stent is a wire mesh tube that helps hold your artery open.    Healthy living tips:  If the cause of your chest pain is known, your healthcare provider will give you specific guidelines to follow. The  following are general healthy guidelines:  Do not smoke.  Nicotine and other chemicals in cigarettes and cigars can cause lung and heart damage. Ask your healthcare provider for information if you currently smoke and need help to quit. E-cigarettes or smokeless tobacco still contain nicotine. Talk to your healthcare provider before you use these products.    Choose a variety of healthy foods as often as possible.  Include fresh, frozen, or canned fruits and vegetables. Also include low-fat dairy products, fish, chicken (without skin), and lean meats. Your healthcare provider or a dietitian can help you create meal plans. You may need to avoid certain foods or drinks if your pain is caused by a digestion problem.         Lower your sodium (salt) intake.  Limit foods that are high in sodium, such as canned foods, salty snacks, and cold cuts. If you add salt when you cook food, do not add more at the table. Choose low-sodium canned foods as much as possible.         Drink plenty of water every day.  Water helps your body to control your temperature and blood pressure. Ask your healthcare provider how much water you should drink every day.    Ask about activity.  Your healthcare provider will tell you which activities to limit or avoid. Ask when you can drive, return to work, and have sex. Ask about the best exercise plan for you.    Maintain a healthy weight.  Ask your healthcare provider what a healthy weight is for you. Ask him or her to help you create a safe weight loss plan if you are overweight.    Ask about vaccines you may need.  Your healthcare provider can tell you which vaccines you need, and when to get them. The following vaccines help prevent diseases that can become serious for a person with a heart condition:    The influenza (flu) vaccine is given each year.  Get a flu vaccine as soon as recommended, usually in September or October.    The pneumonia vaccine is usually given every 5 years.  Your  healthcare provider may recommend the pneumonia vaccine if you are 65 or older.    COVID-19 vaccines are given to adults as a shot in 1 or 2 doses.  Vaccination is recommended for all adults. A booster (additional) dose is also recommended for all adults. A second booster is recommended for all adults 50 or older and for immunocompromised adults 18 or older. The second booster is also recommended for adults who received the 1-dose vaccine for the first and booster doses. Your healthcare provider can tell you when to get one or both boosters.       Follow up with your doctor within 72 hours, or as directed:  You may need to return for more tests to find the cause of your chest pain. You may be referred to a specialist, such as a cardiologist or gastroenterologist. Write down your questions so you remember to ask them during your visits.  © Copyright [employer] 2023 Information is for End User's use only and may not be sold, redistributed or otherwise used for commercial purposes.  The above information is an  only. It is not intended as medical advice for individual conditions or treatments. Talk to your doctor, nurse or pharmacist before following any medical regimen to see if it is safe and effective for you.

## 2024-01-05 LAB
ATRIAL RATE: 122 BPM
QRS AXIS: 35 DEGREES
QRSD INTERVAL: 82 MS
QT INTERVAL: 312 MS
QTC INTERVAL: 433 MS
T WAVE AXIS: -79 DEGREES
VENTRICULAR RATE: 116 BPM

## 2024-01-05 NOTE — TELEPHONE ENCOUNTER
Patient needs note to continue with dental work.   Dentist wants note from cardiology stating okay to proceed with dental procedure including teeth cleaning and capping of tooth.   Please advise.

## 2024-01-05 NOTE — TELEPHONE ENCOUNTER
Wife states that the patients neck behind right ear is sore when he lays down, wife believes that it is muscular due to sitting in the chair the way he does, the extra strength tylenol and ice does help it a bit but just not enough that he can sleep comfortably, she is wondering if maybe a muscle relaxer or something topical etc they could try?

## 2024-01-09 NOTE — TELEPHONE ENCOUNTER
Lab Result: WBC 1.46   Date/Time Drawn: 1/9/24 10:11am   Ordering Provider:   DOE Salinas    Lab Personnel's Name: Abdullahi       The following critical/stat result was read back to the lab as stated above and [employer] messaged to the on-call provider. The provider confirmed receipt of the message.  Lab Result: Hemoglobin 6.2   Date/Time Drawn: 1/9/24 10:11am   Ordering Provider:   DOE Salinas    Lab Personnel's Name: Abdullahi       The following critical/stat result was read back to the lab as stated above and [employer] messaged to the on-call provider. The provider confirmed receipt of the message.  Lab Result: Platelet 22   Date/Time Drawn: 1/9/24 10:11am   Ordering Provider:   DOE Salinas    Lab Personnel's Name: Abdullahi       The following critical/stat result was read back to the lab as stated above and [employer] messaged to the on-call provider. The provider confirmed receipt of the message.

## 2024-01-10 NOTE — TELEPHONE ENCOUNTER
Pt scheduled for blood transfusion 1/12 at [facility]. Lottie Moore spoke with pts wife to review schedule and need for T&S.

## 2024-01-10 NOTE — TELEPHONE ENCOUNTER
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  Called pt. Wife answered. Pt doing well. He has some fatigue. But overall stable.  Advised to go to ED if symptomatic.   Primary hematology team  notified so they can set up transfusion tommorow.

## 2024-01-10 NOTE — TELEPHONE ENCOUNTER
Lab Inquiry   Who are you speaking with? Spouse     If it is not the patient, are they listed on an active communication consent form? Yes   Name of ordering provider DOE Kelly   What is being requested? Lab orders need to be entered   Lab draw location [facility]   What is the best call back number? [phone]

## 2024-01-11 PROBLEM — Z51.5 ADMISSION FOR END OF LIFE CARE: Status: ACTIVE | Noted: 2024-01-01

## 2024-01-11 PROBLEM — R57.9 SHOCK (HCC): Status: ACTIVE | Noted: 2024-01-01

## 2024-01-11 NOTE — PROGRESS NOTES
Patient:    MRN:  [id number]    Aidin Request ID:  [id number]    Level of care reserved:  Hospice    Partner Reserved:  [facility], [place] [address] [phone]    Clinical needs requested:    Geography searched:  [id number]    Start of Service:    Request sent:  1:47pm EST on 1/11/2024 by Noemi Iverson    Partner reserved:  2:57pm EST on 1/11/2024 by Noemi Iverson    Choice list shared:  2:57pm EST on 1/11/2024 by Noemi Iverson

## 2024-01-11 NOTE — ED PROVIDER NOTES
History  Chief Complaint   Patient presents with    Chest Pain     Weakness and sob off and on for 2 weeks chest pain started one week ago     88 year old M with a PMHx of atrial fibrillation not on AC, hx of DVT, hx of CHF with diastolic dysfunction, AML with recent pancytopenia, who presents for chest pressure and shortness of breath. Patient reports he has been having chest pressure for approximately a week now that comes and goes. Denies any radiation down arms or to back. Describes he has had difficulty ambulating, feels very weak. Describes he called EMS this morning because he became SOB. Denies any new leg pain/swelling. Nausea without vomiting. No diarrhea, he does describe dark stools, which he attributes to his iron supplements. No dysuria/frequency. ROS otherwise negative.        Prior to Admission Medications   Prescriptions Last Dose Informant Patient Reported? Taking?   Blood Glucose Monitoring Suppl (OneTouch Verio Reflect) w/Device KIT  Self No No   Sig: Check blood sugars once daily. Please substitute with appropriate alternative as covered by patient's insurance. Dx: E11.65   Patient not taking: Reported on 1/11/2024   OneTouch Delica Lancets 30G MISC  Self No No   Sig: USE TO TEST ONCE DAILY   Patient not taking: No sig reported   OneTouch Delica Lancets 33G MISC  Self No No   Sig: Check blood sugars once daily. Please substitute with appropriate alternative as covered by patient's insurance. Dx: E11.65   Patient not taking: Reported on 1/11/2024      Facility-Administered Medications: None       Past Medical History:   Diagnosis Date    Anxiety     Atrial fibrillation (HCC)     Atrial flutter (HCC)     Congestive heart failure (CHF) (HCC)     COPD (chronic obstructive pulmonary disease) (HCC)     Coronary artery disease     DVT (deep venous thrombosis) (HCC)     ED (erectile dysfunction)     Hearing loss     History of echocardiogram 04/05/2018    EF 0.55, Mild LVH. Mild moderate mitral regurg.  Trace aortic regurg.    Hx of radiation therapy     XRT    Hypercholesterolemia     Hyperlipidemia     Hypertension     Long term (current) use of anticoagulants 8/21/2018    Neuropathy of both feet     Peripheral neuropathy     Prostate cancer (HCC)     Type 2 diabetes mellitus (HCC)        Past Surgical History:   Procedure Laterality Date    CARDIAC CATHETERIZATION  01/15/1988    Left main: unobstructed. Posterolateral branch 90% narrowed.    COLONOSCOPY  10/05/2017    Dr. Saeed High    IR BIOPSY BONE MARROW  12/19/2023    PROSTATE SURGERY         Family History   Problem Relation Age of Onset    Cancer Brother         bladder    Colon cancer Brother     Heart disease Other     Hypertension Other     Cancer Other         bladder    Colon cancer Other      I have reviewed and agree with the history as documented.    E-Cigarette/Vaping    E-Cigarette Use Never User      E-Cigarette/Vaping Substances    Nicotine No     THC No     CBD No     Flavoring No     Other No     Unknown No      Social History     Tobacco Use    Smoking status: Never    Smokeless tobacco: Never   Vaping Use    Vaping status: Never Used   Substance Use Topics    Alcohol use: Not Currently    Drug use: Never       Review of Systems   Constitutional:  Positive for chills. Negative for diaphoresis, fatigue and fever.   HENT:  Negative for congestion and sore throat.    Eyes:  Negative for visual disturbance.   Respiratory:  Positive for shortness of breath. Negative for cough and chest tightness.    Cardiovascular:  Negative for chest pain, palpitations and leg swelling.   Gastrointestinal:  Positive for nausea. Negative for abdominal distention, abdominal pain, constipation, diarrhea and vomiting.   Genitourinary:  Negative for difficulty urinating and dysuria.   Musculoskeletal:  Negative for arthralgias and myalgias.   Skin:  Negative for rash.   Neurological:  Positive for weakness. Negative for dizziness, light-headedness, numbness and  headaches.   Psychiatric/Behavioral:  Negative for agitation, behavioral problems and confusion. The patient is not nervous/anxious.    All other systems reviewed and are negative.      Physical Exam  Physical Exam  Constitutional:       Appearance: He is well-developed. He is ill-appearing and toxic-appearing.   HENT:      Head: Normocephalic and atraumatic.   Cardiovascular:      Rate and Rhythm: Regular rhythm. Tachycardia present.      Heart sounds: Normal heart sounds. No murmur heard.     Comments: Afib with RVR  Pulmonary:      Effort: Tachypnea, accessory muscle usage and respiratory distress present.      Breath sounds: Normal breath sounds. No decreased breath sounds, wheezing, rhonchi or rales.      Comments: Moving air well, clear breath sounds BL, however tachypneic and in respiratory distress  Abdominal:      General: Bowel sounds are normal. There is no distension.      Palpations: Abdomen is soft.      Tenderness: There is no abdominal tenderness.   Musculoskeletal:         General: No deformity.      Right lower leg: Edema present.      Left lower leg: Edema present.      Comments: Mild +1 pitting edema   Skin:     General: Skin is warm.      Findings: No rash.   Neurological:      Mental Status: He is alert and oriented to person, place, and time.   Psychiatric:         Behavior: Behavior normal.         Thought Content: Thought content normal.         Judgment: Judgment normal.         Vital Signs  ED Triage Vitals   Temperature Pulse Respirations Blood Pressure SpO2   01/11/24 0548 01/11/24 0548 01/11/24 0548 01/11/24 0548 01/11/24 0548   97.8 °F (36.6 °C) (!) 154 20 101/55 96 %      Temp Source Heart Rate Source Patient Position - Orthostatic VS BP Location FiO2 (%)   01/11/24 0548 01/11/24 0548 01/11/24 0548 01/11/24 0548 01/11/24 1004   Temporal Monitor Sitting Left arm 50      Pain Score       01/11/24 0548       No Pain           Vitals:    01/11/24 1230 01/11/24 1315 01/11/24 1430 01/11/24  1745   BP: 103/60 (!) 84/53 104/52 107/62   Pulse: (!) 142 (!) 136 (!) 134 (!) 138   Patient Position - Orthostatic VS: Lying Sitting  Lying         Visual Acuity      ED Medications  Medications   sodium chloride 0.9 % bolus 500 mL (0 mL Intravenous Stopped 1/11/24 0723)   vancomycin (VANCOCIN) 1250 mg in sodium chloride 0.9% 250 mL IVPB (0 mg Intravenous Stopped 1/11/24 0852)   cefepime (MAXIPIME) IVPB (premix in dextrose) 2,000 mg 50 mL (0 mg Intravenous Stopped 1/11/24 0700)   acetaminophen (TYLENOL) rectal suppository 650 mg (650 mg Rectal Given 1/11/24 0700)   sodium chloride 0.9 % bolus 1,000 mL (0 mL Intravenous Stopped 1/11/24 0900)   iohexol (OMNIPAQUE) 350 MG/ML injection (MULTI-DOSE) 100 mL (100 mL Intravenous Given 1/11/24 0706)       Diagnostic Studies  Results Reviewed       Procedure Component Value Units Date/Time    Blood culture #1 [[id number]] Collected: 01/11/24 0612    Lab Status: Preliminary result Specimen: Blood from Arm, Right Updated: 01/12/24 2001     Blood Culture No Growth at 24 hrs.    Blood culture #2 [[id number]] Collected: 01/11/24 0607    Lab Status: Preliminary result Specimen: Blood from Hand, Right Updated: 01/12/24 2001     Blood Culture No Growth at 24 hrs.    HS Troponin I 4hr [[id number]]  (Abnormal) Collected: 01/11/24 1106    Lab Status: Final result Specimen: Blood from Arm, Left Updated: 01/11/24 1131     hs TnI 4hr 2,722 ng/L      Delta 4hr hsTnI 972 ng/L     TSH [[id number]]  (Normal) Collected: 01/11/24 0601    Lab Status: Final result Specimen: Blood from Arm, Right Updated: 01/11/24 1004     TSH 3RD GENERATON 2.838 uIU/mL     Lactic acid 2 Hours [[id number]]  (Abnormal) Collected: 01/11/24 0833    Lab Status: Final result Specimen: Blood from Arm, Left Updated: 01/11/24 0915     LACTIC ACID 11.2 mmol/L     Narrative:      Result may be elevated if tourniquet was used during collection.    Lipase [[id number]]  (Normal) Collected: 01/11/24 0601    Lab Status: Final result  Specimen: Blood Updated: 01/11/24 0826     Lipase 18 u/L     HS Troponin I 2hr [[id number]]  (Abnormal) Collected: 01/11/24 0740    Lab Status: Final result Specimen: Blood from Arm, Left Updated: 01/11/24 0810     hs TnI 2hr 2,058 ng/L      Delta 2hr hsTnI 308 ng/L     Blood gas, venous [[id number]]  (Abnormal) Collected: 01/11/24 0740    Lab Status: Final result Specimen: Blood from Arm, Left Updated: 01/11/24 0749     pH, Topher 7.315     pCO2, Topher 22.4 mm Hg      pO2, Topher 32.1 mm Hg      HCO3, Topher 11.2 mmol/L      Base Excess, Topher -13.7 mmol/L      O2 Content, Topher 4.4 ml/dL      O2 HGB, VENOUS 47.1 %     Blood gas, venous [[id number]]  (Abnormal) Collected: 01/11/24 0649    Lab Status: Final result Specimen: Blood from Arm, Left Updated: 01/11/24 0655     pH, Topher 7.231     pCO2, Topher 27.4 mm Hg      pO2, Topher 32.2 mm Hg      HCO3, Topher 11.2 mmol/L      Base Excess, Topher -14.9 mmol/L      O2 Content, Topher 3.9 ml/dL      O2 HGB, VENOUS 39.8 %     FLU/RSV/COVID - if FLU/RSV clinically relevant [[id number]]  (Normal) Collected: 01/11/24 0601    Lab Status: Final result Specimen: Nares from Nose Updated: 01/11/24 0649     SARS-CoV-2 Negative     INFLUENZA A PCR Negative     INFLUENZA B PCR Negative     RSV PCR Negative    Narrative:      FOR PEDIATRIC PATIENTS - copy/paste COVID Guidelines URL to browser: [email or web address]    SARS-CoV-2 assay is a Nucleic Acid Amplification assay intended for the  qualitative detection of nucleic acid from SARS-CoV-2 in nasopharyngeal  swabs. Results are for the presumptive identification of SARS-CoV-2 RNA.    Positive results are indicative of infection with SARS-CoV-2, the virus  causing COVID-19, but do not rule out bacterial infection or co-infection  with other viruses. Laboratories within the [place] and its  territories are required to report all positive results to the appropriate  public health authorities. Negative results  do not preclude SARS-CoV-2  infection and should not be used as the sole basis for treatment or other  patient management decisions. Negative results must be combined with  clinical observations, patient history, and epidemiological information.  This test has not been FDA cleared or approved.    This test has been authorized by FDA under an Emergency Use Authorization  (EUA). This test is only authorized for the duration of time the  declaration that circumstances exist justifying the authorization of the  emergency use of an in vitro diagnostic tests for detection of SARS-CoV-2  virus and/or diagnosis of COVID-19 infection under section 564(b)(1) of  the Act, 21 U.S.C. 360bbb-3(b)(1), unless the authorization is terminated  or revoked sooner. The test has been validated but independent review by FDA  and CLIA is pending.    Test performed using [email or web address]: This RT-PCR assay targets N2,  a region unique to SARS-CoV-2. A conserved region in the E-gene was chosen  for pan-Sarbecovirus detection which includes SARS-CoV-2.    According to CMS-2020-01-R, this platform meets the definition of high-throughput technology.    Procalcitonin [[id number]]  (Abnormal) Collected: 01/11/24 0601    Lab Status: Final result Specimen: Blood from Arm, Right Updated: 01/11/24 0635     Procalcitonin 0.36 ng/ml     Lactic acid [[id number]]  (Abnormal) Collected: 01/11/24 0606    Lab Status: Final result Specimen: Blood from Arm, Right Updated: 01/11/24 0633     LACTIC ACID 9.0 mmol/L     Narrative:      Result may be elevated if tourniquet was used during collection.    B-Type Natriuretic Peptide(BNP) [[id number]]  (Abnormal) Collected: 01/11/24 0601    Lab Status: Final result Specimen: Blood from Arm, Right Updated: 01/11/24 0632     BNP 1,176 pg/mL     HS Troponin 0hr (reflex protocol) [[id number]]  (Abnormal) Collected: 01/11/24 0601    Lab Status: Final result Specimen: Blood from Arm, Right Updated: 01/11/24 0632     hs TnI 0hr  1,750 ng/L     Comprehensive metabolic panel [[id number]]  (Abnormal) Collected: 01/11/24 0601    Lab Status: Final result Specimen: Blood from Arm, Right Updated: 01/11/24 0630     Sodium 141 mmol/L      Potassium 4.5 mmol/L      Chloride 107 mmol/L      CO2 13 mmol/L      ANION GAP 21 mmol/L      BUN 36 mg/dL      Creatinine 1.88 mg/dL      Glucose 201 mg/dL      Calcium 8.9 mg/dL       U/L       U/L      Alkaline Phosphatase 106 U/L      Total Protein 6.4 g/dL      Albumin 3.8 g/dL      Total Bilirubin 3.50 mg/dL      eGFR 31 ml/min/1.73sq m     Narrative:      National Kidney Disease Foundation guidelines for Chronic Kidney Disease (CKD):     Stage 1 with normal or high GFR (GFR > 90 mL/min/1.73 square meters)    Stage 2 Mild CKD (GFR = 60-89 mL/min/1.73 square meters)    Stage 3A Moderate CKD (GFR = 45-59 mL/min/1.73 square meters)    Stage 3B Moderate CKD (GFR = 30-44 mL/min/1.73 square meters)    Stage 4 Severe CKD (GFR = 15-29 mL/min/1.73 square meters)    Stage 5 End Stage CKD (GFR <15 mL/min/1.73 square meters)  Note: GFR calculation is accurate only with a steady state creatinine    D-dimer, quantitative [[id number]]  (Abnormal) Collected: 01/11/24 0601    Lab Status: Final result Specimen: Blood from Arm, Right Updated: 01/11/24 0623     D-Dimer, Quant 2.61 ug/ml FEU     Narrative:      In the evaluation for possible pulmonary embolism, in the appropriate (Well's Score of 4 or less) patient, the age adjusted d-dimer cutoff for this patient can be calculated as:    Age x 0.01 (in ug/mL) for Age-adjusted D-dimer exclusion threshold for a patient over 50 years.    Protime-INR [[id number]]  (Abnormal) Collected: 01/11/24 0601    Lab Status: Final result Specimen: Blood from Arm, Right Updated: 01/11/24 0622     Protime 22.6 seconds      INR 2.03    APTT [[id number]]  (Normal) Collected: 01/11/24 0601    Lab Status: Final result Specimen: Blood from Arm, Right Updated: 01/11/24 0622     PTT 31  seconds     CBC and differential [[id number]]  (Abnormal) Collected: 01/11/24 0601    Lab Status: Final result Specimen: Blood from Arm, Right Updated: 01/11/24 0619     WBC 3.08 Thousand/uL      RBC 1.79 Million/uL      Hemoglobin 6.0 g/dL      Hematocrit 18.6 %       fL      MCH 33.5 pg      MCHC 32.3 g/dL      RDW 17.0 %      Platelets 29 Thousands/uL      nRBC 21 /100 WBCs      Neutrophils Relative 55 %      Immat GRANS % 6 %      Lymphocytes Relative 31 %      Monocytes Relative 8 %      Eosinophils Relative 0 %      Basophils Relative 0 %      Neutrophils Absolute 1.67 Thousands/µL      Immature Grans Absolute 0.19 Thousand/uL      Lymphocytes Absolute 0.96 Thousands/µL      Monocytes Absolute 0.25 Thousand/µL      Eosinophils Absolute 0.00 Thousand/µL      Basophils Absolute 0.01 Thousands/µL     Narrative:      See Manual Diff Done Within 3 Days  This is an appended report.  These results have been appended to a previously verified report.                   US right upper quadrant   Final Result by Annie Mulligan MD (01/11 6054)      Gallbladder wall thickness is 4 mm. Trace pericholecystic fluid. Negative sonographic Oquendo sign. No gallstones identified. Consider scintigraphic biliary scan for further evaluation.      Hepatic steatosis.      Right renal cysts.      Workstation performed: [id number]         PE Study with CT abdomen & pelvis with contrast   Final Result by Shayy Kuhn MD (01/11 1020)         1. No pulmonary embolism.   2. Right upper lobe infiltrates with few patchy opacities in the left upper lobe and right middle lobe. Findings suggest an infectious or inflammatory etiology. Probably reactive right hilar and mediastinal nodes.   3. Cardiomegaly with small pleural effusions larger on the right and small abdominal ascites.   4. Mild prominence of the wall of the ascending colon which could reflect a mild colitis.   5. Streak artifact from patient's arms at the level of the  gallbladder fossa limit evaluation. There is clinical concern for gallbladder pathology ultrasound can be obtained.   6. Hepatic steatosis, hepatic and renal cysts and nonobstructing right renal calculus.   The study was marked in EPIC for immediate notification.   .                  Workstation performed: [id number]         XR chest portable   Final Result by Allan Browning MD (01/11 0911)      No acute cardiopulmonary disease.                  Workstation performed: [id number]                    Procedures  ECG 12 Lead Documentation Only    Date/Time: 1/11/2024 9:50 PM    Performed by: Russell Horn MD  Authorized by: Russell Horn MD    Indications / Diagnosis:  Afib with RVR  ECG reviewed by me, the ED Provider: yes    Patient location:  ED  Previous ECG:     Previous ECG:  Compared to current    Similarity:  Changes noted  Interpretation:     Interpretation: abnormal    Rate:     ECG rate:  153    ECG rate assessment: tachycardic    Rhythm:     Rhythm: atrial fibrillation    Ectopy:     Ectopy: none    QRS:     QRS axis:  Normal    QRS intervals:  Normal  Conduction:     Conduction: normal    ST segments:     ST segments:  Non-specific  T waves:     T waves: non-specific    CriticalCare Time    Date/Time: 1/11/2024 6:00 AM    Performed by: Russell Horn MD  Authorized by: Russell Horn MD    Critical care provider statement:     Critical care time (minutes):  35    Critical care start time:  1/11/2024 6:00 AM    Critical care end time:  1/11/2024 6:35 AM    Critical care time was exclusive of:  Separately billable procedures and treating other patients and teaching time    Critical care was necessary to treat or prevent imminent or life-threatening deterioration of the following conditions:  Respiratory failure, cardiac failure, shock and sepsis    Critical care was time spent personally by me on the following activities:  Obtaining history  from patient or surrogate, development of treatment plan with patient or surrogate, discussions with consultants, evaluation of patient's response to treatment, examination of patient, ordering and performing treatments and interventions, ordering and review of laboratory studies, ordering and review of radiographic studies, re-evaluation of patient's condition and review of old charts    I assumed direction of critical care for this patient from another provider in my specialty: no             ED Course  ED Course as of 01/12/24 2150   Thu Jan 11, 2024   0609 Temperature(!): 96.2 °F (35.7 °C)  Will hold off on rectal temp as neutropenia is a possibility.   0632 hs TnI 0hr(!): 1,750  Afib?             HEART Risk Score      Flowsheet Row Most Recent Value   Heart Score Risk Calculator    History 1 Filed at: 01/11/2024 0748   ECG 2 Filed at: 01/11/2024 0748   Age 2 Filed at: 01/11/2024 0748   Risk Factors 2 Filed at: 01/11/2024 0748   Troponin 2 Filed at: 01/11/2024 0748   HEART Score 9 Filed at: 01/11/2024 0748                       Initial Sepsis Screening       Row Name 01/11/24 1118 01/11/24 0700 01/11/24 0633          Is the patient's history suggestive of a new or worsening infection? Yes (Proceed)  -ZR Yes (Proceed)  -ZR Yes (Proceed)  -BC      Suspected source of infection pneumonia;suspect infection, source unknown;acute abdominal infection  -ZR pneumonia;suspect infection, source unknown  -ZR pneumonia;suspect infection, source unknown  -BC      Indicate SIRS criteria Hyperthemia > 38.3C (100.9F) OR Hypothermia <36C (96.8F);Tachycardia > 90 bpm;Tachypnea > 20 resp per min  -ZR Hyperthemia > 38.3C (100.9F) OR Hypothermia <36C (96.8F);Tachycardia > 90 bpm;Tachypnea > 20 resp per min;Leukocytosis (WBC > 28831 IJL) OR Leukopenia (WBC <4000 IJL) OR Bandemia (WBC >10% bands)  -ZR Hyperthemia > 38.3C (100.9F) OR Hypothermia <36C (96.8F);Tachycardia > 90 bpm;Tachypnea > 20 resp per min;Leukocytosis (WBC > 94395 IJL)  "OR Leukopenia (WBC <4000 IJL) OR Bandemia (WBC >10% bands)  -BC      Are two or more of the above signs & symptoms of infection both present and new to the patient? Yes (Proceed)  -ZR Yes (Proceed)  -ZR Yes (Proceed)  -BC      Assess for evidence of organ dysfunction: Are any of the below criteria present within 6 hours of suspected infection and SIRS criteria that are NOT considered to be chronic conditions? Lactate >/equal 4.0;Urine output < 0.5 mL/kg/hour for 2 hours;SBP < 90;INR > 1.5 or aPTT > 60 secs  -ZR Lactate >/equal 4.0;SBP < 90;INR > 1.5 or aPTT > 60 secs  -ZR Lactate >/equal 4.0  -BC      Date of presentation of severe sepsis 01/11/24  -ZR 01/11/24  -ZR 01/11/24  -BC      Time of presentation of severe sepsis -- 0745  -ZR 0633  -BC      Date of presentation of septic shock 01/11/24  -ZR 01/11/24  -ZR 01/11/24  -BC      Time of presentation of septic shock 0821  -ZR 0821  -ZR 0633  -BC      Fluid Resuscitation: A lesser volume than 30 ml/kg IV fluid will be given  -ZR A lesser volume than 30 ml/kg IV fluid will be given  -ZR A lesser volume than 30 ml/kg IV fluid will be given  -BC      The 30 mL/kg fluid bolus was not given to the patient despite having hypotension, a lactate of >= 4 mmol/L, or documentation of septic shock secondary to: Concern for fluid/volume overload  -ZR Concern for fluid/volume overload  -ZR Concern for fluid/volume overload;Heart Failure;BP responded to a lesser fluid volume  -BC      Instead of the 30 ml/kg fluid bolus, the following volume of crystalloid fluid will be ordered: Other (document in comments)  1500 ml normal saline  -ZR Other (document in comments)  -ZR Other (document in comments)  1.5 L  -BC      Of the following fluid type: NSS  -ZR NSS  -ZR NSS  -BC      Is the patient is persistently hypotensive in the hour after fluid bolus administration? If yes, patient meets criteria for vasopressor use. YES  -ZR NO  -ZR NO  -BC      Sepsis Note: Click \"NEXT\" below (NOT " "\"close\") to generate sepsis note based on above information. YES (proceed by clicking \"NEXT\")  -ZR YES (proceed by clicking \"NEXT\")  -ZR YES (proceed by clicking \"NEXT\")  -BC                User Key  (r) = Recorded By, (t) = Taken By, (c) = Cosigned By      Initials Name Provider Type    JACOBO Ruiz DO Physician    BC Russell Horn MD Physician                  Default Flowsheet Data (last 720 hours)       Sepsis Reassess       Row Name 01/11/24 2212 01/11/24 1752 01/11/24 1620 01/11/24 1618 01/11/24 0820       Repeat Volume Status and Tissue Perfusion Assessment Performed    Date of Reassessment: 01/11/24  -BC 01/11/24  -ZR 01/11/24  -ZR 01/11/24  -ZR 01/11/24  -ZR    Time of Reassessment: 0930 -BC 1118  -ZR 1115  -ZR 1115  -ZR --  -ZR    Sepsis Reassessment Note: Click \"NEXT\" below (NOT \"close\") to generate sepsis reassessment note. YES (proceed by clicking \"NEXT\")  -BC YES (proceed by clicking \"NEXT\")  -ZR YES (proceed by clicking \"NEXT\")  -ZR YES (proceed by clicking \"NEXT\")  -ZR --  -ZR    Repeat Volume Status and Tissue Perfusion Assessment Performed -- -- -- -- --              User Key  (r) = Recorded By, (t) = Taken By, (c) = Cosigned By      Initials Name Provider Type    JACOBO Ruiz DO Physician    BC Russell Horn MD Physician                  SBIRT 20yo+      Flowsheet Row Most Recent Value   Initial Alcohol Screen: US AUDIT-C     1. How often do you have a drink containing alcohol? 0 Filed at: 01/11/2024 0550   2. How many drinks containing alcohol do you have on a typical day you are drinking?  0 Filed at: 01/11/2024 0550   3a. Male UNDER 65: How often do you have five or more drinks on one occasion? 0 Filed at: 01/11/2024 0550   Audit-C Score 0 Filed at: 01/11/2024 0550   RICK: How many times in the past year have you...    Used an illegal drug or used a prescription medication for non-medical reasons? Never Filed at: 01/11/2024 0550      "                 Medical Decision Making  I reviewed the patient's medical chart, PMHx, prior encounters, medications. Reviewed prior admission in December when patient was diagnosed with AML, received multiple blood transfusions.    My independent interpretation of ECG demonstrated: Atrial fibrillation with RVR in the 150s, nonspecific ST/T wave changes diffusely, likely secondary to elevated rate  My independent interpretation of CXR demonstrated: No acute cardiopulmonary disease    My DDx includes: Atrial fibrillation with RVR, anemia, PE, ACS, pneumothorax, cardiac tamponade, CHF, pancytopenia, thrombocytopenia, at risk for pneumonia, UTI, intra-abdominal process    Patient is ill-appearing upon presentation, early in respiratory distress, appears jaundiced/anemic. Differential is extremely broad.    Early in encounter, I discussed with wife CODE STATUS, which she confirmed was DNR/DNI.  Patient also reports his CODE STATUS is DNR/DNI.  This was updated in chart.  Patient is in A-fib with RVR in the 150s, however I made decision to not rate controlled this at this time as I suspect this is compensatory.  While oral temp was normal, after confirming no neutropenia, a rectal temp was obtained and was 101.  Suspect infectious etiology as the cause of the patient's symptoms.  Patient's lactate returned at 9.  Given some mild edema as well as CHF history, respiratory distress, started with 1.5 L of fluid, which is below 30 cc/kilogram fluid bolus, see sepsis fluid exclusion description below.  Patient had 1 episode of unresponsiveness, his heart rate went into the 70s, appeared periarrest, however this resolved.  He was hypotensive shortly after this however this quickly responded to fluids.  Empiric antibiotics were started, vancomycin, cefepime.  Given prior treatment by hematology and prior admission, 1 unit of RBCs was ordered as well for hemoglobin of 6.  Hemoccult testing was negative. CXR was clear. VBG was not  too impressive for respiratory acidosis, appears to be compensating for significant metabolic acidosis, however, will place on HFNC for some respiratory support.    Patient signed out to Dr. Ruiz pending PE/abdomen pelvis (D-dimer elevated as well).    The 30ml/kg fluid bolus was not given to the patient despite hypotension and/or significantly elevated lactate of = 4 and/or presence of septic shock due to: Heart Failure. The patient will be administered 1.5 L of crystalloid fluid instead. Orders for this have been placed in [facility]. The patient may receive additional colloid or crystalloid fluids thereafter based on clinical condition.     Russell Horn MD     Amount and/or Complexity of Data Reviewed  Labs: ordered. Decision-making details documented in ED Course.  Radiology: ordered.    Risk  OTC drugs.  Prescription drug management.  Decision regarding hospitalization.             Disposition  Final diagnoses:   Septic shock (HCC)   Pneumonia of right lung due to infectious organism   Elevated troponin   Elevated brain natriuretic peptide (BNP) level   Acute kidney injury (HCC)   Metabolic acidosis   Transaminasemia   Anemia   Thrombocytopenia (HCC)     Time reflects when diagnosis was documented in both MDM as applicable and the Disposition within this note       Time User Action Codes Description Comment    1/11/2024  1:45 PM Renée Molina Add [A41.9,  R65.21] Septic shock (HCC)     1/11/2024  1:45 PM Renée Molina Add [C61] Malignant neoplasm of prostate (HCC)     1/11/2024  1:45 PM Renée Molina 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)     1/11/2024  2:10 PM Kamaljit Ruiz Modify [A41.9,  R65.21] Septic shock (HCC)     1/11/2024  2:10 PM Kamaljit Ruiz Emeka Add [J15.61] Pneumonia of right lung due to Acinetobacter baumannii     1/11/2024  2:10 PM Kamaljit Ruiz Emeka Remove [J15.61] Pneumonia of right lung due to Acinetobacter baumannii     1/11/2024  2:10  PM Ritz, Kamaljit Emeka Add [J18.9] Pneumonia of right lung due to infectious organism     1/11/2024  2:10 PM Ritz, Kamaljit Emeka Add [R79.89] Elevated troponin     1/11/2024  2:10 PM Ritz, Kamaljit Emeka Add [R79.89] Elevated brain natriuretic peptide (BNP) level     1/11/2024  2:10 PM Ritz, Kamaljit Emeka Add [N17.9] Acute kidney injury (HCC)     1/11/2024  2:10 PM Ritz, Kamaljit Emeka Add [E87.20] Metabolic acidosis     1/11/2024  2:11 PM Ritz, Kamaljit Emeka Add [R74.01] Transaminasemia     1/11/2024  2:11 PM Ritz, Kamaljit Emeka Add [D64.9] Anemia     1/11/2024  2:11 PM Ritz, Kamaljit Emeka Add [D69.6] Thrombocytopenia (HCC)           ED Disposition       ED Disposition   Admit    Condition   Stable    Date/Time   Thu Jan 11, 2024 1410    Comment   Case was discussed with Dr Molina and the patient's admission status was agreed to be Admission Status: observation status to the service of Dr. Molina .               Follow-up Information    None         Discharge Medication List as of 1/11/2024  5:49 PM        CONTINUE these medications which have NOT CHANGED    Details   atorvastatin (LIPITOR) 40 mg tablet TAKE 1 TABLET BY MOUTH DAILY, Normal      busPIRone (BUSPAR) 7.5 mg tablet Take 1 tablet (7.5 mg total) by mouth 2 (two) times a day, Starting Mon 7/31/2023, Normal      econazole nitrate 1 % cream Starting Thu 6/1/2023, Historical Med      ferrous gluconate (FERGON) 240 (27 FE) MG tablet Take 1 tablet (240 mg total) by mouth 2 (two) times a day before lunch and dinner, Starting Mon 9/18/2023, Normal      folic acid (FOLVITE) 1 mg tablet Take 1 tablet (1 mg total) by mouth daily, Starting Mon 10/16/2023, Normal      glimepiride (AMARYL) 1 mg tablet Take 1 tablet (1 mg total) by mouth daily with breakfast, Starting Fri 6/2/2023, Until Mon 5/27/2024, Normal      hydrocortisone 1 % cream Apply topically 2 (two) times a day, Starting u 12/21/2023, Normal      ipratropium (ATROVENT) 0.03 % nasal spray 2 sprays into  each nostril 3 (three) times a day, Starting Thu 8/10/2023, Normal      Magnesium Oxide 420 MG TABS Take 1 tablet (420 mg total) by mouth 2 (two) times a day, Starting Thu 8/17/2023, Normal      methocarbamol (ROBAXIN) 500 mg tablet Take 1 tablet (500 mg total) by mouth 4 (four) times a day as needed for muscle spasms, Starting Fri 1/5/2024, Normal      metoprolol succinate (Toprol XL) 25 mg 24 hr tablet Take 0.5 tablets (12.5 mg total) by mouth daily, Starting Thu 1/4/2024, No Print      nitroglycerin (NITROSTAT) 0.3 mg SL tablet Place 1 tablet (0.3 mg total) under the tongue every 5 (five) minutes as needed for chest pain, Starting Thu 12/28/2023, Normal      potassium chloride (K-DUR,KLOR-CON) 20 mEq tablet Take 1 tablet (20 mEq total) by mouth daily, Starting Mon 9/18/2023, Normal      pyridoxine (VITAMIN B6) 50 mg tablet TAKE 1 TABLET BY MOUTH EVERY DAY, Starting Wed 9/27/2023, Normal      saccharomyces boulardii (FLORASTOR) 250 mg capsule Take 1 capsule (250 mg total) by mouth 2 (two) times a day, Starting Fri 8/25/2023, Normal      tamsulosin (FLOMAX) 0.4 mg Take 1 capsule (0.4 mg total) by mouth daily with dinner, Starting Fri 9/1/2023, Normal      torsemide (DEMADEX) 20 mg tablet Take 1 tablet (20 mg total) by mouth daily, Starting Thu 1/4/2024, Normal      Blood Glucose Monitoring Suppl (OneTouch Verio Reflect) w/Device KIT Check blood sugars once daily. Please substitute with appropriate alternative as covered by patient's insurance. Dx: E11.65, Normal      !! glucose blood (OneTouch Verio) test strip Check blood sugars once daily. Please substitute with appropriate alternative as covered by patient's insurance. Dx: E11.65, Normal      loperamide (IMODIUM A-D) 2 MG tablet Take 2 mg by mouth 3 (three) times a day as needed for diarrhea. Indications: Diarrhea, Historical Med      !! OneTouch Delica Lancets 30G MISC USE TO TEST ONCE DAILY, Normal      !! OneTouch Delica Lancets 33G MISC Check blood sugars  once daily. Please substitute with appropriate alternative as covered by patient's insurance. Dx: E11.65, Normal      !! OneTouch Ultra test strip TEST ONCE DAILY, Normal       !! - Potential duplicate medications found. Please discuss with provider.          No discharge procedures on file.    PDMP Review       None            ED Provider  Electronically Signed by             Russell Horn MD  01/12/24 [id number]

## 2024-01-11 NOTE — ASSESSMENT & PLAN NOTE
Hypovolemic vs. Cardiogenic vs. Septic   Was on levophed which will be discontinued  Did receive 1 unit PRBC   Family electing to transition to comfort care

## 2024-01-11 NOTE — HOSPICE NOTE
RECEIVED HOSPICE REFERRAL. CONFIRMED WITH ALFRED ZHU VIA [facility] THAT PT IS CURRENTLY ON HFNC O2. WOULD NEED TO BE WEANED TO MAX 10 L NC FOR HOSPICE . ALFRED ZHU WILL REACH OUT TO DISCUSS HOSPICE SERVICES WITH FAMILY. PLEASE UPDATE HOSPICE WITH ANY CHANGES.

## 2024-01-11 NOTE — SEPSIS NOTE
"  Sepsis Note   Freddy Copeland 88 y.o. male MRN: [id number]  Unit/Bed#: RM01 Encounter: [id number]       Initial Sepsis Screening       Row Name 01/11/24 0700                Is the patient's history suggestive of a new or worsening infection? Yes (Proceed)  -ZR        Suspected source of infection pneumonia;suspect infection, source unknown  -ZR        Indicate SIRS criteria Hyperthemia > 38.3C (100.9F) OR Hypothermia <36C (96.8F);Tachycardia > 90 bpm;Tachypnea > 20 resp per min;Leukocytosis (WBC > 20121 IJL) OR Leukopenia (WBC <4000 IJL) OR Bandemia (WBC >10% bands)  -ZR        Are two or more of the above signs & symptoms of infection both present and new to the patient? Yes (Proceed)  -ZR        Assess for evidence of organ dysfunction: Are any of the below criteria present within 6 hours of suspected infection and SIRS criteria that are NOT considered to be chronic conditions? Lactate >/equal 4.0;SBP < 90;INR > 1.5 or aPTT > 60 secs  -ZR        Date of presentation of severe sepsis 01/11/24  -ZR        Time of presentation of severe sepsis 0745  -ZR        Date of presentation of septic shock 01/11/24  -ZR        Time of presentation of septic shock 0821  -ZR        Fluid Resuscitation: A lesser volume than 30 ml/kg IV fluid will be given  -ZR        The 30 mL/kg fluid bolus was not given to the patient despite having hypotension, a lactate of >= 4 mmol/L, or documentation of septic shock secondary to: Concern for fluid/volume overload  -ZR        Instead of the 30 ml/kg fluid bolus, the following volume of crystalloid fluid will be ordered: Other (document in comments)  -ZR        Of the following fluid type: NSS  -ZR        Is the patient is persistently hypotensive in the hour after fluid bolus administration? If yes, patient meets criteria for vasopressor use. NO  -ZR        Sepsis Note: Click \"NEXT\" below (NOT \"close\") to generate sepsis note based on above information. YES (proceed by clicking \"NEXT\")  -ZR " "                 User Key  (r) = Recorded By, (t) = Taken By, (c) = Cosigned By      Initials Name Provider Type    JACOBO Ruiz DO Physician                    Default Flowsheet Data (last 720 hours)       Sepsis Reassess       Row Name 01/11/24 0820                   Repeat Volume Status and Tissue Perfusion Assessment Performed    Date of Reassessment: 01/11/24  -ZR        Time of Reassessment: 0821  -ZR        Sepsis Reassessment Note: Click \"NEXT\" below (NOT \"close\") to generate sepsis reassessment note. YES (proceed by clicking \"NEXT\")  -ZR        Repeat Volume Status and Tissue Perfusion Assessment Performed --                  User Key  (r) = Recorded By, (t) = Taken By, (c) = Cosigned By      Initials Name Provider Type    JACOBO Ruiz DO Physician                    Body mass index is 27.12 kg/m².  Wt Readings from Last 1 Encounters:   01/11/24 90.7 kg (199 lb 15.3 oz)        Ideal body weight: 77.6 kg (171 lb 1.2 oz)  Adjusted ideal body weight: 82.8 kg (182 lb 10.1 oz)    "

## 2024-01-11 NOTE — ED PROVIDER NOTES
History  Chief Complaint   Patient presents with    Chest Pain     Weakness and sob off and on for 2 weeks chest pain started one week ago       Chest Pain      Prior to Admission Medications   Prescriptions Last Dose Informant Patient Reported? Taking?   Blood Glucose Monitoring Suppl (OneTouch Verio Reflect) w/Device KIT  Self, Spouse/Significant Other No No   Sig: Check blood sugars once daily. Please substitute with appropriate alternative as covered by patient's insurance. Dx: E11.65   Magnesium Oxide 420 MG TABS  Self, Spouse/Significant Other No No   Sig: Take 1 tablet (420 mg total) by mouth 2 (two) times a day   OneTouch Delica Lancets 30G MISC  Self, Spouse/Significant Other No No   Sig: USE TO TEST ONCE DAILY   OneTouch Delica Lancets 33G MISC  Self, Spouse/Significant Other No No   Sig: Check blood sugars once daily. Please substitute with appropriate alternative as covered by patient's insurance. Dx: E11.65   OneTouch Ultra test strip  Self, Spouse/Significant Other No No   Sig: TEST ONCE DAILY   atorvastatin (LIPITOR) 40 mg tablet  Self, Spouse/Significant Other No No   Sig: TAKE 1 TABLET BY MOUTH DAILY   busPIRone (BUSPAR) 7.5 mg tablet  Self, Spouse/Significant Other No No   Sig: Take 1 tablet (7.5 mg total) by mouth 2 (two) times a day   econazole nitrate 1 % cream  Self, Spouse/Significant Other Yes No   ferrous gluconate (FERGON) 240 (27 FE) MG tablet  Self, Spouse/Significant Other No No   Sig: Take 1 tablet (240 mg total) by mouth 2 (two) times a day before lunch and dinner   folic acid (FOLVITE) 1 mg tablet  Self, Spouse/Significant Other No No   Sig: Take 1 tablet (1 mg total) by mouth daily   glimepiride (AMARYL) 1 mg tablet  Self, Spouse/Significant Other No No   Sig: Take 1 tablet (1 mg total) by mouth daily with breakfast   glucose blood (OneTouch Verio) test strip  Self, Spouse/Significant Other No No   Sig: Check blood sugars once daily. Please substitute with appropriate alternative as  covered by patient's insurance. Dx: E11.65   hydrocortisone 1 % cream   No No   Sig: Apply topically 2 (two) times a day   ipratropium (ATROVENT) 0.03 % nasal spray  Self, Spouse/Significant Other No No   Si sprays into each nostril 3 (three) times a day   loperamide (IMODIUM A-D) 2 MG tablet  Self, Spouse/Significant Other Yes No   Sig: Take 2 mg by mouth 3 (three) times a day as needed for diarrhea. Indications: Diarrhea   Patient not taking: Reported on 2024   methocarbamol (ROBAXIN) 500 mg tablet   No No   Sig: Take 1 tablet (500 mg total) by mouth 4 (four) times a day as needed for muscle spasms   metoprolol succinate (Toprol XL) 25 mg 24 hr tablet   No No   Sig: Take 0.5 tablets (12.5 mg total) by mouth daily   nitroglycerin (NITROSTAT) 0.3 mg SL tablet   No No   Sig: Place 1 tablet (0.3 mg total) under the tongue every 5 (five) minutes as needed for chest pain   potassium chloride (K-DUR,KLOR-CON) 20 mEq tablet  Self, Spouse/Significant Other No No   Sig: Take 1 tablet (20 mEq total) by mouth daily   pyridoxine (VITAMIN B6) 50 mg tablet  Self, Spouse/Significant Other No No   Sig: TAKE 1 TABLET BY MOUTH EVERY DAY   saccharomyces boulardii (FLORASTOR) 250 mg capsule  Self, Spouse/Significant Other No No   Sig: Take 1 capsule (250 mg total) by mouth 2 (two) times a day   tamsulosin (FLOMAX) 0.4 mg  Self, Spouse/Significant Other No No   Sig: Take 1 capsule (0.4 mg total) by mouth daily with dinner   torsemide (DEMADEX) 20 mg tablet   No No   Sig: Take 1 tablet (20 mg total) by mouth daily      Facility-Administered Medications: None       Past Medical History:   Diagnosis Date    Anxiety     Atrial fibrillation (HCC)     Atrial flutter (Formerly [facility])     Congestive heart failure (CHF) (Formerly [facility])     COPD (chronic obstructive pulmonary disease) (Formerly [facility])     Coronary artery disease     DVT (deep venous thrombosis) (Formerly [facility])     ED (erectile dysfunction)     Hearing loss     History of echocardiogram 2018    EF 0.55,  Mild LVH. Mild moderate mitral regurg. Trace aortic regurg.    Hx of radiation therapy     XRT    Hypercholesterolemia     Hyperlipidemia     Hypertension     Long term (current) use of anticoagulants 8/21/2018    Neuropathy of both feet     Peripheral neuropathy     Prostate cancer (HCC)     Type 2 diabetes mellitus (HCC)        Past Surgical History:   Procedure Laterality Date    CARDIAC CATHETERIZATION  01/15/1988    Left main: unobstructed. Posterolateral branch 90% narrowed.    COLONOSCOPY  10/05/2017    Dr. Saeed High    IR BIOPSY BONE MARROW  12/19/2023    PROSTATE SURGERY         Family History   Problem Relation Age of Onset    Cancer Brother         bladder    Colon cancer Brother     Heart disease Other     Hypertension Other     Cancer Other         bladder    Colon cancer Other      I have reviewed and agree with the history as documented.    E-Cigarette/Vaping    E-Cigarette Use Never User      E-Cigarette/Vaping Substances    Nicotine No     THC No     CBD No     Flavoring No     Other No     Unknown No      Social History     Tobacco Use    Smoking status: Never    Smokeless tobacco: Never   Vaping Use    Vaping status: Never Used   Substance Use Topics    Alcohol use: Not Currently    Drug use: Never       Review of Systems   Cardiovascular:  Positive for chest pain.       Physical Exam  Physical Exam    Vital Signs  ED Triage Vitals   Temperature Pulse Respirations Blood Pressure SpO2   01/11/24 0548 01/11/24 0548 01/11/24 0548 01/11/24 0548 01/11/24 0548   97.8 °F (36.6 °C) (!) 154 20 101/55 96 %      Temp Source Heart Rate Source Patient Position - Orthostatic VS BP Location FiO2 (%)   01/11/24 0548 01/11/24 0548 01/11/24 0548 01/11/24 0548 01/11/24 1004   Temporal Monitor Sitting Left arm 50      Pain Score       01/11/24 0548       No Pain           Vitals:    01/11/24 1140 01/11/24 1230 01/11/24 1315 01/11/24 1430   BP: 91/62 103/60 (!) 84/53 104/52   Pulse: (!) 140 (!) 142 (!) 136 (!) 134    Patient Position - Orthostatic VS:  Lying Sitting          Visual Acuity      ED Medications  Medications   glycopyrrolate (ROBINUL) injection 0.1 mg (has no administration in time range)   haloperidol lactate (HALDOL) injection 0.5 mg (has no administration in time range)   HYDROmorphone (DILAUDID) injection 0.3 mg (has no administration in time range)   HYDROmorphone (DILAUDID) injection 0.5 mg (has no administration in time range)   LORazepam (ATIVAN) injection 0.5 mg (has no administration in time range)   LORazepam (ATIVAN) injection 1 mg (has no administration in time range)   sodium chloride 0.9 % bolus 500 mL (0 mL Intravenous Stopped 1/11/24 0723)   vancomycin (VANCOCIN) 1250 mg in sodium chloride 0.9% 250 mL IVPB (0 mg Intravenous Stopped 1/11/24 0852)   cefepime (MAXIPIME) IVPB (premix in dextrose) 2,000 mg 50 mL (0 mg Intravenous Stopped 1/11/24 0700)   acetaminophen (TYLENOL) rectal suppository 650 mg (650 mg Rectal Given 1/11/24 0700)   sodium chloride 0.9 % bolus 1,000 mL (0 mL Intravenous Stopped 1/11/24 0900)   iohexol (OMNIPAQUE) 350 MG/ML injection (MULTI-DOSE) 100 mL (100 mL Intravenous Given 1/11/24 0706)       Diagnostic Studies  Results Reviewed       Procedure Component Value Units Date/Time    HS Troponin I 4hr [[id number]]  (Abnormal) Collected: 01/11/24 1106    Lab Status: Final result Specimen: Blood from Arm, Left Updated: 01/11/24 1131     hs TnI 4hr 2,722 ng/L      Delta 4hr hsTnI 972 ng/L     TSH [[id number]]  (Normal) Collected: 01/11/24 0601    Lab Status: Final result Specimen: Blood from Arm, Right Updated: 01/11/24 1004     TSH 3RD GENERATON 2.838 uIU/mL     Lactic acid 2 Hours [[id number]]  (Abnormal) Collected: 01/11/24 0833    Lab Status: Final result Specimen: Blood from Arm, Left Updated: 01/11/24 0915     LACTIC ACID 11.2 mmol/L     Narrative:      Result may be elevated if tourniquet was used during collection.    Lipase [[id number]]  (Normal) Collected: 01/11/24 0601    Lab  Status: Final result Specimen: Blood Updated: 01/11/24 0826     Lipase 18 u/L     HS Troponin I 2hr [[id number]]  (Abnormal) Collected: 01/11/24 0740    Lab Status: Final result Specimen: Blood from Arm, Left Updated: 01/11/24 0810     hs TnI 2hr 2,058 ng/L      Delta 2hr hsTnI 308 ng/L     Blood gas, venous [[id number]]  (Abnormal) Collected: 01/11/24 0740    Lab Status: Final result Specimen: Blood from Arm, Left Updated: 01/11/24 0749     pH, Topher 7.315     pCO2, Topher 22.4 mm Hg      pO2, Topher 32.1 mm Hg      HCO3, Topher 11.2 mmol/L      Base Excess, Topher -13.7 mmol/L      O2 Content, Topher 4.4 ml/dL      O2 HGB, VENOUS 47.1 %     Blood gas, venous [[id number]]  (Abnormal) Collected: 01/11/24 0649    Lab Status: Final result Specimen: Blood from Arm, Left Updated: 01/11/24 0655     pH, Topher 7.231     pCO2, Topher 27.4 mm Hg      pO2, Topher 32.2 mm Hg      HCO3, Topher 11.2 mmol/L      Base Excess, Topher -14.9 mmol/L      O2 Content, Topher 3.9 ml/dL      O2 HGB, VENOUS 39.8 %     FLU/RSV/COVID - if FLU/RSV clinically relevant [[id number]]  (Normal) Collected: 01/11/24 0601    Lab Status: Final result Specimen: Nares from Nose Updated: 01/11/24 0649     SARS-CoV-2 Negative     INFLUENZA A PCR Negative     INFLUENZA B PCR Negative     RSV PCR Negative    Narrative:      FOR PEDIATRIC PATIENTS - copy/paste COVID Guidelines URL to browser: [email or web address]    SARS-CoV-2 assay is a Nucleic Acid Amplification assay intended for the  qualitative detection of nucleic acid from SARS-CoV-2 in nasopharyngeal  swabs. Results are for the presumptive identification of SARS-CoV-2 RNA.    Positive results are indicative of infection with SARS-CoV-2, the virus  causing COVID-19, but do not rule out bacterial infection or co-infection  with other viruses. Laboratories within the [place] and its  territories are required to report all positive results to the appropriate  public health  authorities. Negative results do not preclude SARS-CoV-2  infection and should not be used as the sole basis for treatment or other  patient management decisions. Negative results must be combined with  clinical observations, patient history, and epidemiological information.  This test has not been FDA cleared or approved.    This test has been authorized by FDA under an Emergency Use Authorization  (EUA). This test is only authorized for the duration of time the  declaration that circumstances exist justifying the authorization of the  emergency use of an in vitro diagnostic tests for detection of SARS-CoV-2  virus and/or diagnosis of COVID-19 infection under section 564(b)(1) of  the Act, 21 U.S.C. 360bbb-3(b)(1), unless the authorization is terminated  or revoked sooner. The test has been validated but independent review by FDA  and CLIA is pending.    Test performed using Siripert: This RT-PCR assay targets N2,  a region unique to SARS-CoV-2. A conserved region in the E-gene was chosen  for pan-Sarbecovirus detection which includes SARS-CoV-2.    According to CMS-2020-01-R, this platform meets the definition of high-throughput technology.    Procalcitonin [[id number]]  (Abnormal) Collected: 01/11/24 0601    Lab Status: Final result Specimen: Blood from Arm, Right Updated: 01/11/24 0635     Procalcitonin 0.36 ng/ml     Lactic acid [[id number]]  (Abnormal) Collected: 01/11/24 0606    Lab Status: Final result Specimen: Blood from Arm, Right Updated: 01/11/24 0633     LACTIC ACID 9.0 mmol/L     Narrative:      Result may be elevated if tourniquet was used during collection.    B-Type Natriuretic Peptide(BNP) [[id number]]  (Abnormal) Collected: 01/11/24 0601    Lab Status: Final result Specimen: Blood from Arm, Right Updated: 01/11/24 0632     BNP 1,176 pg/mL     HS Troponin 0hr (reflex protocol) [[id number]]  (Abnormal) Collected: 01/11/24 0601    Lab Status: Final result Specimen: Blood from Arm, Right  Updated: 01/11/24 0632     hs TnI 0hr 1,750 ng/L     Comprehensive metabolic panel [[id number]]  (Abnormal) Collected: 01/11/24 0601    Lab Status: Final result Specimen: Blood from Arm, Right Updated: 01/11/24 0630     Sodium 141 mmol/L      Potassium 4.5 mmol/L      Chloride 107 mmol/L      CO2 13 mmol/L      ANION GAP 21 mmol/L      BUN 36 mg/dL      Creatinine 1.88 mg/dL      Glucose 201 mg/dL      Calcium 8.9 mg/dL       U/L       U/L      Alkaline Phosphatase 106 U/L      Total Protein 6.4 g/dL      Albumin 3.8 g/dL      Total Bilirubin 3.50 mg/dL      eGFR 31 ml/min/1.73sq m     Narrative:      National Kidney Disease Foundation guidelines for Chronic Kidney Disease (CKD):     Stage 1 with normal or high GFR (GFR > 90 mL/min/1.73 square meters)    Stage 2 Mild CKD (GFR = 60-89 mL/min/1.73 square meters)    Stage 3A Moderate CKD (GFR = 45-59 mL/min/1.73 square meters)    Stage 3B Moderate CKD (GFR = 30-44 mL/min/1.73 square meters)    Stage 4 Severe CKD (GFR = 15-29 mL/min/1.73 square meters)    Stage 5 End Stage CKD (GFR <15 mL/min/1.73 square meters)  Note: GFR calculation is accurate only with a steady state creatinine    Blood culture #1 [[id number]] Collected: 01/11/24 0612    Lab Status: In process Specimen: Blood from Arm, Right Updated: 01/11/24 0626    Blood culture #2 [[id number]] Collected: 01/11/24 0607    Lab Status: In process Specimen: Blood from Hand, Right Updated: 01/11/24 0624    D-dimer, quantitative [[id number]]  (Abnormal) Collected: 01/11/24 0601    Lab Status: Final result Specimen: Blood from Arm, Right Updated: 01/11/24 0623     D-Dimer, Quant 2.61 ug/ml FEU     Narrative:      In the evaluation for possible pulmonary embolism, in the appropriate (Well's Score of 4 or less) patient, the age adjusted d-dimer cutoff for this patient can be calculated as:    Age x 0.01 (in ug/mL) for Age-adjusted D-dimer exclusion threshold for a patient over 50 years.    Protime-INR  [[id number]]  (Abnormal) Collected: 01/11/24 0601    Lab Status: Final result Specimen: Blood from Arm, Right Updated: 01/11/24 0622     Protime 22.6 seconds      INR 2.03    APTT [[id number]]  (Normal) Collected: 01/11/24 0601    Lab Status: Final result Specimen: Blood from Arm, Right Updated: 01/11/24 0622     PTT 31 seconds     CBC and differential [[id number]]  (Abnormal) Collected: 01/11/24 0601    Lab Status: Final result Specimen: Blood from Arm, Right Updated: 01/11/24 0619     WBC 3.08 Thousand/uL      RBC 1.79 Million/uL      Hemoglobin 6.0 g/dL      Hematocrit 18.6 %       fL      MCH 33.5 pg      MCHC 32.3 g/dL      RDW 17.0 %      Platelets 29 Thousands/uL      nRBC 21 /100 WBCs      Neutrophils Relative 55 %      Immat GRANS % 6 %      Lymphocytes Relative 31 %      Monocytes Relative 8 %      Eosinophils Relative 0 %      Basophils Relative 0 %      Neutrophils Absolute 1.67 Thousands/µL      Immature Grans Absolute 0.19 Thousand/uL      Lymphocytes Absolute 0.96 Thousands/µL      Monocytes Absolute 0.25 Thousand/µL      Eosinophils Absolute 0.00 Thousand/µL      Basophils Absolute 0.01 Thousands/µL     Narrative:      See Manual Diff Done Within 3 Days  This is an appended report.  These results have been appended to a previously verified report.                   US right upper quadrant   Final Result by Annie Mulligan MD (01/11 3049)      Gallbladder wall thickness is 4 mm. Trace pericholecystic fluid. Negative sonographic Oquendo sign. No gallstones identified. Consider scintigraphic biliary scan for further evaluation.      Hepatic steatosis.      Right renal cysts.      Workstation performed: [id number]         PE Study with CT abdomen & pelvis with contrast   Final Result by Shayy Kuhn MD (01/11 1020)         1. No pulmonary embolism.   2. Right upper lobe infiltrates with few patchy opacities in the left upper lobe and right middle lobe. Findings suggest an infectious or  inflammatory etiology. Probably reactive right hilar and mediastinal nodes.   3. Cardiomegaly with small pleural effusions larger on the right and small abdominal ascites.   4. Mild prominence of the wall of the ascending colon which could reflect a mild colitis.   5. Streak artifact from patient's arms at the level of the gallbladder fossa limit evaluation. There is clinical concern for gallbladder pathology ultrasound can be obtained.   6. Hepatic steatosis, hepatic and renal cysts and nonobstructing right renal calculus.   The study was marked in EPIC for immediate notification.   .                  Workstation performed: [id number]         XR chest portable   Final Result by Allan Browning MD (01/11 0911)      No acute cardiopulmonary disease.                  Workstation performed: [id number]                    Procedures  Procedures         ED Course  ED Course as of 01/11/24 1735   Thu Jan 11, 2024   0739 Blood gas, venous(!)   0739 Lactic acid(!!)   0739 FLU/RSV/COVID - if FLU/RSV clinically relevant   0739 B-Type Natriuretic Peptide(BNP)(!)   0739 Procalcitonin(!)   0739 HS Troponin 0hr (reflex protocol)(!)   0739 Comprehensive metabolic panel(!)   0739 D-dimer, quantitative(!)   0739 APTT   0739 Protime-INR(!)   0739 APTT   0739 Protime-INR(!)   0739 CBC and differential(!!)   0739 Received sign-out from Dr Horn  89 y/o male presenting with dyspnea and intermittent chest pressure over the last week with increasing weakness. Recent need for transfusion 3 Jan 24 d/t symptomatic anemia.   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  Not currently on anticoagulation.  Multiple metabolic disturbances:  Leukopenia without neutropenia  Anemia (stable from prior)  Thrombocytopenia (appears stable)  Metabolic acidosis  ESTEVAN  Elevated transaminases  Elevated troponin  Elevated d-dimer  Elevated procalcitonin  Elevated BNP  Elevated lactic acid      Cefepime/vancomycin  ordered for abx coverage. 1 unit PRBC ordered.    Pending repeat VBG at this point; CTA chest+CT a/p. Can escalate to BiPAP if needed but DNR/DNI.    Critical care admission   0750 Blood gas, venous(!)  Improved acidemia on basis of respiratory compensation; metabolic acidosis unchanged. Hypoxemia   0756 Echo 14 Aug 2023:    ·  Left Ventricle: Left ventricular cavity size is normal. Wall thickness is normal. The left ventricular ejection fraction is 60%. Systolic function is normal. Wall motion is normal.  ·  Left Atrium: The atrium is moderately dilated.  ·  Right Atrium: The atrium is moderately dilated.  ·  Aortic Valve: There is mild regurgitation.  ·  Mitral Valve: There is mild to moderate regurgitation.  ·  Tricuspid Valve: There is mild regurgitation.     0813 HS Troponin I 2hr(!)  Likely 2/2 increased myocardial demand in setting of sepsis-mediated (or general critical illness-mediated) tachycardia   0818 The 30ml/kg fluid bolus was not given to the patient despite hypotension and/or significantly elevated lactate of = 4 and/or presence of septic shock due to: Concern for fluid/volume overload. The patient will be administered 1500 ml of crystalloid fluid instead and will receive 1 unit PRBC with potential for additional PRBC as needed. Orders for this have been [facility] in [facility]. The patient may receive additional colloid or crystalloid fluids thereafter based on clinical condition.     Kamaljit Ruiz,      0830 Lipase   0917 Patient to CT scan now   0929 Lactic acid 2 Hours(!!)  Elevated compared to prior   0932 CT completed and awaiting interpretation   1023 PE Study with CT abdomen & pelvis with contrast  1. No pulmonary embolism.  2. Right upper lobe infiltrates with few patchy opacities in the left upper lobe and right middle lobe. Findings suggest an infectious or inflammatory etiology. Probably reactive right hilar and mediastinal nodes.  3. Cardiomegaly with small pleural effusions larger on the  right and small abdominal ascites.  4. Mild prominence of the wall of the ascending colon which could reflect a mild colitis.  5. Streak artifact from patient's arms at the level of the gallbladder fossa limit evaluation. There is clinical concern for gallbladder pathology ultrasound can be obtained.  6. Hepatic steatosis, hepatic and renal cysts and nonobstructing right renal calculus.  The study was marked in EPIC for immediate notification.     1050 BPs marginal with MAP of approximately 65, occasionally falling below and occasionally maintaining about it.  Discussed with patient and family (his wife and son) regarding the need for vasopressor initiation and central venous catheter placement.  They are agreement with this.  Discussed the need for hospitalization.  I will discuss with internal medicine at this point and plan for central venous catheter placement.   1145 HS Troponin I 4hr(!)       1115: Further discussion with patient and his family regarding their goals of care and willingness to escalate further including need for interfacility transfer to critical care.  They expressed goals of avoiding invasive therapy as much as possible even beyond the previously mentioned DNR/DNI status.  Discussed with them the various levels of care and interventions that would be available with hospitalization including palliative/symptom control only versus curative therapy in particular.     They would still consider a curative strategy at this point; I emphasized the critical nature of the patient's condition and need for ICU level of care wherever he is admitted and the fact that transfer might be optimal to achieve this: may benefit from facility in which continuous critical care coverage is available.    I reviewed the case with Dr. Molina of internal medicine who advised that transfer likely necessary given patient's complexity and advised consultation with pulmonary/critical care.  I discussed the case with   Musco of pulmonary critical care at [facility] who advised transfer to [facility] or [place].    In further discussion with the patient and family after this, they strongly preferred to avoid transfer.  They noted that one of the reasons for pursuing a palliative only approach with the AML diagnosis itself was to be able to remain in this area for blood transfusions as needed as opposed to regular trips into the [facility] for chemotherapy.  They are worried about the need for repeat travel into the [facility] in the same fashion over the next several days to weeks as patient is hospitalized. Patient himself further declined any surgical intervention for any identified problem. As this represented a conflict between the strategy of maximal curative therapy vs palliative-only therapy, I asked Dr Molina to discuss with the patient the treatment options. He saw the patient in the ED and discussed with him and his family.    Patient and his family discussed further; several discussions between them and myself. They elected to pursue a comfort care only strategy with palliative care/hospice consultation when admitted. I spoke in turn with Dr. Molina who accepted the patient in observation admission to this facility.    HEART Risk Score      Flowsheet Row Most Recent Value   Heart Score Risk Calculator    History 1 Filed at: 01/11/2024 0748   ECG 2 Filed at: 01/11/2024 0748   Age 2 Filed at: 01/11/2024 0748   Risk Factors 2 Filed at: 01/11/2024 0748   Troponin 2 Filed at: 01/11/2024 0748   HEART Score 9 Filed at: 01/11/2024 0748           Initial Sepsis Screening       Row Name 01/11/24 0700                Is the patient's history suggestive of a new or worsening infection? Yes (Proceed)  -ZR        Suspected source of infection pneumonia;suspect infection, source unknown  -ZR        Indicate SIRS criteria Hyperthemia > 38.3C (100.9F) OR Hypothermia <36C (96.8F);Tachycardia > 90 bpm;Tachypnea > 20 resp per  "min;Leukocytosis (WBC > 00287 IJL) OR Leukopenia (WBC <4000 IJL) OR Bandemia (WBC >10% bands)  -ZR        Are two or more of the above signs & symptoms of infection both present and new to the patient? Yes (Proceed)  -ZR        Assess for evidence of organ dysfunction: Are any of the below criteria present within 6 hours of suspected infection and SIRS criteria that are NOT considered to be chronic conditions? Lactate >/equal 4.0;SBP < 90;INR > 1.5 or aPTT > 60 secs  -ZR        Date of presentation of severe sepsis 01/11/24  -ZR        Time of presentation of severe sepsis 0745  -ZR        Date of presentation of septic shock 01/11/24  -ZR        Time of presentation of septic shock 0821  -ZR        Fluid Resuscitation: A lesser volume than 30 ml/kg IV fluid will be given  -ZR        The 30 mL/kg fluid bolus was not given to the patient despite having hypotension, a lactate of >= 4 mmol/L, or documentation of septic shock secondary to: Concern for fluid/volume overload  -ZR        Instead of the 30 ml/kg fluid bolus, the following volume of crystalloid fluid will be ordered: Other (document in comments)  -ZR        Of the following fluid type: NSS  -ZR        Is the patient is persistently hypotensive in the hour after fluid bolus administration? If yes, patient meets criteria for vasopressor use. NO  -ZR        Sepsis Note: Click \"NEXT\" below (NOT \"close\") to generate sepsis note based on above information. YES (proceed by clicking \"NEXT\")  -ZR                  User Key  (r) = Recorded By, (t) = Taken By, (c) = Cosigned By      Initials Name Provider Type    JACOBO Ruiz DO Physician                  Default Flowsheet Data (last 720 hours)       Sepsis Reassess       Row Name 01/11/24 1620 01/11/24 1618 01/11/24 0820             Repeat Volume Status and Tissue Perfusion Assessment Performed    Date of Reassessment: 01/11/24  -ZR 01/11/24  -ZR 01/11/24  -ZR      Time of Reassessment: 1115  -ZR 1115  -ZR --  " "-ZR      Sepsis Reassessment Note: Click \"NEXT\" below (NOT \"close\") to generate sepsis reassessment note. YES (proceed by clicking \"NEXT\")  -ZR YES (proceed by clicking \"NEXT\")  -ZR --  -ZR      Repeat Volume Status and Tissue Perfusion Assessment Performed -- -- --                User Key  (r) = Recorded By, (t) = Taken By, (c) = Cosigned By      Initials Name Provider Type    JACOBO Ruiz DO Physician                  SBIRT 22yo+      Flowsheet Row Most Recent Value   Initial Alcohol Screen: US AUDIT-C     1. How often do you have a drink containing alcohol? 0 Filed at: 01/11/2024 0550   2. How many drinks containing alcohol do you have on a typical day you are drinking?  0 Filed at: 01/11/2024 0550   3a. Male UNDER 65: How often do you have five or more drinks on one occasion? 0 Filed at: 01/11/2024 0550   Audit-C Score 0 Filed at: 01/11/2024 0550   RICK: How many times in the past year have you...    Used an illegal drug or used a prescription medication for non-medical reasons? Never Filed at: 01/11/2024 0550                      Medical Decision Making  Amount and/or Complexity of Data Reviewed  Labs: ordered. Decision-making details documented in ED Course.  Radiology: ordered. Decision-making details documented in ED Course.    Risk  OTC drugs.  Prescription drug management.  Decision regarding hospitalization.             Disposition  Final diagnoses:   Septic shock (HCC)   Pneumonia of right lung due to infectious organism   Elevated troponin   Elevated brain natriuretic peptide (BNP) level   Acute kidney injury (HCC)   Metabolic acidosis   Transaminasemia   Anemia   Thrombocytopenia (HCC)     Time reflects when diagnosis was documented in both MDM as applicable and the Disposition within this note       Time User Action Codes Description Comment    1/11/2024  1:45 PM Renée Molina Add [A41.9,  R65.21] Septic shock (HCC)     1/11/2024  1:45 PM Renée Molina Add [C61] Malignant neoplasm of prostate " (HCC)     1/11/2024  1:45 PM Renée Molina 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)     1/11/2024  2:10 PM Ritz, Kamaljit Emeka Modify [A41.9,  R65.21] Septic shock (HCC)     1/11/2024  2:10 PM Ritz, Kamaljit Emeka Add [J15.61] Pneumonia of right lung due to Acinetobacter baumannii     1/11/2024  2:10 PM Ritz, Kamaljit Emeka Remove [J15.61] Pneumonia of right lung due to Acinetobacter baumannii     1/11/2024  2:10 PM Ritz, Kamaljit Emeka Add [J18.9] Pneumonia of right lung due to infectious organism     1/11/2024  2:10 PM Ritz, Kamaljit Emeka Add [R79.89] Elevated troponin     1/11/2024  2:10 PM Ritz, Kamaljit Eemka Add [R79.89] Elevated brain natriuretic peptide (BNP) level     1/11/2024  2:10 PM Ritz, Kamaljit Emeka Add [N17.9] Acute kidney injury (HCC)     1/11/2024  2:10 PM Ritz, Kamaljit Emeka Add [E87.20] Metabolic acidosis     1/11/2024  2:11 PM Ritz, Kamaljit Emeka Add [R74.01] Transaminasemia     1/11/2024  2:11 PM Ritz, Kamaljit Emeka Add [D64.9] Anemia     1/11/2024  2:11 PM Ritz, Kamaljit Emeka Add [D69.6] Thrombocytopenia (HCC)           ED Disposition       ED Disposition   Admit    Condition   Stable    Date/Time   Thu Jan 11, 2024 1410    Comment   Case was discussed with Dr Molina and the patient's admission status was agreed to be Admission Status: observation status to the service of Dr. Molina .               Follow-up Information    None         Patient's Medications   Discharge Prescriptions    No medications on file       No discharge procedures on file.    PDMP Review       None            ED Provider  Electronically Signed by             Kamaljit Ruiz DO  01/11/24 5740

## 2024-01-11 NOTE — ACP (ADVANCE CARE PLANNING)
Advanced Care Planning Progress Note    Serious Illness Conversation    1. What is your understanding now of where you are with your illness?  Prognostic Understanding: appropriate understanding of prognosis     2. How much information about what is likely to be ahead with your illness would you like to have?  Information: patient wants to be fully informed  Patient and family are aware, reviewed all labs and imaging     3. What did you (clinician) communicate to the patient?  Prognostic Communication: Time - I wish we were not in this situation, but I am worried that time may be as short as days (express as a range, e.g. days to weeks, weeks to months, months to a year).     4. If your health situation worsens, what are your most important goals?  Goals: be physically comfortable     5. What are the biggest fears and worries about the future and your health?  Fears/Worries: pain     6. What abilities are so critical to your life that you cannot imagine living without them?  Unacceptable Function: being chronically confused or not being myself, being in pain or very uncomfortable     7. What gives you strength as you think about the future with your illness?     8. If you become sicker, how much are you willing to go through for the possibility of gaining more time?  Be in the hospital: Yes Have a feeding tube: No   Be in the ICU: No Live in a nursing home: No   Be on a ventilator: No Be uncomfortable: No   Be on dialysis: No Undergo aggressive test and/or procedures: No   9. How much does your proxy and family know about your priorities and wishes?  Discussion Discussion: extensive discussion with family about goals and wishes        How does this plan sound to you? I will do everything I can to help you through this.  Patient verbalized understanding of the plan     I have spent 45minutes speaking with my patient on advanced care planning today or during this visit     Advanced directives  Five Wishes: Patient  does not have Five Wishes- would not like information         Renée Molina MD

## 2024-01-11 NOTE — RESPIRATORY THERAPY NOTE
Pulse ox 93-94%   01/11/24 0720   Respiratory Assessment   Resp Comments pt placed on vapotherm 0700 for increaased WOB and SOB   Non-Invasive Information   O2 Interface Device HFNC prongs   Non-Invasive Ventilation Mode HFNC (High flow)   $ Continous NIV Initial   $ Pulse Oximetry Spot Check Charge Completed   Non-Invasive Settings   FiO2 (%) 50   Flow (lpm) 40   Temperature (Set) 34   Non-Invasive Readings   Heater Temperature (Obs) 34

## 2024-01-11 NOTE — SEPSIS NOTE
"  Sepsis Note   Freddy Copeland 88 y.o. male MRN: [id number]  Unit/Bed#: RM01 Encounter: [id number]       Initial Sepsis Screening       Row Name 01/11/24 0700                Is the patient's history suggestive of a new or worsening infection? Yes (Proceed)  -ZR        Suspected source of infection pneumonia;suspect infection, source unknown  -ZR        Indicate SIRS criteria Hyperthemia > 38.3C (100.9F) OR Hypothermia <36C (96.8F);Tachycardia > 90 bpm;Tachypnea > 20 resp per min;Leukocytosis (WBC > 60270 IJL) OR Leukopenia (WBC <4000 IJL) OR Bandemia (WBC >10% bands)  -ZR        Are two or more of the above signs & symptoms of infection both present and new to the patient? Yes (Proceed)  -ZR        Assess for evidence of organ dysfunction: Are any of the below criteria present within 6 hours of suspected infection and SIRS criteria that are NOT considered to be chronic conditions? Lactate >/equal 4.0;SBP < 90;INR > 1.5 or aPTT > 60 secs  -ZR        Date of presentation of severe sepsis 01/11/24  -ZR        Time of presentation of severe sepsis 0745  -ZR        Date of presentation of septic shock 01/11/24  -ZR        Time of presentation of septic shock 0821  -ZR        Fluid Resuscitation: A lesser volume than 30 ml/kg IV fluid will be given  -ZR        The 30 mL/kg fluid bolus was not given to the patient despite having hypotension, a lactate of >= 4 mmol/L, or documentation of septic shock secondary to: Concern for fluid/volume overload  -ZR        Instead of the 30 ml/kg fluid bolus, the following volume of crystalloid fluid will be ordered: Other (document in comments)  -ZR        Of the following fluid type: NSS  -ZR        Is the patient is persistently hypotensive in the hour after fluid bolus administration? If yes, patient meets criteria for vasopressor use. NO  -ZR        Sepsis Note: Click \"NEXT\" below (NOT \"close\") to generate sepsis note based on above information. YES (proceed by clicking \"NEXT\")  -ZR " "                 User Key  (r) = Recorded By, (t) = Taken By, (c) = Cosigned By      Initials Name Provider Type    JACOBO Ruiz DO Physician                    Default Flowsheet Data (last 720 hours)       Sepsis Reassess       Row Name 01/11/24 1620 01/11/24 1618 01/11/24 0820             Repeat Volume Status and Tissue Perfusion Assessment Performed    Date of Reassessment: 01/11/24  -ZR 01/11/24  -ZR 01/11/24  -ZR      Time of Reassessment: 1115 -ZR 1115  -ZR --  -ZR      Sepsis Reassessment Note: Click \"NEXT\" below (NOT \"close\") to generate sepsis reassessment note. YES (proceed by clicking \"NEXT\")  -ZR YES (proceed by clicking \"NEXT\")  -ZR --  -ZR      Repeat Volume Status and Tissue Perfusion Assessment Performed -- -- --                User Key  (r) = Recorded By, (t) = Taken By, (c) = Cosigned By      Initials Name Provider Type    JACOBO Ruiz DO Physician                    Body mass index is 27.12 kg/m².  Wt Readings from Last 1 Encounters:   01/11/24 90.7 kg (199 lb 15.3 oz)        Ideal body weight: 77.6 kg (171 lb 1.2 oz)  Adjusted ideal body weight: 82.8 kg (182 lb 10.1 oz)    "

## 2024-01-11 NOTE — H&P
H&P Exam - Freddy Copeland 88 y.o. male MRN: [id number]    Unit/Bed#: RM01 Encounter: [id number]    Assessment:    Admission for end of life care  Assessment & Plan  The patient is an 89 y/o M with a history of AML and prostate Ca , established with palliative presents to the ED with chest pain and shock    The case was reviewed by ED provider with me and pulmonary and patient was recommending transfer to a tertiary care facility. However after discussion with the family they elected to transition to comfort care measures.     I spoke with the patient's wife and son at bedside on 2 occasions and reviewed what would be entailed treatment of shock vs comfort care measure.  Ultimately they prefer to have the patient admitted to [facility] and have symptoms managed along with evaluation by hospice     Utilize robinul for secretions  Haldol for nausea and agitation  Dilaudid for Difficulty breathing   Hospice consult     Shock (HCC)  Assessment & Plan  Hypovolemic vs. Cardiogenic vs. Septic   Was on levophed which will be discontinued  Did receive 1 unit PRBC   Family electing to transition to comfort care     AML (acute myeloid leukemia) (HCC)  Assessment & Plan  Recent Diagnosis , established with palliative medicine and oncology     History of Present Illness     History is obtained via review of medical records, discussion with ED staff, discussion with the patient's son and wife.    88-year-old male with a recent diagnosis of AML, history of atrial fibrillation, DVT, prostate cancer, presents to the emergency room with complaint of chest pain ongoing for about a week.  On further questioning the patient's family states that he was in his usual state of health when suddenly felt very weak.     The patient appears ill and is on HFNC. Extensive discussion with his wife and son at bedside regarding goals of care , extent of treatment and the patient's preference regarding end of life care. Ultimately they decided to proceed  with comfort care measures.     Review of Systems   Unable to perform ROS: Acuity of condition       Historical Information   Past Medical History:   Diagnosis Date    Anxiety     Atrial fibrillation (HCC)     Atrial flutter (HCC)     Congestive heart failure (CHF) (HCC)     COPD (chronic obstructive pulmonary disease) (HCC)     Coronary artery disease     DVT (deep venous thrombosis) (HCC)     ED (erectile dysfunction)     Hearing loss     History of echocardiogram 04/05/2018    EF 0.55, Mild LVH. Mild moderate mitral regurg. Trace aortic regurg.    Hx of radiation therapy     XRT    Hypercholesterolemia     Hyperlipidemia     Hypertension     Long term (current) use of anticoagulants 8/21/2018    Neuropathy of both feet     Peripheral neuropathy     Prostate cancer (HCC)     Type 2 diabetes mellitus (HCC)      Past Surgical History:   Procedure Laterality Date    CARDIAC CATHETERIZATION  01/15/1988    Left main: unobstructed. Posterolateral branch 90% narrowed.    COLONOSCOPY  10/05/2017    Dr. Saeed High    IR BIOPSY BONE MARROW  12/19/2023    PROSTATE SURGERY       Social History   Social History     Substance and Sexual Activity   Alcohol Use Not Currently     Social History     Substance and Sexual Activity   Drug Use Never     Social History     Tobacco Use   Smoking Status Never   Smokeless Tobacco Never     E-Cigarette Use: Never User     E-Cigarette/Vaping Substances    Nicotine No     THC No     CBD No     Flavoring No     Other No     Unknown No        Family History: non-contributory    Meds/Allergies   all medications and allergies reviewed  No Known Allergies    Objective   First Vitals:   Blood Pressure: 101/55 (01/11/24 0548)  Pulse: (!) 154 (01/11/24 0548)  Temperature: 97.8 °F (36.6 °C) (01/11/24 0548)  Temp Source: Temporal (01/11/24 0548)  Respirations: 20 (01/11/24 0548)  Weight - Scale: 90.7 kg (199 lb 15.3 oz) (01/11/24 0548)  SpO2: 96 % (01/11/24 0548)    Current Vitals:   Blood Pressure: (!)  84/53 (01/11/24 1315)  Pulse: (!) 136 (01/11/24 1315)  Temperature: 99 °F (37.2 °C) (01/11/24 1315)  Temp Source: Temporal (01/11/24 1315)  Respirations: 20 (01/11/24 1315)  Weight - Scale: 90.7 kg (199 lb 15.3 oz) (01/11/24 0548)  SpO2: 96 % (01/11/24 1315)    No intake or output data in the 24 hours ending 01/11/24 1354    Invasive Devices       Peripheral Intravenous Line  Duration             Peripheral IV 01/11/24 Distal;Left;Upper;Ventral (anterior) Arm <1 day    Peripheral IV 01/11/24 Dorsal (posterior);Right Hand <1 day                    Physical Exam  Vitals and nursing note reviewed.   Constitutional:       Appearance: He is ill-appearing.   HENT:      Head: Normocephalic and atraumatic.      Right Ear: External ear normal.      Left Ear: External ear normal.      Mouth/Throat:      Mouth: Mucous membranes are dry.      Pharynx: No posterior oropharyngeal erythema.   Eyes:      Extraocular Movements: Extraocular movements intact.      Pupils: Pupils are equal, round, and reactive to light.   Cardiovascular:      Rate and Rhythm: Tachycardia present.   Pulmonary:      Effort: Respiratory distress present.   Abdominal:      General: Abdomen is flat. There is distension.      Tenderness: There is abdominal tenderness. There is no guarding.   Musculoskeletal:      Right lower leg: Edema present.      Left lower leg: Edema present.   Skin:     Capillary Refill: Capillary refill takes more than 3 seconds.   Neurological:      Mental Status: He is disoriented.         Lab Results:   Lab Results   Component Value Date    WBC 3.08 (L) 01/11/2024    HGB 6.0 (LL) 01/11/2024    HCT 18.6 (L) 01/11/2024     (H) 01/11/2024    PLT 29 (LL) 01/11/2024     Lab Results   Component Value Date     09/23/2015    SODIUM 141 01/11/2024    K 4.5 01/11/2024     01/11/2024    CO2 13 (L) 01/11/2024    ANIONGAP 9 09/23/2015    AGAP 21 01/11/2024    BUN 36 (H) 01/11/2024    CREATININE 1.88 (H) 01/11/2024    GLUC 201  (H) 01/11/2024    GLUF 138 (H) 12/16/2023    CALCIUM 8.9 01/11/2024     (H) 01/11/2024     (H) 01/11/2024    ALKPHOS 106 (H) 01/11/2024    PROT 7.1 09/23/2015    TP 6.4 01/11/2024    BILITOT 0.85 09/23/2015    TBILI 3.50 (H) 01/11/2024    EGFR 31 01/11/2024       Imaging:   US right upper quadrant   Final Result      Gallbladder wall thickness is 4 mm. Trace pericholecystic fluid. Negative sonographic Oquendo sign. No gallstones identified. Consider scintigraphic biliary scan for further evaluation.      Hepatic steatosis.      Right renal cysts.      Workstation performed: [id number]         PE Study with CT abdomen & pelvis with contrast   Final Result         1. No pulmonary embolism.   2. Right upper lobe infiltrates with few patchy opacities in the left upper lobe and right middle lobe. Findings suggest an infectious or inflammatory etiology. Probably reactive right hilar and mediastinal nodes.   3. Cardiomegaly with small pleural effusions larger on the right and small abdominal ascites.   4. Mild prominence of the wall of the ascending colon which could reflect a mild colitis.   5. Streak artifact from patient's arms at the level of the gallbladder fossa limit evaluation. There is clinical concern for gallbladder pathology ultrasound can be obtained.   6. Hepatic steatosis, hepatic and renal cysts and nonobstructing right renal calculus.   The study was marked in EPIC for immediate notification.   .                  Workstation performed: [id number]         XR chest portable   Final Result      No acute cardiopulmonary disease.                  Workstation performed: [id number]             EKG, Pathology, and Other Studies: Sinus tach    Code Status: Level 4 - Comfort Care  Advance Directive and Living Will:      Power of :    POLST:      Counseling / Coordination of Care:

## 2024-01-11 NOTE — ASSESSMENT & PLAN NOTE
The patient is an 87 y/o M with a history of AML and prostate Ca , established with palliative presents to the ED with chest pain and shock    The case was reviewed by ED provider with me and pulmonary and patient was recommending transfer to a tertiary care facility. However after discussion with the family they elected to transition to comfort care measures.     I spoke with the patient's wife and son at bedside on 2 occasions and reviewed what would be entailed in comfort care and ultimately they prefer to have the patient admitted to [facility] and have symptoms managed along with evaluation by hospice

## 2024-01-11 NOTE — ED NOTES
Respiratory at bedside to trial on Vapotherm     Chris Albert RN  01/11/24 0702       Chris Albert RN  01/11/24 0703

## 2024-01-11 NOTE — QUICK NOTE
88 year old male with recently diagnosed AML and history of prostate cancer who presented this morning with shock most likely sepsis with elevated lactic acid and procalcitonin along with low WBC and hemoglobin. He was transfused and started on antibiotics and HF nasal oxygen. Family made the decision to shift focus to comfort focused care and stop antibiotics with the goal of comfort and peaceful dying. He is felt to be appropriate for GIP level of hospice care with critical illness, respiratory distress and need for RN monitored and administered parenteral medications. Prognosis is likely days.

## 2024-01-11 NOTE — CASE MANAGEMENT
Case Management Assessment & Discharge Planning Note    Patient name Freddy Copeland  Location RM01/RM01 MRN [id number]  : 1935 Date 2024       Current Admission Date: 2024  Current Admission Diagnosis:Admission for end of life care   Patient Active Problem List    Diagnosis Date Noted    Admission for end of life care 2024    Shock (HCC) 2024    Anemia in neoplastic disease 2023    Platelets decreased (HCC) 2023    Lower leg DVT (deep venous thromboembolism), acute, left (HCC) 10/17/2023    Deep vein thrombosis (DVT) of femoral vein of right lower extremity (HCC) 10/17/2023    Acute on chronic diastolic (congestive) heart failure (HCC) 2023    Troponin level elevated 2023    AML (acute myeloid leukemia) (HCC) 2023    Plantar fascial fibromatosis 2023    Type 2 diabetes mellitus without complication, without long-term current use of insulin (HCC) 2023    Peripheral arterial disease (HCC) 2023    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) 2021    Malignant neoplasm of prostate (HCC) 2021    Chronic diastolic congestive heart failure (HCC) 2020    Chronic venous insufficiency 2019    Dermatofibroma 2018    Excessive anticoagulation 2018    Exostosis 2017    Olecranon bursitis 2017    FH: colon cancer 2017    Family history of colonic polyps 2017    History of colonic polyps 2017    Abnormality of gait 2016    Idiopathic peripheral neuropathy 2016    Paresthesias 2016      LOS (days): 0  Geometric Mean LOS (GMLOS) (days):   Days to GMLOS:     OBJECTIVE:              Current admission status: Observation  Referral Reason:  (end of life planning hospice referral)    Preferred Pharmacy:   [facility] - [place] - [address]  [address]  [place] [address]  Phone: [phone] Fax: [phone]    [facility] [place] [facility] [address]  [address] [address] [address] [address]  Phone: [phone] Fax: [phone]    Primary Care Provider: Elijah Borja DO    Primary Insurance: RADHA WORRELL  Secondary Insurance:     ASSESSMENT:    CM met with patient caretaker at the bedside,baseline information  was obtained. CM discussed the role of CM in helping the patient develop a discharge plan and assist the patient in carry out their plan.    Patient lives with wife 2 story home . Ramp into home to first floor living.    Baseline using walker with assist.      Active Health Care Proxies       Susan Copeland Health Care Representative - Spouse   Primary Phone: [phone] (Mobile)  Home Phone: [phone]                 Advance Directives  Does patient have a Health Care POA?: No  Was patient offered paperwork?: Yes  Does patient currently have a Health Care decision maker?: Yes, please see Health Care Proxy section (jose miguel galicia)  Does patient have Advance Directives?: No  Was patient offered paperwork?: Yes  Primary Contact: Susan Copeland wife         Readmission Root Cause  30 Day Readmission: No    Patient Information  Admitted from:: Home  Mental Status: Alert  During Assessment patient was accompanied by: Spouse  Assessment information provided by:: Spouse  Primary Caregiver: Spouse  Caregiver's Name:: Susan Copeland wife  Caregiver's Relationship to Patient:: Significant Other  Caregiver's Telephone Number:: [phone]  Support Systems: Spouse/significant other  [place] [facility] [place] [facility]  What city do you live in?: [place]  Home entry access options. Select all that apply.: Ramp  Type of Current Residence: 2 story home  Upon entering residence, is there a bedroom on the main floor (no further steps)?: Yes  Upon entering residence, is there a bathroom on the main floor (no further steps)?: Yes  Living Arrangements: Lives w/ Spouse/significant other  Is patient a  ?: Yes  Is patient active with VA ([employer])?: No    Activities of Daily Living Prior to Admission  Functional Status: Assistance  Completes ADLs independently?: No  Level of ADL dependence: Assistance  Ambulates independently?: No  Level of ambulatory dependence: Assistance  Does patient use assisted devices?: Yes  Assisted Devices (DME) used: Walker, Straight Cane (electric chair at home patient never used)  Does patient currently own DME?: Yes  What DME does the patient currently own?: Straight Cane, Walker (electric chair at home patient never used)  Does patient have a history of Outpatient Therapy (PT/OT)?: No  Does the patient have a history of Short-Term Rehab?: No  Does patient have a history of HHC?: Yes  Does patient currently have HHC?: No         Patient Information Continued  Income Source: Pension/snf  Does patient have prescription coverage?: Yes  Does patient receive dialysis treatments?: No  Does patient have a history of substance abuse?: No  Does patient have a history of Mental Health Diagnosis?: No         Means of Transportation  Means of Transport to Hospitals in [place]:: Family transport      Housing Stability: Low Risk  (1/11/2024)    Housing Stability Vital Sign     Unable to Pay for Housing in the Last Year: No     Number of Places Lived in the Last Year: 1     Unstable Housing in the Last Year: No   Food Insecurity: No Food Insecurity (1/11/2024)    Hunger Vital Sign     Worried About Running Out of Food in the Last Year: Never true     Ran Out of Food in the Last Year: Never true   Transportation Needs: No Transportation Needs (1/11/2024)    PRAPARE - Transportation     Lack of Transportation (Medical): No     Lack of Transportation (Non-Medical): No   Utilities: Not At Risk (1/11/2024)    C Utilities     Threatened with loss of utilities: No       DISCHARGE DETAILS:    Discharge planning discussed with:: patient wife  Freedom of Choice: Yes  Comments - Freedom of Choice: AUDRA  discussed hospice options with patient wife, patient and wife preference is to remain at [facility] on hospice for end of life care  CM contacted family/caregiver?: Yes (John Copeland wife at bedside)  Were Treatment Team discharge recommendations reviewed with patient/caregiver?: Yes  Did patient/caregiver verbalize understanding of patient care needs?: Yes  Were patient/caregiver advised of the risks associated with not following Treatment Team discharge recommendations?: Yes    Contacts  Patient Contacts: John parson  Relationship to Patient:: Family  Contact Method: Phone  Phone Number: [phone]  Reason/Outcome: Discharge Planning    Requested Home Health Care         Is the patient interested in HHC at discharge?: No    DME Referral Provided  Referral made for DME?: No         Treatment Team Recommendation: Hospice  Discharge Destination Plan:: Hospice            CM sent referral for [facility] to have a hospice talk with patients wife for End of life care for her spouse.    [facility] hospice liaison asked CM to talk to family and see where they want hospice care at.      CM spoke with patient wife in consult room in ED. Discussed hospice care and different options.    Patient wife stated her and her spouse want patient to stay at [facility] for end of life care.    Patient wife is retired RN that work for [facility] for years, she said this is our hospital.    [facility] Hospice liaison tiger text CM stating she reviewed case with Dr Almeida and he approved patient for [facility], not  approved for [facility].     CM discussed  Hospice Liaison spoke with Dr Almeida and patient was approved for inpatient at [facility].   Patient wife stated she can not drive to [place] daily 78 is very bad.     CM updated [facility] hospice liaison with [facility]. Aware family wants to remain at [facility] for end of life care.  Patient is on Comfort care at [facility] currently.    CM heard from Sheeba Coffey  Hospice manager  and a  Hospice nurse will see  family at bedside tomorrow.    CM to follow

## 2024-01-11 NOTE — HOSPICE NOTE
ALFRED ZHU ASKED IF PT COULD BE GIP AT [facility].  REVIEWED CASE WITH DR. MERCADO. PER DR. MERCADO PT IS APPROVED FOR IPU LOC AT [facility] IN [place], NOT APPROVED FOR GIP @ [facility]. PT COULD BE WEANED TO 10 LNC O2 OR NRB FOR TRANSPORT. ALFRED ZHU UPDATED VIA TT, SHE WILL SPEAK WITH FAMILY TO SEE HOW THEY WISH TO PROCEED AND UPDATE HOSPICE.

## 2024-01-12 NOTE — PROGRESS NOTES
[facility]  Progress Note  Name: Freddy Copeland I  MRN: [id number]  Unit/Bed#: 422-01 I Date of Admission: 1/11/2024   Date of Service: 1/12/2024 I Hospital Day: 0    Assessment/Plan   Admission for end of life care  Assessment & Plan  Patient admitted to [facility] hospice  Appreciate assistance from hospice team  NRB placed  On dilaudid and robinuol  Appears comfortable on exam today              Progress Note - Freddy Copeland 88 y.o. male MRN: [id number]    Unit/Bed#: 422-01 Encounter: [id number]        Subjective:   Patient seen and examined, appears comfortable  Son at bedside  Questions answered     Objective:     Vitals:   Vitals:    01/11/24 2336   Pulse: (!) 115   SpO2: 96%     There is no height or weight on file to calculate BMI.    Intake/Output Summary (Last 24 hours) at 1/12/2024 1041  Last data filed at 1/12/2024 0843  Gross per 24 hour   Intake 0 ml   Output 650 ml   Net -650 ml       Physical Exam:   No exam performed today,  end of life .     Invasive Devices       Peripheral Intravenous Line  Duration             Peripheral IV 01/11/24 Distal;Left;Upper;Ventral (anterior) Arm 1 day    Peripheral IV 01/11/24 Dorsal (posterior);Right Hand 1 day              Drain  Duration             Urethral Catheter Latex 18 Fr. <1 day                    Results from last 7 days   Lab Units 01/11/24  0601 01/09/24  1011   WBC Thousand/uL 3.08* 1.46*   HEMOGLOBIN g/dL 6.0* 6.2*   HEMATOCRIT % 18.6* 19.0*   PLATELETS Thousands/uL 29* 22*       Results from last 7 days   Lab Units 01/11/24  0601   POTASSIUM mmol/L 4.5   CHLORIDE mmol/L 107   CO2 mmol/L 13*   BUN mg/dL 36*   CREATININE mg/dL 1.88*   CALCIUM mg/dL 8.9   ALK PHOS U/L 106*   ALT U/L 400*   AST U/L 417*       Medication Administration - last 24 hours from 01/11/2024 1041 to 01/12/2024 1041         Date/Time Order Dose Route Action Action by     01/11/2024 2259 EST bisacodyl (DULCOLAX) rectal suppository 10 mg 10 mg Rectal Given Maggy PAEZ  CHIRAG Foster     01/11/2024 2254 EST ondansetron (ZOFRAN) injection 4 mg 4 mg Intravenous Given Maggy Foster RN              Lab, Imaging and other studies:   VTE Pharmacologic Prophylaxis:   VTE Mechanical Prophylaxis:      Renée Molina MD  1/12/2024,10:41 AM

## 2024-01-12 NOTE — ASSESSMENT & PLAN NOTE
Patient admitted to [facility] hospice  Appreciate assistance from hospice team  NRB placed  On dilaudid and robinuol  Appears comfortable on exam today

## 2024-01-12 NOTE — PLAN OF CARE
Problem: PAIN - ADULT  Goal: Verbalizes/displays adequate comfort level or baseline comfort level  Description: Interventions:  - Encourage patient to monitor pain and request assistance  - Assess pain using appropriate pain scale  - Administer analgesics based on type and severity of pain and evaluate response  - Implement non-pharmacological measures as appropriate and evaluate response  - Consider cultural and social influences on pain and pain management  - Notify physician/advanced practitioner if interventions unsuccessful or patient reports new pain  Outcome: Progressing     Problem: INFECTION - ADULT  Goal: Absence or prevention of progression during hospitalization  Description: INTERVENTIONS:  - Assess and monitor for signs and symptoms of infection  - Monitor lab/diagnostic results  - Monitor all insertion sites, i.e. indwelling lines, tubes, and drains  - Monitor endotracheal if appropriate and nasal secretions for changes in amount and color  - [place] appropriate cooling/warming therapies per order  - Administer medications as ordered  - Instruct and encourage patient and family to use good hand hygiene technique  - Identify and instruct in appropriate isolation precautions for identified infection/condition  Outcome: Progressing     Problem: SAFETY ADULT  Goal: Patient will remain free of falls  Description: INTERVENTIONS:  - Educate patient/family on patient safety including physical limitations  - Instruct patient to call for assistance with activity   - Consult OT/PT to assist with strengthening/mobility   - Keep Call bell within reach  - Keep bed low and locked with side rails adjusted as appropriate  - Keep care items and personal belongings within reach  - Initiate and maintain comfort rounds  - Make Fall Risk Sign visible to staff  - Offer Toileting every 2 Hours, in advance of need  - Initiate/Maintain fall alarm  - Obtain necessary fall risk management equipment: alarm, nonskid socks,  mobility equipment  - Apply yellow socks and bracelet for high fall risk patients  - Consider moving patient to room near nurses station  Outcome: Progressing  Goal: Maintain or return to baseline ADL function  Description: INTERVENTIONS:  -  Assess patient's ability to carry out ADLs; assess patient's baseline for ADL function and identify physical deficits which impact ability to perform ADLs (bathing, care of mouth/teeth, toileting, grooming, dressing, etc.)  - Assess/evaluate cause of self-care deficits   - Assess range of motion  - Assess patient's mobility; develop plan if impaired  - Assess patient's need for assistive devices and provide as appropriate  - Encourage maximum independence but intervene and supervise when necessary  - Involve family in performance of ADLs  - Assess for home care needs following discharge   - Consider OT consult to assist with ADL evaluation and planning for discharge  - Provide patient education as appropriate  Outcome: Progressing  Goal: Maintains/Returns to pre admission functional level  Description: INTERVENTIONS:  - Perform AM-PAC 6 Click Basic Mobility/ Daily Activity assessment daily.  - Set and communicate daily mobility goal to care team and patient/family/caregiver.   - Collaborate with rehabilitation services on mobility goals if consulted  - Perform Range of Motion 4 times a day.  - Reposition patient every 2 hours.  - Dangle patient 3 times a day  - Stand patient 3 times a day  - Ambulate patient 3 times a day  - Out of bed to chair 3 times a day   - Out of bed for meals 3 times a day  - Out of bed for toileting  - Record patient progress and toleration of activity level   Outcome: Progressing     Problem: DISCHARGE PLANNING  Goal: Discharge to home or other facility with appropriate resources  Description: INTERVENTIONS:  - Identify barriers to discharge w/patient and caregiver  - Arrange for needed discharge resources and transportation as appropriate  - Identify  discharge learning needs (meds, wound care, etc.)  - Arrange for interpretive services to assist at discharge as needed  - Refer to Case Management Department for coordinating discharge planning if the patient needs post-hospital services based on physician/advanced practitioner order or complex needs related to functional status, cognitive ability, or social support system  Outcome: Progressing     Problem: Knowledge Deficit  Goal: Patient/family/caregiver demonstrates understanding of disease process, treatment plan, medications, and discharge instructions  Description: Complete learning assessment and assess knowledge base.  Interventions:  - Provide teaching at level of understanding  - Provide teaching via preferred learning methods  Outcome: Progressing     Problem: COPING  Goal: Pt/Family able to verbalize concerns and demonstrate effective coping strategies  Description: INTERVENTIONS:  - Assist patient/family to identify coping skills, available support systems and cultural and spiritual values  - Provide emotional support, including active listening and acknowledgement of concerns of patient and caregivers  - Reduce environmental stimuli, as able  - Provide patient education  - Assess for spiritual pain/suffering and initiate spiritual care, including notification of Pastoral Care or max based community as needed  - Assess effectiveness of coping strategies  Outcome: Progressing  Goal: Will report anxiety at manageable levels  Description: INTERVENTIONS:  - Administer medication as ordered  - Teach and encourage coping skills  - Provide emotional support  - Assess patient/family for anxiety and ability to cope  Outcome: Progressing     Problem: DEATH & DYING  Goal: Pt/Family communicate acceptance of impending death and expresses psychological comfort and peace  Description: INTERVENTIONS:  - Assess patient/family anxiety and grief process related to end of life issues  - Provide emotional, spiritual and  psychosocial support  - Provide information about the patient’s health status with consideration of family and cultural values  - Communicate willingness to discuss death and facilitate grief process  with patient/family as appropriate  - Emphasize sustaining relationships within family system and community, or max/spiritual traditions  - Initiate Spiritual Care, Pastoral care or other ancillary consults as needed  - Refer to community support groups as appropriate  Outcome: Progressing     Problem: DECISION MAKING  Goal: Pt/Family able to effectively weigh alternatives and participate in decision making related to treatment and care  Description: INTERVENTIONS:  - Identify decision maker  - Determine when there are differences among patient's view, family's view, and healthcare provider's view of patient condition and care goals  - Facilitate patient/family articulation of goals for care  - Help patient/family identify pros/cons of alternative solutions  - Provide information as requested by patient/family  - Respect patient/family rights related to privacy and sharing information   - Serve as a liaison between patient, family and health care team  - Initiate consults as appropriate (Ethics Team, Palliative Care, Family Care Conference, etc.)  Outcome: Progressing     Problem: SPIRITUAL CARE  Goal: Pt/Family able to move forward in process of forgiving self, others and/or higher power  Description: INTERVENTIONS:  - Assist patient with any spiritual needs/requests such as communion, confession, anointing, etc  - Explore guilt and help patient/family identify possible spiritual/cultural beliefs and values  - Explore possibilities of making amends & reconciliation with self, others, and/or a greater power  - Guide patient/family in identifying painful feelings  - Help patient explore and identify spiritual beliefs, cultural understandings or values that may help or hinder letting go of issue  - Help patient explore  feelings of anger, bitterness, resentment, anxiety   Help patient/family identify and examine the situation in which these feelings are experienced  - Help patient/family identify destructive displacement of feelings onto other individuals  - Refer patient to formal counseling and/or to max [facility] for further support as needed or per request  Outcome: Progressing  Goal: Patient feels balance and connection with others and/or higher power that empowers the self during times of loss, guilt and fear  Description: INTERVENTIONS:  - Create safety for patient through empathic presence and non-judgmental listening  - Encourage patient to explore his/her values, beliefs and/or spiritual images and practices  - Encourage use of breath work, imagery, meditation, relaxation, reiki to ease distress and provide healing  - Encourage use of cultural and spiritual celebrations and rituals  - Facilitate discussion that helps patient sort out spiritual concerns  - Help patient identify where meaning/hope/comfort & strength are in his/her life  - Refer patient to [facility] for assistance, as appropriate  - Respond to patient/family need for prayer/ritual/sacrament/ceremony  Outcome: Progressing     Problem: Prexisting or High Potential for Compromised Skin Integrity  Goal: Skin integrity is maintained or improved  Description: INTERVENTIONS:  - Identify patients at risk for skin breakdown  - Assess and monitor skin integrity  - Assess and monitor nutrition and hydration status  - Monitor labs   - Assess for incontinence   - Turn and reposition patient  - Assist with mobility/ambulation  - Relieve pressure over bony prominences  - Avoid friction and shearing  - Provide appropriate hygiene as needed including keeping skin clean and dry  - Evaluate need for skin moisturizer/barrier cream  - Collaborate with interdisciplinary team   - Patient/family teaching  - Consider wound care consult   Outcome: Progressing

## 2024-01-12 NOTE — SEPSIS NOTE
Sepsis Note   Freddy Copeland 88 y.o. male MRN: [id number]  Unit/Bed#: 422-01 Encounter: [id number]       Initial Sepsis Screening       Row Name 01/11/24 1118 01/11/24 0700 01/11/24 0633          Is the patient's history suggestive of a new or worsening infection? Yes (Proceed)  -ZR Yes (Proceed)  -ZR Yes (Proceed)  -BC      Suspected source of infection pneumonia;suspect infection, source unknown;acute abdominal infection  -ZR pneumonia;suspect infection, source unknown  -ZR pneumonia;suspect infection, source unknown  -BC      Indicate SIRS criteria Hyperthemia > 38.3C (100.9F) OR Hypothermia <36C (96.8F);Tachycardia > 90 bpm;Tachypnea > 20 resp per min  -ZR Hyperthemia > 38.3C (100.9F) OR Hypothermia <36C (96.8F);Tachycardia > 90 bpm;Tachypnea > 20 resp per min;Leukocytosis (WBC > 12184 IJL) OR Leukopenia (WBC <4000 IJL) OR Bandemia (WBC >10% bands)  -ZR Hyperthemia > 38.3C (100.9F) OR Hypothermia <36C (96.8F);Tachycardia > 90 bpm;Tachypnea > 20 resp per min;Leukocytosis (WBC > 60210 IJL) OR Leukopenia (WBC <4000 IJL) OR Bandemia (WBC >10% bands)  -BC      Are two or more of the above signs & symptoms of infection both present and new to the patient? Yes (Proceed)  -ZR Yes (Proceed)  -ZR Yes (Proceed)  -BC      Assess for evidence of organ dysfunction: Are any of the below criteria present within 6 hours of suspected infection and SIRS criteria that are NOT considered to be chronic conditions? Lactate >/equal 4.0;Urine output < 0.5 mL/kg/hour for 2 hours;SBP < 90;INR > 1.5 or aPTT > 60 secs  -ZR Lactate >/equal 4.0;SBP < 90;INR > 1.5 or aPTT > 60 secs  -ZR Lactate >/equal 4.0  -BC      Date of presentation of severe sepsis 01/11/24  -ZR 01/11/24  -ZR 01/11/24  -BC      Time of presentation of severe sepsis -- 0745  -ZR 0633  -BC      Date of presentation of septic shock 01/11/24  -ZR 01/11/24  -ZR 01/11/24  -BC      Time of presentation of septic shock 0821  -ZR 0821  -ZR 0633  -BC      Fluid Resuscitation: A  "lesser volume than 30 ml/kg IV fluid will be given  -ZR A lesser volume than 30 ml/kg IV fluid will be given  -ZR A lesser volume than 30 ml/kg IV fluid will be given  -BC      The 30 mL/kg fluid bolus was not given to the patient despite having hypotension, a lactate of >= 4 mmol/L, or documentation of septic shock secondary to: Concern for fluid/volume overload  -ZR Concern for fluid/volume overload  -ZR Concern for fluid/volume overload;Heart Failure;BP responded to a lesser fluid volume  -BC      Instead of the 30 ml/kg fluid bolus, the following volume of crystalloid fluid will be ordered: Other (document in comments)  1500 ml normal saline  -ZR Other (document in comments)  -ZR Other (document in comments)  1.5 L  -BC      Of the following fluid type: NSS  -ZR NSS  -ZR NSS  -BC      Is the patient is persistently hypotensive in the hour after fluid bolus administration? If yes, patient meets criteria for vasopressor use. YES  -ZR NO  -ZR NO  -BC      Sepsis Note: Click \"NEXT\" below (NOT \"close\") to generate sepsis note based on above information. YES (proceed by clicking \"NEXT\")  -ZR YES (proceed by clicking \"NEXT\")  -ZR YES (proceed by clicking \"NEXT\")  -BC                User Key  (r) = Recorded By, (t) = Taken By, (c) = Cosigned By      Initials Name Provider Type    JACOBO Ruiz DO Physician    BC Russell Horn MD Physician                    Default Flowsheet Data (last 720 hours)       Sepsis Reassess       Row Name 01/11/24 2212 01/11/24 1752 01/11/24 1620 01/11/24 1618 01/11/24 0820       Repeat Volume Status and Tissue Perfusion Assessment Performed    Date of Reassessment: 01/11/24  -BC 01/11/24  -ZR 01/11/24  -ZR 01/11/24  -ZR 01/11/24  -ZR    Time of Reassessment: 0930 -BC 1118  -ZR 1115  -ZR 1115  -ZR --  -ZR    Sepsis Reassessment Note: Click \"NEXT\" below (NOT \"close\") to generate sepsis reassessment note. YES (proceed by clicking \"NEXT\")  -BC YES (proceed by clicking " "\"NEXT\")  -ZR YES (proceed by clicking \"NEXT\")  -ZR YES (proceed by clicking \"NEXT\")  -ZR --  -ZR    Repeat Volume Status and Tissue Perfusion Assessment Performed -- -- -- -- --              User Key  (r) = Recorded By, (t) = Taken By, (c) = Cosigned By      Initials Name Provider Type    ZR Kamaljit Ruiz DO Physician    MARYLIN Horn MD Physician                    Body mass index is 27.12 kg/m².  Wt Readings from Last 1 Encounters:   01/11/24 90.7 kg (199 lb 15.3 oz)        Ideal body weight: 77.6 kg (171 lb 1.2 oz)  Adjusted ideal body weight: 82.8 kg (182 lb 10.1 oz)    "

## 2024-01-12 NOTE — NURSING NOTE
Pt showing signs of discomfort and urination urgency without release of urine. Pt was bladder scanned at 374 mL; Pt and present granddaughters(family) agreeable to sampson catheter to make him more comfortable and release pressure in bladder; Sampson placed by primary RN Prudence with output of 650 mL urine deep yellow in color; Pt expressed relief and is resting comfortably with family at bedside;

## 2024-01-12 NOTE — HOSPICE NOTE
Maximo mei is 88 year old patient pps 20   Patient is bedbound dependent on others for all adls inhaled corticosteroids not verbal is not able to take po intake .will grimmace with movement .will become restless with care. Family at bed side is very supportive of patient .reviewed comfort measures and gip status with wife and granddaughter.

## 2024-01-12 NOTE — PLAN OF CARE
Problem: PAIN - ADULT  Goal: Verbalizes/displays adequate comfort level or baseline comfort level  Description: Interventions:  - Encourage patient to monitor pain and request assistance  - Assess pain using appropriate pain scale  - Administer analgesics based on type and severity of pain and evaluate response  - Implement non-pharmacological measures as appropriate and evaluate response  - Consider cultural and social influences on pain and pain management  - Notify physician/advanced practitioner if interventions unsuccessful or patient reports new pain  Outcome: Progressing     Problem: INFECTION - ADULT  Goal: Absence or prevention of progression during hospitalization  Description: INTERVENTIONS:  - Assess and monitor for signs and symptoms of infection  - Monitor lab/diagnostic results  - Monitor all insertion sites, i.e. indwelling lines, tubes, and drains  - Monitor endotracheal if appropriate and nasal secretions for changes in amount and color  - [place] appropriate cooling/warming therapies per order  - Administer medications as ordered  - Instruct and encourage patient and family to use good hand hygiene technique  - Identify and instruct in appropriate isolation precautions for identified infection/condition  Outcome: Progressing     Problem: SAFETY ADULT  Goal: Patient will remain free of falls  Description: INTERVENTIONS:  - Educate patient/family on patient safety including physical limitations  - Instruct patient to call for assistance with activity   - Consult OT/PT to assist with strengthening/mobility   - Keep Call bell within reach  - Keep bed low and locked with side rails adjusted as appropriate  - Keep care items and personal belongings within reach  - Initiate and maintain comfort rounds  - Make Fall Risk Sign visible to staff  - Offer Toileting every 2 Hours, in advance of need  - Initiate/Maintain fall alarm  - Obtain necessary fall risk management equipment: alarm, nonskid socks,  mobility equipment  - Apply yellow socks and bracelet for high fall risk patients  - Consider moving patient to room near nurses station  Outcome: Progressing  Goal: Maintain or return to baseline ADL function  Description: INTERVENTIONS:  -  Assess patient's ability to carry out ADLs; assess patient's baseline for ADL function and identify physical deficits which impact ability to perform ADLs (bathing, care of mouth/teeth, toileting, grooming, dressing, etc.)  - Assess/evaluate cause of self-care deficits   - Assess range of motion  - Assess patient's mobility; develop plan if impaired  - Assess patient's need for assistive devices and provide as appropriate  - Encourage maximum independence but intervene and supervise when necessary  - Involve family in performance of ADLs  - Assess for home care needs following discharge   - Consider OT consult to assist with ADL evaluation and planning for discharge  - Provide patient education as appropriate  Outcome: Progressing  Goal: Maintains/Returns to pre admission functional level  Description: INTERVENTIONS:  - Perform AM-PAC 6 Click Basic Mobility/ Daily Activity assessment daily.  - Set and communicate daily mobility goal to care team and patient/family/caregiver.   - Collaborate with rehabilitation services on mobility goals if consulted  - Perform Range of Motion 4 times a day.  - Reposition patient every 2 hours.  - Dangle patient 3 times a day  - Stand patient 3 times a day  - Ambulate patient 3 times a day  - Out of bed to chair 3 times a day   - Out of bed for meals 3 times a day  - Out of bed for toileting  - Record patient progress and toleration of activity level   Outcome: Progressing     Problem: DISCHARGE PLANNING  Goal: Discharge to home or other facility with appropriate resources  Description: INTERVENTIONS:  - Identify barriers to discharge w/patient and caregiver  - Arrange for needed discharge resources and transportation as appropriate  - Identify  discharge learning needs (meds, wound care, etc.)  - Arrange for interpretive services to assist at discharge as needed  - Refer to Case Management Department for coordinating discharge planning if the patient needs post-hospital services based on physician/advanced practitioner order or complex needs related to functional status, cognitive ability, or social support system  Outcome: Progressing     Problem: Knowledge Deficit  Goal: Patient/family/caregiver demonstrates understanding of disease process, treatment plan, medications, and discharge instructions  Description: Complete learning assessment and assess knowledge base.  Interventions:  - Provide teaching at level of understanding  - Provide teaching via preferred learning methods  Outcome: Progressing     Problem: COPING  Goal: Pt/Family able to verbalize concerns and demonstrate effective coping strategies  Description: INTERVENTIONS:  - Assist patient/family to identify coping skills, available support systems and cultural and spiritual values  - Provide emotional support, including active listening and acknowledgement of concerns of patient and caregivers  - Reduce environmental stimuli, as able  - Provide patient education  - Assess for spiritual pain/suffering and initiate spiritual care, including notification of Pastoral Care or max based community as needed  - Assess effectiveness of coping strategies  Outcome: Progressing  Goal: Will report anxiety at manageable levels  Description: INTERVENTIONS:  - Administer medication as ordered  - Teach and encourage coping skills  - Provide emotional support  - Assess patient/family for anxiety and ability to cope  Outcome: Progressing     Problem: DEATH & DYING  Goal: Pt/Family communicate acceptance of impending death and expresses psychological comfort and peace  Description: INTERVENTIONS:  - Assess patient/family anxiety and grief process related to end of life issues  - Provide emotional, spiritual and  psychosocial support  - Provide information about the patient’s health status with consideration of family and cultural values  - Communicate willingness to discuss death and facilitate grief process  with patient/family as appropriate  - Emphasize sustaining relationships within family system and community, or max/spiritual traditions  - Initiate Spiritual Care, Pastoral care or other ancillary consults as needed  - Refer to community support groups as appropriate  Outcome: Progressing     Problem: DECISION MAKING  Goal: Pt/Family able to effectively weigh alternatives and participate in decision making related to treatment and care  Description: INTERVENTIONS:  - Identify decision maker  - Determine when there are differences among patient's view, family's view, and healthcare provider's view of patient condition and care goals  - Facilitate patient/family articulation of goals for care  - Help patient/family identify pros/cons of alternative solutions  - Provide information as requested by patient/family  - Respect patient/family rights related to privacy and sharing information   - Serve as a liaison between patient, family and health care team  - Initiate consults as appropriate (Ethics Team, Palliative Care, Family Care Conference, etc.)  Outcome: Progressing     Problem: SPIRITUAL CARE  Goal: Pt/Family able to move forward in process of forgiving self, others and/or higher power  Description: INTERVENTIONS:  - Assist patient with any spiritual needs/requests such as communion, confession, anointing, etc  - Explore guilt and help patient/family identify possible spiritual/cultural beliefs and values  - Explore possibilities of making amends & reconciliation with self, others, and/or a greater power  - Guide patient/family in identifying painful feelings  - Help patient explore and identify spiritual beliefs, cultural understandings or values that may help or hinder letting go of issue  - Help patient explore  feelings of anger, bitterness, resentment, anxiety   Help patient/family identify and examine the situation in which these feelings are experienced  - Help patient/family identify destructive displacement of feelings onto other individuals  - Refer patient to formal counseling and/or to max [facility] for further support as needed or per request  Outcome: Progressing  Goal: Patient feels balance and connection with others and/or higher power that empowers the self during times of loss, guilt and fear  Description: INTERVENTIONS:  - Create safety for patient through empathic presence and non-judgmental listening  - Encourage patient to explore his/her values, beliefs and/or spiritual images and practices  - Encourage use of breath work, imagery, meditation, relaxation, reiki to ease distress and provide healing  - Encourage use of cultural and spiritual celebrations and rituals  - Facilitate discussion that helps patient sort out spiritual concerns  - Help patient identify where meaning/hope/comfort & strength are in his/her life  - Refer patient to [facility] for assistance, as appropriate  - Respond to patient/family need for prayer/ritual/sacrament/ceremony  Outcome: Progressing     Problem: Prexisting or High Potential for Compromised Skin Integrity  Goal: Skin integrity is maintained or improved  Description: INTERVENTIONS:  - Identify patients at risk for skin breakdown  - Assess and monitor skin integrity  - Assess and monitor nutrition and hydration status  - Monitor labs   - Assess for incontinence   - Turn and reposition patient  - Assist with mobility/ambulation  - Relieve pressure over bony prominences  - Avoid friction and shearing  - Provide appropriate hygiene as needed including keeping skin clean and dry  - Evaluate need for skin moisturizer/barrier cream  - Collaborate with interdisciplinary team   - Patient/family teaching  - Consider wound care consult   Outcome: Progressing

## 2024-01-12 NOTE — PLAN OF CARE
Problem: PAIN - ADULT  Goal: Verbalizes/displays adequate comfort level or baseline comfort level  Description: Interventions:  - Encourage patient to monitor pain and request assistance  - Assess pain using appropriate pain scale  - Administer analgesics based on type and severity of pain and evaluate response  - Implement non-pharmacological measures as appropriate and evaluate response  - Consider cultural and social influences on pain and pain management  - Notify physician/advanced practitioner if interventions unsuccessful or patient reports new pain  Outcome: Progressing     Problem: INFECTION - ADULT  Goal: Absence or prevention of progression during hospitalization  Description: INTERVENTIONS:  - Assess and monitor for signs and symptoms of infection  - Monitor lab/diagnostic results  - Monitor all insertion sites, i.e. indwelling lines, tubes, and drains  - Monitor endotracheal if appropriate and nasal secretions for changes in amount and color  - [place] appropriate cooling/warming therapies per order  - Administer medications as ordered  - Instruct and encourage patient and family to use good hand hygiene technique  - Identify and instruct in appropriate isolation precautions for identified infection/condition  Outcome: Progressing     Problem: SAFETY ADULT  Goal: Patient will remain free of falls  Description: INTERVENTIONS:  - Educate patient/family on patient safety including physical limitations  - Instruct patient to call for assistance with activity   - Consult OT/PT to assist with strengthening/mobility   - Keep Call bell within reach  - Keep bed low and locked with side rails adjusted as appropriate  - Keep care items and personal belongings within reach  - Initiate and maintain comfort rounds  - Make Fall Risk Sign visible to staff  - Offer Toileting every 2 Hours, in advance of need  - Initiate/Maintain fall alarm  - Obtain necessary fall risk management equipment: alarm, nonskid socks,  mobility equipment  - Apply yellow socks and bracelet for high fall risk patients  - Consider moving patient to room near nurses station  Outcome: Progressing  Goal: Maintain or return to baseline ADL function  Description: INTERVENTIONS:  -  Assess patient's ability to carry out ADLs; assess patient's baseline for ADL function and identify physical deficits which impact ability to perform ADLs (bathing, care of mouth/teeth, toileting, grooming, dressing, etc.)  - Assess/evaluate cause of self-care deficits   - Assess range of motion  - Assess patient's mobility; develop plan if impaired  - Assess patient's need for assistive devices and provide as appropriate  - Encourage maximum independence but intervene and supervise when necessary  - Involve family in performance of ADLs  - Assess for home care needs following discharge   - Consider OT consult to assist with ADL evaluation and planning for discharge  - Provide patient education as appropriate  Outcome: Progressing  Goal: Maintains/Returns to pre admission functional level  Description: INTERVENTIONS:  - Perform AM-PAC 6 Click Basic Mobility/ Daily Activity assessment daily.  - Set and communicate daily mobility goal to care team and patient/family/caregiver.   - Collaborate with rehabilitation services on mobility goals if consulted  - Perform Range of Motion 4 times a day.  - Reposition patient every 2 hours.  - Dangle patient 3 times a day  - Stand patient 3 times a day  - Ambulate patient 3 times a day  - Out of bed to chair 3 times a day   - Out of bed for meals 3 times a day  - Out of bed for toileting  - Record patient progress and toleration of activity level   Outcome: Progressing     Problem: DISCHARGE PLANNING  Goal: Discharge to home or other facility with appropriate resources  Description: INTERVENTIONS:  - Identify barriers to discharge w/patient and caregiver  - Arrange for needed discharge resources and transportation as appropriate  - Identify  discharge learning needs (meds, wound care, etc.)  - Arrange for interpretive services to assist at discharge as needed  - Refer to Case Management Department for coordinating discharge planning if the patient needs post-hospital services based on physician/advanced practitioner order or complex needs related to functional status, cognitive ability, or social support system  Outcome: Progressing     Problem: Knowledge Deficit  Goal: Patient/family/caregiver demonstrates understanding of disease process, treatment plan, medications, and discharge instructions  Description: Complete learning assessment and assess knowledge base.  Interventions:  - Provide teaching at level of understanding  - Provide teaching via preferred learning methods  Outcome: Progressing     Problem: COPING  Goal: Pt/Family able to verbalize concerns and demonstrate effective coping strategies  Description: INTERVENTIONS:  - Assist patient/family to identify coping skills, available support systems and cultural and spiritual values  - Provide emotional support, including active listening and acknowledgement of concerns of patient and caregivers  - Reduce environmental stimuli, as able  - Provide patient education  - Assess for spiritual pain/suffering and initiate spiritual care, including notification of Pastoral Care or max based community as needed  - Assess effectiveness of coping strategies  Outcome: Progressing  Goal: Will report anxiety at manageable levels  Description: INTERVENTIONS:  - Administer medication as ordered  - Teach and encourage coping skills  - Provide emotional support  - Assess patient/family for anxiety and ability to cope  Outcome: Progressing     Problem: DEATH & DYING  Goal: Pt/Family communicate acceptance of impending death and expresses psychological comfort and peace  Description: INTERVENTIONS:  - Assess patient/family anxiety and grief process related to end of life issues  - Provide emotional, spiritual and  psychosocial support  - Provide information about the patient’s health status with consideration of family and cultural values  - Communicate willingness to discuss death and facilitate grief process  with patient/family as appropriate  - Emphasize sustaining relationships within family system and community, or max/spiritual traditions  - Initiate Spiritual Care, Pastoral care or other ancillary consults as needed  - Refer to community support groups as appropriate  Outcome: Progressing     Problem: DECISION MAKING  Goal: Pt/Family able to effectively weigh alternatives and participate in decision making related to treatment and care  Description: INTERVENTIONS:  - Identify decision maker  - Determine when there are differences among patient's view, family's view, and healthcare provider's view of patient condition and care goals  - Facilitate patient/family articulation of goals for care  - Help patient/family identify pros/cons of alternative solutions  - Provide information as requested by patient/family  - Respect patient/family rights related to privacy and sharing information   - Serve as a liaison between patient, family and health care team  - Initiate consults as appropriate (Ethics Team, Palliative Care, Family Care Conference, etc.)  Outcome: Progressing     Problem: SPIRITUAL CARE  Goal: Pt/Family able to move forward in process of forgiving self, others and/or higher power  Description: INTERVENTIONS:  - Assist patient with any spiritual needs/requests such as communion, confession, anointing, etc  - Explore guilt and help patient/family identify possible spiritual/cultural beliefs and values  - Explore possibilities of making amends & reconciliation with self, others, and/or a greater power  - Guide patient/family in identifying painful feelings  - Help patient explore and identify spiritual beliefs, cultural understandings or values that may help or hinder letting go of issue  - Help patient explore  feelings of anger, bitterness, resentment, anxiety   Help patient/family identify and examine the situation in which these feelings are experienced  - Help patient/family identify destructive displacement of feelings onto other individuals  - Refer patient to formal counseling and/or to max [facility] for further support as needed or per request  Outcome: Progressing  Goal: Patient feels balance and connection with others and/or higher power that empowers the self during times of loss, guilt and fear  Description: INTERVENTIONS:  - Create safety for patient through empathic presence and non-judgmental listening  - Encourage patient to explore his/her values, beliefs and/or spiritual images and practices  - Encourage use of breath work, imagery, meditation, relaxation, reiki to ease distress and provide healing  - Encourage use of cultural and spiritual celebrations and rituals  - Facilitate discussion that helps patient sort out spiritual concerns  - Help patient identify where meaning/hope/comfort & strength are in his/her life  - Refer patient to [facility] for assistance, as appropriate  - Respond to patient/family need for prayer/ritual/sacrament/ceremony  Outcome: Progressing     Problem: Prexisting or High Potential for Compromised Skin Integrity  Goal: Skin integrity is maintained or improved  Description: INTERVENTIONS:  - Identify patients at risk for skin breakdown  - Assess and monitor skin integrity  - Assess and monitor nutrition and hydration status  - Monitor labs   - Assess for incontinence   - Turn and reposition patient  - Assist with mobility/ambulation  - Relieve pressure over bony prominences  - Avoid friction and shearing  - Provide appropriate hygiene as needed including keeping skin clean and dry  - Evaluate need for skin moisturizer/barrier cream  - Collaborate with interdisciplinary team   - Patient/family teaching  - Consider wound care consult   Outcome: Progressing

## 2024-01-13 NOTE — QUICK NOTE
Review of plan of care with medication adjustments as indicated below.    Per review of medical records, patient remains appropriate for GIP LOC given requirement of frequent nursing assessments and parenteral administration of medications for symptoms, cares, and comfort. Ongoing nursing supervision and assistance are required to promote safety, dignity and comfort.     Hospice team will be available to assist with continued recommendations for end-of-life symptoms management.    Plan:   - discontinue IV morphine   - eGFR 31 on labs     - start hydromorphone 0.2 mg or 0.5 mg IV Q1H PRN for [moderate/severe] pain/SOB   - adjusted frequency/indication lorazepam 0.5 mg IV Q1H PRN for anxiety/SOB   - start haloperidol 1 mg IV Q1H PRN for anxiety/hiccups/nausea/vomiting/agitation/restlessness   - continue dulcolax 10 mg rectal suppository QDaily PRN for constipation     - continue ondansetron 4 mg IV Q6H PRN   - continue glycopyrrolate 0.1 mg IV Q4H PRN [excessive secretions]    Chris Gimenez MD  Hospice, [facility]  Ph: [phone]

## 2024-01-13 NOTE — PROGRESS NOTES
[facility]  Progress Note  Name: Freddy Copeland I  MRN: [id number]  Unit/Bed#: 422-01 I Date of Admission: 1/11/2024   Date of Service: 1/13/2024 I Hospital Day: 1    Assessment/Plan   Admission for end of life care  Assessment & Plan  Patient admitted to [facility] hospice  Appreciate assistance from hospice team  NRB placed  On dilaudid and robinuol  Reviewed with hospice nurse, agree with medication changes per hospice              Progress Note - Freddy Copeland 88 y.o. male MRN: [id number]    Unit/Bed#: 422-01 Encounter: [id number]        Subjective:   Seen and examined, multiple family members at bedside      Objective:     Vitals:   Vitals:    01/13/24 1300   Pulse: (!) 124   Resp: (!) 24   SpO2: 99%     There is no height or weight on file to calculate BMI.    Intake/Output Summary (Last 24 hours) at 1/13/2024 1530  Last data filed at 1/13/2024 1230  Gross per 24 hour   Intake 0 ml   Output 700 ml   Net -700 ml       Physical Exam:   No exam performed today,  Hospice patient, sleeping comfortably  .     Invasive Devices       Peripheral Intravenous Line  Duration             Peripheral IV 01/11/24 Distal;Left;Upper;Ventral (anterior) Arm 2 days    Peripheral IV 01/11/24 Dorsal (posterior);Right Hand 2 days              Drain  Duration             Urethral Catheter Latex 18 Fr. 1 day                    Results from last 7 days   Lab Units 01/11/24  0601 01/09/24  1011   WBC Thousand/uL 3.08* 1.46*   HEMOGLOBIN g/dL 6.0* 6.2*   HEMATOCRIT % 18.6* 19.0*   PLATELETS Thousands/uL 29* 22*       Results from last 7 days   Lab Units 01/11/24  0601   POTASSIUM mmol/L 4.5   CHLORIDE mmol/L 107   CO2 mmol/L 13*   BUN mg/dL 36*   CREATININE mg/dL 1.88*   CALCIUM mg/dL 8.9   ALK PHOS U/L 106*   ALT U/L 400*   AST U/L 417*       Medication Administration - last 24 hours from 01/12/2024 1530 to 01/13/2024 1530         Date/Time Order Dose Route Action Action by     01/12/2024 2025 EST morphine injection 2  mg 2 mg Intravenous Given Alexandra Steel RN     01/13/2024 0934 EST HYDROmorphone HCl (DILAUDID) injection 0.2 mg -- Intravenous See Alternative Regina Oshea RN     01/13/2024 0934 EST HYDROmorphone (DILAUDID) injection 0.5 mg 0.5 mg Intravenous Given Regina Oshea RN     01/13/2024 1453 EST HYDROmorphone (DILAUDID) injection 0.5 mg 0.5 mg Intravenous Given Maggy Foster RN              Lab, Imaging and other studies: I have personally reviewed pertinent reports.    VTE Pharmacologic Prophylaxis:   VTE Mechanical Prophylaxis:      Renée Molina MD  1/13/2024,3:30 PM

## 2024-01-13 NOTE — QUICK NOTE
In discussion with nurse and patient's family, concerned for uncontrolled pain and benefit of scheduled medicines for pain relief:     Schedule Dilaudid 0.5mg q6h, continue 0.5mg q1h prn dose.

## 2024-01-13 NOTE — PLAN OF CARE
Problem: PAIN - ADULT  Goal: Verbalizes/displays adequate comfort level or baseline comfort level  Description: Interventions:  - Encourage patient to monitor pain and request assistance  - Assess pain using appropriate pain scale  - Administer analgesics based on type and severity of pain and evaluate response  - Implement non-pharmacological measures as appropriate and evaluate response  - Consider cultural and social influences on pain and pain management  - Notify physician/advanced practitioner if interventions unsuccessful or patient reports new pain  Outcome: Progressing     Problem: INFECTION - ADULT  Goal: Absence or prevention of progression during hospitalization  Description: INTERVENTIONS:  - Assess and monitor for signs and symptoms of infection  - Monitor lab/diagnostic results  - Monitor all insertion sites, i.e. indwelling lines, tubes, and drains  - Monitor endotracheal if appropriate and nasal secretions for changes in amount and color  - [place] appropriate cooling/warming therapies per order  - Administer medications as ordered  - Instruct and encourage patient and family to use good hand hygiene technique  - Identify and instruct in appropriate isolation precautions for identified infection/condition  Outcome: Progressing     Problem: SAFETY ADULT  Goal: Patient will remain free of falls  Description: INTERVENTIONS:  - Educate patient/family on patient safety including physical limitations  - Instruct patient to call for assistance with activity   - Consult OT/PT to assist with strengthening/mobility   - Keep Call bell within reach  - Keep bed low and locked with side rails adjusted as appropriate  - Keep care items and personal belongings within reach  - Initiate and maintain comfort rounds  - Make Fall Risk Sign visible to staff  - Offer Toileting every 2 Hours, in advance of need  - Initiate/Maintain fall alarm  - Obtain necessary fall risk management equipment: alarm, nonskid socks,  mobility equipment  - Apply yellow socks and bracelet for high fall risk patients  - Consider moving patient to room near nurses station  Outcome: Progressing  Goal: Maintain or return to baseline ADL function  Description: INTERVENTIONS:  -  Assess patient's ability to carry out ADLs; assess patient's baseline for ADL function and identify physical deficits which impact ability to perform ADLs (bathing, care of mouth/teeth, toileting, grooming, dressing, etc.)  - Assess/evaluate cause of self-care deficits   - Assess range of motion  - Assess patient's mobility; develop plan if impaired  - Assess patient's need for assistive devices and provide as appropriate  - Encourage maximum independence but intervene and supervise when necessary  - Involve family in performance of ADLs  - Assess for home care needs following discharge   - Consider OT consult to assist with ADL evaluation and planning for discharge  - Provide patient education as appropriate  Outcome: Progressing  Goal: Maintains/Returns to pre admission functional level  Description: INTERVENTIONS:  - Perform AM-PAC 6 Click Basic Mobility/ Daily Activity assessment daily.  - Set and communicate daily mobility goal to care team and patient/family/caregiver.   - Collaborate with rehabilitation services on mobility goals if consulted  - Perform Range of Motion 4 times a day.  - Reposition patient every 2 hours.  - Dangle patient 3 times a day  - Stand patient 3 times a day  - Ambulate patient 3 times a day  - Out of bed to chair 3 times a day   - Out of bed for meals 3 times a day  - Out of bed for toileting  - Record patient progress and toleration of activity level   Outcome: Progressing     Problem: DISCHARGE PLANNING  Goal: Discharge to home or other facility with appropriate resources  Description: INTERVENTIONS:  - Identify barriers to discharge w/patient and caregiver  - Arrange for needed discharge resources and transportation as appropriate  - Identify  discharge learning needs (meds, wound care, etc.)  - Arrange for interpretive services to assist at discharge as needed  - Refer to Case Management Department for coordinating discharge planning if the patient needs post-hospital services based on physician/advanced practitioner order or complex needs related to functional status, cognitive ability, or social support system  Outcome: Progressing     Problem: Knowledge Deficit  Goal: Patient/family/caregiver demonstrates understanding of disease process, treatment plan, medications, and discharge instructions  Description: Complete learning assessment and assess knowledge base.  Interventions:  - Provide teaching at level of understanding  - Provide teaching via preferred learning methods  Outcome: Progressing     Problem: COPING  Goal: Pt/Family able to verbalize concerns and demonstrate effective coping strategies  Description: INTERVENTIONS:  - Assist patient/family to identify coping skills, available support systems and cultural and spiritual values  - Provide emotional support, including active listening and acknowledgement of concerns of patient and caregivers  - Reduce environmental stimuli, as able  - Provide patient education  - Assess for spiritual pain/suffering and initiate spiritual care, including notification of Pastoral Care or max based community as needed  - Assess effectiveness of coping strategies  Outcome: Progressing  Goal: Will report anxiety at manageable levels  Description: INTERVENTIONS:  - Administer medication as ordered  - Teach and encourage coping skills  - Provide emotional support  - Assess patient/family for anxiety and ability to cope  Outcome: Progressing     Problem: DEATH & DYING  Goal: Pt/Family communicate acceptance of impending death and expresses psychological comfort and peace  Description: INTERVENTIONS:  - Assess patient/family anxiety and grief process related to end of life issues  - Provide emotional, spiritual and  psychosocial support  - Provide information about the patient’s health status with consideration of family and cultural values  - Communicate willingness to discuss death and facilitate grief process  with patient/family as appropriate  - Emphasize sustaining relationships within family system and community, or max/spiritual traditions  - Initiate Spiritual Care, Pastoral care or other ancillary consults as needed  - Refer to community support groups as appropriate  Outcome: Progressing     Problem: DECISION MAKING  Goal: Pt/Family able to effectively weigh alternatives and participate in decision making related to treatment and care  Description: INTERVENTIONS:  - Identify decision maker  - Determine when there are differences among patient's view, family's view, and healthcare provider's view of patient condition and care goals  - Facilitate patient/family articulation of goals for care  - Help patient/family identify pros/cons of alternative solutions  - Provide information as requested by patient/family  - Respect patient/family rights related to privacy and sharing information   - Serve as a liaison between patient, family and health care team  - Initiate consults as appropriate (Ethics Team, Palliative Care, Family Care Conference, etc.)  Outcome: Progressing     Problem: SPIRITUAL CARE  Goal: Pt/Family able to move forward in process of forgiving self, others and/or higher power  Description: INTERVENTIONS:  - Assist patient with any spiritual needs/requests such as communion, confession, anointing, etc  - Explore guilt and help patient/family identify possible spiritual/cultural beliefs and values  - Explore possibilities of making amends & reconciliation with self, others, and/or a greater power  - Guide patient/family in identifying painful feelings  - Help patient explore and identify spiritual beliefs, cultural understandings or values that may help or hinder letting go of issue  - Help patient explore  feelings of anger, bitterness, resentment, anxiety   Help patient/family identify and examine the situation in which these feelings are experienced  - Help patient/family identify destructive displacement of feelings onto other individuals  - Refer patient to formal counseling and/or to max [facility] for further support as needed or per request  Outcome: Progressing  Goal: Patient feels balance and connection with others and/or higher power that empowers the self during times of loss, guilt and fear  Description: INTERVENTIONS:  - Create safety for patient through empathic presence and non-judgmental listening  - Encourage patient to explore his/her values, beliefs and/or spiritual images and practices  - Encourage use of breath work, imagery, meditation, relaxation, reiki to ease distress and provide healing  - Encourage use of cultural and spiritual celebrations and rituals  - Facilitate discussion that helps patient sort out spiritual concerns  - Help patient identify where meaning/hope/comfort & strength are in his/her life  - Refer patient to [facility] for assistance, as appropriate  - Respond to patient/family need for prayer/ritual/sacrament/ceremony  Outcome: Progressing     Problem: Prexisting or High Potential for Compromised Skin Integrity  Goal: Skin integrity is maintained or improved  Description: INTERVENTIONS:  - Identify patients at risk for skin breakdown  - Assess and monitor skin integrity  - Assess and monitor nutrition and hydration status  - Monitor labs   - Assess for incontinence   - Turn and reposition patient  - Assist with mobility/ambulation  - Relieve pressure over bony prominences  - Avoid friction and shearing  - Provide appropriate hygiene as needed including keeping skin clean and dry  - Evaluate need for skin moisturizer/barrier cream  - Collaborate with interdisciplinary team   - Patient/family teaching  - Consider wound care consult   Outcome: Progressing

## 2024-01-13 NOTE — PLAN OF CARE
Problem: PAIN - ADULT  Goal: Verbalizes/displays adequate comfort level or baseline comfort level  Description: Interventions:  - Encourage patient to monitor pain and request assistance  - Assess pain using appropriate pain scale  - Administer analgesics based on type and severity of pain and evaluate response  - Implement non-pharmacological measures as appropriate and evaluate response  - Consider cultural and social influences on pain and pain management  - Notify physician/advanced practitioner if interventions unsuccessful or patient reports new pain  Outcome: Progressing     Problem: INFECTION - ADULT  Goal: Absence or prevention of progression during hospitalization  Description: INTERVENTIONS:  - Assess and monitor for signs and symptoms of infection  - Monitor lab/diagnostic results  - Monitor all insertion sites, i.e. indwelling lines, tubes, and drains  - Monitor endotracheal if appropriate and nasal secretions for changes in amount and color  - [place] appropriate cooling/warming therapies per order  - Administer medications as ordered  - Instruct and encourage patient and family to use good hand hygiene technique  - Identify and instruct in appropriate isolation precautions for identified infection/condition  Outcome: Progressing     Problem: SAFETY ADULT  Goal: Patient will remain free of falls  Description: INTERVENTIONS:  - Educate patient/family on patient safety including physical limitations  - Instruct patient to call for assistance with activity   - Consult OT/PT to assist with strengthening/mobility   - Keep Call bell within reach  - Keep bed low and locked with side rails adjusted as appropriate  - Keep care items and personal belongings within reach  - Initiate and maintain comfort rounds  - Make Fall Risk Sign visible to staff  - Offer Toileting every 2 Hours, in advance of need  - Initiate/Maintain fall alarm  - Obtain necessary fall risk management equipment: alarm, nonskid socks,  mobility equipment  - Apply yellow socks and bracelet for high fall risk patients  - Consider moving patient to room near nurses station  Outcome: Progressing  Goal: Maintain or return to baseline ADL function  Description: INTERVENTIONS:  -  Assess patient's ability to carry out ADLs; assess patient's baseline for ADL function and identify physical deficits which impact ability to perform ADLs (bathing, care of mouth/teeth, toileting, grooming, dressing, etc.)  - Assess/evaluate cause of self-care deficits   - Assess range of motion  - Assess patient's mobility; develop plan if impaired  - Assess patient's need for assistive devices and provide as appropriate  - Encourage maximum independence but intervene and supervise when necessary  - Involve family in performance of ADLs  - Assess for home care needs following discharge   - Consider OT consult to assist with ADL evaluation and planning for discharge  - Provide patient education as appropriate  Outcome: Progressing  Goal: Maintains/Returns to pre admission functional level  Description: INTERVENTIONS:  - Perform AM-PAC 6 Click Basic Mobility/ Daily Activity assessment daily.  - Set and communicate daily mobility goal to care team and patient/family/caregiver.   - Collaborate with rehabilitation services on mobility goals if consulted  - Perform Range of Motion 4 times a day.  - Reposition patient every 2 hours.  - Dangle patient 3 times a day  - Stand patient 3 times a day  - Ambulate patient 3 times a day  - Out of bed to chair 3 times a day   - Out of bed for meals 3 times a day  - Out of bed for toileting  - Record patient progress and toleration of activity level   Outcome: Progressing     Problem: DISCHARGE PLANNING  Goal: Discharge to home or other facility with appropriate resources  Description: INTERVENTIONS:  - Identify barriers to discharge w/patient and caregiver  - Arrange for needed discharge resources and transportation as appropriate  - Identify  discharge learning needs (meds, wound care, etc.)  - Arrange for interpretive services to assist at discharge as needed  - Refer to Case Management Department for coordinating discharge planning if the patient needs post-hospital services based on physician/advanced practitioner order or complex needs related to functional status, cognitive ability, or social support system  Outcome: Progressing     Problem: Knowledge Deficit  Goal: Patient/family/caregiver demonstrates understanding of disease process, treatment plan, medications, and discharge instructions  Description: Complete learning assessment and assess knowledge base.  Interventions:  - Provide teaching at level of understanding  - Provide teaching via preferred learning methods  Outcome: Progressing     Problem: COPING  Goal: Pt/Family able to verbalize concerns and demonstrate effective coping strategies  Description: INTERVENTIONS:  - Assist patient/family to identify coping skills, available support systems and cultural and spiritual values  - Provide emotional support, including active listening and acknowledgement of concerns of patient and caregivers  - Reduce environmental stimuli, as able  - Provide patient education  - Assess for spiritual pain/suffering and initiate spiritual care, including notification of Pastoral Care or max based community as needed  - Assess effectiveness of coping strategies  Outcome: Progressing  Goal: Will report anxiety at manageable levels  Description: INTERVENTIONS:  - Administer medication as ordered  - Teach and encourage coping skills  - Provide emotional support  - Assess patient/family for anxiety and ability to cope  Outcome: Progressing     Problem: DEATH & DYING  Goal: Pt/Family communicate acceptance of impending death and expresses psychological comfort and peace  Description: INTERVENTIONS:  - Assess patient/family anxiety and grief process related to end of life issues  - Provide emotional, spiritual and  psychosocial support  - Provide information about the patient’s health status with consideration of family and cultural values  - Communicate willingness to discuss death and facilitate grief process  with patient/family as appropriate  - Emphasize sustaining relationships within family system and community, or max/spiritual traditions  - Initiate Spiritual Care, Pastoral care or other ancillary consults as needed  - Refer to community support groups as appropriate  Outcome: Progressing     Problem: DECISION MAKING  Goal: Pt/Family able to effectively weigh alternatives and participate in decision making related to treatment and care  Description: INTERVENTIONS:  - Identify decision maker  - Determine when there are differences among patient's view, family's view, and healthcare provider's view of patient condition and care goals  - Facilitate patient/family articulation of goals for care  - Help patient/family identify pros/cons of alternative solutions  - Provide information as requested by patient/family  - Respect patient/family rights related to privacy and sharing information   - Serve as a liaison between patient, family and health care team  - Initiate consults as appropriate (Ethics Team, Palliative Care, Family Care Conference, etc.)  Outcome: Progressing     Problem: SPIRITUAL CARE  Goal: Pt/Family able to move forward in process of forgiving self, others and/or higher power  Description: INTERVENTIONS:  - Assist patient with any spiritual needs/requests such as communion, confession, anointing, etc  - Explore guilt and help patient/family identify possible spiritual/cultural beliefs and values  - Explore possibilities of making amends & reconciliation with self, others, and/or a greater power  - Guide patient/family in identifying painful feelings  - Help patient explore and identify spiritual beliefs, cultural understandings or values that may help or hinder letting go of issue  - Help patient explore  feelings of anger, bitterness, resentment, anxiety   Help patient/family identify and examine the situation in which these feelings are experienced  - Help patient/family identify destructive displacement of feelings onto other individuals  - Refer patient to formal counseling and/or to max [facility] for further support as needed or per request  Outcome: Progressing  Goal: Patient feels balance and connection with others and/or higher power that empowers the self during times of loss, guilt and fear  Description: INTERVENTIONS:  - Create safety for patient through empathic presence and non-judgmental listening  - Encourage patient to explore his/her values, beliefs and/or spiritual images and practices  - Encourage use of breath work, imagery, meditation, relaxation, reiki to ease distress and provide healing  - Encourage use of cultural and spiritual celebrations and rituals  - Facilitate discussion that helps patient sort out spiritual concerns  - Help patient identify where meaning/hope/comfort & strength are in his/her life  - Refer patient to [facility] for assistance, as appropriate  - Respond to patient/family need for prayer/ritual/sacrament/ceremony  Outcome: Progressing     Problem: Prexisting or High Potential for Compromised Skin Integrity  Goal: Skin integrity is maintained or improved  Description: INTERVENTIONS:  - Identify patients at risk for skin breakdown  - Assess and monitor skin integrity  - Assess and monitor nutrition and hydration status  - Monitor labs   - Assess for incontinence   - Turn and reposition patient  - Assist with mobility/ambulation  - Relieve pressure over bony prominences  - Avoid friction and shearing  - Provide appropriate hygiene as needed including keeping skin clean and dry  - Evaluate need for skin moisturizer/barrier cream  - Collaborate with interdisciplinary team   - Patient/family teaching  - Consider wound care consult   Outcome: Progressing

## 2024-01-14 NOTE — PLAN OF CARE
Problem: PAIN - ADULT  Goal: Verbalizes/displays adequate comfort level or baseline comfort level  Description: Interventions:  - Encourage patient to monitor pain and request assistance  - Assess pain using appropriate pain scale  - Administer analgesics based on type and severity of pain and evaluate response  - Implement non-pharmacological measures as appropriate and evaluate response  - Consider cultural and social influences on pain and pain management  - Notify physician/advanced practitioner if interventions unsuccessful or patient reports new pain  Outcome: Progressing     Problem: INFECTION - ADULT  Goal: Absence or prevention of progression during hospitalization  Description: INTERVENTIONS:  - Assess and monitor for signs and symptoms of infection  - Monitor lab/diagnostic results  - Monitor all insertion sites, i.e. indwelling lines, tubes, and drains  - Monitor endotracheal if appropriate and nasal secretions for changes in amount and color  - [place] appropriate cooling/warming therapies per order  - Administer medications as ordered  - Instruct and encourage patient and family to use good hand hygiene technique  - Identify and instruct in appropriate isolation precautions for identified infection/condition  Outcome: Progressing     Problem: SAFETY ADULT  Goal: Patient will remain free of falls  Description: INTERVENTIONS:  - Educate patient/family on patient safety including physical limitations  - Instruct patient to call for assistance with activity   - Consult OT/PT to assist with strengthening/mobility   - Keep Call bell within reach  - Keep bed low and locked with side rails adjusted as appropriate  - Keep care items and personal belongings within reach  - Initiate and maintain comfort rounds  - Make Fall Risk Sign visible to staff  - Offer Toileting every 2 Hours, in advance of need  - Initiate/Maintain fall alarm  - Obtain necessary fall risk management equipment: alarm, nonskid socks,  mobility equipment  - Apply yellow socks and bracelet for high fall risk patients  - Consider moving patient to room near nurses station  Outcome: Progressing  Goal: Maintain or return to baseline ADL function  Description: INTERVENTIONS:  -  Assess patient's ability to carry out ADLs; assess patient's baseline for ADL function and identify physical deficits which impact ability to perform ADLs (bathing, care of mouth/teeth, toileting, grooming, dressing, etc.)  - Assess/evaluate cause of self-care deficits   - Assess range of motion  - Assess patient's mobility; develop plan if impaired  - Assess patient's need for assistive devices and provide as appropriate  - Encourage maximum independence but intervene and supervise when necessary  - Involve family in performance of ADLs  - Assess for home care needs following discharge   - Consider OT consult to assist with ADL evaluation and planning for discharge  - Provide patient education as appropriate  Outcome: Progressing  Goal: Maintains/Returns to pre admission functional level  Description: INTERVENTIONS:  - Perform AM-PAC 6 Click Basic Mobility/ Daily Activity assessment daily.  - Set and communicate daily mobility goal to care team and patient/family/caregiver.   - Collaborate with rehabilitation services on mobility goals if consulted  - Perform Range of Motion 4 times a day.  - Reposition patient every 2 hours.  - Dangle patient 3 times a day  - Stand patient 3 times a day  - Ambulate patient 3 times a day  - Out of bed to chair 3 times a day   - Out of bed for meals 3 times a day  - Out of bed for toileting  - Record patient progress and toleration of activity level   Outcome: Progressing     Problem: DISCHARGE PLANNING  Goal: Discharge to home or other facility with appropriate resources  Description: INTERVENTIONS:  - Identify barriers to discharge w/patient and caregiver  - Arrange for needed discharge resources and transportation as appropriate  - Identify  discharge learning needs (meds, wound care, etc.)  - Arrange for interpretive services to assist at discharge as needed  - Refer to Case Management Department for coordinating discharge planning if the patient needs post-hospital services based on physician/advanced practitioner order or complex needs related to functional status, cognitive ability, or social support system  Outcome: Progressing     Problem: Knowledge Deficit  Goal: Patient/family/caregiver demonstrates understanding of disease process, treatment plan, medications, and discharge instructions  Description: Complete learning assessment and assess knowledge base.  Interventions:  - Provide teaching at level of understanding  - Provide teaching via preferred learning methods  Outcome: Progressing     Problem: COPING  Goal: Pt/Family able to verbalize concerns and demonstrate effective coping strategies  Description: INTERVENTIONS:  - Assist patient/family to identify coping skills, available support systems and cultural and spiritual values  - Provide emotional support, including active listening and acknowledgement of concerns of patient and caregivers  - Reduce environmental stimuli, as able  - Provide patient education  - Assess for spiritual pain/suffering and initiate spiritual care, including notification of Pastoral Care or max based community as needed  - Assess effectiveness of coping strategies  Outcome: Progressing  Goal: Will report anxiety at manageable levels  Description: INTERVENTIONS:  - Administer medication as ordered  - Teach and encourage coping skills  - Provide emotional support  - Assess patient/family for anxiety and ability to cope  Outcome: Progressing     Problem: DEATH & DYING  Goal: Pt/Family communicate acceptance of impending death and expresses psychological comfort and peace  Description: INTERVENTIONS:  - Assess patient/family anxiety and grief process related to end of life issues  - Provide emotional, spiritual and  psychosocial support  - Provide information about the patient’s health status with consideration of family and cultural values  - Communicate willingness to discuss death and facilitate grief process  with patient/family as appropriate  - Emphasize sustaining relationships within family system and community, or max/spiritual traditions  - Initiate Spiritual Care, Pastoral care or other ancillary consults as needed  - Refer to community support groups as appropriate  Outcome: Progressing     Problem: DECISION MAKING  Goal: Pt/Family able to effectively weigh alternatives and participate in decision making related to treatment and care  Description: INTERVENTIONS:  - Identify decision maker  - Determine when there are differences among patient's view, family's view, and healthcare provider's view of patient condition and care goals  - Facilitate patient/family articulation of goals for care  - Help patient/family identify pros/cons of alternative solutions  - Provide information as requested by patient/family  - Respect patient/family rights related to privacy and sharing information   - Serve as a liaison between patient, family and health care team  - Initiate consults as appropriate (Ethics Team, Palliative Care, Family Care Conference, etc.)  Outcome: Progressing     Problem: SPIRITUAL CARE  Goal: Pt/Family able to move forward in process of forgiving self, others and/or higher power  Description: INTERVENTIONS:  - Assist patient with any spiritual needs/requests such as communion, confession, anointing, etc  - Explore guilt and help patient/family identify possible spiritual/cultural beliefs and values  - Explore possibilities of making amends & reconciliation with self, others, and/or a greater power  - Guide patient/family in identifying painful feelings  - Help patient explore and identify spiritual beliefs, cultural understandings or values that may help or hinder letting go of issue  - Help patient explore  feelings of anger, bitterness, resentment, anxiety   Help patient/family identify and examine the situation in which these feelings are experienced  - Help patient/family identify destructive displacement of feelings onto other individuals  - Refer patient to formal counseling and/or to max [facility] for further support as needed or per request  Outcome: Progressing  Goal: Patient feels balance and connection with others and/or higher power that empowers the self during times of loss, guilt and fear  Description: INTERVENTIONS:  - Create safety for patient through empathic presence and non-judgmental listening  - Encourage patient to explore his/her values, beliefs and/or spiritual images and practices  - Encourage use of breath work, imagery, meditation, relaxation, reiki to ease distress and provide healing  - Encourage use of cultural and spiritual celebrations and rituals  - Facilitate discussion that helps patient sort out spiritual concerns  - Help patient identify where meaning/hope/comfort & strength are in his/her life  - Refer patient to [facility] for assistance, as appropriate  - Respond to patient/family need for prayer/ritual/sacrament/ceremony  Outcome: Progressing     Problem: Prexisting or High Potential for Compromised Skin Integrity  Goal: Skin integrity is maintained or improved  Description: INTERVENTIONS:  - Identify patients at risk for skin breakdown  - Assess and monitor skin integrity  - Assess and monitor nutrition and hydration status  - Monitor labs   - Assess for incontinence   - Turn and reposition patient  - Assist with mobility/ambulation  - Relieve pressure over bony prominences  - Avoid friction and shearing  - Provide appropriate hygiene as needed including keeping skin clean and dry  - Evaluate need for skin moisturizer/barrier cream  - Collaborate with interdisciplinary team   - Patient/family teaching  - Consider wound care consult   Outcome: Progressing

## 2024-01-15 VITALS
TEMPERATURE: 97.7 F | BODY MASS INDEX: 27.12 KG/M2 | DIASTOLIC BLOOD PRESSURE: 62 MMHG | RESPIRATION RATE: 24 BRPM | SYSTOLIC BLOOD PRESSURE: 107 MMHG | OXYGEN SATURATION: 98 % | HEART RATE: 138 BPM | WEIGHT: 199.96 LBS

## 2024-01-15 VITALS
HEART RATE: 68 BPM | SYSTOLIC BLOOD PRESSURE: 104 MMHG | RESPIRATION RATE: 20 BRPM | TEMPERATURE: 100.4 F | DIASTOLIC BLOOD PRESSURE: 54 MMHG

## 2024-01-15 NOTE — PLAN OF CARE
Problem: PAIN - ADULT  Goal: Verbalizes/displays adequate comfort level or baseline comfort level  Description: Interventions:  - Encourage patient to monitor pain and request assistance  - Assess pain using appropriate pain scale  - Administer analgesics based on type and severity of pain and evaluate response  - Implement non-pharmacological measures as appropriate and evaluate response  - Consider cultural and social influences on pain and pain management  - Notify physician/advanced practitioner if interventions unsuccessful or patient reports new pain  Outcome: Progressing     Problem: INFECTION - ADULT  Goal: Absence or prevention of progression during hospitalization  Description: INTERVENTIONS:  - Assess and monitor for signs and symptoms of infection  - Monitor lab/diagnostic results  - Monitor all insertion sites, i.e. indwelling lines, tubes, and drains  - Monitor endotracheal if appropriate and nasal secretions for changes in amount and color  - [place] appropriate cooling/warming therapies per order  - Administer medications as ordered  - Instruct and encourage patient and family to use good hand hygiene technique  - Identify and instruct in appropriate isolation precautions for identified infection/condition  Outcome: Progressing     Problem: SAFETY ADULT  Goal: Patient will remain free of falls  Description: INTERVENTIONS:  - Educate patient/family on patient safety including physical limitations  - Instruct patient to call for assistance with activity   - Consult OT/PT to assist with strengthening/mobility   - Keep Call bell within reach  - Keep bed low and locked with side rails adjusted as appropriate  - Keep care items and personal belongings within reach  - Initiate and maintain comfort rounds  - Make Fall Risk Sign visible to staff  - Offer Toileting every 2 Hours, in advance of need  - Initiate/Maintain fall alarm  - Obtain necessary fall risk management equipment: alarm, nonskid socks,  mobility equipment  - Apply yellow socks and bracelet for high fall risk patients  - Consider moving patient to room near nurses station  Outcome: Progressing  Goal: Maintain or return to baseline ADL function  Description: INTERVENTIONS:  -  Assess patient's ability to carry out ADLs; assess patient's baseline for ADL function and identify physical deficits which impact ability to perform ADLs (bathing, care of mouth/teeth, toileting, grooming, dressing, etc.)  - Assess/evaluate cause of self-care deficits   - Assess range of motion  - Assess patient's mobility; develop plan if impaired  - Assess patient's need for assistive devices and provide as appropriate  - Encourage maximum independence but intervene and supervise when necessary  - Involve family in performance of ADLs  - Assess for home care needs following discharge   - Consider OT consult to assist with ADL evaluation and planning for discharge  - Provide patient education as appropriate  Outcome: Progressing  Goal: Maintains/Returns to pre admission functional level  Description: INTERVENTIONS:  - Perform AM-PAC 6 Click Basic Mobility/ Daily Activity assessment daily.  - Set and communicate daily mobility goal to care team and patient/family/caregiver.   - Collaborate with rehabilitation services on mobility goals if consulted  - Perform Range of Motion 4 times a day.  - Reposition patient every 2 hours.  - Dangle patient 3 times a day  - Stand patient 3 times a day  - Ambulate patient 3 times a day  - Out of bed to chair 3 times a day   - Out of bed for meals 3 times a day  - Out of bed for toileting  - Record patient progress and toleration of activity level   Outcome: Progressing     Problem: DISCHARGE PLANNING  Goal: Discharge to home or other facility with appropriate resources  Description: INTERVENTIONS:  - Identify barriers to discharge w/patient and caregiver  - Arrange for needed discharge resources and transportation as appropriate  - Identify  discharge learning needs (meds, wound care, etc.)  - Arrange for interpretive services to assist at discharge as needed  - Refer to Case Management Department for coordinating discharge planning if the patient needs post-hospital services based on physician/advanced practitioner order or complex needs related to functional status, cognitive ability, or social support system  Outcome: Progressing     Problem: Knowledge Deficit  Goal: Patient/family/caregiver demonstrates understanding of disease process, treatment plan, medications, and discharge instructions  Description: Complete learning assessment and assess knowledge base.  Interventions:  - Provide teaching at level of understanding  - Provide teaching via preferred learning methods  Outcome: Progressing     Problem: COPING  Goal: Pt/Family able to verbalize concerns and demonstrate effective coping strategies  Description: INTERVENTIONS:  - Assist patient/family to identify coping skills, available support systems and cultural and spiritual values  - Provide emotional support, including active listening and acknowledgement of concerns of patient and caregivers  - Reduce environmental stimuli, as able  - Provide patient education  - Assess for spiritual pain/suffering and initiate spiritual care, including notification of Pastoral Care or max based community as needed  - Assess effectiveness of coping strategies  Outcome: Progressing  Goal: Will report anxiety at manageable levels  Description: INTERVENTIONS:  - Administer medication as ordered  - Teach and encourage coping skills  - Provide emotional support  - Assess patient/family for anxiety and ability to cope  Outcome: Progressing     Problem: DEATH & DYING  Goal: Pt/Family communicate acceptance of impending death and expresses psychological comfort and peace  Description: INTERVENTIONS:  - Assess patient/family anxiety and grief process related to end of life issues  - Provide emotional, spiritual and  psychosocial support  - Provide information about the patient’s health status with consideration of family and cultural values  - Communicate willingness to discuss death and facilitate grief process  with patient/family as appropriate  - Emphasize sustaining relationships within family system and community, or max/spiritual traditions  - Initiate Spiritual Care, Pastoral care or other ancillary consults as needed  - Refer to community support groups as appropriate  Outcome: Progressing     Problem: DECISION MAKING  Goal: Pt/Family able to effectively weigh alternatives and participate in decision making related to treatment and care  Description: INTERVENTIONS:  - Identify decision maker  - Determine when there are differences among patient's view, family's view, and healthcare provider's view of patient condition and care goals  - Facilitate patient/family articulation of goals for care  - Help patient/family identify pros/cons of alternative solutions  - Provide information as requested by patient/family  - Respect patient/family rights related to privacy and sharing information   - Serve as a liaison between patient, family and health care team  - Initiate consults as appropriate (Ethics Team, Palliative Care, Family Care Conference, etc.)  Outcome: Progressing     Problem: SPIRITUAL CARE  Goal: Pt/Family able to move forward in process of forgiving self, others and/or higher power  Description: INTERVENTIONS:  - Assist patient with any spiritual needs/requests such as communion, confession, anointing, etc  - Explore guilt and help patient/family identify possible spiritual/cultural beliefs and values  - Explore possibilities of making amends & reconciliation with self, others, and/or a greater power  - Guide patient/family in identifying painful feelings  - Help patient explore and identify spiritual beliefs, cultural understandings or values that may help or hinder letting go of issue  - Help patient explore  feelings of anger, bitterness, resentment, anxiety   Help patient/family identify and examine the situation in which these feelings are experienced  - Help patient/family identify destructive displacement of feelings onto other individuals  - Refer patient to formal counseling and/or to max [facility] for further support as needed or per request  Outcome: Progressing  Goal: Patient feels balance and connection with others and/or higher power that empowers the self during times of loss, guilt and fear  Description: INTERVENTIONS:  - Create safety for patient through empathic presence and non-judgmental listening  - Encourage patient to explore his/her values, beliefs and/or spiritual images and practices  - Encourage use of breath work, imagery, meditation, relaxation, reiki to ease distress and provide healing  - Encourage use of cultural and spiritual celebrations and rituals  - Facilitate discussion that helps patient sort out spiritual concerns  - Help patient identify where meaning/hope/comfort & strength are in his/her life  - Refer patient to [facility] for assistance, as appropriate  - Respond to patient/family need for prayer/ritual/sacrament/ceremony  Outcome: Progressing     Problem: Prexisting or High Potential for Compromised Skin Integrity  Goal: Skin integrity is maintained or improved  Description: INTERVENTIONS:  - Identify patients at risk for skin breakdown  - Assess and monitor skin integrity  - Assess and monitor nutrition and hydration status  - Monitor labs   - Assess for incontinence   - Turn and reposition patient  - Assist with mobility/ambulation  - Relieve pressure over bony prominences  - Avoid friction and shearing  - Provide appropriate hygiene as needed including keeping skin clean and dry  - Evaluate need for skin moisturizer/barrier cream  - Collaborate with interdisciplinary team   - Patient/family teaching  - Consider wound care consult   Outcome: Progressing

## 2024-01-15 NOTE — ASSESSMENT & PLAN NOTE
Patient admitted to [facility] hospice  Comfort measures initiated  Hospice team following  Notified per RN of pt passing. Time of death 9:30pm.  Family at bedside. They decline autopsy

## 2024-01-15 NOTE — NURSING NOTE
Was notified at 2130 by patients family that he had passed away. I proceded to check on him and then notify the hospitalist via tiger text. I called the [facility] at [phone] and notified them of death. Awaiting call back from them.    Received call back from [facility] to verify time of death and make arrangements for them to call the  home for pickup. Family has chosen [facility] in [place].

## 2024-01-15 NOTE — UTILIZATION REVIEW
Initial Clinical Review    Admission: Date/Time/Statement:   Admission Orders (From admission, onward)       Ordered        01/12/24 1058  Inpatient Admission  Once                          Orders Placed This Encounter   Procedures    Inpatient Admission     Standing Status:   Standing     Number of Occurrences:   1     Order Specific Question:   Level of Care     Answer:   Hospice General Inpatient Care [23]     Order Specific Question:   Estimated length of stay     Answer:   More than 2 Midnights     Order Specific Question:   Certification     Answer:   I certify that inpatient services are medically necessary for this patient for a duration of greater than two midnights. See H&P and MD Progress Notes for additional information about the patient's course of treatment.         Initial Presentation: 88 y.o. male with a recent diagnosis of AML, history of atrial fibrillation, DVT, prostate cancer, presents to the emergency room on 1-11-24 with complaint of chest pain ongoing for about a week. Patient found to be septic with elevated lactic acid and procalcitonin with low Wbc and low  Hb.  Transfused,  started on antibiotics and oxygen via high flow nasal cannula.   The patient appears ill and is on HFNC. Extensive discussion with his wife and son at bedside regarding goals of care , extent of treatment and the patient's preference regarding end of life care. Ultimately they decided to proceed with comfort care measures. Medical management shifted to comfort care. Start dilaudid and robinuol.  On non re-breather.   1-11-24 approved for inpatient hospice at [facility].   Family does not wish to travel to this location. They request to stay at [facility] for end of life care.    Time of death 9:30pm.  1-14 -24   Discharge summary  88-year-old male with a recent diagnosis of AML, history of atrial fibrillation, DVT, prostate cancer, presents to the emergency room with complaint of chest pain ongoing for  about a week.  On further questioning the patient's family states that he was in his usual state of health when suddenly felt very weak.      The patient appears ill and is on HFNC. Extensive discussion with his wife and son at bedside regarding goals of care , extent of treatment and the patient's preference regarding end of life care. Ultimately they decided to proceed with comfort care measures.      ED Triage Vitals   Temperature Pulse Respirations Blood Pressure SpO2   01/14/24 1419 01/11/24 2336 01/12/24 1912 01/14/24 1310 01/11/24 2336   (!) 102.8F   (39.3 °C) (!) 115 (!) 24 (!) 79/57 96 %      Tympanic Monitor         No Pain            Additional Vital Signs:     Date/Time Temp Pulse Resp BP MAP (mmHg) SpO2 O2 Flow Rate (L/min)   01/14/24 1419 102.8 °F (39.3 °C)   Abnormal  -- -- -- -- -- --   01/14/24 13:10:26 -- -- -- 79/57 Abnormal  64 Abnormal  -- --   01/14/24 1231 -- -- 14 -- -- -- --   01/13/24 2022 -- -- -- -- -- -- 6 L/min   01/13/24 1604 -- 122 Abnormal  23 Abnormal  -- -- 99 % --   01/13/24 1300 -- 124 Abnormal  24 Abnormal  -- -- 99 % --   01/12/24 2020 -- -- -- -- -- -- --   01/12/24 1912 -- 112 Abnormal  24 Abnormal  -- -- -- --   01/12/24 0721 -- -- -- -- -- -- --     Pertinent Labs/Diagnostic Test Results:     US right upper quadrant   Final Result       Gallbladder wall thickness is 4 mm. Trace pericholecystic fluid. Negative sonographic Oquendo sign. No gallstones identified. Consider scintigraphic biliary scan for further evaluation.       Hepatic steatosis.       Right renal cysts.               PE Study with CT abdomen & pelvis with contrast   Final Result           1. No pulmonary embolism.   2. Right upper lobe infiltrates with few patchy opacities in the left upper lobe and right middle lobe. Findings suggest an infectious or inflammatory etiology. Probably reactive right hilar and mediastinal nodes.   3. Cardiomegaly with small pleural effusions larger on the right and small  abdominal ascites.   4. Mild prominence of the wall of the ascending colon which could reflect a mild colitis.   5. Streak artifact from patient's arms at the level of the gallbladder fossa limit evaluation. There is clinical concern for gallbladder pathology ultrasound can be obtained.   6. Hepatic steatosis, hepatic and renal cysts and nonobstructing right renal calculus.   .       Results from last 7 days   Lab Units 01/11/24  0601   SARS-COV-2  Negative     Results from last 7 days   Lab Units 01/11/24  0601 01/09/24  1011   WBC Thousand/uL 3.08* 1.46*   HEMOGLOBIN g/dL 6.0* 6.2*   HEMATOCRIT % 18.6* 19.0*   PLATELETS Thousands/uL 29* 22*   NEUTROS ABS Thousands/µL 1.67*  --    BANDS PCT %  --  2         Results from last 7 days   Lab Units 01/11/24  0601   SODIUM mmol/L 141   POTASSIUM mmol/L 4.5   CHLORIDE mmol/L 107   CO2 mmol/L 13*   ANION GAP mmol/L 21   BUN mg/dL 36*   CREATININE mg/dL 1.88*   EGFR ml/min/1.73sq m 31   CALCIUM mg/dL 8.9     Results from last 7 days   Lab Units 01/11/24  0601   AST U/L 417*   ALT U/L 400*   ALK PHOS U/L 106*   TOTAL PROTEIN g/dL 6.4   ALBUMIN g/dL 3.8   TOTAL BILIRUBIN mg/dL 3.50*         Results from last 7 days   Lab Units 01/11/24  0601   GLUCOSE RANDOM mg/dL 201*       Results from last 7 days   Lab Units 01/11/24  0740 01/11/24  0649   PH ANGELA  7.315 7.231*   PCO2 ANGELA mm Hg 22.4* 27.4*   PO2 ANGELA mm Hg 32.1* 32.2*   HCO3 ANGELA mmol/L 11.2* 11.2*   BASE EXC ANGELA mmol/L -13.7 -14.9   O2 CONTENT ANGELA ml/dL 4.4 3.9   O2 HGB, VENOUS % 47.1* 39.8*             Results from last 7 days   Lab Units 01/11/24  1106 01/11/24  0740 01/11/24  0601   HS TNI 0HR ng/L  --   --  1,750*   HS TNI 2HR ng/L  --  2,058*  --    HSTNI D2 ng/L  --  308*  --    HS TNI 4HR ng/L 2,722*  --   --    HSTNI D4 ng/L 972*  --   --      Results from last 7 days   Lab Units 01/11/24  0601   D-DIMER QUANTITATIVE ug/ml FEU 2.61*     Results from last 7 days   Lab Units 01/11/24 0601   PROTIME seconds 22.6*   INR   2.03*   PTT seconds 31     Results from last 7 days   Lab Units 01/11/24  0601   TSH 3RD GENERATON uIU/mL 2.838     Results from last 7 days   Lab Units 01/11/24  0601   PROCALCITONIN ng/ml 0.36*     Results from last 7 days   Lab Units 01/11/24  0833 01/11/24  0606   LACTIC ACID mmol/L 11.2* 9.0*             Results from last 7 days   Lab Units 01/11/24  0601   BNP pg/mL 1,176*             Results from last 7 days   Lab Units 01/12/24  0633 01/11/24  0650   UNIT PRODUCT CODE  K5670G00 [id number]   UNIT NUMBER  [id number]   UNITABO  A A   UNITRH  POS POS   CROSSMATCH  Compatible Compatible   UNIT DISPENSE STATUS  Presumed Trans Return to Inv   UNIT PRODUCT VOL mL 350 350         Results from last 7 days   Lab Units 01/11/24  0601   LIPASE u/L 18       Results from last 7 days   Lab Units 01/11/24  0601   INFLUENZA A PCR  Negative   INFLUENZA B PCR  Negative   RSV PCR  Negative           Results from last 7 days   Lab Units 01/11/24  0612 01/11/24  0607   BLOOD CULTURE  No Growth at 72 hrs. No Growth at 72 hrs.       ED Treatment:   Medication Administration - No Administrations Displayed (No Start Event Found)       None          Past Medical History:   Diagnosis Date    Anxiety     Atrial fibrillation (HCC)     Atrial flutter (HCC)     Congestive heart failure (CHF) (HCC)     COPD (chronic obstructive pulmonary disease) (HCC)     Coronary artery disease     DVT (deep venous thrombosis) (HCC)     ED (erectile dysfunction)     Hearing loss     History of echocardiogram 04/05/2018    EF 0.55, Mild LVH. Mild moderate mitral regurg. Trace aortic regurg.    Hx of radiation therapy     XRT    Hypercholesterolemia     Hyperlipidemia     Hypertension     Long term (current) use of anticoagulants 8/21/2018    Neuropathy of both feet     Peripheral neuropathy     Prostate cancer (HCC)     Type 2 diabetes mellitus (HCC)      Present on Admission:  **None**      Admitting Diagnosis: Malignant neoplasm of  prostate [C61]      Network Utilization Review Department  ATTENTION: Please call with any questions or concerns to [phone] and carefully listen to the prompts so that you are directed to the right person. All voicemails are confidential.   For Discharge needs, contact Care Management DC Support Team at [phone] opt. 2  Send all requests for admission clinical reviews, approved or denied determinations and any other requests to dedicated fax number below belonging to the campus where the patient is receiving treatment. List of dedicated fax numbers for the Facilities:  FACILITY NAME UR FAX NUMBER   ADMISSION DENIALS (Administrative/Medical Necessity) [phone]   DISCHARGE SUPPORT TEAM (NETWORK) [phone]   PARENT CHILD HEALTH (Maternity/NICU/Pediatrics) [phone]   [facility] [phone]   [facility] [phone]   [facility] [phone]   [facility] [phone]   [facility] [phone]   [facility] [phone]   [facility] [phone]   [facility] [phone]   [facility] [phone]   [facility] [phone]   [facility] [phone]

## 2024-01-15 NOTE — NURSING NOTE
Patient belongings found after he was picked up by the  home. Attempted to contact his wife at number provided in chart, with no answer. Supervisor notified. Belongings labeled and left at Belinda's desk to be returned to family.

## 2024-01-15 NOTE — DISCHARGE SUMMARY
[facility]  Discharge- Freddy Copeland 1935, 88 y.o. male MRN: [id number]  Unit/Bed#: 422-01 Encounter: [id number]  Primary Care Provider: Elijah Borja DO   Date and time admitted to hospital: 1/11/2024  5:50 PM    Admission for end of life care  Assessment & Plan  Patient admitted to [facility] hospice  Comfort measures initiated  Hospice team following  Notified per RN of pt passing. Time of death 9:30pm.  Family at bedside. They decline autopsy          Discharging Physician / Practitioner: Soraya Toribio DO  PCP: Elijah Borja DO  Admission Date:   Admission Orders (From admission, onward)       Ordered        01/12/24 1058  Inpatient Admission  Once                          Discharge Date: 01/14/24    Medical Problems       Resolved Problems  Date Reviewed: 1/13/2024   None         Consultations During Hospital Stay:  NA    Procedures Performed:     NA    Significant Findings / Test Results:     See above    Incidental Findings:   NA     Test Results Pending at Discharge (will require follow up):   NA     Outpatient Tests Requested:  NA    Complications:  None    Reason for Admission: For inpatient hospice    Hospital Course:       History is obtained via review of medical records, discussion with ED staff, discussion with the patient's son and wife.     88-year-old male with a recent diagnosis of AML, history of atrial fibrillation, DVT, prostate cancer, presents to the emergency room with complaint of chest pain ongoing for about a week.  On further questioning the patient's family states that he was in his usual state of health when suddenly felt very weak.      The patient appears ill and is on HFNC. Extensive discussion with his wife and son at bedside regarding goals of care , extent of treatment and the patient's preference regarding end of life care. Ultimately they decided to proceed with comfort care measures.    Please see above list of diagnoses and related  plan for additional information.     Condition at Discharge:        Discharge Day Visit / Exam:     * Please refer to separate progress note for these details *    Discussion with Family: Son and dtr-in-law    Discharge instructions/Information to patient and family:   See after visit summary for information provided to patient and family.      Provisions for Follow-Up Care:  See after visit summary for information related to follow-up care and any pertinent home health orders.      Disposition:     Other:     For Discharges to [facility] SNF:   Not Applicable to this Patient - Not Applicable to this Patient    Planned Readmission: No     Discharge Statement:  I spent 30 minutes discharging the patient. This time was spent on the day of discharge. I had direct contact with the patient on the day of discharge. Greater than 50% of the total time was spent examining patient, answering all patient questions, arranging and discussing plan of care with patient as well as directly providing post-discharge instructions.  Additional time then spent on discharge activities.    Discharge Medications:  See after visit summary for reconciled discharge medications provided to patient and family.      ** Please Note: This note has been constructed using a voice recognition system **

## 2024-01-15 NOTE — DEATH NOTE
INPATIENT DEATH NOTE  Freddy Copeland 88 y.o. male MRN: [id number]  Unit/Bed#: 422-01 Encounter: [id number]             PHYSICAL EXAM:  Unresponsive to noxious stimuli, Spontaneous respirations absent, Breath sounds absent, Carotid pulse absent, Heart sounds absent, and Corneal blink reflex absent    Medical Examiner notification criteria:  NONE APPLICABLE   Medical Examiner's office notified?:  No, does not meet ME notification criteria   Medical Examiner accepted case?:  N/A  Name of Medical Examiner: N/A         Autopsy Options:  Post-mortem examination declined by next of kin    Primary Service Attending Physician notified?:  yes - Attending:  Renée Molina MD    Physician/Resident responsible for completing Discharge Summary:  Completed    Time of death 9:30pm

## 2024-01-16 LAB
BACTERIA BLD CULT: NORMAL
BACTERIA BLD CULT: NORMAL

## 2024-01-17 ENCOUNTER — HOME CARE VISIT (OUTPATIENT)
Dept: HOME HOSPICE | Facility: HOSPICE | Age: 89
End: 2024-01-17
Payer: MEDICARE

## 2024-01-17 LAB
ATRIAL RATE: 138 BPM
ATRIAL RATE: 241 BPM
QRS AXIS: 64 DEGREES
QRS AXIS: 86 DEGREES
QRSD INTERVAL: 94 MS
QRSD INTERVAL: 96 MS
QT INTERVAL: 320 MS
QT INTERVAL: 374 MS
QTC INTERVAL: 431 MS
QTC INTERVAL: 507 MS
T WAVE AXIS: 238 DEGREES
T WAVE AXIS: 80 DEGREES
VENTRICULAR RATE: 151 BPM
VENTRICULAR RATE: 80 BPM
